# Patient Record
Sex: MALE | Race: WHITE | NOT HISPANIC OR LATINO | Employment: OTHER | URBAN - METROPOLITAN AREA
[De-identification: names, ages, dates, MRNs, and addresses within clinical notes are randomized per-mention and may not be internally consistent; named-entity substitution may affect disease eponyms.]

---

## 2017-07-28 ENCOUNTER — OFFICE VISIT (OUTPATIENT)
Dept: FAMILY MEDICINE CLINIC | Facility: CLINIC | Age: 72
End: 2017-07-28

## 2017-07-28 VITALS
TEMPERATURE: 97.8 F | HEIGHT: 70 IN | WEIGHT: 248.5 LBS | DIASTOLIC BLOOD PRESSURE: 76 MMHG | HEART RATE: 110 BPM | BODY MASS INDEX: 35.58 KG/M2 | OXYGEN SATURATION: 99 % | SYSTOLIC BLOOD PRESSURE: 120 MMHG

## 2017-07-28 DIAGNOSIS — E78.2 MIXED HYPERLIPIDEMIA: ICD-10-CM

## 2017-07-28 DIAGNOSIS — I10 ESSENTIAL HYPERTENSION: ICD-10-CM

## 2017-07-28 DIAGNOSIS — S80.12XA TRAUMATIC HEMATOMA OF LEFT LOWER LEG, INITIAL ENCOUNTER: ICD-10-CM

## 2017-07-28 DIAGNOSIS — E11.9 TYPE 2 DIABETES MELLITUS WITHOUT COMPLICATION, WITHOUT LONG-TERM CURRENT USE OF INSULIN (HCC): Primary | ICD-10-CM

## 2017-07-28 DIAGNOSIS — Z51.81 MEDICATION MONITORING ENCOUNTER: ICD-10-CM

## 2017-07-28 PROBLEM — Z00.00 ROUTINE ADULT HEALTH MAINTENANCE: Status: ACTIVE | Noted: 2017-07-28

## 2017-07-28 PROBLEM — E66.3 SEVERELY OVERWEIGHT: Status: ACTIVE | Noted: 2017-07-28

## 2017-07-28 PROBLEM — M10.062 ACUTE IDIOPATHIC GOUT OF LEFT KNEE: Status: ACTIVE | Noted: 2017-07-28

## 2017-07-28 PROCEDURE — 99204 OFFICE O/P NEW MOD 45 MIN: CPT | Performed by: FAMILY MEDICINE

## 2017-07-28 RX ORDER — PIOGLITAZONEHYDROCHLORIDE 30 MG/1
30 TABLET ORAL DAILY
Qty: 90 TABLET | Refills: 3 | Status: SHIPPED | OUTPATIENT
Start: 2017-07-28 | End: 2018-08-23 | Stop reason: SDUPTHER

## 2017-07-28 RX ORDER — PIOGLITAZONEHYDROCHLORIDE 30 MG/1
30 TABLET ORAL DAILY
COMMUNITY
End: 2017-07-28 | Stop reason: SDUPTHER

## 2017-07-28 RX ORDER — MELATONIN
1000 DAILY
COMMUNITY

## 2017-07-28 RX ORDER — LISINOPRIL 20 MG/1
20 TABLET ORAL DAILY
Qty: 90 TABLET | Refills: 3 | Status: SHIPPED | OUTPATIENT
Start: 2017-07-28 | End: 2018-08-23 | Stop reason: SDUPTHER

## 2017-07-28 RX ORDER — ALLOPURINOL 100 MG/1
100 TABLET ORAL DAILY
COMMUNITY
End: 2017-09-06 | Stop reason: SDUPTHER

## 2017-07-28 RX ORDER — ASPIRIN 81 MG/1
81 TABLET, CHEWABLE ORAL DAILY
COMMUNITY
End: 2018-09-28 | Stop reason: HOSPADM

## 2017-07-28 RX ORDER — ATORVASTATIN CALCIUM 80 MG/1
80 TABLET, FILM COATED ORAL DAILY
Qty: 90 TABLET | Refills: 3 | Status: SHIPPED | OUTPATIENT
Start: 2017-07-28 | End: 2018-08-23 | Stop reason: SDUPTHER

## 2017-07-28 RX ORDER — ATORVASTATIN CALCIUM 80 MG/1
80 TABLET, FILM COATED ORAL DAILY
COMMUNITY
End: 2017-07-28 | Stop reason: SDUPTHER

## 2017-07-28 RX ORDER — CHLORAL HYDRATE 500 MG
CAPSULE ORAL
COMMUNITY
End: 2018-08-23

## 2017-07-28 RX ORDER — LISINOPRIL 20 MG/1
20 TABLET ORAL DAILY
COMMUNITY
End: 2017-07-28 | Stop reason: SDUPTHER

## 2017-07-28 NOTE — PROGRESS NOTES
"  Chief Complaint   Patient presents with   • Hyperlipidemia     New patient, med refills    • Hypertension   • Leg Swelling     left leg       Subjective    New patient here  He and wife moved back from [place] to be near daughter and grands  Currently battling prostate cancer with radiation  Needs refills    Also tipped his lawn tractor a month ago and injured his left calf    Patient here for follow-up of elevated blood pressure.    He is not exercising and is not adherent to a low-salt diet.    Blood pressure is well controlled at home.   Cardiac symptoms: lower extremity edema.   Patient denies: chest pressure/discomfort, claudication, cough, dyspnea, exertional chest pressure/discomfort, fatigue and irregular heart beat.   Cardiovascular risk factors: advanced age (older than 55 for men, 65 for women), diabetes mellitus, dyslipidemia, family history of premature cardiovascular disease, hypertension, male gender, obesity (BMI >= 30 kg/m2) and sedentary lifestyle.   Use of agents associated with hypertension: none.   History of target organ damage: none.  Patient is taking prescribed hypertension medications as prescribed without side effects.    The following portions of the patient's history were reviewed and updated as appropriate: allergies, current medications, past family history, past medical history, past social history, past surgical history and problem list.    Review of Systems  Pertinent items are noted in HPI.    No results found for this or any previous visit.     Vitals:    07/28/17 1255   BP: 120/76   BP Location: Left arm   Patient Position: Sitting   Pulse: 110   Temp: 97.8 °F (36.6 °C)   SpO2: 99%   Weight: 248 lb 8 oz (113 kg)   Height: 69.5\" (176.5 cm)     Objective    Gen: alert, pleasant.  HEENT: PERRL, EOMI intact, lids ok, ear canals clear, TMs normal, throat clear, nostrils normal  Neck: no bruit, no enlarged thyroid  Lungs: CTA  Heart: RR no murmur  ABD: soft , + BS  Pulses: intact  Mood: " stable  Left calf: golf ball sized hematoma.  Feet : rough shape, all nails thick and yellow, hammer toes.     Assessment/Plan   Hypertension, normal blood pressure Evidence of target organ damage: none.    Bryan was seen today for hyperlipidemia, hypertension and leg swelling.    Diagnoses and all orders for this visit:    Type 2 diabetes mellitus without complication, without long-term current use of insulin  -     pioglitazone (ACTOS) 30 MG tablet; Take 1 tablet by mouth Daily. Indications: Type 2 Diabetes  -     Basic Metabolic Panel; Future  -     MicroAlbumin, Urine, Random; Future  -     Hemoglobin A1c; Future  -     Ambulatory Referral to Podiatry    Essential hypertension  -     lisinopril (PRINIVIL,ZESTRIL) 20 MG tablet; Take 1 tablet by mouth Daily. Indications: High Blood Pressure    Mixed hyperlipidemia  -     atorvastatin (LIPITOR) 80 MG tablet; Take 1 tablet by mouth Daily.  -     Lipid Panel; Future    Medication monitoring encounter  -     ALT; Future    Traumatic hematoma of left lower leg, initial encounter  -     US nonvascular extremity complete; Future      Medication: no change.  Dietary sodium restriction.  Regular aerobic exercise.  Follow up: 3 months and as needed.    There are no Patient Instructions on file for this visit.  Medications Discontinued During This Encounter   Medication Reason   • pioglitazone (ACTOS) 30 MG tablet Reorder   • lisinopril (PRINIVIL,ZESTRIL) 20 MG tablet Reorder   • atorvastatin (LIPITOR) 80 MG tablet Reorder        Return in about 3 months (around 10/28/2017) for diabetes.    Limit salt  Limit alcoholic drinks to 1 a day  Limit caffeine to 1-2 servings a day    Dr. Jose Fonseca MD  [place]  [facility].

## 2017-08-02 ENCOUNTER — RESULTS ENCOUNTER (OUTPATIENT)
Dept: FAMILY MEDICINE CLINIC | Facility: CLINIC | Age: 72
End: 2017-08-02

## 2017-08-02 DIAGNOSIS — Z51.81 MEDICATION MONITORING ENCOUNTER: ICD-10-CM

## 2017-08-02 DIAGNOSIS — E78.2 MIXED HYPERLIPIDEMIA: ICD-10-CM

## 2017-08-02 DIAGNOSIS — E11.9 TYPE 2 DIABETES MELLITUS WITHOUT COMPLICATION, WITHOUT LONG-TERM CURRENT USE OF INSULIN (HCC): ICD-10-CM

## 2017-08-04 ENCOUNTER — HOSPITAL ENCOUNTER (OUTPATIENT)
Dept: ULTRASOUND IMAGING | Facility: HOSPITAL | Age: 72
Discharge: HOME | End: 2017-08-04
Attending: FAMILY MEDICINE | Admitting: FAMILY MEDICINE

## 2017-08-04 DIAGNOSIS — S80.12XA TRAUMATIC HEMATOMA OF LEFT LOWER LEG, INITIAL ENCOUNTER: ICD-10-CM

## 2017-08-04 PROCEDURE — 76881 US COMPL JOINT R-T W/IMG: CPT

## 2017-08-07 DIAGNOSIS — S80.12XD TRAUMATIC HEMATOMA OF LEFT LOWER LEG, SUBSEQUENT ENCOUNTER: Primary | ICD-10-CM

## 2017-08-14 ENCOUNTER — OFFICE VISIT (OUTPATIENT)
Dept: SURGERY | Facility: CLINIC | Age: 72
End: 2017-08-14

## 2017-08-14 VITALS
HEIGHT: 70 IN | DIASTOLIC BLOOD PRESSURE: 78 MMHG | BODY MASS INDEX: 35.93 KG/M2 | WEIGHT: 251 LBS | SYSTOLIC BLOOD PRESSURE: 118 MMHG

## 2017-08-14 DIAGNOSIS — S80.12XA TRAUMATIC HEMATOMA OF LEFT LOWER LEG, INITIAL ENCOUNTER: Primary | ICD-10-CM

## 2017-08-14 PROCEDURE — 99202 OFFICE O/P NEW SF 15 MIN: CPT | Performed by: SURGERY

## 2017-08-14 NOTE — PROGRESS NOTES
PATIENT INFORMATION  Bryan ROSS - 1945    CHIEF COMPLAINT  Chief Complaint   Patient presents with   • Wound Check     hematoma x 3 months       HISTORY OF PRESENT ILLNESS  HPI suffered some trauma to his left leg 3 months ago.  He fell off a tractor.  He is not on any anticoagulation.  He says he has some swelling of the left leg but he denies any fevers or chills.  He denies any pain.  He denies any disability from this.  He recently had an ultrasound performed.  He does work out by lifting weights with his upper body and his lower body.        REVIEW OF SYSTEMS  Review of Systems prostate cancer being treated with radiation with fatigue      ACTIVE PROBLEMS  Patient Active Problem List    Diagnosis   • Type 2 diabetes mellitus without complication, without long-term current use of insulin [E11.9]   • Severely overweight [E66.3]   • Routine adult health maintenance [Z00.00]   • Acute idiopathic gout of left knee [M10.062]   • Medication monitoring encounter [Z51.81]   • Traumatic hematoma of left lower leg [S80.12XA]   • Essential hypertension [I10]   • Mixed hyperlipidemia [E78.2]   • Arthritis [M19.90]         PAST MEDICAL HISTORY  Past Medical History:   Diagnosis Date   • Arthritis    • Hyperlipidemia    • Hypertension          SURGICAL HISTORY  Past Surgical History:   Procedure Laterality Date   • COLONOSCOPY  2016   • HERNIA REPAIR     • PROSTATE ULTRASOUND BIOPSY     • TOTAL HIP ARTHROPLASTY Right          FAMILY HISTORY  Family History   Problem Relation Age of Onset   • Stroke Mother    • Stroke Father    • No Known Problems Brother          SOCIAL HISTORY  Social History     Occupational History   • retired [employer]       Social History Main Topics   • Smoking status: Never Smoker   • Smokeless tobacco: Never Used   • Alcohol use 8.4 oz/week     14 Glasses of wine per week   • Drug use: No   • Sexual activity: Yes     Partners: Female     Birth control/  "protection: Post-menopausal         CURRENT MEDICATIONS    Current Outpatient Prescriptions:   •  allopurinol (ZYLOPRIM) 100 MG tablet, Take 100 mg by mouth Daily., Disp: , Rfl:   •  aspirin 81 MG chewable tablet, Chew 81 mg Daily., Disp: , Rfl:   •  atorvastatin (LIPITOR) 80 MG tablet, Take 1 tablet by mouth Daily., Disp: 90 tablet, Rfl: 3  •  cholecalciferol (VITAMIN D3) 1000 UNITS tablet, Take 1,000 Units by mouth Daily., Disp: , Rfl:   •  lisinopril (PRINIVIL,ZESTRIL) 20 MG tablet, Take 1 tablet by mouth Daily. Indications: High Blood Pressure, Disp: 90 tablet, Rfl: 3  •  Multiple Vitamins-Minerals (MULTIVITAL PLATINUM PO), Take  by mouth., Disp: , Rfl:   •  Omega-3 Fatty Acids (FISH OIL) 1000 MG capsule capsule, Take  by mouth Daily With Breakfast., Disp: , Rfl:   •  pioglitazone (ACTOS) 30 MG tablet, Take 1 tablet by mouth Daily. Indications: Type 2 Diabetes, Disp: 90 tablet, Rfl: 3  •  Potassium 99 MG tablet, Take  by mouth., Disp: , Rfl:   •  Psyllium (METAMUCIL FIBER PO), Take  by mouth., Disp: , Rfl:     ALLERGIES  Review of patient's allergies indicates no known allergies.    VITALS  Vitals:    08/14/17 1300   BP: 118/78   Weight: 251 lb (114 kg)   Height: 69.5\" (176.5 cm)       LAST RESULTS   No results found for any previous visit.  Us Nonvascular Extremity Complete    Result Date: 8/4/2017  Narrative: LEFT NONVASCULAR EXTREMITY ULTRASOUND  INDICATION: Hematoma in the left leg after a fall 2 months ago. Observation for hematoma  PROCEDURE: Grayscale and Doppler imaging of the left leg in the area of patient's  COMPARISON: None  FINDINGS:  There is a complex collection in the soft tissues of the medial left calf measures 10.6 x 1.4 x 4. 8 cm.      Impression: Complex collection in the soft tissues of the medial left calf consistent with a hematoma.  This report was finalized on 8/4/2017 1:18 PM by Dr. Tito Hanson MD.        PHYSICAL EXAM  Physical Exam solar white male in no active distress.  He is " oriented ×3.  He is asymmetry of his left calf to his right calf.  His ultrasound was reviewed and discussed with the radiologist.  It is consistent with a hematoma.  There is no cellulitis.  There is no tenderness.    ASSESSMENT  Traumatic hematoma        PLAN  The risks benefits and options were discussed with the patient in detail.  I have advised him to stop his lower extremity weight work for the next 2 months.  We will observe the area if he become symptomatic we can proceed with an incision and drainage but that is not planned at this time.  I will see him back when necessary.

## 2017-08-30 LAB
ALT SERPL-CCNC: 24 U/L (ref 5–41)
BUN SERPL-MCNC: 17 MG/DL (ref 8–23)
BUN/CREAT SERPL: 13.3 (ref 7–25)
CALCIUM SERPL-MCNC: 8.8 MG/DL (ref 8.8–10.5)
CHLORIDE SERPL-SCNC: 101 MMOL/L (ref 98–107)
CHOLEST SERPL-MCNC: 185 MG/DL (ref 0–200)
CO2 SERPL-SCNC: 25.2 MMOL/L (ref 22–29)
CREAT SERPL-MCNC: 1.28 MG/DL (ref 0.76–1.27)
GLUCOSE SERPL-MCNC: 116 MG/DL (ref 65–99)
HBA1C MFR BLD: 5.7 % (ref 4.8–5.6)
HDLC SERPL-MCNC: 64 MG/DL (ref 40–60)
LDLC SERPL CALC-MCNC: 68 MG/DL (ref 0–100)
POTASSIUM SERPL-SCNC: 4.6 MMOL/L (ref 3.5–5.2)
SODIUM SERPL-SCNC: 140 MMOL/L (ref 136–145)
TRIGL SERPL-MCNC: 266 MG/DL (ref 0–150)
VLDLC SERPL CALC-MCNC: 53.2 MG/DL (ref 8–32)

## 2017-08-31 LAB — MICROALBUMIN UR-MCNC: 30.1 UG/ML

## 2017-09-06 ENCOUNTER — OFFICE VISIT (OUTPATIENT)
Dept: FAMILY MEDICINE CLINIC | Facility: CLINIC | Age: 72
End: 2017-09-06

## 2017-09-06 VITALS
BODY MASS INDEX: 35.73 KG/M2 | TEMPERATURE: 97.6 F | OXYGEN SATURATION: 93 % | SYSTOLIC BLOOD PRESSURE: 130 MMHG | DIASTOLIC BLOOD PRESSURE: 76 MMHG | HEART RATE: 106 BPM | HEIGHT: 70 IN | WEIGHT: 249.6 LBS

## 2017-09-06 DIAGNOSIS — E11.9 TYPE 2 DIABETES MELLITUS WITHOUT COMPLICATION, WITHOUT LONG-TERM CURRENT USE OF INSULIN (HCC): ICD-10-CM

## 2017-09-06 DIAGNOSIS — E11.29 MICROALBUMINURIA DUE TO TYPE 2 DIABETES MELLITUS (HCC): ICD-10-CM

## 2017-09-06 DIAGNOSIS — Z51.81 MEDICATION MONITORING ENCOUNTER: ICD-10-CM

## 2017-09-06 DIAGNOSIS — M10.062 ACUTE IDIOPATHIC GOUT OF LEFT KNEE: ICD-10-CM

## 2017-09-06 DIAGNOSIS — E78.2 MIXED HYPERLIPIDEMIA: ICD-10-CM

## 2017-09-06 DIAGNOSIS — R80.9 MICROALBUMINURIA DUE TO TYPE 2 DIABETES MELLITUS (HCC): ICD-10-CM

## 2017-09-06 DIAGNOSIS — I10 ESSENTIAL HYPERTENSION: Primary | ICD-10-CM

## 2017-09-06 PROCEDURE — 99214 OFFICE O/P EST MOD 30 MIN: CPT | Performed by: FAMILY MEDICINE

## 2017-09-06 RX ORDER — ALLOPURINOL 100 MG/1
100 TABLET ORAL DAILY
Qty: 90 TABLET | Refills: 3 | Status: SHIPPED | OUTPATIENT
Start: 2017-09-06 | End: 2018-03-26 | Stop reason: SDUPTHER

## 2017-09-06 RX ORDER — TAMSULOSIN HYDROCHLORIDE 0.4 MG/1
CAPSULE ORAL
Status: ON HOLD | COMMUNITY
Start: 2017-08-31 | End: 2019-01-04

## 2017-09-06 NOTE — PROGRESS NOTES
"  Chief Complaint   Patient presents with   • Follow-up     lab results    • Diabetes   • Hyperlipidemia   • Gout   • Lower Extremity Issue   • Hypertension       Subjective   Bryan Landers is an 72 y.o. male who presents for follow up of diabetes. Current symptoms include: none. Patient denies foot ulcerations, hyperglycemia, hypoglycemia , increased appetite, nausea, paresthesia of the feet, polydipsia, polyuria, visual disturbances, vomiting and weight loss. Evaluation to date has included: fasting blood sugar, fasting lipid panel, hemoglobin A1C and microalbuminuria. Home sugars: patient does not check sugars. Current treatments: more intensive attention to diet which has been not very effective, low cholesterol diet which has been not very effective, increased aerobic exercise which has been not very effective, Continued metformin which has been effective, Continued Actos which has been effective, Continued statin which has been effective and Continued ACE inhibitor/ARB which has been effective. Last dilated eye exam unsure.    No gout flairs.      The following portions of the patient's history were reviewed and updated as appropriate: allergies, current medications, past medical history, past social history, past surgical history and problem list.        Review of Systems  Pertinent items are noted in HPI.     Vitals:    09/06/17 0942   BP: 130/76   BP Location: Left arm   Patient Position: Sitting   Pulse: 106   Temp: 97.6 °F (36.4 °C)   SpO2: 93%   Weight: 249 lb 9.6 oz (113 kg)   Height: 69.5\" (176.5 cm)       Objective    Gen:  Alert, pleasant, heavy smell of ETOH. Eyes bloodshot  Ears: canals clear, TMs normal  Throat: clear , no thrush, teeth ok  Neck: no bruit, no LAD  Lungs: clear  Heart: RR no murmur  Feet:  No rash, no skin breakdown, sensation grossly normal.  Right calf. Less swollen , smaller but harder hematoma  If not better in a few weeks, please call Dr Jett again    Laboratory:  Results for " orders placed or performed in visit on 08/02/17   Lipid Panel   Result Value Ref Range    Total Cholesterol 185 0 - 200 mg/dL    Triglycerides 266 (H) 0 - 150 mg/dL    HDL Cholesterol 64 (H) 40 - 60 mg/dL    VLDL Cholesterol 53.2 (H) 8 - 32 mg/dL    LDL Cholesterol  68 0 - 100 mg/dL   Basic Metabolic Panel   Result Value Ref Range    Glucose 116 (H) 65 - 99 mg/dL    BUN 17 8 - 23 mg/dL    Creatinine 1.28 (H) 0.76 - 1.27 mg/dL    eGFR Non African Am 55 (L) >60 mL/min/1.73    eGFR African Am 67 >60 mL/min/1.73    BUN/Creatinine Ratio 13.3 7.0 - 25.0    Sodium 140 136 - 145 mmol/L    Potassium 4.6 3.5 - 5.2 mmol/L    Chloride 101 98 - 107 mmol/L    Total CO2 25.2 22.0 - 29.0 mmol/L    Calcium 8.8 8.8 - 10.5 mg/dL   MicroAlbumin, Urine, Random   Result Value Ref Range    Microalbumin, Urine 30.1 Not Estab. ug/mL   Hemoglobin A1c   Result Value Ref Range    Hemoglobin A1C 5.70 (H) 4.80 - 5.60 %   ALT   Result Value Ref Range    ALT (SGPT) 24 5 - 41 U/L        Assessment/Plan        Encouraged aerobic exercise.  Discussed foot care.  Reminded to get yearly retinal exam.  Continued metformin; see  medication orders.  Continued Actos; see medication orders.  Continued statin drug see medication orders.  Continued ACE inhibitor; see medication orders.  Follow up in 6 months or as needed.    Bryan was seen today for follow-up, diabetes, hyperlipidemia, gout, lower extremity issue and hypertension.    Diagnoses and all orders for this visit:    Essential hypertension    Mixed hyperlipidemia    Type 2 diabetes mellitus without complication, without long-term current use of insulin  -     Hemoglobin A1c; Future  -     Basic Metabolic Panel; Future    Acute idiopathic gout of left knee  -     allopurinol (ZYLOPRIM) 100 MG tablet; Take 1 tablet by mouth Daily. Indications: Gout secondary to Another Condition  -     Uric Acid; Future    Microalbuminuria due to type 2 diabetes mellitus  -     MicroAlbumin, Urine, Random;  Future    Medication monitoring encounter  -     ALT; Future        Return in about 6 months (around 3/6/2018) for blood pressure, Medicare Wellness visit, diabetes.  Patient Instructions   SCr went up  Protein in urine    Medications Discontinued During This Encounter   Medication Reason   • allopurinol (ZYLOPRIM) 100 MG tablet Reorder         Dr. Jose Fonseca MD  [place], [place]  [facility].

## 2017-09-22 ENCOUNTER — CLINICAL SUPPORT (OUTPATIENT)
Dept: FAMILY MEDICINE CLINIC | Facility: CLINIC | Age: 72
End: 2017-09-22

## 2017-09-22 DIAGNOSIS — Z23 IMMUNIZATION DUE: Primary | ICD-10-CM

## 2017-09-22 PROCEDURE — 90662 IIV NO PRSV INCREASED AG IM: CPT | Performed by: FAMILY MEDICINE

## 2017-09-22 PROCEDURE — 90471 IMMUNIZATION ADMIN: CPT | Performed by: FAMILY MEDICINE

## 2017-12-06 ENCOUNTER — RESULTS ENCOUNTER (OUTPATIENT)
Dept: FAMILY MEDICINE CLINIC | Facility: CLINIC | Age: 72
End: 2017-12-06

## 2017-12-06 DIAGNOSIS — Z51.81 MEDICATION MONITORING ENCOUNTER: ICD-10-CM

## 2017-12-06 DIAGNOSIS — M10.062 ACUTE IDIOPATHIC GOUT OF LEFT KNEE: ICD-10-CM

## 2017-12-06 DIAGNOSIS — E11.29 MICROALBUMINURIA DUE TO TYPE 2 DIABETES MELLITUS (HCC): ICD-10-CM

## 2017-12-06 DIAGNOSIS — R80.9 MICROALBUMINURIA DUE TO TYPE 2 DIABETES MELLITUS (HCC): ICD-10-CM

## 2017-12-06 DIAGNOSIS — E11.9 TYPE 2 DIABETES MELLITUS WITHOUT COMPLICATION, WITHOUT LONG-TERM CURRENT USE OF INSULIN (HCC): ICD-10-CM

## 2017-12-16 LAB
ALT SERPL-CCNC: 28 U/L (ref 5–41)
BUN SERPL-MCNC: 15 MG/DL (ref 8–23)
BUN/CREAT SERPL: 11.3 (ref 7–25)
CALCIUM SERPL-MCNC: 8.8 MG/DL (ref 8.8–10.5)
CHLORIDE SERPL-SCNC: 100 MMOL/L (ref 98–107)
CO2 SERPL-SCNC: 27.8 MMOL/L (ref 22–29)
CREAT SERPL-MCNC: 1.33 MG/DL (ref 0.76–1.27)
GFR SERPLBLD CREATININE-BSD FMLA CKD-EPI: 53 ML/MIN/1.73
GFR SERPLBLD CREATININE-BSD FMLA CKD-EPI: 64 ML/MIN/1.73
GLUCOSE SERPL-MCNC: 129 MG/DL (ref 65–99)
HBA1C MFR BLD: 5.8 % (ref 4.8–5.6)
MICROALBUMIN UR-MCNC: 26 UG/ML
POTASSIUM SERPL-SCNC: 4.4 MMOL/L (ref 3.5–5.2)
SODIUM SERPL-SCNC: 140 MMOL/L (ref 136–145)
URATE SERPL-MCNC: 6.6 MG/DL (ref 3.4–7)

## 2018-02-27 ENCOUNTER — OFFICE VISIT (OUTPATIENT)
Dept: FAMILY MEDICINE CLINIC | Facility: CLINIC | Age: 73
End: 2018-02-27

## 2018-02-27 VITALS
OXYGEN SATURATION: 95 % | WEIGHT: 252 LBS | DIASTOLIC BLOOD PRESSURE: 64 MMHG | HEIGHT: 70 IN | HEART RATE: 115 BPM | SYSTOLIC BLOOD PRESSURE: 118 MMHG | TEMPERATURE: 97.4 F | BODY MASS INDEX: 36.08 KG/M2

## 2018-02-27 DIAGNOSIS — Z85.46 HISTORY OF PROSTATE CANCER: Primary | ICD-10-CM

## 2018-02-27 DIAGNOSIS — I10 ESSENTIAL HYPERTENSION: ICD-10-CM

## 2018-02-27 DIAGNOSIS — E11.9 TYPE 2 DIABETES MELLITUS WITHOUT COMPLICATION, WITHOUT LONG-TERM CURRENT USE OF INSULIN (HCC): ICD-10-CM

## 2018-02-27 PROBLEM — S80.12XA TRAUMATIC HEMATOMA OF LEFT LOWER LEG: Status: RESOLVED | Noted: 2017-07-28 | Resolved: 2018-02-27

## 2018-02-27 PROCEDURE — 99213 OFFICE O/P EST LOW 20 MIN: CPT | Performed by: FAMILY MEDICINE

## 2018-02-27 NOTE — PROGRESS NOTES
Subjective   Bryan Landers is a 73 y.o. male who is here for   Chief Complaint   Patient presents with   • Follow-up   • Prostate Cancer   • Hypertension   .     History of Present Illness   Overall doing well after his 43 RXT for prostate cancer.finished up in Aug 17'  Wants to move back to [facility]  His wife was here last week , I s/w her in a room  She was distraught over his recent anger and wanted me to talk with him.  He verbally hurt his daughter and son in law and his 2 adult grandchildren's feelings  Which is unlike him.  He blames them for the issue  He seems level headed today  He acknowledges he and wife disagree about moving away  Their daughter and grands are here.  He has no friends here  Is lonely.  3-4 drinks a night. (MAYBE MORE?)  No smell of etoh today.      The following portions of the patient's history were reviewed and updated as appropriate: allergies, current medications, past family history, past medical history, past social history, past surgical history and problem list.    Review of Systems    Objective   Physical Exam   Constitutional: He appears well-developed and well-nourished.   Cardiovascular: Normal rate.    Neurological: He is alert.   Psychiatric: He has a normal mood and affect. His behavior is normal. Judgment and thought content normal.   Nursing note and vitals reviewed.      Assessment/Plan   Bryan was seen today for follow-up, prostate cancer and hypertension.    Diagnoses and all orders for this visit:    History of prostate cancer    Essential hypertension    Type 2 diabetes mellitus without complication, without long-term current use of insulin      There are no Patient Instructions on file for this visit.    There are no discontinued medications.     Return in about 6 months (around 8/27/2018) for Medicare Wellness visit.      The next day, i called his wife Chloe and gave her an update.  I did not see depression or dementia  Suggested continue to work things out  calmly at home.    Dr. Jose Fonseca  [place], [place]

## 2018-03-06 ENCOUNTER — TELEPHONE (OUTPATIENT)
Dept: FAMILY MEDICINE CLINIC | Facility: CLINIC | Age: 73
End: 2018-03-06

## 2018-03-06 NOTE — TELEPHONE ENCOUNTER
Pt would like to know if he can come in for a Hep A vaccine? He and his wife are going out of state traveling.

## 2018-03-07 ENCOUNTER — CLINICAL SUPPORT (OUTPATIENT)
Dept: FAMILY MEDICINE CLINIC | Facility: CLINIC | Age: 73
End: 2018-03-07

## 2018-03-07 DIAGNOSIS — Z23 IMMUNIZATION DUE: Primary | ICD-10-CM

## 2018-03-07 PROCEDURE — 90471 IMMUNIZATION ADMIN: CPT | Performed by: FAMILY MEDICINE

## 2018-03-07 PROCEDURE — 90632 HEPA VACCINE ADULT IM: CPT | Performed by: FAMILY MEDICINE

## 2018-03-21 ENCOUNTER — OFFICE VISIT (OUTPATIENT)
Dept: FAMILY MEDICINE CLINIC | Facility: CLINIC | Age: 73
End: 2018-03-21

## 2018-03-21 VITALS
SYSTOLIC BLOOD PRESSURE: 126 MMHG | WEIGHT: 251.9 LBS | TEMPERATURE: 97.9 F | OXYGEN SATURATION: 95 % | BODY MASS INDEX: 36.06 KG/M2 | HEART RATE: 113 BPM | DIASTOLIC BLOOD PRESSURE: 80 MMHG | HEIGHT: 70 IN

## 2018-03-21 DIAGNOSIS — J40 BRONCHITIS: Primary | ICD-10-CM

## 2018-03-21 PROCEDURE — 99213 OFFICE O/P EST LOW 20 MIN: CPT | Performed by: FAMILY MEDICINE

## 2018-03-21 RX ORDER — AMOXICILLIN 500 MG/1
500 TABLET, FILM COATED ORAL 3 TIMES DAILY
Qty: 21 TABLET | Refills: 0 | Status: SHIPPED | OUTPATIENT
Start: 2018-03-21 | End: 2018-03-28

## 2018-03-21 NOTE — PROGRESS NOTES
"  Chief Complaint   Patient presents with   • Sore Throat     x 1 day    • Nasal Congestion       Upper Respiratory Infection: Patient complains of symptoms of a URI, possible sinusitis. Symptoms include congestion, cough and sore throat. Onset of symptoms was several days ago, gradually worsening since that time. He also c/o congestion, low grade fever, nasal congestion, productive cough with  yellow colored sputum and wheezing for the past 3 days .  He is drinking moderate amounts of fluids. Evaluation to date: none. Treatment to date: none.  Ill contacts at home or school or work discussed.      Vitals:    03/21/18 1052   BP: 126/80   BP Location: Left arm   Patient Position: Sitting   Pulse: 113   Temp: 97.9 °F (36.6 °C)   SpO2: 95%   Weight: 114 kg (251 lb 14.4 oz)   Height: 176.5 cm (69.5\")     Gen: mildly ill appearing, alert, smells of ETOH  Ears: Tm's  without redness  Nose:  Congestion  Throat:  Red without exudate, some drainage, tonsils okay, teeth in bad shape , several are rotten, needs to see dentist soon.  Neck: no LAD  Lung: mild rales, good air movement, regular RR  Heart: RR without murmur  Skin: advanced sun skin damage to face, mulit large AKs      Assessment/Plan   Bryan was seen today for sore throat and nasal congestion.    Diagnoses and all orders for this visit:    Bronchitis  -     amoxicillin (AMOXIL) 500 MG tablet; Take 1 tablet by mouth 3 (Three) Times a Day for 7 days.             There are no Patient Instructions on file for this visit.    Tylenol or Advil as needed for pain, fever, muscle aches  Plenty of fluids  Hand washing discussed  Off work or school note given if needed.  Warm tea for throat.  Pros and cons of antibiotic use discussed    Dr. Jose Fonseca MD  [place]  [facility]  "

## 2018-03-26 DIAGNOSIS — M10.062 ACUTE IDIOPATHIC GOUT OF LEFT KNEE: ICD-10-CM

## 2018-03-26 RX ORDER — ALLOPURINOL 100 MG/1
200 TABLET ORAL DAILY
Qty: 180 TABLET | Refills: 1 | Status: SHIPPED | OUTPATIENT
Start: 2018-03-26 | End: 2018-08-23 | Stop reason: SDUPTHER

## 2018-04-17 ENCOUNTER — HOSPITAL ENCOUNTER (EMERGENCY)
Facility: HOSPITAL | Age: 73
Discharge: HOME | End: 2018-04-17
Attending: EMERGENCY MEDICINE | Admitting: EMERGENCY MEDICINE

## 2018-04-17 VITALS
RESPIRATION RATE: 15 BRPM | HEIGHT: 70 IN | SYSTOLIC BLOOD PRESSURE: 145 MMHG | DIASTOLIC BLOOD PRESSURE: 88 MMHG | HEART RATE: 109 BPM | TEMPERATURE: 97.5 F | BODY MASS INDEX: 35.07 KG/M2 | WEIGHT: 245 LBS | OXYGEN SATURATION: 97 %

## 2018-04-17 DIAGNOSIS — S51.811A SKIN TEAR OF FOREARM WITHOUT COMPLICATION, RIGHT, INITIAL ENCOUNTER: Primary | ICD-10-CM

## 2018-04-17 PROCEDURE — 99283 EMERGENCY DEPT VISIT LOW MDM: CPT

## 2018-04-17 PROCEDURE — 99282 EMERGENCY DEPT VISIT SF MDM: CPT | Performed by: EMERGENCY MEDICINE

## 2018-04-17 RX ORDER — CEPHALEXIN 500 MG/1
500 CAPSULE ORAL 3 TIMES DAILY
Qty: 9 CAPSULE | Refills: 0 | Status: SHIPPED | OUTPATIENT
Start: 2018-04-17 | End: 2018-04-20

## 2018-04-17 NOTE — DISCHARGE INSTRUCTIONS
Change wound dressing once a day until it is well-healed.  Please return to the ER for any worsening pain, swelling, redness, fevers, drainage or any other concerns.

## 2018-04-17 NOTE — ED PROVIDER NOTES
Subjective   History of Present Illness  History of Present Illness    Chief complaint: Fall, forearm injury    Location: Right forearm    Quality/Severity:  Mild pain and bleeding    Timing/Duration: Occurred about 3 hours ago    Modifying Factors: None    Narrative: This patient presents for evaluation of a forearm injury after he accidentally fell.  He was in the parking lot of the liquor store and he tripped over his own feet causing him to fall to ground.  When he landed his forearm struck the asphalt surface and caused a skin tear with some immediate pain and some bleeding.  He was able to move the arm in all directions without any difficulty.  He denies any head injury or loss of consciousness.  He denies any injury to his trunk.  He says his left knee is a little bit sore but he hasn't been able to bear weight on it and he does not think it is particularly injured.  He expresses concern that he just needs help with the wound care needs on his forearm.    Associated Symptoms: As above    Review of Systems   Constitutional: Negative for activity change and diaphoresis.   Respiratory: Negative for shortness of breath.    Cardiovascular: Negative for chest pain.   Gastrointestinal: Negative for abdominal pain and vomiting.   Musculoskeletal: Positive for myalgias. Negative for gait problem.   Skin: Positive for wound. Negative for color change.   Neurological: Negative for syncope and numbness.   All other systems reviewed and are negative.      Past Medical History:   Diagnosis Date   • Arthritis    • Hyperlipidemia    • Hypertension        No Known Allergies    Past Surgical History:   Procedure Laterality Date   • COLONOSCOPY  09/2016   • HERNIA REPAIR     • PROSTATE ULTRASOUND BIOPSY     • TOTAL HIP ARTHROPLASTY Right 2007       Family History   Problem Relation Age of Onset   • Stroke Mother    • Stroke Father    • No Known Problems Brother        Social History     Social History   • Marital status:       Spouse name: Chloe   • Number of children: 2     Occupational History   • retired [employer]       Social History Main Topics   • Smoking status: Never Smoker   • Smokeless tobacco: Never Used   • Alcohol use 8.4 oz/week     14 Glasses of wine per week   • Drug use: No   • Sexual activity: Yes     Partners: Female     Birth control/ protection: Post-menopausal     Other Topics Concern   • Not on file       ED Triage Vitals [04/17/18 1421]   Temp Heart Rate Resp BP SpO2   97.5 °F (36.4 °C) 109 15 145/88 97 %      Temp src Heart Rate Source Patient Position BP Location FiO2 (%)   Oral Monitor -- -- --         Objective   Physical Exam   Constitutional: He is oriented to person, place, and time. He appears well-developed and well-nourished. No distress.   HENT:   Head: Normocephalic and atraumatic.   Eyes: EOM are normal. Pupils are equal, round, and reactive to light. Right eye exhibits no discharge. Left eye exhibits no discharge.   Neck: Normal range of motion. Neck supple.   Cardiovascular: Normal rate, regular rhythm and intact distal pulses.    Pulmonary/Chest: Effort normal. No respiratory distress.   Musculoskeletal: Normal range of motion. He exhibits tenderness. He exhibits no edema or deformity.   The right dorsolateral proximal forearm shows one moderate sized skin tear abrasion injury that extends about 6 cm in diameter.  The soft tissues are tender to palpation.  There is minimal oozing of dark red blood present.  There are no foreign bodies evident within the wound.  There is no evidence of tendon, nerve or vascular injury at all.  The patient is able to flex and extend the elbow and forearm and fingers to completely normal range of motion.  Distal sensation is intact to all fingers appropriately.   Neurological: He is alert and oriented to person, place, and time. He exhibits normal muscle tone.   Skin: Skin is warm and dry. No rash noted. He is not diaphoretic. No  "erythema.   Psychiatric: He has a normal mood and affect. His behavior is normal. Judgment and thought content normal.   Nursing note and vitals reviewed.      Procedures         ED Course  ED Course   Comment By Time   Presented with a skin tear injury to the right forearm.  It was not amenable to suturing.  I personally irrigated the wound with 1000 mL of sterile saline over a sink.  He and I reapproximated the wound edges of the skin tear epithelium back over the abraded tissue to create a sterile dressing.  We applied bacitracin over entire wound after that and I applied a nonadherent gauze dressings with Curlex and then Coban and for security.  Patient tolerated wound care very well.  Discharged home with a 3 day prescription for Keflex.  Advised follow-up with primary care doctor for repeat evaluation of the wound and I reviewed with him the usual \"return to ER\" instructions for any worsening changes or concerns about wound healing. Tobin Szymanski MD 04/17 [id number]                  [facility]    Final diagnoses:   Skin tear of forearm without complication, right, initial encounter            Tobin Szymanski MD  04/17/18 0423    "

## 2018-04-23 ENCOUNTER — OFFICE VISIT (OUTPATIENT)
Dept: FAMILY MEDICINE CLINIC | Facility: CLINIC | Age: 73
End: 2018-04-23

## 2018-04-23 VITALS
WEIGHT: 252 LBS | OXYGEN SATURATION: 97 % | HEIGHT: 70 IN | SYSTOLIC BLOOD PRESSURE: 120 MMHG | BODY MASS INDEX: 36.08 KG/M2 | HEART RATE: 103 BPM | DIASTOLIC BLOOD PRESSURE: 78 MMHG

## 2018-04-23 DIAGNOSIS — S51.811D SKIN TEAR OF FOREARM WITHOUT COMPLICATION, RIGHT, SUBSEQUENT ENCOUNTER: Primary | ICD-10-CM

## 2018-04-23 PROCEDURE — 99213 OFFICE O/P EST LOW 20 MIN: CPT | Performed by: FAMILY MEDICINE

## 2018-04-23 NOTE — PROGRESS NOTES
Subjective   Bryan Landers is a 73 y.o. male who is here for   Chief Complaint   Patient presents with   • Follow-up     ER   • Fall     x 1 week ago    • Wound Check     on right arm    .     History of Present Illness   Tripped and feel last week on [place]  Large skin tear flap on right forearm  Went to ER, note reviewed. Done with Keflex  No sutures.  Healing well  No pain.  tetnus utd    The following portions of the patient's history were reviewed and updated as appropriate: allergies, current medications, past medical history, past social history and problem list.    Review of Systems    Objective   Physical Exam   Skin:        Nursing note and vitals reviewed.      Assessment/Plan   Bryan was seen today for follow-up, fall and wound check.    Diagnoses and all orders for this visit:    Skin tear of forearm without complication, right, subsequent encounter      Patient Instructions   Gently wash 2 x a day with soap and water  Neosporin  Cover with non stick and ACE wrap   May also leave open to air if at home.      There are no discontinued medications.     Return for Next scheduled follow up.    Dr. Jose Fonseca  [facility] [place], [place]

## 2018-04-23 NOTE — PATIENT INSTRUCTIONS
Gently wash 2 x a day with soap and water  Neosporin  Cover with non stick and ACE wrap   May also leave open to air if at home.

## 2018-06-21 ENCOUNTER — OFFICE VISIT (OUTPATIENT)
Dept: FAMILY MEDICINE CLINIC | Facility: CLINIC | Age: 73
End: 2018-06-21

## 2018-06-21 VITALS
HEART RATE: 111 BPM | OXYGEN SATURATION: 94 % | DIASTOLIC BLOOD PRESSURE: 76 MMHG | SYSTOLIC BLOOD PRESSURE: 114 MMHG | BODY MASS INDEX: 35.65 KG/M2 | HEIGHT: 70 IN | WEIGHT: 249 LBS

## 2018-06-21 DIAGNOSIS — S36.508A: Primary | ICD-10-CM

## 2018-06-21 PROCEDURE — 99213 OFFICE O/P EST LOW 20 MIN: CPT | Performed by: FAMILY MEDICINE

## 2018-06-21 NOTE — PROGRESS NOTES
Subjective   Bryan Landers is a 73 y.o. male who is here for   Chief Complaint   Patient presents with   • Rectal Bleeding     getting worse x 2-3 weeks    .     History of Present Illness   Blood in Stool: Patient presents for presents evaluation of blood in stool/ rectal bleeding. Patient has associated symptoms of visible blood: light. The patient denies abdominal pain and diarrhea.  The patient has a known history of: no known risk factors. The patient has had 9 or 10 episodes of rectal bleeding.  There is a history of rectal injury. Patient has not similar episodes of rectal bleeding in the past.  He battles daily conspitation, takes 6 fiber tabs a day  Went to [place] for a week, got really constipated  At home finally had a large painful BM  Since then streaking red blood with BM      The following portions of the patient's history were reviewed and updated as appropriate: allergies, current medications, past family history, past medical history, past social history, past surgical history and problem list.    Review of Systems    Objective   Physical Exam   Genitourinary: Prostate normal. Rectal exam shows fissure. Rectal exam shows no external hemorrhoid, no internal hemorrhoid, no mass, no tenderness and anal tone normal.             Assessment/Plan   Bryan was seen today for rectal bleeding.    Diagnoses and all orders for this visit:    Injury of anal canal, initial encounter      Patient Instructions   Plenty of water  Baby wipes  Vaseline after BM to cut  Hold preventive aspirin for a week or 2      There are no discontinued medications.     Return for Next scheduled follow up.    Dr. Jose Fonseca  [facility]  [place], [place]

## 2018-06-22 ENCOUNTER — TELEPHONE (OUTPATIENT)
Dept: FAMILY MEDICINE CLINIC | Facility: CLINIC | Age: 73
End: 2018-06-22

## 2018-06-22 NOTE — TELEPHONE ENCOUNTER
Pt called and said you were going to call something in for his rash on the back of his leg that you found yesterday.

## 2018-07-26 ENCOUNTER — TELEPHONE (OUTPATIENT)
Dept: FAMILY MEDICINE CLINIC | Facility: CLINIC | Age: 73
End: 2018-07-26

## 2018-07-26 DIAGNOSIS — S36.508D: Primary | ICD-10-CM

## 2018-07-31 ENCOUNTER — TELEPHONE (OUTPATIENT)
Dept: SURGERY | Facility: CLINIC | Age: 73
End: 2018-07-31

## 2018-07-31 ENCOUNTER — OFFICE VISIT (OUTPATIENT)
Dept: SURGERY | Facility: CLINIC | Age: 73
End: 2018-07-31

## 2018-07-31 VITALS
DIASTOLIC BLOOD PRESSURE: 80 MMHG | WEIGHT: 252 LBS | BODY MASS INDEX: 36.08 KG/M2 | HEIGHT: 70 IN | SYSTOLIC BLOOD PRESSURE: 132 MMHG

## 2018-07-31 DIAGNOSIS — K64.8 INTERNAL HEMORRHOIDS: Primary | ICD-10-CM

## 2018-07-31 PROCEDURE — 99212 OFFICE O/P EST SF 10 MIN: CPT | Performed by: SURGERY

## 2018-07-31 NOTE — PROGRESS NOTES
INTERMITTENT RECTAL BLEEDING X FEW MONTHS, DARK STOOL, LAST C-SCOPE ABOUT 3 YEARS AGO  He has noticed some blood when he wipes on his tissue occasionally over the last several weeks.  He had a colonoscopy 2 years ago that had 2 polyps.  He did have radiation treatment for prostate cancer.  He is due for another colonoscopy in 3 years.  He saw his primary care doctor and was diagnosed with a fissure.  He denies any painful bowel movements recently.  He's been using Metamucil and he does have some constipation.  He drinks about 32 ounces of water per day.  Alert white male in no active distress.  He has no external hemorrhoids or skin tags.  His sphincter tone was normal.  Rectal exam was normal.  It was nontender I did not feel a mass or a fissure.  Anoscopy was performed and I saw no evidence of proctitis I did see some small internal hemorrhoids and no evidence of a fissure today.  His benefits and options were discussed with the patient.  I've advised him to increase his water intake to 64 ounces per day.  He can resume his daily aspirin.  I wrote him a prescription for Anusol HC suppositories one twice a day ×14 days.  If he has recurrent bleeding at that time he should call and we will likely refer him to colorectal surgery.

## 2018-08-15 DIAGNOSIS — Z51.81 MEDICATION MONITORING ENCOUNTER: ICD-10-CM

## 2018-08-15 DIAGNOSIS — Z00.00 ROUTINE ADULT HEALTH MAINTENANCE: ICD-10-CM

## 2018-08-15 DIAGNOSIS — E11.29 MICROALBUMINURIA DUE TO TYPE 2 DIABETES MELLITUS (HCC): ICD-10-CM

## 2018-08-15 DIAGNOSIS — M10.062 ACUTE IDIOPATHIC GOUT OF LEFT KNEE: ICD-10-CM

## 2018-08-15 DIAGNOSIS — R53.83 FATIGUE, UNSPECIFIED TYPE: ICD-10-CM

## 2018-08-15 DIAGNOSIS — R80.9 MICROALBUMINURIA DUE TO TYPE 2 DIABETES MELLITUS (HCC): ICD-10-CM

## 2018-08-15 DIAGNOSIS — E78.2 MIXED HYPERLIPIDEMIA: ICD-10-CM

## 2018-08-15 DIAGNOSIS — I10 ESSENTIAL HYPERTENSION: Primary | ICD-10-CM

## 2018-08-15 DIAGNOSIS — E11.9 TYPE 2 DIABETES MELLITUS WITHOUT COMPLICATION, WITHOUT LONG-TERM CURRENT USE OF INSULIN (HCC): ICD-10-CM

## 2018-08-15 DIAGNOSIS — Z12.5 ENCOUNTER FOR SCREENING FOR MALIGNANT NEOPLASM OF PROSTATE: ICD-10-CM

## 2018-08-16 LAB
ALT SERPL-CCNC: 29 U/L (ref 5–41)
APPEARANCE UR: CLEAR
BILIRUB UR QL STRIP: NEGATIVE
BUN SERPL-MCNC: 12 MG/DL (ref 8–23)
BUN/CREAT SERPL: 8.8 (ref 7–25)
CALCIUM SERPL-MCNC: 9.6 MG/DL (ref 8.8–10.5)
CHLORIDE SERPL-SCNC: 99 MMOL/L (ref 98–107)
CHOLEST SERPL-MCNC: 200 MG/DL (ref 0–200)
CO2 SERPL-SCNC: 27.1 MMOL/L (ref 22–29)
COLOR UR: YELLOW
CREAT SERPL-MCNC: 1.37 MG/DL (ref 0.76–1.27)
ERYTHROCYTE [DISTWIDTH] IN BLOOD BY AUTOMATED COUNT: 13.3 % (ref 11.5–14.5)
GLUCOSE SERPL-MCNC: 131 MG/DL (ref 65–99)
GLUCOSE UR QL: NEGATIVE
HBA1C MFR BLD: 5.9 % (ref 4.8–5.6)
HCT VFR BLD AUTO: 41 % (ref 42–52)
HDLC SERPL-MCNC: 70 MG/DL (ref 40–60)
HGB BLD-MCNC: 13.6 G/DL (ref 14–18)
HGB UR QL STRIP: NEGATIVE
KETONES UR QL STRIP: NEGATIVE
LDLC SERPL CALC-MCNC: 74 MG/DL (ref 0–100)
LDLC/HDLC SERPL: 1.06 {RATIO}
LEUKOCYTE ESTERASE UR QL STRIP: NEGATIVE
MCH RBC QN AUTO: 36 PG (ref 27–31)
MCHC RBC AUTO-ENTMCNC: 33.2 G/DL (ref 31–37)
MCV RBC AUTO: 108.5 FL (ref 80–94)
NITRITE UR QL STRIP: NEGATIVE
PH UR STRIP: 7 [PH] (ref 4.5–8)
PLATELET # BLD AUTO: 159 10*3/MM3 (ref 140–500)
POTASSIUM SERPL-SCNC: 5 MMOL/L (ref 3.5–5.2)
PROT UR QL STRIP: NEGATIVE
PSA SERPL-MCNC: 0.62 NG/ML (ref 0–4)
RBC # BLD AUTO: 3.78 10*6/MM3 (ref 4.7–6.1)
SODIUM SERPL-SCNC: 141 MMOL/L (ref 136–145)
SP GR UR: 1.01 (ref 1–1.03)
TRIGL SERPL-MCNC: 279 MG/DL (ref 0–150)
TSH SERPL DL<=0.005 MIU/L-ACNC: 4.76 MIU/ML (ref 0.27–4.2)
URATE SERPL-MCNC: 5.3 MG/DL (ref 3.4–7)
UROBILINOGEN UR STRIP-MCNC: NORMAL MG/DL
VLDLC SERPL CALC-MCNC: 55.8 MG/DL (ref 8–32)
WBC # BLD AUTO: 4.36 10*3/MM3 (ref 4.8–10.8)

## 2018-08-23 ENCOUNTER — OFFICE VISIT (OUTPATIENT)
Dept: FAMILY MEDICINE CLINIC | Facility: CLINIC | Age: 73
End: 2018-08-23

## 2018-08-23 VITALS
BODY MASS INDEX: 36.08 KG/M2 | SYSTOLIC BLOOD PRESSURE: 110 MMHG | HEIGHT: 70 IN | HEART RATE: 100 BPM | WEIGHT: 252 LBS | OXYGEN SATURATION: 95 % | DIASTOLIC BLOOD PRESSURE: 72 MMHG

## 2018-08-23 DIAGNOSIS — Z85.46 HISTORY OF PROSTATE CANCER: ICD-10-CM

## 2018-08-23 DIAGNOSIS — Z51.81 MEDICATION MONITORING ENCOUNTER: ICD-10-CM

## 2018-08-23 DIAGNOSIS — M10.062 ACUTE IDIOPATHIC GOUT OF LEFT KNEE: Primary | ICD-10-CM

## 2018-08-23 DIAGNOSIS — E11.9 TYPE 2 DIABETES MELLITUS WITHOUT COMPLICATION, WITHOUT LONG-TERM CURRENT USE OF INSULIN (HCC): ICD-10-CM

## 2018-08-23 DIAGNOSIS — Z00.00 MEDICARE ANNUAL WELLNESS VISIT, SUBSEQUENT: ICD-10-CM

## 2018-08-23 DIAGNOSIS — N18.2 STAGE 2 CHRONIC KIDNEY DISEASE: ICD-10-CM

## 2018-08-23 DIAGNOSIS — M19.90 ARTHRITIS: ICD-10-CM

## 2018-08-23 DIAGNOSIS — I10 ESSENTIAL HYPERTENSION: ICD-10-CM

## 2018-08-23 DIAGNOSIS — E78.2 MIXED HYPERLIPIDEMIA: ICD-10-CM

## 2018-08-23 PROBLEM — S36.508A: Status: RESOLVED | Noted: 2018-06-21 | Resolved: 2018-08-23

## 2018-08-23 PROCEDURE — G0439 PPPS, SUBSEQ VISIT: HCPCS | Performed by: FAMILY MEDICINE

## 2018-08-23 RX ORDER — PIOGLITAZONEHYDROCHLORIDE 30 MG/1
30 TABLET ORAL DAILY
Qty: 90 TABLET | Refills: 3 | Status: ON HOLD | OUTPATIENT
Start: 2018-08-23 | End: 2019-01-10 | Stop reason: SDUPTHER

## 2018-08-23 RX ORDER — ATORVASTATIN CALCIUM 80 MG/1
80 TABLET, FILM COATED ORAL DAILY
Qty: 90 TABLET | Refills: 3 | Status: SHIPPED | OUTPATIENT
Start: 2018-08-23 | End: 2019-08-15 | Stop reason: SDUPTHER

## 2018-08-23 RX ORDER — ALLOPURINOL 100 MG/1
200 TABLET ORAL DAILY
Qty: 180 TABLET | Refills: 3 | Status: SHIPPED | OUTPATIENT
Start: 2018-08-23 | End: 2019-02-28

## 2018-08-23 RX ORDER — LISINOPRIL 20 MG/1
20 TABLET ORAL DAILY
Qty: 90 TABLET | Refills: 3 | Status: ON HOLD | OUTPATIENT
Start: 2018-08-23 | End: 2019-01-09 | Stop reason: SDUPTHER

## 2018-08-23 NOTE — PATIENT INSTRUCTIONS
Results for orders placed or performed in visit on 08/15/18   PSA SCREENING   Result Value Ref Range    PSA 0.617 0.000 - 4.000 ng/mL   Basic metabolic panel   Result Value Ref Range    Glucose 131 (H) 65 - 99 mg/dL    BUN 12 8 - 23 mg/dL    Creatinine 1.37 (H) 0.76 - 1.27 mg/dL    eGFR Non African Am 51 (L) >60 mL/min/1.73    eGFR African Am 62 >60 mL/min/1.73    BUN/Creatinine Ratio 8.8 7.0 - 25.0    Sodium 141 136 - 145 mmol/L    Potassium 5.0 3.5 - 5.2 mmol/L    Chloride 99 98 - 107 mmol/L    Total CO2 27.1 22.0 - 29.0 mmol/L    Calcium 9.6 8.8 - 10.5 mg/dL   ALT   Result Value Ref Range    ALT (SGPT) 29 5 - 41 U/L   CBC (No Diff)   Result Value Ref Range    WBC 4.36 (L) 4.80 - 10.80 10*3/mm3    RBC 3.78 (L) 4.70 - 6.10 10*6/mm3    Hemoglobin 13.6 (L) 14.0 - 18.0 g/dL    Hematocrit 41.0 (L) 42.0 - 52.0 %    .5 (H) 80.0 - 94.0 fL    MCH 36.0 (H) 27.0 - 31.0 pg    MCHC 33.2 31.0 - 37.0 g/dL    RDW 13.3 11.5 - 14.5 %    Platelets 159 140 - 500 10*3/mm3   TSH   Result Value Ref Range    TSH 4.760 (H) 0.270 - 4.200 mIU/mL   Urinalysis With Microscopic If Indicated (No Culture) - Urine, Clean Catch   Result Value Ref Range    Specific Gravity, UA 1.015 1.003 - 1.030    pH, UA 7.0 4.5 - 8.0    Color, UA Yellow     Appearance, UA Clear Clear    Leukocytes, UA Negative Negative    Protein Negative Negative    Glucose, UA Negative Negative    Ketones Negative     Blood, UA Negative Negative    Bilirubin, UA Negative Negative    Urobilinogen, UA Comment     Nitrite, UA Negative Negative   Lipid Panel With LDL / HDL Ratio   Result Value Ref Range    Total Cholesterol 200 0 - 200 mg/dL    Triglycerides 279 (H) 0 - 150 mg/dL    HDL Cholesterol 70 (H) 40 - 60 mg/dL    VLDL Cholesterol 55.8 (H) 8 - 32 mg/dL    LDL Cholesterol  74 0 - 100 mg/dL    LDL/HDL Ratio 1.06    Hemoglobin A1c   Result Value Ref Range    Hemoglobin A1C 5.90 (H) 4.80 - 5.60 %   Uric Acid   Result Value Ref Range    Uric Acid 5.3 3.4 - 7.0 mg/dL

## 2018-08-23 NOTE — PROGRESS NOTES
QUICK REFERENCE INFORMATION:  The ABCs of the Annual Wellness Visit    Subsequent Medicare Wellness Visit    HEALTH RISK ASSESSMENT    1945    Recent Hospitalizations:  No hospitalization(s) within the last year..        Current Medical Providers:  Patient Care Team:  Jose Fonseca MD as PCP - General (Family Medicine)  Jose Michelle MD as Consulting Physician (Radiation Oncology)  Roldan Mccarthy MD as Consulting Physician (Urology)        Smoking Status:  History   Smoking Status   • Former Smoker   • Types: Cigars   Smokeless Tobacco   • Never Used       Alcohol Consumption:  History   Alcohol Use   • 16.8 oz/week   • 28 Standard drinks or equivalent per week     Comment: 2 double vodkas a night       Depression Screen:   PHQ-2/PHQ-9 Depression Screening 8/23/2018   Little interest or pleasure in doing things 0   Feeling down, depressed, or hopeless 0   Total Score 0       Health Habits and Functional and Cognitive Screening:  Functional & Cognitive Status 8/23/2018   Do you have difficulty preparing food and eating? No   Do you have difficulty bathing yourself, getting dressed or grooming yourself? No   Do you have difficulty using the toilet? No   Do you have difficulty moving around from place to place? No   Do you have trouble with steps or getting out of a bed or a chair? No   In the past year have you fallen or experienced a near fall? No   Current Diet Well Balanced Diet   Dental Exam Up to date   Eye Exam Up to date   Exercise (times per week) 4 times per week   Current Exercise Activities Include Walking   Do you need help using the phone?  No   Are you deaf or do you have serious difficulty hearing?  Yes   Do you need help with transportation? No   Do you need help shopping? No   Do you need help preparing meals?  No   Do you need help with housework?  No   Do you need help with laundry? No   Do you need help taking your medications? No   Do you need help managing money? No   Do you  ever drive or ride in a car without wearing a seat belt? No   Have you felt unusual stress, anger or loneliness in the last month? No   Who do you live with? Spouse   If you need help, do you have trouble finding someone available to you? No   Have you been bothered in the last four weeks by sexual problems? No   Do you have difficulty concentrating, remembering or making decisions? Yes           Does the patient have evidence of cognitive impairment? Yes    Aspirin use counseling: Taking ASA appropriately as indicated      Recent Lab Results:  CMP:  Lab Results   Component Value Date     (H) 08/16/2018    BUN 12 08/16/2018    CREATININE 1.37 (H) 08/16/2018    EGFRIFNONA 51 (L) 08/16/2018    EGFRIFAFRI 62 08/16/2018    BCR 8.8 08/16/2018     08/16/2018    K 5.0 08/16/2018    CO2 27.1 08/16/2018    CALCIUM 9.6 08/16/2018    ALT 29 08/16/2018     Lipid Panel:  Lab Results   Component Value Date    TRIG 279 (H) 08/16/2018    HDL 70 (H) 08/16/2018    VLDL 55.8 (H) 08/16/2018    LDLHDL 1.06 08/16/2018     HbA1c:  Lab Results   Component Value Date    HGBA1C 5.90 (H) 08/16/2018       Visual Acuity:  No exam data present    Age-appropriate Screening Schedule:  Refer to the list below for future screening recommendations based on patient's age, sex and/or medical conditions. Orders for these recommended tests are listed in the plan section. The patient has been provided with a written plan.    Health Maintenance   Topic Date Due   • TDAP/TD VACCINES (1 - Tdap) 01/09/1963   • ZOSTER VACCINE (1 of 2) 01/09/1994   • PNEUMOCOCCAL VACCINES (65+ LOW/MEDIUM RISK) (1 of 2 - PCV13) 01/09/2009   • DIABETIC FOOT EXAM  07/28/2017   • DIABETIC EYE EXAM  07/28/2017   • INFLUENZA VACCINE  08/01/2018   • URINE MICROALBUMIN  12/15/2018   • HEMOGLOBIN A1C  02/16/2019   • LIPID PANEL  08/16/2019   • COLONOSCOPY  09/15/2026        Subjective   History of Present Illness    Bryan Landers is a 73 y.o. male who presents for an  Subsequent Wellness Visit.    The following portions of the patient's history were reviewed and updated as appropriate: allergies, current medications, past family history, past medical history, past social history, past surgical history and problem list.    Outpatient Medications Prior to Visit   Medication Sig Dispense Refill   • aspirin 81 MG chewable tablet Chew 81 mg Daily.     • cholecalciferol (VITAMIN D3) 1000 UNITS tablet Take 1,000 Units by mouth Daily.     • Multiple Vitamins-Minerals (MULTIVITAL PLATINUM PO) Take  by mouth.     • Potassium 99 MG tablet Take  by mouth.     • tamsulosin (FLOMAX) 0.4 MG capsule 24 hr capsule      • allopurinol (ZYLOPRIM) 100 MG tablet Take 2 tablets by mouth Daily. 180 tablet 1   • atorvastatin (LIPITOR) 80 MG tablet Take 1 tablet by mouth Daily. 90 tablet 3   • lisinopril (PRINIVIL,ZESTRIL) 20 MG tablet Take 1 tablet by mouth Daily. Indications: High Blood Pressure 90 tablet 3   • pioglitazone (ACTOS) 30 MG tablet Take 1 tablet by mouth Daily. Indications: Type 2 Diabetes 90 tablet 3   • Omega-3 Fatty Acids (FISH OIL) 1000 MG capsule capsule Take  by mouth Daily With Breakfast.     • Psyllium (METAMUCIL FIBER PO) Take  by mouth.       No facility-administered medications prior to visit.        Patient Active Problem List   Diagnosis   • Essential hypertension   • Mixed hyperlipidemia   • Arthritis   • Type 2 diabetes mellitus without complication, without long-term current use of insulin (CMS/[facility])   • Severely overweight   • Routine adult health maintenance   • Acute idiopathic gout of left knee   • Medication monitoring encounter   • Microalbuminuria due to type 2 diabetes mellitus (CMS/[facility])   • History of prostate cancer   • Skin tear of forearm without complication, right, subsequent encounter   • Stage 2 chronic kidney disease   • Medicare annual wellness visit, subsequent       Advance Care Planning:  has an advance directive - a copy HAS NOT been  "provided    Identification of Risk Factors:  Risk factors include: weight , unhealthy diet, cardiovascular risk, inactivity, alcohol use, increased fall risk and hearing limitations.    Review of Systems    Compared to one year ago, the patient feels his physical health is the same.  Compared to one year ago, the patient feels his mental health is the same.    Objective     Physical Exam   Constitutional: He appears well-developed and well-nourished.   Cardiovascular: Normal rate.    Pulmonary/Chest: Effort normal.   Musculoskeletal: He exhibits tenderness and deformity.   Skin:   Extensive skin sun damage on head face and arms   Nursing note and vitals reviewed.      Vitals:    08/23/18 0957   BP: 110/72   BP Location: Left arm   Patient Position: Sitting   Pulse: 100   SpO2: 95%   Weight: 114 kg (252 lb)   Height: 177.8 cm (70\")   PainSc: 0-No pain       Patient's Body mass index is 36.16 kg/m². BMI is above normal parameters. Recommendations include: exercise counseling and nutrition counseling.      Assessment/Plan   Patient Self-Management and Personalized Health Advice  The patient has been provided with information about: diet, exercise, weight management, prevention of cardiac or vascular disease, the relationship between weight and GERD and alcohol use and preventive services including:   · Diabetes screening, see lab orders.    Visit Diagnoses:    ICD-10-CM ICD-9-CM   1. Acute idiopathic gout of left knee M10.062 274.01   2. Arthritis M19.90 716.90   3. Essential hypertension I10 401.9   4. History of prostate cancer Z85.46 V10.46   5. Medication monitoring encounter Z51.81 V58.83   6. Mixed hyperlipidemia E78.2 272.2   7. Type 2 diabetes mellitus without complication, without long-term current use of insulin (CMS/[facility]) E11.9 250.00   8. Stage 2 chronic kidney disease N18.2 585.2   9. Medicare annual wellness visit, subsequent Z00.00 V70.0       Orders Placed This Encounter   Procedures   • US AAA Screen " Limited     Standing Status:   Future     Standing Expiration Date:   8/23/2019     Order Specific Question:   Is the patient a male between 65-75 years old and have smoked at least 100 cigarettes in their lifetime OR a male or female Medicare patient who has a family history of abdominal aoritc aneurysm?     Answer:   Yes     Order Specific Question:   Reason for Exam:     Answer:   screening       Outpatient Encounter Prescriptions as of 8/23/2018   Medication Sig Dispense Refill   • allopurinol (ZYLOPRIM) 100 MG tablet Take 2 tablets by mouth Daily. 180 tablet 3   • aspirin 81 MG chewable tablet Chew 81 mg Daily.     • atorvastatin (LIPITOR) 80 MG tablet Take 1 tablet by mouth Daily. 90 tablet 3   • cholecalciferol (VITAMIN D3) 1000 UNITS tablet Take 1,000 Units by mouth Daily.     • lisinopril (PRINIVIL,ZESTRIL) 20 MG tablet Take 1 tablet by mouth Daily. 90 tablet 3   • Multiple Vitamins-Minerals (MULTIVITAL PLATINUM PO) Take  by mouth.     • pioglitazone (ACTOS) 30 MG tablet Take 1 tablet by mouth Daily. 90 tablet 3   • Potassium 99 MG tablet Take  by mouth.     • tamsulosin (FLOMAX) 0.4 MG capsule 24 hr capsule      • [DISCONTINUED] allopurinol (ZYLOPRIM) 100 MG tablet Take 2 tablets by mouth Daily. 180 tablet 1   • [DISCONTINUED] atorvastatin (LIPITOR) 80 MG tablet Take 1 tablet by mouth Daily. 90 tablet 3   • [DISCONTINUED] lisinopril (PRINIVIL,ZESTRIL) 20 MG tablet Take 1 tablet by mouth Daily. Indications: High Blood Pressure 90 tablet 3   • [DISCONTINUED] pioglitazone (ACTOS) 30 MG tablet Take 1 tablet by mouth Daily. Indications: Type 2 Diabetes 90 tablet 3   • [DISCONTINUED] Omega-3 Fatty Acids (FISH OIL) 1000 MG capsule capsule Take  by mouth Daily With Breakfast.     • [DISCONTINUED] Psyllium (METAMUCIL FIBER PO) Take  by mouth.       No facility-administered encounter medications on file as of 8/23/2018.        Reviewed use of high risk medication in the elderly: yes  Reviewed for potential of  harmful drug interactions in the elderly: yes   Refilled meds  AAA screening.  Refilled meds  Reviewed labs  He is sure he is utd on vaccines, will receive hep A #2 next month.      Follow Up:  Return in about 6 months (around 2/23/2019) for blood pressure.     An After Visit Summary and PPPS with all of these plans were given to the patient.

## 2018-08-29 ENCOUNTER — TELEPHONE (OUTPATIENT)
Dept: FAMILY MEDICINE CLINIC | Facility: CLINIC | Age: 73
End: 2018-08-29

## 2018-08-29 DIAGNOSIS — Z82.49 FAMILY HISTORY OF ISCHEMIC HEART DISEASE: Primary | ICD-10-CM

## 2018-08-29 NOTE — TELEPHONE ENCOUNTER
Hernan with [employer] called and said that the [place] AAA is ordered incorrectly. He needs DX to be Z13.6 with supporting DX to be either Z87.891 or Z82.49    Ok i reordered with new code.    Jose Fonseca MD

## 2018-08-31 ENCOUNTER — TELEPHONE (OUTPATIENT)
Dept: FAMILY MEDICINE CLINIC | Facility: CLINIC | Age: 73
End: 2018-08-31

## 2018-09-04 ENCOUNTER — HOSPITAL ENCOUNTER (OUTPATIENT)
Dept: ULTRASOUND IMAGING | Facility: HOSPITAL | Age: 73
Discharge: HOME | End: 2018-09-04
Attending: FAMILY MEDICINE | Admitting: FAMILY MEDICINE

## 2018-09-04 DIAGNOSIS — Z82.49 FAMILY HISTORY OF ISCHEMIC HEART DISEASE: ICD-10-CM

## 2018-09-04 PROCEDURE — 76706 US ABDL AORTA SCREEN AAA: CPT

## 2018-09-07 ENCOUNTER — CLINICAL SUPPORT (OUTPATIENT)
Dept: FAMILY MEDICINE CLINIC | Facility: CLINIC | Age: 73
End: 2018-09-07

## 2018-09-07 PROCEDURE — 90632 HEPA VACCINE ADULT IM: CPT | Performed by: FAMILY MEDICINE

## 2018-09-07 PROCEDURE — 90471 IMMUNIZATION ADMIN: CPT | Performed by: FAMILY MEDICINE

## 2018-09-13 ENCOUNTER — OFFICE VISIT (OUTPATIENT)
Dept: GASTROENTEROLOGY | Facility: CLINIC | Age: 73
End: 2018-09-13

## 2018-09-13 VITALS — HEIGHT: 70 IN | BODY MASS INDEX: 36.54 KG/M2 | WEIGHT: 255.2 LBS

## 2018-09-13 DIAGNOSIS — K62.5 RECTAL BLEEDING: Primary | ICD-10-CM

## 2018-09-13 PROCEDURE — 99213 OFFICE O/P EST LOW 20 MIN: CPT | Performed by: INTERNAL MEDICINE

## 2018-09-13 NOTE — PROGRESS NOTES
PATIENT INFORMATION  Bryan ROSS - 1945    CHIEF COMPLAINT  Chief Complaint   Patient presents with   • Rectal Bleeding       HISTORY OF PRESENT ILLNESS  Here for bleeding with a normal bowel pattern and only when its firm the bleeding will be worse(like this AM) No known family history of polyps or CRC.   Has had BIHR and BTHA but ow no abd surgery        Rectal Bleeding             REVIEW OF SYSTEMS  Review of Systems   Gastrointestinal: Positive for anal bleeding, blood in stool and hematochezia.   All other systems reviewed and are negative.        ACTIVE PROBLEMS  Patient Active Problem List    Diagnosis   • Stage 2 chronic kidney disease [N18.2]   • Medicare annual wellness visit, subsequent [Z00.00]   • Skin tear of forearm without complication, right, subsequent encounter [S51.811D]   • History of prostate cancer [Z85.46]   • Microalbuminuria due to type 2 diabetes mellitus (CMS/[facility]) [E11.29, R80.9]   • Type 2 diabetes mellitus without complication, without long-term current use of insulin (CMS/[facility]) [E11.9]   • Severely overweight [E66.3]   • Routine adult health maintenance [Z00.00]   • Acute idiopathic gout of left knee [M10.062]   • Medication monitoring encounter [Z51.81]   • Essential hypertension [I10]   • Mixed hyperlipidemia [E78.2]   • Arthritis [M19.90]         PAST MEDICAL HISTORY  Past Medical History:   Diagnosis Date   • Arthritis    • Colon polyp    • Hyperlipidemia    • Hypertension          SURGICAL HISTORY  Past Surgical History:   Procedure Laterality Date   • COLONOSCOPY  2016   • HERNIA REPAIR     • PROSTATE ULTRASOUND BIOPSY     • TOTAL HIP ARTHROPLASTY Right          FAMILY HISTORY  Family History   Problem Relation Age of Onset   • Stroke Mother    • Stroke Father    • No Known Problems Brother    • Colon cancer Neg Hx    • Colon polyps Neg Hx          SOCIAL HISTORY  Social History     Occupational History   • retired sales managment [employer]    "    Social History Main Topics   • Smoking status: Former Smoker     Types: Cigars   • Smokeless tobacco: Never Used   • Alcohol use 16.8 oz/week     28 Standard drinks or equivalent per week      Comment: 2 double vodkas a night   • Drug use: No   • Sexual activity: Yes     Partners: Female     Birth control/ protection: Post-menopausal         CURRENT MEDICATIONS    Current Outpatient Prescriptions:   •  allopurinol (ZYLOPRIM) 100 MG tablet, Take 2 tablets by mouth Daily., Disp: 180 tablet, Rfl: 3  •  aspirin 81 MG chewable tablet, Chew 81 mg Daily., Disp: , Rfl:   •  atorvastatin (LIPITOR) 80 MG tablet, Take 1 tablet by mouth Daily., Disp: 90 tablet, Rfl: 3  •  cholecalciferol (VITAMIN D3) 1000 UNITS tablet, Take 1,000 Units by mouth Daily., Disp: , Rfl:   •  lisinopril (PRINIVIL,ZESTRIL) 20 MG tablet, Take 1 tablet by mouth Daily., Disp: 90 tablet, Rfl: 3  •  Multiple Vitamins-Minerals (MULTIVITAL PLATINUM PO), Take  by mouth., Disp: , Rfl:   •  pioglitazone (ACTOS) 30 MG tablet, Take 1 tablet by mouth Daily., Disp: 90 tablet, Rfl: 3  •  Potassium 99 MG tablet, Take  by mouth., Disp: , Rfl:   •  tamsulosin (FLOMAX) 0.4 MG capsule 24 hr capsule, , Disp: , Rfl:     ALLERGIES  Patient has no known allergies.    VITALS  Vitals:    09/13/18 1121   Weight: 116 kg (255 lb 3.2 oz)   Height: 177.8 cm (70\")       LAST RESULTS   Orders Only on 08/15/2018   Component Date Value Ref Range Status   • PSA 08/16/2018 0.617  0.000 - 4.000 ng/mL Final    Comment: The total PSA (tPSA) method performed at [facility] is a  quantitative electrochemiluminescence immunoassay 'ECLIA'     • Glucose 08/16/2018 131* 65 - 99 mg/dL Final   • BUN 08/16/2018 12  8 - 23 mg/dL Final   • Creatinine 08/16/2018 1.37* 0.76 - 1.27 mg/dL Final   • eGFR Non  Am 08/16/2018 51* >60 mL/min/1.73 Final    Comment: The MDRD GFR formula is only valid for adults with stable  renal function between ages 18 and 70.     • eGFR  Am 08/16/2018 62  >60 " mL/min/1.73 Final   • BUN/Creatinine Ratio 08/16/2018 8.8  7.0 - 25.0 Final   • Sodium 08/16/2018 141  136 - 145 mmol/L Final   • Potassium 08/16/2018 5.0  3.5 - 5.2 mmol/L Final   • Chloride 08/16/2018 99  98 - 107 mmol/L Final   • Total CO2 08/16/2018 27.1  22.0 - 29.0 mmol/L Final   • Calcium 08/16/2018 9.6  8.8 - 10.5 mg/dL Final   • ALT (SGPT) 08/16/2018 29  5 - 41 U/L Final   • WBC 08/16/2018 4.36* 4.80 - 10.80 10*3/mm3 Final   • RBC 08/16/2018 3.78* 4.70 - 6.10 10*6/mm3 Final   • Hemoglobin 08/16/2018 13.6* 14.0 - 18.0 g/dL Final   • Hematocrit 08/16/2018 41.0* 42.0 - 52.0 % Final   • MCV 08/16/2018 108.5* 80.0 - 94.0 fL Final   • MCH 08/16/2018 36.0* 27.0 - 31.0 pg Final   • MCHC 08/16/2018 33.2  31.0 - 37.0 g/dL Final   • RDW 08/16/2018 13.3  11.5 - 14.5 % Final   • Platelets 08/16/2018 159  140 - 500 10*3/mm3 Final   • TSH 08/16/2018 4.760* 0.270 - 4.200 mIU/mL Final   • Specific Gravity, UA 08/16/2018 1.015  1.003 - 1.030 Final   • pH, UA 08/16/2018 7.0  4.5 - 8.0 Final   • Color, UA 08/16/2018 Yellow   Final    Comment: Urine microscopic not indicated.  REFERENCE RANGE: Yellow, Straw     • Appearance, UA 08/16/2018 Clear  Clear Final   • Leukocytes, UA 08/16/2018 Negative  Negative Final   • Protein 08/16/2018 Negative  Negative Final   • Glucose, UA 08/16/2018 Negative  Negative Final   • Ketones 08/16/2018 Negative   Final    REFERENCE RANGE: Negative, 80 mg/dL (3+), >=160 mg/dL (4+)   • Blood, UA 08/16/2018 Negative  Negative Final   • Bilirubin, UA 08/16/2018 Negative  Negative Final   • Urobilinogen, UA 08/16/2018 Comment   Final    Comment: 0.2 E.U./dL  REFERENCE RANGE: 0.2 - 1.0 E.U./dL     • Nitrite, UA 08/16/2018 Negative  Negative Final   • Total Cholesterol 08/16/2018 200  0 - 200 mg/dL Final   • Triglycerides 08/16/2018 279* 0 - 150 mg/dL Final   • HDL Cholesterol 08/16/2018 70* 40 - 60 mg/dL Final   • VLDL Cholesterol 08/16/2018 55.8* 8 - 32 mg/dL Final   • LDL Cholesterol  08/16/2018 74  0  - 100 mg/dL Final   • LDL/HDL Ratio 08/16/2018 1.06   Final   • Hemoglobin A1C 08/16/2018 5.90* 4.80 - 5.60 % Final   • Uric Acid 08/16/2018 5.3  3.4 - 7.0 mg/dL Final     [place] Aaa Screen Limited    Result Date: 9/4/2018  Narrative: Screening aortic ultrasound  INDICATION: Former smoker. Hypertension. Family history of coronary artery disease.  COMPARISON: None.  FINDINGS: Grayscale, color flow, and spectral Doppler waveform analysis is performed of the abdominal aorta. Proximal aorta is 2.6 cm. Mid aorta is 2.4 cm. Distal aorta is 1.9 cm. No free fluid.      Impression: Negative exam. No abdominal aortic aneurysm.  This report was finalized on 9/4/2018 12:28 PM by Dr. Beck Khan MD.        PHYSICAL EXAM  Physical Exam   Constitutional: He is oriented to person, place, and time. He appears well-developed and well-nourished.   HENT:   Head: Normocephalic and atraumatic.   Eyes: Pupils are equal, round, and reactive to light. Conjunctivae and EOM are normal. No scleral icterus.   Neck: Normal range of motion. Neck supple. No thyromegaly present.   Cardiovascular: Normal rate, regular rhythm, normal heart sounds and intact distal pulses.  Exam reveals no gallop.    No murmur heard.  Pulmonary/Chest: Effort normal and breath sounds normal. He has no wheezes. He has no rales.   Abdominal: Soft. Bowel sounds are normal. He exhibits no shifting dullness, no distension, no fluid wave, no abdominal bruit, no ascites and no mass. There is no hepatosplenomegaly. There is no tenderness. There is no guarding and negative Pabon's sign. Hernia confirmed negative in the ventral area.   Musculoskeletal: Normal range of motion. He exhibits no edema.   Lymphadenopathy:     He has no cervical adenopathy.   Neurological: He is alert and oriented to person, place, and time.   Skin: Skin is warm and dry. No rash noted. He is not diaphoretic. No erythema.   Psychiatric: He has a normal mood and affect. His behavior is normal.        ASSESSMENT  Diagnoses and all orders for this visit:    Rectal bleeding  -     Case Request; Standing  -     Case Request    Other orders  -     Follow Anesthesia Guidelines / Standing Orders; Future  -     Obtain informed consent; Standing  -     Verify bowel prep was successful; Standing  -     Give tap water enema if bowel prep was insufficient; Standing          PLAN  Return if symptoms worsen or fail to improve.

## 2018-09-24 ENCOUNTER — TELEPHONE (OUTPATIENT)
Dept: GASTROENTEROLOGY | Facility: CLINIC | Age: 73
End: 2018-09-24

## 2018-09-24 NOTE — TELEPHONE ENCOUNTER
SPOKE WITH MIKAEL TOLENTINO  MOVED UP TO 10/17/2018 AT 11:45AM AT [place].  HE STATES THERE HAS A LOT BLOOD IN [facility].  ADVISE HIS IF GETS WORST MAY NEED TO GO ER

## 2018-09-24 NOTE — TELEPHONE ENCOUNTER
LEFT MESSAGE ON MY VOICE MAIL.  SCHEDULED FOR 11/02/2018 AT [place].  HE STATES HAVING SERVE RECTAL BLEEDING, ONLY COUPLE OF TIMES, WANTS TO MOVE HIS PROCEDURE UP.

## 2018-09-26 ENCOUNTER — TELEPHONE (OUTPATIENT)
Dept: GASTROENTEROLOGY | Facility: CLINIC | Age: 73
End: 2018-09-26

## 2018-09-26 NOTE — TELEPHONE ENCOUNTER
SCHEDULED FOR COLONOSCOPY 10/17/2018.  HE STATES HAVING BLEEDING TO POINT ITS COLORING THE WATER RED, ONLY HAPPENS EVERY COUPLE OF DAYS, VERY WORRIED.  CAN GET IN HIM ON THIS Friday, 09/28/2018 Juan C, TAKES ASPIRIN , BUT WILL ONLY BE HELD TWO DAY.  OR DOES HE NEED TO GO ER?  PLEASE ADVISE.

## 2018-09-27 ENCOUNTER — ANESTHESIA EVENT (OUTPATIENT)
Dept: PERIOP | Facility: HOSPITAL | Age: 73
End: 2018-09-27

## 2018-09-27 ENCOUNTER — PREP FOR SURGERY (OUTPATIENT)
Dept: OTHER | Facility: HOSPITAL | Age: 73
End: 2018-09-27

## 2018-09-27 DIAGNOSIS — K62.5 RECTAL BLEEDING: Primary | ICD-10-CM

## 2018-09-28 ENCOUNTER — HOSPITAL ENCOUNTER (OUTPATIENT)
Facility: HOSPITAL | Age: 73
Setting detail: HOSPITAL OUTPATIENT SURGERY
Discharge: HOME | End: 2018-09-28
Attending: INTERNAL MEDICINE | Admitting: INTERNAL MEDICINE

## 2018-09-28 ENCOUNTER — HOSPITAL ENCOUNTER (EMERGENCY)
Facility: HOSPITAL | Age: 73
Discharge: HOME | End: 2018-09-28
Attending: EMERGENCY MEDICINE | Admitting: EMERGENCY MEDICINE

## 2018-09-28 ENCOUNTER — PREP FOR SURGERY (OUTPATIENT)
Dept: OTHER | Facility: HOSPITAL | Age: 73
End: 2018-09-28

## 2018-09-28 ENCOUNTER — TELEPHONE (OUTPATIENT)
Dept: GASTROENTEROLOGY | Facility: CLINIC | Age: 73
End: 2018-09-28

## 2018-09-28 ENCOUNTER — ANESTHESIA (OUTPATIENT)
Dept: PERIOP | Facility: HOSPITAL | Age: 73
End: 2018-09-28

## 2018-09-28 VITALS
SYSTOLIC BLOOD PRESSURE: 145 MMHG | WEIGHT: 250 LBS | OXYGEN SATURATION: 98 % | DIASTOLIC BLOOD PRESSURE: 86 MMHG | HEART RATE: 96 BPM | RESPIRATION RATE: 18 BRPM | TEMPERATURE: 97.5 F | BODY MASS INDEX: 37.03 KG/M2 | HEIGHT: 69 IN

## 2018-09-28 VITALS
RESPIRATION RATE: 15 BRPM | TEMPERATURE: 97.8 F | DIASTOLIC BLOOD PRESSURE: 90 MMHG | WEIGHT: 248.2 LBS | OXYGEN SATURATION: 97 % | SYSTOLIC BLOOD PRESSURE: 131 MMHG | BODY MASS INDEX: 36.65 KG/M2 | HEART RATE: 84 BPM

## 2018-09-28 DIAGNOSIS — K62.7 RADIATION PROCTITIS: Primary | ICD-10-CM

## 2018-09-28 DIAGNOSIS — K92.2 CHRONIC LOWER GI BLEEDING: Primary | ICD-10-CM

## 2018-09-28 DIAGNOSIS — D64.9 ANEMIA, MILD: ICD-10-CM

## 2018-09-28 DIAGNOSIS — K62.5 RECTAL BLEEDING: ICD-10-CM

## 2018-09-28 LAB
ALBUMIN SERPL-MCNC: 3.6 G/DL (ref 3.5–5.2)
ALBUMIN/GLOB SERPL: 1.3 G/DL
ALP SERPL-CCNC: 70 U/L (ref 40–129)
ALT SERPL W P-5'-P-CCNC: 22 U/L (ref 5–41)
ANION GAP SERPL CALCULATED.3IONS-SCNC: 12.2 MMOL/L
ANISOCYTOSIS BLD QL: NORMAL
AST SERPL-CCNC: 35 U/L (ref 5–40)
BASOPHILS # BLD AUTO: 0.04 10*3/MM3 (ref 0–0.2)
BASOPHILS NFR BLD AUTO: 1 % (ref 0–2)
BILIRUB SERPL-MCNC: 0.6 MG/DL (ref 0.2–1.2)
BUN BLD-MCNC: 12 MG/DL (ref 8–23)
BUN/CREAT SERPL: 8.8 (ref 7–25)
CALCIUM SPEC-SCNC: 8.5 MG/DL (ref 8.8–10.5)
CHLORIDE SERPL-SCNC: 100 MMOL/L (ref 98–107)
CO2 SERPL-SCNC: 25.8 MMOL/L (ref 22–29)
CREAT BLD-MCNC: 1.37 MG/DL (ref 0.76–1.27)
DEPRECATED RDW RBC AUTO: 53.3 FL (ref 37–54)
EOSINOPHIL # BLD AUTO: 0.09 10*3/MM3 (ref 0.1–0.3)
EOSINOPHIL NFR BLD AUTO: 2.3 % (ref 0–4)
ERYTHROCYTE [DISTWIDTH] IN BLOOD BY AUTOMATED COUNT: 13.4 % (ref 11.5–14.5)
GFR SERPL CREATININE-BSD FRML MDRD: 51 ML/MIN/1.73
GLOBULIN UR ELPH-MCNC: 2.7 GM/DL
GLUCOSE BLD-MCNC: 122 MG/DL (ref 65–99)
GLUCOSE BLDC GLUCOMTR-MCNC: 114 MG/DL (ref 70–130)
HCT VFR BLD AUTO: 35.3 % (ref 42–52)
HGB BLD-MCNC: 11.5 G/DL (ref 14–18)
IMM GRANULOCYTES # BLD: 0.01 10*3/MM3 (ref 0–0.03)
IMM GRANULOCYTES NFR BLD: 0.3 % (ref 0–0.5)
LYMPHOCYTES # BLD AUTO: 0.8 10*3/MM3 (ref 0.6–4.8)
LYMPHOCYTES NFR BLD AUTO: 20.9 % (ref 20–45)
MACROCYTES BLD QL SMEAR: NORMAL
MCH RBC QN AUTO: 35.4 PG (ref 27–31)
MCHC RBC AUTO-ENTMCNC: 32.6 G/DL (ref 31–37)
MCV RBC AUTO: 108.6 FL (ref 80–94)
MONOCYTES # BLD AUTO: 0.61 10*3/MM3 (ref 0–1)
MONOCYTES NFR BLD AUTO: 15.9 % (ref 3–8)
NEUTROPHILS # BLD AUTO: 2.28 10*3/MM3 (ref 1.5–8.3)
NEUTROPHILS NFR BLD AUTO: 59.6 % (ref 45–70)
NRBC BLD MANUAL-RTO: 0 /100 WBC (ref 0–0)
PLAT MORPH BLD: NORMAL
PLATELET # BLD AUTO: 123 10*3/MM3 (ref 140–500)
PMV BLD AUTO: 8.8 FL (ref 7.4–10.4)
POTASSIUM BLD-SCNC: 3.8 MMOL/L (ref 3.5–5.2)
PROT SERPL-MCNC: 6.3 G/DL (ref 6–8.5)
RBC # BLD AUTO: 3.25 10*6/MM3 (ref 4.7–6.1)
SODIUM BLD-SCNC: 138 MMOL/L (ref 136–145)
WBC MORPH BLD: NORMAL
WBC NRBC COR # BLD: 3.83 10*3/MM3 (ref 4.8–10.8)

## 2018-09-28 PROCEDURE — 85007 BL SMEAR W/DIFF WBC COUNT: CPT | Performed by: EMERGENCY MEDICINE

## 2018-09-28 PROCEDURE — 88305 TISSUE EXAM BY PATHOLOGIST: CPT

## 2018-09-28 PROCEDURE — 80053 COMPREHEN METABOLIC PANEL: CPT | Performed by: EMERGENCY MEDICINE

## 2018-09-28 PROCEDURE — 85025 COMPLETE CBC W/AUTO DIFF WBC: CPT | Performed by: EMERGENCY MEDICINE

## 2018-09-28 PROCEDURE — 93010 ELECTROCARDIOGRAM REPORT: CPT | Performed by: INTERNAL MEDICINE

## 2018-09-28 PROCEDURE — 93005 ELECTROCARDIOGRAM TRACING: CPT | Performed by: NURSE ANESTHETIST, CERTIFIED REGISTERED

## 2018-09-28 PROCEDURE — 82962 GLUCOSE BLOOD TEST: CPT

## 2018-09-28 PROCEDURE — 25010000002 PROPOFOL 10 MG/ML EMULSION: Performed by: NURSE ANESTHETIST, CERTIFIED REGISTERED

## 2018-09-28 PROCEDURE — 99283 EMERGENCY DEPT VISIT LOW MDM: CPT

## 2018-09-28 PROCEDURE — 99284 EMERGENCY DEPT VISIT MOD MDM: CPT | Performed by: EMERGENCY MEDICINE

## 2018-09-28 PROCEDURE — 45380 COLONOSCOPY AND BIOPSY: CPT | Performed by: INTERNAL MEDICINE

## 2018-09-28 RX ORDER — SODIUM CHLORIDE 0.9 % (FLUSH) 0.9 %
10 SYRINGE (ML) INJECTION AS NEEDED
Status: DISCONTINUED | OUTPATIENT
Start: 2018-09-28 | End: 2018-09-28 | Stop reason: HOSPADM

## 2018-09-28 RX ORDER — LIDOCAINE HYDROCHLORIDE 10 MG/ML
0.5 INJECTION, SOLUTION EPIDURAL; INFILTRATION; INTRACAUDAL; PERINEURAL ONCE AS NEEDED
Status: DISCONTINUED | OUTPATIENT
Start: 2018-09-28 | End: 2018-09-28 | Stop reason: HOSPADM

## 2018-09-28 RX ORDER — SODIUM CHLORIDE 0.9 % (FLUSH) 0.9 %
1-10 SYRINGE (ML) INJECTION AS NEEDED
Status: DISCONTINUED | OUTPATIENT
Start: 2018-09-28 | End: 2018-09-28 | Stop reason: HOSPADM

## 2018-09-28 RX ORDER — PROPOFOL 10 MG/ML
VIAL (ML) INTRAVENOUS AS NEEDED
Status: DISCONTINUED | OUTPATIENT
Start: 2018-09-28 | End: 2018-09-28 | Stop reason: SURG

## 2018-09-28 RX ORDER — LIDOCAINE HYDROCHLORIDE 20 MG/ML
INJECTION, SOLUTION INFILTRATION; PERINEURAL AS NEEDED
Status: DISCONTINUED | OUTPATIENT
Start: 2018-09-28 | End: 2018-09-28 | Stop reason: SURG

## 2018-09-28 RX ORDER — ONDANSETRON 2 MG/ML
4 INJECTION INTRAMUSCULAR; INTRAVENOUS ONCE AS NEEDED
Status: CANCELLED | OUTPATIENT
Start: 2018-09-28

## 2018-09-28 RX ORDER — SODIUM CHLORIDE, SODIUM LACTATE, POTASSIUM CHLORIDE, CALCIUM CHLORIDE 600; 310; 30; 20 MG/100ML; MG/100ML; MG/100ML; MG/100ML
9 INJECTION, SOLUTION INTRAVENOUS CONTINUOUS
Status: DISCONTINUED | OUTPATIENT
Start: 2018-09-28 | End: 2018-09-28 | Stop reason: HOSPADM

## 2018-09-28 RX ORDER — MEPERIDINE HYDROCHLORIDE 25 MG/ML
12.5 INJECTION INTRAMUSCULAR; INTRAVENOUS; SUBCUTANEOUS
Status: CANCELLED | OUTPATIENT
Start: 2018-09-28 | End: 2018-09-29

## 2018-09-28 RX ORDER — SODIUM CHLORIDE, SODIUM LACTATE, POTASSIUM CHLORIDE, CALCIUM CHLORIDE 600; 310; 30; 20 MG/100ML; MG/100ML; MG/100ML; MG/100ML
100 INJECTION, SOLUTION INTRAVENOUS CONTINUOUS
Status: CANCELLED | OUTPATIENT
Start: 2018-09-28

## 2018-09-28 RX ORDER — SODIUM CHLORIDE 9 MG/ML
40 INJECTION, SOLUTION INTRAVENOUS AS NEEDED
Status: DISCONTINUED | OUTPATIENT
Start: 2018-09-28 | End: 2018-09-28 | Stop reason: HOSPADM

## 2018-09-28 RX ADMIN — SODIUM CHLORIDE, POTASSIUM CHLORIDE, SODIUM LACTATE AND CALCIUM CHLORIDE: 600; 310; 30; 20 INJECTION, SOLUTION INTRAVENOUS at 13:16

## 2018-09-28 RX ADMIN — PROPOFOL 100 MG: 10 INJECTION, EMULSION INTRAVENOUS at 14:02

## 2018-09-28 RX ADMIN — PROPOFOL 50 MG: 10 INJECTION, EMULSION INTRAVENOUS at 14:07

## 2018-09-28 RX ADMIN — PROPOFOL 50 MG: 10 INJECTION, EMULSION INTRAVENOUS at 14:10

## 2018-09-28 RX ADMIN — PROPOFOL 100 MG: 10 INJECTION, EMULSION INTRAVENOUS at 14:05

## 2018-09-28 RX ADMIN — LIDOCAINE HYDROCHLORIDE 100 MG: 20 INJECTION, SOLUTION INFILTRATION; PERINEURAL at 14:02

## 2018-09-28 NOTE — TELEPHONE ENCOUNTER
RECEIVED MESSAGE FROM DR. MCKEON - NEED TO SCHEDULE FLEX SIGM W/ [facility]. AT  [facility] SOON AS POSSIBLE.  CAN SCHEDULE FOR 10/02/2018 AT 2:45PM - ARRIVE 1PM.

## 2018-09-28 NOTE — ANESTHESIA PREPROCEDURE EVALUATION
Anesthesia Evaluation     Patient summary reviewed and Nursing notes reviewed   no history of anesthetic complications:  NPO Solid Status: > 8 hours  NPO Liquid Status: > 8 hours           Airway   Mallampati: II  TM distance: >3 FB  No difficulty expected  Dental          Pulmonary - negative pulmonary ROS and normal exam    breath sounds clear to auscultation  Cardiovascular - normal exam  Exercise tolerance: good (4-7 METS)    Rhythm: regular  Rate: normal    (+) hypertension well controlled, PND, hyperlipidemia,       Neuro/Psych- negative ROS  GI/Hepatic/Renal/Endo    (+) obesity,   diabetes mellitus type 2 well controlled,     Musculoskeletal     Abdominal   (+) obese,    Substance History   (+) alcohol use (2/ day),      OB/GYN          Other   (+) arthritis                     Anesthesia Plan    ASA 3     MAC     Anesthetic plan, all risks, benefits, and alternatives have been provided, discussed and informed consent has been obtained with: patient.  Use of blood products discussed with patient  Consented to blood products.

## 2018-09-28 NOTE — TELEPHONE ENCOUNTER
SPOKE WITH DAUGHTER, JOE.  EXPLAIINED SCHEDULED FOR Tuesday, 10/02/2018 AT 2:45PM - ARRIVE 1PM.  E-MAILED INSTRUCTIONS TO HER AT [email or web address] TODAY.

## 2018-09-28 NOTE — ANESTHESIA POSTPROCEDURE EVALUATION
Patient: Bryan Landers    Procedure Summary     Date:  09/28/18 Room / Location:  [facility] ENDOSCOPY 1 /  [facility]    Anesthesia Start:  1357 Anesthesia Stop:  1420    Procedure:  COLONOSCOPY (N/A ) Diagnosis:       Rectal bleeding      Radiation proctitis      Colon polyps      Diverticulosis      (Rectal bleeding [K62.5])    Surgeon:  Olaf Gomez MD Provider:  Roldan Morataya CRNA    Anesthesia Type:  MAC ASA Status:  3          Anesthesia Type: MAC  Last vitals  BP   113/79 (09/28/18 1450)   Temp   97.8 °F (36.6 °C) (09/28/18 1439)   Pulse   82 (09/28/18 1450)   Resp   15 (09/28/18 1450)     SpO2   97 % (09/28/18 1450)     Post Anesthesia Care and Evaluation    Patient location during evaluation: PHASE II  Patient participation: complete - patient participated  Level of consciousness: awake and alert  Pain score: 0  Pain management: satisfactory to patient  Airway patency: patent  Anesthetic complications: No anesthetic complications  PONV Status: none  Cardiovascular status: acceptable  Respiratory status: acceptable  Hydration status: acceptable

## 2018-10-01 PROBLEM — K62.7 RADIATION PROCTITIS: Status: ACTIVE | Noted: 2018-10-01

## 2018-10-02 ENCOUNTER — HOSPITAL ENCOUNTER (OUTPATIENT)
Facility: HOSPITAL | Age: 73
Setting detail: HOSPITAL OUTPATIENT SURGERY
Discharge: HOME | End: 2018-10-02
Attending: INTERNAL MEDICINE | Admitting: INTERNAL MEDICINE

## 2018-10-02 ENCOUNTER — ANESTHESIA EVENT (OUTPATIENT)
Dept: GASTROENTEROLOGY | Facility: HOSPITAL | Age: 73
End: 2018-10-02

## 2018-10-02 ENCOUNTER — ANESTHESIA (OUTPATIENT)
Dept: GASTROENTEROLOGY | Facility: HOSPITAL | Age: 73
End: 2018-10-02

## 2018-10-02 VITALS
DIASTOLIC BLOOD PRESSURE: 88 MMHG | HEIGHT: 69 IN | SYSTOLIC BLOOD PRESSURE: 131 MMHG | HEART RATE: 74 BPM | OXYGEN SATURATION: 97 % | RESPIRATION RATE: 20 BRPM | WEIGHT: 249.13 LBS | BODY MASS INDEX: 36.9 KG/M2 | TEMPERATURE: 97.9 F

## 2018-10-02 LAB
CYTO UR: NORMAL
LAB AP CASE REPORT: NORMAL
LAB AP CLINICAL INFORMATION: NORMAL
PATH REPORT.FINAL DX SPEC: NORMAL
PATH REPORT.GROSS SPEC: NORMAL

## 2018-10-02 PROCEDURE — 25010000002 PROPOFOL 10 MG/ML EMULSION: Performed by: ANESTHESIOLOGY

## 2018-10-02 RX ORDER — PROPOFOL 10 MG/ML
VIAL (ML) INTRAVENOUS CONTINUOUS PRN
Status: DISCONTINUED | OUTPATIENT
Start: 2018-10-02 | End: 2018-10-02 | Stop reason: SURG

## 2018-10-02 RX ORDER — SODIUM CHLORIDE, SODIUM LACTATE, POTASSIUM CHLORIDE, CALCIUM CHLORIDE 600; 310; 30; 20 MG/100ML; MG/100ML; MG/100ML; MG/100ML
30 INJECTION, SOLUTION INTRAVENOUS CONTINUOUS PRN
Status: DISCONTINUED | OUTPATIENT
Start: 2018-10-02 | End: 2018-10-02 | Stop reason: HOSPADM

## 2018-10-02 RX ORDER — LIDOCAINE HYDROCHLORIDE 20 MG/ML
INJECTION, SOLUTION INFILTRATION; PERINEURAL AS NEEDED
Status: DISCONTINUED | OUTPATIENT
Start: 2018-10-02 | End: 2018-10-02 | Stop reason: SURG

## 2018-10-02 RX ORDER — PROPOFOL 10 MG/ML
VIAL (ML) INTRAVENOUS AS NEEDED
Status: DISCONTINUED | OUTPATIENT
Start: 2018-10-02 | End: 2018-10-02 | Stop reason: SURG

## 2018-10-02 RX ADMIN — PROPOFOL 100 MCG/KG/MIN: 10 INJECTION, EMULSION INTRAVENOUS at 14:50

## 2018-10-02 RX ADMIN — LIDOCAINE HYDROCHLORIDE 60 MG: 20 INJECTION, SOLUTION INFILTRATION; PERINEURAL at 14:53

## 2018-10-02 RX ADMIN — SODIUM CHLORIDE, POTASSIUM CHLORIDE, SODIUM LACTATE AND CALCIUM CHLORIDE: 600; 310; 30; 20 INJECTION, SOLUTION INTRAVENOUS at 14:47

## 2018-10-02 RX ADMIN — PROPOFOL 100 MG: 10 INJECTION, EMULSION INTRAVENOUS at 14:50

## 2018-10-02 NOTE — ANESTHESIA PREPROCEDURE EVALUATION
Anesthesia Evaluation     Patient summary reviewed                Airway   Mallampati: III  TM distance: >3 FB  Neck ROM: full  No difficulty expected  Dental - normal exam     Pulmonary     breath sounds clear to auscultation  Cardiovascular   Exercise tolerance: good (4-7 METS)    Rhythm: regular  Rate: normal        Neuro/Psych  GI/Hepatic/Renal/Endo      Musculoskeletal     Abdominal    Substance History      OB/GYN          Other                        Anesthesia Plan    ASA 2     MAC     intravenous induction   Anesthetic plan, all risks, benefits, and alternatives have been provided, discussed and informed consent has been obtained with: patient.

## 2018-10-02 NOTE — ANESTHESIA POSTPROCEDURE EVALUATION
Patient: Bryan Landers    Procedure Summary     Date:  10/02/18 Room / Location:   [facility] /  [facility]    Anesthesia Start:  1452 Anesthesia Stop:  1543    Procedure:  FLEXIBLE SIGMOIDOSCOPY:  APC cautery bleeding using 60 joules (N/A ) Diagnosis:       Radiation proctitis      Diverticulosis      (Radiation proctitis [K62.7])    Surgeon:  Olaf Gomez MD Provider:  Mark Barnard MD    Anesthesia Type:  MAC ASA Status:  2          Anesthesia Type: MAC  Last vitals  BP   131/88 (10/02/18 1602)   Temp   36.6 °C (97.9 °F) (10/02/18 1256)   Pulse   74 (10/02/18 1602)   Resp   20 (10/02/18 1602)     SpO2   97 % (10/02/18 1602)     Post Anesthesia Care and Evaluation    Patient location during evaluation: PACU  Patient participation: complete - patient participated  Level of consciousness: awake and alert  Pain management: adequate  Airway patency: patent  Anesthetic complications: No anesthetic complications  PONV Status: none  Cardiovascular status: acceptable  Respiratory status: acceptable  Hydration status: acceptable

## 2018-10-02 NOTE — BRIEF OP NOTE
FLEXIBLE SIGMOIDOSCOPY WITH BIOPSY  Progress Note    Bryan Landers  10/2/2018    Pre-op Diagnosis:   Radiation proctitis [K62.7]       Post-Op Diagnosis Codes:     * Radiation proctitis [K62.7]     * Diverticulosis [K57.90]    Procedure/CPT® Codes:      Procedure(s):  FLEXIBLE SIGMOIDOSCOPY:  APC cautery bleeding using 60 joules    Surgeon(s):  Olaf Gomez MD    Anesthesia: Monitor Anesthesia Care    Staff:   Endo Technician: Madie Arvizu  Endo Nurse: Shanon Salomon RN    Estimated Blood Loss: none    Urine Voided: * No values recorded between 10/2/2018  2:51 PM and 10/2/2018  3:39 PM *    Specimens:                None      Drains:      Findings: Flex sig to 30 cm good Prep  Diverticulosis  Radiation Proctitis -APC 1L/60J    Complications: none      Olaf Gomez MD     Date: 10/2/2018  Time: 3:41 PM

## 2018-10-02 NOTE — NURSING NOTE
DR. MCKEON NOTIFIED OF PATIENTS 2ND FLEETS ENEMA RESULTS (YELLOWISH-BROWN) LIQUID WITH BRIGHT RED BLOOD. MD STATES HE WANTS PATIENT TO HAVE 3RD ENEMA AT THIS TIME PRIOR TO PROCEDURE. WILL CONTINUE TO MONITOR.

## 2018-10-03 ENCOUNTER — TELEPHONE (OUTPATIENT)
Dept: GASTROENTEROLOGY | Facility: CLINIC | Age: 73
End: 2018-10-03

## 2018-10-09 ENCOUNTER — TELEPHONE (OUTPATIENT)
Dept: GASTROENTEROLOGY | Facility: CLINIC | Age: 73
End: 2018-10-09

## 2018-10-11 ENCOUNTER — TELEPHONE (OUTPATIENT)
Dept: GASTROENTEROLOGY | Facility: CLINIC | Age: 73
End: 2018-10-11

## 2018-10-11 NOTE — TELEPHONE ENCOUNTER
HE STATES STILL HAVING LOOSE STOOLS, NOT DIARRHEA AND NOT COMPLETELY FORMED YET.  WHAT ELSE CAN HE DO?  MEDICATION?  PLEASE ADVISE.

## 2018-10-16 NOTE — TELEPHONE ENCOUNTER
NOW COMPLAINING OF CONSTIPATION.  HAS TAKEN DOSE OF MIRALAX LAST NIGHT AND OTHER DOSE THIS MORNING.  HE STATES FEELS LIKE SOMETHING IS STUCK.  CAN SHE GIVE HIM ENEMA?  PLEASE ADVISE.

## 2018-10-19 NOTE — TELEPHONE ENCOUNTER
SPOKE WITH WIFE - DECIDE NOT TO DO ENEMA, EMILY HAS KICKED IN.  ADVISE HER TO CALL WITH ANY OTHER ISSUES.

## 2018-10-19 NOTE — TELEPHONE ENCOUNTER
SPOKE WITH WIFE.  STILL HAVING CONSTIPATION.  WILL GIVE HIM ENEMA.  SHE WILL CALL BACK THIS AFTERNOON WITH MARIANNE.

## 2018-10-29 ENCOUNTER — TELEPHONE (OUTPATIENT)
Dept: GASTROENTEROLOGY | Facility: CLINIC | Age: 73
End: 2018-10-29

## 2018-10-29 NOTE — H&P
PATIENT INFORMATION  Bryan ROSS - 1945     CHIEF COMPLAINT      Chief Complaint   Patient presents with   • Rectal Bleeding         HISTORY OF PRESENT ILLNESS  Here for bleeding with a normal bowel pattern and only when its firm the bleeding will be worse(like this AM) No known family history of polyps or CRC.   Has had BIHR and BTHA but ow no abd surgery        Rectal Bleeding               REVIEW OF SYSTEMS  Review of Systems   Gastrointestinal: Positive for anal bleeding, blood in stool and hematochezia.   All other systems reviewed and are negative.           ACTIVE PROBLEMS      Patient Active Problem List     Diagnosis   • Stage 2 chronic kidney disease [N18.2]   • Medicare annual wellness visit, subsequent [Z00.00]   • Skin tear of forearm without complication, right, subsequent encounter [S51.811D]   • History of prostate cancer [Z85.46]   • Microalbuminuria due to type 2 diabetes mellitus (CMS/[facility]) [E11.29, R80.9]   • Type 2 diabetes mellitus without complication, without long-term current use of insulin (CMS/[facility]) [E11.9]   • Severely overweight [E66.3]   • Routine adult health maintenance [Z00.00]   • Acute idiopathic gout of left knee [M10.062]   • Medication monitoring encounter [Z51.81]   • Essential hypertension [I10]   • Mixed hyperlipidemia [E78.2]   • Arthritis [M19.90]            PAST MEDICAL HISTORY  Medical History        Past Medical History:   Diagnosis Date   • Arthritis     • Colon polyp     • Hyperlipidemia     • Hypertension                 SURGICAL HISTORY  Surgical History         Past Surgical History:   Procedure Laterality Date   • COLONOSCOPY   2016   • HERNIA REPAIR       • PROSTATE ULTRASOUND BIOPSY       • TOTAL HIP ARTHROPLASTY Right                FAMILY HISTORY        Family History   Problem Relation Age of Onset   • Stroke Mother     • Stroke Father     • No Known Problems Brother     • Colon cancer Neg Hx     • Colon polyps Neg Hx              SOCIAL  "HISTORY  Social History           Occupational History   • retired sales managment [employer]                Social History Main Topics   • Smoking status: Former Smoker       Types: Cigars   • Smokeless tobacco: Never Used   • Alcohol use 16.8 oz/week       28 Standard drinks or equivalent per week         Comment: 2 double vodkas a night   • Drug use: No   • Sexual activity: Yes       Partners: Female       Birth control/ protection: Post-menopausal            CURRENT MEDICATIONS     Current Outpatient Prescriptions:   •  allopurinol (ZYLOPRIM) 100 MG tablet, Take 2 tablets by mouth Daily., Disp: 180 tablet, Rfl: 3  •  aspirin 81 MG chewable tablet, Chew 81 mg Daily., Disp: , Rfl:   •  atorvastatin (LIPITOR) 80 MG tablet, Take 1 tablet by mouth Daily., Disp: 90 tablet, Rfl: 3  •  cholecalciferol (VITAMIN D3) 1000 UNITS tablet, Take 1,000 Units by mouth Daily., Disp: , Rfl:   •  lisinopril (PRINIVIL,ZESTRIL) 20 MG tablet, Take 1 tablet by mouth Daily., Disp: 90 tablet, Rfl: 3  •  Multiple Vitamins-Minerals (MULTIVITAL PLATINUM PO), Take  by mouth., Disp: , Rfl:   •  pioglitazone (ACTOS) 30 MG tablet, Take 1 tablet by mouth Daily., Disp: 90 tablet, Rfl: 3  •  Potassium 99 MG tablet, Take  by mouth., Disp: , Rfl:   •  tamsulosin (FLOMAX) 0.4 MG capsule 24 hr capsule, , Disp: , Rfl:      ALLERGIES  Patient has no known allergies.     VITALS  Vitals       Vitals:     09/13/18 1121   Weight: 116 kg (255 lb 3.2 oz)   Height: 177.8 cm (70\")            LAST RESULTS                   Orders Only on 08/15/2018   Component Date Value Ref Range Status   • PSA 08/16/2018 0.617  0.000 - 4.000 ng/mL Final     Comment: The total PSA (tPSA) method performed at [facility] is a  quantitative electrochemiluminescence immunoassay 'ECLIA'      • Glucose 08/16/2018 131* 65 - 99 mg/dL Final   • BUN 08/16/2018 12  8 - 23 mg/dL Final   • Creatinine 08/16/2018 1.37* 0.76 - 1.27 mg/dL Final   • eGFR Non  Am 08/16/2018 51* >60 " mL/min/1.73 Final     Comment: The MDRD GFR formula is only valid for adults with stable  renal function between ages 18 and 70.      • eGFR  Am 08/16/2018 62  >60 mL/min/1.73 Final   • BUN/Creatinine Ratio 08/16/2018 8.8  7.0 - 25.0 Final   • Sodium 08/16/2018 141  136 - 145 mmol/L Final   • Potassium 08/16/2018 5.0  3.5 - 5.2 mmol/L Final   • Chloride 08/16/2018 99  98 - 107 mmol/L Final   • Total CO2 08/16/2018 27.1  22.0 - 29.0 mmol/L Final   • Calcium 08/16/2018 9.6  8.8 - 10.5 mg/dL Final   • ALT (SGPT) 08/16/2018 29  5 - 41 U/L Final   • WBC 08/16/2018 4.36* 4.80 - 10.80 10*3/mm3 Final   • RBC 08/16/2018 3.78* 4.70 - 6.10 10*6/mm3 Final   • Hemoglobin 08/16/2018 13.6* 14.0 - 18.0 g/dL Final   • Hematocrit 08/16/2018 41.0* 42.0 - 52.0 % Final   • MCV 08/16/2018 108.5* 80.0 - 94.0 fL Final   • MCH 08/16/2018 36.0* 27.0 - 31.0 pg Final   • MCHC 08/16/2018 33.2  31.0 - 37.0 g/dL Final   • RDW 08/16/2018 13.3  11.5 - 14.5 % Final   • Platelets 08/16/2018 159  140 - 500 10*3/mm3 Final   • TSH 08/16/2018 4.760* 0.270 - 4.200 mIU/mL Final   • Specific Gravity, UA 08/16/2018 1.015  1.003 - 1.030 Final   • pH, UA 08/16/2018 7.0  4.5 - 8.0 Final   • Color, UA 08/16/2018 Yellow    Final     Comment: Urine microscopic not indicated.  REFERENCE RANGE: Yellow, Straw      • Appearance, UA 08/16/2018 Clear  Clear Final   • Leukocytes, UA 08/16/2018 Negative  Negative Final   • Protein 08/16/2018 Negative  Negative Final   • Glucose, UA 08/16/2018 Negative  Negative Final   • Ketones 08/16/2018 Negative    Final     REFERENCE RANGE: Negative, 80 mg/dL (3+), >=160 mg/dL (4+)   • Blood, UA 08/16/2018 Negative  Negative Final   • Bilirubin, UA 08/16/2018 Negative  Negative Final   • Urobilinogen, UA 08/16/2018 Comment    Final     Comment: 0.2 E.U./dL  REFERENCE RANGE: 0.2 - 1.0 E.U./dL      • Nitrite, UA 08/16/2018 Negative  Negative Final   • Total Cholesterol 08/16/2018 200  0 - 200 mg/dL Final   • Triglycerides  "08/16/2018 279* 0 - 150 mg/dL Final   • HDL Cholesterol 08/16/2018 70* 40 - 60 mg/dL Final   • VLDL Cholesterol 08/16/2018 55.8* 8 - 32 mg/dL Final   • LDL Cholesterol  08/16/2018 74  0 - 100 mg/dL Final   • LDL/HDL Ratio 08/16/2018 1.06    Final   • Hemoglobin A1C 08/16/2018 5.90* 4.80 - 5.60 % Final   • Uric Acid 08/16/2018 5.3  3.4 - 7.0 mg/dL Final      Us Aaa Screen Limited     Result Date: 9/4/2018  Narrative: Screening aortic ultrasound  INDICATION: Former smoker. Hypertension. Family history of coronary artery disease.  COMPARISON: None.  FINDINGS: Grayscale, color flow, and spectral Doppler waveform analysis is performed of the abdominal aorta. Proximal aorta is 2.6 cm. Mid aorta is 2.4 cm. Distal aorta is 1.9 cm. No free fluid.       Impression: Negative exam. No abdominal aortic aneurysm.  This report was finalized on 9/4/2018 12:28 PM by Dr. Beck Khan MD.          PHYSICAL EXAM  /88 (BP Location: Right arm, Patient Position: Lying)   Pulse 74   Temp 97.9 °F (36.6 °C) (Oral)   Resp 20   Ht 175.3 cm (69\")   Wt 113 kg (249 lb 2 oz)   SpO2 97%   BMI 36.79 kg/m²     Physical Exam   Constitutional: He is oriented to person, place, and time. He appears well-developed and well-nourished.   HENT:   Head: Normocephalic and atraumatic.   Eyes: Pupils are equal, round, and reactive to light. Conjunctivae and EOM are normal. No scleral icterus.   Neck: Normal range of motion. Neck supple. No thyromegaly present.   Cardiovascular: Normal rate, regular rhythm, normal heart sounds and intact distal pulses.  Exam reveals no gallop.    No murmur heard.  Pulmonary/Chest: Effort normal and breath sounds normal. He has no wheezes. He has no rales.   Abdominal: Soft. Bowel sounds are normal. He exhibits no shifting dullness, no distension, no fluid wave, no abdominal bruit, no ascites and no mass. There is no hepatosplenomegaly. There is no tenderness. There is no guarding and negative Pabon's sign. Hernia " confirmed negative in the ventral area.   Musculoskeletal: Normal range of motion. He exhibits no edema.   Lymphadenopathy:     He has no cervical adenopathy.   Neurological: He is alert and oriented to person, place, and time.   Skin: Skin is warm and dry. No rash noted. He is not diaphoretic. No erythema.   Psychiatric: He has a normal mood and affect. His behavior is normal.         ASSESSMENT  Diagnoses and all orders for this visit:     Rectal bleeding  -     Case Request; Standing  -     Case Request     Other orders  -     Follow Anesthesia Guidelines / Standing Orders; Future  -     Obtain informed consent; Standing  -     Verify bowel prep was successful; Standing  -     Give tap water enema if bowel prep was insufficient; Standing              PLAN  Return if symptoms worsen or fail to improve.    Recent Colon showed Radiation Proctitis so will repeat Flex Sig w APC for Treatment

## 2018-11-05 ENCOUNTER — TELEPHONE (OUTPATIENT)
Dept: GASTROENTEROLOGY | Facility: CLINIC | Age: 73
End: 2018-11-05

## 2018-11-05 NOTE — TELEPHONE ENCOUNTER
SPOKE WITH PATIENT - NO BLEEDING FOR 1 WEEK PRIOR TO Friday, 11/02/2018.  HE STATES NO BLEEDING OVER THE WEEKEND.  DOES HAVE BOWEL MOVEMENT EVERYDAY, PENCIL THIN, ORANGE & PALE IN COLOR.  HAS OFFICE APPOINTMENT ON 11/26/2018.

## 2018-11-26 ENCOUNTER — OFFICE VISIT (OUTPATIENT)
Dept: GASTROENTEROLOGY | Facility: CLINIC | Age: 73
End: 2018-11-26

## 2018-11-26 VITALS — HEIGHT: 69 IN | BODY MASS INDEX: 37.47 KG/M2 | WEIGHT: 253 LBS

## 2018-11-26 DIAGNOSIS — K62.7 RADIATION PROCTITIS: Primary | ICD-10-CM

## 2018-11-26 DIAGNOSIS — Z85.46 HISTORY OF PROSTATE CANCER: ICD-10-CM

## 2018-11-26 DIAGNOSIS — K62.5 RECTAL BLEEDING: ICD-10-CM

## 2018-11-26 PROCEDURE — 99213 OFFICE O/P EST LOW 20 MIN: CPT | Performed by: INTERNAL MEDICINE

## 2018-11-26 NOTE — PROGRESS NOTES
PATIENT INFORMATION  Bryan Landers       - 1945    CHIEF COMPLAINT  Chief Complaint   Patient presents with   • Follow-up     follow up on flex sig       HISTORY OF PRESENT ILLNESS  Follow up from [facility] treatment of his XRT treatment fro his prostate and has gone from Daily bleeding to once a week, hasn't stopped all together and is on miralax with soft regular stools.    Has been holding his Baby asa and tried it once with some bleeding so he stopped again            REVIEW OF SYSTEMS  Review of Systems   All other systems reviewed and are negative.        ACTIVE PROBLEMS  Patient Active Problem List    Diagnosis   • Radiation proctitis [K62.7]   • Rectal bleeding [K62.5]   • Stage 2 chronic kidney disease [N18.2]   • Medicare annual wellness visit, subsequent [Z00.00]   • Skin tear of forearm without complication, right, subsequent encounter [S51.811D]   • History of prostate cancer [Z85.46]   • Microalbuminuria due to type 2 diabetes mellitus (CMS/[facility]) [E11.29, R80.9]   • Type 2 diabetes mellitus without complication, without long-term current use of insulin (CMS/[facility]) [E11.9]   • Severely overweight [E66.3]   • Routine adult health maintenance [Z00.00]   • Acute idiopathic gout of left knee [M10.062]   • Medication monitoring encounter [Z51.81]   • Essential hypertension [I10]   • Mixed hyperlipidemia [E78.2]   • Arthritis [M19.90]         PAST MEDICAL HISTORY  Past Medical History:   Diagnosis Date   • Arthritis    • Colon polyp    • Diabetes mellitus (CMS/HCC)    • GI bleed    • Hyperlipidemia    • Hypertension          SURGICAL HISTORY  Past Surgical History:   Procedure Laterality Date   • COLONOSCOPY  2016   • HERNIA REPAIR     • PROSTATE ULTRASOUND BIOPSY     • TOTAL HIP ARTHROPLASTY Right          FAMILY HISTORY  Family History   Problem Relation Age of Onset   • Stroke Mother    • Stroke Father    • No Known Problems Brother    • Colon cancer Neg Hx    • Colon polyps Neg Hx          SOCIAL  "HISTORY  Social History     Occupational History   • Occupation: retired [employer]    Tobacco Use   • Smoking status: Former Smoker     Types: Cigars   • Smokeless tobacco: Never Used   Substance and Sexual Activity   • Alcohol use: Yes     Alcohol/week: 16.8 oz     Types: 28 Standard drinks or equivalent per week     Comment: 2 double vodkas a night   • Drug use: No   • Sexual activity: Yes     Partners: Female     Birth control/protection: Post-menopausal       Debilities/Disabilities Identified: None    Emotional Behavior: Appropriate    CURRENT MEDICATIONS    Current Outpatient Medications:   •  allopurinol (ZYLOPRIM) 100 MG tablet, Take 2 tablets by mouth Daily., Disp: 180 tablet, Rfl: 3  •  atorvastatin (LIPITOR) 80 MG tablet, Take 1 tablet by mouth Daily., Disp: 90 tablet, Rfl: 3  •  cholecalciferol (VITAMIN D3) 1000 UNITS tablet, Take 1,000 Units by mouth Daily., Disp: , Rfl:   •  lisinopril (PRINIVIL,ZESTRIL) 20 MG tablet, Take 1 tablet by mouth Daily., Disp: 90 tablet, Rfl: 3  •  Multiple Vitamins-Minerals (MULTIVITAL PLATINUM PO), Take  by mouth., Disp: , Rfl:   •  pioglitazone (ACTOS) 30 MG tablet, Take 1 tablet by mouth Daily., Disp: 90 tablet, Rfl: 3  •  Potassium 99 MG tablet, Take  by mouth., Disp: , Rfl:   •  tamsulosin (FLOMAX) 0.4 MG capsule 24 hr capsule, , Disp: , Rfl:     ALLERGIES  Patient has no known allergies.    VITALS  Vitals:    11/26/18 1521   Weight: 115 kg (253 lb)   Height: 175.3 cm (69.02\")       LAST RESULTS   Admission on 09/28/2018, Discharged on 09/28/2018   Component Date Value Ref Range Status   • Glucose 09/28/2018 114  70 - 130 mg/dL Final   • Case Report 09/28/2018    Final                    Value:Surgical Pathology Report                         Case: [id number]                                  Authorizing Provider:  Olaf Gomez        Collected:           09/28/2018 02:11 PM                                 MD Tevin                       "                                             Ordering Location:     [facility]   Received:            09/28/2018 02:34 PM                                 OR                                                                           Pathologist:           Juan Ayala MD                                                     Specimens:   1) - Large Intestine, Right / Ascending Colon, ASCENDING COLON POLYP                                2) - Large Intestine, Transverse Colon, TRANSVERSE COLON POLYP                            • Clinical Information 09/28/2018    Final                    Value:This result contains rich text formatting which cannot be displayed here.   • Final Diagnosis 09/28/2018    Final                    Value:This result contains rich text formatting which cannot be displayed here.   • Gross Description 09/28/2018    Final                    Value:This result contains rich text formatting which cannot be displayed here.   • Microscopic Description 09/28/2018    Final                    Value:This result contains rich text formatting which cannot be displayed here.     No results found.    PHYSICAL EXAM  Physical Exam   Constitutional: He is oriented to person, place, and time. He appears well-developed and well-nourished.   HENT:   Head: Normocephalic and atraumatic.   Eyes: Conjunctivae and EOM are normal. Pupils are equal, round, and reactive to light. No scleral icterus.   Neck: Normal range of motion. Neck supple. No thyromegaly present.   Cardiovascular: Normal rate, regular rhythm, normal heart sounds and intact distal pulses. Exam reveals no gallop.   No murmur heard.  Pulmonary/Chest: Effort normal and breath sounds normal. He has no wheezes. He has no rales.   Abdominal: Soft. Bowel sounds are normal. He exhibits no shifting dullness, no distension, no fluid wave, no abdominal bruit, no ascites and no mass. There is no hepatosplenomegaly. There is no tenderness. There is  no guarding and negative Pabon's sign. Hernia confirmed negative in the ventral area.   Musculoskeletal: Normal range of motion. He exhibits no edema.   Lymphadenopathy:     He has no cervical adenopathy.   Neurological: He is alert and oriented to person, place, and time.   Skin: Skin is warm and dry. No rash noted. He is not diaphoretic. No erythema.   Psychiatric: He has a normal mood and affect. His behavior is normal.       ASSESSMENT  Diagnoses and all orders for this visit:    Radiation proctitis    Rectal bleeding    History of prostate cancer          PLAN  Return in about 4 months (around 3/26/2019).

## 2018-12-13 ENCOUNTER — HOSPITAL ENCOUNTER (OUTPATIENT)
Dept: GENERAL RADIOLOGY | Facility: HOSPITAL | Age: 73
Discharge: HOME | End: 2018-12-13
Attending: FAMILY MEDICINE

## 2018-12-13 ENCOUNTER — HOSPITAL ENCOUNTER (OUTPATIENT)
Dept: GENERAL RADIOLOGY | Facility: HOSPITAL | Age: 73
Discharge: HOME | End: 2018-12-13
Attending: FAMILY MEDICINE | Admitting: FAMILY MEDICINE

## 2018-12-13 ENCOUNTER — OFFICE VISIT (OUTPATIENT)
Dept: FAMILY MEDICINE CLINIC | Facility: CLINIC | Age: 73
End: 2018-12-13

## 2018-12-13 VITALS
WEIGHT: 248 LBS | SYSTOLIC BLOOD PRESSURE: 122 MMHG | DIASTOLIC BLOOD PRESSURE: 78 MMHG | BODY MASS INDEX: 36.73 KG/M2 | HEART RATE: 108 BPM | HEIGHT: 69 IN | OXYGEN SATURATION: 95 %

## 2018-12-13 DIAGNOSIS — Z96.643 HISTORY OF HIP REPLACEMENT, TOTAL, BILATERAL: ICD-10-CM

## 2018-12-13 DIAGNOSIS — M25.551 RIGHT HIP PAIN: ICD-10-CM

## 2018-12-13 DIAGNOSIS — Z96.643 HISTORY OF HIP REPLACEMENT, TOTAL, BILATERAL: Primary | ICD-10-CM

## 2018-12-13 PROCEDURE — 99213 OFFICE O/P EST LOW 20 MIN: CPT | Performed by: FAMILY MEDICINE

## 2018-12-13 PROCEDURE — 72110 X-RAY EXAM L-2 SPINE 4/>VWS: CPT

## 2018-12-13 PROCEDURE — 73502 X-RAY EXAM HIP UNI 2-3 VIEWS: CPT

## 2018-12-13 NOTE — PROGRESS NOTES
Subjective   Bryan Landers is a 73 y.o. male who is here for   Chief Complaint   Patient presents with   • Hip Pain     Right    .     History of Present Illness   Hip Pain: Patient complains of right hip pain. Onset of the symptoms was several weeks ago. Inciting event: none. Current symptoms include is worse with weight bearing and radiates to right thigh. Associated symptoms: none. Aggravating symptoms: going up and down stairs. Patient's overall course: gradually worsening. Patient has had prior hip problems. Previous visits for this problem: none. Evaluation to date: none.  Treatment to date: none.  S/p bilateral hip replacements 15 yr ago in [facility].  Still going to the  for exercise 3 x a week    The following portions of the patient's history were reviewed and updated as appropriate: allergies, current medications, past medical history, past social history, past surgical history and problem list.    Review of Systems    Objective   Physical Exam   Musculoskeletal:        Right hip: He exhibits decreased range of motion and decreased strength. He exhibits no tenderness, no bony tenderness, no swelling and no crepitus.        Left hip: He exhibits decreased range of motion. He exhibits no tenderness.        Right knee: Normal.        Lumbar back: He exhibits decreased range of motion and tenderness. He exhibits no pain.        Back:        Assessment/Plan   Bryan was seen today for hip pain.    Diagnoses and all orders for this visit:    History of hip replacement, total, bilateral  -     XR Spine Lumbar 4+ View; Future  -     XR Hip With or Without Pelvis 2 - 3 View Right; Future  -     Ambulatory Referral to Orthopedic Surgery    Right hip pain  -     XR Spine Lumbar 4+ View; Future  -     XR Hip With or Without Pelvis 2 - 3 View Right; Future  -     Ambulatory Referral to Orthopedic Surgery      There are no Patient Instructions on file for this visit.    There are no discontinued medications.      Return for Next scheduled follow up.    Dr. Jose Fonseca  [facility] [place], [place].

## 2018-12-26 ENCOUNTER — OFFICE VISIT (OUTPATIENT)
Dept: ORTHOPEDIC SURGERY | Facility: CLINIC | Age: 73
End: 2018-12-26

## 2018-12-26 VITALS
BODY MASS INDEX: 37.03 KG/M2 | DIASTOLIC BLOOD PRESSURE: 80 MMHG | HEIGHT: 69 IN | HEART RATE: 116 BPM | SYSTOLIC BLOOD PRESSURE: 125 MMHG | WEIGHT: 250 LBS

## 2018-12-26 DIAGNOSIS — M47.26 OSTEOARTHRITIS OF SPINE WITH RADICULOPATHY, LUMBAR REGION: Primary | ICD-10-CM

## 2018-12-26 DIAGNOSIS — Z96.643 STATUS POST TOTAL REPLACEMENT OF BOTH HIPS: ICD-10-CM

## 2018-12-26 PROCEDURE — 99203 OFFICE O/P NEW LOW 30 MIN: CPT | Performed by: ORTHOPAEDIC SURGERY

## 2018-12-26 NOTE — PROGRESS NOTES
Subjective:Low back and right hip and leg pain.          Patient ID: Bryan Landers is a 73 y.o. male.    Chief Complaint:    History of Present Illness A 73-year-old male seen by me for the first time today regarding his lower back and primarily his right hip for pain that has developed in the past 3 weeks. Past medical history is significant that he had bilateral total hip replacement over 15 years ago when living in [place], [place], and had no complaints or problems until this most recent onset of pain. There is no history of trauma although he states he did play football when he was younger and he is now having some lower back and right hip pain. X-rays were done at the hospital of his hips which show perfect position of both implants, the right hip is cemented and the left hip is press-fit but the position appears well with no evidence of loosening or malpositioning. X-rays of his back are significant and show significant lumbar arthritic changes and disk disease with anterior and posterior spurring and spondylolisthesis of L3 on L4. Grade 1 spondylolisthesis. He has not tried any antiinflammatories. States that the pain is not at rest but with activity and the longer he walks the worse the pain gets.            Social History     Occupational History   • 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    Tobacco Use   • Smoking status: Former Smoker     Types: Cigars   • Smokeless tobacco: Never Used   Substance and Sexual Activity   • Alcohol use: Yes     Alcohol/week: 16.8 oz     Types: 28 Standard drinks or equivalent per week     Comment: 2 double vodkas a night   • Drug use: No   • Sexual activity: Yes     Partners: Female     Birth control/protection: Post-menopausal      Review of Systems   Constitutional: Negative for chills, diaphoresis, fever and unexpected weight change.   HENT: Positive for hearing loss. Negative for nosebleeds, sore throat and tinnitus.    Eyes: Negative for pain and visual  disturbance.   Respiratory: Negative for cough, shortness of breath and wheezing.    Cardiovascular: Negative for chest pain and palpitations.   Gastrointestinal: Negative for abdominal pain, diarrhea, nausea and vomiting.   Endocrine: Negative for cold intolerance, heat intolerance and polydipsia.   Genitourinary: Negative for difficulty urinating, dysuria and hematuria.   Musculoskeletal: Positive for arthralgias and myalgias. Negative for joint swelling.   Skin: Negative for rash and wound.   Allergic/Immunologic: Negative for environmental allergies.   Neurological: Negative for dizziness, syncope and numbness.   Hematological: Does not bruise/bleed easily.   Psychiatric/Behavioral: Negative for dysphoric mood and sleep disturbance. The patient is not nervous/anxious.          Past Medical History:   Diagnosis Date   • Arthritis    • Colon polyp    • Diabetes mellitus (CMS/HCC)    • GI bleed    • Hyperlipidemia    • Hypertension      Past Surgical History:   Procedure Laterality Date   • COLONOSCOPY  09/2016   • COLONOSCOPY N/A 9/28/2018    Procedure: COLONOSCOPY;  Surgeon: Olaf Gomez MD;  Location: [facility] OR;  Service: Gastroenterology   • HERNIA REPAIR     • PROSTATE ULTRASOUND BIOPSY     • SIGMOIDOSCOPY N/A 10/2/2018    Procedure: FLEXIBLE SIGMOIDOSCOPY:  APC cautery bleeding using 60 joules;  Surgeon: Olaf Gomez MD;  Location: [facility] ENDOSCOPY;  Service: Gastroenterology   • TOTAL HIP ARTHROPLASTY Right 2007     Family History   Problem Relation Age of Onset   • Stroke Mother    • Stroke Father    • No Known Problems Brother    • Colon cancer Neg Hx    • Colon polyps Neg Hx          Objective:  Vitals:    12/26/18 1353   BP: 125/80   Pulse: 116         12/26/18  1353   Weight: 113 kg (250 lb)     Body mass index is 36.92 kg/m².        Ortho Exam   Again reviewed the x-rays done at the hospital and the tip shows perfect position of both implants with no evidence of  malrotation alignment or loosening. X-ray with AP and lateral of his lumbar spine showed mild scoliotic changes with significant arthritic changes throughout the lumbar spine with spondylolisthesis of L3 on L4, grade 1 spondylolisthesis. He is alert and oriented x 3. He has no pain with passive internal or external rotation of either hip and negative Stinchfield test. Negative straight leg raise on both sides with negative figure-4. Quad function 5/5. He has no tenderness to palpation in the lower back or over the right SI joint. His calf is nontender with good distal pulses. There is no reflux deficit. Good capillary refill.          Assessment:       No diagnosis found.       Plan:Over 20 minutes were spent with the patient and his wife face to face reviewing his x-rays of his back and his hip, pointing out the arthritis in his lumbar spine and reviewing his physical exam. I believe the pain is coming from his back in the arthritic changes noted and not his hips. He has not tried any antiinflammatory so my recommendation is to try 2 Aleve twice a day to see what relief we can get. He is to return in a month. If still symptomatic will proceed with an MRI of his back. Patient was in agreement with that recommendation and plan.                 Work Status:    BRONWYN query complete.    Orders:  No orders of the defined types were placed in this encounter.      Medications:  No orders of the defined types were placed in this encounter.      Followup:  No Follow-up on file.          Dictated utilizing Dragon dictation

## 2019-01-04 ENCOUNTER — HOSPITAL ENCOUNTER (INPATIENT)
Facility: HOSPITAL | Age: 74
LOS: 6 days | Discharge: HOME | End: 2019-01-10
Attending: EMERGENCY MEDICINE | Admitting: INTERNAL MEDICINE

## 2019-01-04 ENCOUNTER — TELEPHONE (OUTPATIENT)
Dept: ORTHOPEDIC SURGERY | Facility: CLINIC | Age: 74
End: 2019-01-04

## 2019-01-04 ENCOUNTER — TELEPHONE (OUTPATIENT)
Dept: GASTROENTEROLOGY | Facility: CLINIC | Age: 74
End: 2019-01-04

## 2019-01-04 DIAGNOSIS — I10 ESSENTIAL HYPERTENSION: ICD-10-CM

## 2019-01-04 DIAGNOSIS — K92.2 GASTROINTESTINAL HEMORRHAGE, UNSPECIFIED GASTROINTESTINAL HEMORRHAGE TYPE: Primary | ICD-10-CM

## 2019-01-04 DIAGNOSIS — E11.9 TYPE 2 DIABETES MELLITUS WITHOUT COMPLICATION, WITHOUT LONG-TERM CURRENT USE OF INSULIN (HCC): ICD-10-CM

## 2019-01-04 LAB
ABO GROUP BLD: NORMAL
ABO GROUP BLD: NORMAL
ALBUMIN SERPL-MCNC: 3.7 G/DL (ref 3.5–5.2)
ALBUMIN/GLOB SERPL: 1.2 G/DL
ALP SERPL-CCNC: 63 U/L (ref 40–129)
ALT SERPL W P-5'-P-CCNC: 20 U/L (ref 5–41)
ANION GAP SERPL CALCULATED.3IONS-SCNC: 11.5 MMOL/L
APTT PPP: 25.1 SECONDS (ref 24.3–38.1)
AST SERPL-CCNC: 35 U/L (ref 5–40)
BASOPHILS # BLD AUTO: 0.04 10*3/MM3 (ref 0–0.2)
BASOPHILS NFR BLD AUTO: 0.8 % (ref 0–2)
BILIRUB SERPL-MCNC: 0.5 MG/DL (ref 0.2–1.2)
BLD GP AB SCN SERPL QL: NEGATIVE
BUN BLD-MCNC: 16 MG/DL (ref 8–23)
BUN/CREAT SERPL: 9.9 (ref 7–25)
CALCIUM SPEC-SCNC: 8.8 MG/DL (ref 8.8–10.5)
CHLORIDE SERPL-SCNC: 103 MMOL/L (ref 98–107)
CO2 SERPL-SCNC: 24.5 MMOL/L (ref 22–29)
CREAT BLD-MCNC: 1.61 MG/DL (ref 0.76–1.27)
DEPRECATED RDW RBC AUTO: 53.1 FL (ref 37–54)
EOSINOPHIL # BLD AUTO: 0.12 10*3/MM3 (ref 0.1–0.3)
EOSINOPHIL NFR BLD AUTO: 2.5 % (ref 0–4)
ERYTHROCYTE [DISTWIDTH] IN BLOOD BY AUTOMATED COUNT: 13.7 % (ref 11.5–14.5)
GFR SERPL CREATININE-BSD FRML MDRD: 42 ML/MIN/1.73
GLOBULIN UR ELPH-MCNC: 3 GM/DL
GLUCOSE BLD-MCNC: 135 MG/DL (ref 65–99)
GLUCOSE BLDC GLUCOMTR-MCNC: 97 MG/DL (ref 70–130)
HCT VFR BLD AUTO: 32.3 % (ref 42–52)
HCT VFR BLD AUTO: 33.7 % (ref 42–52)
HCT VFR BLD AUTO: 36.6 % (ref 42–52)
HGB BLD-MCNC: 10.6 G/DL (ref 14–18)
HGB BLD-MCNC: 10.8 G/DL (ref 14–18)
HGB BLD-MCNC: 12.3 G/DL (ref 14–18)
IMM GRANULOCYTES # BLD AUTO: 0.01 10*3/MM3 (ref 0–0.03)
IMM GRANULOCYTES NFR BLD AUTO: 0.2 % (ref 0–0.5)
INR PPP: 1.06 (ref 0.9–1.1)
LYMPHOCYTES # BLD AUTO: 1.18 10*3/MM3 (ref 0.6–4.8)
LYMPHOCYTES NFR BLD AUTO: 24.4 % (ref 20–45)
MCH RBC QN AUTO: 35.4 PG (ref 27–31)
MCHC RBC AUTO-ENTMCNC: 33.6 G/DL (ref 31–37)
MCV RBC AUTO: 105.5 FL (ref 80–94)
MONOCYTES # BLD AUTO: 0.59 10*3/MM3 (ref 0–1)
MONOCYTES NFR BLD AUTO: 12.2 % (ref 3–8)
NEUTROPHILS # BLD AUTO: 2.9 10*3/MM3 (ref 1.5–8.3)
NEUTROPHILS NFR BLD AUTO: 59.9 % (ref 45–70)
NRBC BLD AUTO-RTO: 0 /100 WBC (ref 0–0)
PLATELET # BLD AUTO: 147 10*3/MM3 (ref 140–500)
PMV BLD AUTO: 9.4 FL (ref 7.4–10.4)
POTASSIUM BLD-SCNC: 4.4 MMOL/L (ref 3.5–5.2)
PROT SERPL-MCNC: 6.7 G/DL (ref 6–8.5)
PROTHROMBIN TIME: 13.8 SECONDS (ref 12.1–15)
RBC # BLD AUTO: 3.47 10*6/MM3 (ref 4.7–6.1)
RH BLD: POSITIVE
RH BLD: POSITIVE
SODIUM BLD-SCNC: 139 MMOL/L (ref 136–145)
T&S EXPIRATION DATE: NORMAL
WBC NRBC COR # BLD: 4.84 10*3/MM3 (ref 4.8–10.8)

## 2019-01-04 PROCEDURE — 80053 COMPREHEN METABOLIC PANEL: CPT | Performed by: EMERGENCY MEDICINE

## 2019-01-04 PROCEDURE — G0378 HOSPITAL OBSERVATION PER HR: HCPCS

## 2019-01-04 PROCEDURE — 86901 BLOOD TYPING SEROLOGIC RH(D): CPT | Performed by: EMERGENCY MEDICINE

## 2019-01-04 PROCEDURE — 86850 RBC ANTIBODY SCREEN: CPT | Performed by: EMERGENCY MEDICINE

## 2019-01-04 PROCEDURE — 99219 PR INITIAL OBSERVATION CARE/DAY 50 MINUTES: CPT | Performed by: HOSPITALIST

## 2019-01-04 PROCEDURE — 82962 GLUCOSE BLOOD TEST: CPT

## 2019-01-04 PROCEDURE — 99284 EMERGENCY DEPT VISIT MOD MDM: CPT

## 2019-01-04 PROCEDURE — 85610 PROTHROMBIN TIME: CPT | Performed by: EMERGENCY MEDICINE

## 2019-01-04 PROCEDURE — 86901 BLOOD TYPING SEROLOGIC RH(D): CPT

## 2019-01-04 PROCEDURE — 99284 EMERGENCY DEPT VISIT MOD MDM: CPT | Performed by: EMERGENCY MEDICINE

## 2019-01-04 PROCEDURE — 85007 BL SMEAR W/DIFF WBC COUNT: CPT | Performed by: EMERGENCY MEDICINE

## 2019-01-04 PROCEDURE — 36415 COLL VENOUS BLD VENIPUNCTURE: CPT

## 2019-01-04 PROCEDURE — 85730 THROMBOPLASTIN TIME PARTIAL: CPT | Performed by: EMERGENCY MEDICINE

## 2019-01-04 PROCEDURE — 86900 BLOOD TYPING SEROLOGIC ABO: CPT

## 2019-01-04 PROCEDURE — 85018 HEMOGLOBIN: CPT | Performed by: HOSPITALIST

## 2019-01-04 PROCEDURE — 85014 HEMATOCRIT: CPT | Performed by: HOSPITALIST

## 2019-01-04 PROCEDURE — 85014 HEMATOCRIT: CPT | Performed by: FAMILY MEDICINE

## 2019-01-04 PROCEDURE — 85018 HEMOGLOBIN: CPT | Performed by: FAMILY MEDICINE

## 2019-01-04 PROCEDURE — 85025 COMPLETE CBC W/AUTO DIFF WBC: CPT | Performed by: EMERGENCY MEDICINE

## 2019-01-04 PROCEDURE — 86900 BLOOD TYPING SEROLOGIC ABO: CPT | Performed by: EMERGENCY MEDICINE

## 2019-01-04 RX ORDER — CHLORDIAZEPOXIDE HYDROCHLORIDE 25 MG/1
25 CAPSULE, GELATIN COATED ORAL 2 TIMES DAILY
Status: DISCONTINUED | OUTPATIENT
Start: 2019-01-04 | End: 2019-01-05

## 2019-01-04 RX ORDER — NICOTINE POLACRILEX 4 MG
15 LOZENGE BUCCAL
Status: DISCONTINUED | OUTPATIENT
Start: 2019-01-04 | End: 2019-01-10 | Stop reason: HOSPADM

## 2019-01-04 RX ORDER — MELATONIN
1000 DAILY
Status: DISCONTINUED | OUTPATIENT
Start: 2019-01-05 | End: 2019-01-10 | Stop reason: HOSPADM

## 2019-01-04 RX ORDER — SODIUM CHLORIDE 0.9 % (FLUSH) 0.9 %
10 SYRINGE (ML) INJECTION AS NEEDED
Status: DISCONTINUED | OUTPATIENT
Start: 2019-01-04 | End: 2019-01-10 | Stop reason: HOSPADM

## 2019-01-04 RX ORDER — DEXTROSE MONOHYDRATE 25 G/50ML
25 INJECTION, SOLUTION INTRAVENOUS
Status: DISCONTINUED | OUTPATIENT
Start: 2019-01-04 | End: 2019-01-10 | Stop reason: HOSPADM

## 2019-01-04 RX ORDER — LISINOPRIL 20 MG/1
20 TABLET ORAL DAILY
Status: DISCONTINUED | OUTPATIENT
Start: 2019-01-05 | End: 2019-01-04

## 2019-01-04 RX ORDER — ALLOPURINOL 100 MG/1
200 TABLET ORAL DAILY
Status: DISCONTINUED | OUTPATIENT
Start: 2019-01-05 | End: 2019-01-10 | Stop reason: HOSPADM

## 2019-01-04 RX ORDER — ATORVASTATIN CALCIUM 40 MG/1
80 TABLET, FILM COATED ORAL DAILY
Status: DISCONTINUED | OUTPATIENT
Start: 2019-01-05 | End: 2019-01-10 | Stop reason: HOSPADM

## 2019-01-04 RX ORDER — PANTOPRAZOLE SODIUM 40 MG/10ML
40 INJECTION, POWDER, LYOPHILIZED, FOR SOLUTION INTRAVENOUS ONCE
Status: COMPLETED | OUTPATIENT
Start: 2019-01-04 | End: 2019-01-04

## 2019-01-04 RX ORDER — SODIUM CHLORIDE, SODIUM LACTATE, POTASSIUM CHLORIDE, CALCIUM CHLORIDE 600; 310; 30; 20 MG/100ML; MG/100ML; MG/100ML; MG/100ML
100 INJECTION, SOLUTION INTRAVENOUS CONTINUOUS
Status: DISCONTINUED | OUTPATIENT
Start: 2019-01-05 | End: 2019-01-05

## 2019-01-04 RX ORDER — PANTOPRAZOLE SODIUM 40 MG/10ML
40 INJECTION, POWDER, LYOPHILIZED, FOR SOLUTION INTRAVENOUS
Status: DISCONTINUED | OUTPATIENT
Start: 2019-01-04 | End: 2019-01-08

## 2019-01-04 RX ADMIN — CHLORDIAZEPOXIDE HYDROCHLORIDE 25 MG: 25 CAPSULE ORAL at 22:55

## 2019-01-04 RX ADMIN — SODIUM CHLORIDE 1000 ML: 9 INJECTION, SOLUTION INTRAVENOUS at 12:10

## 2019-01-04 RX ADMIN — PANTOPRAZOLE SODIUM 40 MG: 40 INJECTION, POWDER, FOR SOLUTION INTRAVENOUS at 18:20

## 2019-01-04 RX ADMIN — SODIUM CHLORIDE, POTASSIUM CHLORIDE, SODIUM LACTATE AND CALCIUM CHLORIDE 100 ML/HR: 600; 310; 30; 20 INJECTION, SOLUTION INTRAVENOUS at 23:27

## 2019-01-04 RX ADMIN — PANTOPRAZOLE SODIUM 40 MG: 40 INJECTION, POWDER, FOR SOLUTION INTRAVENOUS at 13:39

## 2019-01-04 NOTE — PLAN OF CARE
Problem: Patient Care Overview  Goal: Plan of Care Review  Outcome: Ongoing (interventions implemented as appropriate)   01/04/19 2192   Coping/Psychosocial   Plan of Care Reviewed With patient;spouse   Plan of Care Review   Progress no change   OTHER   Outcome Summary 73 yr. old admitted from ER with bright red stools x 2 days( had been on Aleve for back pain,arthritis, seen by DR. Jack,NPOx ice chips,, V/S stable,Labs due @5pm.     Goal: Individualization and Mutuality  Outcome: Ongoing (interventions implemented as appropriate)    Goal: Discharge Needs Assessment  Outcome: Ongoing (interventions implemented as appropriate)      Problem: Gastrointestinal Bleeding (Adult)  Goal: Signs and Symptoms of Listed Potential Problems Will be Absent, Minimized or Managed (Gastrointestinal Bleeding)  Outcome: Ongoing (interventions implemented as appropriate)      Problem: Alcohol Withdrawal Acute, Risk/Actual (Adult)  Goal: Signs and Symptoms of Listed Potential Problems Will be Absent, Minimized or Managed (Alcohol Withdrawal Acute, Risk/Actual)  Outcome: Ongoing (interventions implemented as appropriate)

## 2019-01-04 NOTE — ED NOTES
Called hospitalists, Bernice Cummins, per  Dr. Rivera's request.      Argelia Logan  01/04/19 8289

## 2019-01-04 NOTE — H&P
[facility] HOSPITALIST     Jose Fonseca MD    CHIEF COMPLAINT:  Blood in stool    HISTORY OF PRESENT ILLNESS:    -Mr. Landers is a 72 y/o  male who presents after noticing a significant amount of blood with a bowel movement last evening.  He denies rectal pain as well as abdominal cramping.  He denies nausea.  He notes no unusual increase in stool frequency outside of what normally happens when he takes Miralax.  He denies dizziness and chest pain.  No reported exertional dyspnea.  The bleeding continued through the night so he was brought in to the ER.  He also notes recent NSAID use for some shoulder pain prescribed by Dr. Albarran.  He has known diverticular disease from colonoscopy by Dr. Gomez as well as AVM's treated by [facility].  No other issues until last night.  He does not routinely check his blood glucose.      Past Medical History:   Diagnosis Date   • Arthritis    • Colon polyp    • Diabetes mellitus (CMS/HCC)    • GI bleed    • Hyperlipidemia    • Hypertension      Past Surgical History:   Procedure Laterality Date   • COLONOSCOPY  09/2016   • COLONOSCOPY N/A 9/28/2018    Procedure: COLONOSCOPY;  Surgeon: Olaf Gomez MD;  Location: [facility] OR;  Service: Gastroenterology   • HERNIA REPAIR     • PROSTATE ULTRASOUND BIOPSY     • SIGMOIDOSCOPY N/A 10/2/2018    Procedure: FLEXIBLE SIGMOIDOSCOPY:  APC cautery bleeding using 60 joules;  Surgeon: Olaf Gomez MD;  Location: [facility] ENDOSCOPY;  Service: Gastroenterology   • TOTAL HIP ARTHROPLASTY Right 2007     Family History   Problem Relation Age of Onset   • Stroke Mother    • Stroke Father    • No Known Problems Brother    • Colon cancer Neg Hx    • Colon polyps Neg Hx      Social History     Tobacco Use   • Smoking status: Former Smoker     Types: Cigars   • Smokeless tobacco: Never Used   Substance Use Topics   • Alcohol use: Yes     Alcohol/week: 19.2 - 20.4 oz     Types: 28 Standard drinks or  "equivalent, 4 - 6 Shots of liquor per week     Comment: 2-3 double vodkas a night   • Drug use: No     Medications Prior to Admission   Medication Sig Dispense Refill Last Dose   • allopurinol (ZYLOPRIM) 100 MG tablet Take 2 tablets by mouth Daily. (Patient taking differently: Take 300 mg by mouth Daily.) 180 tablet 3 1/4/2019 at Unknown time   • atorvastatin (LIPITOR) 80 MG tablet Take 1 tablet by mouth Daily. 90 tablet 3 1/4/2019 at Unknown time   • cholecalciferol (VITAMIN D3) 1000 UNITS tablet Take 1,000 Units by mouth Daily.   1/4/2019 at Unknown time   • lisinopril (PRINIVIL,ZESTRIL) 20 MG tablet Take 1 tablet by mouth Daily. 90 tablet 3 1/4/2019 at Unknown time   • Multiple Vitamins-Minerals (MULTIVITAL PLATINUM PO) Take 1 tablet by mouth Daily.   1/4/2019 at Unknown time   • pioglitazone (ACTOS) 30 MG tablet Take 1 tablet by mouth Daily. 90 tablet 3 1/4/2019 at Unknown time   • POTASSIUM PO Take  by mouth. Dose unknown   1/4/2019 at Unknown time     Allergies:  Nsaids    REVIEW OF SYSTEMS:  Please see the above history of present illness for pertinent positives and negatives.  The remainder of the patient's systems have been reviewed and are negative.    Vital Signs  Temp:  [97.6 °F (36.4 °C)-98.2 °F (36.8 °C)] 97.6 °F (36.4 °C)  Heart Rate:  [] 110  Resp:  [16] 16  BP: (124-136)/(70-80) 136/76  Oxygen Therapy  SpO2: 98 %  Pulse Oximetry Type: Intermittent  Device (Oxygen Therapy): room air}  Body mass index is 37.42 kg/m².  Flowsheet Rows      First Filed Value   Admission Height  175 cm (68.9\") Documented at 01/04/2019 1125   Admission Weight  119 kg (262 lb) Documented at 01/04/2019 1125             Physical Exam:  Physical Exam   Constitutional: Patient appears well-developed and well-nourished and in no acute distress   HEENT:   Head: Normocephalic and atraumatic.   Eyes:  Pupils are equal, round, and reactive to light. EOM are intact. Sclerae are anicteric and noninjected.  Mouth and Throat: " Patient has moist mucous membranes. Oropharynx is clear of any erythema or exudate.     Neck: Neck supple. No JVD present. No thyromegaly present. No lymphadenopathy present.  Cardiovascular: Regular rate, regular rhythm, S1 normal and S2 normal.  Exam reveals no gallop and no friction rub.  No murmur heard.  Pulmonary/Chest: Lungs are clear to auscultation bilaterally. No respiratory distress. No wheezes. No rhonchi. No rales.   Abdominal: Soft. Bowel sounds are normal. No distension and no mass. There is no hepatosplenomegaly. There is no tenderness.   Musculoskeletal: Normal muscle tone  Extremities: No edema. Radial pulses are palpable and equal.  Neurological: Patient is alert and oriented to person, place, and time. Cranial nerves II-XII are grossly intact with no focal deficits.       Results Review:    I reviewed the patient's new clinical results.  Lab Results (most recent)     Procedure Component Value Units Date/Time    Hemoglobin & Hematocrit, Blood [[id number]]  (Abnormal) Collected:  01/04/19 1602    Specimen:  Blood Updated:  01/04/19 1607     Hemoglobin 10.8 g/dL      Hematocrit 33.7 %     Comprehensive Metabolic Panel [[id number]]  (Abnormal) Collected:  01/04/19 1209    Specimen:  Blood Updated:  01/04/19 1250     Glucose 135 mg/dL      BUN 16 mg/dL      Creatinine 1.61 mg/dL      Sodium 139 mmol/L      Potassium 4.4 mmol/L      Chloride 103 mmol/L      CO2 24.5 mmol/L      Calcium 8.8 mg/dL      Total Protein 6.7 g/dL      Albumin 3.70 g/dL      ALT (SGPT) 20 U/L      AST (SGOT) 35 U/L      Alkaline Phosphatase 63 U/L      Total Bilirubin 0.5 mg/dL      eGFR Non African Amer 42 mL/min/1.73      Globulin 3.0 gm/dL      A/G Ratio 1.2 g/dL      BUN/Creatinine Ratio 9.9     Anion Gap 11.5 mmol/L     Narrative:       The MDRD GFR formula is only valid for adults with stable renal function between ages 18 and 70.    Protime-INR [[id number]]  (Normal) Collected:  01/04/19 1209    Specimen:  Blood Updated:   01/04/19 1249     Protime 13.8 Seconds      INR 1.06    Narrative:       Therapeutic Ranges for INR: 2.0-3.0 (PT 20-30)                              2.5-3.5 (PT 25-34)    aPTT [[id number]]  (Normal) Collected:  01/04/19 1209    Specimen:  Blood Updated:  01/04/19 1249     PTT 25.1 seconds     Narrative:       PTT = The equivalent PTT values for the therapeutic range of heparin levels at 0.1 to 0.7 U/ml are 53 to 110 seconds.    CBC & Differential [[id number]] Collected:  01/04/19 1209    Specimen:  Blood Updated:  01/04/19 1235    Narrative:       The following orders were created for panel order CBC & Differential.  Procedure                               Abnormality         Status                     ---------                               -----------         ------                     Scan Slide[[id number]]                                       Final result               CBC Auto Differential[[id number]]        Abnormal            Final result                 Please view results for these tests on the individual orders.    Scan Slide [[id number]] Collected:  01/04/19 1209    Specimen:  Blood Updated:  01/04/19 1235    CBC Auto Differential [[id number]]  (Abnormal) Collected:  01/04/19 1209    Specimen:  Blood Updated:  01/04/19 1234     WBC 4.84 10*3/mm3      RBC 3.47 10*6/mm3      Hemoglobin 12.3 g/dL      Hematocrit 36.6 %      .5 fL      MCH 35.4 pg      MCHC 33.6 g/dL      RDW 13.7 %      RDW-SD 53.1 fl      MPV 9.4 fL      Platelets 147 10*3/mm3      Neutrophil % 59.9 %      Lymphocyte % 24.4 %      Monocyte % 12.2 %      Eosinophil % 2.5 %      Basophil % 0.8 %      Immature Grans % 0.2 %      Neutrophils, Absolute 2.90 10*3/mm3      Lymphocytes, Absolute 1.18 10*3/mm3      Monocytes, Absolute 0.59 10*3/mm3      Eosinophils, Absolute 0.12 10*3/mm3      Basophils, Absolute 0.04 10*3/mm3      Immature Grans, Absolute 0.01 10*3/mm3      nRBC 0.0 /100 WBC           Imaging Results (most recent)     None           ECG/EMG Results (most recent)     None          Assessment/Plan     1.  GI bleed: Discussed case with Dr. Loredo.  Likely a diverticular bleed.  Will monitor overnight and repeat H&H if further bleeding occurs.  Otherwise, check CBC in AM.    2.  DM, Type II: Holding Actos.  Cover with accuchecks and SSI while NPO.    3.  EtOH Overuse: Will cover with Librium.    4.  CRI: Creatinine a little above baseline.  Will repeat in the AM.  Baseline around 1.3.     I discussed the patients findings and my recommendations with patient.     Hever Evans MD  01/04/19  4:45 PM

## 2019-01-04 NOTE — ED PROVIDER NOTES
Subjective   History of Present Illness  History of Present Illness    Chief complaint: bleeding    Location: Rectum    Quality/Severity:  Moderate    Timing/Onset/Duration: Noted today    Modifying Factors: Nothing seems to make it better or worse    Associated Symptoms: No fever or chills.  No abdominal pain.  No chest pain or shortness of breath.  No nausea or vomiting.    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  The patient has been on Aleve for a week for arthritis.    PCP: Raymundo      Review of Systems   Constitutional: Negative for chills and fever.   Respiratory: Negative for shortness of breath.    Cardiovascular: Negative for chest pain.   Gastrointestinal: Negative for abdominal pain, diarrhea and vomiting.   Genitourinary: Negative for difficulty urinating.   Skin: Negative for pallor.        Medication List      ASK your doctor about these medications    allopurinol 100 MG tablet  Commonly known as:  ZYLOPRIM  Take 2 tablets by mouth Daily.     atorvastatin 80 MG tablet  Commonly known as:  LIPITOR  Take 1 tablet by mouth Daily.     cholecalciferol 1000 units tablet  Commonly known as:  VITAMIN D3     lisinopril 20 MG tablet  Commonly known as:  PRINIVIL,ZESTRIL  Take 1 tablet by mouth Daily.     MULTIVITAL PLATINUM PO     pioglitazone 30 MG tablet  Commonly known as:  ACTOS  Take 1 tablet by mouth Daily.     POTASSIUM PO     tamsulosin 0.4 MG capsule 24 hr capsule  Commonly known as:  FLOMAX          Past Medical History:   Diagnosis Date   • Arthritis    • Colon polyp    • Diabetes mellitus (CMS/HCC)    • GI bleed    • Hyperlipidemia    • Hypertension        Allergies   Allergen Reactions   • Nsaids GI Bleeding     BLEEDING       Past Surgical History:   Procedure Laterality Date   • COLONOSCOPY  09/2016   • COLONOSCOPY N/A 9/28/2018    Procedure: COLONOSCOPY;  Surgeon: Olaf oGmez  MD Tevin;  Location:  [facility];  Service: Gastroenterology   • HERNIA REPAIR     • PROSTATE ULTRASOUND BIOPSY     • SIGMOIDOSCOPY N/A 10/2/2018    Procedure: FLEXIBLE SIGMOIDOSCOPY:  APC cautery bleeding using 60 joules;  Surgeon: Olaf Gomez MD;  Location:  [facility];  Service: Gastroenterology   • TOTAL HIP ARTHROPLASTY Right 2007       Family History   Problem Relation Age of Onset   • Stroke Mother    • Stroke Father    • No Known Problems Brother    • Colon cancer Neg Hx    • Colon polyps Neg Hx        Social History     Socioeconomic History   • Marital status:      Spouse name: Chloe   • Number of children: 2   • Years of education: Not on file   • Highest education level: Not on file   Occupational History   • 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    Tobacco Use   • Smoking status: Former Smoker     Types: Cigars   • Smokeless tobacco: Never Used   Substance and Sexual Activity   • Alcohol use: Yes     Alcohol/week: 16.8 oz     Types: 28 Standard drinks or equivalent per week     Comment: 2 double vodkas a night   • Drug use: No   • Sexual activity: Yes     Partners: Female     Birth control/protection: Post-menopausal           Objective   Physical Exam   Constitutional: He is oriented to person, place, and time. He appears well-developed and well-nourished. No distress.   ED Triage Vitals  Temp: 98.2 °F (36.8 °C) (01/04/19 1135)  Heart Rate: 117 (01/04/19 1125)  Resp: 16 (01/04/19 1125)  BP: 128/70 (01/04/19 1125)  SpO2: 95 % (01/04/19 1125)  Temp src: Oral (01/04/19 1135)  Heart Rate Source: Monitor (01/04/19 1125)  Patient Position: Lying (01/04/19 1125)  BP Location: Left arm (01/04/19 1125)  FiO2 (%): n/a    The patient's vitals were reviewed by me.  Unless otherwise noted they are within normal limits.     Cardiovascular: Normal rate, regular rhythm, normal heart sounds and intact distal pulses. Exam reveals no gallop and no friction rub.   No murmur  heard.  Pulmonary/Chest: Effort normal and breath sounds normal. No stridor. No respiratory distress. He has no wheezes. He has no rales. He exhibits no tenderness.   Abdominal: Soft. Bowel sounds are normal. He exhibits no distension and no mass. There is no tenderness. There is no rebound and no guarding. No hernia.   Genitourinary:   Genitourinary Comments: Rectal exam: Burgundy colored stools, normal tone, no masses.  Nontender.  No hemorrhoids.   Musculoskeletal: Normal range of motion. He exhibits no edema or deformity.   Neurological: He is alert and oriented to person, place, and time.   Skin: Skin is warm and dry. No rash noted. He is not diaphoretic. No erythema. No pallor.   Psychiatric: His behavior is normal.   Nursing note and vitals reviewed.      Procedures           ED Course  ED Course as of Jan 04 1333   Fri Jan 04, 2019   1237 On September 28, 2018, the hemoglobin was 11.5.  [RC]   1331 The lab for values were reviewed by me.  The hemoglobin is 12.3.  The hematocrit is 36.  The serum glucose is 135.  The creatinine is 1.61.  The GFR is 42.  The laboratory values are otherwise unremarkable.  [RC]      ED Course User Index  [RC] Toby Rivera MD      12:37 PM, 01/04/19:  The orthostatics are negative.    1:33 PM, 01/04/19:  Patient was reassessed.  He has no new complaints.  His vital signs were reviewed and are improved.  He is less tachycardic with heart rate of 104.  Abdominal exam: Soft nontender no masses positive bowel sounds.    1:33 PM, 01/04/19:  Patient's diagnosis of GI bleed, likely related to Aleve, was discussed with him.  The patient will be admitted to the hospitalist for monitoring his hemoglobin further treatment workup for his GI bleed.  The plan will be to stop his NSAIDs.    1:35 PM, 01/04/19:  Spoke with Neeru, the nurse practitioner for the hospitalist, she will accept the patient to the hospital service.        [facility]    No orders to display     Labs Reviewed - No data to  display  Xr Spine Lumbar 4+ View    Result Date: 12/13/2018  Narrative: INDICATION: Back and right hip pain for 2 weeks..  COMPARISON: None..  FINDINGS: 5 views of the lumbar spine. There is a moderate degenerative dextroscoliosis. There is grade 1-2 anterolisthesis at L5-S1 and mild retrolisthesis at L2-3.  There are discogenic degenerative changes, but there is no fracture or bone erosion or destruction at any level. 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      Impression: Degenerative changes without acute abnormality.  This report was finalized on 12/13/2018 10:19 AM by Dr. Caden Ballard MD.      Xr Hip With Or Without Pelvis 2 - 3 View Right    Result Date: 12/13/2018  Narrative: INDICATION: Hip pain for 2 weeks. No trauma.  COMPARISON: None.  FINDINGS: AP pelvis and 2 views of the right hip. No fracture or dislocation. No bone erosion or destruction. Bilateral hip arthroplasties appear normally aligned. Soft tissue calcifications around the right hip and proximal femur are presumably related to prior surgery and/or trauma. Large bone spur extends from the superior aspect of the right acetabulum. No evidence of hardware loosening.      Impression: Chronic changes as above. No acute findings.  This report was finalized on 12/13/2018 11:11 AM by Dr. Beck Khan MD.        Final diagnoses:   Gastrointestinal hemorrhage, unspecified gastrointestinal hemorrhage type         ED Medications:  Medications - No data to display    New Medications:     Medication List      ASK your doctor about these medications    allopurinol 100 MG tablet  Commonly known as:  ZYLOPRIM  Take 2 tablets by mouth Daily.     atorvastatin 80 MG tablet  Commonly known as:  LIPITOR  Take 1 tablet by mouth Daily.     cholecalciferol 1000 units tablet  Commonly known as:  VITAMIN D3     lisinopril 20 MG tablet  Commonly known as:  PRINIVIL,ZESTRIL  Take 1  tablet by mouth Daily.     MULTIVITAL PLATINUM PO     pioglitazone 30 MG tablet  Commonly known as:  ACTOS  Take 1 tablet by mouth Daily.     POTASSIUM PO     tamsulosin 0.4 MG capsule 24 hr capsule  Commonly known as:  FLOMAX          Stopped Medications:     Medication List      ASK your doctor about these medications    allopurinol 100 MG tablet  Commonly known as:  ZYLOPRIM  Take 2 tablets by mouth Daily.     atorvastatin 80 MG tablet  Commonly known as:  LIPITOR  Take 1 tablet by mouth Daily.     cholecalciferol 1000 units tablet  Commonly known as:  VITAMIN D3     lisinopril 20 MG tablet  Commonly known as:  PRINIVIL,ZESTRIL  Take 1 tablet by mouth Daily.     MULTIVITAL PLATINUM PO     pioglitazone 30 MG tablet  Commonly known as:  ACTOS  Take 1 tablet by mouth Daily.     POTASSIUM PO     tamsulosin 0.4 MG capsule 24 hr capsule  Commonly known as:  FLOMAX            Final diagnoses:   Gastrointestinal hemorrhage, unspecified gastrointestinal hemorrhage type            Toby Rivera MD  01/04/19 6260

## 2019-01-04 NOTE — TELEPHONE ENCOUNTER
Patient advised and agreeable. I did add NSAIDS to allergy per Dr. Albarran so it would be flagged for the future.    Thanks.

## 2019-01-04 NOTE — TELEPHONE ENCOUNTER
"Patient called stating that he was taking 2 Aleve twice daily as prescribed, and he is now having \"a massive of blood coming from his rectum including clots yesterday, only a dabble today\" he states that this has happened in the past and he has seen Dr. Gomez for it and he did contact there office. I instructed him to IMMEDIATELY stop the Aleve and follow Dr. Gomez's recommendation that if this happens again he needs to go to the Emergency department immediately. I also let him know I was sending a message to Dr. Albarran but he was in the OR today and that I would call the patient back as soon as I heard back from Dr. Albarran.    Thanks.  "

## 2019-01-04 NOTE — CONSULTS
[facility]              Initial Inpatient Consult Note  CC:bright red blood from rectum  Referring Provider:Dr. Evans  Reason for Consultation: GI bleed  Subjective     History of present illness:    72 yo presents with passing  bright red blood and blood clots last night. He had about 2-3 episodes last night and small amount in the morning. He has history for radiation proctitis and diverticulosis. He had cls in September . He was treated with APC in October. He had some ongoing intermittent issues with rectal bleeding but minimal. None in the past one month prior to yesterday. He was having hip pain and was given Aleve for this about 1 week ago. He denies any abdominal pain, nausea, vomiting. No melena. No dizziness, chest pain or lightheadedness.  Gastroenterology consult requested for further evaluation.  Past Medical History:  Past Medical History:   Diagnosis Date   • Arthritis    • Colon polyp    • Diabetes mellitus (CMS/HCC)    • GI bleed    • Hyperlipidemia    • Hypertension        Past Surgical History:  Past Surgical History:   Procedure Laterality Date   • COLONOSCOPY  09/2016   • COLONOSCOPY N/A 9/28/2018    Procedure: COLONOSCOPY;  Surgeon: Olaf Gomez MD;  [facility] OR;  Service: Gastroenterology   • HERNIA REPAIR     • PROSTATE ULTRASOUND BIOPSY     • SIGMOIDOSCOPY N/A 10/2/2018    Procedure: FLEXIBLE SIGMOIDOSCOPY:  APC cautery bleeding using 60 joules;  Surgeon: Olaf Gomez MD;  Location: [facility];  Service: Gastroenterology   • TOTAL HIP ARTHROPLASTY Right 2007        Social History:   Social History     Tobacco Use   • Smoking status: Former Smoker     Types: Cigars   • Smokeless tobacco: Never Used   Substance Use Topics   • Alcohol use: Yes     Alcohol/week: 19.2 - 20.4 oz     Types: 28 Standard drinks or equivalent, 4 - 6 Shots of liquor per week     Comment: 2-3 double vodkas a night        Family History:  Family  History   Problem Relation Age of Onset   • Stroke Mother    • Stroke Father    • No Known Problems Brother    • Colon cancer Neg Hx    • Colon polyps Neg Hx        Home Meds:  Medications Prior to Admission   Medication Sig Dispense Refill Last Dose   • allopurinol (ZYLOPRIM) 100 MG tablet Take 2 tablets by mouth Daily. (Patient taking differently: Take 300 mg by mouth Daily.) 180 tablet 3 1/4/2019 at Unknown time   • atorvastatin (LIPITOR) 80 MG tablet Take 1 tablet by mouth Daily. 90 tablet 3 1/4/2019 at Unknown time   • cholecalciferol (VITAMIN D3) 1000 UNITS tablet Take 1,000 Units by mouth Daily.   1/4/2019 at Unknown time   • lisinopril (PRINIVIL,ZESTRIL) 20 MG tablet Take 1 tablet by mouth Daily. 90 tablet 3 1/4/2019 at Unknown time   • Multiple Vitamins-Minerals (MULTIVITAL PLATINUM PO) Take 1 tablet by mouth Daily.   1/4/2019 at Unknown time   • pioglitazone (ACTOS) 30 MG tablet Take 1 tablet by mouth Daily. 90 tablet 3 1/4/2019 at Unknown time   • POTASSIUM PO Take  by mouth. Dose unknown   1/4/2019 at Unknown time       Current Meds:     allopurinol 200 mg Oral Daily   atorvastatin 80 mg Oral Daily   cholecalciferol 1,000 Units Oral Daily   insulin aspart 0-7 Units Subcutaneous 4x Daily AC & at Bedtime   lisinopril 20 mg Oral Daily       Allergies:  Allergies   Allergen Reactions   • Nsaids GI Bleeding     BLEEDING       Review of Systems  12 point ros done, pertinent positive including bright red blood from rectum, hip pain.    Objective     Vital Signs  Temp:  [97.6 °F (36.4 °C)-98.2 °F (36.8 °C)] 97.6 °F (36.4 °C)  Heart Rate:  [] 110  Resp:  [16] 16  BP: (124-136)/(70-80) 136/76    Physical Exam:  General Appearance:    Alert, cooperative, in no acute distress   Head:    Normocephalic, without obvious abnormality, atraumatic   Eyes:            Lids and lashes normal, conjunctivae and sclerae normal, no   icterus   Throat:   No oral lesions, no thrush, oral mucosa moist   Neck:   No adenopathy,  supple, trachea midline, no thyromegaly, no   carotid bruit, no JVD   Lungs:     Clear to auscultation,respirations regular, even and                   unlabored    Heart:    Regular rhythm and normal rate, normal S1 and S2, no            murmur, no gallop, no rub, no click   Chest Wall:    No abnormalities observed   Abdomen:     Normal bowel sounds, no masses, no organomegaly, soft        non-tender, non-distended, no guarding, no rebound                 tenderness   Rectal:     Deferred   Extremities:   no edema, no cyanosis, no redness   Skin:   No bleeding, bruising or rash   Lymph nodes:   No palpable adenopathy   Psychiatric:  Judgement and insight: normal   Orientation to person place and time: normal   Mood and affect: normal     Results Review:   I reviewed the patient's new clinical results.    WBC No results found for: WBCS   HGB Hemoglobin   Date Value Ref Range Status   01/04/2019 12.3 (L) 14.0 - 18.0 g/dL Final      HCT Hematocrit   Date Value Ref Range Status   01/04/2019 36.6 (L) 42.0 - 52.0 % Final      Platlets No results found for: LABPLAT   MCV MCV   Date Value Ref Range Status   01/04/2019 105.5 (H) 80.0 - 94.0 fL Final          Sodium Sodium   Date Value Ref Range Status   01/04/2019 139 136 - 145 mmol/L Final      Potassium Potassium   Date Value Ref Range Status   01/04/2019 4.4 3.5 - 5.2 mmol/L Final      Chloride Chloride   Date Value Ref Range Status   01/04/2019 103 98 - 107 mmol/L Final      CO2 CO2   Date Value Ref Range Status   01/04/2019 24.5 22.0 - 29.0 mmol/L Final      BUN BUN   Date Value Ref Range Status   01/04/2019 16 8 - 23 mg/dL Final      Creatinine Creatinine   Date Value Ref Range Status   01/04/2019 1.61 (H) 0.76 - 1.27 mg/dL Final      Calcium Calcium   Date Value Ref Range Status   01/04/2019 8.8 8.8 - 10.5 mg/dL Final      Albumin Albumin   Date Value Ref Range Status   01/04/2019 3.70 3.50 - 5.20 g/dL Final      AST  ALT  PT/INR:   AST (SGOT)   Date Value Ref Range  Status   01/04/2019 35 5 - 40 U/L Final     ALT (SGPT)   Date Value Ref Range Status   01/04/2019 20 5 - 41 U/L Final     Protime   Date Value Ref Range Status   01/04/2019 13.8 12.1 - 15.0 Seconds Final   /  INR   Date Value Ref Range Status   01/04/2019 1.06 0.90 - 1.10 Final            Imaging Results (last 72 hours)     ** No results found for the last 72 hours. **          Assessment/Plan       Gastrointestinal hemorrhage    Lower GI bleed-hemodynamically stable. DDX diverticular vs. Radiation proctitis. Hemoglobin higher than before. Will monitor.  Keep npo except ice chips  If has ongoing bleeding, may need to rescope.       I discussed the patients findings and my recommendations with patient, primary care team and consulting provider    Rosa Jack MD  [facility]  01/04/19  3:07 PM      Time:

## 2019-01-05 LAB
ALBUMIN SERPL-MCNC: 2.8 G/DL (ref 3.5–5.2)
ALBUMIN/GLOB SERPL: 1.2 G/DL
ALP SERPL-CCNC: 48 U/L (ref 40–129)
ALT SERPL W P-5'-P-CCNC: 15 U/L (ref 5–41)
ANION GAP SERPL CALCULATED.3IONS-SCNC: 7.9 MMOL/L
AST SERPL-CCNC: 26 U/L (ref 5–40)
BASOPHILS # BLD AUTO: 0.02 10*3/MM3 (ref 0–0.2)
BASOPHILS NFR BLD AUTO: 0.6 % (ref 0–2)
BILIRUB SERPL-MCNC: 0.4 MG/DL (ref 0.2–1.2)
BUN BLD-MCNC: 18 MG/DL (ref 8–23)
BUN/CREAT SERPL: 12.8 (ref 7–25)
CALCIUM SPEC-SCNC: 7.8 MG/DL (ref 8.8–10.5)
CHLORIDE SERPL-SCNC: 109 MMOL/L (ref 98–107)
CO2 SERPL-SCNC: 25.1 MMOL/L (ref 22–29)
CREAT BLD-MCNC: 1.41 MG/DL (ref 0.76–1.27)
DEPRECATED RDW RBC AUTO: 55.2 FL (ref 37–54)
EOSINOPHIL # BLD AUTO: 0.13 10*3/MM3 (ref 0.1–0.3)
EOSINOPHIL NFR BLD AUTO: 3.7 % (ref 0–4)
ERYTHROCYTE [DISTWIDTH] IN BLOOD BY AUTOMATED COUNT: 14 % (ref 11.5–14.5)
GFR SERPL CREATININE-BSD FRML MDRD: 49 ML/MIN/1.73
GLOBULIN UR ELPH-MCNC: 2.4 GM/DL
GLUCOSE BLD-MCNC: 101 MG/DL (ref 65–99)
GLUCOSE BLDC GLUCOMTR-MCNC: 102 MG/DL (ref 70–130)
GLUCOSE BLDC GLUCOMTR-MCNC: 110 MG/DL (ref 70–130)
GLUCOSE BLDC GLUCOMTR-MCNC: 112 MG/DL (ref 70–130)
HCT VFR BLD AUTO: 28.7 % (ref 42–52)
HCT VFR BLD AUTO: 30.5 % (ref 42–52)
HGB BLD-MCNC: 9.4 G/DL (ref 14–18)
HGB BLD-MCNC: 9.8 G/DL (ref 14–18)
IMM GRANULOCYTES # BLD AUTO: 0.01 10*3/MM3 (ref 0–0.03)
IMM GRANULOCYTES NFR BLD AUTO: 0.3 % (ref 0–0.5)
LYMPHOCYTES # BLD AUTO: 1.05 10*3/MM3 (ref 0.6–4.8)
LYMPHOCYTES NFR BLD AUTO: 29.7 % (ref 20–45)
MCH RBC QN AUTO: 35.2 PG (ref 27–31)
MCHC RBC AUTO-ENTMCNC: 32.8 G/DL (ref 31–37)
MCV RBC AUTO: 107.5 FL (ref 80–94)
MONOCYTES # BLD AUTO: 0.49 10*3/MM3 (ref 0–1)
MONOCYTES NFR BLD AUTO: 13.9 % (ref 3–8)
NEUTROPHILS # BLD AUTO: 1.83 10*3/MM3 (ref 1.5–8.3)
NEUTROPHILS NFR BLD AUTO: 51.8 % (ref 45–70)
NRBC BLD AUTO-RTO: 0 /100 WBC (ref 0–0)
PLATELET # BLD AUTO: 108 10*3/MM3 (ref 140–500)
PMV BLD AUTO: 9.1 FL (ref 7.4–10.4)
POTASSIUM BLD-SCNC: 4.2 MMOL/L (ref 3.5–5.2)
PROT SERPL-MCNC: 5.2 G/DL (ref 6–8.5)
RBC # BLD AUTO: 2.67 10*6/MM3 (ref 4.7–6.1)
SODIUM BLD-SCNC: 142 MMOL/L (ref 136–145)
WBC NRBC COR # BLD: 3.53 10*3/MM3 (ref 4.8–10.8)

## 2019-01-05 PROCEDURE — 85025 COMPLETE CBC W/AUTO DIFF WBC: CPT | Performed by: HOSPITALIST

## 2019-01-05 PROCEDURE — 85014 HEMATOCRIT: CPT | Performed by: FAMILY MEDICINE

## 2019-01-05 PROCEDURE — 99225 PR SBSQ OBSERVATION CARE/DAY 25 MINUTES: CPT | Performed by: HOSPITALIST

## 2019-01-05 PROCEDURE — 99214 OFFICE O/P EST MOD 30 MIN: CPT | Performed by: INTERNAL MEDICINE

## 2019-01-05 PROCEDURE — 80053 COMPREHEN METABOLIC PANEL: CPT | Performed by: HOSPITALIST

## 2019-01-05 PROCEDURE — G0378 HOSPITAL OBSERVATION PER HR: HCPCS

## 2019-01-05 PROCEDURE — 82962 GLUCOSE BLOOD TEST: CPT

## 2019-01-05 PROCEDURE — 85018 HEMOGLOBIN: CPT | Performed by: FAMILY MEDICINE

## 2019-01-05 RX ORDER — CHLORDIAZEPOXIDE HYDROCHLORIDE 25 MG/1
50 CAPSULE, GELATIN COATED ORAL 2 TIMES DAILY
Status: DISCONTINUED | OUTPATIENT
Start: 2019-01-05 | End: 2019-01-08

## 2019-01-05 RX ADMIN — VITAMIN D, TAB 1000IU (100/BT) 1000 UNITS: 25 TAB at 08:38

## 2019-01-05 RX ADMIN — ALLOPURINOL 200 MG: 100 TABLET ORAL at 08:38

## 2019-01-05 RX ADMIN — ATORVASTATIN CALCIUM 80 MG: 40 TABLET, FILM COATED ORAL at 08:38

## 2019-01-05 RX ADMIN — PANTOPRAZOLE SODIUM 40 MG: 40 INJECTION, POWDER, FOR SOLUTION INTRAVENOUS at 08:38

## 2019-01-05 RX ADMIN — CHLORDIAZEPOXIDE HYDROCHLORIDE 25 MG: 25 CAPSULE ORAL at 08:43

## 2019-01-05 RX ADMIN — PANTOPRAZOLE SODIUM 40 MG: 40 INJECTION, POWDER, FOR SOLUTION INTRAVENOUS at 18:14

## 2019-01-05 RX ADMIN — CHLORDIAZEPOXIDE HYDROCHLORIDE 50 MG: 25 CAPSULE ORAL at 20:13

## 2019-01-05 NOTE — PROGRESS NOTES
BGA/GI Progress Note   Chief Complaint: gi bleed    Subjective     Interval History:     No complaints. bm small amount this am, less blood, solid    Review of Systems:    All systems were reviewed and negative except for:  Gastrointestinal: positive for  bright red blood per rectum    Objective     Vital Signs  Temp:  [97.6 °F (36.4 °C)-98.2 °F (36.8 °C)] 97.9 °F (36.6 °C)  Heart Rate:  [] 91  Resp:  [16] 16  BP: (124-136)/(70-81) 134/81  Body mass index is 37.42 kg/m².    Intake/Output Summary (Last 24 hours) at 1/5/2019 1119  Last data filed at 1/5/2019 1006  Gross per 24 hour   Intake 1625 ml   Output --   Net 1625 ml     I/O this shift:  In: 625 [I.V.:625]  Out: -        Physical Exam:   General: patient awake, alert and cooperative   Eyes: Normal lids and lashes, no scleral icterus   Neck: supple, normal ROM   Skin: warm and dry, not jaundiced   Cardiovascular: regular rhythm and rate, no murmurs auscultated   Pulm: clear to auscultation bilaterally, regular and unlabored   Abdomen: soft, nontender, nondistended; normal bowel sounds   Rectal: deferred   Extremities: no rash or edema   Neurologic: Normal mood and behavior     Results Review:     I reviewed the patient's new clinical results.      WBC No results found for: WBCS   HGB Hemoglobin   Date Value Ref Range Status   01/05/2019 9.4 (L) 14.0 - 18.0 g/dL Final   01/04/2019 10.6 (L) 14.0 - 18.0 g/dL Final   01/04/2019 10.8 (L) 14.0 - 18.0 g/dL Final   01/04/2019 12.3 (L) 14.0 - 18.0 g/dL Final      HCT Hematocrit   Date Value Ref Range Status   01/05/2019 28.7 (L) 42.0 - 52.0 % Final   01/04/2019 32.3 (L) 42.0 - 52.0 % Final   01/04/2019 33.7 (L) 42.0 - 52.0 % Final   01/04/2019 36.6 (L) 42.0 - 52.0 % Final      Platlets No results found for: LABPLAT     PT/INR:    Protime   Date Value Ref Range Status   01/04/2019 13.8 12.1 - 15.0 Seconds Final   /  INR   Date Value Ref Range Status   01/04/2019 1.06 0.90 - 1.10 Final       Sodium Sodium    Date Value Ref Range Status   01/05/2019 142 136 - 145 mmol/L Final   01/04/2019 139 136 - 145 mmol/L Final      Potassium Potassium   Date Value Ref Range Status   01/05/2019 4.2 3.5 - 5.2 mmol/L Final   01/04/2019 4.4 3.5 - 5.2 mmol/L Final      Chloride Chloride   Date Value Ref Range Status   01/05/2019 109 (H) 98 - 107 mmol/L Final   01/04/2019 103 98 - 107 mmol/L Final      Bicarbonate No results found for: PLASMABICARB   BUN BUN   Date Value Ref Range Status   01/05/2019 18 8 - 23 mg/dL Final   01/04/2019 16 8 - 23 mg/dL Final      Creatinine Creatinine   Date Value Ref Range Status   01/05/2019 1.41 (H) 0.76 - 1.27 mg/dL Final   01/04/2019 1.61 (H) 0.76 - 1.27 mg/dL Final      Calcium Calcium   Date Value Ref Range Status   01/05/2019 7.8 (L) 8.8 - 10.5 mg/dL Final   01/04/2019 8.8 8.8 - 10.5 mg/dL Final      Magnesium  AST  ALT  Bilirubin, Total  AlkPhos  Albumin    Amylase  Lipase    Radiology: No results found for: MG  No components found for: AST.*  No components found for: ALT.*  No components found for: BILIRUBIN, TOTAL.*    No components found for: ALKPHOS.*  No components found for: ALBUMIN.*      No components found for: AMYLASE.*  No components found for: LIPASE.*    Imaging Results (most recent)     None                Assessment/Plan     Patient Active Problem List   Diagnosis Code   • Essential hypertension I10   • Mixed hyperlipidemia E78.2   • Arthritis M19.90   • Type 2 diabetes mellitus without complication, without long-term current use of insulin (CMS/[facility]) E11.9   • Severely overweight E66.3   • Routine adult health maintenance Z00.00   • Acute idiopathic gout of left knee M10.062   • Medication monitoring encounter Z51.81   • Microalbuminuria due to type 2 diabetes mellitus (CMS/[facility]) E11.29, R80.9   • History of prostate cancer Z85.46   • Skin tear of forearm without complication, right, subsequent encounter S51.811D   • Stage 2 chronic kidney disease N18.2   • Medicare annual wellness  visit, subsequent Z00.00   • Rectal bleeding K62.5   • Radiation proctitis K62.7   • History of hip replacement, total, bilateral Z96.643   • Right hip pain M25.551   • Gastrointestinal hemorrhage K92.2       Lower gi bleed-likely diverticular vs radiation proctitis  Hemoglobin noted at 9.6  Bleeding has stopped  Advance diet will follow        Rosa Jack MD  01/05/19  11:19 AM

## 2019-01-05 NOTE — PLAN OF CARE
Problem: Patient Care Overview  Goal: Discharge Needs Assessment  Outcome: Ongoing (interventions implemented as appropriate)   01/05/19 1109   Discharge Needs Assessment   Readmission Within the Last 30 Days no previous admission in last 30 days   Concerns to be Addressed no discharge needs identified;denies needs/concerns at this time   Patient/Family Anticipates Transition to home with family   Patient/Family Anticipated Services at Transition none   Transportation Concerns car, none   Transportation Anticipated car, drives self;family or friend will provide   Anticipated Changes Related to Illness none   Equipment Needed After Discharge none   Offered/Gave Vendor List no   Disability   Equipment Currently Used at Home none

## 2019-01-05 NOTE — PROGRESS NOTES
"Hospitalist Team      Patient Care Team:  Jose Fonseca MD as PCP - General (Family Medicine)  Jose Michelle MD as Consulting Physician (Radiation Oncology)  Roldan Mccarthy MD as Consulting Physician (Urology)        Chief Complaint: Follow-up GI bleed    Subjective    -Events noted overnight.  Less bright red blood this morning, but still present.  Denies chest pain and dyspnea.  Tolerating PO.    Objective    Vital Signs  Temp:  [96.5 °F (35.8 °C)-97.9 °F (36.6 °C)] 96.5 °F (35.8 °C)  Heart Rate:  [] 97  Resp:  [16] 16  BP: (112-134)/(60-81) 112/60  Oxygen Therapy  SpO2: 98 %  Pulse Oximetry Type: Intermittent  Device (Oxygen Therapy): room air}  Flowsheet Rows      First Filed Value   Admission Height  175 cm (68.9\") Documented at 01/04/2019 1125   Admission Weight  119 kg (262 lb) Documented at 01/04/2019 1125          Physical Exam:    General Appearance:    Alert, cooperative, in no acute distress   Lungs:     Clear to auscultation,respirations regular, even and                  unlabored    Heart:    Regular rhythm and normal rate, normal S1 and S2, no            murmur, no gallop, no rub, no click   Abdomen:     Soft and non-tender w/ active bowel sounds   Extremities:   Moves all extremities well, no edema, no cyanosis, no             redness   Pulses:   Pulses palpable and equal bilaterally   Neurologic:   Cranial nerves 2 - 12 grossly intact       Results Review:     I reviewed the patient's new clinical results.    Lab Results (last 24 hours)     Procedure Component Value Units Date/Time    POC Glucose Once [[id number]]  (Normal) Collected:  01/05/19 1646    Specimen:  Blood Updated:  01/05/19 1652     Glucose 102 mg/dL     POC Glucose Once [[id number]]  (Normal) Collected:  01/05/19 1134    Specimen:  Blood Updated:  01/05/19 1140     Glucose 112 mg/dL     POC Glucose Once [[id number]]  (Normal) Collected:  01/05/19 0615    Specimen:  Blood Updated:  01/05/19 0621     Glucose 112 " mg/dL     Comprehensive Metabolic Panel [[id number]]  (Abnormal) Collected:  01/05/19 0446    Specimen:  Blood Updated:  01/05/19 0530     Glucose 101 mg/dL      BUN 18 mg/dL      Creatinine 1.41 mg/dL      Sodium 142 mmol/L      Potassium 4.2 mmol/L      Chloride 109 mmol/L      CO2 25.1 mmol/L      Calcium 7.8 mg/dL      Total Protein 5.2 g/dL      Albumin 2.80 g/dL      ALT (SGPT) 15 U/L      AST (SGOT) 26 U/L      Alkaline Phosphatase 48 U/L      Total Bilirubin 0.4 mg/dL      eGFR Non African Amer 49 mL/min/1.73      Globulin 2.4 gm/dL      A/G Ratio 1.2 g/dL      BUN/Creatinine Ratio 12.8     Anion Gap 7.9 mmol/L     Narrative:       The MDRD GFR formula is only valid for adults with stable renal function between ages 18 and 70.    CBC & Differential [[id number]] Collected:  01/05/19 0446    Specimen:  Blood Updated:  01/05/19 0517    Narrative:       The following orders were created for panel order CBC & Differential.  Procedure                               Abnormality         Status                     ---------                               -----------         ------                     Scan Slide[[id number]]                                                                  CBC Auto Differential[[id number]]        Abnormal            Final result                 Please view results for these tests on the individual orders.    CBC Auto Differential [[id number]]  (Abnormal) Collected:  01/05/19 0446    Specimen:  Blood Updated:  01/05/19 0517     WBC 3.53 10*3/mm3      RBC 2.67 10*6/mm3      Hemoglobin 9.4 g/dL      Hematocrit 28.7 %      .5 fL      MCH 35.2 pg      MCHC 32.8 g/dL      RDW 14.0 %      RDW-SD 55.2 fl      MPV 9.1 fL      Platelets 108 10*3/mm3      Neutrophil % 51.8 %      Lymphocyte % 29.7 %      Monocyte % 13.9 %      Eosinophil % 3.7 %      Basophil % 0.6 %      Immature Grans % 0.3 %      Neutrophils, Absolute 1.83 10*3/mm3      Lymphocytes, Absolute 1.05 10*3/mm3      Monocytes,  Absolute 0.49 10*3/mm3      Eosinophils, Absolute 0.13 10*3/mm3      Basophils, Absolute 0.02 10*3/mm3      Immature Grans, Absolute 0.01 10*3/mm3      nRBC 0.0 /100 WBC     POC Glucose Once [[id number]]  (Normal) Collected:  01/05/19 0034    Specimen:  Blood Updated:  01/05/19 0040     Glucose 110 mg/dL     Hemoglobin & Hematocrit, Blood [[id number]]  (Abnormal) Collected:  01/04/19 2304    Specimen:  Blood Updated:  01/04/19 2306     Hemoglobin 10.6 g/dL      Hematocrit 32.3 %     POC Glucose Once [[id number]]  (Normal) Collected:  01/04/19 1851    Specimen:  Blood Updated:  01/04/19 1857     Glucose 97 mg/dL           Imaging Results (last 24 hours)     ** No results found for the last 24 hours. **            ECG/EMG Results (most recent)     None          Medication Review:   I have reviewed the patient's current medication list    Current Facility-Administered Medications:   •  allopurinol (ZYLOPRIM) tablet 200 mg, 200 mg, Oral, Daily, Hever Evans MD, 200 mg at 01/05/19 0838  •  atorvastatin (LIPITOR) tablet 80 mg, 80 mg, Oral, Daily, Hever Evans MD, 80 mg at 01/05/19 0838  •  chlordiazePOXIDE (LIBRIUM) capsule 50 mg, 50 mg, Oral, BID, Hever Evans MD  •  cholecalciferol (VITAMIN D3) tablet 1,000 Units, 1,000 Units, Oral, Daily, Hever Evans MD, 1,000 Units at 01/05/19 0838  •  dextrose (D50W) 25 g/ 50mL Intravenous Solution 25 g, 25 g, Intravenous, Q15 Min PRN, Hever Evans MD  •  dextrose (GLUTOSE) oral gel 15 g, 15 g, Oral, Q15 Min PRN, Hever Evans MD  •  glucagon (GLUCAGEN) injection 1 mg, 1 mg, Subcutaneous, PRN, Hever Evans MD  •  insulin aspart (novoLOG) injection 0-7 Units, 0-7 Units, Subcutaneous, TID With Meals, Hever Evans MD  •  pantoprazole (PROTONIX) injection 40 mg, 40 mg, Intravenous, BID AMY, Rosa Jack MD, 40 mg at 01/05/19 0829  •  [COMPLETED] Insert peripheral IV, , , Once **AND** sodium chloride 0.9 % flush 10 mL, 10 mL, Intravenous, PRN, [place], Toby L,  MD      Assessment/Plan     1.  GI bleed: Still suspect a diverticular bleed.  Hgb relatively stable w/o any large dips.  Will repeat H&H in the morning.     2.  DM, Type II: Bedsides and AM at goal.  Holding Actos.  Continue SSI.     3.  EtOH Overuse: Increased Librium to 50mg BID.     4.  CRI: Creatinine improved.  Repeat renal panel in AM.      Plan for disposition: Home soon    Hever Evans MD  01/05/19  5:34 PM

## 2019-01-05 NOTE — NURSING NOTE
Discharge Planning Assessment   Helen Yancey     Patient Name: Bryan Landers  MRN: [id number]  Today's Date: 1/5/2019    Admit Date: 1/4/2019    Discharge Needs Assessment     Row Name 01/05/19 1109       Living Environment    Lives With  spouse    Current Living Arrangements  home/apartment/condo    Primary Care Provided by  self    Provides Primary Care For  no one    Family Caregiver if Needed  spouse    Quality of Family Relationships  helpful;involved;supportive    Able to Return to Prior Arrangements  yes       Resource/Environmental Concerns    Resource/Environmental Concerns  none    Transportation Concerns  car, none       Transition Planning    Patient/Family Anticipates Transition to  home with family    Patient/Family Anticipated Services at Transition  none    Transportation Anticipated  car, drives self;family or friend will provide       Discharge Needs Assessment    Readmission Within the Last 30 Days  no previous admission in last 30 days    Concerns to be Addressed  no discharge needs identified;denies needs/concerns at this time    Equipment Currently Used at Home  none    Anticipated Changes Related to Illness  none    Equipment Needed After Discharge  none    Offered/Gave Vendor List  no        Discharge Plan     Row Name 01/05/19 1110       Plan    Plan  home    Patient/Family in Agreement with Plan  yes    Plan Comments  spoke with patient at bedside. agreeable to speak to . he refused to sign DAVIS. he lives with wife in a multi level home. no trouble navigating the stairs. no HH or home O2/neb. he states he has a bp cuff. independent with ADL's including shopping and driving. wife provides the meal prep and driving as well. he is very active: goes to the [facility] 3x week. he uses [facility] [place] and denies having trouble paying for medications. he has prescription coverage. does not have a living will and is not interested at this time. face sheet verified. plan is home when medically  stable. will continue to follow.         Destination      No service coordination in this encounter.      Durable Medical Equipment      No service coordination in this encounter.      Dialysis/Infusion      No service coordination in this encounter.      Home Medical Care      No service coordination in this encounter.      Community Resources      No service coordination in this encounter.          Demographic Summary     Row Name 01/05/19 1109       General Information    Admission Type  observation    Arrived From  home    Required Notices Provided  Observation Status Notice    Referral Source  admission list    Reason for Consult  discharge planning    Preferred Language  English     Used During This Interaction  no       Contact Information    Permission Granted to Share Info With          Functional Status    No documentation.       Psychosocial    No documentation.       Abuse/Neglect    No documentation.       Legal    No documentation.       Substance Abuse    No documentation.       Patient Forms    No documentation.           Deisy Bryant RN

## 2019-01-05 NOTE — PLAN OF CARE
Problem: Patient Care Overview  Goal: Plan of Care Review  Outcome: Ongoing (interventions implemented as appropriate)   01/05/19 0548   Coping/Psychosocial   Plan of Care Reviewed With patient   Plan of Care Review   Progress no change   OTHER   Outcome Summary Pt VSS; NPO with ice chips; 2 bright red stools this shift; LR @ 100 ml/hr; O2 sats maintained on room air; will continue to monitor.        Problem: Gastrointestinal Bleeding (Adult)  Goal: Signs and Symptoms of Listed Potential Problems Will be Absent, Minimized or Managed (Gastrointestinal Bleeding)  Outcome: Ongoing (interventions implemented as appropriate)   01/05/19 0548   Goal/Outcome Evaluation   Problems Assessed (GI Bleeding) all       Problem: Alcohol Withdrawal Acute, Risk/Actual (Adult)  Goal: Signs and Symptoms of Listed Potential Problems Will be Absent, Minimized or Managed (Alcohol Withdrawal Acute, Risk/Actual)  Outcome: Ongoing (interventions implemented as appropriate)   01/05/19 0548   Goal/Outcome Evaluation   Problems Assessed (Alcohol Withdrawal Syndrome) all   Problems Present (Alcohol W/D Syndrome) none

## 2019-01-06 LAB
GLUCOSE BLDC GLUCOMTR-MCNC: 111 MG/DL (ref 70–130)
GLUCOSE BLDC GLUCOMTR-MCNC: 111 MG/DL (ref 70–130)
GLUCOSE BLDC GLUCOMTR-MCNC: 114 MG/DL (ref 70–130)
GLUCOSE BLDC GLUCOMTR-MCNC: 115 MG/DL (ref 70–130)
HCT VFR BLD AUTO: 26.5 % (ref 42–52)
HCT VFR BLD AUTO: 30.3 % (ref 42–52)
HGB BLD-MCNC: 8.7 G/DL (ref 14–18)
HGB BLD-MCNC: 9.8 G/DL (ref 14–18)

## 2019-01-06 PROCEDURE — 85014 HEMATOCRIT: CPT | Performed by: INTERNAL MEDICINE

## 2019-01-06 PROCEDURE — 99225 PR SBSQ OBSERVATION CARE/DAY 25 MINUTES: CPT | Performed by: HOSPITALIST

## 2019-01-06 PROCEDURE — 85018 HEMOGLOBIN: CPT | Performed by: HOSPITALIST

## 2019-01-06 PROCEDURE — 85018 HEMOGLOBIN: CPT | Performed by: INTERNAL MEDICINE

## 2019-01-06 PROCEDURE — G0378 HOSPITAL OBSERVATION PER HR: HCPCS

## 2019-01-06 PROCEDURE — 82962 GLUCOSE BLOOD TEST: CPT

## 2019-01-06 PROCEDURE — 85014 HEMATOCRIT: CPT | Performed by: HOSPITALIST

## 2019-01-06 PROCEDURE — 99214 OFFICE O/P EST MOD 30 MIN: CPT | Performed by: INTERNAL MEDICINE

## 2019-01-06 RX ADMIN — CHLORDIAZEPOXIDE HYDROCHLORIDE 50 MG: 25 CAPSULE ORAL at 23:59

## 2019-01-06 RX ADMIN — ALLOPURINOL 200 MG: 100 TABLET ORAL at 08:34

## 2019-01-06 RX ADMIN — VITAMIN D, TAB 1000IU (100/BT) 1000 UNITS: 25 TAB at 08:34

## 2019-01-06 RX ADMIN — PANTOPRAZOLE SODIUM 40 MG: 40 INJECTION, POWDER, FOR SOLUTION INTRAVENOUS at 08:34

## 2019-01-06 RX ADMIN — POLYETHYLENE GLYCOL 3350, SODIUM SULFATE ANHYDROUS, SODIUM BICARBONATE, SODIUM CHLORIDE, POTASSIUM CHLORIDE 4000 ML: 236; 22.74; 6.74; 5.86; 2.97 POWDER, FOR SOLUTION ORAL at 11:38

## 2019-01-06 RX ADMIN — ATORVASTATIN CALCIUM 80 MG: 40 TABLET, FILM COATED ORAL at 08:34

## 2019-01-06 RX ADMIN — PANTOPRAZOLE SODIUM 40 MG: 40 INJECTION, POWDER, FOR SOLUTION INTRAVENOUS at 17:04

## 2019-01-06 RX ADMIN — CHLORDIAZEPOXIDE HYDROCHLORIDE 50 MG: 25 CAPSULE ORAL at 08:39

## 2019-01-06 NOTE — SIGNIFICANT NOTE
01/05/19 2240   Provider Notification   Reason for Communication Other (Comment)  (Stat H &H)   Provider Name Dr. Murdock   Notification Route In person, on unit   Response No new orders  (lab has improved)

## 2019-01-06 NOTE — PROGRESS NOTES
BGA/GI Progress Note   Chief Complaint: Rectal bleeding  Subjective     Interval History:     He had more bleeding last night. He said more bright red, at times just dripping out of him. Hemoglobin down another 1 gram. None since    Review of Systems:    All systems were reviewed and negative except for:  Gastrointestinal: positive for  bright red blood per rectum    Objective     Vital Signs  Temp:  [96.5 °F (35.8 °C)-98.1 °F (36.7 °C)] 96.7 °F (35.9 °C)  Heart Rate:  [] 100  Resp:  [16-20] 20  BP: (100-136)/(60-74) 136/68  Body mass index is 37.42 kg/m².    Intake/Output Summary (Last 24 hours) at 1/6/2019 1011  Last data filed at 1/5/2019 1314  Gross per 24 hour   Intake 360 ml   Output --   Net 360 ml     No intake/output data recorded.       Physical Exam:   General: patient awake, alert and cooperative   Eyes: Normal lids and lashes, no scleral icterus   Neck: supple, normal ROM   Skin: warm and dry, not jaundiced   Cardiovascular: regular rhythm and rate, no murmurs auscultated   Pulm: clear to auscultation bilaterally, regular and unlabored   Abdomen: soft, nontender, nondistended; normal bowel sounds   Rectal: deferred   Extremities: no rash or edema   Neurologic: Normal mood and behavior     Results Review:     I reviewed the patient's new clinical results.      WBC No results found for: WBCS   HGB Hemoglobin   Date Value Ref Range Status   01/06/2019 8.7 (L) 14.0 - 18.0 g/dL Final   01/05/2019 9.8 (L) 14.0 - 18.0 g/dL Final   01/05/2019 9.4 (L) 14.0 - 18.0 g/dL Final   01/04/2019 10.6 (L) 14.0 - 18.0 g/dL Final   01/04/2019 10.8 (L) 14.0 - 18.0 g/dL Final   01/04/2019 12.3 (L) 14.0 - 18.0 g/dL Final      HCT Hematocrit   Date Value Ref Range Status   01/06/2019 26.5 (L) 42.0 - 52.0 % Final   01/05/2019 30.5 (L) 42.0 - 52.0 % Final   01/05/2019 28.7 (L) 42.0 - 52.0 % Final   01/04/2019 32.3 (L) 42.0 - 52.0 % Final   01/04/2019 33.7 (L) 42.0 - 52.0 % Final   01/04/2019 36.6 (L) 42.0 - 52.0 %  Final      Platlets No results found for: LABPLAT     PT/INR:    Protime   Date Value Ref Range Status   01/04/2019 13.8 12.1 - 15.0 Seconds Final   /  INR   Date Value Ref Range Status   01/04/2019 1.06 0.90 - 1.10 Final       Sodium Sodium   Date Value Ref Range Status   01/05/2019 142 136 - 145 mmol/L Final   01/04/2019 139 136 - 145 mmol/L Final      Potassium Potassium   Date Value Ref Range Status   01/05/2019 4.2 3.5 - 5.2 mmol/L Final   01/04/2019 4.4 3.5 - 5.2 mmol/L Final      Chloride Chloride   Date Value Ref Range Status   01/05/2019 109 (H) 98 - 107 mmol/L Final   01/04/2019 103 98 - 107 mmol/L Final      Bicarbonate No results found for: PLASMABICARB   BUN BUN   Date Value Ref Range Status   01/05/2019 18 8 - 23 mg/dL Final   01/04/2019 16 8 - 23 mg/dL Final      Creatinine Creatinine   Date Value Ref Range Status   01/05/2019 1.41 (H) 0.76 - 1.27 mg/dL Final   01/04/2019 1.61 (H) 0.76 - 1.27 mg/dL Final      Calcium Calcium   Date Value Ref Range Status   01/05/2019 7.8 (L) 8.8 - 10.5 mg/dL Final   01/04/2019 8.8 8.8 - 10.5 mg/dL Final      Magnesium  AST  ALT  Bilirubin, Total  AlkPhos  Albumin    Amylase  Lipase    Radiology: No results found for: MG  No components found for: AST.*  No components found for: ALT.*  No components found for: BILIRUBIN, TOTAL.*    No components found for: ALKPHOS.*  No components found for: ALBUMIN.*      No components found for: AMYLASE.*  No components found for: LIPASE.*    Imaging Results (most recent)     None                Assessment/Plan     Patient Active Problem List   Diagnosis Code   • Essential hypertension I10   • Mixed hyperlipidemia E78.2   • Arthritis M19.90   • Type 2 diabetes mellitus without complication, without long-term current use of insulin (CMS/[facility]) E11.9   • Severely overweight E66.3   • Routine adult health maintenance Z00.00   • Acute idiopathic gout of left knee M10.062   • Medication monitoring encounter Z51.81   • Microalbuminuria due to  type 2 diabetes mellitus (CMS/HCC) E11.29, R80.9   • History of prostate cancer Z85.46   • Skin tear of forearm without complication, right, subsequent encounter S51.811D   • Stage 2 chronic kidney disease N18.2   • Medicare annual wellness visit, subsequent Z00.00   • Rectal bleeding K62.5   • Radiation proctitis K62.7   • History of hip replacement, total, bilateral Z96.643   • Right hip pain M25.551   • Gastrointestinal hemorrhage K92.2       GI bleed-diverticular vs. Radiation proctitis. Plan colonoscopy tomorrow  RBA discussed  Continue on ivette Jack MD  01/06/19  10:11 AM

## 2019-01-06 NOTE — PROGRESS NOTES
"Hospitalist Team      Patient Care Team:  Jose Fonseca MD as PCP - General (Family Medicine)  Jose Michelle MD as Consulting Physician (Radiation Oncology)  Roldan Mccarthy MD as Consulting Physician (Urology)        Chief Complaint: Follow-up GI bleed    Subjective    Still having bleeding episodes and now just had another large \"blow-out\" described by the wife.  He denies chest pain and dizziness.  He is tolerating PO.  No abdominal pain.    Objective    Vital Signs  Temp:  [96.7 °F (35.9 °C)-98.1 °F (36.7 °C)] 98.1 °F (36.7 °C)  Heart Rate:  [] 83  Resp:  [16-20] 18  BP: (100-136)/(60-71) 111/71  Oxygen Therapy  SpO2: 99 %  Pulse Oximetry Type: Intermittent  Device (Oxygen Therapy): room air}    Flowsheet Rows      First Filed Value   Admission Height  175 cm (68.9\") Documented at 01/04/2019 1125   Admission Weight  119 kg (262 lb) Documented at 01/04/2019 1125          Physical Exam:    General Appearance:    Alert, cooperative, in no acute distress   Lungs:     Clear to auscultation,respirations regular, even and                   unlabored    Heart:    Regular rhythm and normal rate, normal S1 and S2, no            murmur, no gallop, no rub, no click   Abdomen:     Soft and non-tender w/ active bowel sounds   Extremities:   Moves all extremities well, no edema, no cyanosis, no             redness   Pulses:   Pulses palpable and equal bilaterally   Neurologic:   Cranial nerves 2 - 12 grossly intact       Results Review:     I reviewed the patient's new clinical results.    Lab Results (last 24 hours)     Procedure Component Value Units Date/Time    POC Glucose Once [[id number]]  (Normal) Collected:  01/06/19 1139    Specimen:  Blood Updated:  01/06/19 1147     Glucose 114 mg/dL     POC Glucose Once [[id number]]  (Normal) Collected:  01/06/19 0711    Specimen:  Blood Updated:  01/06/19 0721     Glucose 111 mg/dL     Hemoglobin & Hematocrit, Blood [[id number]]  (Abnormal) Collected:  01/06/19 " 0431    Specimen:  Blood Updated:  01/06/19 0504     Hemoglobin 8.7 g/dL      Hematocrit 26.5 %     Hemoglobin & Hematocrit, Blood [[id number]]  (Abnormal) Collected:  01/05/19 2224    Specimen:  Blood Updated:  01/05/19 2228     Hemoglobin 9.8 g/dL      Hematocrit 30.5 %     POC Glucose Once [[id number]]  (Normal) Collected:  01/05/19 2125    Specimen:  Blood Updated:  01/05/19 2131     Glucose 112 mg/dL     POC Glucose Once [[id number]]  (Normal) Collected:  01/05/19 1646    Specimen:  Blood Updated:  01/05/19 1652     Glucose 102 mg/dL           Imaging Results (last 24 hours)     ** No results found for the last 24 hours. **              Medication Review:   I have reviewed the patient's current medication list    Current Facility-Administered Medications:   •  allopurinol (ZYLOPRIM) tablet 200 mg, 200 mg, Oral, Daily, Hever Evans MD, 200 mg at 01/06/19 0834  •  atorvastatin (LIPITOR) tablet 80 mg, 80 mg, Oral, Daily, Hever Evans MD, 80 mg at 01/06/19 0834  •  chlordiazePOXIDE (LIBRIUM) capsule 50 mg, 50 mg, Oral, BID, Hever Evans MD, 50 mg at 01/06/19 0839  •  cholecalciferol (VITAMIN D3) tablet 1,000 Units, 1,000 Units, Oral, Daily, Hever Evans MD, 1,000 Units at 01/06/19 0834  •  dextrose (D50W) 25 g/ 50mL Intravenous Solution 25 g, 25 g, Intravenous, Q15 Min PRN, Hever Evans MD  •  dextrose (GLUTOSE) oral gel 15 g, 15 g, Oral, Q15 Min PRN, Hever Evans MD  •  glucagon (GLUCAGEN) injection 1 mg, 1 mg, Subcutaneous, PRN, Hever Evans MD  •  insulin aspart (novoLOG) injection 0-7 Units, 0-7 Units, Subcutaneous, TID With Meals, Hever Evans MD  •  pantoprazole (PROTONIX) injection 40 mg, 40 mg, Intravenous, BID AC, Rosa Jack MD, 40 mg at 01/06/19 0834  •  [COMPLETED] Insert peripheral IV, , , Once **AND** sodium chloride 0.9 % flush 10 mL, 10 mL, Intravenous, PRN, Toby Rivera MD      Assessment/Plan     1.  GI Bleed: Hgb trickling down, but he remains HD stable.  C-scope  tomorrow.  Given this episode, will check an H&H.    2.  DM, Type II: Bedsides and AM at goal.  Continue current regimen.  Actos on hold.    3.  EtOH Overuse: Nothing acute.  Continue Librium.    4.  HTN: Blood pressure at goal.  Holding ACEI.    5.  CRI: Check renal panel in AM.    Plan for disposition: Home vs. Transfer when appropriate.    Hever Evans MD  01/06/19  12:29 PM

## 2019-01-06 NOTE — PLAN OF CARE
Problem: Patient Care Overview  Goal: Plan of Care Review  Outcome: Ongoing (interventions implemented as appropriate)   01/06/19 0504   Coping/Psychosocial   Plan of Care Reviewed With patient   Plan of Care Review   Progress no change   OTHER   Outcome Summary Pt does not complain of pain; CIWA zero; up independently to bathroom; bloody stools increased, placed bedside commode; clear liquids; will continue to monitor.        Problem: Gastrointestinal Bleeding (Adult)  Goal: Signs and Symptoms of Listed Potential Problems Will be Absent, Minimized or Managed (Gastrointestinal Bleeding)  Outcome: Ongoing (interventions implemented as appropriate)      Problem: Alcohol Withdrawal Acute, Risk/Actual (Adult)  Goal: Signs and Symptoms of Listed Potential Problems Will be Absent, Minimized or Managed (Alcohol Withdrawal Acute, Risk/Actual)  Outcome: Ongoing (interventions implemented as appropriate)   01/06/19 0504   Goal/Outcome Evaluation   Problems Assessed (Alcohol Withdrawal Syndrome) all   Problems Present (Alcohol W/D Syndrome) none

## 2019-01-06 NOTE — PLAN OF CARE
Problem: Patient Care Overview  Goal: Plan of Care Review  Outcome: Ongoing (interventions implemented as appropriate)   01/06/19 1355   Coping/Psychosocial   Plan of Care Reviewed With patient   Plan of Care Review   Progress no change   OTHER   Outcome Summary No c/o pain or discomfort. CIWA score of 1 d/t anxiety. Up ad emil. BSC for frequency d/t bowel prep for colonscopy 1/7. Bright red bloody stools continue. Clear liquid diet; NPO at midnight. Hgb is 9 this afternoon.      Goal: Individualization and Mutuality  Outcome: Ongoing (interventions implemented as appropriate)   01/06/19 1355   Individualization   Patient Specific Goals (Include Timeframe) encourage to use BSC and not walk to bathroom while doing bowel prep    Patient Specific Interventions BSC at bedside       Problem: Gastrointestinal Bleeding (Adult)  Goal: Signs and Symptoms of Listed Potential Problems Will be Absent, Minimized or Managed (Gastrointestinal Bleeding)  Outcome: Ongoing (interventions implemented as appropriate)   01/06/19 1355   Goal/Outcome Evaluation   Problems Assessed (GI Bleeding) all   Problems Present (GI Bleeding) situational response  (bright red bloody BMs, dizziness)       Problem: Alcohol Withdrawal Acute, Risk/Actual (Adult)  Goal: Signs and Symptoms of Listed Potential Problems Will be Absent, Minimized or Managed (Alcohol Withdrawal Acute, Risk/Actual)  Outcome: Ongoing (interventions implemented as appropriate)   01/06/19 1355   Goal/Outcome Evaluation   Problems Assessed (Alcohol Withdrawal Syndrome) all   Problems Present (Alcohol W/D Syndrome) none

## 2019-01-07 ENCOUNTER — ANESTHESIA EVENT (OUTPATIENT)
Dept: PERIOP | Facility: HOSPITAL | Age: 74
End: 2019-01-07

## 2019-01-07 ENCOUNTER — APPOINTMENT (OUTPATIENT)
Dept: NUCLEAR MEDICINE | Facility: HOSPITAL | Age: 74
End: 2019-01-07

## 2019-01-07 ENCOUNTER — ANESTHESIA (OUTPATIENT)
Dept: PERIOP | Facility: HOSPITAL | Age: 74
End: 2019-01-07

## 2019-01-07 LAB
ALBUMIN SERPL-MCNC: 3 G/DL (ref 3.5–5.2)
ANION GAP SERPL CALCULATED.3IONS-SCNC: 10.4 MMOL/L
BASOPHILS # BLD AUTO: 0.02 10*3/MM3 (ref 0–0.2)
BASOPHILS NFR BLD AUTO: 0.6 % (ref 0–2)
BUN BLD-MCNC: 18 MG/DL (ref 8–23)
BUN/CREAT SERPL: 13.8 (ref 7–25)
CALCIUM SPEC-SCNC: 7.9 MG/DL (ref 8.8–10.5)
CHLORIDE SERPL-SCNC: 107 MMOL/L (ref 98–107)
CO2 SERPL-SCNC: 25.6 MMOL/L (ref 22–29)
CREAT BLD-MCNC: 1.3 MG/DL (ref 0.76–1.27)
DEPRECATED RDW RBC AUTO: 54.5 FL (ref 37–54)
EOSINOPHIL # BLD AUTO: 0.1 10*3/MM3 (ref 0.1–0.3)
EOSINOPHIL NFR BLD AUTO: 2.8 % (ref 0–4)
ERYTHROCYTE [DISTWIDTH] IN BLOOD BY AUTOMATED COUNT: 14 % (ref 11.5–14.5)
GFR SERPL CREATININE-BSD FRML MDRD: 54 ML/MIN/1.73
GLUCOSE BLD-MCNC: 96 MG/DL (ref 65–99)
GLUCOSE BLDC GLUCOMTR-MCNC: 100 MG/DL (ref 70–130)
GLUCOSE BLDC GLUCOMTR-MCNC: 102 MG/DL (ref 70–130)
GLUCOSE BLDC GLUCOMTR-MCNC: 120 MG/DL (ref 70–130)
GLUCOSE BLDC GLUCOMTR-MCNC: 155 MG/DL (ref 70–130)
HCT VFR BLD AUTO: 24.2 % (ref 42–52)
HCT VFR BLD AUTO: 24.6 % (ref 42–52)
HGB BLD-MCNC: 8 G/DL (ref 14–18)
HGB BLD-MCNC: 8.2 G/DL (ref 14–18)
IMM GRANULOCYTES # BLD AUTO: 0.01 10*3/MM3 (ref 0–0.03)
IMM GRANULOCYTES NFR BLD AUTO: 0.3 % (ref 0–0.5)
LYMPHOCYTES # BLD AUTO: 0.79 10*3/MM3 (ref 0.6–4.8)
LYMPHOCYTES NFR BLD AUTO: 22.2 % (ref 20–45)
MCH RBC QN AUTO: 35.2 PG (ref 27–31)
MCHC RBC AUTO-ENTMCNC: 33.1 G/DL (ref 31–37)
MCV RBC AUTO: 106.6 FL (ref 80–94)
MONOCYTES # BLD AUTO: 0.55 10*3/MM3 (ref 0–1)
MONOCYTES NFR BLD AUTO: 15.4 % (ref 3–8)
NEUTROPHILS # BLD AUTO: 2.09 10*3/MM3 (ref 1.5–8.3)
NEUTROPHILS NFR BLD AUTO: 58.7 % (ref 45–70)
NRBC BLD AUTO-RTO: 0 /100 WBC (ref 0–0)
PHOSPHATE SERPL-MCNC: 3.9 MG/DL (ref 2.7–4.5)
PLATELET # BLD AUTO: 96 10*3/MM3 (ref 140–500)
PMV BLD AUTO: 9 FL (ref 7.4–10.4)
POTASSIUM BLD-SCNC: 3.7 MMOL/L (ref 3.5–5.2)
RBC # BLD AUTO: 2.27 10*6/MM3 (ref 4.7–6.1)
SODIUM BLD-SCNC: 143 MMOL/L (ref 136–145)
WBC NRBC COR # BLD: 3.56 10*3/MM3 (ref 4.8–10.8)

## 2019-01-07 PROCEDURE — 99232 SBSQ HOSP IP/OBS MODERATE 35: CPT | Performed by: HOSPITALIST

## 2019-01-07 PROCEDURE — 85014 HEMATOCRIT: CPT | Performed by: HOSPITALIST

## 2019-01-07 PROCEDURE — 85018 HEMOGLOBIN: CPT | Performed by: HOSPITALIST

## 2019-01-07 PROCEDURE — 0W3P8ZZ CONTROL BLEEDING IN GASTROINTESTINAL TRACT, VIA NATURAL OR ARTIFICIAL OPENING ENDOSCOPIC: ICD-10-PCS | Performed by: INTERNAL MEDICINE

## 2019-01-07 PROCEDURE — 85025 COMPLETE CBC W/AUTO DIFF WBC: CPT | Performed by: INTERNAL MEDICINE

## 2019-01-07 PROCEDURE — A9560 TC99M LABELED RBC: HCPCS | Performed by: HOSPITALIST

## 2019-01-07 PROCEDURE — 25010000002 PHENYLEPHRINE PER 1 ML: Performed by: NURSE ANESTHETIST, CERTIFIED REGISTERED

## 2019-01-07 PROCEDURE — 78278 ACUTE GI BLOOD LOSS IMAGING: CPT

## 2019-01-07 PROCEDURE — 45378 DIAGNOSTIC COLONOSCOPY: CPT | Performed by: INTERNAL MEDICINE

## 2019-01-07 PROCEDURE — 82962 GLUCOSE BLOOD TEST: CPT

## 2019-01-07 PROCEDURE — 80069 RENAL FUNCTION PANEL: CPT | Performed by: HOSPITALIST

## 2019-01-07 PROCEDURE — 25010000002 PROPOFOL 10 MG/ML EMULSION: Performed by: NURSE ANESTHETIST, CERTIFIED REGISTERED

## 2019-01-07 PROCEDURE — 0 TECHNETIUM LABELED RED BLOOD CELLS: Performed by: HOSPITALIST

## 2019-01-07 RX ORDER — ONDANSETRON 2 MG/ML
4 INJECTION INTRAMUSCULAR; INTRAVENOUS ONCE AS NEEDED
Status: DISCONTINUED | OUTPATIENT
Start: 2019-01-07 | End: 2019-01-07 | Stop reason: HOSPADM

## 2019-01-07 RX ORDER — PROPOFOL 10 MG/ML
VIAL (ML) INTRAVENOUS AS NEEDED
Status: DISCONTINUED | OUTPATIENT
Start: 2019-01-07 | End: 2019-01-07 | Stop reason: SURG

## 2019-01-07 RX ORDER — SODIUM CHLORIDE, SODIUM LACTATE, POTASSIUM CHLORIDE, CALCIUM CHLORIDE 600; 310; 30; 20 MG/100ML; MG/100ML; MG/100ML; MG/100ML
INJECTION, SOLUTION INTRAVENOUS CONTINUOUS PRN
Status: DISCONTINUED | OUTPATIENT
Start: 2019-01-07 | End: 2019-01-07 | Stop reason: SURG

## 2019-01-07 RX ORDER — MEPERIDINE HYDROCHLORIDE 25 MG/ML
12.5 INJECTION INTRAMUSCULAR; INTRAVENOUS; SUBCUTANEOUS
Status: DISCONTINUED | OUTPATIENT
Start: 2019-01-07 | End: 2019-01-07 | Stop reason: HOSPADM

## 2019-01-07 RX ORDER — LIDOCAINE HYDROCHLORIDE 20 MG/ML
INJECTION, SOLUTION INFILTRATION; PERINEURAL AS NEEDED
Status: DISCONTINUED | OUTPATIENT
Start: 2019-01-07 | End: 2019-01-07 | Stop reason: SURG

## 2019-01-07 RX ORDER — GLYCOPYRROLATE 0.2 MG/ML
INJECTION INTRAMUSCULAR; INTRAVENOUS AS NEEDED
Status: DISCONTINUED | OUTPATIENT
Start: 2019-01-07 | End: 2019-01-07 | Stop reason: SURG

## 2019-01-07 RX ORDER — SODIUM CHLORIDE, SODIUM LACTATE, POTASSIUM CHLORIDE, CALCIUM CHLORIDE 600; 310; 30; 20 MG/100ML; MG/100ML; MG/100ML; MG/100ML
100 INJECTION, SOLUTION INTRAVENOUS CONTINUOUS
Status: DISCONTINUED | OUTPATIENT
Start: 2019-01-07 | End: 2019-01-09

## 2019-01-07 RX ADMIN — SODIUM CHLORIDE, POTASSIUM CHLORIDE, SODIUM LACTATE AND CALCIUM CHLORIDE: 600; 310; 30; 20 INJECTION, SOLUTION INTRAVENOUS at 13:27

## 2019-01-07 RX ADMIN — LIDOCAINE HYDROCHLORIDE 100 MG: 20 INJECTION, SOLUTION INFILTRATION; PERINEURAL at 14:13

## 2019-01-07 RX ADMIN — PHENYLEPHRINE HYDROCHLORIDE 100 MCG: 10 INJECTION INTRAVENOUS at 14:24

## 2019-01-07 RX ADMIN — PROPOFOL 20 MG: 10 INJECTION, EMULSION INTRAVENOUS at 14:32

## 2019-01-07 RX ADMIN — SODIUM CHLORIDE, POTASSIUM CHLORIDE, SODIUM LACTATE AND CALCIUM CHLORIDE 100 ML/HR: 600; 310; 30; 20 INJECTION, SOLUTION INTRAVENOUS at 16:20

## 2019-01-07 RX ADMIN — TECHNETIUM TC 99M-LABELED RED BLOOD CELLS 1 DOSE: KIT at 22:30

## 2019-01-07 RX ADMIN — VITAMIN D, TAB 1000IU (100/BT) 1000 UNITS: 25 TAB at 09:59

## 2019-01-07 RX ADMIN — PROPOFOL 20 MG: 10 INJECTION, EMULSION INTRAVENOUS at 14:17

## 2019-01-07 RX ADMIN — PROPOFOL 20 MG: 10 INJECTION, EMULSION INTRAVENOUS at 14:19

## 2019-01-07 RX ADMIN — GLYCOPYRROLATE 0.1 MG: 0.2 INJECTION INTRAMUSCULAR; INTRAVENOUS at 14:11

## 2019-01-07 RX ADMIN — PANTOPRAZOLE SODIUM 40 MG: 40 INJECTION, POWDER, FOR SOLUTION INTRAVENOUS at 17:16

## 2019-01-07 RX ADMIN — PROPOFOL 20 MG: 10 INJECTION, EMULSION INTRAVENOUS at 14:16

## 2019-01-07 RX ADMIN — ALLOPURINOL 200 MG: 100 TABLET ORAL at 09:59

## 2019-01-07 RX ADMIN — PHENYLEPHRINE HYDROCHLORIDE 100 MCG: 10 INJECTION INTRAVENOUS at 14:37

## 2019-01-07 RX ADMIN — PROPOFOL 20 MG: 10 INJECTION, EMULSION INTRAVENOUS at 14:30

## 2019-01-07 RX ADMIN — PROPOFOL 20 MG: 10 INJECTION, EMULSION INTRAVENOUS at 14:21

## 2019-01-07 RX ADMIN — PANTOPRAZOLE SODIUM 40 MG: 40 INJECTION, POWDER, FOR SOLUTION INTRAVENOUS at 08:50

## 2019-01-07 RX ADMIN — PROPOFOL 20 MG: 10 INJECTION, EMULSION INTRAVENOUS at 14:36

## 2019-01-07 RX ADMIN — PROPOFOL 80 MG: 10 INJECTION, EMULSION INTRAVENOUS at 14:14

## 2019-01-07 RX ADMIN — PROPOFOL 20 MG: 10 INJECTION, EMULSION INTRAVENOUS at 14:18

## 2019-01-07 RX ADMIN — PROPOFOL 20 MG: 10 INJECTION, EMULSION INTRAVENOUS at 14:25

## 2019-01-07 RX ADMIN — PROPOFOL 20 MG: 10 INJECTION, EMULSION INTRAVENOUS at 14:28

## 2019-01-07 RX ADMIN — PHENYLEPHRINE HYDROCHLORIDE 100 MCG: 10 INJECTION INTRAVENOUS at 14:31

## 2019-01-07 RX ADMIN — ATORVASTATIN CALCIUM 80 MG: 40 TABLET, FILM COATED ORAL at 09:59

## 2019-01-07 NOTE — NURSING NOTE
Gabriela from Radiology called, said she spoke with the Nuclear tech who said the order is [facility] GI Blood Loss. She clarified with the tech that the scan is for rectal bleeding and was told this is the right order. Meanwhile, Dr. Jack called back and said she couldn't find the order in Epic, but was told by this RN that Radiology found what to enter.     Marah Trimble, RN 1/7/2019 .    Called Dr. Jack to enter the order for the tagged RBC scan. Nursing and Radiology could not find the order in [facility]. Dr. Jack said she would get on [employer] from home and try to find the order to enter it.     Marah Trimble, RN 1/7/2019

## 2019-01-07 NOTE — OP NOTE
Patient Name:  Bryan Landers  YOB: 1945    Date of Procedure:  1/4/2019 - 1/7/2019    Procedure Performed: Colonoscopy    Indications:  GI bleed    Pre-op Diagnosis:   Gastrointestinal hemorrhage, unspecified gastrointestinal hemorrhage type [K92.2]    Post-Op Diagnosis Codes:     * Gastrointestinal hemorrhage, unspecified gastrointestinal hemorrhage type [K92.2]         Staff:  Surgeon(s):  Rosa Jack MD         Consent: Procedure Colonoscopy Indications, risks and procedure were discussed with the patient, including but not limited to, bleeding, infection, possibility of perforation and possible polypectomy. All of the patient's questions were answered, and signed informed consent was obtained and placed on the chart.      Sedation: Sedation was provided by anesthesia.      Estimated Blood Loss: <500ml    Specimens: None      Description of Procedure:   After excellent sedation a digital rectal examination was performed and a flexible colonoscope was inserted into the rectum.  Blood blood clots is noted in the rectum.  The scope was carefully traversed all the way into the cecal bowl.  There is blood clots noted all the way up to the cecal bowl.  He's had evidence of pan diverticulosis noted.  The terminal ileum was entered and no blood is noted in the terminal ileum.  The scope was then brought back into the sixth cecal bowl and upon withdrawal the scope careful examination mucosa was made with careful lavaging throughout.  There are diverticular disease noted throughout the colon and blood clots are noted throughout the colon.  No tic 11 that was actively bleeding.  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  He has an ulcerated area there in the rectum with friable edges with some oozing noted here.      Findings:  If he bleeds again, will do tagged RBC scan as there was blood noted all the way into the  right colon.  Rectal ulcer with friable mucosa.  Will review findings with Dr. Gomez to see if APC treatment is needed to these areas.  Will transfuse 2 units of PRBC  Complications:None      Rosa Jcak MD     Date: 1/7/2019  Time: 2:47 PM

## 2019-01-07 NOTE — PLAN OF CARE
Problem: Patient Care Overview  Goal: Plan of Care Review  Outcome: Ongoing (interventions implemented as appropriate)   01/07/19 0611   Coping/Psychosocial   Plan of Care Reviewed With patient   Plan of Care Review   Progress no change   OTHER   Outcome Summary No c/o pain or discomfort through out the night CIWA score 0. Up ad emil. BSC; NPO since midnight; Hgb is 8 at this time.     Goal: Individualization and Mutuality  Outcome: Ongoing (interventions implemented as appropriate)    Goal: Discharge Needs Assessment  Outcome: Ongoing (interventions implemented as appropriate)      Problem: Gastrointestinal Bleeding (Adult)  Goal: Signs and Symptoms of Listed Potential Problems Will be Absent, Minimized or Managed (Gastrointestinal Bleeding)  Outcome: Ongoing (interventions implemented as appropriate)      Problem: Alcohol Withdrawal Acute, Risk/Actual (Adult)  Goal: Signs and Symptoms of Listed Potential Problems Will be Absent, Minimized or Managed (Alcohol Withdrawal Acute, Risk/Actual)  Outcome: Ongoing (interventions implemented as appropriate)

## 2019-01-07 NOTE — ANESTHESIA PREPROCEDURE EVALUATION
Anesthesia Evaluation     Patient summary reviewed and Nursing notes reviewed   no history of anesthetic complications:  NPO Solid Status: > 8 hours  NPO Liquid Status: < 2 hours           Airway   Mallampati: II  TM distance: >3 FB  Neck ROM: full  No difficulty expected  Dental      Comment: Some missing    Pulmonary - normal exam    breath sounds clear to auscultation  (+) a smoker (cigars 25 years ago) Former,   Sleep apnea: snores.  Cardiovascular - normal exam  Exercise tolerance: good (4-7 METS)    Rhythm: regular  Rate: normal    (+) hypertension well controlled less than 2 medications, hyperlipidemia,       Neuro/Psych  GI/Hepatic/Renal/Endo    (+) obesity,  GI bleeding (lower GI bleed), diabetes mellitus type 2 well controlled,     Musculoskeletal     (+) back pain (occ),   Abdominal   (+) obese,    Substance History   (+) alcohol use (3 drinks daily),      OB/GYN negative ob/gyn ROS         Other   (+) arthritis (bacl)   history of cancer (history of prostate cancer s/p radiation therapy, colitis 9/2018) remission    ROS/Med Hx Other: meds with sip H2O 0730 and 1-2 oz water one hour ago                Anesthesia Plan    ASA 3     MAC     intravenous induction   Anesthetic plan, all risks, benefits, and alternatives have been provided, discussed and informed consent has been obtained with: patient and spouse/significant other.

## 2019-01-07 NOTE — PROGRESS NOTES
"Hospitalist Team      Patient Care Team:  Jose Fonseca MD as PCP - General (Family Medicine)  Jose Michelle MD as Consulting Physician (Radiation Oncology)  Roldan Mccarthy MD as Consulting Physician (Urology)        Chief Complaint:  GI bleed    Subjective    Interval History and ROS:     Patient States I am bleeding and it has not slowed down  Patient Complaints: GI bleeding/ Patient has rectal bleeding.  When I entered room he had blood covering the floor from the bed into the bathroom and all over the commode.   He is sitting up on a bedside commode and is alert and oriented and in no physical distress  Patient Denies:  Nausea and vomiting or pain  History taken from: patient chart RN      Objective    Vital Signs  Temp:  [96.9 °F (36.1 °C)-98.1 °F (36.7 °C)] 97.7 °F (36.5 °C)  Heart Rate:  [] 97  Resp:  [18] 18  BP: (111-127)/(68-76) 127/76  Oxygen Therapy  SpO2: 97 %  Pulse Oximetry Type: Intermittent  Device (Oxygen Therapy): room air}  Flowsheet Rows      First Filed Value   Admission Height  175 cm (68.9\") Documented at 01/04/2019 1125   Admission Weight  119 kg (262 lb) Documented at 01/04/2019 1125            Physical Exam:    Physical Exam   Constitutional: Patient appears in no distress but bleeding from rectum with blood on floor and on commode in room.  I reported it immediately to nursing staff.  HEENT:   Head: Normocephalic and atraumatic.   Eyes:  Pupils are equal, round, and reactive to light. EOM are intact. Sclerae are anicteric and non-injected.  Mouth and Throat: Patient has moist mucous membranes. Oropharynx is clear of any erythema or exudate.     Neck: Neck supple. No JVD present. No thyromegaly present. No lymphadenopathy present.  Cardiovascular: Regular rate, regular rhythm, S1 normal and S2 normal.  Exam reveals no gallop and no friction rub.  No murmur heard.  Pulmonary/Chest: Lungs are clear to auscultation bilaterally. No respiratory distress. No wheezes. No " rhonchi. No rales.   Abdominal: Soft. Bowel sounds are normal. No distension and no mass. There is no hepatosplenomegaly. There is no tenderness.     Dr. Jack is consulted and she is taking patient to surgery today for colonoscopy.        Results Review:     I reviewed the patient's new clinical results.    Lab Results (last 24 hours)     Procedure Component Value Units Date/Time    POC Glucose Once [[id number]]  (Normal) Collected:  01/07/19 0718    Specimen:  Blood Updated:  01/07/19 0724     Glucose 100 mg/dL     Renal Function Panel [[id number]]  (Abnormal) Collected:  01/07/19 0421    Specimen:  Blood Updated:  01/07/19 0528     Glucose 96 mg/dL      BUN 18 mg/dL      Creatinine 1.30 mg/dL      Sodium 143 mmol/L      Potassium 3.7 mmol/L      Chloride 107 mmol/L      CO2 25.6 mmol/L      Calcium 7.9 mg/dL      Albumin 3.00 g/dL      Phosphorus 3.9 mg/dL      Anion Gap 10.4 mmol/L      BUN/Creatinine Ratio 13.8     eGFR Non African Amer 54 mL/min/1.73     Narrative:       The MDRD GFR formula is only valid for adults with stable renal function between ages 18 and 70.    CBC & Differential [[id number]] Collected:  01/07/19 0421    Specimen:  Blood Updated:  01/07/19 0505    Narrative:       The following orders were created for panel order CBC & Differential.  Procedure                               Abnormality         Status                     ---------                               -----------         ------                     Scan Slide[[id number]]                                                                  CBC Auto Differential[[id number]]        Abnormal            Final result                 Please view results for these tests on the individual orders.    CBC Auto Differential [[id number]]  (Abnormal) Collected:  01/07/19 0421    Specimen:  Blood Updated:  01/07/19 0505     WBC 3.56 10*3/mm3      RBC 2.27 10*6/mm3      Hemoglobin 8.0 g/dL      Hematocrit 24.2 %      .6 fL      MCH 35.2 pg       MCHC 33.1 g/dL      RDW 14.0 %      RDW-SD 54.5 fl      MPV 9.0 fL      Platelets 96 10*3/mm3      Neutrophil % 58.7 %      Lymphocyte % 22.2 %      Monocyte % 15.4 %      Eosinophil % 2.8 %      Basophil % 0.6 %      Immature Grans % 0.3 %      Neutrophils, Absolute 2.09 10*3/mm3      Lymphocytes, Absolute 0.79 10*3/mm3      Monocytes, Absolute 0.55 10*3/mm3      Eosinophils, Absolute 0.10 10*3/mm3      Basophils, Absolute 0.02 10*3/mm3      Immature Grans, Absolute 0.01 10*3/mm3      nRBC 0.0 /100 WBC     POC Glucose Once [[id number]]  (Normal) Collected:  01/06/19 2018    Specimen:  Blood Updated:  01/06/19 2024     Glucose 115 mg/dL     POC Glucose Once [[id number]]  (Normal) Collected:  01/06/19 1633    Specimen:  Blood Updated:  01/06/19 1646     Glucose 111 mg/dL     Hemoglobin & Hematocrit, Blood [[id number]]  (Abnormal) Collected:  01/06/19 1317    Specimen:  Blood Updated:  01/06/19 1322     Hemoglobin 9.8 g/dL      Hematocrit 30.3 %     POC Glucose Once [[id number]]  (Normal) Collected:  01/06/19 1139    Specimen:  Blood Updated:  01/06/19 1147     Glucose 114 mg/dL           Imaging Results (last 24 hours)     ** No results found for the last 24 hours. **          Xray not reviewed personally by physician.      ECG not reviewed personally by physician  ECG/EMG Results (most recent)     None          Medication Review:   I have reviewed the patient's current medication list    Current Facility-Administered Medications:   •  allopurinol (ZYLOPRIM) tablet 200 mg, 200 mg, Oral, Daily, Hever Evans MD, 200 mg at 01/06/19 0834  •  atorvastatin (LIPITOR) tablet 80 mg, 80 mg, Oral, Daily, Hever Evans MD, 80 mg at 01/06/19 0834  •  chlordiazePOXIDE (LIBRIUM) capsule 50 mg, 50 mg, Oral, BID, Hever Evans MD, 50 mg at 01/06/19 5784  •  cholecalciferol (VITAMIN D3) tablet 1,000 Units, 1,000 Units, Oral, Daily, Hever Evans MD, 1,000 Units at 01/06/19 0834  •  dextrose (D50W) 25 g/ 50mL Intravenous Solution  25 g, 25 g, Intravenous, Q15 Min PRN, Hever Evans MD  •  dextrose (GLUTOSE) oral gel 15 g, 15 g, Oral, Q15 Min PRN, Hever Evans MD  •  glucagon (GLUCAGEN) injection 1 mg, 1 mg, Subcutaneous, PRN, Hever Evans MD  •  insulin aspart (novoLOG) injection 0-7 Units, 0-7 Units, Subcutaneous, TID With Meals, Hever Evans MD  •  pantoprazole (PROTONIX) injection 40 mg, 40 mg, Intravenous, BID AC, Rosa Jack MD, 40 mg at 01/06/19 7964  •  [COMPLETED] Insert peripheral IV, , , Once **AND** sodium chloride 0.9 % flush 10 mL, 10 mL, Intravenous, PRN, Toby Rivera MD      Assessment/Plan     GI Bleed/  Colonoscopy planned for today Dr. Jack/ on clears    DM 2    ETOH use    HTN    CKD 2    OA    HLD    Obese:  Body mass index is 37.42 kg/m².     Radiation proctitis/ H/O prostate cancer    H/O hip replacement    Gout              Plan for disposition:  Home when well.        Hailey Benson DO  01/07/19  7:30 AM      Time: 30 min

## 2019-01-07 NOTE — ANESTHESIA POSTPROCEDURE EVALUATION
Patient: Bryan Landers    Procedure Summary     Date:  01/07/19 Room / Location:  [facility] ENDOSCOPY 2 /  [facility]    Anesthesia Start:  1409 Anesthesia Stop:  1447    Procedure:  COLONOSCOPY (N/A ) Diagnosis:       Gastrointestinal hemorrhage, unspecified gastrointestinal hemorrhage type      (Gastrointestinal hemorrhage, unspecified gastrointestinal hemorrhage type [K92.2])    Surgeon:  Rosa Jack MD Provider:  Roldan Morataya CRNA    Anesthesia Type:  MAC ASA Status:  3          Anesthesia Type: MAC  Last vitals  BP   (!) 83/53 (01/07/19 1448)   Temp   98 °F (36.7 °C) (01/07/19 1448)   Pulse   93 (01/07/19 1448)   Resp   16 (01/07/19 1448)     SpO2   96 % (01/07/19 1448)     Post Anesthesia Care and Evaluation    Patient location during evaluation: PHASE II  Patient participation: complete - patient participated  Level of consciousness: awake and alert  Pain score: 0  Pain management: inadequate  Airway patency: patent  Anesthetic complications: No anesthetic complications  PONV Status: none  Cardiovascular status: acceptable  Respiratory status: acceptable  Hydration status: acceptable

## 2019-01-08 LAB
ABO GROUP BLD: NORMAL
ANION GAP SERPL CALCULATED.3IONS-SCNC: 8.3 MMOL/L
BASOPHILS # BLD AUTO: 0.01 10*3/MM3 (ref 0–0.2)
BASOPHILS NFR BLD AUTO: 0.3 % (ref 0–2)
BLD GP AB SCN SERPL QL: NEGATIVE
BUN BLD-MCNC: 15 MG/DL (ref 8–23)
BUN/CREAT SERPL: 12.5 (ref 7–25)
CALCIUM SPEC-SCNC: 8.2 MG/DL (ref 8.8–10.5)
CHLORIDE SERPL-SCNC: 109 MMOL/L (ref 98–107)
CO2 SERPL-SCNC: 25.7 MMOL/L (ref 22–29)
CREAT BLD-MCNC: 1.2 MG/DL (ref 0.76–1.27)
DEPRECATED RDW RBC AUTO: 54.6 FL (ref 37–54)
EOSINOPHIL # BLD AUTO: 0.1 10*3/MM3 (ref 0.1–0.3)
EOSINOPHIL NFR BLD AUTO: 2.7 % (ref 0–4)
ERYTHROCYTE [DISTWIDTH] IN BLOOD BY AUTOMATED COUNT: 13.8 % (ref 11.5–14.5)
GFR SERPL CREATININE-BSD FRML MDRD: 59 ML/MIN/1.73
GLUCOSE BLD-MCNC: 104 MG/DL (ref 65–99)
GLUCOSE BLDC GLUCOMTR-MCNC: 120 MG/DL (ref 70–130)
GLUCOSE BLDC GLUCOMTR-MCNC: 136 MG/DL (ref 70–130)
GLUCOSE BLDC GLUCOMTR-MCNC: 144 MG/DL (ref 70–130)
GLUCOSE BLDC GLUCOMTR-MCNC: 96 MG/DL (ref 70–130)
HCT VFR BLD AUTO: 21.6 % (ref 42–52)
HGB BLD-MCNC: 7 G/DL (ref 14–18)
IMM GRANULOCYTES # BLD AUTO: 0.02 10*3/MM3 (ref 0–0.03)
IMM GRANULOCYTES NFR BLD AUTO: 0.5 % (ref 0–0.5)
LYMPHOCYTES # BLD AUTO: 0.68 10*3/MM3 (ref 0.6–4.8)
LYMPHOCYTES NFR BLD AUTO: 18.6 % (ref 20–45)
MACROCYTES BLD QL SMEAR: NORMAL
MCH RBC QN AUTO: 35 PG (ref 27–31)
MCHC RBC AUTO-ENTMCNC: 32.4 G/DL (ref 31–37)
MCV RBC AUTO: 108 FL (ref 80–94)
MONOCYTES # BLD AUTO: 0.48 10*3/MM3 (ref 0–1)
MONOCYTES NFR BLD AUTO: 13.1 % (ref 3–8)
NEUTROPHILS # BLD AUTO: 2.37 10*3/MM3 (ref 1.5–8.3)
NEUTROPHILS NFR BLD AUTO: 64.8 % (ref 45–70)
NRBC BLD AUTO-RTO: 0 /100 WBC (ref 0–0)
PLAT MORPH BLD: NORMAL
PLATELET # BLD AUTO: 95 10*3/MM3 (ref 140–500)
PMV BLD AUTO: 9.2 FL (ref 7.4–10.4)
POTASSIUM BLD-SCNC: 3.9 MMOL/L (ref 3.5–5.2)
RBC # BLD AUTO: 2 10*6/MM3 (ref 4.7–6.1)
RH BLD: POSITIVE
SODIUM BLD-SCNC: 143 MMOL/L (ref 136–145)
T&S EXPIRATION DATE: NORMAL
WBC MORPH BLD: NORMAL
WBC NRBC COR # BLD: 3.66 10*3/MM3 (ref 4.8–10.8)

## 2019-01-08 PROCEDURE — 86900 BLOOD TYPING SEROLOGIC ABO: CPT | Performed by: INTERNAL MEDICINE

## 2019-01-08 PROCEDURE — 82962 GLUCOSE BLOOD TEST: CPT

## 2019-01-08 PROCEDURE — 86850 RBC ANTIBODY SCREEN: CPT | Performed by: INTERNAL MEDICINE

## 2019-01-08 PROCEDURE — 86923 COMPATIBILITY TEST ELECTRIC: CPT

## 2019-01-08 PROCEDURE — 85007 BL SMEAR W/DIFF WBC COUNT: CPT | Performed by: INTERNAL MEDICINE

## 2019-01-08 PROCEDURE — 36430 TRANSFUSION BLD/BLD COMPNT: CPT

## 2019-01-08 PROCEDURE — 86900 BLOOD TYPING SEROLOGIC ABO: CPT

## 2019-01-08 PROCEDURE — 80048 BASIC METABOLIC PNL TOTAL CA: CPT | Performed by: INTERNAL MEDICINE

## 2019-01-08 PROCEDURE — 99233 SBSQ HOSP IP/OBS HIGH 50: CPT | Performed by: INTERNAL MEDICINE

## 2019-01-08 PROCEDURE — 86901 BLOOD TYPING SEROLOGIC RH(D): CPT | Performed by: INTERNAL MEDICINE

## 2019-01-08 PROCEDURE — 99232 SBSQ HOSP IP/OBS MODERATE 35: CPT | Performed by: INTERNAL MEDICINE

## 2019-01-08 PROCEDURE — 85025 COMPLETE CBC W/AUTO DIFF WBC: CPT | Performed by: INTERNAL MEDICINE

## 2019-01-08 PROCEDURE — P9016 RBC LEUKOCYTES REDUCED: HCPCS

## 2019-01-08 RX ORDER — CHLORDIAZEPOXIDE HYDROCHLORIDE 25 MG/1
25 CAPSULE, GELATIN COATED ORAL 2 TIMES DAILY
Status: DISCONTINUED | OUTPATIENT
Start: 2019-01-08 | End: 2019-01-09

## 2019-01-08 RX ORDER — SODIUM CHLORIDE 9 MG/ML
INJECTION, SOLUTION INTRAVENOUS
Status: COMPLETED
Start: 2019-01-08 | End: 2019-01-08

## 2019-01-08 RX ADMIN — ALLOPURINOL 200 MG: 100 TABLET ORAL at 09:04

## 2019-01-08 RX ADMIN — CHLORDIAZEPOXIDE HYDROCHLORIDE 25 MG: 25 CAPSULE ORAL at 22:11

## 2019-01-08 RX ADMIN — VITAMIN D, TAB 1000IU (100/BT) 1000 UNITS: 25 TAB at 09:04

## 2019-01-08 RX ADMIN — SODIUM CHLORIDE: 900 INJECTION, SOLUTION INTRAVENOUS at 11:48

## 2019-01-08 RX ADMIN — SODIUM CHLORIDE, POTASSIUM CHLORIDE, SODIUM LACTATE AND CALCIUM CHLORIDE 100 ML/HR: 600; 310; 30; 20 INJECTION, SOLUTION INTRAVENOUS at 00:36

## 2019-01-08 RX ADMIN — ATORVASTATIN CALCIUM 80 MG: 40 TABLET, FILM COATED ORAL at 09:04

## 2019-01-08 RX ADMIN — CHLORDIAZEPOXIDE HYDROCHLORIDE 50 MG: 25 CAPSULE ORAL at 09:07

## 2019-01-08 NOTE — PROGRESS NOTES
GI Daily Progress Note    Assessment/Plan:      Gastrointestinal hemorrhage       LOS: 2 days     Bryan Landers is a 73 y.o. male who was admitted with Rectal Bleeding. He reports the symptoms are improving with treatment. TaggedRBC scan showed Prox colon source. With blood thru out  Colon would consider this a Diverticular bleed and no BM overnight.   No other complaints and his HGB is 8.2. Also his indicies suggest no BEE so this is ABLA and I=his non healing rectal ulcer can be addressed later.     Subjective:    Patient expresses No GI compalints  Patient denies abdominal pain, bloody stools and loss of appetite    Objective:    Vital signs in last 24 hours:  Temp:  [97 °F (36.1 °C)-98.3 °F (36.8 °C)] 97.9 °F (36.6 °C)  Heart Rate:  [82-97] 82  Resp:  [16-18] 18  BP: ()/(53-70) 97/55    Intake/Output last 3 shifts:  I/O last 3 completed shifts:  In: 1460 [P.O.:480; I.V.:980]  Out: -   Intake/Output this shift:  No intake/output data recorded.    Physical Exam:Abdomen  Sounds Normal Active Bowel Sounds   Distension Soft   Tenderness Nontender     Imaging Results (last 72 hours)     Procedure Component Value Units Date/Time    NM GI Blood Loss [[id number]] Collected:  01/08/19 0655     Updated:  01/08/19 0704    Narrative:       RADIONUCLIDE TAGGED RBC GI BLEEDING STUDY, 1/8/2019     HISTORY:   73-year-old male admitted to the hospital on 1/4/2019 with active lower  GI bleed. Colonoscopy yesterday showed extensive diverticulosis  throughout the colon with clotted blood from the cecum to the rectum,  but no blood in the terminal ileum. 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     DOSE:   26.8 mCi pertechnetate labeled autologous red blood cells.     COMPARISON:   No prior abdominal imaging here.     FINDINGS:   Early short interval imaging over the 1st hour with delayed images at 2  hours and 8 hours.     On the 8 hours image, abnormal GI tract activity is seen  throughout the  colon from the cecum to the rectosigmoid. The findings imply a proximal  colonic source of active GI bleeding.       Impression:       Abnormal examination showing evidence of active proximal colonic GI  tract bleeding.     This report was finalized on 1/8/2019 7:02 AM by Dr. George Kevin MD.             WBC   Date Value Ref Range Status   01/07/2019 3.56 (L) 4.80 - 10.80 10*3/mm3 Final     RBC   Date Value Ref Range Status   01/07/2019 2.27 (L) 4.70 - 6.10 10*6/mm3 Final     Hemoglobin   Date Value Ref Range Status   01/07/2019 8.2 (L) 14.0 - 18.0 g/dL Final     Hematocrit   Date Value Ref Range Status   01/07/2019 24.6 (L) 42.0 - 52.0 % Final     MCV   Date Value Ref Range Status   01/07/2019 106.6 (H) 80.0 - 94.0 fL Final     MCH   Date Value Ref Range Status   01/07/2019 35.2 (H) 27.0 - 31.0 pg Final     MCHC   Date Value Ref Range Status   01/07/2019 33.1 31.0 - 37.0 g/dL Final     RDW   Date Value Ref Range Status   01/07/2019 14.0 11.5 - 14.5 % Final     RDW-SD   Date Value Ref Range Status   01/07/2019 54.5 (H) 37.0 - 54.0 fl Final     MPV   Date Value Ref Range Status   01/07/2019 9.0 7.4 - 10.4 fL Final     Platelets   Date Value Ref Range Status   01/07/2019 96 (L) 140 - 500 10*3/mm3 Final     Neutrophil %   Date Value Ref Range Status   01/07/2019 58.7 45.0 - 70.0 % Final     Lymphocyte %   Date Value Ref Range Status   01/07/2019 22.2 20.0 - 45.0 % Final     Monocyte %   Date Value Ref Range Status   01/07/2019 15.4 (H) 3.0 - 8.0 % Final     Eosinophil %   Date Value Ref Range Status   01/07/2019 2.8 0.0 - 4.0 % Final     Basophil %   Date Value Ref Range Status   01/07/2019 0.6 0.0 - 2.0 % Final     Immature Grans %   Date Value Ref Range Status   01/07/2019 0.3 0.0 - 0.5 % Final     Neutrophils, Absolute   Date Value Ref Range Status   01/07/2019 2.09 1.50 - 8.30 10*3/mm3 Final     Lymphocytes, Absolute   Date Value Ref Range Status   01/07/2019 0.79 0.60 - 4.80 10*3/mm3 Final      Monocytes, Absolute   Date Value Ref Range Status   01/07/2019 0.55 0.00 - 1.00 10*3/mm3 Final     Eosinophils, Absolute   Date Value Ref Range Status   01/07/2019 0.10 0.10 - 0.30 10*3/mm3 Final     Basophils, Absolute   Date Value Ref Range Status   01/07/2019 0.02 0.00 - 0.20 10*3/mm3 Final     Immature Grans, Absolute   Date Value Ref Range Status   01/07/2019 0.01 0.00 - 0.03 10*3/mm3 Final     nRBC   Date Value Ref Range Status   01/07/2019 0.0 0.0 - 0.0 /100 WBC Final       Glucose   Date Value Ref Range Status   01/07/2019 96 65 - 99 mg/dL Final     Sodium   Date Value Ref Range Status   01/07/2019 143 136 - 145 mmol/L Final     Potassium   Date Value Ref Range Status   01/07/2019 3.7 3.5 - 5.2 mmol/L Final     CO2   Date Value Ref Range Status   01/07/2019 25.6 22.0 - 29.0 mmol/L Final     Chloride   Date Value Ref Range Status   01/07/2019 107 98 - 107 mmol/L Final     Anion Gap   Date Value Ref Range Status   01/07/2019 10.4 mmol/L Final     Creatinine   Date Value Ref Range Status   01/07/2019 1.30 (H) 0.76 - 1.27 mg/dL Final     BUN   Date Value Ref Range Status   01/07/2019 18 8 - 23 mg/dL Final     BUN/Creatinine Ratio   Date Value Ref Range Status   01/07/2019 13.8 7.0 - 25.0 Final     Calcium   Date Value Ref Range Status   01/07/2019 7.9 (L) 8.8 - 10.5 mg/dL Final     eGFR Non  Amer   Date Value Ref Range Status   01/07/2019 54 (L) >60 mL/min/1.73 Final     Albumin   Date Value Ref Range Status   01/07/2019 3.00 (L) 3.50 - 5.20 g/dL Final     Diverticular Bleed  ABLA  DM  ETOH abuse  CKD- II    Appears to have stopped bleeding  over night despite the positive NM scan. WIll observe another day on clears and follow clinically but feel this is consistent with a Diverticular Bleed.

## 2019-01-08 NOTE — NURSING NOTE
Continued Stay Note  ROBERTO Pelletier     Patient Name: Bryan Landers  MRN: [id number]  Today's Date: 1/8/2019    Admit Date: 1/4/2019    Discharge Plan     Row Name 01/08/19 1320       Plan    Plan  plan home    Patient/Family in Agreement with Plan  yes    Plan Comments  Spoke with patient and wife at bedside. IMM explained, signed and copy provided. Patient hopes to return home soon, denies needs at this time. Will continue to follow.        Discharge Codes    No documentation.             Nelson Carson RN

## 2019-01-08 NOTE — PROGRESS NOTES
"SERVICE: [facility] HOSPITALIST    CONSULTANTS:  GI    CHIEF COMPLAINT: GI Bleed    SUBJECTIVE:  No further blood bm's overnight. Has seen some melena. Continues to deny pain. Is having dizziness and subjective weakness on standing and ambulation.     No objections to blood administration. Discussed risks/benefits. Pt agreeable to transfusion.    ROS: no n/v      OBJECTIVE:    BP 97/55 (BP Location: Right arm, Patient Position: Lying)   Pulse 82   Temp 97.9 °F (36.6 °C) (Oral)   Resp 18   Ht 175.3 cm (69.02\")   Wt 115 kg (253 lb 6.4 oz)   SpO2 100%   BMI 37.40 kg/m²     MEDS/LABS REVIEWED AND ORDERED      allopurinol 200 mg Oral Daily   atorvastatin 80 mg Oral Daily   chlordiazePOXIDE 50 mg Oral BID   cholecalciferol 1,000 Units Oral Daily   insulin aspart 0-7 Units Subcutaneous TID With Meals       Physical Exam   Constitutional: He appears well-developed and well-nourished. No distress.   Eyes: Pupils are equal, round, and reactive to light.   Cardiovascular: Normal rate, regular rhythm and normal heart sounds.   No murmur heard.  Pulmonary/Chest: Effort normal and breath sounds normal. No stridor. No respiratory distress. He has no wheezes.   Abdominal: Soft. He exhibits no distension. There is no tenderness. There is no guarding.   Diminished in all quadrants   Musculoskeletal: He exhibits no edema.   Neurological: He is alert. He exhibits normal muscle tone.   Skin: Skin is warm and dry. He is not diaphoretic. No erythema.   Psychiatric: He has a normal mood and affect. His behavior is normal.   Nursing note and vitals reviewed.      LAB/DIAGNOSTICS:    Lab Results (last 24 hours)     Procedure Component Value Units Date/Time    CBC & Differential [[id number]] Collected:  01/08/19 0831    Specimen:  Blood Updated:  01/08/19 0954    Narrative:       The following orders were created for panel order CBC & Differential.  Procedure                               Abnormality         Status     "                 ---------                               -----------         ------                     Scan Slide[[id number]]                                       Final result               CBC Auto Differential[[id number]]        Abnormal            Final result                 Please view results for these tests on the individual orders.    Scan Slide [[id number]] Collected:  01/08/19 0831    Specimen:  Blood Updated:  01/08/19 0954     Macrocytes Slight/1+     WBC Morphology Normal     Platelet Morphology Normal    Basic Metabolic Panel [[id number]]  (Abnormal) Collected:  01/08/19 0831    Specimen:  Blood Updated:  01/08/19 0856     Glucose 104 mg/dL      BUN 15 mg/dL      Creatinine 1.20 mg/dL      Sodium 143 mmol/L      Potassium 3.9 mmol/L      Chloride 109 mmol/L      CO2 25.7 mmol/L      Calcium 8.2 mg/dL      eGFR Non African Amer 59 mL/min/1.73      BUN/Creatinine Ratio 12.5     Anion Gap 8.3 mmol/L     Narrative:       The MDRD GFR formula is only valid for adults with stable renal function between ages 18 and 70.    CBC Auto Differential [[id number]]  (Abnormal) Collected:  01/08/19 0831    Specimen:  Blood Updated:  01/08/19 0844     WBC 3.66 10*3/mm3      RBC 2.00 10*6/mm3      Hemoglobin 7.0 g/dL      Hematocrit 21.6 %      .0 fL      MCH 35.0 pg      MCHC 32.4 g/dL      RDW 13.8 %      RDW-SD 54.6 fl      MPV 9.2 fL      Platelets 95 10*3/mm3      Neutrophil % 64.8 %      Lymphocyte % 18.6 %      Monocyte % 13.1 %      Eosinophil % 2.7 %      Basophil % 0.3 %      Immature Grans % 0.5 %      Neutrophils, Absolute 2.37 10*3/mm3      Lymphocytes, Absolute 0.68 10*3/mm3      Monocytes, Absolute 0.48 10*3/mm3      Eosinophils, Absolute 0.10 10*3/mm3      Basophils, Absolute 0.01 10*3/mm3      Immature Grans, Absolute 0.02 10*3/mm3      nRBC 0.0 /100 WBC     POC Glucose Once [[id number]]  (Normal) Collected:  01/08/19 0712    Specimen:  Blood Updated:  01/08/19 0718     Glucose 96 mg/dL     POC Glucose  Once [[id number]]  (Abnormal) Collected:  01/07/19 2029    Specimen:  Blood Updated:  01/07/19 2036     Glucose 155 mg/dL     POC Glucose Once [[id number]]  (Normal) Collected:  01/07/19 1642    Specimen:  Blood Updated:  01/07/19 1647     Glucose 102 mg/dL     Hemoglobin & Hematocrit, Blood [[id number]]  (Abnormal) Collected:  01/07/19 1554    Specimen:  Blood Updated:  01/07/19 1601     Hemoglobin 8.2 g/dL      Hematocrit 24.6 %     POC Glucose Once [[id number]]  (Normal) Collected:  01/07/19 1116    Specimen:  Blood Updated:  01/07/19 1123     Glucose 120 mg/dL            Nm Gi Blood Loss    Result Date: 1/8/2019  Abnormal examination showing evidence of active proximal colonic GI tract bleeding.  This report was finalized on 1/8/2019 7:02 AM by Dr. George Kevin MD.          ASSESSMENT/PLAN:    Diverticular Bleed w/ acute blood loss anemia  - Admission hgb 12.3 -> 7.0 today, has not gotten any transfusions this admission  - GI Following  - Tagged RBC scan positive for active proximal colonic bleed  - No BM's overnight  - Give one unit, check H&H after unit, recheck in AM, unless pt has another bloody BM after H&H drawn.  - On Clears  - Eval for advancement of diet tomorrow, possible DC tomorrow if continues to be stable    DM2  - Holding actos  - On Sliding scale  - Fasting was 96 this AM  - A1c was 5.6 back in August, won't check here, not pt is s/p transfusion    HTN  - Borderline low, likely related to acute blood loss  - Holding home lisinopril    Etoh Overuse  - Has been on Librium, CIWA's negative  - Will cut down librium today    JEFFREY on Stage 3 CKD  - Baseline eGFR 40's-50's, baseline Cr 1.3  - Admit CR 1.6 -> 1.2 today

## 2019-01-08 NOTE — PLAN OF CARE
Problem: Patient Care Overview  Goal: Plan of Care Review  Outcome: Ongoing (interventions implemented as appropriate)   01/08/19 0431   Coping/Psychosocial   Plan of Care Reviewed With patient   Plan of Care Review   Progress no change   OTHER   Outcome Summary Active bleed slowed, VSS, clear liquid diet, IVF's, Tag redblood cell test last night, bedtime       Goal: Individualization and Mutuality  Outcome: Ongoing (interventions implemented as appropriate)      Problem: Gastrointestinal Bleeding (Adult)  Goal: Signs and Symptoms of Listed Potential Problems Will be Absent, Minimized or Managed (Gastrointestinal Bleeding)  Outcome: Ongoing (interventions implemented as appropriate)

## 2019-01-08 NOTE — PLAN OF CARE
Problem: Patient Care Overview  Goal: Plan of Care Review  Outcome: Ongoing (interventions implemented as appropriate)   01/07/19 1942   Coping/Psychosocial   Plan of Care Reviewed With patient   Plan of Care Review   Progress no change   OTHER   Outcome Summary Still actively bleeding. Dr. Jack aware. Tagged RBC Scan ordered. Colonscopy today, showed rectal bleeding. Clear liquid diet.        Problem: Gastrointestinal Bleeding (Adult)  Goal: Signs and Symptoms of Listed Potential Problems Will be Absent, Minimized or Managed (Gastrointestinal Bleeding)  Outcome: Ongoing (interventions implemented as appropriate)   01/07/19 1942   Goal/Outcome Evaluation   Problems Assessed (GI Bleeding) all   Problems Present (GI Bleeding) situational response       Problem: Alcohol Withdrawal Acute, Risk/Actual (Adult)  Goal: Signs and Symptoms of Listed Potential Problems Will be Absent, Minimized or Managed (Alcohol Withdrawal Acute, Risk/Actual)  Outcome: Outcome(s) achieved Date Met: 01/07/19 01/07/19 1942   Goal/Outcome Evaluation   Problems Assessed (Alcohol Withdrawal Syndrome) all   Problems Present (Alcohol W/D Syndrome) none

## 2019-01-08 NOTE — PROGRESS NOTES
Patient: Bryan Landers  Procedure(s) with comments:  COLONOSCOPY - Diverticulosis  Radiation Proctitis  Rectal Ulcerations  Anesthesia type: [unfilled]    Patient location: Med Surgical Floor  Vitals:    01/07/19 1538 01/07/19 1915 01/08/19 0003 01/08/19 1132   BP: 113/70 124/69 97/55 102/68   BP Location: Right arm Right arm Right arm Right arm   Patient Position: Lying Lying Lying Lying   Pulse: 90 97 82 87   Resp: 18 18 18 18   Temp: 97 °F (36.1 °C) 97.7 °F (36.5 °C) 97.9 °F (36.6 °C) 97 °F (36.1 °C)   TempSrc: Oral Oral Oral Oral   SpO2: 98% 97% 100% 98%   Weight:       Height:         Level of consciousness: awake, alert and oriented    Post-anesthesia pain: adequate analgesia  Airway patency: patent  Respiratory: unassisted  Cardiovascular: stable and blood pressure at baseline  Hydration: euvolemic    Anesthetic complications: no

## 2019-01-09 ENCOUNTER — APPOINTMENT (OUTPATIENT)
Dept: GENERAL RADIOLOGY | Facility: HOSPITAL | Age: 74
End: 2019-01-09

## 2019-01-09 LAB
ABO + RH BLD: NORMAL
ANION GAP SERPL CALCULATED.3IONS-SCNC: 8.8 MMOL/L
BASOPHILS # BLD AUTO: 0.01 10*3/MM3 (ref 0–0.2)
BASOPHILS NFR BLD AUTO: 0.3 % (ref 0–2)
BH BB BLOOD EXPIRATION DATE: NORMAL
BH BB BLOOD TYPE BARCODE: 5100
BH BB DISPENSE STATUS: NORMAL
BH BB PRODUCT CODE: NORMAL
BH BB UNIT NUMBER: NORMAL
BUN BLD-MCNC: 11 MG/DL (ref 8–23)
BUN/CREAT SERPL: 10.8 (ref 7–25)
CALCIUM SPEC-SCNC: 8 MG/DL (ref 8.8–10.5)
CHLORIDE SERPL-SCNC: 106 MMOL/L (ref 98–107)
CO2 SERPL-SCNC: 26.2 MMOL/L (ref 22–29)
CREAT BLD-MCNC: 1.02 MG/DL (ref 0.76–1.27)
DEPRECATED RDW RBC AUTO: 57 FL (ref 37–54)
EOSINOPHIL # BLD AUTO: 0.14 10*3/MM3 (ref 0.1–0.3)
EOSINOPHIL NFR BLD AUTO: 4.2 % (ref 0–4)
ERYTHROCYTE [DISTWIDTH] IN BLOOD BY AUTOMATED COUNT: 14.9 % (ref 11.5–14.5)
GFR SERPL CREATININE-BSD FRML MDRD: 71 ML/MIN/1.73
GLUCOSE BLD-MCNC: 112 MG/DL (ref 65–99)
GLUCOSE BLDC GLUCOMTR-MCNC: 118 MG/DL (ref 70–130)
GLUCOSE BLDC GLUCOMTR-MCNC: 145 MG/DL (ref 70–130)
GLUCOSE BLDC GLUCOMTR-MCNC: 146 MG/DL (ref 70–130)
GLUCOSE BLDC GLUCOMTR-MCNC: 172 MG/DL (ref 70–130)
HCT VFR BLD AUTO: 21.6 % (ref 42–52)
HCT VFR BLD AUTO: 26.6 % (ref 42–52)
HGB BLD-MCNC: 7.1 G/DL (ref 14–18)
HGB BLD-MCNC: 8.9 G/DL (ref 14–18)
IMM GRANULOCYTES # BLD AUTO: 0.02 10*3/MM3 (ref 0–0.03)
IMM GRANULOCYTES NFR BLD AUTO: 0.6 % (ref 0–0.5)
LYMPHOCYTES # BLD AUTO: 0.69 10*3/MM3 (ref 0.6–4.8)
LYMPHOCYTES NFR BLD AUTO: 20.7 % (ref 20–45)
MCH RBC QN AUTO: 34.6 PG (ref 27–31)
MCHC RBC AUTO-ENTMCNC: 32.9 G/DL (ref 31–37)
MCV RBC AUTO: 105.4 FL (ref 80–94)
MONOCYTES # BLD AUTO: 0.39 10*3/MM3 (ref 0–1)
MONOCYTES NFR BLD AUTO: 11.7 % (ref 3–8)
NEUTROPHILS # BLD AUTO: 2.08 10*3/MM3 (ref 1.5–8.3)
NEUTROPHILS NFR BLD AUTO: 62.5 % (ref 45–70)
NRBC BLD AUTO-RTO: 0 /100 WBC (ref 0–0)
PLATELET # BLD AUTO: 114 10*3/MM3 (ref 140–500)
PMV BLD AUTO: 9.3 FL (ref 7.4–10.4)
POTASSIUM BLD-SCNC: 3.6 MMOL/L (ref 3.5–5.2)
RBC # BLD AUTO: 2.05 10*6/MM3 (ref 4.7–6.1)
SODIUM BLD-SCNC: 141 MMOL/L (ref 136–145)
UNIT  ABO: NORMAL
UNIT  RH: NORMAL
WBC NRBC COR # BLD: 3.33 10*3/MM3 (ref 4.8–10.8)

## 2019-01-09 PROCEDURE — 82962 GLUCOSE BLOOD TEST: CPT

## 2019-01-09 PROCEDURE — 71045 X-RAY EXAM CHEST 1 VIEW: CPT

## 2019-01-09 PROCEDURE — 85018 HEMOGLOBIN: CPT | Performed by: INTERNAL MEDICINE

## 2019-01-09 PROCEDURE — 80048 BASIC METABOLIC PNL TOTAL CA: CPT | Performed by: INTERNAL MEDICINE

## 2019-01-09 PROCEDURE — 99233 SBSQ HOSP IP/OBS HIGH 50: CPT | Performed by: INTERNAL MEDICINE

## 2019-01-09 PROCEDURE — 97161 PT EVAL LOW COMPLEX 20 MIN: CPT

## 2019-01-09 PROCEDURE — 36430 TRANSFUSION BLD/BLD COMPNT: CPT

## 2019-01-09 PROCEDURE — 85014 HEMATOCRIT: CPT | Performed by: INTERNAL MEDICINE

## 2019-01-09 PROCEDURE — 85025 COMPLETE CBC W/AUTO DIFF WBC: CPT | Performed by: INTERNAL MEDICINE

## 2019-01-09 PROCEDURE — 99232 SBSQ HOSP IP/OBS MODERATE 35: CPT | Performed by: INTERNAL MEDICINE

## 2019-01-09 PROCEDURE — 71101 X-RAY EXAM UNILAT RIBS/CHEST: CPT

## 2019-01-09 PROCEDURE — P9016 RBC LEUKOCYTES REDUCED: HCPCS

## 2019-01-09 PROCEDURE — 94799 UNLISTED PULMONARY SVC/PX: CPT

## 2019-01-09 PROCEDURE — 86900 BLOOD TYPING SEROLOGIC ABO: CPT

## 2019-01-09 PROCEDURE — 36415 COLL VENOUS BLD VENIPUNCTURE: CPT | Performed by: INTERNAL MEDICINE

## 2019-01-09 RX ORDER — LISINOPRIL 20 MG/1
20 TABLET ORAL DAILY
Qty: 90 TABLET | Refills: 3
Start: 2019-01-09 | End: 2019-01-16 | Stop reason: SINTOL

## 2019-01-09 RX ORDER — CHLORDIAZEPOXIDE HYDROCHLORIDE 5 MG/1
10 CAPSULE, GELATIN COATED ORAL 2 TIMES DAILY
Status: DISCONTINUED | OUTPATIENT
Start: 2019-01-09 | End: 2019-01-10 | Stop reason: HOSPADM

## 2019-01-09 RX ORDER — SENNOSIDES 8.6 MG
CAPSULE ORAL
Status: COMPLETED
Start: 2019-01-09 | End: 2019-01-09

## 2019-01-09 RX ORDER — SENNOSIDES 8.6 MG
1300 CAPSULE ORAL EVERY 12 HOURS SCHEDULED
Status: DISCONTINUED | OUTPATIENT
Start: 2019-01-09 | End: 2019-01-10 | Stop reason: HOSPADM

## 2019-01-09 RX ORDER — ACETAMINOPHEN 325 MG/1
1300 TABLET ORAL 2 TIMES DAILY
Status: DISCONTINUED | OUTPATIENT
Start: 2019-01-09 | End: 2019-01-09 | Stop reason: CLARIF

## 2019-01-09 RX ADMIN — CHLORDIAZEPOXIDE HYDROCHLORIDE 10 MG: 5 CAPSULE ORAL at 21:32

## 2019-01-09 RX ADMIN — ALLOPURINOL 200 MG: 100 TABLET ORAL at 09:36

## 2019-01-09 RX ADMIN — ACETAMINOPHEN 1300 MG: 650 TABLET, FILM COATED, EXTENDED RELEASE ORAL at 21:45

## 2019-01-09 RX ADMIN — CHLORDIAZEPOXIDE HYDROCHLORIDE 25 MG: 25 CAPSULE ORAL at 09:41

## 2019-01-09 RX ADMIN — ATORVASTATIN CALCIUM 80 MG: 40 TABLET, FILM COATED ORAL at 09:36

## 2019-01-09 RX ADMIN — VITAMIN D, TAB 1000IU (100/BT) 1000 UNITS: 25 TAB at 09:37

## 2019-01-09 RX ADMIN — SODIUM CHLORIDE, POTASSIUM CHLORIDE, SODIUM LACTATE AND CALCIUM CHLORIDE 100 ML/HR: 600; 310; 30; 20 INJECTION, SOLUTION INTRAVENOUS at 06:14

## 2019-01-09 NOTE — PROGRESS NOTES
"SERVICE: [facility] HOSPITALIST    CONSULTANTS:  GI    CHIEF COMPLAINT: Orthostatic Syncope    SUBJECTIVE:    Patient has had 2 falls in the last 24 hours associated with vertigo on standing.  The first fall was overnight, and patient on exam this morning stated that he his symptoms were improving and that he understood he needed to wait a second prior to standing.  However this afternoon he stood and apparently forgot his safety precautions and fell again.  With this afternoon's fall he has had some rib pain that is new.  She had not had started his unit of blood prior to this fall.  No further bloody bowel movements.    Denies chest pain, review of systems is positive for some right shoulder pain that is chronic.    OBJECTIVE:    /70 (BP Location: Left arm, Patient Position: Sitting)   Pulse 92   Temp 97 °F (36.1 °C) (Oral)   Resp 18   Ht 175.3 cm (69.02\")   Wt 115 kg (253 lb 6.4 oz)   SpO2 99%   BMI 37.40 kg/m²     MEDS/LABS REVIEWED AND ORDERED      acetaminophen 1,300 mg Oral Q12H   allopurinol 200 mg Oral Daily   atorvastatin 80 mg Oral Daily   chlordiazePOXIDE 25 mg Oral BID   cholecalciferol 1,000 Units Oral Daily   insulin aspart 0-7 Units Subcutaneous TID With Meals       Physical Exam   Constitutional: No distress.   HENT:   Head: Normocephalic and atraumatic.   Eyes: Pupils are equal, round, and reactive to light.   Cardiovascular: Normal rate, regular rhythm and normal heart sounds.   No murmur heard.  Pulmonary/Chest: Effort normal and breath sounds normal. No stridor. No respiratory distress. He has no wheezes. He has no rales.   Abdominal: Soft. Bowel sounds are normal. He exhibits no distension. There is no tenderness. There is no guarding.   Musculoskeletal: Normal range of motion. He exhibits no edema or tenderness.   Neurological: He is alert. He exhibits normal muscle tone.   Skin: Skin is warm and dry. He is not diaphoretic. No erythema.   Psychiatric: He has a " normal mood and affect. His behavior is normal.   Nursing note and vitals reviewed.      LAB/DIAGNOSTICS:    Lab Results (last 24 hours)     Procedure Component Value Units Date/Time    POC Glucose Once [[id number]]  (Abnormal) Collected:  01/09/19 1113    Specimen:  Blood Updated:  01/09/19 1127     Glucose 145 mg/dL     POC Glucose Once [[id number]]  (Normal) Collected:  01/09/19 0733    Specimen:  Blood Updated:  01/09/19 0740     Glucose 118 mg/dL     Basic Metabolic Panel [[id number]]  (Abnormal) Collected:  01/09/19 0426    Specimen:  Blood Updated:  01/09/19 0507     Glucose 112 mg/dL      BUN 11 mg/dL      Creatinine 1.02 mg/dL      Sodium 141 mmol/L      Potassium 3.6 mmol/L      Chloride 106 mmol/L      CO2 26.2 mmol/L      Calcium 8.0 mg/dL      eGFR Non African Amer 71 mL/min/1.73      BUN/Creatinine Ratio 10.8     Anion Gap 8.8 mmol/L     Narrative:       The MDRD GFR formula is only valid for adults with stable renal function between ages 18 and 70.    CBC & Differential [[id number]] Collected:  01/09/19 0426    Specimen:  Blood Updated:  01/09/19 0453    Narrative:       The following orders were created for panel order CBC & Differential.  Procedure                               Abnormality         Status                     ---------                               -----------         ------                     Scan Slide[[id number]]                                                                  CBC Auto Differential[[id number]]        Abnormal            Final result                 Please view results for these tests on the individual orders.    CBC Auto Differential [[id number]]  (Abnormal) Collected:  01/09/19 0426    Specimen:  Blood Updated:  01/09/19 0453     WBC 3.33 10*3/mm3      RBC 2.05 10*6/mm3      Hemoglobin 7.1 g/dL      Hematocrit 21.6 %      .4 fL      MCH 34.6 pg      MCHC 32.9 g/dL      RDW 14.9 %      RDW-SD 57.0 fl      MPV 9.3 fL      Platelets 114 10*3/mm3      Neutrophil %  62.5 %      Lymphocyte % 20.7 %      Monocyte % 11.7 %      Eosinophil % 4.2 %      Basophil % 0.3 %      Immature Grans % 0.6 %      Neutrophils, Absolute 2.08 10*3/mm3      Lymphocytes, Absolute 0.69 10*3/mm3      Monocytes, Absolute 0.39 10*3/mm3      Eosinophils, Absolute 0.14 10*3/mm3      Basophils, Absolute 0.01 10*3/mm3      Immature Grans, Absolute 0.02 10*3/mm3      nRBC 0.0 /100 WBC     POC Glucose Once [[id number]]  (Abnormal) Collected:  01/08/19 1953    Specimen:  Blood Updated:  01/08/19 2003     Glucose 136 mg/dL            Nm Gi Blood Loss    Result Date: 1/8/2019  Abnormal examination showing evidence of active proximal colonic GI tract bleeding.  This report was finalized on 1/8/2019 7:02 AM by Dr. George Kevin MD.      Xr Chest 1 View    Result Date: 1/9/2019  No active disease.  This report was finalized on 1/9/2019 2:41 PM by Dr. George Kevin MD.          ASSESSMENT/PLAN:    Diverticular Bleed w/ acute blood loss anemia  - Admission hgb 12.3 -> 7.0 yesterday, Got 1 unit PRBC, 7.1 this AM  - GI Following  - Tagged RBC scan positive for active proximal colonic bleed  - No BM's overnight  - GIve an additional unit, check CBC in AM, with orthostatics  - GI has advanced diet and stopped nsaids     DM2  - Holding actos  - On Sliding scale  - Fasting was 96 this AM  - A1c was 5.6 back in August, won't check here, not pt is s/p transfusion     HTN  - Borderline low, likely related to acute blood loss  - Holding home lisinopril     Etoh Overuse  - Has been on Librium, Continuing to score negative on CIWA  - Will cut down librium again today     JEFFREY on Stage 3 CKD  - Baseline eGFR 40's-50's, baseline Cr 1.3  - Admit CR 1.6 -> 1.2 -> 1.02

## 2019-01-09 NOTE — THERAPY DISCHARGE NOTE
Acute Care - Physical Therapy Initial Eval/Discharge   Helen Yancey     Patient Name: Bryan Landers  : 1945  MRN: [id number]  Today's Date: 2019   Onset of Illness/Injury or Date of Surgery: 19  Date of Referral to PT: 19  Referring Physician: Dr. Olson       Admit Date: 2019    Visit Dx:    ICD-10-CM ICD-9-CM   1. Gastrointestinal hemorrhage, unspecified gastrointestinal hemorrhage type K92.2 578.9     Patient Active Problem List   Diagnosis   • Essential hypertension   • Mixed hyperlipidemia   • Arthritis   • Type 2 diabetes mellitus without complication, without long-term current use of insulin (CMS/HCC)   • Severely overweight   • Routine adult health maintenance   • Acute idiopathic gout of left knee   • Medication monitoring encounter   • Microalbuminuria due to type 2 diabetes mellitus (CMS/HCC)   • History of prostate cancer   • Skin tear of forearm without complication, right, subsequent encounter   • Stage 2 chronic kidney disease   • Medicare annual wellness visit, subsequent   • Rectal bleeding   • Radiation proctitis   • History of hip replacement, total, bilateral   • Right hip pain   • Gastrointestinal hemorrhage     Past Medical History:   Diagnosis Date   • Arthritis    • Colon polyp    • Diabetes mellitus (CMS/HCC)    • GI bleed    • Hyperlipidemia    • Hypertension      Past Surgical History:   Procedure Laterality Date   • COLONOSCOPY  2016   • COLONOSCOPY N/A 2018    Procedure: COLONOSCOPY;  Surgeon: Olaf Gomez MD;  Location: [facility] OR;  Service: Gastroenterology   • COLONOSCOPY N/A 2019    Procedure: COLONOSCOPY;  Surgeon: Rosa Jack MD;  Location: [facility] OR;  Service: Gastroenterology   • HERNIA REPAIR     • PROSTATE ULTRASOUND BIOPSY     • SIGMOIDOSCOPY N/A 10/2/2018    Procedure: FLEXIBLE SIGMOIDOSCOPY:  APC cautery bleeding using 60 joules;  Surgeon: Olaf Gomez MD;  Location: [facility] ENDOSCOPY;  Service:  "Gastroenterology   • TOTAL HIP ARTHROPLASTY Right 2007          PT ASSESSMENT (last 12 hours)      Physical Therapy Evaluation     Row Name 01/09/19 0900          PT Evaluation Time/Intention    Subjective Information  no complaints  -BP     Document Type  discharge evaluation/summary  -BP     Mode of Treatment  physical therapy  -BP     Patient Effort  good  -BP     Symptoms Noted During/After Treatment  dizziness  -BP     Comment  Patient with complaints of dizziness during initial 10 feet of gait. With rest, dizziness subsides. Notified RN  -BP     Row Name 01/09/19 0900          General Information    Patient Profile Reviewed?  yes  -BP     Onset of Illness/Injury or Date of Surgery  01/04/19  -BP     Referring Physician  Dr. Olson   -BP     Patient Observations  alert;cooperative;agree to therapy  -BP     Patient/Family Observations  Patient sitting upright in bedside chair. Daughter present. Patient provides permission to speak in front of daughter. IV line in use. Agreeable to PT evaluation   -BP     Prior Level of Function  independent:;gait;transfer;all household mobility;community mobility;ADL's;bed mobility;home management;driving  -BP     Equipment Currently Used at Home  none owns FWW and BSC   -BP     Pertinent History of Current Functional Problem  Patient admitted to the hospital with diagnosis of GI bleed. Patient independent with all mobility at baseline however states he feels a little \"weaker\" due to medical diagnosis/status. Patient has used a walker in the past following hip replacements. Per patient and nursing staff, patient fell last evening while going to the bathroom. He reports he tripped over his IV line.   -BP     Existing Precautions/Restrictions  fall  -BP     Risks Reviewed  patient and family:;LOB;increased discomfort  -BP     Benefits Reviewed  patient and family:;improve function;increase independence;increase strength  -BP     Barriers to Rehab  none identified  -BP     Row Name " 01/09/19 0900          Relationship/Environment    Lives With  spouse  -BP     Row Name 01/09/19 0900          Resource/Environmental Concerns    Current Living Arrangements  home/apartment/condo  -BP     Row Name 01/09/19 0900          Home Main Entrance    Number of Stairs, Main Entrance  two  -BP     Stair Railings, Main Entrance  -- one handrail, does not specify which side   -BP     Row Name 01/09/19 0900          Cognitive Assessment/Interventions    Additional Documentation  Cognitive Assessment/Intervention (Group)  -BP     Row Name 01/09/19 0900          Cognitive Assessment/Intervention- PT/OT    Orientation Status (Cognition)  oriented x 4  -BP     Follows Commands (Cognition)  WNL  -BP     Personal Safety Interventions  gait belt;nonskid shoes/slippers when out of bed  -BP     Row Name 01/09/19 0900          Safety Issues, Functional Mobility    Safety Issues Affecting Function (Mobility)  positioning of assistive device  -     Comment, Safety Issues/Impairments (Mobility)  Patient requires verbal cues for improved position of AD, patient able to correct and maintain  -BP     Row Name 01/09/19 0900          Bed Mobility Assessment/Treatment    Comment (Bed Mobility)  deferred, patient up in chair.   -BP     Row Name 01/09/19 0900          Transfer Assessment/Treatment    Transfer Assessment/Treatment  sit-stand transfer;stand-sit transfer  -     Comment (Transfers)  Patient able to demonstrate proper hand placement during transfers   -BP     Sit-Stand [place] (Transfers)  supervision  -     Stand-Sit [place] (Transfers)  supervision  -     Row Name 01/09/19 0900          Sit-Stand Transfer    Assistive Device (Sit-Stand Transfers)  walker, front-wheeled  -BP     Row Name 01/09/19 0900          Stand-Sit Transfer    Assistive Device (Stand-Sit Transfers)  walker, front-wheeled  -BP     Row Name 01/09/19 0900          Gait/Stairs Assessment/Training    [place] Level (Gait)  stand by  assist  -BP     Assistive Device (Gait)  walker, front-wheeled  -BP     Distance in Feet (Gait)  176  -BP     Pattern (Gait)  swing-through  -BP     Deviations/Abnormal Patterns (Gait)  base of support, wide;gait speed decreased;stride length decreased  -BP     Bilateral Gait Deviations  forward flexed posture  -BP     Comment (Gait/Stairs)  Attemtped gait without use of device. Patient demonstrates slow gait speed and decreased arm swing. With use of device, patient increases gait speed and reports feeling more secure. No LOB noted. Navigates all directional changes and external obstacles without need for cues. Assist for IV pole only.   -BP     Row Name 01/09/19 0900          General ROM    GENERAL ROM COMMENTS  B UE AROM WFL. B LE AROM WFL.   -[facility] [place] Name 01/09/19 0900          MMT (Manual Muscle Testing)    General MMT Comments  R UE gross MMT 5/5. L shoulder flexion MMT 4-/5. B LE gross MMT 4+/5   -[facility] [place] Name 01/09/19 0900          Sensory Assessment/Intervention    Sensory General Assessment  -- Patient denies numbness and tingling B LE's.   -     Row Name 01/09/19 0900          Pain Assessment    Additional Documentation  Pain Scale: Numbers Pre/Post-Treatment (Group)  -[facility] [place] Name 01/09/19 0900          Pain Scale: Numbers Pre/Post-Treatment    Pain Scale: Numbers, Pretreatment  0/10 - no pain  -     Pain Scale: Numbers, Post-Treatment  0/10 - no pain  -[facility] [place] Name 01/09/19 0900          Plan of Care Review    Plan of Care Reviewed With  patient  -[facility] [place] Name 01/09/19 0900          Physical Therapy Clinical Impression    Date of Referral to PT  01/09/19  -     Criteria for Skilled Interventions Met (PT Clinical Impression)  no problems identified which require skilled intervention  -     Care Plan Review (PT)  evaluation/treatment results reviewed;care plan/treatment goals reviewed;risks/benefits reviewed  -     Care Plan Review, Other Participant (PT Clinical Impression)   daughter  -BP     Row Name 01/09/19 0900          Physical Therapy Goals    Bed Mobility Goal Selection (PT)  --  -BP     Row Name 01/09/19 0900          Positioning and Restraints    Pre-Treatment Position  sitting in chair/recliner  -BP     Post Treatment Position  chair  -BP     In Chair  sitting;notified nsg;call light within reach;encouraged to call for assist;with family/caregiver  -BP     Row Name 01/09/19 0900          Living Environment    Home Accessibility  stairs to enter home  -BP       User Key  (r) = Recorded By, (t) = Taken By, (c) = Cosigned By    Initials Name Provider Type    BP Quincy Bruno, PT Physical Therapist          Physical Therapy Education     Title: PT OT SLP Therapies (Done)     Topic: Physical Therapy (Done)     Point: Mobility training (Done)     Learning Progress Summary           Patient Acceptance, E,TB, VU by  at 1/9/2019 11:43 AM   Family Acceptance, E,TB, VU by  at 1/9/2019 11:43 AM                               User Key     Initials Effective Dates Name Provider Type Discipline     04/03/18 -  Quincy Bruno, PT Physical Therapist PT                PT Recommendation and Plan  Anticipated Discharge Disposition (PT): home  Therapy Frequency (PT Clinical Impression): evaluation only  Outcome Summary/Treatment Plan (PT)  Anticipated Equipment Needs at Discharge (PT): (Pt may need FWW if unable to obtain from prior use )  Anticipated Discharge Disposition (PT): home  Plan of Care Reviewed With: patient, daughter  Outcome Summary: PT Evaluation Complete: Patient peforms sit to/from stand transfers with supervision and gait x 176 feet with SBA with FWW. Patient with improved stability and gait mechanics with use of FWW. Patient agreeable to use of walker for mobility. Recommend continued mobility wit nursing staff. No further skilled PT needs at this time.      Outcome Measures     Row Name 01/09/19 0900             How much help from another person do you currently  need...    Turning from your back to your side while in flat bed without using bedrails?  3  -BP      Moving from lying on back to sitting on the side of a flat bed without bedrails?  3  -BP      Moving to and from a bed to a chair (including a wheelchair)?  3  -BP      Standing up from a chair using your arms (e.g., wheelchair, bedside chair)?  3  -BP      Climbing 3-5 steps with a railing?  3  -BP      To walk in hospital room?  3  -BP      AM-PAC 6 Clicks Score  18  -BP         Functional Assessment    Outcome Measure Options  AM-PAC 6 Clicks Basic Mobility (PT)  -BP        User Key  (r) = Recorded By, (t) = Taken By, (c) = Cosigned By    Initials Name Provider Type    Quincy Guerrero, PT Physical Therapist           Time Calculation:   PT Charges     Row Name 01/09/19 1148             Time Calculation    Start Time  0900  -BP      PT Received On  01/09/19  -BP        User Key  (r) = Recorded By, (t) = Taken By, (c) = Cosigned By    Initials Name Provider Type    Quincy Guerrero, PT Physical Therapist        Therapy Suggested Charges     Code   Minutes Charges    None           Therapy Charges for Today     Code Description Service Date Service Provider Modifiers Qty    52739118707 HC PT EVAL LOW COMPLEXITY 2 1/9/2019 Quincy Bruno, PT GP 1          PT G-Codes  Outcome Measure Options: AM-PAC 6 Clicks Basic Mobility (PT)  AM-PAC 6 Clicks Score: 18    PT Discharge Summary  Anticipated Discharge Disposition (PT): home  Reason for Discharge: At baseline function    Quincy Bruno, PT  1/9/2019

## 2019-01-09 NOTE — PLAN OF CARE
Problem: Patient Care Overview  Goal: Plan of Care Review  Outcome: Ongoing (interventions implemented as appropriate)   01/09/19 0547   Coping/Psychosocial   Plan of Care Reviewed With patient;family   OTHER   Outcome Summary Received one unit prbc, hgb 7.1, ivf dc'd, unwitnessed fall this shift, alarms placed, and n.o for orthostatic b/p in am. C/o left rib pain s/p fall, left rib series pending. Wife at bedside, reinforced need to call for asist with transfers and ambulation, pt verbalizes understanding.     Goal: Individualization and Mutuality  Outcome: Ongoing (interventions implemented as appropriate)    Goal: Discharge Needs Assessment  Outcome: Ongoing (interventions implemented as appropriate)    Goal: Interprofessional Rounds/Family Conf  Outcome: Ongoing (interventions implemented as appropriate)      Problem: Gastrointestinal Bleeding (Adult)  Goal: Signs and Symptoms of Listed Potential Problems Will be Absent, Minimized or Managed (Gastrointestinal Bleeding)  Outcome: Ongoing (interventions implemented as appropriate)      Problem: Fall Risk (Adult)  Goal: Identify Related Risk Factors and Signs and Symptoms  Outcome: Outcome(s) achieved Date Met: 01/09/19    Goal: Absence of Fall  Outcome: Unable to achieve outcome(s) by discharge Date Met: 01/09/19

## 2019-01-09 NOTE — SIGNIFICANT NOTE
Patient reported that he tripped and fell on his way back from the bathroom. This was unwitnessed. No injuries or changes noted.

## 2019-01-09 NOTE — NURSING NOTE
Continued Stay Note  ROBERTO Pelletier     Patient Name: Bryan Landers  MRN: [id number]  Today's Date: 1/9/2019    Admit Date: 1/4/2019    Discharge Plan     Row Name 01/09/19 1512       Plan    Plan Comments   Per physical therapist recommendation patient would benefit from having a rolling walker for home use.  Patient in agreement with speaking to  with wife at bedside.  He says he has a rolling walker at home to use and does not need one ordered.   will continue to follow.         Discharge Codes    No documentation.             Miracle Stuart RN

## 2019-01-09 NOTE — PROGRESS NOTES
GI Daily Progress Note    Assessment/Plan:      Gastrointestinal hemorrhage       LOS: 3 days     Bryan Landers is a 74 y.o. male who was admitted with Rectal Bleeding. He reports the symptoms are improving with treatment. No further bleeding and his HGB though still low is stable and is still on IVFs so his counts may be s/w diluted. Wife Present and reviewed his endo findings and labs. Will recheck after another unit of PRBCs today in the AM prior to considering D/C    Subjective:    Patient expresses No GI complaints  Patient denies abdominal pain and bloody stools    Objective:    Vital signs in last 24 hours:  Temp:  [97 °F (36.1 °C)-97.6 °F (36.4 °C)] 97 °F (36.1 °C)  Heart Rate:  [76-93] 92  Resp:  [16] 16  BP: (108-120)/(68-77) 118/74    Intake/Output last 3 shifts:  I/O last 3 completed shifts:  In: 4070 [P.O.:1440; I.V.:1995; Blood:635]  Out: -   Intake/Output this shift:  I/O this shift:  In: 960 [P.O.:960]  Out: -     Physical Exam:Abdomen  Sounds Normal Active Bowel Sounds   Distension Soft   Tenderness Nontender     Imaging Results (last 72 hours)     Procedure Component Value Units Date/Time    NM GI Blood Loss [[id number]] Collected:  01/08/19 0655     Updated:  01/08/19 0704    Narrative:       RADIONUCLIDE TAGGED RBC GI BLEEDING STUDY, 1/8/2019     HISTORY:   73-year-old male admitted to the hospital on 1/4/2019 with active lower  GI bleed. Colonoscopy yesterday showed extensive diverticulosis  throughout the colon with clotted blood from the cecum to the rectum,  but no blood in the terminal ileum. 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     DOSE:   26.8 mCi pertechnetate labeled autologous red blood cells.     COMPARISON:   No prior abdominal imaging here.     FINDINGS:   Early short interval imaging over the 1st hour with delayed images at 2  hours and 8 hours.     On the 8 hours image, abnormal GI tract activity is seen throughout the  colon from  the cecum to the rectosigmoid. The findings imply a proximal  colonic source of active GI bleeding.       Impression:       Abnormal examination showing evidence of active proximal colonic GI  tract bleeding.     This report was finalized on 1/8/2019 7:02 AM by Dr. George Kevin MD.             WBC   Date Value Ref Range Status   01/09/2019 3.33 (L) 4.80 - 10.80 10*3/mm3 Final     RBC   Date Value Ref Range Status   01/09/2019 2.05 (L) 4.70 - 6.10 10*6/mm3 Final     Hemoglobin   Date Value Ref Range Status   01/09/2019 7.1 (C) 14.0 - 18.0 g/dL Final     Hematocrit   Date Value Ref Range Status   01/09/2019 21.6 (C) 42.0 - 52.0 % Final     MCV   Date Value Ref Range Status   01/09/2019 105.4 (H) 80.0 - 94.0 fL Final     MCH   Date Value Ref Range Status   01/09/2019 34.6 (H) 27.0 - 31.0 pg Final     MCHC   Date Value Ref Range Status   01/09/2019 32.9 31.0 - 37.0 g/dL Final     RDW   Date Value Ref Range Status   01/09/2019 14.9 (H) 11.5 - 14.5 % Final     RDW-SD   Date Value Ref Range Status   01/09/2019 57.0 (H) 37.0 - 54.0 fl Final     MPV   Date Value Ref Range Status   01/09/2019 9.3 7.4 - 10.4 fL Final     Platelets   Date Value Ref Range Status   01/09/2019 114 (L) 140 - 500 10*3/mm3 Final     Neutrophil %   Date Value Ref Range Status   01/09/2019 62.5 45.0 - 70.0 % Final     Lymphocyte %   Date Value Ref Range Status   01/09/2019 20.7 20.0 - 45.0 % Final     Monocyte %   Date Value Ref Range Status   01/09/2019 11.7 (H) 3.0 - 8.0 % Final     Eosinophil %   Date Value Ref Range Status   01/09/2019 4.2 (H) 0.0 - 4.0 % Final     Basophil %   Date Value Ref Range Status   01/09/2019 0.3 0.0 - 2.0 % Final     Immature Grans %   Date Value Ref Range Status   01/09/2019 0.6 (H) 0.0 - 0.5 % Final     Neutrophils, Absolute   Date Value Ref Range Status   01/09/2019 2.08 1.50 - 8.30 10*3/mm3 Final     Lymphocytes, Absolute   Date Value Ref Range Status   01/09/2019 0.69 0.60 - 4.80 10*3/mm3 Final     Monocytes,  Absolute   Date Value Ref Range Status   01/09/2019 0.39 0.00 - 1.00 10*3/mm3 Final     Eosinophils, Absolute   Date Value Ref Range Status   01/09/2019 0.14 0.10 - 0.30 10*3/mm3 Final     Basophils, Absolute   Date Value Ref Range Status   01/09/2019 0.01 0.00 - 0.20 10*3/mm3 Final     Immature Grans, Absolute   Date Value Ref Range Status   01/09/2019 0.02 0.00 - 0.03 10*3/mm3 Final     nRBC   Date Value Ref Range Status   01/09/2019 0.0 0.0 - 0.0 /100 WBC Final       Glucose   Date Value Ref Range Status   01/09/2019 112 (H) 65 - 99 mg/dL Final     Sodium   Date Value Ref Range Status   01/09/2019 141 136 - 145 mmol/L Final     Potassium   Date Value Ref Range Status   01/09/2019 3.6 3.5 - 5.2 mmol/L Final     CO2   Date Value Ref Range Status   01/09/2019 26.2 22.0 - 29.0 mmol/L Final     Chloride   Date Value Ref Range Status   01/09/2019 106 98 - 107 mmol/L Final     Anion Gap   Date Value Ref Range Status   01/09/2019 8.8 mmol/L Final     Creatinine   Date Value Ref Range Status   01/09/2019 1.02 0.76 - 1.27 mg/dL Final     BUN   Date Value Ref Range Status   01/09/2019 11 8 - 23 mg/dL Final     BUN/Creatinine Ratio   Date Value Ref Range Status   01/09/2019 10.8 7.0 - 25.0 Final     Calcium   Date Value Ref Range Status   01/09/2019 8.0 (L) 8.8 - 10.5 mg/dL Final     eGFR Non  Amer   Date Value Ref Range Status   01/09/2019 71 >60 mL/min/1.73 Final     Albumin   Date Value Ref Range Status   01/07/2019 3.00 (L) 3.50 - 5.20 g/dL Final     Diverticular Bleed  ABLA  DM  ETOH abuse  CKD- II  Radiation Proctitis-S/P APC treatment    Will advance Diet and D/C his IVFs also agree with transfusion and will recheck in the AM. Also will encourage Arthritis strength Tylenol to replace his recently started Aleve despite there being no probable link between NSAIDS and Diverticular bleeding.

## 2019-01-09 NOTE — PLAN OF CARE
Problem: Patient Care Overview  Goal: Plan of Care Review  Outcome: Ongoing (interventions implemented as appropriate)   01/09/19 0526   Coping/Psychosocial   Plan of Care Reviewed With patient   Plan of Care Review   Progress improving   OTHER   Outcome Summary Pt. had no BM this shift; Hgb 7.1 and Hct 21.6 this AM - MD aware; LR infusing; per pt. had a fall tonight, was unwitnessed - fall precautions now in place; sleeping at this time       Problem: Gastrointestinal Bleeding (Adult)  Goal: Signs and Symptoms of Listed Potential Problems Will be Absent, Minimized or Managed (Gastrointestinal Bleeding)  Outcome: Ongoing (interventions implemented as appropriate)   01/09/19 0526   Goal/Outcome Evaluation   Problems Assessed (GI Bleeding) all   Problems Present (GI Bleeding) situational response       Problem: Fall Risk (Adult)  Goal: Identify Related Risk Factors and Signs and Symptoms  Outcome: Ongoing (interventions implemented as appropriate)   01/09/19 0526   Fall Risk (Adult)   Related Risk Factors (Fall Risk) other (see comments)  (IV tubing)   Signs and Symptoms (Fall Risk) presence of risk factors     Goal: Absence of Fall  Outcome: Ongoing (interventions implemented as appropriate)   01/09/19 0526   Fall Risk (Adult)   Absence of Fall unable to achieve outcome

## 2019-01-09 NOTE — SIGNIFICANT NOTE
Critical value result: Hgb 7.1   Reported to: Dr. Evans  Repeated and verified     No new orders at this time

## 2019-01-09 NOTE — PAYOR COMM NOTE
"Bryan Patel (74 y.o. Male)     ATTN: RACHEL  AUTH#[id number]  FAXING UPDATED CLINICALS FOR APPROVAL OF ADDITIONAL DAYS. PLEASE REPLY TO LUIS HERNANDEZ AT FAX#[phone] OR PHONE#[phone]. THANK YOU.       Date of Birth Social Security Number Address Home Phone MRN    1945  [address] [place] [id number] [phone] [id number]    [employer] Marital Status          None        Admission Date Admission Type Admitting Provider Attending Provider Department, Room/Bed    1/4/19 Emergency Hever Evans MD Ellis, Rusty T, MD [facility] MED SURG, 1407/1    Discharge Date Discharge Disposition Discharge Destination                       Attending Provider:  Hever Evans MD    Allergies:  Nsaids    Isolation:  None   Infection:  None   Code Status:  CPR    Ht:  175.3 cm (69.02\")   Wt:  115 kg (253 lb 6.4 oz)    Admission Cmt:  None   Principal Problem:  None                Active Insurance as of 1/4/2019     Primary Coverage     Payor Plan Insurance Group Employer/Plan Group    [employer]NA MEDICARE REPLACEMENT AETNA [id number]     Payor Plan Address Payor Plan Phone Number Payor Plan Fax Number Effective Dates    [address] [phone]  1/1/2018 - None Entered    [facility] [id number]       Subscriber Name Subscriber Birth Date Member ID       BRYAN PATEL 1945 [id number]                 Emergency Contacts      (Rel.) Home Phone Work Phone Mobile Phone    Chloe Patel (Spouse) -- -- [phone]    MichaelIsi rod (Daughter) -- -- [phone]            ICU Vital Signs     Row Name 01/09/19 1518 01/09/19 1353 01/09/19 1330 01/09/19 1325 01/09/19 0844       Vitals    Temp  97 °F (36.1 °C)  97 °F (36.1 °C)  --  97 °F (36.1 °C)  --    Temp src  Oral  Oral  --  Oral  --    Pulse  92  100  --  103  --    Heart Rate Source  Monitor  Monitor  --  Monitor  --    Resp  18  16  --  16  --    Resp Rate Source  Visual  Visual  --  Visual  --    BP  120/70  114/65  --  " 94/54  --    BP Location  Left arm  Left arm  --  Left arm  --    BP Method  Automatic  Automatic  --  Automatic  --    Patient Position  Sitting  Sitting  --  Sitting  --       Oxygen Therapy    SpO2  99 %  97 %  97 %  93 %  --    Pulse Oximetry Type  --  Intermittent  --  Intermittent  --    Device (Oxygen Therapy)  room air  room air  room air  room air  room air    Row Name 01/09/19 0642 01/09/19 0300 01/08/19 2352 01/08/19 2300 01/08/19 2203       Vitals    Temp  97 °F (36.1 °C)  97.6 °F (36.4 °C)  --  97 °F (36.1 °C)  --    Temp src  Oral  Oral  --  Oral  --    Pulse  92  93  --  92  --    Heart Rate Source  Monitor  Monitor  --  Monitor  --    Resp  16  16  --  16  --    Resp Rate Source  Visual  Visual  --  Visual  --    BP  118/74  108/68  --  120/77  --    BP Location  Right arm  Right arm  --  Right arm  --    BP Method  Automatic  Automatic  --  Automatic  --    Patient Position  Lying  Lying  --  Lying  --       Oxygen Therapy    SpO2  98 %  99 %  --  97 %  --    Device (Oxygen Therapy)  room air  room air  room air  room air  room air    Row Name 01/08/19 1900                   Vitals    Temp  97.6 °F (36.4 °C)        Temp src  Oral        Pulse  76        Heart Rate Source  Monitor        Resp  16        Resp Rate Source  Visual        BP  112/75        BP Location  Right arm        BP Method  Automatic        Patient Position  Lying           Oxygen Therapy    SpO2  100 %        Device (Oxygen Therapy)  room air            Lines, Drains & Airways    Active LDAs     Name:   Placement date:   Placement time:   Site:   Days:    Peripheral IV 01/09/19 0005 Right Hand   01/09/19    0005    Hand   less than 1                Hospital Medications (active)       Dose Frequency Start End    acetaminophen (TYLENOL) 8 hr tablet 1,300 mg 1,300 mg Every 12 Hours Scheduled 1/9/2019     Sig - Route: Take 2 tablets by mouth Every 12 (Twelve) Hours. - Oral    allopurinol (ZYLOPRIM) tablet 200 mg 200 mg Daily 1/5/2019   "   Sig - Route: Take 2 tablets by mouth Daily. - Oral    atorvastatin (LIPITOR) tablet 80 mg 80 mg Daily 1/5/2019     Sig - Route: Take 2 tablets by mouth Daily. - Oral    chlordiazePOXIDE (LIBRIUM) capsule 25 mg 25 mg 2 Times Daily 1/8/2019 1/14/2019    Sig - Route: Take 1 capsule by mouth 2 (Two) Times a Day. - Oral    cholecalciferol (VITAMIN D3) tablet 1,000 Units 1,000 Units Daily 1/5/2019     Sig - Route: Take 1 tablet by mouth Daily. - Oral    dextrose (D50W) 25 g/ 50mL Intravenous Solution 25 g 25 g Every 15 Minutes PRN 1/4/2019     Sig - Route: Infuse 50 mL into a venous catheter Every 15 (Fifteen) Minutes As Needed for Low Blood Sugar (Blood Sugar Less Than 70). - Intravenous    dextrose (GLUTOSE) oral gel 15 g 15 g Every 15 Minutes PRN 1/4/2019     Sig - Route: Take 15 g by mouth Every 15 (Fifteen) Minutes As Needed for Low Blood Sugar (Blood sugar less than 70). - Oral    glucagon (GLUCAGEN) injection 1 mg 1 mg As Needed 1/4/2019     Sig - Route: Inject 1 mg under the skin into the appropriate area as directed As Needed (Blood Glucose Less Than 70). - Subcutaneous    insulin aspart (novoLOG) injection 0-7 Units 0-7 Units 3 Times Daily With Meals 1/5/2019     Sig - Route: Inject 0-7 Units under the skin into the appropriate area as directed 3 (Three) Times a Day With Meals. - Subcutaneous    sodium chloride 0.9 % flush 10 mL 10 mL As Needed 1/4/2019     Sig - Route: Infuse 10 mL into a venous catheter As Needed for Line Care. - Intravenous    Linked Group 1:  \"And\" Linked Group Details        acetaminophen (TYLENOL) tablet 1,300 mg (Discontinued) 1,300 mg 2 Times Daily 1/9/2019 1/9/2019    Sig - Route: Take 4 tablets by mouth 2 (Two) Times a Day. - Oral    Reason for Discontinue: Formulary change    lactated ringers infusion (Discontinued) 100 mL/hr Continuous 1/7/2019 1/9/2019    Sig - Route: Infuse 100 mL/hr into a venous catheter Continuous. - Intravenous          Blood Administration Record (From " admission, onward)    Transfusions already released     Ordered     Start    01/09/19 1137  Transfuse RBC, 1 Units Infuse Each Unit Over: 3.5H  Transfusion     Released Time Blood Unit Number Status   01/09/19 1329   18  [id number]  [id number] Transfusing       01/09/19 1136          Completed transfusions     Ordered     Start    01/08/19 0900  Transfuse RBC, 1 Units Infuse Each Unit Over: 3.5H  Transfusion     Released Time Blood Unit Number Status   01/08/19 1140   18  [id number]  [id number] Completed 01/08/19 1418       01/08/19 0859                  Lab Results (last 24 hours)     Procedure Component Value Units Date/Time    POC Glucose Once [[id number]]  (Abnormal) Collected:  01/09/19 1113    Specimen:  Blood Updated:  01/09/19 1127     Glucose 145 mg/dL     POC Glucose Once [[id number]]  (Normal) Collected:  01/09/19 0733    Specimen:  Blood Updated:  01/09/19 0740     Glucose 118 mg/dL     Basic Metabolic Panel [[id number]]  (Abnormal) Collected:  01/09/19 0426    Specimen:  Blood Updated:  01/09/19 0507     Glucose 112 mg/dL      BUN 11 mg/dL      Creatinine 1.02 mg/dL      Sodium 141 mmol/L      Potassium 3.6 mmol/L      Chloride 106 mmol/L      CO2 26.2 mmol/L      Calcium 8.0 mg/dL      eGFR Non African Amer 71 mL/min/1.73      BUN/Creatinine Ratio 10.8     Anion Gap 8.8 mmol/L     Narrative:       The MDRD GFR formula is only valid for adults with stable renal function between ages 18 and 70.    CBC & Differential [[id number]] Collected:  01/09/19 0426    Specimen:  Blood Updated:  01/09/19 0453    Narrative:       The following orders were created for panel order CBC & Differential.  Procedure                               Abnormality         Status                     ---------                               -----------         ------                     Scan Slide[[id number]]                                                                  CBC Auto Differential[[id number]]        Abnormal             Final result                 Please view results for these tests on the individual orders.    CBC Auto Differential [[id number]]  (Abnormal) Collected:  01/09/19 0426    Specimen:  Blood Updated:  01/09/19 0453     WBC 3.33 10*3/mm3      RBC 2.05 10*6/mm3      Hemoglobin 7.1 g/dL      Hematocrit 21.6 %      .4 fL      MCH 34.6 pg      MCHC 32.9 g/dL      RDW 14.9 %      RDW-SD 57.0 fl      MPV 9.3 fL      Platelets 114 10*3/mm3      Neutrophil % 62.5 %      Lymphocyte % 20.7 %      Monocyte % 11.7 %      Eosinophil % 4.2 %      Basophil % 0.3 %      Immature Grans % 0.6 %      Neutrophils, Absolute 2.08 10*3/mm3      Lymphocytes, Absolute 0.69 10*3/mm3      Monocytes, Absolute 0.39 10*3/mm3      Eosinophils, Absolute 0.14 10*3/mm3      Basophils, Absolute 0.01 10*3/mm3      Immature Grans, Absolute 0.02 10*3/mm3      nRBC 0.0 /100 WBC     POC Glucose Once [[id number]]  (Abnormal) Collected:  01/08/19 1953    Specimen:  Blood Updated:  01/08/19 2003     Glucose 136 mg/dL     POC Glucose Once [[id number]]  (Normal) Collected:  01/08/19 1617    Specimen:  Blood Updated:  01/08/19 1623     Glucose 120 mg/dL         Imaging Results (last 24 hours)     Procedure Component Value Units Date/Time    XR Ribs Left With PA Chest [[id number]] Updated:  01/09/19 1515    XR Chest 1 View [[id number]] Collected:  01/09/19 1440     Updated:  01/09/19 1443    Narrative:       CHEST X-RAY, 1/9/2019         HISTORY:  74-year-old male with left side chest pain after a fall today.     TECHNIQUE:  Single view chest x-ray limited by patient body habitus and AP portable  radiographic technique.     FINDINGS:  The lungs are symmetrically expanded and clear. No visible pneumothorax,  pulmonary contusion or pleural effusion. Mild cardiomegaly.       Impression:       No active disease.     This report was finalized on 1/9/2019 2:41 PM by Dr. George Kevin MD.              Physician Progress Notes (last 24 hours) (Notes from 1/8/2019   3:21 PM through 1/9/2019  3:21 PM)      Olaf Gomez MD at 1/9/2019  1:06 PM          GI Daily Progress Note    Assessment/Plan:      Gastrointestinal hemorrhage       LOS: 3 days     Bryan Landers is a 74 y.o. male who was admitted with Rectal Bleeding. He reports the symptoms are improving with treatment. No further bleeding and his HGB though still low is stable and is still on IVFs so his counts may be s/w diluted. Wife Present and reviewed his endo findings and labs. Will recheck after another unit of PRBCs today in the AM prior to considering D/C    Subjective:    Patient expresses No GI complaints  Patient denies abdominal pain and bloody stools    Objective:    Vital signs in last 24 hours:  Temp:  [97 °F (36.1 °C)-97.6 °F (36.4 °C)] 97 °F (36.1 °C)  Heart Rate:  [76-93] 92  Resp:  [16] 16  BP: (108-120)/(68-77) 118/74    Intake/Output last 3 shifts:  I/O last 3 completed shifts:  In: 4070 [P.O.:1440; I.V.:1995; Blood:635]  Out: -   Intake/Output this shift:  I/O this shift:  In: 960 [P.O.:960]  Out: -     Physical Exam:Abdomen  Sounds Normal Active Bowel Sounds   Distension Soft   Tenderness Nontender     Imaging Results (last 72 hours)     Procedure Component Value Units Date/Time    NM GI Blood Loss [[id number]] Collected:  01/08/19 0655     Updated:  01/08/19 0704    Narrative:       RADIONUCLIDE TAGGED RBC GI BLEEDING STUDY, 1/8/2019     HISTORY:   73-year-old male admitted to the hospital on 1/4/2019 with active lower  GI bleed. Colonoscopy yesterday showed extensive diverticulosis  throughout the colon with clotted blood from the cecum to the rectum,  but no blood in the terminal ileum. 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     DOSE:   26.8 mCi pertechnetate labeled autologous red blood cells.     COMPARISON:   No prior abdominal imaging here.     FINDINGS:   Early short interval imaging over the 1st hour with delayed images at 2  hours  and 8 hours.     On the 8 hours image, abnormal GI tract activity is seen throughout the  colon from the cecum to the rectosigmoid. The findings imply a proximal  colonic source of active GI bleeding.       Impression:       Abnormal examination showing evidence of active proximal colonic GI  tract bleeding.     This report was finalized on 1/8/2019 7:02 AM by Dr. George Kevin MD.             WBC   Date Value Ref Range Status   01/09/2019 3.33 (L) 4.80 - 10.80 10*3/mm3 Final     RBC   Date Value Ref Range Status   01/09/2019 2.05 (L) 4.70 - 6.10 10*6/mm3 Final     Hemoglobin   Date Value Ref Range Status   01/09/2019 7.1 (C) 14.0 - 18.0 g/dL Final     Hematocrit   Date Value Ref Range Status   01/09/2019 21.6 (C) 42.0 - 52.0 % Final     MCV   Date Value Ref Range Status   01/09/2019 105.4 (H) 80.0 - 94.0 fL Final     MCH   Date Value Ref Range Status   01/09/2019 34.6 (H) 27.0 - 31.0 pg Final     MCHC   Date Value Ref Range Status   01/09/2019 32.9 31.0 - 37.0 g/dL Final     RDW   Date Value Ref Range Status   01/09/2019 14.9 (H) 11.5 - 14.5 % Final     RDW-SD   Date Value Ref Range Status   01/09/2019 57.0 (H) 37.0 - 54.0 fl Final     MPV   Date Value Ref Range Status   01/09/2019 9.3 7.4 - 10.4 fL Final     Platelets   Date Value Ref Range Status   01/09/2019 114 (L) 140 - 500 10*3/mm3 Final     Neutrophil %   Date Value Ref Range Status   01/09/2019 62.5 45.0 - 70.0 % Final     Lymphocyte %   Date Value Ref Range Status   01/09/2019 20.7 20.0 - 45.0 % Final     Monocyte %   Date Value Ref Range Status   01/09/2019 11.7 (H) 3.0 - 8.0 % Final     Eosinophil %   Date Value Ref Range Status   01/09/2019 4.2 (H) 0.0 - 4.0 % Final     Basophil %   Date Value Ref Range Status   01/09/2019 0.3 0.0 - 2.0 % Final     Immature Grans %   Date Value Ref Range Status   01/09/2019 0.6 (H) 0.0 - 0.5 % Final     Neutrophils, Absolute   Date Value Ref Range Status   01/09/2019 2.08 1.50 - 8.30 10*3/mm3 Final     Lymphocytes,  Absolute   Date Value Ref Range Status   01/09/2019 0.69 0.60 - 4.80 10*3/mm3 Final     Monocytes, Absolute   Date Value Ref Range Status   01/09/2019 0.39 0.00 - 1.00 10*3/mm3 Final     Eosinophils, Absolute   Date Value Ref Range Status   01/09/2019 0.14 0.10 - 0.30 10*3/mm3 Final     Basophils, Absolute   Date Value Ref Range Status   01/09/2019 0.01 0.00 - 0.20 10*3/mm3 Final     Immature Grans, Absolute   Date Value Ref Range Status   01/09/2019 0.02 0.00 - 0.03 10*3/mm3 Final     nRBC   Date Value Ref Range Status   01/09/2019 0.0 0.0 - 0.0 /100 WBC Final       Glucose   Date Value Ref Range Status   01/09/2019 112 (H) 65 - 99 mg/dL Final     Sodium   Date Value Ref Range Status   01/09/2019 141 136 - 145 mmol/L Final     Potassium   Date Value Ref Range Status   01/09/2019 3.6 3.5 - 5.2 mmol/L Final     CO2   Date Value Ref Range Status   01/09/2019 26.2 22.0 - 29.0 mmol/L Final     Chloride   Date Value Ref Range Status   01/09/2019 106 98 - 107 mmol/L Final     Anion Gap   Date Value Ref Range Status   01/09/2019 8.8 mmol/L Final     Creatinine   Date Value Ref Range Status   01/09/2019 1.02 0.76 - 1.27 mg/dL Final     BUN   Date Value Ref Range Status   01/09/2019 11 8 - 23 mg/dL Final     BUN/Creatinine Ratio   Date Value Ref Range Status   01/09/2019 10.8 7.0 - 25.0 Final     Calcium   Date Value Ref Range Status   01/09/2019 8.0 (L) 8.8 - 10.5 mg/dL Final     eGFR Non  Amer   Date Value Ref Range Status   01/09/2019 71 >60 mL/min/1.73 Final     Albumin   Date Value Ref Range Status   01/07/2019 3.00 (L) 3.50 - 5.20 g/dL Final     Diverticular Bleed  ABLA  DM  ETOH abuse  CKD- II  Radiation Proctitis-S/P APC treatment    Will advance Diet and D/C his IVFs also agree with transfusion and will recheck in the AM. Also will encourage Arthritis strength Tylenol to replace his recently started Aleve despite there being no probable link between NSAIDS and Diverticular bleeding.    Electronically signed  by Patricia, Olaf Abarca MD at 1/9/2019  1:11 PM

## 2019-01-09 NOTE — PLAN OF CARE
Problem: Patient Care Overview  Goal: Plan of Care Review   01/09/19 1144   Coping/Psychosocial   Plan of Care Reviewed With patient;daughter   OTHER   Outcome Summary PT Evaluation Complete: Patient peforms sit to/from stand transfers with supervision and gait x 176 feet with SBA with FWW. Patient with improved stability and gait mechanics with use of FWW. Patient agreeable to use of walker for mobility. Recommend continued mobility wit nursing staff. No further skilled PT needs at this time.

## 2019-01-10 VITALS
BODY MASS INDEX: 37.49 KG/M2 | SYSTOLIC BLOOD PRESSURE: 105 MMHG | TEMPERATURE: 97.3 F | HEART RATE: 112 BPM | DIASTOLIC BLOOD PRESSURE: 70 MMHG | OXYGEN SATURATION: 97 % | WEIGHT: 253.1 LBS | RESPIRATION RATE: 18 BRPM | HEIGHT: 69 IN

## 2019-01-10 PROBLEM — K92.2 GASTROINTESTINAL HEMORRHAGE: Status: RESOLVED | Noted: 2019-01-04 | Resolved: 2019-01-10

## 2019-01-10 LAB
ABO + RH BLD: NORMAL
ANION GAP SERPL CALCULATED.3IONS-SCNC: 10.2 MMOL/L
BASOPHILS # BLD AUTO: 0.01 10*3/MM3 (ref 0–0.2)
BASOPHILS # BLD AUTO: 0.02 10*3/MM3 (ref 0–0.2)
BASOPHILS NFR BLD AUTO: 0.3 % (ref 0–2)
BASOPHILS NFR BLD AUTO: 0.6 % (ref 0–2)
BH BB BLOOD EXPIRATION DATE: NORMAL
BH BB BLOOD TYPE BARCODE: 5100
BH BB DISPENSE STATUS: NORMAL
BH BB PRODUCT CODE: NORMAL
BH BB UNIT NUMBER: NORMAL
BUN BLD-MCNC: 12 MG/DL (ref 8–23)
BUN/CREAT SERPL: 9.9 (ref 7–25)
CALCIUM SPEC-SCNC: 8.2 MG/DL (ref 8.8–10.5)
CHLORIDE SERPL-SCNC: 108 MMOL/L (ref 98–107)
CO2 SERPL-SCNC: 25.8 MMOL/L (ref 22–29)
CREAT BLD-MCNC: 1.21 MG/DL (ref 0.76–1.27)
DEPRECATED RDW RBC AUTO: 62.3 FL (ref 37–54)
DEPRECATED RDW RBC AUTO: 66.9 FL (ref 37–54)
EOSINOPHIL # BLD AUTO: 0.12 10*3/MM3 (ref 0.1–0.3)
EOSINOPHIL # BLD AUTO: 0.14 10*3/MM3 (ref 0.1–0.3)
EOSINOPHIL NFR BLD AUTO: 3.8 % (ref 0–4)
EOSINOPHIL NFR BLD AUTO: 3.9 % (ref 0–4)
ERYTHROCYTE [DISTWIDTH] IN BLOOD BY AUTOMATED COUNT: 16.8 % (ref 11.5–14.5)
ERYTHROCYTE [DISTWIDTH] IN BLOOD BY AUTOMATED COUNT: 17.2 % (ref 11.5–14.5)
GFR SERPL CREATININE-BSD FRML MDRD: 58 ML/MIN/1.73
GLUCOSE BLD-MCNC: 128 MG/DL (ref 65–99)
GLUCOSE BLDC GLUCOMTR-MCNC: 113 MG/DL (ref 70–130)
GLUCOSE BLDC GLUCOMTR-MCNC: 174 MG/DL (ref 70–130)
HCT VFR BLD AUTO: 22.9 % (ref 42–52)
HCT VFR BLD AUTO: 28.3 % (ref 42–52)
HGB BLD-MCNC: 7.4 G/DL (ref 14–18)
HGB BLD-MCNC: 8.9 G/DL (ref 14–18)
IMM GRANULOCYTES # BLD AUTO: 0.02 10*3/MM3 (ref 0–0.03)
IMM GRANULOCYTES # BLD AUTO: 0.04 10*3/MM3 (ref 0–0.03)
IMM GRANULOCYTES NFR BLD AUTO: 0.6 % (ref 0–0.5)
IMM GRANULOCYTES NFR BLD AUTO: 1.1 % (ref 0–0.5)
LYMPHOCYTES # BLD AUTO: 0.67 10*3/MM3 (ref 0.6–4.8)
LYMPHOCYTES # BLD AUTO: 0.73 10*3/MM3 (ref 0.6–4.8)
LYMPHOCYTES NFR BLD AUTO: 18.6 % (ref 20–45)
LYMPHOCYTES NFR BLD AUTO: 22.9 % (ref 20–45)
MCH RBC QN AUTO: 33.2 PG (ref 27–31)
MCH RBC QN AUTO: 33.6 PG (ref 27–31)
MCHC RBC AUTO-ENTMCNC: 31.4 G/DL (ref 31–37)
MCHC RBC AUTO-ENTMCNC: 32.3 G/DL (ref 31–37)
MCV RBC AUTO: 102.7 FL (ref 80–94)
MCV RBC AUTO: 106.8 FL (ref 80–94)
MONOCYTES # BLD AUTO: 0.46 10*3/MM3 (ref 0–1)
MONOCYTES # BLD AUTO: 0.49 10*3/MM3 (ref 0–1)
MONOCYTES NFR BLD AUTO: 13.6 % (ref 3–8)
MONOCYTES NFR BLD AUTO: 14.4 % (ref 3–8)
NEUTROPHILS # BLD AUTO: 1.85 10*3/MM3 (ref 1.5–8.3)
NEUTROPHILS # BLD AUTO: 2.24 10*3/MM3 (ref 1.5–8.3)
NEUTROPHILS NFR BLD AUTO: 58 % (ref 45–70)
NEUTROPHILS NFR BLD AUTO: 62.2 % (ref 45–70)
NRBC BLD AUTO-RTO: 0 /100 WBC (ref 0–0)
NRBC BLD AUTO-RTO: 0.8 /100 WBC (ref 0–0)
PLATELET # BLD AUTO: 114 10*3/MM3 (ref 140–500)
PLATELET # BLD AUTO: 138 10*3/MM3 (ref 140–500)
PMV BLD AUTO: 9.3 FL (ref 7.4–10.4)
PMV BLD AUTO: 9.7 FL (ref 7.4–10.4)
POTASSIUM BLD-SCNC: 3.8 MMOL/L (ref 3.5–5.2)
RBC # BLD AUTO: 2.23 10*6/MM3 (ref 4.7–6.1)
RBC # BLD AUTO: 2.65 10*6/MM3 (ref 4.7–6.1)
SODIUM BLD-SCNC: 144 MMOL/L (ref 136–145)
UNIT  ABO: NORMAL
UNIT  RH: NORMAL
WBC NRBC COR # BLD: 3.19 10*3/MM3 (ref 4.8–10.8)
WBC NRBC COR # BLD: 3.6 10*3/MM3 (ref 4.8–10.8)

## 2019-01-10 PROCEDURE — 85025 COMPLETE CBC W/AUTO DIFF WBC: CPT | Performed by: INTERNAL MEDICINE

## 2019-01-10 PROCEDURE — 94799 UNLISTED PULMONARY SVC/PX: CPT

## 2019-01-10 PROCEDURE — 80048 BASIC METABOLIC PNL TOTAL CA: CPT | Performed by: INTERNAL MEDICINE

## 2019-01-10 PROCEDURE — 63710000001 INSULIN ASPART PER 5 UNITS: Performed by: HOSPITALIST

## 2019-01-10 PROCEDURE — 82962 GLUCOSE BLOOD TEST: CPT

## 2019-01-10 PROCEDURE — 99239 HOSP IP/OBS DSCHRG MGMT >30: CPT | Performed by: INTERNAL MEDICINE

## 2019-01-10 RX ORDER — PIOGLITAZONEHYDROCHLORIDE 30 MG/1
30 TABLET ORAL DAILY
Qty: 90 TABLET | Refills: 3
Start: 2019-01-10 | End: 2019-08-15 | Stop reason: SDUPTHER

## 2019-01-10 RX ADMIN — VITAMIN D, TAB 1000IU (100/BT) 1000 UNITS: 25 TAB at 08:56

## 2019-01-10 RX ADMIN — ACETAMINOPHEN 1300 MG: 650 TABLET, FILM COATED, EXTENDED RELEASE ORAL at 09:44

## 2019-01-10 RX ADMIN — INSULIN ASPART 2 UNITS: 100 INJECTION, SOLUTION INTRAVENOUS; SUBCUTANEOUS at 11:53

## 2019-01-10 RX ADMIN — ATORVASTATIN CALCIUM 80 MG: 40 TABLET, FILM COATED ORAL at 08:56

## 2019-01-10 RX ADMIN — ALLOPURINOL 200 MG: 100 TABLET ORAL at 08:56

## 2019-01-10 RX ADMIN — CHLORDIAZEPOXIDE HYDROCHLORIDE 10 MG: 5 CAPSULE ORAL at 09:43

## 2019-01-10 NOTE — NURSING NOTE
Received call back from kyra Lopez to d/c home per Dr Patricia pt to follow up in office in four weeks.

## 2019-01-10 NOTE — PLAN OF CARE
Problem: Patient Care Overview  Goal: Plan of Care Review  Outcome: Ongoing (interventions implemented as appropriate)   01/10/19 0751   Coping/Psychosocial   Plan of Care Reviewed With patient   Plan of Care Review   Progress no change   OTHER   Outcome Summary No falls this shift; hgb 7.4, hct 22.9 this AM - Hospitalist aware; IV is saline locked       Problem: Gastrointestinal Bleeding (Adult)  Goal: Signs and Symptoms of Listed Potential Problems Will be Absent, Minimized or Managed (Gastrointestinal Bleeding)  Outcome: Ongoing (interventions implemented as appropriate)   01/10/19 0751   Goal/Outcome Evaluation   Problems Assessed (GI Bleeding) all   Problems Present (GI Bleeding) situational response

## 2019-01-10 NOTE — NURSING NOTE
Case Management Discharge Note    Final Note: patient discharged home to self care.    Destination      No service has been selected for the patient.      Durable Medical Equipment      No service has been selected for the patient.      Dialysis/Infusion      No service has been selected for the patient.      Home Medical Care      No service has been selected for the patient.      Community Resources      No service has been selected for the patient.             Final Discharge Disposition Code: 01 - home or self-care

## 2019-01-10 NOTE — DISCHARGE SUMMARY
"  Bryan Landers  1945  [id number]        Hospitalists Discharge Summary    Date of Admission: 1/4/2019  Date of Discharge:  1/10/2019    Primary Discharge Diagnoses: Acute blood loss anemia 2/2 Diverticular Bleed POA    Secondary Discharge Diagnoses:   Orthostatic Syncope w/ falls due to Primary ddx POA  DM2 not on chronic Insulin POA  Esstential HTN POA  Etoh Abuse POA  JEFFREY on CKD stage III POA  Radiation Proctitis s/p APC treatment      PCP  Patient Care Team:  Jose Fonseca MD as PCP - General (Family Medicine)  Jose Michelle MD as Consulting Physician (Radiation Oncology)  Roldan Mccarthy MD as Consulting Physician (Urology)    Consults:   Consults     Date and Time Order Name Status Description    1/4/2019 1456 Inpatient Gastroenterology Consult Completed     1/4/2019 1336 Inpatient Gastroenterology Consult Completed           CC:  BRBPR    History of Present Illness:  As per H&P: \"Mr. Landers is a 72 y/o  male who presents after noticing a significant amount of blood with a bowel movement last evening.  He denies rectal pain as well as abdominal cramping.  He denies nausea.  He notes no unusual increase in stool frequency outside of what normally happens when he takes Miralax.  He denies dizziness and chest pain.  No reported exertional dyspnea.  The bleeding continued through the night so he was brought in to the ER.  He also notes recent NSAID use for some shoulder pain prescribed by Dr. Albarran.  He has known diverticular disease from colonoscopy by Dr. Gomez as well as AVM's treated by APC.  No other issues until last night.  He does not routinely check his blood glucose.\"    Hospital Course    Pt's Hgb on Admission was 12.3 on 1/4, dropping to 10 later in the afternoon. He initially stopped bleeding on 1/5, but started rebleeding on 1/6, so pt underwent colonoscopy on 1/7 by Dr. Jack, with blood clots noted through out the bowel. No actively bleeding tic was found during " colonoscopy, but a ulcerated area was found int he rectum. When the pt rebleed he underwent a tagged RBC scan that showed active bleeding from proximal colonic source.     Overnight from 1/7 till 1/8 pt's bleeding had abruptly stopped again, but his hgb was 7.0. He was transfused one unit, and HGB on recheck was 7.1. Given he stopped having bright red bowel movements, it was felt this was due to Equilibiration and dilution, and transfused another unit. The Hgb on AM of Discharge showed 7.4, but on repeat check was 8.9.    Pt had 3 falls while he was admitted, due to being orthostatic. Pt given instructions on safe transfers. Xrays after each fall were negative. Though pt did have increase in his chronic rt shoulder pain, and new pain in his ribs.     Holding actos and lisinopril, blood pressures still borderline low without medication. And mildly orthostatic. Encouraged fluid intake, and care on transfers to reduce risk of falls.     Etoh daily use - was given librium and monitored on CIWA no signs of acute wd.    Physical Exam at Discharge  Vital Signs  Temp:  [97 °F (36.1 °C)-97.5 °F (36.4 °C)] 97.3 °F (36.3 °C)  Heart Rate:  [] 110  Resp:  [16-19] 18  BP: ()/(54-80) 118/72    Physical Exam:  Physical Exam   Constitutional: He appears well-developed and well-nourished. No distress.   HENT:   Head: Normocephalic and atraumatic.   Eyes: Conjunctivae are normal.   Neck: No JVD present.   Cardiovascular: Normal rate, regular rhythm and normal heart sounds.   Pulmonary/Chest: Effort normal and breath sounds normal. No stridor. No respiratory distress. He has no wheezes.   Abdominal: Soft. Bowel sounds are normal. He exhibits no distension. There is no tenderness. There is no guarding.   Musculoskeletal: Normal range of motion. He exhibits no edema, tenderness or deformity.   Skin: Skin is warm and dry. Capillary refill takes less than 2 seconds. He is not diaphoretic. No erythema. No pallor.   Psychiatric:  He has a normal mood and affect. His behavior is normal.   Nursing note and vitals reviewed.      Operations and Procedures Performed:  Procedure(s):  COLONOSCOPY      Allergies:  is allergic to nsaids.    Renaldo  not reviewed    Discharge Medications:     Discharge Medications      Changes to Medications      Instructions Start Date   allopurinol 100 MG tablet  Commonly known as:  ZYLOPRIM  What changed:  how much to take   200 mg, Oral, Daily      lisinopril 20 MG tablet  Commonly known as:  PRINIVIL,ZESTRIL  What changed:  additional instructions   20 mg, Oral, Daily, Hold until follow up with PCP      pioglitazone 30 MG tablet  Commonly known as:  ACTOS  What changed:  additional instructions   30 mg, Oral, Daily, Hold until follow up with Dr. Fonseca         Continue These Medications      Instructions Start Date   atorvastatin 80 MG tablet  Commonly known as:  LIPITOR   80 mg, Oral, Daily      cholecalciferol 1000 units tablet  Commonly known as:  VITAMIN D3   1,000 Units, Oral, Daily      MULTIVITAL PLATINUM PO   1 tablet, Oral, Daily      POTASSIUM PO   Oral, Dose unknown             Last Lab Results:   Lab Results (last 72 hours)     Procedure Component Value Units Date/Time    CBC & Differential [[id number]] Collected:  01/10/19 1001    Specimen:  Blood Updated:  01/10/19 1007    Narrative:       The following orders were created for panel order CBC & Differential.  Procedure                               Abnormality         Status                     ---------                               -----------         ------                     Scan Slide[[id number]]                                                                  CBC Auto Differential[[id number]]        Abnormal            Final result                 Please view results for these tests on the individual orders.    CBC Auto Differential [[id number]]  (Abnormal) Collected:  01/10/19 1001    Specimen:  Blood Updated:  01/10/19 1007     WBC 3.60  10*3/mm3      RBC 2.65 10*6/mm3      Hemoglobin 8.9 g/dL      Hematocrit 28.3 %      .8 fL      MCH 33.6 pg      MCHC 31.4 g/dL      RDW 17.2 %      RDW-SD 66.9 fl      MPV 9.3 fL      Platelets 138 10*3/mm3      Neutrophil % 62.2 %      Lymphocyte % 18.6 %      Monocyte % 13.6 %      Eosinophil % 3.9 %      Basophil % 0.6 %      Immature Grans % 1.1 %      Neutrophils, Absolute 2.24 10*3/mm3      Lymphocytes, Absolute 0.67 10*3/mm3      Monocytes, Absolute 0.49 10*3/mm3      Eosinophils, Absolute 0.14 10*3/mm3      Basophils, Absolute 0.02 10*3/mm3      Immature Grans, Absolute 0.04 10*3/mm3      nRBC 0.8 /100 WBC     POC Glucose Once [[id number]]  (Normal) Collected:  01/10/19 0715    Specimen:  Blood Updated:  01/10/19 0724     Glucose 113 mg/dL     Basic Metabolic Panel [[id number]]  (Abnormal) Collected:  01/10/19 0344    Specimen:  Blood Updated:  01/10/19 0505     Glucose 128 mg/dL      BUN 12 mg/dL      Creatinine 1.21 mg/dL      Sodium 144 mmol/L      Potassium 3.8 mmol/L      Chloride 108 mmol/L      CO2 25.8 mmol/L      Calcium 8.2 mg/dL      eGFR Non African Amer 58 mL/min/1.73      BUN/Creatinine Ratio 9.9     Anion Gap 10.2 mmol/L     Narrative:       The MDRD GFR formula is only valid for adults with stable renal function between ages 18 and 70.    CBC & Differential [[id number]] Collected:  01/10/19 0344    Specimen:  Blood Updated:  01/10/19 0442    Narrative:       The following orders were created for panel order CBC & Differential.  Procedure                               Abnormality         Status                     ---------                               -----------         ------                     CBC Auto Differential[[id number]]        Abnormal            Final result                 Please view results for these tests on the individual orders.    CBC Auto Differential [[id number]]  (Abnormal) Collected:  01/10/19 0344    Specimen:  Blood Updated:  01/10/19 0442     WBC 3.19 10*3/mm3       RBC 2.23 10*6/mm3      Hemoglobin 7.4 g/dL      Hematocrit 22.9 %      .7 fL      MCH 33.2 pg      MCHC 32.3 g/dL      RDW 16.8 %      RDW-SD 62.3 fl      MPV 9.7 fL      Platelets 114 10*3/mm3      Neutrophil % 58.0 %      Lymphocyte % 22.9 %      Monocyte % 14.4 %      Eosinophil % 3.8 %      Basophil % 0.3 %      Immature Grans % 0.6 %      Neutrophils, Absolute 1.85 10*3/mm3      Lymphocytes, Absolute 0.73 10*3/mm3      Monocytes, Absolute 0.46 10*3/mm3      Eosinophils, Absolute 0.12 10*3/mm3      Basophils, Absolute 0.01 10*3/mm3      Immature Grans, Absolute 0.02 10*3/mm3      nRBC 0.0 /100 WBC     Hemoglobin & Hematocrit, Blood [[id number]]  (Abnormal) Collected:  01/09/19 2039    Specimen:  Blood Updated:  01/09/19 2043     Hemoglobin 8.9 g/dL      Hematocrit 26.6 %     POC Glucose Once [[id number]]  (Abnormal) Collected:  01/09/19 2018    Specimen:  Blood Updated:  01/09/19 2025     Glucose 172 mg/dL     POC Glucose Once [[id number]]  (Abnormal) Collected:  01/09/19 1615    Specimen:  Blood Updated:  01/09/19 1630     Glucose 146 mg/dL     POC Glucose Once [[id number]]  (Abnormal) Collected:  01/09/19 1113    Specimen:  Blood Updated:  01/09/19 1127     Glucose 145 mg/dL     POC Glucose Once [[id number]]  (Normal) Collected:  01/09/19 0733    Specimen:  Blood Updated:  01/09/19 0740     Glucose 118 mg/dL     Basic Metabolic Panel [[id number]]  (Abnormal) Collected:  01/09/19 0426    Specimen:  Blood Updated:  01/09/19 0507     Glucose 112 mg/dL      BUN 11 mg/dL      Creatinine 1.02 mg/dL      Sodium 141 mmol/L      Potassium 3.6 mmol/L      Chloride 106 mmol/L      CO2 26.2 mmol/L      Calcium 8.0 mg/dL      eGFR Non African Amer 71 mL/min/1.73      BUN/Creatinine Ratio 10.8     Anion Gap 8.8 mmol/L     Narrative:       The MDRD GFR formula is only valid for adults with stable renal function between ages 18 and 70.    CBC & Differential [[id number]] Collected:  01/09/19 0426    Specimen:  Blood  Updated:  01/09/19 0453    Narrative:       The following orders were created for panel order CBC & Differential.  Procedure                               Abnormality         Status                     ---------                               -----------         ------                     Scan Slide[[id number]]                                                                  CBC Auto Differential[[id number]]        Abnormal            Final result                 Please view results for these tests on the individual orders.    CBC Auto Differential [[id number]]  (Abnormal) Collected:  01/09/19 0426    Specimen:  Blood Updated:  01/09/19 0453     WBC 3.33 10*3/mm3      RBC 2.05 10*6/mm3      Hemoglobin 7.1 g/dL      Hematocrit 21.6 %      .4 fL      MCH 34.6 pg      MCHC 32.9 g/dL      RDW 14.9 %      RDW-SD 57.0 fl      MPV 9.3 fL      Platelets 114 10*3/mm3      Neutrophil % 62.5 %      Lymphocyte % 20.7 %      Monocyte % 11.7 %      Eosinophil % 4.2 %      Basophil % 0.3 %      Immature Grans % 0.6 %      Neutrophils, Absolute 2.08 10*3/mm3      Lymphocytes, Absolute 0.69 10*3/mm3      Monocytes, Absolute 0.39 10*3/mm3      Eosinophils, Absolute 0.14 10*3/mm3      Basophils, Absolute 0.01 10*3/mm3      Immature Grans, Absolute 0.02 10*3/mm3      nRBC 0.0 /100 WBC     POC Glucose Once [[id number]]  (Abnormal) Collected:  01/08/19 1953    Specimen:  Blood Updated:  01/08/19 2003     Glucose 136 mg/dL     POC Glucose Once [[id number]]  (Normal) Collected:  01/08/19 1617    Specimen:  Blood Updated:  01/08/19 1623     Glucose 120 mg/dL     POC Glucose Once [[id number]]  (Abnormal) Collected:  01/08/19 1121    Specimen:  Blood Updated:  01/08/19 1148     Glucose 144 mg/dL     CBC & Differential [[id number]] Collected:  01/08/19 0831    Specimen:  Blood Updated:  01/08/19 0954    Narrative:       The following orders were created for panel order CBC & Differential.  Procedure                               Abnormality          Status                     ---------                               -----------         ------                     Scan Slide[[id number]]                                       Final result               CBC Auto Differential[[id number]]        Abnormal            Final result                 Please view results for these tests on the individual orders.    Scan Slide [[id number]] Collected:  01/08/19 0831    Specimen:  Blood Updated:  01/08/19 0954     Macrocytes Slight/1+     WBC Morphology Normal     Platelet Morphology Normal    Basic Metabolic Panel [[id number]]  (Abnormal) Collected:  01/08/19 0831    Specimen:  Blood Updated:  01/08/19 0856     Glucose 104 mg/dL      BUN 15 mg/dL      Creatinine 1.20 mg/dL      Sodium 143 mmol/L      Potassium 3.9 mmol/L      Chloride 109 mmol/L      CO2 25.7 mmol/L      Calcium 8.2 mg/dL      eGFR Non African Amer 59 mL/min/1.73      BUN/Creatinine Ratio 12.5     Anion Gap 8.3 mmol/L     Narrative:       The MDRD GFR formula is only valid for adults with stable renal function between ages 18 and 70.    CBC Auto Differential [[id number]]  (Abnormal) Collected:  01/08/19 0831    Specimen:  Blood Updated:  01/08/19 0844     WBC 3.66 10*3/mm3      RBC 2.00 10*6/mm3      Hemoglobin 7.0 g/dL      Hematocrit 21.6 %      .0 fL      MCH 35.0 pg      MCHC 32.4 g/dL      RDW 13.8 %      RDW-SD 54.6 fl      MPV 9.2 fL      Platelets 95 10*3/mm3      Neutrophil % 64.8 %      Lymphocyte % 18.6 %      Monocyte % 13.1 %      Eosinophil % 2.7 %      Basophil % 0.3 %      Immature Grans % 0.5 %      Neutrophils, Absolute 2.37 10*3/mm3      Lymphocytes, Absolute 0.68 10*3/mm3      Monocytes, Absolute 0.48 10*3/mm3      Eosinophils, Absolute 0.10 10*3/mm3      Basophils, Absolute 0.01 10*3/mm3      Immature Grans, Absolute 0.02 10*3/mm3      nRBC 0.0 /100 WBC     POC Glucose Once [[id number]]  (Normal) Collected:  01/08/19 0712    Specimen:  Blood Updated:  01/08/19 0718     Glucose 96 mg/dL      POC Glucose Once [[id number]]  (Abnormal) Collected:  01/07/19 2029    Specimen:  Blood Updated:  01/07/19 2036     Glucose 155 mg/dL     POC Glucose Once [[id number]]  (Normal) Collected:  01/07/19 1642    Specimen:  Blood Updated:  01/07/19 1647     Glucose 102 mg/dL     Hemoglobin & Hematocrit, Blood [[id number]]  (Abnormal) Collected:  01/07/19 1554    Specimen:  Blood Updated:  01/07/19 1601     Hemoglobin 8.2 g/dL      Hematocrit 24.6 %         Xr Ribs Left With Pa Chest    Result Date: 1/10/2019  Narrative: CHEST AND LEFT RIB SERIES, 1/9/2019     HISTORY: 74-year-old male hospital inpatient with left anterior rib pain after a fall earlier today.  TECHNIQUE: Upright PA chest x-ray with 4 view left rib series.  FINDINGS: No acute left rib fracture is identified. There are old healed fractures of the left 5th and 6th ribs.  The lungs are expanded and clear. No visible pneumothorax, pulmonary contusion or pleural effusion. Mild cardiomegaly is stable.      Impression: 1. Negative for acute left rib fracture. 2. Old healed left 5th and 6th rib fractures.  This report was finalized on 1/10/2019 6:45 AM by Dr. George Kevin MD.      Nm Gi Blood Loss    Result Date: 1/8/2019  Narrative: RADIONUCLIDE TAGGED RBC GI BLEEDING STUDY, 1/8/2019  HISTORY: 73-year-old male admitted to the hospital on 1/4/2019 with active lower GI bleed. Colonoscopy yesterday showed extensive diverticulosis throughout the colon with clotted blood from the cecum to the rectum, but no blood in the terminal ileum. He has a history of radiation proctitis related to prior therapy for prostate cancer.  DOSE: 26.8 mCi pertechnetate labeled autologous red blood cells.  COMPARISON: No prior abdominal imaging here.  FINDINGS: Early short interval imaging over the 1st hour with delayed images at 2 hours and 8 hours.  On the 8 hours image, abnormal GI tract activity is seen throughout the colon from the cecum to the rectosigmoid. The findings imply a  proximal colonic source of active GI bleeding.      Impression: Abnormal examination showing evidence of active proximal colonic GI tract bleeding.  This report was finalized on 1/8/2019 7:02 AM by Dr. George Kevin MD.      Xr Chest 1 View    Result Date: 1/9/2019  Narrative: CHEST X-RAY, 1/9/2019     HISTORY: 74-year-old male with left side chest pain after a fall today.  TECHNIQUE: Single view chest x-ray limited by patient body habitus and AP portable radiographic technique.  FINDINGS: The lungs are symmetrically expanded and clear. No visible pneumothorax, pulmonary contusion or pleural effusion. Mild cardiomegaly.      Impression: No active disease.  This report was finalized on 1/9/2019 2:41 PM by Dr. George Kevin MD.      Xr Spine Lumbar 4+ View    Result Date: 12/13/2018  Narrative: INDICATION: Back and right hip pain for 2 weeks..  COMPARISON: None..  FINDINGS: 5 views of the lumbar spine. There is a moderate degenerative dextroscoliosis. There is grade 1-2 anterolisthesis at L5-S1 and mild retrolisthesis at L2-3.  There are discogenic degenerative changes, but there is no fracture or bone erosion or destruction at any level. 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      Impression: Degenerative changes without acute abnormality.  This report was finalized on 12/13/2018 10:19 AM by Dr. Caden Ballard MD.      Xr Hip With Or Without Pelvis 2 - 3 View Right    Result Date: 12/13/2018  Narrative: INDICATION: Hip pain for 2 weeks. No trauma.  COMPARISON: None.  FINDINGS: AP pelvis and 2 views of the right hip. No fracture or dislocation. No bone erosion or destruction. Bilateral hip arthroplasties appear normally aligned. Soft tissue calcifications around the right hip and proximal femur are presumably related to prior surgery and/or trauma. Large bone spur extends from the superior aspect of the right acetabulum.  No evidence of hardware loosening.      Impression: Chronic changes as above. No acute findings.  This report was finalized on 12/13/2018 11:11 AM by Dr. Beck Khan MD.        Condition on Discharge:  Fair    Discharge Disposition  To Home    Visiting Nurse:    No     Home PT/OT:  No     Home Safety Evaluation:  No     DME  None, has walkers at home    Discharge Diet:      Dietary Orders (From admission, onward)    Start     Ordered    01/09/19 1141  Diet Regular; Consistent Carbohydrate  Diet Effective Now     Question Answer Comment   Diet Texture / Consistency Regular    Common Modifiers Consistent Carbohydrate        01/09/19 1140          Activity at Discharge:  Care on transfers until blood pressure improves      Pre-discharge education  Transfer safety    Follow-up Appointments  Future Appointments   Date Time Provider Department Center   1/23/2019  1:30 PM Jacobo Albarran MD [facility] None   2/21/2019 10:00 AM [facility] None   2/28/2019 11:00 AM Jose Fonseca MD [facility] None   3/25/2019  2:00 PM Olaf Gomez MD [facility] None     Additional Instructions for the Follow-ups that You Need to Schedule     Discharge Follow-up with PCP   As directed       Currently Documented PCP:    Jose Fonseca MD    PCP Phone Number:    [phone]     Follow Up Details:  in 1 week for blood loss anemia from Diverticular bleed, holding lisinopril and actos until follow up         Discharge Follow-up with Specialty: Dr Gomez GI; 6 Weeks   As directed      Specialty:  Dr Gomez GI    Follow Up:  6 Weeks               Test Results Pending at Discharge       Cari Olson MD  01/10/19  11:35 AM    Time: Discharge > 30 min (if over 30 minutes give explanation as to why it took greater than 30 minutes) Coordination with GI, discussion of results with family, education on transfers and orthostatic symptoms

## 2019-01-10 NOTE — NURSING NOTE
Continued Stay Note  ROBERTO Pelletier     Patient Name: Bryan Landers  MRN: [id number]  Today's Date: 1/10/2019    Admit Date: 1/4/2019    Discharge Plan     Row Name 01/10/19 1136       Plan    Plan Comments  LACE patient; referral made to Mandie in pharmacy.         Discharge Codes    No documentation.       Expected Discharge Date and Time     Expected Discharge Date Expected Discharge Time    Maykel 10, 2019             Miracle Stuart RN

## 2019-01-11 NOTE — PAYOR COMM NOTE
"Bryan Patel (74 y.o. Male)     ATTN: NURSE REVIEWER  AUTH#[id number]  FAXING DISCHARGE ORDER. AWAITING APPROVAL OF FINAL NIGHT OF SERVICE (1/9). PLEASE REPLY TO LUIS HERNANDEZ AT FAX#[phone] OR PHONE#[phone]. THANK YOU.       Date of Birth Social Security Number Address Home Phone MRN    1945  [address] [facility] [phone] [id number]    Denominational Marital Status          None        Admission Date Admission Type Admitting Provider Attending Provider Department, Room/Bed    1/4/19 Emergency Hever Evans MD  [facility] MED SURG, 1407/1    Discharge Date Discharge Disposition Discharge Destination        1/10/2019               Attending Provider:  (none)   Allergies:  Nsaids    Isolation:  None   Infection:  None   Code Status:  Prior    Ht:  175.3 cm (69.02\")   Wt:  115 kg (253 lb 1.6 oz)    Admission Cmt:  None   Principal Problem:  Gastrointestinal hemorrhage [K92.2]                 Active Insurance as of 1/4/2019     Primary Coverage     Payor Plan Insurance Group Employer/Plan Group    [employer] MEDICARE [place]PLAC[place] [id number]     Payor Plan Address Payor Plan Phone Number Payor Plan Fax Number Effective Dates    [address] [phone]  1/1/2018 - None Entered    [facility] [id number]       Subscriber Name Subscriber Birth Date Member ID       BRYAN PATEL 1945 [id number]                 Emergency Contacts      (Rel.) Home Phone Work Phone Mobile Phone    Chloe Patel (Spouse) -- -- [phone]    TerraIsi (Daughter) -- -- [phone]                 Discharge Order (From admission, onward)    Start     Ordered    01/10/19 1131  Discharge patient  Once     Expected Discharge Date:  01/10/19    Expected Discharge Time:  Midday    Discharge Disposition:  Home or Self Care    Physician of Record for Attribution - Please select from Treatment Team:  SURINDER GUTIERREZ [[id number]]    Review needed by CMO to determine " Physician of Record:  No    Please choose which facility the patient is currently admitted if they are being discharged to another facility or unit.:   [facility]    Mode:  Family       Question Answer Comment   Physician of Record for Attribution - Please select from Treatment Team SURINDER GUTIERREZ    Review needed by CMO to determine Physician of Record No    Please choose which facility the patient is currently admitted if they are being discharged to another facility or unit.  [facility]    Mode: Family        01/10/19 1134

## 2019-01-12 ENCOUNTER — READMISSION MANAGEMENT (OUTPATIENT)
Dept: CALL CENTER | Facility: HOSPITAL | Age: 74
End: 2019-01-12

## 2019-01-12 NOTE — OUTREACH NOTE
Prep Survey      Responses   Facility patient discharged from?  [facility]   Is LACE score < 7 ?  No   Is patient eligible?  Yes   Discharge diagnosis  Gastrointestinal hemorrhage   Does the patient have one of the following disease processes/diagnoses(primary or secondary)?  Other   Does the patient have Home health ordered?  No   Is there a DME ordered?  No   Prep survey completed?  Yes          Kati Roberson RN

## 2019-01-14 ENCOUNTER — READMISSION MANAGEMENT (OUTPATIENT)
Dept: CALL CENTER | Facility: HOSPITAL | Age: 74
End: 2019-01-14

## 2019-01-14 NOTE — OUTREACH NOTE
"Medical Week 1 Survey      Responses   Facility patient discharged from?  [facility]   Does the patient have one of the following disease processes/diagnoses(primary or secondary)?  Other   Is there a successful TCM telephone encounter documented?  No   Week 1 attempt successful?  Yes   Call start time  0927   Call end time  0944   Discharge diagnosis  Gastrointestinal hemorrhage   Is patient permission given to speak with other caregiver?  Yes   List who call center can speak with  wife   Person spoke with today (if not patient) and relationship  wife   Meds reviewed with patient/caregiver?  Yes   Is the patient taking all medications as directed (includes completed medication regime)?  N/A   Does the patient have a primary care provider?   Yes   Does the patient have an appointment with their PCP within 7 days of discharge?  No   What is preventing the patient from scheduling follow up appointments within 7 days of discharge?  Haven't had time   Nursing Interventions  Educated patient on importance of making appointment, Advised patient to make appointment   Has home health visited the patient within 72 hours of discharge?  N/A   Psychosocial issues?  No   Did the patient receive a copy of their discharge instructions?  Yes   Nursing interventions  Reviewed instructions with patient   What is the patient's perception of their health status since discharge?  Improving   Is the patient/caregiver able to teach back signs and symptoms related to disease process for when to call PCP?  Yes   Is the patient/caregiver able to teach back signs and symptoms related to disease process for when to call 911?  Yes   Is the patient/caregiver able to teach back the hierarchy of who to call/visit for symptoms/problems? PCP, Specialist, Home health nurse, Urgent Care, ED, 911  Yes   Additional teach back comments  Pt says he is \"just fine\" Says he is having \"not very much bleeding\". WIfe says pt is \"sleeping a lot\". SHe will make " appt to see PCP today.    Week 1 call completed?  Yes          Justine Lamb RN

## 2019-01-16 ENCOUNTER — OFFICE VISIT (OUTPATIENT)
Dept: FAMILY MEDICINE CLINIC | Facility: CLINIC | Age: 74
End: 2019-01-16

## 2019-01-16 VITALS
OXYGEN SATURATION: 98 % | DIASTOLIC BLOOD PRESSURE: 78 MMHG | BODY MASS INDEX: 37.92 KG/M2 | HEART RATE: 104 BPM | WEIGHT: 256 LBS | SYSTOLIC BLOOD PRESSURE: 118 MMHG | HEIGHT: 69 IN

## 2019-01-16 DIAGNOSIS — I95.89 HYPOTENSION DUE TO HYPOVOLEMIA: ICD-10-CM

## 2019-01-16 DIAGNOSIS — D62 ACUTE BLOOD LOSS ANEMIA: ICD-10-CM

## 2019-01-16 DIAGNOSIS — K62.7 RADIATION PROCTITIS: Primary | ICD-10-CM

## 2019-01-16 DIAGNOSIS — E86.1 HYPOTENSION DUE TO HYPOVOLEMIA: ICD-10-CM

## 2019-01-16 DIAGNOSIS — K62.5 RECTAL BLEEDING: ICD-10-CM

## 2019-01-16 LAB
BUN SERPL-MCNC: 10 MG/DL (ref 8–23)
BUN/CREAT SERPL: 7.1 (ref 7–25)
CALCIUM SERPL-MCNC: 8.6 MG/DL (ref 8.8–10.5)
CHLORIDE SERPL-SCNC: 105 MMOL/L (ref 98–107)
CO2 SERPL-SCNC: 29.7 MMOL/L (ref 22–29)
CREAT SERPL-MCNC: 1.41 MG/DL (ref 0.76–1.27)
GLUCOSE SERPL-MCNC: 126 MG/DL (ref 65–99)
HCT VFR BLD AUTO: 28.7 % (ref 42–52)
HGB BLD-MCNC: 9 G/DL (ref 14–18)
POTASSIUM SERPL-SCNC: 4.9 MMOL/L (ref 3.5–5.2)
SODIUM SERPL-SCNC: 144 MMOL/L (ref 136–145)

## 2019-01-16 PROCEDURE — 99214 OFFICE O/P EST MOD 30 MIN: CPT | Performed by: FAMILY MEDICINE

## 2019-01-16 NOTE — PROGRESS NOTES
Subjective   Bryan Landers is a 74 y.o. male who is here for   Chief Complaint   Patient presents with   • Follow-up     Hospital    • Diverticulitis   • Rectal Bleeding   .     History of Present Illness     GI Bleeding: Patient complains of bright red blood per rectum and melena. Symptoms have been present for approximately a few weeks. The symptoms are completely resolved. This has been associated with abdominal bloating.  He denies belching and eructation and heartburn.  He has used nonsteroidal anti-inflammatory drugs on a regular bases; he is not anticoagulated.  The patient currently denies significant abdominal pain or discomfort.  There is a past history of gastrointestinal bleeding.   Admitted for a week , with severe lower GI bleed  Had a scope and PRBC Xfusion  We reviewed hospital records  Eating ok now  Still weak and CERVANTES  No rectal bleeding since he left the hospital      The following portions of the patient's history were reviewed and updated as appropriate: allergies, current medications, past medical history, past social history, past surgical history and problem list.    Review of Systems    Objective   Physical Exam   Constitutional: He appears well-developed and well-nourished.   HENT:   Mouth/Throat: Oropharynx is clear and moist.   Eyes: No scleral icterus.   Cardiovascular: Normal rate.   Pulmonary/Chest: Effort normal.   Abdominal: Soft.   Neurological: He is alert.   Skin: There is pallor.   Nursing note and vitals reviewed.      Assessment/Plan   Bryan was seen today for follow-up, diverticulitis and rectal bleeding.    Diagnoses and all orders for this visit:    Radiation proctitis    Rectal bleeding    Acute blood loss anemia  -     Hemoglobin & Hematocrit, Blood    Hypotension due to hypovolemia  -     Basic Metabolic Panel      Patient Instructions   Stay of lisinopril for now, bp is still on the low side.  Stay off asprin for now  Restart Actos  Take 2 OTC iron pills a day.  Look up  iron rich diet        Medications Discontinued During This Encounter   Medication Reason   • lisinopril (PRINIVIL,ZESTRIL) 20 MG tablet Side effects        Return in about 1 month (around 2/16/2019) for blood pressure, new medication follow up.    Dr. Jose Fonseca  [place], [place]

## 2019-01-16 NOTE — PATIENT INSTRUCTIONS
Stay of lisinopril for now, bp is still on the low side.  Stay off asprin for now  Restart Actos  Take 2 OTC iron pills a day.  Look up iron rich diet

## 2019-01-21 ENCOUNTER — READMISSION MANAGEMENT (OUTPATIENT)
Dept: CALL CENTER | Facility: HOSPITAL | Age: 74
End: 2019-01-21

## 2019-01-21 NOTE — OUTREACH NOTE
Medical Week 2 Survey      Responses   Facility patient discharged from?  [facility]   Does the patient have one of the following disease processes/diagnoses(primary or secondary)?  Other   Week 2 attempt successful?  Yes   Call start time  1705   Meds reviewed with patient/caregiver?  Yes   Is the patient having any side effects they believe may be caused by any medication additions or changes?  No   Does the patient have all medications ordered at discharge?  Yes   Is the patient taking all medications as directed (includes completed medication regime)?  Yes   Does the patient have a primary care provider?   Yes   Does the patient have an appointment with their PCP within 7 days of discharge?  Yes   Has the patient kept scheduled appointments due by today?  Yes   Did the patient receive a copy of their discharge instructions?  Yes   Nursing interventions  Reviewed instructions with patient   What is the patient's perception of their health status since discharge?  Improving   Is the patient/caregiver able to teach back signs and symptoms related to disease process for when to call PCP?  Yes   Is the patient/caregiver able to teach back signs and symptoms related to disease process for when to call 911?  Yes   Is the patient/caregiver able to teach back the hierarchy of who to call/visit for symptoms/problems? PCP, Specialist, Home health nurse, Urgent Care, ED, 911  Yes   Week 2 Call Completed?  Yes          Shelbi Camargo RN

## 2019-01-23 ENCOUNTER — OFFICE VISIT (OUTPATIENT)
Dept: ORTHOPEDIC SURGERY | Facility: CLINIC | Age: 74
End: 2019-01-23

## 2019-01-23 VITALS — HEIGHT: 69 IN | WEIGHT: 250 LBS | BODY MASS INDEX: 37.03 KG/M2

## 2019-01-23 DIAGNOSIS — M47.26 OSTEOARTHRITIS OF SPINE WITH RADICULOPATHY, LUMBAR REGION: Primary | ICD-10-CM

## 2019-01-23 DIAGNOSIS — Z96.643 STATUS POST TOTAL REPLACEMENT OF BOTH HIPS: ICD-10-CM

## 2019-01-23 DIAGNOSIS — K57.92 DIVERTICULITIS: ICD-10-CM

## 2019-01-23 PROCEDURE — 99212 OFFICE O/P EST SF 10 MIN: CPT | Performed by: ORTHOPAEDIC SURGERY

## 2019-01-23 NOTE — PROGRESS NOTES
Subjective: Low back pain     Patient ID: Bryan Landers is a 74 y.o. male.    Chief Complaint:    History of Present Illness patient returns since last seen was placed in an anti-inflammatory relieved his back pain but it caused rectal bleeding from a previously undiagnosed diverticulitis.  States he was hospitalized the need for blood transfusion is currently asymptomatic as far as his diverticulitis is concerned but also his back at this time is asymptomatic.  He was told that he can take Tylenol future pain.       Social History     Occupational History   • Occupation: retired [employer]    Tobacco Use   • Smoking status: Former Smoker     Types: Cigars     Last attempt to quit: 1986     Years since quittin.0   • Smokeless tobacco: Never Used   Substance and Sexual Activity   • Alcohol use: Yes     Alcohol/week: 19.2 - 20.4 oz     Types: 4 - 6 Shots of liquor, 28 Standard drinks or equivalent per week     Comment: 2-3 double vodkas a night   • Drug use: No   • Sexual activity: Yes     Partners: Female      Review of Systems   Constitutional: Negative for chills, diaphoresis, fever and unexpected weight change.   HENT: Negative for hearing loss, nosebleeds, sore throat and tinnitus.    Eyes: Negative for pain and visual disturbance.   Respiratory: Negative for cough, shortness of breath and wheezing.    Cardiovascular: Negative for chest pain and palpitations.   Gastrointestinal: Negative for abdominal pain, diarrhea, nausea and vomiting.   Endocrine: Negative for cold intolerance, heat intolerance and polydipsia.   Genitourinary: Negative for difficulty urinating, dysuria and hematuria.   Musculoskeletal: Positive for arthralgias, back pain and myalgias. Negative for joint swelling.   Skin: Negative for rash and wound.   Allergic/Immunologic: Negative for environmental allergies.   Neurological: Negative for dizziness, syncope and numbness.   Hematological: Does not bruise/bleed  easily.   Psychiatric/Behavioral: Negative for dysphoric mood and sleep disturbance. The patient is not nervous/anxious.          Past Medical History:   Diagnosis Date   • Arthritis    • Colon polyp    • Diabetes mellitus (CMS/HCC)    • GI bleed    • Hyperlipidemia    • Hypertension      Past Surgical History:   Procedure Laterality Date   • COLONOSCOPY  09/2016   • COLONOSCOPY N/A 9/28/2018    Procedure: COLONOSCOPY;  Surgeon: Olaf Gomez MD;  Location: [facility];  Service: Gastroenterology   • COLONOSCOPY N/A 1/7/2019    Procedure: COLONOSCOPY;  Surgeon: Rosa Jack MD;  Location: [facility] OR;  Service: Gastroenterology   • HERNIA REPAIR     • PROSTATE ULTRASOUND BIOPSY     • SIGMOIDOSCOPY N/A 10/2/2018    Procedure: FLEXIBLE SIGMOIDOSCOPY:  APC cautery bleeding using 60 joules;  Surgeon: Olaf Gomez MD;  Location: [facility] ENDOSCOPY;  Service: Gastroenterology   • TOTAL HIP ARTHROPLASTY Right 2007     Family History   Problem Relation Age of Onset   • Stroke Mother    • Stroke Father    • No Known Problems Brother    • Colon cancer Neg Hx    • Colon polyps Neg Hx          Objective:  There were no vitals filed for this visit.      01/23/19  1325   Weight: 113 kg (250 lb)     Body mass index is 36.92 kg/m².        Ortho Exam   is alert and oriented ×3.  He has no long track signs minimal pain as far back as he is concerned.  Good distal pulses no sensory loss no long track signs.  Is taking Tylenol for any residual pain.  Negative Stinchfield test his calf is nontender negative Homans.    Assessment:        1. Osteoarthritis of spine with radiculopathy, lumbar region    2. Status post total replacement of both hips    3. Diverticulitis           Plan: Patient take Tylenol as needed for back pain but is told that the pain should return exam or radiculopathy he should call and I'll schedule first an outpatient MRI that or tigre moncada's he is claustrophobic and i'll see him after  the mris been completed otherwise return when necessary            Work Status:    BRONWYN query complete.    Orders:  No orders of the defined types were placed in this encounter.      Medications:  No orders of the defined types were placed in this encounter.      Followup:  Return if symptoms worsen or fail to improve.          Dictated utilizing Dragon dictation

## 2019-01-28 ENCOUNTER — READMISSION MANAGEMENT (OUTPATIENT)
Dept: CALL CENTER | Facility: HOSPITAL | Age: 74
End: 2019-01-28

## 2019-01-28 NOTE — OUTREACH NOTE
Medical Week 3 Survey      Responses   Facility patient discharged from?  [facility]   Does the patient have one of the following disease processes/diagnoses(primary or secondary)?  Other   Week 3 attempt successful?  No   Unsuccessful attempts  Attempt 1          Laly Liu RN

## 2019-01-29 ENCOUNTER — READMISSION MANAGEMENT (OUTPATIENT)
Dept: CALL CENTER | Facility: HOSPITAL | Age: 74
End: 2019-01-29

## 2019-01-29 NOTE — OUTREACH NOTE
Medical Week 3 Survey      Responses   Facility patient discharged from?  [facility]   Does the patient have one of the following disease processes/diagnoses(primary or secondary)?  Other   Week 3 attempt successful?  Yes   Call start time  1010   Call end time  1013   Discharge diagnosis  Gastrointestinal hemorrhage   Meds reviewed with patient/caregiver?  Yes   Is the patient having any side effects they believe may be caused by any medication additions or changes?  No   Does the patient have all medications ordered at discharge?  Yes   Is the patient taking all medications as directed (includes completed medication regime)?  Yes   Does the patient have a primary care provider?   Yes   Does the patient have an appointment with their PCP within 7 days of discharge?  Yes   Has the patient kept scheduled appointments due by today?  Yes   Psychosocial issues?  No   Did the patient receive a copy of their discharge instructions?  Yes   Nursing interventions  Reviewed instructions with patient   What is the patient's perception of their health status since discharge?  Improving   Is the patient/caregiver able to teach back signs and symptoms related to disease process for when to call PCP?  Yes   Is the patient/caregiver able to teach back signs and symptoms related to disease process for when to call 911?  Yes   Is the patient/caregiver able to teach back the hierarchy of who to call/visit for symptoms/problems? PCP, Specialist, Home health nurse, Urgent Care, ED, 911  Yes   Additional teach back comments  Pt doing well, stated he has not bled.   Week 3 Call Completed?  Yes          Sheron Galo RN

## 2019-02-05 ENCOUNTER — READMISSION MANAGEMENT (OUTPATIENT)
Dept: CALL CENTER | Facility: HOSPITAL | Age: 74
End: 2019-02-05

## 2019-02-05 NOTE — OUTREACH NOTE
Medical Week 4 Survey      Responses   Facility patient discharged from?  [facility]   Does the patient have one of the following disease processes/diagnoses(primary or secondary)?  Other   Week 4 attempt successful?  Yes   Call start time  1657   Call end time  1658   Discharge diagnosis  Gastrointestinal hemorrhage   Meds reviewed with patient/caregiver?  Yes   Is the patient having any side effects they believe may be caused by any medication additions or changes?  No   Is the patient taking all medications as directed (includes completed medication regime)?  Yes   Has the patient kept scheduled appointments due by today?  Yes   Is the patient still receiving Home Health Services?  Yes   Psychosocial issues?  No   What is the patient's perception of their health status since discharge?  Improving   Is the patient/caregiver able to teach back signs and symptoms related to disease process for when to call PCP?  Yes   Is the patient/caregiver able to teach back signs and symptoms related to disease process for when to call 911?  Yes   Is the patient/caregiver able to teach back the hierarchy of who to call/visit for symptoms/problems? PCP, Specialist, Home health nurse, Urgent Care, ED, 911  Yes   Additional teach back comments  Pt doing well, stated he has not bled.   Week 4 Call Completed?  Yes   Would the patient like one additional call?  No   Graduated  Yes   Did the patient feel the follow up calls were helpful during their recovery period?  Yes   Was the number of calls appropriate?  Yes          Gopi Mobley RN

## 2019-02-20 DIAGNOSIS — R80.9 MICROALBUMINURIA DUE TO TYPE 2 DIABETES MELLITUS (HCC): ICD-10-CM

## 2019-02-20 DIAGNOSIS — E78.2 MIXED HYPERLIPIDEMIA: ICD-10-CM

## 2019-02-20 DIAGNOSIS — E11.9 TYPE 2 DIABETES MELLITUS WITHOUT COMPLICATION, WITHOUT LONG-TERM CURRENT USE OF INSULIN (HCC): ICD-10-CM

## 2019-02-20 DIAGNOSIS — I10 ESSENTIAL HYPERTENSION: ICD-10-CM

## 2019-02-20 DIAGNOSIS — N18.2 STAGE 2 CHRONIC KIDNEY DISEASE: ICD-10-CM

## 2019-02-20 DIAGNOSIS — I10 ESSENTIAL HYPERTENSION: Primary | ICD-10-CM

## 2019-02-20 DIAGNOSIS — Z85.46 HISTORY OF PROSTATE CANCER: ICD-10-CM

## 2019-02-20 DIAGNOSIS — Z12.5 ENCOUNTER FOR SCREENING FOR MALIGNANT NEOPLASM OF PROSTATE: ICD-10-CM

## 2019-02-20 DIAGNOSIS — D62 ACUTE BLOOD LOSS ANEMIA: ICD-10-CM

## 2019-02-20 DIAGNOSIS — Z00.00 ROUTINE ADULT HEALTH MAINTENANCE: ICD-10-CM

## 2019-02-20 DIAGNOSIS — E11.29 MICROALBUMINURIA DUE TO TYPE 2 DIABETES MELLITUS (HCC): ICD-10-CM

## 2019-02-20 DIAGNOSIS — Z51.81 MEDICATION MONITORING ENCOUNTER: ICD-10-CM

## 2019-02-20 DIAGNOSIS — M10.062 ACUTE IDIOPATHIC GOUT OF LEFT KNEE: ICD-10-CM

## 2019-02-22 LAB
ALBUMIN SERPL-MCNC: 3.9 G/DL (ref 3.5–5.2)
ALBUMIN/GLOB SERPL: 1.6 G/DL
ALP SERPL-CCNC: 69 U/L (ref 40–129)
ALT SERPL-CCNC: 20 U/L (ref 5–41)
APPEARANCE UR: CLEAR
AST SERPL-CCNC: 33 U/L (ref 5–40)
BILIRUB SERPL-MCNC: 0.7 MG/DL (ref 0.2–1.2)
BILIRUB UR QL STRIP: NEGATIVE
BUN SERPL-MCNC: 11 MG/DL (ref 8–23)
BUN/CREAT SERPL: 8 (ref 7–25)
CALCIUM SERPL-MCNC: 8.9 MG/DL (ref 8.8–10.5)
CHLORIDE SERPL-SCNC: 102 MMOL/L (ref 98–107)
CHOLEST SERPL-MCNC: 165 MG/DL (ref 0–200)
CO2 SERPL-SCNC: 28.9 MMOL/L (ref 22–29)
COLOR UR: YELLOW
CREAT SERPL-MCNC: 1.38 MG/DL (ref 0.76–1.27)
ERYTHROCYTE [DISTWIDTH] IN BLOOD BY AUTOMATED COUNT: 14.6 % (ref 12.3–15.4)
GLOBULIN SER CALC-MCNC: 2.5 GM/DL
GLUCOSE SERPL-MCNC: 112 MG/DL (ref 65–99)
GLUCOSE UR QL: NEGATIVE
HBA1C MFR BLD: 4.9 % (ref 4.8–5.6)
HCT VFR BLD AUTO: 37.8 % (ref 37.5–51)
HDLC SERPL-MCNC: 63 MG/DL (ref 40–60)
HGB BLD-MCNC: 12.1 G/DL (ref 13–17.7)
HGB UR QL STRIP: NEGATIVE
KETONES UR QL STRIP: NEGATIVE
LDLC SERPL CALC-MCNC: 66 MG/DL (ref 0–100)
LEUKOCYTE ESTERASE UR QL STRIP: NEGATIVE
MCH RBC QN AUTO: 35.2 PG (ref 26.6–33)
MCHC RBC AUTO-ENTMCNC: 32 G/DL (ref 31.5–35.7)
MCV RBC AUTO: 109.9 FL (ref 79–97)
MICROALBUMIN UR-MCNC: 30.3 UG/ML
NITRITE UR QL STRIP: NEGATIVE
PH UR STRIP: 7 [PH] (ref 4.5–8)
PLATELET # BLD AUTO: 154 10*3/MM3 (ref 140–450)
POTASSIUM SERPL-SCNC: 4.3 MMOL/L (ref 3.5–5.2)
PROT SERPL-MCNC: 6.4 G/DL (ref 6–8.5)
PROT UR QL STRIP: NEGATIVE
PSA SERPL-MCNC: 0.32 NG/ML (ref 0–4)
RBC # BLD AUTO: 3.44 10*6/MM3 (ref 4.14–5.8)
SODIUM SERPL-SCNC: 142 MMOL/L (ref 136–145)
SP GR UR: 1.01 (ref 1–1.03)
TRIGL SERPL-MCNC: 180 MG/DL (ref 0–150)
TSH SERPL DL<=0.005 MIU/L-ACNC: 4.79 MIU/ML (ref 0.27–4.2)
URATE SERPL-MCNC: 5.9 MG/DL (ref 3.4–7)
UROBILINOGEN UR STRIP-MCNC: NORMAL MG/DL
VLDLC SERPL CALC-MCNC: 36 MG/DL (ref 8–32)
WBC # BLD AUTO: 3.04 10*3/MM3 (ref 3.4–10.8)

## 2019-02-28 ENCOUNTER — OFFICE VISIT (OUTPATIENT)
Dept: FAMILY MEDICINE CLINIC | Facility: CLINIC | Age: 74
End: 2019-02-28

## 2019-02-28 VITALS
BODY MASS INDEX: 37.47 KG/M2 | HEIGHT: 69 IN | OXYGEN SATURATION: 97 % | WEIGHT: 253 LBS | DIASTOLIC BLOOD PRESSURE: 82 MMHG | SYSTOLIC BLOOD PRESSURE: 120 MMHG | HEART RATE: 99 BPM

## 2019-02-28 DIAGNOSIS — E78.2 MIXED HYPERLIPIDEMIA: ICD-10-CM

## 2019-02-28 DIAGNOSIS — M10.062 ACUTE IDIOPATHIC GOUT OF LEFT KNEE: ICD-10-CM

## 2019-02-28 DIAGNOSIS — E11.9 TYPE 2 DIABETES MELLITUS WITHOUT COMPLICATION, WITHOUT LONG-TERM CURRENT USE OF INSULIN (HCC): ICD-10-CM

## 2019-02-28 DIAGNOSIS — I10 ESSENTIAL HYPERTENSION: Primary | ICD-10-CM

## 2019-02-28 DIAGNOSIS — Z51.81 MEDICATION MONITORING ENCOUNTER: ICD-10-CM

## 2019-02-28 DIAGNOSIS — Z00.00 ROUTINE ADULT HEALTH MAINTENANCE: ICD-10-CM

## 2019-02-28 DIAGNOSIS — N18.2 STAGE 2 CHRONIC KIDNEY DISEASE: ICD-10-CM

## 2019-02-28 PROBLEM — I95.89 HYPOTENSION DUE TO HYPOVOLEMIA: Status: RESOLVED | Noted: 2019-01-16 | Resolved: 2019-02-28

## 2019-02-28 PROBLEM — D62 ACUTE BLOOD LOSS ANEMIA: Status: RESOLVED | Noted: 2019-01-16 | Resolved: 2019-02-28

## 2019-02-28 PROBLEM — M25.551 RIGHT HIP PAIN: Status: RESOLVED | Noted: 2018-12-13 | Resolved: 2019-02-28

## 2019-02-28 PROBLEM — E86.1 HYPOTENSION DUE TO HYPOVOLEMIA: Status: RESOLVED | Noted: 2019-01-16 | Resolved: 2019-02-28

## 2019-02-28 PROBLEM — K62.5 RECTAL BLEEDING: Status: RESOLVED | Noted: 2018-09-13 | Resolved: 2019-02-28

## 2019-02-28 PROCEDURE — 99214 OFFICE O/P EST MOD 30 MIN: CPT | Performed by: FAMILY MEDICINE

## 2019-02-28 RX ORDER — ALLOPURINOL 100 MG/1
100 TABLET ORAL DAILY
Qty: 180 TABLET | Refills: 3
Start: 2019-02-28 | End: 2020-02-14 | Stop reason: SDUPTHER

## 2019-02-28 RX ORDER — LISINOPRIL 5 MG/1
5 TABLET ORAL DAILY
Qty: 90 TABLET | Refills: 3 | Status: SHIPPED | OUTPATIENT
Start: 2019-02-28 | End: 2020-02-14 | Stop reason: DRUGHIGH

## 2019-02-28 RX ORDER — FERROUS SULFATE 325(65) MG
325 TABLET ORAL
COMMUNITY
End: 2019-04-26

## 2019-03-12 ENCOUNTER — TELEPHONE (OUTPATIENT)
Dept: FAMILY MEDICINE CLINIC | Facility: CLINIC | Age: 74
End: 2019-03-12

## 2019-03-12 DIAGNOSIS — R41.3 MEMORY LOSS: Primary | ICD-10-CM

## 2019-03-12 NOTE — ADDENDUM NOTE
Addended by: HEAD, LINDA HENRY on: 3/12/2019 02:35 PM     Modules accepted: Level of Service, SmartSet

## 2019-03-12 NOTE — TELEPHONE ENCOUNTER
Saw Bryan Landers's wife in the office yesterday for complaints of anxiety due to her concerns about .  She is wanting him tested for demenetia or Alzheimer's due to worsening confusion and aggressive behavior. She states he failed at-home testing for cognitive screening which was completed per home health nurse.    Lamar WATERS Head, APRN

## 2019-03-12 NOTE — TELEPHONE ENCOUNTER
Saw Bryan Landers's wife in the office yesterday for complaints of anxiety due to her concerns about .  She is wanting him tested for demenetia or Alzheimer's due to worsening confusion and aggressive behavior. She states he failed at-home testing for cognitive screening which was completed per home health nurse.    Lamar Miner, APRN    Call patient  Need to preform follow up labs and CT of brain for memory and mood issues  Due to failed depressioin screening by [employer] nurse.    Come in for nonfasting labs  And i will order head CT  We can so both on same day here on site.    Jose Fonseca MD

## 2019-03-13 ENCOUNTER — RESULTS ENCOUNTER (OUTPATIENT)
Dept: FAMILY MEDICINE CLINIC | Facility: CLINIC | Age: 74
End: 2019-03-13

## 2019-03-13 DIAGNOSIS — R41.3 MEMORY LOSS: ICD-10-CM

## 2019-03-17 ENCOUNTER — RESULTS ENCOUNTER (OUTPATIENT)
Dept: FAMILY MEDICINE CLINIC | Facility: CLINIC | Age: 74
End: 2019-03-17

## 2019-03-17 DIAGNOSIS — R41.3 MEMORY LOSS: ICD-10-CM

## 2019-03-21 ENCOUNTER — HOSPITAL ENCOUNTER (OUTPATIENT)
Dept: CT IMAGING | Facility: HOSPITAL | Age: 74
Discharge: HOME | End: 2019-03-21
Admitting: FAMILY MEDICINE

## 2019-03-21 DIAGNOSIS — R41.3 MEMORY LOSS: ICD-10-CM

## 2019-03-21 PROCEDURE — 70450 CT HEAD/BRAIN W/O DYE: CPT

## 2019-03-22 LAB
ANA SER QL: NEGATIVE
FOLATE SERPL-MCNC: >20 NG/ML
HCV AB S/CO SERPL IA: 0.2 S/CO RATIO (ref 0–0.9)
RPR SER QL: NON REACTIVE
VIT B12 SERPL-MCNC: 326 PG/ML (ref 232–1245)

## 2019-03-25 ENCOUNTER — OFFICE VISIT (OUTPATIENT)
Dept: GASTROENTEROLOGY | Facility: CLINIC | Age: 74
End: 2019-03-25

## 2019-03-25 ENCOUNTER — APPOINTMENT (OUTPATIENT)
Dept: LAB | Facility: HOSPITAL | Age: 74
End: 2019-03-25

## 2019-03-25 VITALS
SYSTOLIC BLOOD PRESSURE: 122 MMHG | WEIGHT: 254.2 LBS | HEIGHT: 69 IN | DIASTOLIC BLOOD PRESSURE: 80 MMHG | BODY MASS INDEX: 37.65 KG/M2

## 2019-03-25 DIAGNOSIS — D62 ANEMIA DUE TO ACUTE BLOOD LOSS: Primary | ICD-10-CM

## 2019-03-25 DIAGNOSIS — K62.7 RADIATION PROCTITIS: ICD-10-CM

## 2019-03-25 DIAGNOSIS — N18.2 STAGE 2 CHRONIC KIDNEY DISEASE: ICD-10-CM

## 2019-03-25 DIAGNOSIS — E11.9 TYPE 2 DIABETES MELLITUS WITHOUT COMPLICATION, WITHOUT LONG-TERM CURRENT USE OF INSULIN (HCC): ICD-10-CM

## 2019-03-25 DIAGNOSIS — E53.8 VITAMIN B12 DEFICIENCY: ICD-10-CM

## 2019-03-25 LAB
BASOPHILS # BLD AUTO: 0.03 10*3/MM3 (ref 0–0.2)
BASOPHILS NFR BLD AUTO: 0.7 % (ref 0–1.5)
DEPRECATED RDW RBC AUTO: 53.9 FL (ref 37–54)
EOSINOPHIL # BLD AUTO: 0.04 10*3/MM3 (ref 0–0.4)
EOSINOPHIL NFR BLD AUTO: 1 % (ref 0.3–6.2)
ERYTHROCYTE [DISTWIDTH] IN BLOOD BY AUTOMATED COUNT: 13.6 % (ref 12.3–15.4)
HCT VFR BLD AUTO: 42 % (ref 37.5–51)
HGB BLD-MCNC: 13.5 G/DL (ref 13–17.7)
IMM GRANULOCYTES # BLD AUTO: 0.01 10*3/MM3 (ref 0–0.05)
IMM GRANULOCYTES NFR BLD AUTO: 0.2 % (ref 0–0.5)
LYMPHOCYTES # BLD AUTO: 0.85 10*3/MM3 (ref 0.7–3.1)
LYMPHOCYTES NFR BLD AUTO: 21 % (ref 19.6–45.3)
MCH RBC QN AUTO: 34.5 PG (ref 26.6–33)
MCHC RBC AUTO-ENTMCNC: 32.1 G/DL (ref 31.5–35.7)
MCV RBC AUTO: 107.4 FL (ref 79–97)
MONOCYTES # BLD AUTO: 0.56 10*3/MM3 (ref 0.1–0.9)
MONOCYTES NFR BLD AUTO: 13.8 % (ref 5–12)
NEUTROPHILS # BLD AUTO: 2.56 10*3/MM3 (ref 1.4–7)
NEUTROPHILS NFR BLD AUTO: 63.3 % (ref 42.7–76)
NRBC BLD AUTO-RTO: 0 /100 WBC (ref 0–0)
PLATELET # BLD AUTO: 159 10*3/MM3 (ref 140–450)
PMV BLD AUTO: 9.7 FL (ref 6–12)
RBC # BLD AUTO: 3.91 10*6/MM3 (ref 4.14–5.8)
WBC NRBC COR # BLD: 4.05 10*3/MM3 (ref 3.4–10.8)

## 2019-03-25 PROCEDURE — 36415 COLL VENOUS BLD VENIPUNCTURE: CPT | Performed by: INTERNAL MEDICINE

## 2019-03-25 PROCEDURE — 99213 OFFICE O/P EST LOW 20 MIN: CPT | Performed by: INTERNAL MEDICINE

## 2019-03-25 PROCEDURE — 83921 ORGANIC ACID SINGLE QUANT: CPT | Performed by: INTERNAL MEDICINE

## 2019-03-25 PROCEDURE — 85025 COMPLETE CBC W/AUTO DIFF WBC: CPT | Performed by: INTERNAL MEDICINE

## 2019-03-25 NOTE — PROGRESS NOTES
PATIENT INFORMATION  Bryan Landers       - 1945    CHIEF COMPLAINT  Chief Complaint   Patient presents with   • Follow-up     4 mo follow up on radiation proctitis       HISTORY OF PRESENT ILLNESS  Here for F/u from radiation proctitis and no recurrent bleeding    Overall doing well and due to borderline B12 level and elevated MCV will check a MMA and repeat his CBC    Is moving his halima better on Miralax            REVIEW OF SYSTEMS  Review of Systems   All other systems reviewed and are negative.        ACTIVE PROBLEMS  Patient Active Problem List    Diagnosis   • History of hip replacement, total, bilateral [Z96.643]   • Radiation proctitis [K62.7]   • Stage 2 chronic kidney disease [N18.2]   • Medicare annual wellness visit, subsequent [Z00.00]   • Skin tear of forearm without complication, right, subsequent encounter [S51.811D]   • History of prostate cancer [Z85.46]   • Microalbuminuria due to type 2 diabetes mellitus (CMS/HCC) [E11.29, R80.9]   • Type 2 diabetes mellitus without complication, without long-term current use of insulin (CMS/[facility]) [E11.9]   • Severely overweight [E66.3]   • Routine adult health maintenance [Z00.00]   • Acute idiopathic gout of left knee [M10.062]   • Medication monitoring encounter [Z51.81]   • Essential hypertension [I10]   • Mixed hyperlipidemia [E78.2]   • Arthritis [M19.90]         PAST MEDICAL HISTORY  Past Medical History:   Diagnosis Date   • Arthritis    • Colon polyp    • Diabetes mellitus (CMS/HCC)    • GI bleed    • Hyperlipidemia    • Hypertension          SURGICAL HISTORY  Past Surgical History:   Procedure Laterality Date   • COLONOSCOPY  2016   • COLONOSCOPY N/A 2018    Procedure: COLONOSCOPY;  Surgeon: Olaf Gomez MD;  Location: [facility] OR;  Service: Gastroenterology   • COLONOSCOPY N/A 2019    Procedure: COLONOSCOPY;  Surgeon: Rosa Jack MD;  Location: [facility] OR;  Service: Gastroenterology   • HERNIA REPAIR     •  PROSTATE ULTRASOUND BIOPSY     • SIGMOIDOSCOPY N/A 10/2/2018    Procedure: FLEXIBLE SIGMOIDOSCOPY:  APC cautery bleeding using 60 joules;  Surgeon: Olaf Gomez MD;  Location: [facility] ENDOSCOPY;  Service: Gastroenterology   • TOTAL HIP ARTHROPLASTY Right          FAMILY HISTORY  Family History   Problem Relation Age of Onset   • Stroke Mother    • Stroke Father    • No Known Problems Brother    • Colon cancer Neg Hx    • Colon polyps Neg Hx          SOCIAL HISTORY  Social History     Occupational History   • 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    Tobacco Use   • Smoking status: Current Some Day Smoker     Types: Cigars     Last attempt to quit: 1986     Years since quittin.2   • Smokeless tobacco: Never Used   Substance and Sexual Activity   • Alcohol use: Yes     Alcohol/week: 19.2 - 20.4 oz     Types: 4 - 6 Shots of liquor, 28 Standard drinks or equivalent per week     Comment: 2-3 double vodkas a night   • Drug use: No   • Sexual activity: Yes     Partners: Female     Birth control/protection: Post-menopausal       Debilities/Disabilities Identified: None    Emotional Behavior: Appropriate    CURRENT MEDICATIONS    Current Outpatient Medications:   •  allopurinol (ZYLOPRIM) 100 MG tablet, Take 1 tablet by mouth Daily., Disp: 180 tablet, Rfl: 3  •  atorvastatin (LIPITOR) 80 MG tablet, Take 1 tablet by mouth Daily., Disp: 90 tablet, Rfl: 3  •  cholecalciferol (VITAMIN D3) 1000 UNITS tablet, Take 1,000 Units by mouth Daily., Disp: , Rfl:   •  ferrous sulfate 325 (65 FE) MG tablet, Take 325 mg by mouth Daily With Breakfast., Disp: , Rfl:   •  lisinopril (PRINIVIL,ZESTRIL) 5 MG tablet, Take 1 tablet by mouth Daily., Disp: 90 tablet, Rfl: 3  •  Multiple Vitamins-Minerals (MULTIVITAL PLATINUM PO), Take 1 tablet by mouth Daily., Disp: , Rfl:   •  pioglitazone (ACTOS) 30 MG tablet, Take 1 tablet by mouth Daily. Hold until follow up with Dr. Fonseca, Disp: 90 tablet, Rfl: 3  •   "POTASSIUM PO, Take  by mouth. Dose unknown, Disp: , Rfl:     ALLERGIES  Nsaids    VITALS  Vitals:    03/25/19 1436   BP: 122/80   Weight: 115 kg (254 lb 3.2 oz)   Height: 175.3 cm (69.02\")       LAST RESULTS   Results Encounter on 03/17/2019   Component Date Value Ref Range Status   • TOÑO Direct 03/21/2019 Negative  Negative Final     Ct Head Without Contrast    Result Date: 3/21/2019  Narrative: HEAD CT WITHOUT CONTRAST 03/21/2019  HISTORY: Multiple episodes of memory lapses over the past year. No known trauma.  TECHNIQUE: Multiple axial images were obtained from the skull base to vertex without intravenous contrast administration. Radiation dose reduction techniques included automated exposure control or exposure modulation based on body size. Radiation audit for CT and nuclear cardiology exams in the last 12 months: 0.  FINDINGS: There is generalized enlargement of the ventricles and sulci characteristic of atrophy and there is periventricular microvascular white matter ischemic change. There is no midline shift. There is no mass or mass effect, hemorrhage or acute infarct. The visualized paranasal sinuses are clear.      Impression: Generalized atrophy and chronic microvascular white matter ischemic change. No acute intracranial abnormality.  This report was finalized on 3/21/2019 9:13 AM by Dr. Thiago Negrete MD.        PHYSICAL EXAM  Physical Exam   Constitutional: He is oriented to person, place, and time. He appears well-developed and well-nourished.   HENT:   Head: Normocephalic and atraumatic.   Eyes: Conjunctivae and EOM are normal. Pupils are equal, round, and reactive to light. No scleral icterus.   Neck: Normal range of motion. Neck supple. No thyromegaly present.   Cardiovascular: Normal rate, regular rhythm, normal heart sounds and intact distal pulses. Exam reveals no gallop.   No murmur heard.  Pulmonary/Chest: Effort normal and breath sounds normal. He has no wheezes. He has no rales.   Abdominal: " Soft. Bowel sounds are normal. He exhibits no shifting dullness, no distension, no fluid wave, no abdominal bruit, no ascites and no mass. There is no hepatosplenomegaly. There is no tenderness. There is no guarding and negative Pabon's sign. Hernia confirmed negative in the ventral area.   Musculoskeletal: Normal range of motion. He exhibits no edema.   Lymphadenopathy:     He has no cervical adenopathy.   Neurological: He is alert and oriented to person, place, and time.   Skin: Skin is warm and dry. No rash noted. He is not diaphoretic. No erythema.   Psychiatric: He has a normal mood and affect. His behavior is normal.       ASSESSMENT  Diagnoses and all orders for this visit:    Anemia due to acute blood loss  -     Methylmalonic Acid, Serum  -     CBC & Differential    Radiation proctitis    Type 2 diabetes mellitus without complication, without long-term current use of insulin (CMS/[facility])    Stage 2 chronic kidney disease    Vitamin B12 deficiency          PLAN  Return in about 4 months (around 7/25/2019).

## 2019-03-26 ENCOUNTER — OFFICE VISIT (OUTPATIENT)
Dept: FAMILY MEDICINE CLINIC | Facility: CLINIC | Age: 74
End: 2019-03-26

## 2019-03-26 VITALS
BODY MASS INDEX: 37.62 KG/M2 | WEIGHT: 254 LBS | SYSTOLIC BLOOD PRESSURE: 122 MMHG | HEART RATE: 103 BPM | OXYGEN SATURATION: 96 % | HEIGHT: 69 IN | DIASTOLIC BLOOD PRESSURE: 76 MMHG

## 2019-03-26 DIAGNOSIS — R41.3 MEMORY PROBLEM: Primary | ICD-10-CM

## 2019-03-26 PROCEDURE — 99214 OFFICE O/P EST MOD 30 MIN: CPT | Performed by: FAMILY MEDICINE

## 2019-03-26 RX ORDER — DONEPEZIL HYDROCHLORIDE 10 MG/1
10 TABLET, FILM COATED ORAL
Qty: 30 TABLET | Refills: 5 | Status: SHIPPED | OUTPATIENT
Start: 2019-03-26 | End: 2019-08-15 | Stop reason: SDUPTHER

## 2019-03-26 NOTE — PATIENT INSTRUCTIONS
HEAD CT WITHOUT CONTRAST 03/21/2019     HISTORY:  Multiple episodes of memory lapses over the past year. No known trauma.     TECHNIQUE:  Multiple axial images were obtained from the skull base to vertex  without intravenous contrast administration. Radiation dose reduction  techniques included automated exposure control or exposure modulation  based on body size. Radiation audit for CT and nuclear cardiology exams  in the last 12 months: 0.     FINDINGS:  There is generalized enlargement of the ventricles and sulci  characteristic of atrophy and there is periventricular microvascular  white matter ischemic change. There is no midline shift. There is no  mass or mass effect, hemorrhage or acute infarct. The visualized  paranasal sinuses are clear.     IMPRESSION:  Generalized atrophy and chronic microvascular white matter  ischemic change. No acute intracranial abnormality.     This report was finalized on 3/21/2019 9:13 AM by Dr. Thiago Negrete MD.

## 2019-03-26 NOTE — PROGRESS NOTES
Subjective   Bryan Landers is a 74 y.o. male who is here for   Chief Complaint   Patient presents with   • Follow-up     CT SCAN    • Memory Loss   .   Saw Bryan Landers's wife in the office yesterday for complaints of anxiety due to her concerns about .  She is wanting him tested for dementia or Alzheimer's due to worsening confusion and aggressive behavior. She states he failed at-home testing for cognitive screening which was completed per home health nurse.    Lamar Miner, NIRAJ     Call patient  Need to preform follow up labs and CT of brain for memory and mood issues  Due to failed depression screening by [employer] nurse.     Come in for non fasting labs  And i will order head CT  We can so both on same day here on site.     Jose Fonseca MD    History of Present Illness   Dementia: He is here for evaluation and treatment of cognitive problems. He is accompanied by daughter and wife. Primary caregiver is patient. The family and the patient identify problems with changes in short term memory, getting disoriented outside of familiar environment and recalling words. Family and patient report problems with forgetting things. Family and patient are concerned about  irritability, however, they are not concerned about medication errors, driving and inability to maintain adequate nutrition. Medication administration: patient self medicates    Functional Assessment:   Activities of Daily Living (ADLs):    He is independent in the following: ambulation, bathing and hygiene, feeding, continence, grooming, toile ting and dressing  Requires assistance with the following: finances  Instrumental Activities of Daily Living (IADLs):    He is independent in the following: ,  Requires assistance with the following: finances          The following portions of the patient's history were reviewed and updated as appropriate: allergies, current medications, past family history, past medical history, past social history,  past surgical history and problem list.    Review of Systems    Objective   Physical Exam   Constitutional: He appears well-developed and well-nourished.   Cardiovascular: Normal rate.   Pulmonary/Chest: Effort normal.   Neurological: He is alert. No cranial nerve deficit.   Psychiatric: He has a normal mood and affect. His speech is normal and behavior is normal. Judgment and thought content normal. He exhibits abnormal recent memory.   Nursing note and vitals reviewed.       Results for orders placed or performed in visit on 03/25/19   CBC Auto Differential   Result Value Ref Range    WBC 4.05 3.40 - 10.80 10*3/mm3    RBC 3.91 (L) 4.14 - 5.80 10*6/mm3    Hemoglobin 13.5 13.0 - 17.7 g/dL    Hematocrit 42.0 37.5 - 51.0 %    .4 (H) 79.0 - 97.0 fL    MCH 34.5 (H) 26.6 - 33.0 pg    MCHC 32.1 31.5 - 35.7 g/dL    RDW 13.6 12.3 - 15.4 %    RDW-SD 53.9 37.0 - 54.0 fl    MPV 9.7 6.0 - 12.0 fL    Platelets 159 140 - 450 10*3/mm3    Neutrophil % 63.3 42.7 - 76.0 %    Lymphocyte % 21.0 19.6 - 45.3 %    Monocyte % 13.8 (H) 5.0 - 12.0 %    Eosinophil % 1.0 0.3 - 6.2 %    Basophil % 0.7 0.0 - 1.5 %    Immature Grans % 0.2 0.0 - 0.5 %    Neutrophils, Absolute 2.56 1.40 - 7.00 10*3/mm3    Lymphocytes, Absolute 0.85 0.70 - 3.10 10*3/mm3    Monocytes, Absolute 0.56 0.10 - 0.90 10*3/mm3    Eosinophils, Absolute 0.04 0.00 - 0.40 10*3/mm3    Basophils, Absolute 0.03 0.00 - 0.20 10*3/mm3    Immature Grans, Absolute 0.01 0.00 - 0.05 10*3/mm3    nRBC 0.0 0.0 - 0.0 /100 WBC     Lab w/u for dementia all ok      Assessment/Plan   Bryan was seen today for follow-up and memory loss.    Diagnoses and all orders for this visit:    Memory problem  -     donepezil (ARICEPT) 10 MG tablet; Take 1 tablet by mouth Daily With Breakfast.    also asked him to reduce his 3-4 double vodkas a night.    Patient Instructions   HEAD CT WITHOUT CONTRAST 03/21/2019     HISTORY:  Multiple episodes of memory lapses over the past year. No known trauma.      TECHNIQUE:  Multiple axial images were obtained from the skull base to vertex  without intravenous contrast administration. Radiation dose reduction  techniques included automated exposure control or exposure modulation  based on body size. Radiation audit for CT and nuclear cardiology exams  in the last 12 months: 0.     FINDINGS:  There is generalized enlargement of the ventricles and sulci  characteristic of atrophy and there is periventricular microvascular  white matter ischemic change. There is no midline shift. There is no  mass or mass effect, hemorrhage or acute infarct. The visualized  paranasal sinuses are clear.     IMPRESSION:  Generalized atrophy and chronic microvascular white matter  ischemic change. No acute intracranial abnormality.     This report was finalized on 3/21/2019 9:13 AM by Dr. Thiago Negrete MD.           There are no discontinued medications.     Return in about 1 month (around 4/26/2019) for new medication follow up.    Dr. Jose Fonseca  [place], [place]

## 2019-03-27 LAB
Lab: ABNORMAL
METHYLMALONATE SERPL-SCNC: 652 NMOL/L (ref 0–378)

## 2019-04-26 ENCOUNTER — OFFICE VISIT (OUTPATIENT)
Dept: FAMILY MEDICINE CLINIC | Facility: CLINIC | Age: 74
End: 2019-04-26

## 2019-04-26 VITALS
WEIGHT: 250 LBS | HEART RATE: 110 BPM | DIASTOLIC BLOOD PRESSURE: 62 MMHG | OXYGEN SATURATION: 95 % | BODY MASS INDEX: 36.92 KG/M2 | SYSTOLIC BLOOD PRESSURE: 104 MMHG

## 2019-04-26 DIAGNOSIS — E11.9 TYPE 2 DIABETES MELLITUS WITHOUT COMPLICATION, WITHOUT LONG-TERM CURRENT USE OF INSULIN (HCC): ICD-10-CM

## 2019-04-26 DIAGNOSIS — R41.3 MEMORY PROBLEM: ICD-10-CM

## 2019-04-26 DIAGNOSIS — M10.062 ACUTE IDIOPATHIC GOUT OF LEFT KNEE: ICD-10-CM

## 2019-04-26 DIAGNOSIS — Z00.00 ROUTINE ADULT HEALTH MAINTENANCE: ICD-10-CM

## 2019-04-26 DIAGNOSIS — Z85.46 HISTORY OF PROSTATE CANCER: ICD-10-CM

## 2019-04-26 DIAGNOSIS — Z51.81 MEDICATION MONITORING ENCOUNTER: ICD-10-CM

## 2019-04-26 DIAGNOSIS — E78.2 MIXED HYPERLIPIDEMIA: ICD-10-CM

## 2019-04-26 DIAGNOSIS — D64.9 LOW HEMOGLOBIN: ICD-10-CM

## 2019-04-26 DIAGNOSIS — I10 ESSENTIAL HYPERTENSION: Primary | ICD-10-CM

## 2019-04-26 DIAGNOSIS — N18.2 STAGE 2 CHRONIC KIDNEY DISEASE: ICD-10-CM

## 2019-04-26 DIAGNOSIS — Z12.5 ENCOUNTER FOR SCREENING FOR MALIGNANT NEOPLASM OF PROSTATE: ICD-10-CM

## 2019-04-26 PROCEDURE — 99213 OFFICE O/P EST LOW 20 MIN: CPT | Performed by: FAMILY MEDICINE

## 2019-04-26 NOTE — PROGRESS NOTES
Subjective   Bryan Landers is a 74 y.o. male who is here for   Chief Complaint   Patient presents with   • Memory Loss     Follow Up medication   .     History of Present Illness   We started Aricept last month.  No SE  He nor wife have not noticed any improvement  Which is typical , takes 2-6 months for effect    Stopped the B complex due to loose stools, ok by me  Still on a MVI  Take iron for 1 more week then stop.    We again talked about his ETOH at night, reports a slight reduction  I asked him to keeping working on less and less every night.      The following portions of the patient's history were reviewed and updated as appropriate: allergies, current medications, past family history, past medical history, past social history, past surgical history and problem list.    Review of Systems    Objective   Physical Exam   Constitutional: He appears well-developed and well-nourished.   Cardiovascular: Normal rate.   Pulmonary/Chest: Effort normal.   Abdominal: Soft.   Neurological: He is alert.   Nursing note and vitals reviewed.      Assessment/Plan   Bryan was seen today for memory loss.    Diagnoses and all orders for this visit:    Memory problem      There are no Patient Instructions on file for this visit.    Medications Discontinued During This Encounter   Medication Reason   • ferrous sulfate 325 (65 FE) MG tablet *Therapy completed        Return in about 3 months (around 7/26/2019) for Next scheduled follow up, blood pressure.    Dr. Jose Fonseca  [facility] [place], [place]

## 2019-05-26 ENCOUNTER — HOSPITAL ENCOUNTER (EMERGENCY)
Facility: HOSPITAL | Age: 74
Discharge: HOME | End: 2019-05-26
Attending: EMERGENCY MEDICINE | Admitting: EMERGENCY MEDICINE

## 2019-05-26 VITALS
HEART RATE: 103 BPM | HEIGHT: 69 IN | BODY MASS INDEX: 36.73 KG/M2 | OXYGEN SATURATION: 96 % | SYSTOLIC BLOOD PRESSURE: 153 MMHG | WEIGHT: 248 LBS | TEMPERATURE: 97.5 F | RESPIRATION RATE: 14 BRPM | DIASTOLIC BLOOD PRESSURE: 83 MMHG

## 2019-05-26 DIAGNOSIS — T16.1XXA FOREIGN BODY OF RIGHT EAR, INITIAL ENCOUNTER: Primary | ICD-10-CM

## 2019-05-26 PROCEDURE — 69200 CLEAR OUTER EAR CANAL: CPT | Performed by: EMERGENCY MEDICINE

## 2019-05-26 PROCEDURE — 99282 EMERGENCY DEPT VISIT SF MDM: CPT

## 2019-05-26 RX ORDER — ASPIRIN 81 MG/1
81 TABLET, CHEWABLE ORAL DAILY
COMMUNITY
End: 2021-09-20

## 2019-06-06 NOTE — PATIENT INSTRUCTIONS
Patient called in regards to receiving notification of an appointment on 6/17 with Dr. Phelps. The patient is confused about this appointment. She stated that she was instructed to follow up in 6 months for the next office visit. She was also told to record BP for two weeks and send results. She believes that this appointment may have been scheduled in error since she was never told to come into the office in two weeks. Patient needs to verify if this appointment is necessary   Plenty of water  Baby wipes  Vaseline after BM to cut  Hold preventive aspirin for a week or 2

## 2019-07-26 ENCOUNTER — RESULTS ENCOUNTER (OUTPATIENT)
Dept: FAMILY MEDICINE CLINIC | Facility: CLINIC | Age: 74
End: 2019-07-26

## 2019-07-26 DIAGNOSIS — E78.2 MIXED HYPERLIPIDEMIA: ICD-10-CM

## 2019-07-26 DIAGNOSIS — Z85.46 HISTORY OF PROSTATE CANCER: ICD-10-CM

## 2019-07-26 DIAGNOSIS — E11.9 TYPE 2 DIABETES MELLITUS WITHOUT COMPLICATION, WITHOUT LONG-TERM CURRENT USE OF INSULIN (HCC): ICD-10-CM

## 2019-07-26 DIAGNOSIS — I10 ESSENTIAL HYPERTENSION: ICD-10-CM

## 2019-07-26 DIAGNOSIS — R41.3 MEMORY PROBLEM: ICD-10-CM

## 2019-07-26 DIAGNOSIS — Z12.5 ENCOUNTER FOR SCREENING FOR MALIGNANT NEOPLASM OF PROSTATE: ICD-10-CM

## 2019-07-26 DIAGNOSIS — D64.9 LOW HEMOGLOBIN: ICD-10-CM

## 2019-07-29 ENCOUNTER — OFFICE VISIT (OUTPATIENT)
Dept: GASTROENTEROLOGY | Facility: CLINIC | Age: 74
End: 2019-07-29

## 2019-07-29 VITALS
HEIGHT: 69 IN | WEIGHT: 248 LBS | SYSTOLIC BLOOD PRESSURE: 156 MMHG | BODY MASS INDEX: 36.73 KG/M2 | DIASTOLIC BLOOD PRESSURE: 84 MMHG

## 2019-07-29 DIAGNOSIS — N18.2 STAGE 2 CHRONIC KIDNEY DISEASE: ICD-10-CM

## 2019-07-29 DIAGNOSIS — E11.9 TYPE 2 DIABETES MELLITUS WITHOUT COMPLICATION, WITHOUT LONG-TERM CURRENT USE OF INSULIN (HCC): ICD-10-CM

## 2019-07-29 DIAGNOSIS — K62.7 RADIATION PROCTITIS: Primary | ICD-10-CM

## 2019-07-29 PROCEDURE — 99213 OFFICE O/P EST LOW 20 MIN: CPT | Performed by: INTERNAL MEDICINE

## 2019-07-29 NOTE — PROGRESS NOTES
PATIENT INFORMATION  Bryan Landers       - 1945    CHIEF COMPLAINT  Chief Complaint   Patient presents with   • Follow-up     4 mo follow up on anemia       HISTORY OF PRESENT ILLNESS  Here for radiation proctitis and is doing well and has started taking namenda and VIagra but is being treted for PCE(NFL Disease)    Reviewed his constipation and anemia but is over responding to Miralax and we discussed how to cut mback            REVIEW OF SYSTEMS  Review of Systems   HENT: Positive for rhinorrhea.    Gastrointestinal: Positive for diarrhea.   Genitourinary: Positive for frequency.   All other systems reviewed and are negative.        ACTIVE PROBLEMS  Patient Active Problem List    Diagnosis   • Memory problem [R41.3]   • History of hip replacement, total, bilateral [Z96.643]   • Radiation proctitis [K62.7]   • Stage 2 chronic kidney disease [N18.2]   • Medicare annual wellness visit, subsequent [Z00.00]   • Skin tear of forearm without complication, right, subsequent encounter [S51.811D]   • History of prostate cancer [Z85.46]   • Microalbuminuria due to type 2 diabetes mellitus (CMS/HCC) [E11.29, R80.9]   • Type 2 diabetes mellitus without complication, without long-term current use of insulin (CMS/HCC) [E11.9]   • Severely overweight [E66.3]   • Routine adult health maintenance [Z00.00]   • Acute idiopathic gout of left knee [M10.062]   • Medication monitoring encounter [Z51.81]   • Essential hypertension [I10]   • Mixed hyperlipidemia [E78.2]   • Arthritis [M19.90]         PAST MEDICAL HISTORY  Past Medical History:   Diagnosis Date   • Arthritis    • Colon polyp    • Diabetes mellitus (CMS/HCC)    • GI bleed    • Hyperlipidemia    • Hypertension          SURGICAL HISTORY  Past Surgical History:   Procedure Laterality Date   • COLONOSCOPY  2016   • COLONOSCOPY N/A 2018    Procedure: COLONOSCOPY;  Surgeon: Olaf Gomez MD;  Location: [facility];  Service: Gastroenterology   •  COLONOSCOPY N/A 2019    Procedure: COLONOSCOPY;  Surgeon: Rosa Jack MD;  Location:  [facility];  Service: Gastroenterology   • HERNIA REPAIR     • PROSTATE ULTRASOUND BIOPSY     • SIGMOIDOSCOPY N/A 10/2/2018    Procedure: FLEXIBLE SIGMOIDOSCOPY:  APC cautery bleeding using 60 joules;  Surgeon: Olaf Gomez MD;  Location:  [facility];  Service: Gastroenterology   • TOTAL HIP ARTHROPLASTY Right          FAMILY HISTORY  Family History   Problem Relation Age of Onset   • Stroke Mother    • Stroke Father    • No Known Problems Brother    • Colon cancer Neg Hx    • Colon polyps Neg Hx          SOCIAL HISTORY  Social History     Occupational History   • Occupation: retired [employer]    Tobacco Use   • Smoking status: Former Smoker     Types: Cigars     Last attempt to quit: 1986     Years since quittin.5   • Smokeless tobacco: Never Used   Substance and Sexual Activity   • Alcohol use: Yes     Alcohol/week: 19.2 - 20.4 oz     Types: 4 - 6 Shots of liquor, 28 Standard drinks or equivalent per week     Frequency: 4 or more times a week     Drinks per session: 5 or 6     Comment: 2-3 double vodkas a night   • Drug use: No   • Sexual activity: Yes     Partners: Female     Birth control/protection: Post-menopausal       Debilities/Disabilities Identified: None    Emotional Behavior: Appropriate    CURRENT MEDICATIONS    Current Outpatient Medications:   •  allopurinol (ZYLOPRIM) 100 MG tablet, Take 1 tablet by mouth Daily., Disp: 180 tablet, Rfl: 3  •  aspirin 81 MG chewable tablet, Chew 81 mg Daily., Disp: , Rfl:   •  atorvastatin (LIPITOR) 80 MG tablet, Take 1 tablet by mouth Daily., Disp: 90 tablet, Rfl: 3  •  cholecalciferol (VITAMIN D3) 1000 UNITS tablet, Take 1,000 Units by mouth Daily., Disp: , Rfl:   •  donepezil (ARICEPT) 10 MG tablet, Take 1 tablet by mouth Daily With Breakfast., Disp: 30 tablet, Rfl: 5  •  lisinopril (PRINIVIL,ZESTRIL) 5 MG tablet, Take  "1 tablet by mouth Daily., Disp: 90 tablet, Rfl: 3  •  Multiple Vitamins-Minerals (MULTIVITAL PLATINUM PO), Take 1 tablet by mouth Daily., Disp: , Rfl:   •  pioglitazone (ACTOS) 30 MG tablet, Take 1 tablet by mouth Daily. Hold until follow up with Dr. Fonseca, Disp: 90 tablet, Rfl: 3  •  POTASSIUM PO, Take  by mouth. Dose unknown, Disp: , Rfl:     ALLERGIES  Nsaids    VITALS  Vitals:    07/29/19 1504   BP: 156/84   Weight: 112 kg (248 lb)   Height: 175.3 cm (69.02\")       LAST RESULTS   Office Visit on 03/25/2019   Component Date Value Ref Range Status   • Methylmalonic Acid 03/25/2019 652* 0 - 378 nmol/L Final   • Disclaimer: 03/25/2019 Comment   Final    This test was developed and its performance characteristics  determined by [facility]. It has not been cleared or approved  by the Food and Drug Administration.   • WBC 03/25/2019 4.05  3.40 - 10.80 10*3/mm3 Final   • RBC 03/25/2019 3.91* 4.14 - 5.80 10*6/mm3 Final   • Hemoglobin 03/25/2019 13.5  13.0 - 17.7 g/dL Final   • Hematocrit 03/25/2019 42.0  37.5 - 51.0 % Final   • MCV 03/25/2019 107.4* 79.0 - 97.0 fL Final   • MCH 03/25/2019 34.5* 26.6 - 33.0 pg Final   • MCHC 03/25/2019 32.1  31.5 - 35.7 g/dL Final   • RDW 03/25/2019 13.6  12.3 - 15.4 % Final   • RDW-SD 03/25/2019 53.9  37.0 - 54.0 fl Final   • MPV 03/25/2019 9.7  6.0 - 12.0 fL Final   • Platelets 03/25/2019 159  140 - 450 10*3/mm3 Final   • Neutrophil % 03/25/2019 63.3  42.7 - 76.0 % Final   • Lymphocyte % 03/25/2019 21.0  19.6 - 45.3 % Final   • Monocyte % 03/25/2019 13.8* 5.0 - 12.0 % Final   • Eosinophil % 03/25/2019 1.0  0.3 - 6.2 % Final   • Basophil % 03/25/2019 0.7  0.0 - 1.5 % Final   • Immature Grans % 03/25/2019 0.2  0.0 - 0.5 % Final   • Neutrophils, Absolute 03/25/2019 2.56  1.40 - 7.00 10*3/mm3 Final   • Lymphocytes, Absolute 03/25/2019 0.85  0.70 - 3.10 10*3/mm3 Final   • Monocytes, Absolute 03/25/2019 0.56  0.10 - 0.90 10*3/mm3 Final   • Eosinophils, Absolute 03/25/2019 0.04  0.00 - 0.40 " 10*3/mm3 Final   • Basophils, Absolute 03/25/2019 0.03  0.00 - 0.20 10*3/mm3 Final   • Immature Grans, Absolute 03/25/2019 0.01  0.00 - 0.05 10*3/mm3 Final   • nRBC 03/25/2019 0.0  0.0 - 0.0 /100 WBC Final     No results found.    PHYSICAL EXAM  Physical Exam   Constitutional: He is oriented to person, place, and time. He appears well-developed and well-nourished.   HENT:   Head: Normocephalic and atraumatic.   Eyes: Conjunctivae and EOM are normal. Pupils are equal, round, and reactive to light. No scleral icterus.   Neck: Normal range of motion. Neck supple. No thyromegaly present.   Cardiovascular: Normal rate, regular rhythm, normal heart sounds and intact distal pulses. Exam reveals no gallop.   No murmur heard.  Pulmonary/Chest: Effort normal and breath sounds normal. He has no wheezes. He has no rales.   Abdominal: Soft. Bowel sounds are normal. He exhibits no shifting dullness, no distension, no fluid wave, no abdominal bruit, no ascites and no mass. There is no hepatosplenomegaly. There is no guarding and negative Pabon's sign. Hernia confirmed negative in the ventral area.   Musculoskeletal: Normal range of motion. He exhibits no edema.   Lymphadenopathy:     He has no cervical adenopathy.   Neurological: He is alert and oriented to person, place, and time.   Skin: Skin is warm and dry. No rash noted. He is not diaphoretic. No erythema.       ASSESSMENT  Diagnoses and all orders for this visit:    Radiation proctitis    Type 2 diabetes mellitus without complication, without long-term current use of insulin (CMS/[facility])    Stage 2 chronic kidney disease          PLAN  Return in about 6 months (around 1/29/2020).

## 2019-08-10 LAB
ALBUMIN SERPL-MCNC: 4 G/DL (ref 3.5–5.2)
ALBUMIN/GLOB SERPL: 1.3 G/DL
ALP SERPL-CCNC: 72 U/L (ref 39–117)
ALT SERPL-CCNC: 33 U/L (ref 1–41)
APPEARANCE UR: CLEAR
AST SERPL-CCNC: 48 U/L (ref 1–40)
BILIRUB SERPL-MCNC: 0.7 MG/DL (ref 0.2–1.2)
BILIRUB UR QL STRIP: NEGATIVE
BUN SERPL-MCNC: 15 MG/DL (ref 8–23)
BUN/CREAT SERPL: 10.6 (ref 7–25)
CALCIUM SERPL-MCNC: 8.9 MG/DL (ref 8.6–10.5)
CHLORIDE SERPL-SCNC: 103 MMOL/L (ref 98–107)
CHOLEST SERPL-MCNC: 170 MG/DL (ref 0–200)
CO2 SERPL-SCNC: 26 MMOL/L (ref 22–29)
COLOR UR: YELLOW
CREAT SERPL-MCNC: 1.42 MG/DL (ref 0.76–1.27)
ERYTHROCYTE [DISTWIDTH] IN BLOOD BY AUTOMATED COUNT: 14.3 % (ref 12.3–15.4)
FERRITIN SERPL-MCNC: 382 NG/ML (ref 30–400)
GLOBULIN SER CALC-MCNC: 3 GM/DL
GLUCOSE SERPL-MCNC: 105 MG/DL (ref 65–99)
GLUCOSE UR QL: NEGATIVE
HBA1C MFR BLD: 5.6 % (ref 4.8–5.6)
HCT VFR BLD AUTO: 42.3 % (ref 37.5–51)
HDLC SERPL-MCNC: 66 MG/DL (ref 40–60)
HGB BLD-MCNC: 13.6 G/DL (ref 13–17.7)
HGB UR QL STRIP: NEGATIVE
IRON SATN MFR SERPL: 25 % (ref 20–50)
IRON SERPL-MCNC: 101 MCG/DL (ref 59–158)
KETONES UR QL STRIP: NEGATIVE
LDLC SERPL CALC-MCNC: 61 MG/DL (ref 0–100)
LEUKOCYTE ESTERASE UR QL STRIP: NEGATIVE
MCH RBC QN AUTO: 36 PG (ref 26.6–33)
MCHC RBC AUTO-ENTMCNC: 32.2 G/DL (ref 31.5–35.7)
MCV RBC AUTO: 111.9 FL (ref 79–97)
MICROALBUMIN UR-MCNC: 45.1 UG/ML
NITRITE UR QL STRIP: NEGATIVE
PH UR STRIP: 6.5 [PH] (ref 5–8)
PLATELET # BLD AUTO: 154 10*3/MM3 (ref 140–450)
POTASSIUM SERPL-SCNC: 4.4 MMOL/L (ref 3.5–5.2)
PROT SERPL-MCNC: 7 G/DL (ref 6–8.5)
PROT UR QL STRIP: NEGATIVE
PSA SERPL-MCNC: 0.4 NG/ML (ref 0–4)
RBC # BLD AUTO: 3.78 10*6/MM3 (ref 4.14–5.8)
SODIUM SERPL-SCNC: 143 MMOL/L (ref 136–145)
SP GR UR: 1.02 (ref 1–1.03)
TIBC SERPL-MCNC: 400 MCG/DL
TRIGL SERPL-MCNC: 215 MG/DL (ref 0–150)
TSH SERPL DL<=0.005 MIU/L-ACNC: 5.02 MIU/ML (ref 0.27–4.2)
UIBC SERPL-MCNC: 299 MCG/DL (ref 112–346)
UROBILINOGEN UR STRIP-MCNC: NORMAL MG/DL
VLDLC SERPL CALC-MCNC: 43 MG/DL
WBC # BLD AUTO: 3.74 10*3/MM3 (ref 3.4–10.8)

## 2019-08-15 ENCOUNTER — OFFICE VISIT (OUTPATIENT)
Dept: FAMILY MEDICINE CLINIC | Facility: CLINIC | Age: 74
End: 2019-08-15

## 2019-08-15 VITALS
BODY MASS INDEX: 36.72 KG/M2 | HEIGHT: 69 IN | SYSTOLIC BLOOD PRESSURE: 128 MMHG | RESPIRATION RATE: 16 BRPM | DIASTOLIC BLOOD PRESSURE: 68 MMHG | WEIGHT: 247.9 LBS | HEART RATE: 104 BPM | OXYGEN SATURATION: 97 %

## 2019-08-15 DIAGNOSIS — E11.9 TYPE 2 DIABETES MELLITUS WITHOUT COMPLICATION, WITHOUT LONG-TERM CURRENT USE OF INSULIN (HCC): ICD-10-CM

## 2019-08-15 DIAGNOSIS — E78.2 MIXED HYPERLIPIDEMIA: ICD-10-CM

## 2019-08-15 DIAGNOSIS — M1A.0620 IDIOPATHIC CHRONIC GOUT OF LEFT KNEE WITHOUT TOPHUS: ICD-10-CM

## 2019-08-15 DIAGNOSIS — R41.3 MEMORY PROBLEM: ICD-10-CM

## 2019-08-15 DIAGNOSIS — E03.8 SUBCLINICAL HYPOTHYROIDISM: ICD-10-CM

## 2019-08-15 DIAGNOSIS — I10 ESSENTIAL HYPERTENSION: Primary | ICD-10-CM

## 2019-08-15 DIAGNOSIS — N18.2 STAGE 2 CHRONIC KIDNEY DISEASE: ICD-10-CM

## 2019-08-15 DIAGNOSIS — Z51.81 MEDICATION MONITORING ENCOUNTER: ICD-10-CM

## 2019-08-15 DIAGNOSIS — E55.9 VITAMIN D DEFICIENCY: ICD-10-CM

## 2019-08-15 PROCEDURE — 99214 OFFICE O/P EST MOD 30 MIN: CPT | Performed by: FAMILY MEDICINE

## 2019-08-15 RX ORDER — DONEPEZIL HYDROCHLORIDE 10 MG/1
10 TABLET, FILM COATED ORAL
Qty: 90 TABLET | Refills: 3 | Status: SHIPPED | OUTPATIENT
Start: 2019-08-15 | End: 2020-07-23

## 2019-08-15 RX ORDER — LEVOTHYROXINE SODIUM 0.03 MG/1
25 TABLET ORAL DAILY
Qty: 90 TABLET | Refills: 1 | Status: SHIPPED | OUTPATIENT
Start: 2019-08-15 | End: 2020-02-14 | Stop reason: SDUPTHER

## 2019-08-15 RX ORDER — ATORVASTATIN CALCIUM 80 MG/1
80 TABLET, FILM COATED ORAL DAILY
Qty: 90 TABLET | Refills: 3 | Status: SHIPPED | OUTPATIENT
Start: 2019-08-15 | End: 2020-08-12 | Stop reason: SDUPTHER

## 2019-08-15 RX ORDER — PIOGLITAZONEHYDROCHLORIDE 30 MG/1
30 TABLET ORAL DAILY
Qty: 90 TABLET | Refills: 3 | Status: SHIPPED | OUTPATIENT
Start: 2019-08-15 | End: 2020-07-23

## 2019-08-15 NOTE — PROGRESS NOTES
"  Chief Complaint   Patient presents with   • Hyperlipidemia   • Hypertension   • Diabetes       Subjective   Bryan Landers is an 74 y.o. male who presents for follow up of diabetes. Current symptoms include: paresthesia of the feet. Patient denies foot ulcerations and visual disturbances. Evaluation to date has included: fasting blood sugar, fasting lipid panel, hemoglobin A1C and microalbuminuria. Home sugars: patient does not check sugars. Current treatments: more intensive attention to diet which has been somewhat effective, increased aerobic exercise which has been somewhat effective, Continued Actos which has been effective and Continued ACE inhibitor/ARB which has been effective. Discussed importance of yearly eye exams and checking feet for skin integrity.    No gout flairs    Drinking less    Memory and mood is better,    New onset hypothyroidism.    Going to the  2-3 x a week    Wife reports his mood is better, drinking less, and he is watching his diet more closely          The following portions of the patient's history were reviewed and updated as appropriate: allergies, current medications, past medical history, past social history, past surgical history and problem list.        Review of Systems  Pertinent items are noted in HPI.     Vitals:    08/15/19 1023   BP: 128/68   BP Location: Left arm   Patient Position: Sitting   Cuff Size: Large Adult   Pulse: 104   Resp: 16   SpO2: 97%   Weight: 112 kg (247 lb 14.4 oz)   Height: 175.3 cm (69.02\")       Objective    Gen:  Alert, pleasant  Ears: canals clear, TMs normal  Throat: clear , no thrush, teeth ok  Neck: no bruit, no LAD  Lungs: clear  Heart: RR no murmur  Feet:  No rash, no skin breakdown, sensation grossly normal.    Laboratory:  Results for orders placed or performed in visit on 07/26/19   MicroAlbumin, Urine, Random - Urine, Clean Catch   Result Value Ref Range    Microalbumin, Urine 45.1 Not Estab. ug/mL   CBC (No Diff)   Result Value Ref " Range    WBC 3.74 3.40 - 10.80 10*3/mm3    RBC 3.78 (L) 4.14 - 5.80 10*6/mm3    Hemoglobin 13.6 13.0 - 17.7 g/dL    Hematocrit 42.3 37.5 - 51.0 %    .9 (H) 79.0 - 97.0 fL    MCH 36.0 (H) 26.6 - 33.0 pg    MCHC 32.2 31.5 - 35.7 g/dL    RDW 14.3 12.3 - 15.4 %    Platelets 154 140 - 450 10*3/mm3   Comprehensive Metabolic Panel   Result Value Ref Range    Glucose 105 (H) 65 - 99 mg/dL    BUN 15 8 - 23 mg/dL    Creatinine 1.42 (H) 0.76 - 1.27 mg/dL    eGFR Non African Am 49 (L) >60 mL/min/1.73    eGFR African Am 59 (L) >60 mL/min/1.73    BUN/Creatinine Ratio 10.6 7.0 - 25.0    Sodium 143 136 - 145 mmol/L    Potassium 4.4 3.5 - 5.2 mmol/L    Chloride 103 98 - 107 mmol/L    Total CO2 26.0 22.0 - 29.0 mmol/L    Calcium 8.9 8.6 - 10.5 mg/dL    Total Protein 7.0 6.0 - 8.5 g/dL    Albumin 4.00 3.50 - 5.20 g/dL    Globulin 3.0 gm/dL    A/G Ratio 1.3 g/dL    Total Bilirubin 0.7 0.2 - 1.2 mg/dL    Alkaline Phosphatase 72 39 - 117 U/L    AST (SGOT) 48 (H) 1 - 40 U/L    ALT (SGPT) 33 1 - 41 U/L   Hemoglobin A1c   Result Value Ref Range    Hemoglobin A1C 5.60 4.80 - 5.60 %   Iron and TIBC   Result Value Ref Range    TIBC 400 mcg/dL    UIBC 299 112 - 346 mcg/dL    Iron 101 59 - 158 mcg/dL    Iron Saturation 25 20 - 50 %   Ferritin   Result Value Ref Range    Ferritin 382.00 30.00 - 400.00 ng/mL   Lipid Panel   Result Value Ref Range    Total Cholesterol 170 0 - 200 mg/dL    Triglycerides 215 (H) 0 - 150 mg/dL    HDL Cholesterol 66 (H) 40 - 60 mg/dL    VLDL Cholesterol 43 mg/dL    LDL Cholesterol  61 0 - 100 mg/dL   PSA Screen   Result Value Ref Range    PSA 0.398 0.000 - 4.000 ng/mL   TSH   Result Value Ref Range    TSH 5.020 (H) 0.270 - 4.200 mIU/mL   Urinalysis With Microscopic If Indicated (No Culture) - Urine, Clean Catch   Result Value Ref Range    Specific Gravity, UA 1.017 1.005 - 1.030    pH, UA 6.5 5.0 - 8.0    Color, UA Yellow     Appearance, UA Clear Clear    Leukocytes, UA Negative Negative    Protein Negative  Negative    Glucose, UA Negative Negative    Ketones Negative Negative    Blood, UA Negative Negative    Bilirubin, UA Negative Negative    Urobilinogen, UA Comment     Nitrite, UA Negative Negative        Assessment/Plan        Discussed general issues about diabetes pathophysiology and management.  Addressed ADA diet.  Encouraged aerobic exercise.  Continued Actos; see medication orders.  Continued statin drug see medication orders.  Continued ACE inhibitor; see medication orders.  Follow up in 6 months or as needed.    Bryan was seen today for hyperlipidemia, hypertension and diabetes.    Diagnoses and all orders for this visit:    Essential hypertension  -     CBC (No Diff); Future    Idiopathic chronic gout of left knee without tophus  -     Uric Acid; Future    Mixed hyperlipidemia  -     atorvastatin (LIPITOR) 80 MG tablet; Take 1 tablet by mouth Daily.  -     Lipid Panel; Future    Type 2 diabetes mellitus without complication, without long-term current use of insulin (CMS/[facility])  -     pioglitazone (ACTOS) 30 MG tablet; Take 1 tablet by mouth Daily. Hold until follow up with Dr. Fonseca  -     MicroAlbumin, Urine, Random - Urine, Clean Catch; Future  -     Hemoglobin A1c; Future  -     Urinalysis With Microscopic If Indicated (No Culture) - Urine, Clean Catch; Future    Stage 2 chronic kidney disease  -     Renal Function Panel; Future    Subclinical hypothyroidism  -     levothyroxine (SYNTHROID, LEVOTHROID) 25 MCG tablet; Take 1 tablet by mouth Daily.  -     TSH; Future  -     T4, Free; Future    Memory problem  -     donepezil (ARICEPT) 10 MG tablet; Take 1 tablet by mouth Daily With Breakfast.    Vitamin D deficiency  -     Vitamin D 25 Hydroxy; Future    Medication monitoring encounter  -     ALT; Future        Discussed healthy diabetic eating plan.  May refer to ADA web site, diabetes.org    Return in about 6 months (around 2/15/2020).  There are no Patient Instructions on file for this  visit.  Medications Discontinued During This Encounter   Medication Reason   • atorvastatin (LIPITOR) 80 MG tablet Reorder   • donepezil (ARICEPT) 10 MG tablet Reorder   • pioglitazone (ACTOS) 30 MG tablet Reorder         Dr. Jose Fonseca MD  [place], [place]  [facility].

## 2019-09-12 DIAGNOSIS — I10 ESSENTIAL HYPERTENSION: ICD-10-CM

## 2019-09-13 RX ORDER — LISINOPRIL 20 MG/1
TABLET ORAL
Qty: 90 TABLET | Refills: 1 | Status: SHIPPED | OUTPATIENT
Start: 2019-09-13 | End: 2020-02-14 | Stop reason: SDUPTHER

## 2019-10-04 DIAGNOSIS — M10.062 ACUTE IDIOPATHIC GOUT OF LEFT KNEE: ICD-10-CM

## 2019-10-04 RX ORDER — ALLOPURINOL 100 MG/1
TABLET ORAL
Qty: 180 TABLET | Refills: 0 | Status: SHIPPED | OUTPATIENT
Start: 2019-10-04 | End: 2020-02-14 | Stop reason: SDUPTHER

## 2019-10-29 ENCOUNTER — OFFICE VISIT (OUTPATIENT)
Dept: FAMILY MEDICINE CLINIC | Facility: CLINIC | Age: 74
End: 2019-10-29

## 2019-10-29 VITALS
OXYGEN SATURATION: 97 % | DIASTOLIC BLOOD PRESSURE: 78 MMHG | SYSTOLIC BLOOD PRESSURE: 126 MMHG | BODY MASS INDEX: 37.33 KG/M2 | RESPIRATION RATE: 16 BRPM | HEART RATE: 102 BPM | TEMPERATURE: 98.3 F | WEIGHT: 252 LBS | HEIGHT: 69 IN

## 2019-10-29 DIAGNOSIS — H61.23 BILATERAL IMPACTED CERUMEN: Primary | ICD-10-CM

## 2019-10-29 PROCEDURE — 69209 REMOVE IMPACTED EAR WAX UNI: CPT | Performed by: NURSE PRACTITIONER

## 2019-10-29 PROCEDURE — 99213 OFFICE O/P EST LOW 20 MIN: CPT | Performed by: NURSE PRACTITIONER

## 2019-10-29 NOTE — PROGRESS NOTES
Patient ID: Bryan Landers is a 74 y.o. male     Subjective     Chief Complaint   Patient presents with   • Cerumen Impaction     bilateral       History of Present Illness    Bryan Landers presents to the office today for ear irrigation.  He has had problems with ear fullness for the past couple of weeks.  He states he has had problems with increased earwax in the past however has been approximately 10 years ago which required ear irrigation.  He saw an ad on TV over the weekend which showed an ear irrigation system to buy over-the-counter.  He had this ordered from his pharmacy which also contained earwax drops in the kit.  He applied 1 drop to each ear on Sunday and per the instructions, instructed to have ears flushed 2 days later.  Presented to office today with kit for ear irrigation.      He denies any complaints of fever, chills, cough, chest pain, shortness of air, abdominal pain, nausea, or any other concerns.     The following portions of the patient's history were reviewed and updated as appropriate: allergies, current medications, past family history, past medical history, past social history, past surgical history and problem list.       Review of Systems   HENT:        Ear fullness - possible ear wax impaction.     All other systems reviewed and are negative.      Vitals:    10/29/19 0956   BP: 126/78   Pulse: 102   Resp: 16   Temp: 98.3 °F (36.8 °C)   SpO2: 97%       Documented weights    10/29/19 0956   Weight: 114 kg (252 lb)     Body mass index is 37.21 kg/m².    Results for orders placed or performed in visit on 07/26/19   MicroAlbumin, Urine, Random - Urine, Clean Catch   Result Value Ref Range    Microalbumin, Urine 45.1 Not Estab. ug/mL   CBC (No Diff)   Result Value Ref Range    WBC 3.74 3.40 - 10.80 10*3/mm3    RBC 3.78 (L) 4.14 - 5.80 10*6/mm3    Hemoglobin 13.6 13.0 - 17.7 g/dL    Hematocrit 42.3 37.5 - 51.0 %    .9 (H) 79.0 - 97.0 fL    MCH 36.0 (H) 26.6 - 33.0 pg    MCHC 32.2 31.5  - 35.7 g/dL    RDW 14.3 12.3 - 15.4 %    Platelets 154 140 - 450 10*3/mm3   Comprehensive Metabolic Panel   Result Value Ref Range    Glucose 105 (H) 65 - 99 mg/dL    BUN 15 8 - 23 mg/dL    Creatinine 1.42 (H) 0.76 - 1.27 mg/dL    eGFR Non African Am 49 (L) >60 mL/min/1.73    eGFR African Am 59 (L) >60 mL/min/1.73    BUN/Creatinine Ratio 10.6 7.0 - 25.0    Sodium 143 136 - 145 mmol/L    Potassium 4.4 3.5 - 5.2 mmol/L    Chloride 103 98 - 107 mmol/L    Total CO2 26.0 22.0 - 29.0 mmol/L    Calcium 8.9 8.6 - 10.5 mg/dL    Total Protein 7.0 6.0 - 8.5 g/dL    Albumin 4.00 3.50 - 5.20 g/dL    Globulin 3.0 gm/dL    A/G Ratio 1.3 g/dL    Total Bilirubin 0.7 0.2 - 1.2 mg/dL    Alkaline Phosphatase 72 39 - 117 U/L    AST (SGOT) 48 (H) 1 - 40 U/L    ALT (SGPT) 33 1 - 41 U/L   Hemoglobin A1c   Result Value Ref Range    Hemoglobin A1C 5.60 4.80 - 5.60 %   Iron and TIBC   Result Value Ref Range    TIBC 400 mcg/dL    UIBC 299 112 - 346 mcg/dL    Iron 101 59 - 158 mcg/dL    Iron Saturation 25 20 - 50 %   Ferritin   Result Value Ref Range    Ferritin 382.00 30.00 - 400.00 ng/mL   Lipid Panel   Result Value Ref Range    Total Cholesterol 170 0 - 200 mg/dL    Triglycerides 215 (H) 0 - 150 mg/dL    HDL Cholesterol 66 (H) 40 - 60 mg/dL    VLDL Cholesterol 43 mg/dL    LDL Cholesterol  61 0 - 100 mg/dL   PSA Screen   Result Value Ref Range    PSA 0.398 0.000 - 4.000 ng/mL   TSH   Result Value Ref Range    TSH 5.020 (H) 0.270 - 4.200 mIU/mL   Urinalysis With Microscopic If Indicated (No Culture) - Urine, Clean Catch   Result Value Ref Range    Specific Gravity, UA 1.017 1.005 - 1.030    pH, UA 6.5 5.0 - 8.0    Color, UA Yellow     Appearance, UA Clear Clear    Leukocytes, UA Negative Negative    Protein Negative Negative    Glucose, UA Negative Negative    Ketones Negative Negative    Blood, UA Negative Negative    Bilirubin, UA Negative Negative    Urobilinogen, UA Comment     Nitrite, UA Negative Negative       Objective     Physical  Exam   Constitutional: He appears well-developed. No distress.   HENT:   Bilateral cerumen impaction.  Right greater than left.     Neck: Normal range of motion.   Cardiovascular: Normal rate and regular rhythm.   No murmur heard.  Pulmonary/Chest: Effort normal and breath sounds normal. He has no wheezes.   Lymphadenopathy:     He has no cervical adenopathy.   Skin: Skin is warm and dry.            Assessment/Plan     Assessment/Plan     Bryan was seen today for cerumen impaction.    Diagnoses and all orders for this visit:    Bilateral impacted cerumen  -     Ear Cerumen Removal    Ear Cerumen Removal  Date/Time: 10/29/2019 10:44 AM  Performed by: Lamar Miner APRN  Authorized by: Lamar Miner APRN   Consent: Verbal consent obtained.  Risks and benefits: risks, benefits and alternatives were discussed  Consent given by: patient  Patient understanding: patient states understanding of the procedure being performed  Patient consent: the patient's understanding of the procedure matches consent given  Patient identity confirmed: verbally with patient    Anesthesia:  Local Anesthetic: none  Ceruminolytics applied: Ceruminolytics applied prior to the procedure. (Patient applied drops to ears on Sunday)  Location details: left ear and right ear  Patient tolerance: Patient tolerated the procedure well with no immediate complications  Comments: Used our own supplies.  Instructed the kit he bought is to be used at home as needed.  Moderate amount of cerumen irrigated from right ear and small amount from left ear.  Immediate improvement of symptoms with decreased ear fullness.    Procedure type: irrigation   Sedation:  Patient sedated: no        F/U as needed for any problems or concerns.      NIRAJ Barba  [facility]  [facility]  10/29/19  10:27 AM

## 2020-01-30 ENCOUNTER — OFFICE VISIT (OUTPATIENT)
Dept: GASTROENTEROLOGY | Facility: CLINIC | Age: 75
End: 2020-01-30

## 2020-01-30 VITALS
HEIGHT: 69 IN | SYSTOLIC BLOOD PRESSURE: 128 MMHG | DIASTOLIC BLOOD PRESSURE: 70 MMHG | BODY MASS INDEX: 37.47 KG/M2 | WEIGHT: 253 LBS

## 2020-01-30 DIAGNOSIS — N18.2 STAGE 2 CHRONIC KIDNEY DISEASE: ICD-10-CM

## 2020-01-30 DIAGNOSIS — K62.7 RADIATION PROCTITIS: Primary | ICD-10-CM

## 2020-01-30 DIAGNOSIS — E53.8 VITAMIN B12 DEFICIENCY: ICD-10-CM

## 2020-01-30 DIAGNOSIS — K57.30 DIVERTICULOSIS LARGE INTESTINE W/O PERFORATION OR ABSCESS W/O BLEEDING: ICD-10-CM

## 2020-01-30 PROCEDURE — 99213 OFFICE O/P EST LOW 20 MIN: CPT | Performed by: INTERNAL MEDICINE

## 2020-01-30 NOTE — PROGRESS NOTES
PATIENT INFORMATION  Bryan Landers       - 1945    CHIEF COMPLAINT  Chief Complaint   Patient presents with   • Follow-up     6 mo follow up on proctitis       HISTORY OF PRESENT ILLNESS  Followed for Diverticular bleed and XRT proctitis and is doing well but was shown to be B12 deficient and has not gotten replaced so will discuss with Dr Fonseca in 2 weeks              REVIEW OF SYSTEMS  Review of Systems   All other systems reviewed and are negative.        ACTIVE PROBLEMS  Patient Active Problem List    Diagnosis   • Subclinical hypothyroidism [E03.9]   • Memory problem [R41.3]   • History of hip replacement, total, bilateral [Z96.643]   • Radiation proctitis [K62.7]   • Stage 2 chronic kidney disease [N18.2]   • Medicare annual wellness visit, subsequent [Z00.00]   • Skin tear of forearm without complication, right, subsequent encounter [S51.811D]   • History of prostate cancer [Z85.46]   • Microalbuminuria due to type 2 diabetes mellitus (CMS/[facility]) [E11.29, R80.9]   • Type 2 diabetes mellitus without complication, without long-term current use of insulin (CMS/[facility]) [E11.9]   • Severely overweight [E66.3]   • Routine adult health maintenance [Z00.00]   • Idiopathic chronic gout of left knee [M1A.0620]   • Medication monitoring encounter [Z51.81]   • Essential hypertension [I10]   • Mixed hyperlipidemia [E78.2]   • Arthritis [M19.90]         PAST MEDICAL HISTORY  Past Medical History:   Diagnosis Date   • Arthritis    • Colon polyp    • Diabetes mellitus (CMS/HCC)    • GI bleed    • Hyperlipidemia    • Hypertension          SURGICAL HISTORY  Past Surgical History:   Procedure Laterality Date   • COLONOSCOPY  2016   • COLONOSCOPY N/A 2018    Procedure: COLONOSCOPY;  Surgeon: Olaf Gomez MD;  Location: [facility] OR;  Service: Gastroenterology   • COLONOSCOPY N/A 2019    Procedure: COLONOSCOPY;  Surgeon: Rosa Jack MD;  Location: [facility] OR;  Service: Gastroenterology   •  HERNIA REPAIR     • PROSTATE ULTRASOUND BIOPSY     • SIGMOIDOSCOPY N/A 10/2/2018    Procedure: FLEXIBLE SIGMOIDOSCOPY:  APC cautery bleeding using 60 joules;  Surgeon: Olaf Gomez MD;  Location: [facility] ENDOSCOPY;  Service: Gastroenterology   • TOTAL HIP ARTHROPLASTY Right          FAMILY HISTORY  Family History   Problem Relation Age of Onset   • Stroke Mother    • Stroke Father    • No Known Problems Brother    • Colon cancer Neg Hx    • Colon polyps Neg Hx          SOCIAL HISTORY  Social History     Occupational History   • 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    Tobacco Use   • Smoking status: Former Smoker     Types: Cigars     Last attempt to quit: 1986     Years since quittin.1   • Smokeless tobacco: Never Used   Substance and Sexual Activity   • Alcohol use: Yes     Alcohol/week: 32.0 - 34.0 standard drinks     Types: 4 - 6 Shots of liquor, 28 Standard drinks or equivalent per week     Frequency: 4 or more times a week     Drinks per session: 5 or 6     Comment: 2-3 double vodkas a night   • Drug use: No   • Sexual activity: Yes     Partners: Female     Birth control/protection: Post-menopausal       Debilities/Disabilities Identified: None    Emotional Behavior: Appropriate    CURRENT MEDICATIONS    Current Outpatient Medications:   •  allopurinol (ZYLOPRIM) 100 MG tablet, Take 1 tablet by mouth Daily., Disp: 180 tablet, Rfl: 3  •  allopurinol (ZYLOPRIM) 100 MG tablet, TAKE TWO TABLETS BY MOUTH DAILY, Disp: 180 tablet, Rfl: 0  •  aspirin 81 MG chewable tablet, Chew 81 mg Daily., Disp: , Rfl:   •  atorvastatin (LIPITOR) 80 MG tablet, Take 1 tablet by mouth Daily., Disp: 90 tablet, Rfl: 3  •  cholecalciferol (VITAMIN D3) 1000 UNITS tablet, Take 1,000 Units by mouth Daily., Disp: , Rfl:   •  donepezil (ARICEPT) 10 MG tablet, Take 1 tablet by mouth Daily With Breakfast., Disp: 90 tablet, Rfl: 3  •  levothyroxine (SYNTHROID, LEVOTHROID) 25 MCG tablet, Take 1 tablet by  "mouth Daily., Disp: 90 tablet, Rfl: 1  •  lisinopril (PRINIVIL,ZESTRIL) 20 MG tablet, TAKE ONE TABLET BY MOUTH DAILY, Disp: 90 tablet, Rfl: 1  •  lisinopril (PRINIVIL,ZESTRIL) 5 MG tablet, Take 1 tablet by mouth Daily., Disp: 90 tablet, Rfl: 3  •  Multiple Vitamins-Minerals (MULTIVITAL PLATINUM PO), Take 1 tablet by mouth Daily., Disp: , Rfl:   •  pioglitazone (ACTOS) 30 MG tablet, Take 1 tablet by mouth Daily. Hold until follow up with Dr. Fonseca, Disp: 90 tablet, Rfl: 3  •  POTASSIUM PO, Take  by mouth. Dose unknown, Disp: , Rfl:     ALLERGIES  Nsaids    VITALS  Vitals:    01/30/20 1026   BP: 128/70   Weight: 115 kg (253 lb)   Height: 175.3 cm (69.02\")       LAST RESULTS   Results Encounter on 07/26/2019   Component Date Value Ref Range Status   • Microalbumin, Urine 08/09/2019 45.1  Not Estab. ug/mL Final   • WBC 08/09/2019 3.74  3.40 - 10.80 10*3/mm3 Final   • RBC 08/09/2019 3.78* 4.14 - 5.80 10*6/mm3 Final   • Hemoglobin 08/09/2019 13.6  13.0 - 17.7 g/dL Final   • Hematocrit 08/09/2019 42.3  37.5 - 51.0 % Final   • MCV 08/09/2019 111.9* 79.0 - 97.0 fL Final   • MCH 08/09/2019 36.0* 26.6 - 33.0 pg Final   • MCHC 08/09/2019 32.2  31.5 - 35.7 g/dL Final   • RDW 08/09/2019 14.3  12.3 - 15.4 % Final   • Platelets 08/09/2019 154  140 - 450 10*3/mm3 Final   • Glucose 08/09/2019 105* 65 - 99 mg/dL Final   • BUN 08/09/2019 15  8 - 23 mg/dL Final   • Creatinine 08/09/2019 1.42* 0.76 - 1.27 mg/dL Final   • eGFR Non African Am 08/09/2019 49* >60 mL/min/1.73 Final    Comment: The MDRD GFR formula is only valid for adults with stable  renal function between ages 18 and 70.     • eGFR  Am 08/09/2019 59* >60 mL/min/1.73 Final   • BUN/Creatinine Ratio 08/09/2019 10.6  7.0 - 25.0 Final   • Sodium 08/09/2019 143  136 - 145 mmol/L Final   • Potassium 08/09/2019 4.4  3.5 - 5.2 mmol/L Final   • Chloride 08/09/2019 103  98 - 107 mmol/L Final   • Total CO2 08/09/2019 26.0  22.0 - 29.0 mmol/L Final   • Calcium 08/09/2019 8.9  " 8.6 - 10.5 mg/dL Final   • Total Protein 08/09/2019 7.0  6.0 - 8.5 g/dL Final   • Albumin 08/09/2019 4.00  3.50 - 5.20 g/dL Final   • Globulin 08/09/2019 3.0  gm/dL Final   • A/G Ratio 08/09/2019 1.3  g/dL Final   • Total Bilirubin 08/09/2019 0.7  0.2 - 1.2 mg/dL Final   • Alkaline Phosphatase 08/09/2019 72  39 - 117 U/L Final   • AST (SGOT) 08/09/2019 48* 1 - 40 U/L Final   • ALT (SGPT) 08/09/2019 33  1 - 41 U/L Final   • Hemoglobin A1C 08/09/2019 5.60  4.80 - 5.60 % Final    Comment: Hemoglobin A1C Ranges:  Increased Risk for Diabetes  5.7% to 6.4%  Diabetes                     >= 6.5%  Diabetic Goal                < 7.0%     • TIBC 08/09/2019 400  mcg/dL Final   • UIBC 08/09/2019 299  112 - 346 mcg/dL Final   • Iron 08/09/2019 101  59 - 158 mcg/dL Final   • Iron Saturation 08/09/2019 25  20 - 50 % Final   • Ferritin 08/09/2019 382.00  30.00 - 400.00 ng/mL Final   • Total Cholesterol 08/09/2019 170  0 - 200 mg/dL Final   • Triglycerides 08/09/2019 215* 0 - 150 mg/dL Final   • HDL Cholesterol 08/09/2019 66* 40 - 60 mg/dL Final   • VLDL Cholesterol 08/09/2019 43  mg/dL Final   • LDL Cholesterol  08/09/2019 61  0 - 100 mg/dL Final   • PSA 08/09/2019 0.398  0.000 - 4.000 ng/mL Final   • TSH 08/09/2019 5.020* 0.270 - 4.200 mIU/mL Final   • Specific Gravity, UA 08/09/2019 1.017  1.005 - 1.030 Final   • pH, UA 08/09/2019 6.5  5.0 - 8.0 Final   • Color, UA 08/09/2019 Yellow   Final    Comment: Urine microscopic not indicated.  REFERENCE RANGE: Yellow, Straw     • Appearance, UA 08/09/2019 Clear  Clear Final   • Leukocytes, UA 08/09/2019 Negative  Negative Final   • Protein 08/09/2019 Negative  Negative Final   • Glucose, UA 08/09/2019 Negative  Negative Final   • Ketones 08/09/2019 Negative  Negative Final   • Blood, UA 08/09/2019 Negative  Negative Final   • Bilirubin, UA 08/09/2019 Negative  Negative Final   • Urobilinogen, UA 08/09/2019 Comment   Final    Comment: 0.2 E.U./dL  REFERENCE RANGE: 0.2 - 1.0 E.U./dL     •  Nitrite, UA 08/09/2019 Negative  Negative Final     No results found.    PHYSICAL EXAM  Physical Exam   Constitutional: He is oriented to person, place, and time. He appears well-developed and well-nourished.   Eyes: Pupils are equal, round, and reactive to light. Conjunctivae and EOM are normal. No scleral icterus.   Neck: Normal range of motion. Neck supple. No thyromegaly present.   Cardiovascular: Normal rate, regular rhythm, normal heart sounds and intact distal pulses. Exam reveals no gallop.   No murmur heard.  Pulmonary/Chest: Effort normal and breath sounds normal. He has no wheezes. He has no rales.   Abdominal: Soft. Bowel sounds are normal. He exhibits no shifting dullness, no distension, no fluid wave, no abdominal bruit, no ascites and no mass. There is no hepatosplenomegaly. There is no tenderness. There is no guarding and negative Pabon's sign. Hernia confirmed negative in the ventral area.   Musculoskeletal: Normal range of motion. He exhibits no edema.   Lymphadenopathy:     He has no cervical adenopathy.   Neurological: He is alert and oriented to person, place, and time.   Skin: Skin is warm and dry. No rash noted. He is not diaphoretic. No erythema.       ASSESSMENT  Diagnoses and all orders for this visit:    Radiation proctitis    Diverticulosis large intestine w/o perforation or abscess w/o bleeding    Stage 2 chronic kidney disease    Vitamin B12 deficiency          PLAN  Return in about 6 months (around 7/30/2020).

## 2020-02-06 DIAGNOSIS — E11.9 TYPE 2 DIABETES MELLITUS WITHOUT COMPLICATION, WITHOUT LONG-TERM CURRENT USE OF INSULIN (HCC): ICD-10-CM

## 2020-02-06 DIAGNOSIS — Z51.81 MEDICATION MONITORING ENCOUNTER: ICD-10-CM

## 2020-02-06 DIAGNOSIS — E78.2 MIXED HYPERLIPIDEMIA: ICD-10-CM

## 2020-02-06 DIAGNOSIS — N18.2 STAGE 2 CHRONIC KIDNEY DISEASE: ICD-10-CM

## 2020-02-06 DIAGNOSIS — I10 ESSENTIAL HYPERTENSION: ICD-10-CM

## 2020-02-06 DIAGNOSIS — E03.8 SUBCLINICAL HYPOTHYROIDISM: ICD-10-CM

## 2020-02-06 DIAGNOSIS — E55.9 VITAMIN D DEFICIENCY: ICD-10-CM

## 2020-02-06 DIAGNOSIS — M1A.0620 IDIOPATHIC CHRONIC GOUT OF LEFT KNEE WITHOUT TOPHUS: ICD-10-CM

## 2020-02-08 LAB
25(OH)D3+25(OH)D2 SERPL-MCNC: 49.7 NG/ML (ref 30–100)
ALBUMIN SERPL-MCNC: 3.8 G/DL (ref 3.5–5.2)
ALT SERPL-CCNC: 28 U/L (ref 1–41)
APPEARANCE UR: CLEAR
BILIRUB UR QL STRIP: NEGATIVE
BUN SERPL-MCNC: 17 MG/DL (ref 8–23)
BUN/CREAT SERPL: 11.6 (ref 7–25)
CALCIUM SERPL-MCNC: 8.6 MG/DL (ref 8.6–10.5)
CHLORIDE SERPL-SCNC: 102 MMOL/L (ref 98–107)
CHOLEST SERPL-MCNC: 170 MG/DL (ref 0–200)
CO2 SERPL-SCNC: 24.9 MMOL/L (ref 22–29)
COLOR UR: YELLOW
CREAT SERPL-MCNC: 1.47 MG/DL (ref 0.76–1.27)
ERYTHROCYTE [DISTWIDTH] IN BLOOD BY AUTOMATED COUNT: 12.6 % (ref 12.3–15.4)
GLUCOSE SERPL-MCNC: 128 MG/DL (ref 65–99)
GLUCOSE UR QL: NEGATIVE
HBA1C MFR BLD: 6.3 % (ref 4.8–5.6)
HCT VFR BLD AUTO: 38.7 % (ref 37.5–51)
HDLC SERPL-MCNC: 65 MG/DL (ref 40–60)
HGB BLD-MCNC: 13.2 G/DL (ref 13–17.7)
HGB UR QL STRIP: NEGATIVE
KETONES UR QL STRIP: NEGATIVE
LDLC SERPL CALC-MCNC: 65 MG/DL (ref 0–100)
LEUKOCYTE ESTERASE UR QL STRIP: NEGATIVE
MCH RBC QN AUTO: 35.3 PG (ref 26.6–33)
MCHC RBC AUTO-ENTMCNC: 34.1 G/DL (ref 31.5–35.7)
MCV RBC AUTO: 103.5 FL (ref 79–97)
MICROALBUMIN UR-MCNC: 17.3 UG/ML
NITRITE UR QL STRIP: NEGATIVE
PH UR STRIP: 6 [PH] (ref 5–8)
PHOSPHATE SERPL-MCNC: 2.8 MG/DL (ref 2.5–4.5)
PLATELET # BLD AUTO: 137 10*3/MM3 (ref 140–450)
POTASSIUM SERPL-SCNC: 4.4 MMOL/L (ref 3.5–5.2)
PROT UR QL STRIP: NEGATIVE
RBC # BLD AUTO: 3.74 10*6/MM3 (ref 4.14–5.8)
SODIUM SERPL-SCNC: 141 MMOL/L (ref 136–145)
SP GR UR: 1.01 (ref 1–1.03)
T4 FREE SERPL-MCNC: 1.26 NG/DL (ref 0.93–1.7)
TRIGL SERPL-MCNC: 201 MG/DL (ref 0–150)
TSH SERPL DL<=0.005 MIU/L-ACNC: 4.25 UIU/ML (ref 0.27–4.2)
URATE SERPL-MCNC: 5.2 MG/DL (ref 3.4–7)
UROBILINOGEN UR STRIP-MCNC: NORMAL MG/DL
VLDLC SERPL CALC-MCNC: 40.2 MG/DL (ref 5–40)
WBC # BLD AUTO: 4.51 10*3/MM3 (ref 3.4–10.8)

## 2020-02-14 ENCOUNTER — OFFICE VISIT (OUTPATIENT)
Dept: FAMILY MEDICINE CLINIC | Facility: CLINIC | Age: 75
End: 2020-02-14

## 2020-02-14 VITALS
HEART RATE: 109 BPM | HEIGHT: 69 IN | SYSTOLIC BLOOD PRESSURE: 120 MMHG | BODY MASS INDEX: 37.18 KG/M2 | OXYGEN SATURATION: 97 % | WEIGHT: 251 LBS | DIASTOLIC BLOOD PRESSURE: 74 MMHG

## 2020-02-14 DIAGNOSIS — Z51.81 MEDICATION MONITORING ENCOUNTER: ICD-10-CM

## 2020-02-14 DIAGNOSIS — E53.8 B12 DEFICIENCY: ICD-10-CM

## 2020-02-14 DIAGNOSIS — I10 ESSENTIAL HYPERTENSION: ICD-10-CM

## 2020-02-14 DIAGNOSIS — M10.062 ACUTE IDIOPATHIC GOUT OF LEFT KNEE: ICD-10-CM

## 2020-02-14 DIAGNOSIS — E78.2 MIXED HYPERLIPIDEMIA: ICD-10-CM

## 2020-02-14 DIAGNOSIS — E03.8 SUBCLINICAL HYPOTHYROIDISM: ICD-10-CM

## 2020-02-14 DIAGNOSIS — Z00.00 ROUTINE ADULT HEALTH MAINTENANCE: ICD-10-CM

## 2020-02-14 DIAGNOSIS — M1A.0620 IDIOPATHIC CHRONIC GOUT OF LEFT KNEE WITHOUT TOPHUS: ICD-10-CM

## 2020-02-14 DIAGNOSIS — Z00.00 MEDICARE ANNUAL WELLNESS VISIT, SUBSEQUENT: Primary | ICD-10-CM

## 2020-02-14 DIAGNOSIS — E11.9 TYPE 2 DIABETES MELLITUS WITHOUT COMPLICATION, WITHOUT LONG-TERM CURRENT USE OF INSULIN (HCC): ICD-10-CM

## 2020-02-14 DIAGNOSIS — N18.2 STAGE 2 CHRONIC KIDNEY DISEASE: ICD-10-CM

## 2020-02-14 PROBLEM — S51.811D SKIN TEAR OF FOREARM WITHOUT COMPLICATION, RIGHT, SUBSEQUENT ENCOUNTER: Status: RESOLVED | Noted: 2018-04-23 | Resolved: 2020-02-14

## 2020-02-14 PROCEDURE — G0439 PPPS, SUBSEQ VISIT: HCPCS | Performed by: FAMILY MEDICINE

## 2020-02-14 RX ORDER — ALLOPURINOL 100 MG/1
100 TABLET ORAL DAILY
Qty: 90 TABLET | Refills: 3 | Status: SHIPPED | OUTPATIENT
Start: 2020-02-14 | End: 2020-04-14

## 2020-02-14 RX ORDER — LEVOTHYROXINE SODIUM 0.03 MG/1
25 TABLET ORAL DAILY
Qty: 90 TABLET | Refills: 1 | Status: SHIPPED | OUTPATIENT
Start: 2020-02-14 | End: 2020-06-03

## 2020-02-14 RX ORDER — FLUOROURACIL 50 MG/G
CREAM TOPICAL 2 TIMES DAILY
COMMUNITY

## 2020-02-14 RX ORDER — LISINOPRIL 20 MG/1
20 TABLET ORAL DAILY
Qty: 90 TABLET | Refills: 3 | Status: SHIPPED | OUTPATIENT
Start: 2020-02-14 | End: 2021-03-30

## 2020-02-14 NOTE — PROGRESS NOTES
The ABCs of the Annual Wellness Visit  Subsequent Medicare Wellness Visit    Chief Complaint   Patient presents with   • Medicare Wellness-subsequent       Subjective   History of Present Illness:  Bryan Landers is a 75 y.o. male who presents for a Subsequent Medicare Wellness Visit.    HEALTH RISK ASSESSMENT    Recent Hospitalizations:  No hospitalization(s) within the last year.    Current Medical Providers:  Patient Care Team:  Jose Fonseca MD as PCP - General (Family Medicine)  Jose Michelle MD as Consulting Physician (Radiation Oncology)  Roldan Mccarthy MD as Consulting Physician (Urology)    Smoking Status:  Social History     Tobacco Use   Smoking Status Former Smoker   • Types: Cigars   • Last attempt to quit: 1986   • Years since quittin.1   Smokeless Tobacco Never Used       Alcohol Consumption:  Social History     Substance and Sexual Activity   Alcohol Use Yes   • Alcohol/week: 32.0 - 34.0 standard drinks   • Types: 4 - 6 Shots of liquor, 28 Standard drinks or equivalent per week   • Frequency: 4 or more times a week   • Drinks per session: 5 or 6    Comment: 2-3 double vodkas a night       Depression Screen:   PHQ-2/PHQ-9 Depression Screening 2020   Little interest or pleasure in doing things 0   Feeling down, depressed, or hopeless 0   Trouble falling or staying asleep, or sleeping too much 0   Feeling tired or having little energy 0   Poor appetite or overeating 0   Feeling bad about yourself - or that you are a failure or have let yourself or your family down 0   Trouble concentrating on things, such as reading the newspaper or watching television 0   Moving or speaking so slowly that other people could have noticed. Or the opposite - being so fidgety or restless that you have been moving around a lot more than usual 0   Thoughts that you would be better off dead, or of hurting yourself in some way 0   Total Score 0       Fall Risk Screen:  STEADI Fall Risk Assessment  has not been completed.    Health Habits and Functional and Cognitive Screening:  Functional & Cognitive Status 2/14/2020   Do you have difficulty preparing food and eating? No   Do you have difficulty bathing yourself, getting dressed or grooming yourself? No   Do you have difficulty using the toilet? No   Do you have difficulty moving around from place to place? No   Do you have trouble with steps or getting out of a bed or a chair? No   Current Diet Well Balanced Diet   Dental Exam Up to date   Eye Exam Up to date   Exercise (times per week) 3 times per week   Current Exercise Activities Include Light Weight/Kettebells   Do you need help using the phone?  No   Are you deaf or do you have serious difficulty hearing?  Yes   Do you need help with transportation? No   Do you need help shopping? No   Do you need help preparing meals?  No   Do you need help with housework?  No   Do you need help with laundry? No   Do you need help taking your medications? No   Do you need help managing money? No   Do you ever drive or ride in a car without wearing a seat belt? No   Have you felt unusual stress, anger or loneliness in the last month? No   Who do you live with? Spouse   If you need help, do you have trouble finding someone available to you? Yes   Have you been bothered in the last four weeks by sexual problems? No   Do you have difficulty concentrating, remembering or making decisions? Yes         Does the patient have evidence of cognitive impairment? No    Asprin use counseling:Taking ASA appropriately as indicated    Age-appropriate Screening Schedule:  Refer to the list below for future screening recommendations based on patient's age, sex and/or medical conditions. Orders for these recommended tests are listed in the plan section. The patient has been provided with a written plan.    Health Maintenance   Topic Date Due   • TDAP/TD VACCINES (1 - Tdap) 01/09/1956   • ZOSTER VACCINE (1 of 2) 04/11/2005   • PNEUMOCOCCAL  VACCINE (65+ HIGH RISK) (1 of 2 - PCV13) 01/09/2009   • DIABETIC EYE EXAM  08/01/2020   • HEMOGLOBIN A1C  08/07/2020   • LIPID PANEL  02/07/2021   • URINE MICROALBUMIN  02/07/2021   • COLONOSCOPY  09/15/2021   • INFLUENZA VACCINE  Completed          The following portions of the patient's history were reviewed and updated as appropriate: allergies, current medications, past family history, past medical history, past social history, past surgical history and problem list.    Outpatient Medications Prior to Visit   Medication Sig Dispense Refill   • aspirin 81 MG chewable tablet Chew 81 mg Daily.     • atorvastatin (LIPITOR) 80 MG tablet Take 1 tablet by mouth Daily. 90 tablet 3   • cholecalciferol (VITAMIN D3) 1000 UNITS tablet Take 1,000 Units by mouth Daily.     • Cyanocobalamin (VITAMIN B 12 PO) Take 1,000 mg by mouth Every Morning.     • donepezil (ARICEPT) 10 MG tablet Take 1 tablet by mouth Daily With Breakfast. 90 tablet 3   • fluorouracil (EFUDEX) 5 % cream Apply  topically to the appropriate area as directed 2 (Two) Times a Day.     • Multiple Vitamins-Minerals (MULTIVITAL PLATINUM PO) Take 1 tablet by mouth Daily.     • pioglitazone (ACTOS) 30 MG tablet Take 1 tablet by mouth Daily. Hold until follow up with Dr. Fonseca 90 tablet 3   • POTASSIUM PO Take  by mouth. Dose unknown     • psyllium (METAMUCIL) 58.6 % powder Take  by mouth 3 (Three) Times a Day.     • allopurinol (ZYLOPRIM) 100 MG tablet Take 1 tablet by mouth Daily. 180 tablet 3   • levothyroxine (SYNTHROID, LEVOTHROID) 25 MCG tablet Take 1 tablet by mouth Daily. 90 tablet 1   • lisinopril (PRINIVIL,ZESTRIL) 20 MG tablet TAKE ONE TABLET BY MOUTH DAILY 90 tablet 1   • lisinopril (PRINIVIL,ZESTRIL) 5 MG tablet Take 1 tablet by mouth Daily. 90 tablet 3   • allopurinol (ZYLOPRIM) 100 MG tablet TAKE TWO TABLETS BY MOUTH DAILY 180 tablet 0     No facility-administered medications prior to visit.        Patient Active Problem List   Diagnosis   •  "Essential hypertension   • Mixed hyperlipidemia   • Arthritis   • Type 2 diabetes mellitus without complication, without long-term current use of insulin (CMS/[facility])   • Severely overweight   • Routine adult health maintenance   • Idiopathic chronic gout of left knee   • Medication monitoring encounter   • Microalbuminuria due to type 2 diabetes mellitus (CMS/[facility])   • History of prostate cancer   • Stage 2 chronic kidney disease   • Medicare annual wellness visit, subsequent   • Radiation proctitis   • History of hip replacement, total, bilateral   • Memory problem   • Subclinical hypothyroidism   • GI bleed   • B12 deficiency       Advanced Care Planning:  ACP discussion was held with the patient during this visit. Patient has an advance directive, copy requested.    Review of Systems    Compared to one year ago, the patient feels his physical health is better.  Compared to one year ago, the patient feels his mental health is better.    Reviewed chart for potential of high risk medication in the elderly: yes  Reviewed chart for potential of harmful drug interactions in the elderly:yes    Objective         Vitals:    02/14/20 1116   BP: 120/74   BP Location: Left arm   Patient Position: Sitting   Cuff Size: Adult   Pulse: 109   SpO2: 97%   Weight: 114 kg (251 lb)   Height: 175.3 cm (69.02\")       Body mass index is 37.04 kg/m².  Discussed the patient's BMI with him. The BMI is above average; BMI management plan is completed.    Physical Exam    Lab Results   Component Value Date     (H) 02/07/2020    CHLPL 170 02/07/2020    TRIG 201 (H) 02/07/2020    HDL 65 (H) 02/07/2020    LDL 65 02/07/2020    VLDL 40.2 (H) 02/07/2020    HGBA1C 6.30 (H) 02/07/2020        Assessment/Plan   Medicare Risks and Personalized Health Plan  CMS Preventative Services Quick Reference  Alcohol Misuse  Cardiovascular risk  Colon Cancer Screening  Dementia/Memory   Depression/Dysphoria  Diabetic Lab Screening   Obesity/Overweight "   Prostate Cancer Screening     The above risks/problems have been discussed with the patient.  Pertinent information has been shared with the patient in the After Visit Summary.  Follow up plans and orders are seen below in the Assessment/Plan Section.    Diagnoses and all orders for this visit:    1. Medicare annual wellness visit, subsequent (Primary)    2. Essential hypertension  -     lisinopril (PRINIVIL,ZESTRIL) 20 MG tablet; Take 1 tablet by mouth Daily. Indications: High Blood Pressure Disorder  Dispense: 90 tablet; Refill: 3    3. Idiopathic chronic gout of left knee without tophus    4. Mixed hyperlipidemia    5. Routine adult health maintenance    6. Stage 2 chronic kidney disease  -     Basic Metabolic Panel; Future    7. Subclinical hypothyroidism  -     levothyroxine (SYNTHROID, LEVOTHROID) 25 MCG tablet; Take 1 tablet by mouth Daily. Indications: Underactive Thyroid Gland  Dispense: 90 tablet; Refill: 1  -     TSH; Future  -     T4, Free; Future    8. Type 2 diabetes mellitus without complication, without long-term current use of insulin (CMS/[facility])  -     Hemoglobin A1c; Future    9. B12 deficiency  -     Vitamin B12; Future  -     Folate; Future    10. Acute idiopathic gout of left knee  -     allopurinol (ZYLOPRIM) 100 MG tablet; Take 1 tablet by mouth Daily. Indications: Gout secondary to Another Condition  Dispense: 90 tablet; Refill: 3  -     Uric Acid; Future    11. Medication monitoring encounter  -     Hepatic Function Panel; Future    no gout flairs  [facility]  Still drinking heavy every night.  Using 5FU on face for extensive AKs.  Start OTC vit b12 1000 mcg a day.    He stopped taking synthroid for about a month, so will leave the dose the same    Follow Up:  Return in about 6 months (around 8/14/2020) for diabetes.     An After Visit Summary and PPPS were given to the patient.

## 2020-02-14 NOTE — PATIENT INSTRUCTIONS
Results for orders placed or performed in visit on 02/06/20   Vitamin D 25 Hydroxy   Result Value Ref Range    25 Hydroxy, Vitamin D 49.7 30.0 - 100.0 ng/ml   T4, Free   Result Value Ref Range    Free T4 1.26 0.93 - 1.70 ng/dL   TSH   Result Value Ref Range    TSH 4.250 (H) 0.270 - 4.200 uIU/mL   Uric Acid   Result Value Ref Range    Uric Acid 5.2 3.4 - 7.0 mg/dL   Urinalysis With Microscopic If Indicated (No Culture) - Urine, Clean Catch   Result Value Ref Range    Specific Gravity, UA 1.015 1.005 - 1.030    pH, UA 6.0 5.0 - 8.0    Color, UA Yellow     Appearance, UA Clear Clear    Leukocytes, UA Negative Negative    Protein Negative Negative    Glucose, UA Negative Negative    Ketones Negative Negative    Blood, UA Negative Negative    Bilirubin, UA Negative Negative    Urobilinogen, UA Comment     Nitrite, UA Negative Negative   CBC (No Diff)   Result Value Ref Range    WBC 4.51 3.40 - 10.80 10*3/mm3    RBC 3.74 (L) 4.14 - 5.80 10*6/mm3    Hemoglobin 13.2 13.0 - 17.7 g/dL    Hematocrit 38.7 37.5 - 51.0 %    .5 (H) 79.0 - 97.0 fL    MCH 35.3 (H) 26.6 - 33.0 pg    MCHC 34.1 31.5 - 35.7 g/dL    RDW 12.6 12.3 - 15.4 %    Platelets 137 (L) 140 - 450 10*3/mm3   Hemoglobin A1c   Result Value Ref Range    Hemoglobin A1C 6.30 (H) 4.80 - 5.60 %   Lipid Panel   Result Value Ref Range    Total Cholesterol 170 0 - 200 mg/dL    Triglycerides 201 (H) 0 - 150 mg/dL    HDL Cholesterol 65 (H) 40 - 60 mg/dL    VLDL Cholesterol 40.2 (H) 5 - 40 mg/dL    LDL Cholesterol  65 0 - 100 mg/dL   Renal Function Panel   Result Value Ref Range    Glucose 128 (H) 65 - 99 mg/dL    BUN 17 8 - 23 mg/dL    Creatinine 1.47 (H) 0.76 - 1.27 mg/dL    eGFR Non African Am 47 (L) >60 mL/min/1.73    eGFR African Am 56 (L) >60 mL/min/1.73    BUN/Creatinine Ratio 11.6 7.0 - 25.0    Sodium 141 136 - 145 mmol/L    Potassium 4.4 3.5 - 5.2 mmol/L    Chloride 102 98 - 107 mmol/L    Total CO2 24.9 22.0 - 29.0 mmol/L    Calcium 8.6 8.6 - 10.5 mg/dL     Phosphorus 2.8 2.5 - 4.5 mg/dL    Albumin 3.80 3.50 - 5.20 g/dL   MicroAlbumin, Urine, Random - Urine, Clean Catch   Result Value Ref Range    Microalbumin, Urine 17.3 Not Estab. ug/mL   ALT   Result Value Ref Range    ALT (SGPT) 28 1 - 41 U/L

## 2020-04-14 DIAGNOSIS — M10.062 ACUTE IDIOPATHIC GOUT OF LEFT KNEE: ICD-10-CM

## 2020-04-14 RX ORDER — ALLOPURINOL 100 MG/1
TABLET ORAL
Qty: 180 TABLET | Refills: 0 | Status: SHIPPED | OUTPATIENT
Start: 2020-04-14 | End: 2020-08-12 | Stop reason: SDUPTHER

## 2020-06-03 DIAGNOSIS — E03.8 SUBCLINICAL HYPOTHYROIDISM: ICD-10-CM

## 2020-06-03 RX ORDER — LEVOTHYROXINE SODIUM 0.03 MG/1
TABLET ORAL
Qty: 90 TABLET | Refills: 0 | Status: SHIPPED | OUTPATIENT
Start: 2020-06-03 | End: 2020-08-12 | Stop reason: SDUPTHER

## 2020-07-20 ENCOUNTER — TELEPHONE (OUTPATIENT)
Dept: FAMILY MEDICINE CLINIC | Facility: CLINIC | Age: 75
End: 2020-07-20

## 2020-07-23 DIAGNOSIS — R41.3 MEMORY PROBLEM: ICD-10-CM

## 2020-07-23 DIAGNOSIS — E11.9 TYPE 2 DIABETES MELLITUS WITHOUT COMPLICATION, WITHOUT LONG-TERM CURRENT USE OF INSULIN (HCC): ICD-10-CM

## 2020-07-23 RX ORDER — PIOGLITAZONEHYDROCHLORIDE 30 MG/1
TABLET ORAL
Qty: 90 TABLET | Refills: 0 | Status: SHIPPED | OUTPATIENT
Start: 2020-07-23 | End: 2021-02-22

## 2020-07-23 RX ORDER — DONEPEZIL HYDROCHLORIDE 10 MG/1
TABLET, FILM COATED ORAL
Qty: 90 TABLET | Refills: 0 | Status: SHIPPED | OUTPATIENT
Start: 2020-07-23 | End: 2021-10-13 | Stop reason: SINTOL

## 2020-07-30 ENCOUNTER — TELEMEDICINE (OUTPATIENT)
Dept: GASTROENTEROLOGY | Facility: CLINIC | Age: 75
End: 2020-07-30

## 2020-07-30 ENCOUNTER — RESULTS ENCOUNTER (OUTPATIENT)
Dept: FAMILY MEDICINE CLINIC | Facility: CLINIC | Age: 75
End: 2020-07-30

## 2020-07-30 DIAGNOSIS — E03.8 SUBCLINICAL HYPOTHYROIDISM: ICD-10-CM

## 2020-07-30 DIAGNOSIS — E53.8 B12 DEFICIENCY: ICD-10-CM

## 2020-07-30 DIAGNOSIS — K62.7 RADIATION PROCTITIS: ICD-10-CM

## 2020-07-30 DIAGNOSIS — Z51.81 MEDICATION MONITORING ENCOUNTER: ICD-10-CM

## 2020-07-30 DIAGNOSIS — K57.90 DIVERTICULOSIS: Primary | ICD-10-CM

## 2020-07-30 DIAGNOSIS — M10.062 ACUTE IDIOPATHIC GOUT OF LEFT KNEE: ICD-10-CM

## 2020-07-30 DIAGNOSIS — E53.8 VITAMIN B12 DEFICIENCY: ICD-10-CM

## 2020-07-30 DIAGNOSIS — N18.2 STAGE 2 CHRONIC KIDNEY DISEASE: ICD-10-CM

## 2020-07-30 DIAGNOSIS — M1A.9XX0 CHRONIC GOUT WITHOUT TOPHUS, UNSPECIFIED CAUSE, UNSPECIFIED SITE: ICD-10-CM

## 2020-07-30 DIAGNOSIS — E11.21 TYPE 2 DIABETES MELLITUS WITH DIABETIC NEPHROPATHY, WITHOUT LONG-TERM CURRENT USE OF INSULIN (HCC): ICD-10-CM

## 2020-07-30 DIAGNOSIS — E11.9 TYPE 2 DIABETES MELLITUS WITHOUT COMPLICATION, WITHOUT LONG-TERM CURRENT USE OF INSULIN (HCC): ICD-10-CM

## 2020-07-30 PROCEDURE — 99212 OFFICE O/P EST SF 10 MIN: CPT | Performed by: INTERNAL MEDICINE

## 2020-07-30 NOTE — PROGRESS NOTES
Tele visit:  Unable to complete visit using a video connection to the patient. A phone visit was used to complete this visits. You have chosen to receive care through a telephone visit. Do you consent to use a telephone visit for your medical care today? Yes    PATIENT INFORMATION  Bryan ROSS - 1945    CHIEF COMPLAINT  Chief Complaint   Patient presents with   • Follow-up     6 MO FOLLOW UP ON RADIATION PROCTITIS       HISTORY OF PRESENT ILLNESS  Was tested  For COVID last week due to GK exposure. Is a poor historian and doesn't recall last visit with PCP nor does he recall getttin B12 shots.     Is exercising and his treadmill checks his pulse and does have a cuff for his BP too.    No rectal bleeding and no dyspepsia so was strongly encouraged to get back into his PCP to have his Renal function HGB A1C and B12 checked again              REVIEW OF SYSTEMS  Review of Systems   All other systems reviewed and are negative.        ACTIVE PROBLEMS  Patient Active Problem List    Diagnosis   • B12 deficiency [E53.8]   • GI bleed [K92.2]   • Subclinical hypothyroidism [E03.9]   • Memory problem [R41.3]   • History of hip replacement, total, bilateral [Z96.643]   • Radiation proctitis [K62.7]   • Stage 2 chronic kidney disease [N18.2]   • Medicare annual wellness visit, subsequent [Z00.00]   • History of prostate cancer [Z85.46]   • Microalbuminuria due to type 2 diabetes mellitus (CMS/HCC) [E11.29, R80.9]   • Type 2 diabetes mellitus without complication, without long-term current use of insulin (CMS/[facility]) [E11.9]   • Severely overweight [E66.3]   • Routine adult health maintenance [Z00.00]   • Idiopathic chronic gout of left knee [M1A.0620]   • Medication monitoring encounter [Z51.81]   • Essential hypertension [I10]   • Mixed hyperlipidemia [E78.2]   • Arthritis [M19.90]         PAST MEDICAL HISTORY  Past Medical History:   Diagnosis Date   • Disease of thyroid gland    • GI bleed    • Gout    •  Hyperlipidemia    • Hypertension    • Vitamin D deficiency          SURGICAL HISTORY  Past Surgical History:   Procedure Laterality Date   • COLONOSCOPY  2016   • COLONOSCOPY N/A 2018    Procedure: COLONOSCOPY;  Surgeon: Olaf Gomez MD;  Location: [facility] OR;  Service: Gastroenterology   • COLONOSCOPY N/A 2019    Procedure: COLONOSCOPY;  Surgeon: Rosa Jack MD;  Location: [facility] OR;  Service: Gastroenterology   • HERNIA REPAIR     • PROSTATE ULTRASOUND BIOPSY     • SIGMOIDOSCOPY N/A 10/2/2018    Procedure: FLEXIBLE SIGMOIDOSCOPY:  APC cautery bleeding using 60 joules;  Surgeon: Olaf Gomez MD;  Location: [facility] ENDOSCOPY;  Service: Gastroenterology   • TOTAL HIP ARTHROPLASTY Bilateral          FAMILY HISTORY  Family History   Problem Relation Age of Onset   • Stroke Mother    • Stroke Father    • No Known Problems Brother    • Colon cancer Neg Hx    • Colon polyps Neg Hx          SOCIAL HISTORY  Social History     Occupational History   • 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    Tobacco Use   • Smoking status: Former Smoker     Types: Cigars     Last attempt to quit: 1986     Years since quittin.6   • Smokeless tobacco: Never Used   Substance and Sexual Activity   • Alcohol use: Yes     Alcohol/week: 32.0 - 34.0 standard drinks     Types: 4 - 6 Shots of liquor, 28 Standard drinks or equivalent per week     Frequency: 4 or more times a week     Drinks per session: 5 or 6     Comment: 2-3 double vodkas a night   • Drug use: No   • Sexual activity: Yes     Partners: Female     Birth control/protection: Post-menopausal         CURRENT MEDICATIONS    Current Outpatient Medications:   •  allopurinol (ZYLOPRIM) 100 MG tablet, TAKE TWO TABLETS BY MOUTH DAILY, Disp: 180 tablet, Rfl: 0  •  aspirin 81 MG chewable tablet, Chew 81 mg Daily., Disp: , Rfl:   •  atorvastatin (LIPITOR) 80 MG tablet, Take 1 tablet by mouth Daily., Disp: 90 tablet, Rfl: 3  •   cholecalciferol (VITAMIN D3) 1000 UNITS tablet, Take 1,000 Units by mouth Daily., Disp: , Rfl:   •  Cyanocobalamin (VITAMIN B 12 PO), Take 1,000 mg by mouth Every Morning., Disp: , Rfl:   •  donepezil (ARICEPT) 10 MG tablet, TAKE ONE TABLET BY MOUTH DAILY WITH BREAKFAST, Disp: 90 tablet, Rfl: 0  •  fluorouracil (EFUDEX) 5 % cream, Apply  topically to the appropriate area as directed 2 (Two) Times a Day., Disp: , Rfl:   •  levothyroxine (SYNTHROID, LEVOTHROID) 25 MCG tablet, TAKE ONE TABLET BY MOUTH DAILY, Disp: 90 tablet, Rfl: 0  •  lisinopril (PRINIVIL,ZESTRIL) 20 MG tablet, Take 1 tablet by mouth Daily. Indications: High Blood Pressure Disorder, Disp: 90 tablet, Rfl: 3  •  Multiple Vitamins-Minerals (MULTIVITAL PLATINUM PO), Take 1 tablet by mouth Daily., Disp: , Rfl:   •  pioglitazone (ACTOS) 30 MG tablet, TAKE ONE TABLET BY MOUTH DAILY, Disp: 90 tablet, Rfl: 0  •  POTASSIUM PO, Take  by mouth. Dose unknown, Disp: , Rfl:   •  psyllium (METAMUCIL) 58.6 % powder, Take  by mouth 3 (Three) Times a Day., Disp: , Rfl:     ALLERGIES  Nsaids    VITALS  There were no vitals filed for this visit.    LAST RESULTS   Admission on 07/20/2020, Discharged on 07/20/2020   Component Date Value Ref Range Status   • SARS-CoV-2, SANDRA 07/20/2020 Not Detected  Not Detected Final    This test was developed and its performance characteristics determined  by [facility]. This test has not been FDA cleared or  approved. This test has been authorized by FDA under an Emergency Use  Authorization (EUA). This test is only authorized for the duration of  time the declaration that circumstances exist justifying the  authorization of the emergency use of in vitro diagnostic tests for  detection of SARS-CoV-2 virus and/or diagnosis of COVID-19 infection  under section 564(b)(1) of the Act, 21 U.S.C. 360bbb-3(b)(1), unless  the authorization is terminated or revoked sooner.  When diagnostic testing is negative, the possibility of a  "false  negative result should be considered in the context of a patient's  recent exposures and the presence of clinical signs and symptoms  consistent with COVID-19. An individual without symptoms of COVID-19  and who is not shedding SARS-CoV-2 virus would expect to have a  negative (not detected) result in this assay.   • COVID [facility] PRIORITY 07/20/2020 Comment   Final    Received     No results found.    PHYSICAL EXAM  Debilities/Disabilities Identified: None  Emotional Behavior: Appropriate  Wt Readings from Last 3 Encounters:   07/20/20 111 kg (245 lb)   02/14/20 114 kg (251 lb)   01/30/20 115 kg (253 lb)     Ht Readings from Last 1 Encounters:   07/20/20 175.3 cm (69\")     There is no height or weight on file to calculate BMI.  Physical Exam    CLINICAL DATA REVIEWED   reviewed previous lab results and integrated with today's visit, reviewed notes from other physicians and/or last GI encounter, reviewed previous endoscopy results and available photos    ASSESSMENT  Diagnoses and all orders for this visit:    Diverticulosis    Radiation proctitis    Chronic gout without tophus, unspecified cause, unspecified site    Type 2 diabetes mellitus with diabetic nephropathy, without long-term current use of insulin (CMS/[facility])    Stage 2 chronic kidney disease    Vitamin B12 deficiency          PLAN  Needs Appt with Dr Fonseca    Return in about 1 year (around 7/30/2021).    I have discussed the above plan with the patient.  They verbalize understanding and are in agreement with the plan.  They have been advised to contact the office for any questions, concerns, or changes related to their health.                      "

## 2020-08-05 LAB
ALBUMIN SERPL-MCNC: 3.8 G/DL (ref 3.5–5.2)
ALP SERPL-CCNC: 83 U/L (ref 39–117)
ALT SERPL-CCNC: 37 U/L (ref 1–41)
AST SERPL-CCNC: 64 U/L (ref 1–40)
BILIRUB DIRECT SERPL-MCNC: 0.2 MG/DL (ref 0–0.3)
BILIRUB SERPL-MCNC: 0.6 MG/DL (ref 0–1.2)
BUN SERPL-MCNC: 12 MG/DL (ref 8–23)
BUN/CREAT SERPL: 8.5 (ref 7–25)
CALCIUM SERPL-MCNC: 8.8 MG/DL (ref 8.6–10.5)
CHLORIDE SERPL-SCNC: 104 MMOL/L (ref 98–107)
CO2 SERPL-SCNC: 28 MMOL/L (ref 22–29)
CREAT SERPL-MCNC: 1.41 MG/DL (ref 0.76–1.27)
FOLATE SERPL-MCNC: 5.54 NG/ML (ref 4.78–24.2)
GLUCOSE SERPL-MCNC: 100 MG/DL (ref 65–99)
HBA1C MFR BLD: 6 % (ref 4.8–5.6)
POTASSIUM SERPL-SCNC: 4.3 MMOL/L (ref 3.5–5.2)
PROT SERPL-MCNC: 6.1 G/DL (ref 6–8.5)
SODIUM SERPL-SCNC: 141 MMOL/L (ref 136–145)
T4 FREE SERPL-MCNC: 1.18 NG/DL (ref 0.93–1.7)
TSH SERPL DL<=0.005 MIU/L-ACNC: 3.08 UIU/ML (ref 0.27–4.2)
URATE SERPL-MCNC: 5.5 MG/DL (ref 3.4–7)
VIT B12 SERPL-MCNC: 757 PG/ML (ref 211–946)

## 2020-08-12 ENCOUNTER — OFFICE VISIT (OUTPATIENT)
Dept: FAMILY MEDICINE CLINIC | Facility: CLINIC | Age: 75
End: 2020-08-12

## 2020-08-12 VITALS
WEIGHT: 246.6 LBS | BODY MASS INDEX: 36.53 KG/M2 | OXYGEN SATURATION: 99 % | SYSTOLIC BLOOD PRESSURE: 120 MMHG | DIASTOLIC BLOOD PRESSURE: 76 MMHG | HEART RATE: 107 BPM | HEIGHT: 69 IN

## 2020-08-12 DIAGNOSIS — E03.8 SUBCLINICAL HYPOTHYROIDISM: ICD-10-CM

## 2020-08-12 DIAGNOSIS — E11.9 TYPE 2 DIABETES MELLITUS WITHOUT COMPLICATION, WITHOUT LONG-TERM CURRENT USE OF INSULIN (HCC): Primary | ICD-10-CM

## 2020-08-12 DIAGNOSIS — E78.2 MIXED HYPERLIPIDEMIA: ICD-10-CM

## 2020-08-12 DIAGNOSIS — M10.062 ACUTE IDIOPATHIC GOUT OF LEFT KNEE: ICD-10-CM

## 2020-08-12 PROCEDURE — 99213 OFFICE O/P EST LOW 20 MIN: CPT | Performed by: FAMILY MEDICINE

## 2020-08-12 RX ORDER — LEVOTHYROXINE SODIUM 0.03 MG/1
25 TABLET ORAL DAILY
Qty: 90 TABLET | Refills: 3 | Status: SHIPPED | OUTPATIENT
Start: 2020-08-12 | End: 2021-08-27 | Stop reason: SDUPTHER

## 2020-08-12 RX ORDER — ALLOPURINOL 100 MG/1
200 TABLET ORAL DAILY
Qty: 180 TABLET | Refills: 3 | Status: SHIPPED | OUTPATIENT
Start: 2020-08-12 | End: 2021-08-27 | Stop reason: SDUPTHER

## 2020-08-12 RX ORDER — ATORVASTATIN CALCIUM 80 MG/1
80 TABLET, FILM COATED ORAL DAILY
Qty: 90 TABLET | Refills: 3 | Status: SHIPPED | OUTPATIENT
Start: 2020-08-12 | End: 2021-08-27 | Stop reason: SDUPTHER

## 2020-08-12 NOTE — PATIENT INSTRUCTIONS
Results for orders placed or performed in visit on 07/30/20   Vitamin B12   Result Value Ref Range    Vitamin B-12 757 211 - 946 pg/mL   Folate   Result Value Ref Range    Folate 5.54 4.78 - 24.20 ng/mL   Basic Metabolic Panel   Result Value Ref Range    Glucose 100 (H) 65 - 99 mg/dL    BUN 12 8 - 23 mg/dL    Creatinine 1.41 (H) 0.76 - 1.27 mg/dL    eGFR Non African Am 49 (L) >60 mL/min/1.73    eGFR African Am 59 (L) >60 mL/min/1.73    BUN/Creatinine Ratio 8.5 7.0 - 25.0    Sodium 141 136 - 145 mmol/L    Potassium 4.3 3.5 - 5.2 mmol/L    Chloride 104 98 - 107 mmol/L    Total CO2 28.0 22.0 - 29.0 mmol/L    Calcium 8.8 8.6 - 10.5 mg/dL   TSH   Result Value Ref Range    TSH 3.080 0.270 - 4.200 uIU/mL   T4, Free   Result Value Ref Range    Free T4 1.18 0.93 - 1.70 ng/dL   Hemoglobin A1c   Result Value Ref Range    Hemoglobin A1C 6.00 (H) 4.80 - 5.60 %   Hepatic Function Panel   Result Value Ref Range    Total Protein 6.1 6.0 - 8.5 g/dL    Albumin 3.80 3.50 - 5.20 g/dL    Total Bilirubin 0.6 0.0 - 1.2 mg/dL    Bilirubin, Direct 0.2 0.0 - 0.3 mg/dL    Alkaline Phosphatase 83 39 - 117 U/L    AST (SGOT) 64 (H) 1 - 40 U/L    ALT (SGPT) 37 1 - 41 U/L   Uric Acid   Result Value Ref Range    Uric Acid 5.5 3.4 - 7.0 mg/dL

## 2020-08-12 NOTE — PROGRESS NOTES
"  Chief Complaint   Patient presents with   • Diabetes   • Chronic Kidney Disease   • Hypothyroidism   • Hyperlipidemia       Subjective   Bryan Landers is an 75 y.o. male who presents for follow up of diabetes. Current symptoms include: hyperglycemia. Patient denies foot ulcerations. Evaluation to date has included: fasting blood sugar, fasting lipid panel, hemoglobin A1C and microalbuminuria. Home sugars: patient does not check sugars. Current treatments: more intensive attention to diet which has been somewhat effective, low cholesterol diet which has been not very effective, increased aerobic exercise which has been somewhat effective, Continued Actos which has been somewhat effective, Continued statin which has been effective and Continued ACE inhibitor/ARB which has been effective. Discussed importance of yearly eye exams and checking feet for skin integrity.      The following portions of the patient's history were reviewed and updated as appropriate: allergies, current medications, past family history, past medical history, past social history, past surgical history and problem list.        Review of Systems  Pertinent items are noted in HPI.     Vitals:    08/12/20 1458   BP: 120/76   BP Location: Left arm   Patient Position: Sitting   Cuff Size: Adult   Pulse: 107   SpO2: 99%   Weight: 112 kg (246 lb 9.6 oz)   Height: 175.3 cm (69\")       Objective    Gen:  Alert, pleasant  Ears: canals clear, TMs normal  Throat: clear , no thrush, teeth ok  Neck: no bruit, no LAD  Lungs: clear  Heart: RR no murmur  Feet:  No rash, no skin breakdown, sensation grossly normal.    Laboratory:       Assessment/Plan        Discussed general issues about diabetes pathophysiology and management.  Continued Actos; see medication orders.  Continued statin drug see medication orders.  Continued ACE inhibitor; see medication orders.  Follow up in 6 months or as needed.    Bryan was seen today for diabetes, chronic kidney disease, " hypothyroidism and hyperlipidemia.    Diagnoses and all orders for this visit:    Type 2 diabetes mellitus without complication, without long-term current use of insulin (CMS/[facility])    Acute idiopathic gout of left knee  -     allopurinol (ZYLOPRIM) 100 MG tablet; Take 2 tablets by mouth Daily.    Mixed hyperlipidemia  -     atorvastatin (LIPITOR) 80 MG tablet; Take 1 tablet by mouth Daily.    Subclinical hypothyroidism  -     levothyroxine (SYNTHROID, LEVOTHROID) 25 MCG tablet; Take 1 tablet by mouth Daily.      We reviewed his labs  A1c looks pretty good today at 6.0.  He does exercise most days on his treadmill.  Still drinking 3 double vodkas a night.  And really no plans on changing    No recent gout flares will discontinue on the allopurinol  Discussed healthy diabetic eating plan.  May refer to ADA web site, diabetes.org    Return in about 6 months (around 2/12/2021) for blood pressure, Medicare Wellness visit, diabetes.  Patient Instructions     Results for orders placed or performed in visit on 07/30/20   Vitamin B12   Result Value Ref Range    Vitamin B-12 757 211 - 946 pg/mL   Folate   Result Value Ref Range    Folate 5.54 4.78 - 24.20 ng/mL   Basic Metabolic Panel   Result Value Ref Range    Glucose 100 (H) 65 - 99 mg/dL    BUN 12 8 - 23 mg/dL    Creatinine 1.41 (H) 0.76 - 1.27 mg/dL    eGFR Non African Am 49 (L) >60 mL/min/1.73    eGFR African Am 59 (L) >60 mL/min/1.73    BUN/Creatinine Ratio 8.5 7.0 - 25.0    Sodium 141 136 - 145 mmol/L    Potassium 4.3 3.5 - 5.2 mmol/L    Chloride 104 98 - 107 mmol/L    Total CO2 28.0 22.0 - 29.0 mmol/L    Calcium 8.8 8.6 - 10.5 mg/dL   TSH   Result Value Ref Range    TSH 3.080 0.270 - 4.200 uIU/mL   T4, Free   Result Value Ref Range    Free T4 1.18 0.93 - 1.70 ng/dL   Hemoglobin A1c   Result Value Ref Range    Hemoglobin A1C 6.00 (H) 4.80 - 5.60 %   Hepatic Function Panel   Result Value Ref Range    Total Protein 6.1 6.0 - 8.5 g/dL    Albumin 3.80 3.50 - 5.20 g/dL     Total Bilirubin 0.6 0.0 - 1.2 mg/dL    Bilirubin, Direct 0.2 0.0 - 0.3 mg/dL    Alkaline Phosphatase 83 39 - 117 U/L    AST (SGOT) 64 (H) 1 - 40 U/L    ALT (SGPT) 37 1 - 41 U/L   Uric Acid   Result Value Ref Range    Uric Acid 5.5 3.4 - 7.0 mg/dL         Medications Discontinued During This Encounter   Medication Reason   • allopurinol (ZYLOPRIM) 100 MG tablet Reorder   • atorvastatin (LIPITOR) 80 MG tablet Reorder   • levothyroxine (SYNTHROID, LEVOTHROID) 25 MCG tablet Reorder         Dr. Jose Fonseca MD  [place], [place]  [facility].

## 2020-12-08 ENCOUNTER — OFFICE VISIT (OUTPATIENT)
Dept: FAMILY MEDICINE CLINIC | Facility: CLINIC | Age: 75
End: 2020-12-08

## 2020-12-08 VITALS
HEIGHT: 69 IN | SYSTOLIC BLOOD PRESSURE: 128 MMHG | DIASTOLIC BLOOD PRESSURE: 70 MMHG | WEIGHT: 242 LBS | OXYGEN SATURATION: 98 % | BODY MASS INDEX: 35.84 KG/M2 | HEART RATE: 116 BPM

## 2020-12-08 DIAGNOSIS — R06.09 DYSPNEA ON EXERTION: Primary | ICD-10-CM

## 2020-12-08 PROCEDURE — 99214 OFFICE O/P EST MOD 30 MIN: CPT | Performed by: FAMILY MEDICINE

## 2020-12-08 PROCEDURE — 93000 ELECTROCARDIOGRAM COMPLETE: CPT | Performed by: FAMILY MEDICINE

## 2020-12-08 NOTE — PROGRESS NOTES
"  Subjective   Bryan Landers is a 75 y.o. male who is here for   Chief Complaint   Patient presents with   • Shortness of Breath     since saturday has been more soa    .     History of Present Illness   Dyspnea: Patient complains of shortness of breath with one block walking, with more than one block walking.  Symptoms include difficulty breathing, dyspnea on exertion, dyspnea when laying down and no cough. Symptoms began 1 week ago, unchanged since that time.  Patient denies chest pain, located left chest and constant cough. Associated symptoms include wheezing. Patient has not had recent travel.  Weight has been stable.  Appetite has been unchanged. Symptoms are exacerbated by moderate activity and walking. Symptoms are alleviated by rest.   Usually walks on treadmill most days.  No true chest pain.  No fever      The following portions of the patient's history were reviewed and updated as appropriate: allergies, current medications, past family history, past medical history, past social history, past surgical history and problem list.    Review of Systems   Constitutional: Negative for fever.   HENT: Negative.    Respiratory: Positive for chest tightness, shortness of breath and wheezing.    Cardiovascular: Negative for chest pain, palpitations and leg swelling.   Neurological: Negative for dizziness.       Vitals:    12/08/20 1318   BP: 128/70   BP Location: Left arm   Patient Position: Sitting   Cuff Size: Adult   Pulse: 116   SpO2: 98%   Weight: 110 kg (242 lb)   Height: 175.3 cm (69\")     Objective   Physical Exam  Vitals signs reviewed.   Cardiovascular:      Rate and Rhythm: Tachycardia present.      Heart sounds: No murmur.   Pulmonary:      Effort: Pulmonary effort is normal. No respiratory distress.      Breath sounds: Wheezing present.   Neurological:      General: No focal deficit present.      Mental Status: He is alert.   Psychiatric:         Mood and Affect: Mood normal.       ECG 12 " Lead    Date/Time: 12/8/2020 1:43 PM  Performed by: Jose Fonseca MD  Authorized by: Jose Fonseca MD   Comparison: compared with previous ECG from 9/28/2018  Rhythm: sinus tachycardia  Rate: tachycardic  Conduction: right bundle branch block  QRS axis: normal    Clinical impression: abnormal EKG            Assessment/Plan   Diagnoses and all orders for this visit:    1. Dyspnea on exertion (Primary)  -     ECG 12 Lead  -     BNP  -     Basic Metabolic Panel  -     Hemoglobin & Hematocrit, Blood  -     TSH  -     Treadmill Stress Test; Future  -     XR Chest 2 View; Future        There are no Patient Instructions on file for this visit.    There are no discontinued medications.     Return in about 2 weeks (around 12/22/2020).    Dr. Jose Fonseca  [place], [place]

## 2020-12-09 LAB
BUN SERPL-MCNC: 14 MG/DL (ref 8–27)
BUN/CREAT SERPL: 10 (ref 10–24)
CALCIUM SERPL-MCNC: 9.4 MG/DL (ref 8.6–10.2)
CHLORIDE SERPL-SCNC: 102 MMOL/L (ref 96–106)
CO2 SERPL-SCNC: 25 MMOL/L (ref 20–29)
CREAT SERPL-MCNC: 1.43 MG/DL (ref 0.76–1.27)
GLUCOSE SERPL-MCNC: 121 MG/DL (ref 65–99)
HCT VFR BLD AUTO: 37.4 % (ref 37.5–51)
HGB BLD-MCNC: 13.3 G/DL (ref 13–17.7)
POTASSIUM SERPL-SCNC: 4.7 MMOL/L (ref 3.5–5.2)
SODIUM SERPL-SCNC: 140 MMOL/L (ref 134–144)
TSH SERPL DL<=0.005 MIU/L-ACNC: 3.34 UIU/ML (ref 0.45–4.5)

## 2020-12-18 ENCOUNTER — HOSPITAL ENCOUNTER (OUTPATIENT)
Dept: CARDIOLOGY | Facility: HOSPITAL | Age: 75
Discharge: HOME | End: 2020-12-18
Admitting: FAMILY MEDICINE

## 2020-12-18 DIAGNOSIS — R06.09 DYSPNEA ON EXERTION: ICD-10-CM

## 2020-12-18 PROCEDURE — 93016 CV STRESS TEST SUPVJ ONLY: CPT | Performed by: INTERNAL MEDICINE

## 2020-12-18 PROCEDURE — 93017 CV STRESS TEST TRACING ONLY: CPT

## 2020-12-18 PROCEDURE — 93018 CV STRESS TEST I&R ONLY: CPT | Performed by: INTERNAL MEDICINE

## 2020-12-21 DIAGNOSIS — R06.09 DOE (DYSPNEA ON EXERTION): Primary | ICD-10-CM

## 2020-12-21 LAB
BH CV STRESS BP STAGE 1: NORMAL
BH CV STRESS DURATION MIN STAGE 1: 2
BH CV STRESS DURATION SEC STAGE 1: 40
BH CV STRESS GRADE STAGE 1: 10
BH CV STRESS HR STAGE 1: 145
BH CV STRESS METS STAGE 1: 5
BH CV STRESS PROTOCOL 1: NORMAL
BH CV STRESS RECOVERY BP: NORMAL MMHG
BH CV STRESS RECOVERY HR: 124 BPM
BH CV STRESS SPEED STAGE 1: 1.7
BH CV STRESS STAGE 1: 1
MAXIMAL PREDICTED HEART RATE: 144 BPM
PERCENT MAX PREDICTED HR: 101.39 %
STRESS BASELINE BP: NORMAL MMHG
STRESS BASELINE HR: 124 BPM
STRESS O2 SAT REST: 96 %
STRESS PERCENT HR: 119 %
STRESS POST ESTIMATED WORKLOAD: 4.6 METS
STRESS POST EXERCISE DUR MIN: 2 MIN
STRESS POST EXERCISE DUR SEC: 40 SEC
STRESS POST O2 SAT PEAK: 96 %
STRESS POST PEAK BP: NORMAL MMHG
STRESS POST PEAK HR: 146 BPM
STRESS TARGET HR: 122 BPM

## 2020-12-22 ENCOUNTER — HOSPITAL ENCOUNTER (OUTPATIENT)
Dept: GENERAL RADIOLOGY | Facility: HOSPITAL | Age: 75
Discharge: HOME | End: 2020-12-22
Admitting: FAMILY MEDICINE

## 2020-12-22 ENCOUNTER — OFFICE VISIT (OUTPATIENT)
Dept: FAMILY MEDICINE CLINIC | Facility: CLINIC | Age: 75
End: 2020-12-22

## 2020-12-22 VITALS
BODY MASS INDEX: 34.07 KG/M2 | DIASTOLIC BLOOD PRESSURE: 76 MMHG | OXYGEN SATURATION: 96 % | HEIGHT: 69 IN | HEART RATE: 125 BPM | WEIGHT: 230 LBS | SYSTOLIC BLOOD PRESSURE: 126 MMHG

## 2020-12-22 DIAGNOSIS — R06.09 DOE (DYSPNEA ON EXERTION): ICD-10-CM

## 2020-12-22 DIAGNOSIS — R94.39 ABNORMAL STRESS ECG WITH TREADMILL: Primary | ICD-10-CM

## 2020-12-22 DIAGNOSIS — R00.0 SINUS TACHYCARDIA BY ELECTROCARDIOGRAM: ICD-10-CM

## 2020-12-22 DIAGNOSIS — R06.09 DYSPNEA ON EXERTION: ICD-10-CM

## 2020-12-22 PROCEDURE — 71046 X-RAY EXAM CHEST 2 VIEWS: CPT

## 2020-12-22 PROCEDURE — 99214 OFFICE O/P EST MOD 30 MIN: CPT | Performed by: FAMILY MEDICINE

## 2020-12-22 NOTE — PROGRESS NOTES
Subjective   Bryan Landers is a 75 y.o. male who is here for   Chief Complaint   Patient presents with   • Follow-up     stress test    .     History of Present Illness   Bryan is here in follow-up for his treadmill EKG.  There was some troubles he had some baseline tachycardia.  Had dyspnea as he proceed on a treadmill.  He has a right bundle branch block.  No obvious changes of clear ischemia.  But nonetheless with the abnormal EKG and his persistent dyspnea on exertion we will set him up for a nuclear scan hopefully not next week sometime.    He will go up today and get his chest x-ray which she was unable to get during the time of the treadmill.    In the meantime; I asked him to take it easy.  No more exercise or treadmill.  No lifting heavier than a gallon of milk.  No intercourse.  And to stay on his 81 mg aspirin.    The following portions of the patient's history were reviewed and updated as appropriate: allergies, current medications, past family history, past medical history, past social history, past surgical history and problem list.    Review of Systems    Objective   Physical Exam  Cardiovascular:      Rate and Rhythm: Tachycardia present.   Pulmonary:      Effort: Pulmonary effort is normal.   Neurological:      Mental Status: He is alert.   Psychiatric:         Mood and Affect: Mood normal.         Treadmill Stress Test (12/18/2020 10:26)    Assessment/Plan   Diagnoses and all orders for this visit:    1. Abnormal stress ECG with treadmill (Primary)    2. CERVANTES (dyspnea on exertion)    3. Sinus tachycardia by electrocardiogram      There are no Patient Instructions on file for this visit.    There are no discontinued medications.     Return if symptoms worsen or fail to improve.    Dr. Jose Fonseca  [facility] [place], [place]

## 2021-01-20 ENCOUNTER — TELEPHONE (OUTPATIENT)
Dept: FAMILY MEDICINE CLINIC | Facility: CLINIC | Age: 76
End: 2021-01-20

## 2021-01-20 NOTE — TELEPHONE ENCOUNTER
Caller: Bryan Landers    Relationship to patient: Self    Best call back number: [phone]    New or established patient?  [] New  [x] Established    Date of discharge: 12/29/2021    Facility discharged from: [facility]    Diagnosis/Symptoms: PULMONARY EMBOLISM, BLOOD CLOTS IN LUNGS AND LEGS. PATIENT IS CURRENTLY ON BLOOD THINNERS AND WANTED TO SPEAK WITH HIS PROVIDER TO SEE IF HE NEEDED TO SCHEDULE A FOLLOW-UP APPOINTMENT. PATIENT REQUESTS A CALL BACK.    Length of stay (If applicable): ONE NIGHT    Specialty Only: Did you see a [facility] health provider?    [] Yes  [x] No

## 2021-01-21 ENCOUNTER — OFFICE VISIT (OUTPATIENT)
Dept: FAMILY MEDICINE CLINIC | Facility: CLINIC | Age: 76
End: 2021-01-21

## 2021-01-21 VITALS
SYSTOLIC BLOOD PRESSURE: 120 MMHG | BODY MASS INDEX: 35.46 KG/M2 | WEIGHT: 239.4 LBS | HEIGHT: 69 IN | OXYGEN SATURATION: 98 % | HEART RATE: 114 BPM | DIASTOLIC BLOOD PRESSURE: 74 MMHG

## 2021-01-21 DIAGNOSIS — I82.433 ACUTE DEEP VEIN THROMBOSIS (DVT) OF POPLITEAL VEIN OF BOTH LOWER EXTREMITIES (HCC): ICD-10-CM

## 2021-01-21 DIAGNOSIS — I26.90 ACUTE SEPTIC PULMONARY EMBOLISM WITHOUT ACUTE COR PULMONALE (HCC): Primary | ICD-10-CM

## 2021-01-21 DIAGNOSIS — Z12.5 SCREENING FOR PROSTATE CANCER: ICD-10-CM

## 2021-01-21 DIAGNOSIS — Z86.711 PERSONAL HISTORY OF PULMONARY EMBOLISM: ICD-10-CM

## 2021-01-21 PROBLEM — I82.409 DVT (DEEP VENOUS THROMBOSIS): Status: ACTIVE | Noted: 2021-01-07

## 2021-01-21 PROBLEM — R06.09 DOE (DYSPNEA ON EXERTION): Status: RESOLVED | Noted: 2020-12-22 | Resolved: 2021-01-21

## 2021-01-21 PROBLEM — I26.99 ACUTE PULMONARY EMBOLISM (HCC): Status: ACTIVE | Noted: 2020-12-28

## 2021-01-21 PROCEDURE — 99214 OFFICE O/P EST MOD 30 MIN: CPT | Performed by: FAMILY MEDICINE

## 2021-01-21 NOTE — PROGRESS NOTES
Subjective   Bryan Landers is a 76 y.o. male who is here for   Chief Complaint   Patient presents with   • Hospital Follow Up Visit     dvt lung and leg    • Deep Vein Thrombosis   .     History of Present Illness   Hospital follow-up with Mr. Bryan Landers.  Bryan passed out at home a few weeks ago.  Was taken to [facility].  Where he was diagnosed with bilateral leg DVTs and bilateral pulmonary embolism.  He was placed on a heparin drip.  Echocardiogram was done did not reveal intracardiac thrombus.  CTA of chest was done.  And sent home on Eliquis.  Did not need oxygen.  Today he is not having chest pain.  Still has swelling in both of his legs.  Although this edema is  routine for him.  He still having some dyspnea on exertion.  He is able to walk on a treadmill for a few minutes at a slow pace.  Again we had seen him twice in the office for this dyspnea on exertion and we had begun the work-up and had not found a cause.  He was scheduled for a nuclear stress test on his heart.  But that was canceled as he was in the hospital the same time for his PE.  And then there is no need for at this point, as we have the cause of his dyspnea on exertion.    He has never had blood clots before.  He is not a smoker.  I did not discuss it with him today, but I am quite concerned about an occult malignancy.  He report he has lost weight;but may be  due to his increased exercise as of late and healthier eating.    He will stay on Eliquis. For at least 6 months.    We will need to order a CT abdomen pelvis with p.o. and IV contrast to look for occult malignancy.  We will draw a venous thrombus blood panel today.  Including factor V.  We will draw a SPEP and a PSA again looking for occult malignancies.    Current outpatient and discharge medications have been reconciled for the patient.  Reviewed by: Jose Fonseca MD      The following portions of the patient's history were reviewed and updated as appropriate:  allergies, current medications, past family history, past medical history, past social history, past surgical history and problem list.    Review of Systems    Objective   Physical Exam  Vitals signs reviewed.   Cardiovascular:      Rate and Rhythm: Normal rate.   Pulmonary:      Effort: No respiratory distress.   Musculoskeletal:      Right lower leg: Edema present.      Left lower leg: Edema present.   Neurological:      Mental Status: He is alert.         Assessment/Plan   Diagnoses and all orders for this visit:    1. Acute septic pulmonary embolism without acute cor pulmonale (CMS/HCC) (Primary)  -     Venous Thrombosis Profile  -     Factor 5 Leiden  -     CT Abdomen Pelvis With Contrast; Future  -     Protein Elec + Interp, Serum  -     PSA Screen    2. Acute deep vein thrombosis (DVT) of popliteal vein of both lower extremities (CMS/HCC)  -     Venous Thrombosis Profile  -     Factor 5 Leiden  -     CT Abdomen Pelvis With Contrast; Future  -     Protein Elec + Interp, Serum  -     PSA Screen    3. Screening for prostate cancer  -     PSA Screen    4. Personal history of pulmonary embolism   -     Venous Thrombosis Profile    There are no Patient Instructions on file for this visit.    We will have him come back in 2 weeks we will go over all the results.    Then did discuss we will need repeat CTA of chest and Doppler of legs in 3 months to assess the status of his thrombi    There are no discontinued medications.     No follow-ups on file.    Dr. Jose Fonseca  [place], [place]

## 2021-01-22 ENCOUNTER — HOSPITAL ENCOUNTER (OUTPATIENT)
Dept: CT IMAGING | Facility: HOSPITAL | Age: 76
Discharge: HOME | End: 2021-01-22
Admitting: FAMILY MEDICINE

## 2021-01-22 DIAGNOSIS — I26.90 ACUTE SEPTIC PULMONARY EMBOLISM WITHOUT ACUTE COR PULMONALE (HCC): ICD-10-CM

## 2021-01-22 DIAGNOSIS — I82.433 ACUTE DEEP VEIN THROMBOSIS (DVT) OF POPLITEAL VEIN OF BOTH LOWER EXTREMITIES (HCC): ICD-10-CM

## 2021-01-22 LAB — CREAT BLDA-MCNC: 1.5 MG/DL (ref 0.6–1.3)

## 2021-01-22 PROCEDURE — 82565 ASSAY OF CREATININE: CPT

## 2021-01-22 PROCEDURE — 0 IOPAMIDOL PER 1 ML: Performed by: FAMILY MEDICINE

## 2021-01-22 PROCEDURE — 0 DIATRIZOATE MEGLUMINE & SODIUM PER 1 ML: Performed by: FAMILY MEDICINE

## 2021-01-22 PROCEDURE — 74177 CT ABD & PELVIS W/CONTRAST: CPT

## 2021-01-22 RX ADMIN — IOPAMIDOL 100 ML: 755 INJECTION, SOLUTION INTRAVENOUS at 12:19

## 2021-01-22 RX ADMIN — DIATRIZOATE MEGLUMINE AND DIATRIZOATE SODIUM 30 ML: 600; 100 SOLUTION ORAL; RECTAL at 12:19

## 2021-01-26 LAB
ALBUMIN SERPL ELPH-MCNC: 3.2 G/DL (ref 2.9–4.4)
ALBUMIN/GLOB SERPL: 1 {RATIO} (ref 0.7–1.7)
ALPHA1 GLOB SERPL ELPH-MCNC: 0.3 G/DL (ref 0–0.4)
ALPHA2 GLOB SERPL ELPH-MCNC: 0.7 G/DL (ref 0.4–1)
APCR PPP: 2 RATIO
APTT HEX PL PPP: 2 SEC
APTT IMM NP PPP: ABNORMAL SEC
APTT PPP 1:1 SALINE: ABNORMAL SEC
APTT PPP: 27.4 SEC
AT III ACT/NOR PPP CHRO: 106 %
B-GLOBULIN SERPL ELPH-MCNC: 0.9 G/DL (ref 0.7–1.3)
B2 GLYCOPROT1 IGA SER-ACNC: <10 SAU
B2 GLYCOPROT1 IGG SER-ACNC: <10 SGU
B2 GLYCOPROT1 IGM SER-ACNC: <10 SMU
CARDIOLIPIN IGG SER IA-ACNC: <10 GPL
CARDIOLIPIN IGM SER IA-ACNC: <10 MPL
CONFIRM DRVVT: 66.1 SEC
DRVVT SCREEN TO CONFIRM RATIO: 1 RATIO
F2 GENE MUT ANL BLD/T: ABNORMAL
F5 GENE MUT ANL BLD/T: ABNORMAL
F5 GENE MUT ANL BLD/T: NORMAL
FACT VIII ACT/NOR PPP: 263 %
GAMMA GLOB SERPL ELPH-MCNC: 1.4 G/DL (ref 0.4–1.8)
GENE DIS ANL INTERP-IMP: ABNORMAL
GENE DIS ANL INTERP-IMP: NORMAL
GLOBULIN SER CALC-MCNC: 3.3 G/DL (ref 2.2–3.9)
HCYS SERPL-SCNC: 29.3 UMOL/L
LABORATORY COMMENT REPORT: ABNORMAL
LABORATORY COMMENT REPORT: ABNORMAL
LABORATORY COMMENT REPORT: NORMAL
LABORATORY COMMENT REPORT: NORMAL
M PROTEIN SERPL ELPH-MCNC: NORMAL G/DL
PROT C ACT/NOR PPP CHRO: 99 %
PROT PATTERN SERPL ELPH-IMP: NORMAL
PROT S FREE AG ACT/NOR PPP IA: 213 %
PROT SERPL-MCNC: 6.5 G/DL (ref 6–8.5)
PSA SERPL-MCNC: 0.6 NG/ML (ref 0–4)
REF LAB TEST METHOD: ABNORMAL
REF LAB TEST METHOD: NORMAL
SCREEN DRVVT: 70.6 SEC

## 2021-01-27 ENCOUNTER — OFFICE VISIT (OUTPATIENT)
Dept: FAMILY MEDICINE CLINIC | Facility: CLINIC | Age: 76
End: 2021-01-27

## 2021-01-27 DIAGNOSIS — I82.433 ACUTE DEEP VEIN THROMBOSIS (DVT) OF POPLITEAL VEIN OF BOTH LOWER EXTREMITIES (HCC): ICD-10-CM

## 2021-01-27 DIAGNOSIS — I26.09 OTHER ACUTE PULMONARY EMBOLISM WITH ACUTE COR PULMONALE (HCC): Primary | ICD-10-CM

## 2021-01-27 DIAGNOSIS — R93.5 ABNORMAL FINDINGS ON DIAGNOSTIC IMAGING OF OTHER ABDOMINAL REGIONS, INCLUDING RETROPERITONEUM: ICD-10-CM

## 2021-01-27 DIAGNOSIS — R59.0 LYMPHADENOPATHY, ABDOMINAL: ICD-10-CM

## 2021-01-27 DIAGNOSIS — D68.51 FACTOR V LEIDEN CARRIER (HCC): ICD-10-CM

## 2021-01-27 PROCEDURE — 99442 PR PHYS/QHP TELEPHONE EVALUATION 11-20 MIN: CPT | Performed by: FAMILY MEDICINE

## 2021-01-27 NOTE — PROGRESS NOTES
Subjective   Bryan Landers is a 76 y.o. male who is here for No chief complaint on file.  .     History of Present Illness   Today is telephone medical visit between Dr Jose Fonseca and current patient.  Consent is given by patient for this encounter.  This is occurring during the current COVID-19 pandemic of 2020-1; with social isolation mandates per federal and state guidelines. Patient's chart has been reviewed.  Medical history from chart is reviewed. Patient location is per their choice. Provider location is in my routine medical office in [facility].  Regarding EM coding: history will not be as robust and exam will not be possible. Most EM coding decisions will be based on MDM and time. Time included pre and post charting and time spent speaking with the patient. Time spent for encounter is 15 minutes.    Telephone follow-up with Mr. Bryan Landers today.  Received the majority of his venous thrombosis lab panel back.  He is heterozygous for factor V.  There are some other abnormalities on his panel.  Given his diffuse venous thrombosis problems now including bilateral leg DVT and bilateral PEs.  We will continue him on his Eliquis and refer him to Dr. Marquez for a heme-onc evaluation.    Also reviewed his CT scan abdomen pelvis which reveals abnormal lymph nodes in the liver area.  Worrisome for an occult malignancy.  I will try to order a PET scan for further evaluation.      The following portions of the patient's history were reviewed and updated as appropriate: allergies, current medications, past family history, past medical history, past social history, past surgical history and problem list.    Review of Systems    Objective   Physical Exam    Assessment/Plan   Diagnoses and all orders for this visit:    1. Other acute pulmonary embolism with acute cor pulmonale (CMS/HCC) (Primary)  -     Ambulatory Referral to Hematology / Oncology  -     NM PET Skull Base To Mid Thigh; Future    2. Acute deep  vein thrombosis (DVT) of popliteal vein of both lower extremities (CMS/[facility])  -     Ambulatory Referral to Hematology / Oncology  -     NM PET Skull Base To Mid Thigh; Future    3. 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 Lymphadenopathy, abdominal  -     Ambulatory Referral to Hematology / Oncology  -     NM PET Skull Base To Mid Thigh; Future    5. Abnormal findings on diagnostic imaging of other abdominal regions, including retroperitoneum   -     NM PET Skull Base To Mid Thigh; Future      There are no Patient Instructions on file for this visit.    There are no discontinued medications.     Return if symptoms worsen or fail to improve.    Dr. Jose Fonseca  [place], [place]

## 2021-02-04 ENCOUNTER — OFFICE VISIT (OUTPATIENT)
Dept: FAMILY MEDICINE CLINIC | Facility: CLINIC | Age: 76
End: 2021-02-04

## 2021-02-04 VITALS
DIASTOLIC BLOOD PRESSURE: 82 MMHG | WEIGHT: 244.1 LBS | HEIGHT: 69 IN | OXYGEN SATURATION: 98 % | HEART RATE: 105 BPM | SYSTOLIC BLOOD PRESSURE: 120 MMHG | BODY MASS INDEX: 36.15 KG/M2

## 2021-02-04 DIAGNOSIS — R59.0 LYMPHADENOPATHY, ABDOMINAL: ICD-10-CM

## 2021-02-04 DIAGNOSIS — I26.99 ACUTE PULMONARY EMBOLISM WITHOUT ACUTE COR PULMONALE, UNSPECIFIED PULMONARY EMBOLISM TYPE (HCC): ICD-10-CM

## 2021-02-04 DIAGNOSIS — I82.433 ACUTE DEEP VEIN THROMBOSIS (DVT) OF POPLITEAL VEIN OF BOTH LOWER EXTREMITIES (HCC): Primary | ICD-10-CM

## 2021-02-04 DIAGNOSIS — D68.51 FACTOR V LEIDEN CARRIER (HCC): ICD-10-CM

## 2021-02-04 PROCEDURE — 99214 OFFICE O/P EST MOD 30 MIN: CPT | Performed by: FAMILY MEDICINE

## 2021-02-04 NOTE — PROGRESS NOTES
Subjective   Bryan Landers is a 76 y.o. male who is here for   Chief Complaint   Patient presents with   • Deep Vein Thrombosis     f/u    .     History of Present Illness     Follow-up of DVT and PE.  Neck is doing better.  No shortness of breath.  Only mild dyspnea on exertion.  Doing well on his Eliquis.  No bleeding problems.  We discussed the DVT in both his legs.  He already had chronic edema in both legs, the edema seems to be worse.  We discussed leg care, skin care of the leg.  He is encouraged to wear compression stockings.  Elevate the leg and cut out salt.    Lastly spoke with rebound referred him to Dr. Marquez for heme-onc eval.  But good news he is already enrolled at [facility]'s heme-onc program and is already seeing a specialist there.    He has an appointment with them next week.  Also has his PET scan to be done next week to further eval the lymphadenopathy which we saw around his liver on the CT scan.  Again we will try to rule out an occult malignancy.    His thromboses work-up revealed a few abnormalities as well as carrier for factor V.  He will further discuss these results with his hematology specialist about the significance of these.  And long-term management with anticoagulation medication.    Looks like he will have follow-up Doppler of his legs on July 7.  He will probably have a CTA of the chest at the same time.            The following portions of the patient's history were reviewed and updated as appropriate: allergies, current medications, past family history, past medical history, past social history, past surgical history and problem list.    Review of Systems    Objective   Physical Exam  Vitals signs reviewed.   Cardiovascular:      Rate and Rhythm: Normal rate.   Pulmonary:      Effort: Pulmonary effort is normal.   Musculoskeletal:      Right lower leg: Edema present.      Left lower leg: Edema present.   Neurological:      Mental Status: He is alert.       Factor V Leiden  (01/21/2021 09:31)  Protein Elec + Interp, Serum (01/21/2021 09:31)    Venous Thrombosis Profile (01/21/2021 09:31)    CT Abdomen Pelvis With Contrast (01/22/2021 14:09)        Assessment/Plan   Diagnoses and all orders for this visit:    1. Acute deep vein thrombosis (DVT) of popliteal vein of both lower extremities (CMS/HCC) (Primary)    2. Acute pulmonary embolism without acute cor pulmonale, unspecified pulmonary embolism type (CMS/HCC)    3. 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 Lymphadenopathy, abdominal      There are no Patient Instructions on file for this visit.    There are no discontinued medications.     No follow-ups on file.    Dr. Jose Fonseca  [facility]  [place], [place]

## 2021-02-08 ENCOUNTER — TELEPHONE (OUTPATIENT)
Dept: FAMILY MEDICINE CLINIC | Facility: CLINIC | Age: 76
End: 2021-02-08

## 2021-02-08 NOTE — TELEPHONE ENCOUNTER
PATIENTS WIFE CALLED STATING, PATIENT WAS TOLD TO WEAR SUPPORT STOCKINGS DAY AND NIGHT, PT IS NEEDING SOMETHING TO MOISTURIZE SKIN PHARMACY TOLD THEM OVER THE COUNTER CREAM WOULD MESS UP THE STOCKINGS AND TOLD THEM TO REQUEST SOMETHING FROM THERE DOCTOR, IS THERE ANYTHING DR MOREJON CAN PRESCRIBE FOR THIS?PLEASE ADVISE          CALL BACK:[phone]

## 2021-02-11 ENCOUNTER — APPOINTMENT (OUTPATIENT)
Dept: PET IMAGING | Facility: HOSPITAL | Age: 76
End: 2021-02-11

## 2021-02-22 DIAGNOSIS — E11.9 TYPE 2 DIABETES MELLITUS WITHOUT COMPLICATION, WITHOUT LONG-TERM CURRENT USE OF INSULIN (HCC): ICD-10-CM

## 2021-02-22 RX ORDER — PIOGLITAZONEHYDROCHLORIDE 30 MG/1
TABLET ORAL
Qty: 90 TABLET | Refills: 1 | Status: SHIPPED | OUTPATIENT
Start: 2021-02-22 | End: 2021-08-27 | Stop reason: SDUPTHER

## 2021-02-23 ENCOUNTER — HOSPITAL ENCOUNTER (OUTPATIENT)
Dept: PET IMAGING | Facility: HOSPITAL | Age: 76
Discharge: HOME | End: 2021-02-23

## 2021-02-23 DIAGNOSIS — R59.0 LYMPHADENOPATHY, ABDOMINAL: ICD-10-CM

## 2021-02-23 DIAGNOSIS — R93.5 ABNORMAL FINDINGS ON DIAGNOSTIC IMAGING OF OTHER ABDOMINAL REGIONS, INCLUDING RETROPERITONEUM: ICD-10-CM

## 2021-02-23 DIAGNOSIS — D68.51 FACTOR V LEIDEN CARRIER (HCC): ICD-10-CM

## 2021-02-23 DIAGNOSIS — I26.09 OTHER ACUTE PULMONARY EMBOLISM WITH ACUTE COR PULMONALE (HCC): ICD-10-CM

## 2021-02-23 DIAGNOSIS — I82.433 ACUTE DEEP VEIN THROMBOSIS (DVT) OF POPLITEAL VEIN OF BOTH LOWER EXTREMITIES (HCC): ICD-10-CM

## 2021-02-23 LAB — GLUCOSE BLDC GLUCOMTR-MCNC: 111 MG/DL (ref 70–130)

## 2021-02-23 PROCEDURE — A9552 F18 FDG: HCPCS | Performed by: FAMILY MEDICINE

## 2021-02-23 PROCEDURE — 82962 GLUCOSE BLOOD TEST: CPT

## 2021-02-23 PROCEDURE — 78815 PET IMAGE W/CT SKULL-THIGH: CPT

## 2021-02-23 PROCEDURE — 0 FLUDEOXYGLUCOSE F18 SOLUTION: Performed by: FAMILY MEDICINE

## 2021-02-23 RX ADMIN — FLUDEOXYGLUCOSE F18 1 DOSE: 300 INJECTION INTRAVENOUS at 12:03

## 2021-03-01 ENCOUNTER — TELEPHONE (OUTPATIENT)
Dept: FAMILY MEDICINE CLINIC | Facility: CLINIC | Age: 76
End: 2021-03-01

## 2021-03-01 NOTE — TELEPHONE ENCOUNTER
Pt daughter notified she needs to have patient and wife put her name on the verbal release form next time they come in, and any other offices as well.

## 2021-03-01 NOTE — TELEPHONE ENCOUNTER
Caller: Isi Ellison    Relationship: Emergency Contact    Best call back number: [phone]    Caller requesting test results: PET SCAN    What test was performed: PET SCAN    When was the test performed: 2/23      Additional notes: PATIENT'S DAUGHTER CALLED TO REQUEST A CALL BACK WITH HER FATHER'S PET SCAN.    DAUGHTER REQUESTS TO BE ON THE  VERBAL.

## 2021-03-17 ENCOUNTER — IMMUNIZATION (OUTPATIENT)
Dept: VACCINE CLINIC | Facility: HOSPITAL | Age: 76
End: 2021-03-17

## 2021-03-17 PROCEDURE — 91300 HC SARSCOV02 VAC 30MCG/0.3ML IM: CPT | Performed by: OBSTETRICS & GYNECOLOGY

## 2021-03-17 PROCEDURE — 0001A: CPT | Performed by: OBSTETRICS & GYNECOLOGY

## 2021-03-30 DIAGNOSIS — I10 ESSENTIAL HYPERTENSION: ICD-10-CM

## 2021-03-30 RX ORDER — LISINOPRIL 20 MG/1
TABLET ORAL
Qty: 90 TABLET | Refills: 1 | Status: SHIPPED | OUTPATIENT
Start: 2021-03-30 | End: 2021-08-27 | Stop reason: SDUPTHER

## 2021-04-07 ENCOUNTER — IMMUNIZATION (OUTPATIENT)
Dept: VACCINE CLINIC | Facility: HOSPITAL | Age: 76
End: 2021-04-07

## 2021-04-07 PROCEDURE — 0002A: CPT | Performed by: OBSTETRICS & GYNECOLOGY

## 2021-04-07 PROCEDURE — 91300 HC SARSCOV02 VAC 30MCG/0.3ML IM: CPT | Performed by: OBSTETRICS & GYNECOLOGY

## 2021-07-09 ENCOUNTER — OFFICE VISIT (OUTPATIENT)
Dept: FAMILY MEDICINE CLINIC | Facility: CLINIC | Age: 76
End: 2021-07-09

## 2021-07-09 VITALS
TEMPERATURE: 97.2 F | RESPIRATION RATE: 16 BRPM | HEIGHT: 69 IN | SYSTOLIC BLOOD PRESSURE: 110 MMHG | OXYGEN SATURATION: 97 % | BODY MASS INDEX: 36.58 KG/M2 | DIASTOLIC BLOOD PRESSURE: 74 MMHG | HEART RATE: 102 BPM | WEIGHT: 247 LBS

## 2021-07-09 DIAGNOSIS — Z85.46 HISTORY OF PROSTATE CANCER: ICD-10-CM

## 2021-07-09 DIAGNOSIS — R31.9 PAINLESS HEMATURIA: ICD-10-CM

## 2021-07-09 DIAGNOSIS — R31.9 HEMATURIA, UNSPECIFIED TYPE: Primary | ICD-10-CM

## 2021-07-09 LAB
BILIRUB BLD-MCNC: NEGATIVE MG/DL
CLARITY, POC: ABNORMAL
COLOR UR: ABNORMAL
GLUCOSE UR STRIP-MCNC: NEGATIVE MG/DL
KETONES UR QL: NEGATIVE
LEUKOCYTE EST, POC: NEGATIVE
NITRITE UR-MCNC: NEGATIVE MG/ML
PH UR: 6.5 [PH] (ref 5–8)
PROT UR STRIP-MCNC: ABNORMAL MG/DL
RBC # UR STRIP: ABNORMAL /UL
SP GR UR: 1.01 (ref 1–1.03)
UROBILINOGEN UR QL: NORMAL

## 2021-07-09 PROCEDURE — 81002 URINALYSIS NONAUTO W/O SCOPE: CPT | Performed by: NURSE PRACTITIONER

## 2021-07-09 PROCEDURE — 99213 OFFICE O/P EST LOW 20 MIN: CPT | Performed by: NURSE PRACTITIONER

## 2021-07-09 NOTE — PROGRESS NOTES
"Subjective      Chief Complaint   Patient presents with   • Blood in Urine       Bryan Landers is a 76 y.o. male here along with wife for evaluation of gross hematuria. Patient has had a few episodes of hematuria. Onset of hematuria was 1 day ago.There is not a history of nephrolithiasis. Associated symptoms include: none. Denies any dysuria or increased urinary frequency. Patient admits to history of prostate cancer s/p radiation therapy.. Patient denies history of previous infection.    He was taking Eliquis due to PE.  Repeat CT angio of chest completed 2 days ago showed resolution of PE.  Stopped taking his Eliquis yesterday at the direction of hematology.  He has been taking aspirin 81 mg daily.      He brought urine sample from home.  Was not in sterile container.      The following portions of the patient's history were reviewed and updated as appropriate: allergies, current medications, past family history, past medical history, past social history, past surgical history and problem list.    Objective    /74 (BP Location: Left arm, Patient Position: Sitting, Cuff Size: Adult)   Pulse 102   Temp 97.2 °F (36.2 °C)   Resp 16   Ht 175.3 cm (69\")   Wt 112 kg (247 lb)   SpO2 97%   BMI 36.48 kg/m²   General appearance: alert, appears stated age and cooperative  Lungs: clear to auscultation bilaterally  Heart: regular rate and rhythm, S1, S2 normal, no murmur, click, rub or gallop  Abdomen: soft, non-tender; bowel sounds normal; no masses,  no organomegaly      Lab Review  UA has been reviewed. UA results: hematuria to a large degree and protein   Urine culture not sent due to use of non-sterile container from home for urine collection.    Assessment/Plan   Gross hematuria of uncertain cause.   Hold aspirin 81 mg daily for now due to hematuria.    Discussed the evaluation and differential diagnosis of hematuria.  Urology referral.      Patient was wearing face mask when I entered the room and throughout " our encounter. Protective equipment was worn throughout this patient encounter including a face mask, eye protection, and gloves. Hand hygiene was performed before donning protective equipment and after removal when leaving the room.     Lamar Miner, APRN

## 2021-07-29 ENCOUNTER — TRANSCRIBE ORDERS (OUTPATIENT)
Dept: ADMINISTRATIVE | Facility: HOSPITAL | Age: 76
End: 2021-07-29

## 2021-07-29 DIAGNOSIS — N20.0 RENAL CALCULUS: Primary | ICD-10-CM

## 2021-08-18 DIAGNOSIS — E78.2 MIXED HYPERLIPIDEMIA: ICD-10-CM

## 2021-08-18 DIAGNOSIS — M10.062 ACUTE IDIOPATHIC GOUT OF LEFT KNEE: ICD-10-CM

## 2021-08-18 DIAGNOSIS — I10 ESSENTIAL HYPERTENSION: ICD-10-CM

## 2021-08-18 DIAGNOSIS — E11.9 TYPE 2 DIABETES MELLITUS WITHOUT COMPLICATION, WITHOUT LONG-TERM CURRENT USE OF INSULIN (HCC): ICD-10-CM

## 2021-08-18 DIAGNOSIS — N18.2 STAGE 2 CHRONIC KIDNEY DISEASE: ICD-10-CM

## 2021-08-18 DIAGNOSIS — Z85.46 HISTORY OF PROSTATE CANCER: ICD-10-CM

## 2021-08-18 DIAGNOSIS — Z12.5 ENCOUNTER FOR SCREENING FOR MALIGNANT NEOPLASM OF PROSTATE: ICD-10-CM

## 2021-08-18 DIAGNOSIS — Z85.46 HISTORY OF PROSTATE CANCER: Primary | ICD-10-CM

## 2021-08-21 LAB
ALT SERPL-CCNC: 20 U/L (ref 1–41)
APPEARANCE UR: CLEAR
BILIRUB UR QL STRIP: NEGATIVE
BUN SERPL-MCNC: 16 MG/DL (ref 8–23)
BUN/CREAT SERPL: 12 (ref 7–25)
CALCIUM SERPL-MCNC: 8.8 MG/DL (ref 8.6–10.5)
CHLORIDE SERPL-SCNC: 104 MMOL/L (ref 98–107)
CHOLEST SERPL-MCNC: 181 MG/DL (ref 0–200)
CO2 SERPL-SCNC: 27.4 MMOL/L (ref 22–29)
COLOR UR: YELLOW
CREAT SERPL-MCNC: 1.33 MG/DL (ref 0.76–1.27)
ERYTHROCYTE [DISTWIDTH] IN BLOOD BY AUTOMATED COUNT: 13.9 % (ref 12.3–15.4)
GLUCOSE SERPL-MCNC: 115 MG/DL (ref 65–99)
GLUCOSE UR QL: NEGATIVE
HBA1C MFR BLD: 5.5 % (ref 4.8–5.6)
HCT VFR BLD AUTO: 37.1 % (ref 37.5–51)
HDLC SERPL-MCNC: 50 MG/DL (ref 40–60)
HGB BLD-MCNC: 12.6 G/DL (ref 13–17.7)
HGB UR QL STRIP: NEGATIVE
KETONES UR QL STRIP: NEGATIVE
LDLC SERPL CALC-MCNC: 97 MG/DL (ref 0–100)
LEUKOCYTE ESTERASE UR QL STRIP: NEGATIVE
MCH RBC QN AUTO: 36.1 PG (ref 26.6–33)
MCHC RBC AUTO-ENTMCNC: 34 G/DL (ref 31.5–35.7)
MCV RBC AUTO: 106.3 FL (ref 79–97)
NITRITE UR QL STRIP: NEGATIVE
PH UR STRIP: 7 [PH] (ref 5–8)
PLATELET # BLD AUTO: 120 10*3/MM3 (ref 140–450)
POTASSIUM SERPL-SCNC: 4.4 MMOL/L (ref 3.5–5.2)
PROT UR QL STRIP: NEGATIVE
PSA SERPL-MCNC: 0.79 NG/ML (ref 0–4)
RBC # BLD AUTO: 3.49 10*6/MM3 (ref 4.14–5.8)
SODIUM SERPL-SCNC: 138 MMOL/L (ref 136–145)
SP GR UR: 1.02 (ref 1–1.03)
TRIGL SERPL-MCNC: 196 MG/DL (ref 0–150)
TSH SERPL DL<=0.005 MIU/L-ACNC: 43.7 UIU/ML (ref 0.27–4.2)
URATE SERPL-MCNC: 4 MG/DL (ref 3.4–7)
UROBILINOGEN UR STRIP-MCNC: NORMAL MG/DL
VLDLC SERPL CALC-MCNC: 34 MG/DL (ref 5–40)
WBC # BLD AUTO: 3.5 10*3/MM3 (ref 3.4–10.8)

## 2021-08-27 ENCOUNTER — OFFICE VISIT (OUTPATIENT)
Dept: FAMILY MEDICINE CLINIC | Facility: CLINIC | Age: 76
End: 2021-08-27

## 2021-08-27 ENCOUNTER — HOSPITAL ENCOUNTER (OUTPATIENT)
Dept: CT IMAGING | Facility: HOSPITAL | Age: 76
Discharge: HOME | End: 2021-08-27
Admitting: UROLOGY

## 2021-08-27 VITALS
SYSTOLIC BLOOD PRESSURE: 130 MMHG | OXYGEN SATURATION: 96 % | HEART RATE: 113 BPM | WEIGHT: 249.8 LBS | BODY MASS INDEX: 37 KG/M2 | HEIGHT: 69 IN | DIASTOLIC BLOOD PRESSURE: 80 MMHG

## 2021-08-27 DIAGNOSIS — I10 ESSENTIAL HYPERTENSION: ICD-10-CM

## 2021-08-27 DIAGNOSIS — E03.8 SUBCLINICAL HYPOTHYROIDISM: ICD-10-CM

## 2021-08-27 DIAGNOSIS — N20.0 RENAL CALCULUS: ICD-10-CM

## 2021-08-27 DIAGNOSIS — E78.2 MIXED HYPERLIPIDEMIA: ICD-10-CM

## 2021-08-27 DIAGNOSIS — M10.062 ACUTE IDIOPATHIC GOUT OF LEFT KNEE: ICD-10-CM

## 2021-08-27 DIAGNOSIS — Z00.00 MEDICARE ANNUAL WELLNESS VISIT, SUBSEQUENT: Primary | ICD-10-CM

## 2021-08-27 DIAGNOSIS — E11.9 TYPE 2 DIABETES MELLITUS WITHOUT COMPLICATION, WITHOUT LONG-TERM CURRENT USE OF INSULIN (HCC): ICD-10-CM

## 2021-08-27 PROCEDURE — 99213 OFFICE O/P EST LOW 20 MIN: CPT | Performed by: FAMILY MEDICINE

## 2021-08-27 PROCEDURE — 74176 CT ABD & PELVIS W/O CONTRAST: CPT

## 2021-08-27 PROCEDURE — G0439 PPPS, SUBSEQ VISIT: HCPCS | Performed by: FAMILY MEDICINE

## 2021-08-27 RX ORDER — ATORVASTATIN CALCIUM 80 MG/1
80 TABLET, FILM COATED ORAL DAILY
Qty: 90 TABLET | Refills: 3 | Status: SHIPPED | OUTPATIENT
Start: 2021-08-27 | End: 2023-02-27 | Stop reason: SDUPTHER

## 2021-08-27 RX ORDER — ALLOPURINOL 100 MG/1
200 TABLET ORAL DAILY
Qty: 180 TABLET | Refills: 3 | Status: SHIPPED | OUTPATIENT
Start: 2021-08-27 | End: 2023-02-27 | Stop reason: SDUPTHER

## 2021-08-27 RX ORDER — PIOGLITAZONEHYDROCHLORIDE 30 MG/1
30 TABLET ORAL DAILY
Qty: 90 TABLET | Refills: 3 | Status: SHIPPED | OUTPATIENT
Start: 2021-08-27 | End: 2022-08-22

## 2021-08-27 RX ORDER — LISINOPRIL 20 MG/1
20 TABLET ORAL DAILY
Qty: 90 TABLET | Refills: 3 | Status: SHIPPED | OUTPATIENT
Start: 2021-08-27 | End: 2023-02-27 | Stop reason: SDUPTHER

## 2021-08-27 RX ORDER — LEVOTHYROXINE SODIUM 0.03 MG/1
25 TABLET ORAL DAILY
Qty: 90 TABLET | Refills: 3 | Status: SHIPPED | OUTPATIENT
Start: 2021-08-27 | End: 2021-09-03

## 2021-08-27 RX ORDER — LEVOTHYROXINE SODIUM 0.03 MG/1
TABLET ORAL
Qty: 90 TABLET | Refills: 3 | OUTPATIENT
Start: 2021-08-27

## 2021-08-27 NOTE — PROGRESS NOTES
The ABCs of the Annual Wellness Visit  Subsequent Medicare Wellness Visit    Chief Complaint   Patient presents with   • Medicare Wellness-subsequent       Subjective   History of Present Illness:  Bryan Landers is a 76 y.o. male who presents for a Subsequent Medicare Wellness Visit.    HEALTH RISK ASSESSMENT    Recent Hospitalizations:  Recently treated at the following:  Other: At [facility].  Diagnosed with bilateral DVT in legs and bilateral pulmonary embolism.  He has had multiple follow-ups.  Last CTA of chest reveals resolution of PE and resolution of right heart strain.  During his work-up for the hypercoagulable state.  Had a CT abdomen of pelvis and abdomen.  Which revealed diffuse lymphadenopathy.  Which was suspicious for cancer.  He had a retroperitoneal lymph node biopsy which revealed nonmalignant lymph node.  He is currently now off his Eliquis.    Current Medical Providers:  Patient Care Team:  Jose Fonseca MD as PCP - General (Family Medicine)  Jose Michelle MD as Consulting Physician (Radiation Oncology)  Roldan Mccarthy MD as Consulting Physician (Urology)    Smoking Status:  Social History     Tobacco Use   Smoking Status Former Smoker   • Types: Cigars   • Quit date: 1986   • Years since quittin.6   Smokeless Tobacco Never Used       Alcohol Consumption:  Social History     Substance and Sexual Activity   Alcohol Use Yes   • Alcohol/week: 32.0 - 34.0 standard drinks   • Types: 4 - 6 Shots of liquor, 28 Standard drinks or equivalent per week    Comment: 2-3 double vodkas a night       Depression Screen:   PHQ-2/PHQ-9 Depression Screening 2021   Little interest or pleasure in doing things 0   Feeling down, depressed, or hopeless 0   Trouble falling or staying asleep, or sleeping too much -   Feeling tired or having little energy -   Poor appetite or overeating -   Feeling bad about yourself - or that you are a failure or have let yourself or your  family down -   Trouble concentrating on things, such as reading the newspaper or watching television -   Moving or speaking so slowly that other people could have noticed. Or the opposite - being so fidgety or restless that you have been moving around a lot more than usual -   Thoughts that you would be better off dead, or of hurting yourself in some way -   Total Score 0       Fall Risk Screen:  DEBORA Fall Risk Assessment was completed, and patient is at LOW risk for falls.Assessment completed on:8/27/2021    Health Habits and Functional and Cognitive Screening:  Functional & Cognitive Status 8/27/2021   Do you have difficulty preparing food and eating? No   Do you have difficulty bathing yourself, getting dressed or grooming yourself? No   Do you have difficulty using the toilet? No   Do you have difficulty moving around from place to place? No   Do you have trouble with steps or getting out of a bed or a chair? No   Current Diet Low Fat Diet   Dental Exam Not up to date   Eye Exam Up to date   Exercise (times per week) 7 times per week   Current Exercises Include Treadmill   Current Exercise Activities Include -   Do you need help using the phone?  No   Are you deaf or do you have serious difficulty hearing?  Yes   Do you need help with transportation? No   Do you need help shopping? No   Do you need help preparing meals?  No   Do you need help with housework?  No   Do you need help with laundry? No   Do you need help taking your medications? No   Do you need help managing money? No   Do you ever drive or ride in a car without wearing a seat belt? No   Have you felt unusual stress, anger or loneliness in the last month? No   Who do you live with? Spouse   If you need help, do you have trouble finding someone available to you? No   Have you been bothered in the last four weeks by sexual problems? No   Do you have difficulty concentrating, remembering or making decisions? Yes         Does the patient have  evidence of cognitive impairment? Yes    Asprin use counseling:Taking ASA appropriately as indicated    Age-appropriate Screening Schedule:  Refer to the list below for future screening recommendations based on patient's age, sex and/or medical conditions. Orders for these recommended tests are listed in the plan section. The patient has been provided with a written plan.    Health Maintenance   Topic Date Due   • URINE MICROALBUMIN  02/07/2021   • INFLUENZA VACCINE  10/01/2021   • HEMOGLOBIN A1C  02/20/2022   • DIABETIC EYE EXAM  05/21/2022   • LIPID PANEL  08/20/2022   • TDAP/TD VACCINES (2 - Td or Tdap) 08/16/2031   • ZOSTER VACCINE  Completed          The following portions of the patient's history were reviewed and updated as appropriate: allergies, current medications, past family history, past medical history, past social history, past surgical history and problem list.    Outpatient Medications Prior to Visit   Medication Sig Dispense Refill   • aspirin 81 MG chewable tablet Chew 81 mg Daily.     • cholecalciferol (VITAMIN D3) 1000 UNITS tablet Take 1,000 Units by mouth Daily.     • Cyanocobalamin (VITAMIN B 12 PO) Take 1,000 mg by mouth Every Morning.     • donepezil (ARICEPT) 10 MG tablet TAKE ONE TABLET BY MOUTH DAILY WITH BREAKFAST 90 tablet 0   • fluorouracil (EFUDEX) 5 % cream Apply  topically to the appropriate area as directed 2 (Two) Times a Day.     • Multiple Vitamins-Minerals (MULTIVITAL PLATINUM PO) Take 1 tablet by mouth Daily.     • POTASSIUM PO Take  by mouth. Dose unknown     • psyllium (METAMUCIL) 58.6 % powder Take  by mouth 3 (Three) Times a Day.     • allopurinol (ZYLOPRIM) 100 MG tablet Take 2 tablets by mouth Daily. 180 tablet 3   • atorvastatin (LIPITOR) 80 MG tablet Take 1 tablet by mouth Daily. 90 tablet 3   • levothyroxine (SYNTHROID, LEVOTHROID) 25 MCG tablet Take 1 tablet by mouth Daily. 90 tablet 3   • lisinopril (PRINIVIL,ZESTRIL) 20 MG tablet TAKE ONE TABLET BY MOUTH DAILY 90  "tablet 1   • pioglitazone (ACTOS) 30 MG tablet TAKE ONE TABLET BY MOUTH DAILY 90 tablet 1     No facility-administered medications prior to visit.       Patient Active Problem List   Diagnosis   • Essential hypertension   • Mixed hyperlipidemia   • Arthritis   • Type 2 diabetes mellitus without complication, without long-term current use of insulin (CMS/[facility])   • Severely overweight   • Routine adult health maintenance   • Idiopathic chronic gout of left knee   • Medication monitoring encounter   • Microalbuminuria due to type 2 diabetes mellitus (CMS/[facility])   • History of prostate cancer   • Stage 2 chronic kidney disease   • Medicare annual wellness visit, subsequent   • Radiation proctitis   • History of hip replacement, total, bilateral   • Memory problem   • Subclinical hypothyroidism   • B12 deficiency   • Sinus tachycardia by electrocardiogram   • Acute pulmonary embolism without acute cor pulmonale (CMS/[facility])   • Acute deep vein thrombosis (DVT) of popliteal vein of both lower extremities (CMS/[facility])   • Screening for prostate cancer   • 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 (CMS/[facility])       Advanced Care Planning:  ACP discussion was held with the patient during this visit. Patient has an advance directive (not in EMR), copy requested.    Review of Systems    Compared to one year ago, the patient feels his physical health is worse.  Compared to one year ago, the patient feels his mental health is worse.    Reviewed chart for potential of high risk medication in the elderly: yes  Reviewed chart for potential of harmful drug interactions in the elderly:yes    Objective         Vitals:    08/27/21 1022   BP: 130/80   BP Location: Left arm   Patient Position: Sitting   Cuff Size: Adult   Pulse: 113   SpO2: 96%   Weight: 113 kg (249 lb 12.8 oz)   Height: 175.3 cm (69\")       Body mass index is 36.89 kg/m².  Discussed the patient's BMI with him. The BMI is above average; BMI management plan is completed.    Physical " Exam  Cardiovascular:      Rate and Rhythm: Normal rate.   Pulmonary:      Effort: Pulmonary effort is normal.   Neurological:      Mental Status: He is alert.   Psychiatric:         Mood and Affect: Mood normal.         Lab Results   Component Value Date     (H) 08/20/2021    CHLPL 181 08/20/2021    TRIG 196 (H) 08/20/2021    HDL 50 08/20/2021    LDL 97 08/20/2021    VLDL 34 08/20/2021    HGBA1C 5.50 08/20/2021        Assessment/Plan   Medicare Risks and Personalized Health Plan  CMS Preventative Services Quick Reference  Cardiovascular risk  Chronic Pain   Colon Cancer Screening  Dementia/Memory   Depression/Dysphoria  Diabetic Lab Screening   Fall Risk  Hearing Problem  Inactivity/Sedentary  Obesity/Overweight   Polypharmacy  Prostate Cancer Screening     The above risks/problems have been discussed with the patient.  Pertinent information has been shared with the patient in the After Visit Summary.  Follow up plans and orders are seen below in the Assessment/Plan Section.    Diagnoses and all orders for this visit:    1. Medicare annual wellness visit, subsequent (Primary)    2. Subclinical hypothyroidism  -     levothyroxine (SYNTHROID, LEVOTHROID) 25 MCG tablet; Take 1 tablet by mouth Daily.  Dispense: 90 tablet; Refill: 3  -     TSH; Future  -     T4, Free; Future  -     T3; Future    3. Acute idiopathic gout of left knee  -     allopurinol (ZYLOPRIM) 100 MG tablet; Take 2 tablets by mouth Daily. Indications: Gout  Dispense: 180 tablet; Refill: 3    4. Mixed hyperlipidemia  -     atorvastatin (LIPITOR) 80 MG tablet; Take 1 tablet by mouth Daily.  Dispense: 90 tablet; Refill: 3    5. Essential hypertension  -     lisinopril (PRINIVIL,ZESTRIL) 20 MG tablet; Take 1 tablet by mouth Daily. Indications: High Blood Pressure Disorder  Dispense: 90 tablet; Refill: 3    6. Type 2 diabetes mellitus without complication, without long-term current use of insulin (CMS/[facility])  -     pioglitazone (ACTOS) 30 MG tablet;  Take 1 tablet by mouth Daily. Indications: Type 2 Diabetes  Dispense: 90 tablet; Refill: 3    Reviewed his lab work.  Kidney function has improved.  His TSH is elevated.  Wife reports he stopped taking levothyroxine 1 years ago.  Also stopped taking the Aricept 1 years ago.  He stopped them both, as he was taking them at the same time, and one the medications was causing stomachache and diarrhea.  My suspicion it was the Aricept and not levothyroxine  His TSH is markedly elevated consistent with full-blown hypothyroidism.  We will restart his home levothyroxine today.  We will recheck TSH and thyroid labs in 6 weeks.  See me in 7 weeks.  We will adjust thyroid medicine as needed.  We will probably start Namenda at that next visit.    Is had no recent gout attacks.    He is also seeing urology for hematuria.  Was told he had kidney stones.  perhaps that was the cause of his hematuria.  Note; he was on Eliquis during that time.  He is no longer on Eliquis.    Having bilateral heel pain.  Been going on off and on for years.  Clinically sounds like plantar fasciitis  Follow Up:  Return in about 7 weeks (around 10/15/2021) for new medication follow up.     An After Visit Summary and PPPS were given to the patient.

## 2021-09-02 DIAGNOSIS — E03.8 SUBCLINICAL HYPOTHYROIDISM: ICD-10-CM

## 2021-09-03 RX ORDER — LEVOTHYROXINE SODIUM 0.03 MG/1
TABLET ORAL
Qty: 90 TABLET | Refills: 3 | Status: SHIPPED | OUTPATIENT
Start: 2021-09-03 | End: 2021-10-13 | Stop reason: SDUPTHER

## 2021-09-20 ENCOUNTER — PRE-ADMISSION TESTING (OUTPATIENT)
Dept: PREADMISSION TESTING | Facility: HOSPITAL | Age: 76
End: 2021-09-20

## 2021-09-20 VITALS
HEIGHT: 70 IN | DIASTOLIC BLOOD PRESSURE: 81 MMHG | SYSTOLIC BLOOD PRESSURE: 118 MMHG | WEIGHT: 249.2 LBS | TEMPERATURE: 98.1 F | HEART RATE: 104 BPM | RESPIRATION RATE: 24 BRPM | OXYGEN SATURATION: 98 % | BODY MASS INDEX: 35.68 KG/M2

## 2021-09-20 LAB
ANION GAP SERPL CALCULATED.3IONS-SCNC: 12.7 MMOL/L (ref 5–15)
BUN SERPL-MCNC: 12 MG/DL (ref 8–23)
BUN/CREAT SERPL: 9.2 (ref 7–25)
CALCIUM SPEC-SCNC: 8.8 MG/DL (ref 8.6–10.5)
CHLORIDE SERPL-SCNC: 104 MMOL/L (ref 98–107)
CO2 SERPL-SCNC: 24.3 MMOL/L (ref 22–29)
CREAT SERPL-MCNC: 1.3 MG/DL (ref 0.76–1.27)
DEPRECATED RDW RBC AUTO: 51.4 FL (ref 37–54)
ERYTHROCYTE [DISTWIDTH] IN BLOOD BY AUTOMATED COUNT: 13.4 % (ref 12.3–15.4)
GFR SERPL CREATININE-BSD FRML MDRD: 54 ML/MIN/1.73
GLUCOSE SERPL-MCNC: 97 MG/DL (ref 65–99)
HCT VFR BLD AUTO: 38 % (ref 37.5–51)
HGB BLD-MCNC: 13.2 G/DL (ref 13–17.7)
MCH RBC QN AUTO: 36.7 PG (ref 26.6–33)
MCHC RBC AUTO-ENTMCNC: 34.7 G/DL (ref 31.5–35.7)
MCV RBC AUTO: 105.6 FL (ref 79–97)
PLATELET # BLD AUTO: 114 10*3/MM3 (ref 140–450)
PMV BLD AUTO: 9.6 FL (ref 6–12)
POTASSIUM SERPL-SCNC: 4.3 MMOL/L (ref 3.5–5.2)
RBC # BLD AUTO: 3.6 10*6/MM3 (ref 4.14–5.8)
SARS-COV-2 ORF1AB RESP QL NAA+PROBE: NOT DETECTED
SODIUM SERPL-SCNC: 141 MMOL/L (ref 136–145)
WBC # BLD AUTO: 4.05 10*3/MM3 (ref 3.4–10.8)

## 2021-09-20 PROCEDURE — 36415 COLL VENOUS BLD VENIPUNCTURE: CPT

## 2021-09-20 PROCEDURE — 80048 BASIC METABOLIC PNL TOTAL CA: CPT

## 2021-09-20 PROCEDURE — 93010 ELECTROCARDIOGRAM REPORT: CPT | Performed by: INTERNAL MEDICINE

## 2021-09-20 PROCEDURE — 85027 COMPLETE CBC AUTOMATED: CPT

## 2021-09-20 PROCEDURE — C9803 HOPD COVID-19 SPEC COLLECT: HCPCS | Performed by: NURSE PRACTITIONER

## 2021-09-20 PROCEDURE — U0004 COV-19 TEST NON-CDC HGH THRU: HCPCS | Performed by: NURSE PRACTITIONER

## 2021-09-20 PROCEDURE — 93005 ELECTROCARDIOGRAM TRACING: CPT

## 2021-09-20 RX ORDER — ASPIRIN 81 MG/1
81 TABLET ORAL DAILY
COMMUNITY

## 2021-09-20 NOTE — DISCHARGE INSTRUCTIONS
Take the following medications the morning of surgery with a small sip of water:  NONE-IF TAKING LEVOTHYROXINE PLEASE TAKE THE MORNING OF SURGERY    BRING A LIST OF DAILY MEDS THE MORNING OF SURGERY    [facility] 1:30PM      If you are on prescription narcotic pain medication to control your pain you may also take that medication the morning of surgery.    General Instructions:  • Do not eat or drink anything after midnight the night before surgery.  • Infants may have breast milk up to four hours before surgery.  • Infants drinking formula may drink formula up to six hours before surgery.   • Patients who avoid smoking, chewing tobacco and alcohol for 4 weeks prior to surgery have a reduced risk of post-operative complications.  Quit smoking as many days before surgery as you can.  • Do not smoke, use chewing tobacco or drink alcohol the day of surgery.   • If applicable bring your C-PAP/ BI-PAP machine.  • Bring any papers given to you in the doctor’s office.  • Wear clean comfortable clothes.  • Do not wear contact lenses, false eyelashes or make-up.  Bring a case for your glasses.   • Bring crutches or walker if applicable.  • Remove all piercings.  Leave jewelry and any other valuables at home.  • Hair extensions with metal clips must be removed prior to surgery.  • The Pre-Admission Testing nurse will instruct you to bring medications if unable to obtain an accurate list in Pre-Admission Testing.        Preventing a Surgical Site Infection:  • For 2 to 3 days before surgery, avoid shaving with a razor because the razor can irritate skin and make it easier to develop an infection.    • Any areas of open skin can increase the risk of a post-operative wound infection by allowing bacteria to enter and travel throughout the body.  Notify your surgeon if you have any skin wounds / rashes even if it is not near the expected surgical site.  The area will need assessed to determine if surgery  should be delayed until it is healed.  • The night prior to surgery shower using a fresh bar of anti-bacterial soap (such as Dial) and clean washcloth.  Sleep in a clean bed with clean clothing.  Do not allow pets to sleep with you.  • Shower on the morning of surgery using a fresh bar of anti-bacterial soap (such as Dial) and clean washcloth.  Dry with a clean towel and dress in clean clothing.  • Ask your surgeon if you will be receiving antibiotics prior to surgery.  • Make sure you, your family, and all healthcare providers clean their hands with soap and water or an alcohol based hand  before caring for you or your wound.    Day of surgery:  Your arrival time is approximately two hours before your scheduled surgery time.  Upon arrival, a Pre-op nurse and Anesthesiologist will review your health history, obtain vital signs, and answer questions you may have.  The only belongings needed at this time will be your home medications and if applicable your C-PAP/BI-PAP machine.  A Pre-op nurse will start an IV and you may receive medication in preparation for surgery, including something to help you relax.      Please be aware that surgery does come with discomfort.  We want to make every effort to control your discomfort so please discuss any uncontrolled symptoms with your nurse.   Your doctor will most likely have prescribed pain medications.      If you are going home after surgery you will receive individualized written care instructions before being discharged.  A responsible adult must drive you to and from the hospital on the day of your surgery and stay with you for 24 hours.  Discharge prescriptions can be filled by the hospital pharmacy during regular pharmacy hours.  If you are having surgery late in the day/evening your prescription may be e-prescribed to your pharmacy.  Please verify your pharmacy hours or chose a 24 hour pharmacy to avoid not having access to your prescription because your  pharmacy has closed for the day.    If you are staying overnight following surgery, you will be transported to your hospital room following the recovery period.  [facility] has all private rooms.    If you have any questions please call Pre-Admission Testing at [phone].  Deductibles and co-payments are collected on the day of service. Please be prepared to pay the required co-pay, deductible or deposit on the day of service as defined by your plan.    Patient Education for Self-Quarantine Process    • Following your COVID testing, we strongly recommend that you wear a mask when you are with other people and practice social distancing.   • Limit your activities to only required outings.  • Wash your hands with soap and water frequently for at least 20 seconds.   • Avoid touching your eyes, nose and mouth with unwashed hands.  • Do not share anything - utensils, drinking glasses, food from the same bowl.   • Sanitize household surfaces daily. Include all high touch areas (door handles, light switches, phones, countertops, etc.)    Call your surgeon immediately if you experience any of the following symptoms:  • Sore Throat  • Shortness of Breath or difficulty breathing  • Cough  • Chills  • Body soreness or muscle pain  • Headache  • Fever  • New loss of taste or smell  • Do not arrive for your surgery ill.  Your procedure will need to be rescheduled to another time.  You will need to call your physician before the day of surgery to avoid any unnecessary exposure to hospital staff as well as other patients.

## 2021-09-21 ENCOUNTER — TELEPHONE (OUTPATIENT)
Dept: FAMILY MEDICINE CLINIC | Facility: CLINIC | Age: 76
End: 2021-09-21

## 2021-09-21 LAB — QT INTERVAL: 423 MS

## 2021-09-21 NOTE — TELEPHONE ENCOUNTER
Patient is having a bladder biopsy done tomorrow at [facility]. [facility] called saying that patient had an abnormal EKG. They are asking you to review that EKG, and put a note in his chart stating whether or not he is approved for the biopsy tomorrow. The EKG is in Epic.     EKG has been reviewed.  Compared to several EKGs in our system as well as [facility].  His right bundle branch block is old and chronic.  His PVCs have developed over the past few years.  These have accelerated after his  Admission to [facility] last December with bilateral leg DVTs and bilateral pulmonary emboli.  Echo at that time revealed ejection fraction 48%.  There is no aortic stenosis.  Does not appear that the heme-onc folks at [facility] have kept him on his anticoagulation.  Is all clear from that front.  So although his EKG is abnormal with PVCs and right bundle branch block.  He should generally be safe for his cystoscopy tomorrow with Dr. Brian Mccarthy.  But he does carry a mild to moderate risk of increased arrhythmias during surgery.  So we will ask the anesthesiology staff to keep a close eye on his telemetry.        Jose Fonseca MD

## 2021-09-22 ENCOUNTER — ANESTHESIA (OUTPATIENT)
Dept: PERIOP | Facility: HOSPITAL | Age: 76
End: 2021-09-22

## 2021-09-22 ENCOUNTER — HOSPITAL ENCOUNTER (OUTPATIENT)
Facility: HOSPITAL | Age: 76
Setting detail: HOSPITAL OUTPATIENT SURGERY
Discharge: HOME | End: 2021-09-22
Attending: UROLOGY | Admitting: UROLOGY

## 2021-09-22 ENCOUNTER — ANESTHESIA EVENT (OUTPATIENT)
Dept: PERIOP | Facility: HOSPITAL | Age: 76
End: 2021-09-22

## 2021-09-22 VITALS
TEMPERATURE: 98 F | WEIGHT: 249 LBS | DIASTOLIC BLOOD PRESSURE: 95 MMHG | BODY MASS INDEX: 36.88 KG/M2 | SYSTOLIC BLOOD PRESSURE: 130 MMHG | OXYGEN SATURATION: 96 % | HEIGHT: 69 IN | HEART RATE: 83 BPM | RESPIRATION RATE: 16 BRPM

## 2021-09-22 DIAGNOSIS — R31.0 GROSS HEMATURIA: ICD-10-CM

## 2021-09-22 DIAGNOSIS — N32.89 BLADDER MASS: Primary | ICD-10-CM

## 2021-09-22 LAB
GLUCOSE BLDC GLUCOMTR-MCNC: 123 MG/DL (ref 70–130)
GLUCOSE BLDC GLUCOMTR-MCNC: 128 MG/DL (ref 70–130)

## 2021-09-22 PROCEDURE — 25010000002 FENTANYL CITRATE (PF) 50 MCG/ML SOLUTION: Performed by: ANESTHESIOLOGY

## 2021-09-22 PROCEDURE — 88313 SPECIAL STAINS GROUP 2: CPT | Performed by: UROLOGY

## 2021-09-22 PROCEDURE — 25010000002 ONDANSETRON PER 1 MG: Performed by: ANESTHESIOLOGY

## 2021-09-22 PROCEDURE — 82962 GLUCOSE BLOOD TEST: CPT

## 2021-09-22 PROCEDURE — 88305 TISSUE EXAM BY PATHOLOGIST: CPT | Performed by: UROLOGY

## 2021-09-22 PROCEDURE — 25010000002 PROPOFOL 10 MG/ML EMULSION: Performed by: ANESTHESIOLOGY

## 2021-09-22 RX ORDER — GLYCOPYRROLATE 0.2 MG/ML
INJECTION INTRAMUSCULAR; INTRAVENOUS AS NEEDED
Status: DISCONTINUED | OUTPATIENT
Start: 2021-09-22 | End: 2021-09-22 | Stop reason: SURG

## 2021-09-22 RX ORDER — MIDAZOLAM HYDROCHLORIDE 1 MG/ML
0.5 INJECTION INTRAMUSCULAR; INTRAVENOUS
Status: DISCONTINUED | OUTPATIENT
Start: 2021-09-22 | End: 2021-09-22 | Stop reason: HOSPADM

## 2021-09-22 RX ORDER — FAMOTIDINE 10 MG/ML
20 INJECTION, SOLUTION INTRAVENOUS ONCE
Status: COMPLETED | OUTPATIENT
Start: 2021-09-22 | End: 2021-09-22

## 2021-09-22 RX ORDER — ENALAPRILAT 2.5 MG/2ML
1.25 INJECTION INTRAVENOUS ONCE AS NEEDED
Status: DISCONTINUED | OUTPATIENT
Start: 2021-09-22 | End: 2021-09-22 | Stop reason: HOSPADM

## 2021-09-22 RX ORDER — FENTANYL CITRATE 50 UG/ML
50 INJECTION, SOLUTION INTRAMUSCULAR; INTRAVENOUS
Status: DISCONTINUED | OUTPATIENT
Start: 2021-09-22 | End: 2021-09-22 | Stop reason: HOSPADM

## 2021-09-22 RX ORDER — HYDROMORPHONE HYDROCHLORIDE 1 MG/ML
0.25 INJECTION, SOLUTION INTRAMUSCULAR; INTRAVENOUS; SUBCUTANEOUS
Status: DISCONTINUED | OUTPATIENT
Start: 2021-09-22 | End: 2021-09-22 | Stop reason: HOSPADM

## 2021-09-22 RX ORDER — LIDOCAINE HYDROCHLORIDE 10 MG/ML
0.5 INJECTION, SOLUTION EPIDURAL; INFILTRATION; INTRACAUDAL; PERINEURAL ONCE AS NEEDED
Status: DISCONTINUED | OUTPATIENT
Start: 2021-09-22 | End: 2021-09-22 | Stop reason: HOSPADM

## 2021-09-22 RX ORDER — FENTANYL CITRATE 50 UG/ML
INJECTION, SOLUTION INTRAMUSCULAR; INTRAVENOUS AS NEEDED
Status: DISCONTINUED | OUTPATIENT
Start: 2021-09-22 | End: 2021-09-22 | Stop reason: SURG

## 2021-09-22 RX ORDER — OXYCODONE HYDROCHLORIDE AND ACETAMINOPHEN 5; 325 MG/1; MG/1
1 TABLET ORAL EVERY 4 HOURS PRN
Qty: 25 TABLET | Refills: 0 | Status: SHIPPED | OUTPATIENT
Start: 2021-09-22 | End: 2022-01-13 | Stop reason: ALTCHOICE

## 2021-09-22 RX ORDER — DROPERIDOL 2.5 MG/ML
0.62 INJECTION, SOLUTION INTRAMUSCULAR; INTRAVENOUS ONCE AS NEEDED
Status: DISCONTINUED | OUTPATIENT
Start: 2021-09-22 | End: 2021-09-22 | Stop reason: HOSPADM

## 2021-09-22 RX ORDER — OXYCODONE AND ACETAMINOPHEN 7.5; 325 MG/1; MG/1
1 TABLET ORAL ONCE AS NEEDED
Status: DISCONTINUED | OUTPATIENT
Start: 2021-09-22 | End: 2021-09-22 | Stop reason: HOSPADM

## 2021-09-22 RX ORDER — OXYCODONE HYDROCHLORIDE AND ACETAMINOPHEN 5; 325 MG/1; MG/1
1 TABLET ORAL ONCE AS NEEDED
Status: DISCONTINUED | OUTPATIENT
Start: 2021-09-22 | End: 2021-09-22 | Stop reason: HOSPADM

## 2021-09-22 RX ORDER — CEFAZOLIN SODIUM 2 G/100ML
2 INJECTION, SOLUTION INTRAVENOUS ONCE
Status: DISCONTINUED | OUTPATIENT
Start: 2021-09-22 | End: 2021-09-22 | Stop reason: HOSPADM

## 2021-09-22 RX ORDER — DIPHENHYDRAMINE HYDROCHLORIDE 50 MG/ML
12.5 INJECTION INTRAMUSCULAR; INTRAVENOUS
Status: DISCONTINUED | OUTPATIENT
Start: 2021-09-22 | End: 2021-09-22 | Stop reason: HOSPADM

## 2021-09-22 RX ORDER — ONDANSETRON 2 MG/ML
4 INJECTION INTRAMUSCULAR; INTRAVENOUS ONCE AS NEEDED
Status: DISCONTINUED | OUTPATIENT
Start: 2021-09-22 | End: 2021-09-22 | Stop reason: HOSPADM

## 2021-09-22 RX ORDER — MAGNESIUM HYDROXIDE 1200 MG/15ML
LIQUID ORAL AS NEEDED
Status: DISCONTINUED | OUTPATIENT
Start: 2021-09-22 | End: 2021-09-22 | Stop reason: HOSPADM

## 2021-09-22 RX ORDER — SODIUM CHLORIDE 0.9 % (FLUSH) 0.9 %
3-10 SYRINGE (ML) INJECTION AS NEEDED
Status: DISCONTINUED | OUTPATIENT
Start: 2021-09-22 | End: 2021-09-22 | Stop reason: HOSPADM

## 2021-09-22 RX ORDER — HYDROCODONE BITARTRATE AND ACETAMINOPHEN 5; 325 MG/1; MG/1
1 TABLET ORAL ONCE AS NEEDED
Status: DISCONTINUED | OUTPATIENT
Start: 2021-09-22 | End: 2021-09-22 | Stop reason: HOSPADM

## 2021-09-22 RX ORDER — PROPOFOL 10 MG/ML
VIAL (ML) INTRAVENOUS AS NEEDED
Status: DISCONTINUED | OUTPATIENT
Start: 2021-09-22 | End: 2021-09-22 | Stop reason: SURG

## 2021-09-22 RX ORDER — SODIUM CHLORIDE, SODIUM LACTATE, POTASSIUM CHLORIDE, CALCIUM CHLORIDE 600; 310; 30; 20 MG/100ML; MG/100ML; MG/100ML; MG/100ML
9 INJECTION, SOLUTION INTRAVENOUS CONTINUOUS
Status: DISCONTINUED | OUTPATIENT
Start: 2021-09-22 | End: 2021-09-22 | Stop reason: HOSPADM

## 2021-09-22 RX ORDER — ONDANSETRON 2 MG/ML
INJECTION INTRAMUSCULAR; INTRAVENOUS AS NEEDED
Status: DISCONTINUED | OUTPATIENT
Start: 2021-09-22 | End: 2021-09-22 | Stop reason: SURG

## 2021-09-22 RX ORDER — CEPHALEXIN 500 MG/1
500 CAPSULE ORAL 3 TIMES DAILY
Qty: 21 CAPSULE | Refills: 0 | Status: SHIPPED | OUTPATIENT
Start: 2021-09-22 | End: 2021-09-29

## 2021-09-22 RX ORDER — SODIUM CHLORIDE 0.9 % (FLUSH) 0.9 %
3 SYRINGE (ML) INJECTION EVERY 12 HOURS SCHEDULED
Status: DISCONTINUED | OUTPATIENT
Start: 2021-09-22 | End: 2021-09-22 | Stop reason: HOSPADM

## 2021-09-22 RX ORDER — OXYCODONE HYDROCHLORIDE AND ACETAMINOPHEN 5; 325 MG/1; MG/1
1 TABLET ORAL EVERY 4 HOURS PRN
Qty: 20 TABLET | Refills: 0 | Status: SHIPPED | OUTPATIENT
Start: 2021-09-22 | End: 2021-09-22 | Stop reason: SDUPTHER

## 2021-09-22 RX ORDER — LIDOCAINE HYDROCHLORIDE 20 MG/ML
INJECTION, SOLUTION INFILTRATION; PERINEURAL AS NEEDED
Status: DISCONTINUED | OUTPATIENT
Start: 2021-09-22 | End: 2021-09-22 | Stop reason: SURG

## 2021-09-22 RX ORDER — NALOXONE HCL 0.4 MG/ML
0.4 VIAL (ML) INJECTION AS NEEDED
Status: DISCONTINUED | OUTPATIENT
Start: 2021-09-22 | End: 2021-09-22 | Stop reason: HOSPADM

## 2021-09-22 RX ADMIN — FENTANYL CITRATE 25 MCG: 50 INJECTION INTRAMUSCULAR; INTRAVENOUS at 15:58

## 2021-09-22 RX ADMIN — ONDANSETRON 4 MG: 2 INJECTION INTRAMUSCULAR; INTRAVENOUS at 16:15

## 2021-09-22 RX ADMIN — SODIUM CHLORIDE, POTASSIUM CHLORIDE, SODIUM LACTATE AND CALCIUM CHLORIDE 9 ML/HR: 600; 310; 30; 20 INJECTION, SOLUTION INTRAVENOUS at 15:32

## 2021-09-22 RX ADMIN — PROPOFOL 250 MG: 10 INJECTION, EMULSION INTRAVENOUS at 15:58

## 2021-09-22 RX ADMIN — GLYCOPYRROLATE 0.1 MG: 0.2 INJECTION INTRAMUSCULAR; INTRAVENOUS at 15:55

## 2021-09-22 RX ADMIN — LIDOCAINE HYDROCHLORIDE 80 MG: 20 INJECTION, SOLUTION INFILTRATION; PERINEURAL at 15:58

## 2021-09-22 RX ADMIN — FAMOTIDINE 20 MG: 10 INJECTION INTRAVENOUS at 15:32

## 2021-09-22 NOTE — PERIOPERATIVE NURSING NOTE
[facility] Yandy, who confirmed that they are open until 8pm tonight.  She confirmed they have a Keflex ready for pt, but they have not received an e-script for a Percocet.  I asked her to watch for that to come through, as the doctor said he would be resending it. Pt & pt's daughter updated.

## 2021-09-22 NOTE — DISCHARGE INSTRUCTIONS
***You can take a pain pill at any time at home.  You did not receive a dose here at the hospital.

## 2021-09-22 NOTE — OP NOTE
CYSTOSCOPY BLADDER BIOPSY  Procedure Note    Bryan Landers  9/22/2021    Pre-op Diagnosis:   Bladder Mass    Post-op Diagnosis:     Post-Op Diagnosis Codes:     * Bladder mass [N32.89]    Procedure(s):  CYSTOSCOPY BLADDER BIOPSY    Surgeon(s):  Roldan Mccarthy MD    Anesthesia: General    Staff:   Circulator: Brooklyn Willams RN; Jess Cui RN  Scrub Person: Diann Hennessy; Sheree Leroy    Estimated Blood Loss: minimal    Specimens:                Order Name Source Comment Collection Info Order Time   TISSUE PATHOLOGY EXAM Urinary Bladder  Collected By: Roldan Mccarthy MD 9/22/2021  4:15 PM     Release to patient   Immediate              Drains: * No LDAs found *    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  Cystoscopy shows a mass in the posterior bladder wall.    Procedure: Patient was taken to the operative suite given general anesthesia.  Placed in lithotomy.  Prepped and draped in sterile fashion.  Surgical timeout was performed.  Urethra is normal.  The prostatic urethra showed moderate obstructing lateral lobes.  The bladder showed mild to moderate trabeculation.  No urothelial mass was seen within the lumen the bladder but at the dome of the bladder he had a very sizable bladder diverticulum measure about 5 to 6 cm.  At the back wall of this diverticulum there is an erythematous mass which I used cold cup biopsies to take multiple specimens.  I then cauterized this with the Bugbee.  Bleeding was minimal.  He was awoken taken recovery stable condition after instillation of lidocaine in his urethra and bladder.      Roldan Mccarthy MD     Date: 9/22/2021  Time: 16:52 EDT

## 2021-09-22 NOTE — PERIOPERATIVE NURSING NOTE
I called Dr Mccarthy to notify that pt's Percocet failed to transmit to the pharmacy on the e-scribe he attempted at 1728.  Dr Mccarthy confirms that he will resend it.

## 2021-09-22 NOTE — ANESTHESIA PREPROCEDURE EVALUATION
Anesthesia Evaluation     Patient summary reviewed and Nursing notes reviewed   NPO Solid Status: > 8 hours  NPO Liquid Status: > 2 hours           Airway   Mallampati: II  TM distance: >3 FB  Neck ROM: full  Dental - normal exam   (+) poor dentition        Pulmonary - normal exam   (+) pulmonary embolism,   Cardiovascular     ECG reviewed  Rhythm: irregular  Rate: normal    (+) hypertension, dysrhythmias PVC, DVT, hyperlipidemia,       Neuro/Psych- negative ROS  GI/Hepatic/Renal/Endo    (+) morbid obesity, GI bleeding resolved, renal disease CRI, diabetes mellitus type 2,     Musculoskeletal     Abdominal    Substance History - negative use     OB/GYN negative ob/gyn ROS         Other   arthritis,                    Anesthesia Plan    ASA 3     general   (I have reviewed the patient's history with the patient and the chart, including all pertinent laboratory results and imaging. I have explained the risks of anesthesia including but not limited to dental damage, corneal abrasion, nerve injury, MI, stroke, and death. Questions asked and answered. Anesthetic plan discussed with patient and team as indicated. Patient expressed understanding of the above.    GA with LMA as able  )  intravenous induction     Anesthetic plan, all risks, benefits, and alternatives have been provided, discussed and informed consent has been obtained with: patient.

## 2021-09-22 NOTE — ANESTHESIA PROCEDURE NOTES
Airway  Urgency: elective    Date/Time: 9/22/2021 4:00 PM  Airway not difficult    General Information and Staff    Patient location during procedure: OR  Anesthesiologist: Tejinder Vizcaino MD    Indications and Patient Condition  Indications for airway management: airway protection    Preoxygenated: yes      Final Airway Details  Final airway type: supraglottic airway      Successful airway: classic  Size 5    Number of attempts at approach: 1    Additional Comments  Smooth IV/mask induction and placement of LMA. Atraumatic, lips/teeth/mouth intact, as preop. +ETCO2, bilateral breath sounds and equal.

## 2021-09-22 NOTE — H&P
[facility] History and Physical    Patient Care Team:  Jose Fonseca MD as PCP - General (Family Medicine)  Jose Michelle MD as Consulting Physician (Radiation Oncology)  Roldan Mccarthy MD as Consulting Physician (Urology)    Chief complaint bladder mass    Subjective     Patient is a 76 y.o. male presents with gross hematuria and bladder mass on cystoscopy. Here for biopsy.     Review of Systems   Pertinent items are noted in HPI    Past Medical History:   Diagnosis Date   • At risk for sleep apnea    • Bladder mass    • Bruises easily    • Diabetes mellitus (CMS/HCC)    • Disease of thyroid gland    • Elevated cholesterol    • GI bleed    • History of transfusion    • Poor historian    • Short-term memory loss    • Vitamin D deficiency      Past Surgical History:   Procedure Laterality Date   • COLONOSCOPY  2016   • COLONOSCOPY N/A 2018    Procedure: COLONOSCOPY;  Surgeon: Olaf Gomez MD;  Location: [facility] OR;  Service: Gastroenterology   • COLONOSCOPY N/A 2019    Procedure: COLONOSCOPY;  Surgeon: Rosa Jack MD;  Location: [facility] OR;  Service: Gastroenterology   • HERNIA REPAIR Bilateral    • PROSTATE ULTRASOUND BIOPSY     • SIGMOIDOSCOPY N/A 10/2/2018    Procedure: FLEXIBLE SIGMOIDOSCOPY:  APC cautery bleeding using 60 joules;  Surgeon: Olaf Gomez MD;  Location: [facility] ENDOSCOPY;  Service: Gastroenterology   • TONSILLECTOMY     • TOTAL HIP ARTHROPLASTY Bilateral      Family History   Problem Relation Age of Onset   • Stroke Mother    • Stroke Father    • No Known Problems Brother    • Colon cancer Neg Hx    • Colon polyps Neg Hx    • Malig Hyperthermia Neg Hx      Social History     Tobacco Use   • Smoking status: Former Smoker     Types: Cigars     Quit date: 1986     Years since quittin.7   • Smokeless tobacco: Never Used   Vaping Use   • Vaping Use: Never used   Substance Use Topics   • Alcohol use: Yes      Alcohol/week: 32.0 - 34.0 standard drinks     Types: 4 - 6 Shots of liquor, 28 Standard drinks or equivalent per week     Comment: 2-3 double vodkas a night   • Drug use: No       Meds:  Medications Prior to Admission   Medication Sig Dispense Refill Last Dose   • allopurinol (ZYLOPRIM) 100 MG tablet Take 2 tablets by mouth Daily. Indications: Gout (Patient taking differently: Take 100 mg by mouth 2 (Two) Times a Day. Indications: Gout) 180 tablet 3 Past Week at Unknown time   • aspirin 81 MG EC tablet Take 81 mg by mouth Daily. HOLDING FOR SURGERY   9/14/2021 at Unknown time   • atorvastatin (LIPITOR) 80 MG tablet Take 1 tablet by mouth Daily. 90 tablet 3 9/19/2021 at 0800   • cholecalciferol (VITAMIN D3) 1000 UNITS tablet Take 1,000 Units by mouth Daily.   9/14/2021   • Cyanocobalamin (VITAMIN B 12 PO) Take 1,000 mg by mouth Every Morning.   9/14/2021   • donepezil (ARICEPT) 10 MG tablet TAKE ONE TABLET BY MOUTH DAILY WITH BREAKFAST (Patient taking differently: Take 10 mg by mouth Daily.) 90 tablet 0 9/19/2021   • fluorouracil (EFUDEX) 5 % cream Apply  topically to the appropriate area as directed 2 (Two) Times a Day.      • levothyroxine (SYNTHROID, LEVOTHROID) 25 MCG tablet TAKE ONE TABLET BY MOUTH DAILY 90 tablet 3 9/19/2021   • lisinopril (PRINIVIL,ZESTRIL) 20 MG tablet Take 1 tablet by mouth Daily. Indications: High Blood Pressure Disorder 90 tablet 3 9/19/2021   • Multiple Vitamins-Minerals (MULTIVITAL PLATINUM PO) Take 1 tablet by mouth Daily. HOLD FOR SURGERY   9/19/2021   • pioglitazone (ACTOS) 30 MG tablet Take 1 tablet by mouth Daily. Indications: Type 2 Diabetes 90 tablet 3 9/20/2021   • POTASSIUM PO Take  by mouth. Dose unknown   9/20/2021       Allergies:  Nsaids    Debilities:  none    Objective     Vital Signs  Temp:  [98 °F (36.7 °C)] 98 °F (36.7 °C)  Heart Rate:  [101] 101  Resp:  [16] 16  BP: (123)/(76) 123/76  No intake or output data in the 24 hours ending 09/22/21 1540  Flowsheet Rows       "First Filed Value   Admission Height  175.3 cm (69\") Documented at 09/22/2021 1415   Admission Weight  113 kg (249 lb) Documented at 09/22/2021 1415           Physical Exam:     General Appearance:    Alert, cooperative, NAD   HEENT:    No trauma, pupils reactive, hearing intact   Back:     No CVA tenderness   Lungs:     Respirations unlabored, no wheezing    Heart:    RRR, intact peripheral pulses   Abdomen:     Soft, NDNT, no masses, no guarding   :    Phallus nl   Extremities:   No edema, no deformity   Lymphatic:   No neck or groin LAD   Skin:   No bleeding, bruising or rashes   Neuro/Psych:   Orientation intact, mood/affect pleasant, no focal findings     Results Review:    I reviewed the patient's new clinical results.  Results for orders placed or performed during the hospital encounter of 09/22/21   POC Glucose Once    Specimen: Blood   Result Value Ref Range    Glucose 123 70 - 130 mg/dL        Assessment:  Bladder Mass    Plan:    Cystoscopy bladder biopsy    I discussed the patient's findings and my recommendations with patient.   Risks, complications, outcomes and alternatives discussed with the patient at the bedside and office.    Roldan Mccarthy MD  09/22/21  15:40 EDT          "

## 2021-09-22 NOTE — ANESTHESIA POSTPROCEDURE EVALUATION
"Patient: Bryan Landers    Procedure Summary     Date: 09/22/21 Room / Location: [facility] OR 01 / [facility] MAIN OR    Anesthesia Start: 1546 Anesthesia Stop: 1633    Procedure: CYSTOSCOPY BLADDER BIOPSY (N/A ) Diagnosis:       Bladder mass      (Bladder Mass)    Surgeons: Roldan Mccarthy MD Provider: Tejinder Vizcaino MD    Anesthesia Type: general ASA Status: 3          Anesthesia Type: general    Vitals  Vitals Value Taken Time   /83 09/22/21 1716   Temp 36.7 °C (98 °F) 09/22/21 1630   Pulse 78 09/22/21 1721   Resp 16 09/22/21 1715   SpO2 96 % 09/22/21 1721   Vitals shown include unvalidated device data.        Post Anesthesia Care and Evaluation    Patient location during evaluation: bedside  Patient participation: complete - patient participated  Level of consciousness: sleepy but conscious  Pain score: 0  Pain management: adequate  Airway patency: patent  Anesthetic complications: No anesthetic complications    Cardiovascular status: acceptable  Respiratory status: acceptable  Hydration status: acceptable    Comments: /86   Pulse 88   Temp 36.7 °C (98 °F) (Oral)   Resp 16   Ht 175.3 cm (69\")   Wt 113 kg (249 lb)   SpO2 95%   BMI 36.77 kg/m²         "

## 2021-09-27 LAB
LAB AP CASE REPORT: NORMAL
LAB AP SPECIAL STAINS: NORMAL
PATH REPORT.FINAL DX SPEC: NORMAL
PATH REPORT.GROSS SPEC: NORMAL

## 2021-10-04 DIAGNOSIS — E78.2 MIXED HYPERLIPIDEMIA: ICD-10-CM

## 2021-10-04 RX ORDER — ATORVASTATIN CALCIUM 80 MG/1
TABLET, FILM COATED ORAL
Qty: 90 TABLET | Refills: 3 | OUTPATIENT
Start: 2021-10-04

## 2021-10-13 ENCOUNTER — OFFICE VISIT (OUTPATIENT)
Dept: FAMILY MEDICINE CLINIC | Facility: CLINIC | Age: 76
End: 2021-10-13

## 2021-10-13 VITALS
OXYGEN SATURATION: 97 % | HEIGHT: 69 IN | WEIGHT: 245 LBS | DIASTOLIC BLOOD PRESSURE: 74 MMHG | SYSTOLIC BLOOD PRESSURE: 128 MMHG | TEMPERATURE: 97.5 F | BODY MASS INDEX: 36.29 KG/M2 | HEART RATE: 104 BPM

## 2021-10-13 DIAGNOSIS — E11.9 TYPE 2 DIABETES MELLITUS WITHOUT COMPLICATION, WITHOUT LONG-TERM CURRENT USE OF INSULIN (HCC): ICD-10-CM

## 2021-10-13 DIAGNOSIS — E03.9 ACQUIRED HYPOTHYROIDISM: ICD-10-CM

## 2021-10-13 DIAGNOSIS — R41.89 COGNITIVE DECLINE: Primary | ICD-10-CM

## 2021-10-13 PROBLEM — R31.0 GROSS HEMATURIA: Status: RESOLVED | Noted: 2021-09-22 | Resolved: 2021-10-13

## 2021-10-13 PROCEDURE — 99214 OFFICE O/P EST MOD 30 MIN: CPT | Performed by: FAMILY MEDICINE

## 2021-10-13 RX ORDER — MEMANTINE HYDROCHLORIDE 10 MG/1
10 TABLET ORAL DAILY
Qty: 90 TABLET | Refills: 1 | Status: SHIPPED | OUTPATIENT
Start: 2021-10-13 | End: 2022-01-13 | Stop reason: SDUPTHER

## 2021-10-13 RX ORDER — LEVOTHYROXINE SODIUM 0.05 MG/1
50 TABLET ORAL
Qty: 90 TABLET | Refills: 1 | Status: SHIPPED | OUTPATIENT
Start: 2021-10-13 | End: 2022-08-29 | Stop reason: SDUPTHER

## 2021-10-13 NOTE — PROGRESS NOTES
"Chief Complaint   Patient presents with   • Hypothyroidism       Hypothyroidism: Patient presents for evaluation of thyroid function. Symptoms consist of fatigue. Symptoms have present for a few years. The symptoms are moderate.  The problem has been gradually improving.  Previous thyroid studies include TSH. The hypothyroidism is due to hypothyroidism.      Vitals:    10/13/21 0934   BP: 128/74   BP Location: Left arm   Patient Position: Sitting   Cuff Size: Adult   Pulse: 104   Temp: 97.5 °F (36.4 °C)   TempSrc: Temporal   SpO2: 97%   Weight: 111 kg (245 lb)   Height: 175.3 cm (69\")     Gen: well appearing, alert  Eyes: EOMI, no eye bulge  Neck: no LAD. Thyroid normal size, no nodules, no tenderness  Lung: good air movement, regular RR  Heart: RR without murmur  Skin: no rash.    Lab Results   Component Value Date    TSH 5.130 (H) 10/06/2021           Assessment/Plan   Diagnoses and all orders for this visit:    1. Cognitive decline (Primary)  -     memantine (Namenda) 10 MG tablet; Take 1 tablet by mouth Daily. Indications: Cognitive Dysfunction  Dispense: 90 tablet; Refill: 1    2. Acquired hypothyroidism  -     levothyroxine (SYNTHROID, LEVOTHROID) 50 MCG tablet; Take 1 tablet by mouth Every Morning. Indications: Underactive Thyroid  Dispense: 90 tablet; Refill: 1  -     TSH; Future  -     T4, Free; Future  -     T3; Future    3. Type 2 diabetes mellitus without complication, without long-term current use of insulin (HCC)  -     Hemoglobin A1c; Future  -     Basic Metabolic Panel; Future    had his bladder surgery for hematuria and bladder mass  Mass is benign    TSH is much better, will increase to 50 mcg today    Off Aricept due to loose stools  Memory is still an issue  Will start namenda today.           There are no Patient Instructions on file for this visit.    Continue to take thyroid medication on a regular basis, same time of day, on an empty stomach. Repeat thyroid labs in six months.    Dr. Garcia" Raymundo BERNARD  Family [place], [place]  [facility]

## 2022-01-13 ENCOUNTER — OFFICE VISIT (OUTPATIENT)
Dept: FAMILY MEDICINE CLINIC | Facility: CLINIC | Age: 77
End: 2022-01-13

## 2022-01-13 VITALS
BODY MASS INDEX: 36.58 KG/M2 | HEART RATE: 116 BPM | DIASTOLIC BLOOD PRESSURE: 74 MMHG | SYSTOLIC BLOOD PRESSURE: 124 MMHG | WEIGHT: 247 LBS | HEIGHT: 69 IN | OXYGEN SATURATION: 98 % | TEMPERATURE: 97.1 F

## 2022-01-13 DIAGNOSIS — N18.31 STAGE 3A CHRONIC KIDNEY DISEASE: ICD-10-CM

## 2022-01-13 DIAGNOSIS — R41.89 COGNITIVE DECLINE: ICD-10-CM

## 2022-01-13 DIAGNOSIS — E03.9 ACQUIRED HYPOTHYROIDISM: Primary | ICD-10-CM

## 2022-01-13 PROBLEM — Z00.00 ROUTINE ADULT HEALTH MAINTENANCE: Status: RESOLVED | Noted: 2017-07-28 | Resolved: 2022-01-13

## 2022-01-13 PROBLEM — Z12.5 SCREENING FOR PROSTATE CANCER: Status: RESOLVED | Noted: 2021-01-21 | Resolved: 2022-01-13

## 2022-01-13 PROCEDURE — 99214 OFFICE O/P EST MOD 30 MIN: CPT | Performed by: FAMILY MEDICINE

## 2022-01-13 RX ORDER — MEMANTINE HYDROCHLORIDE 10 MG/1
20 TABLET ORAL
Qty: 180 TABLET | Refills: 1 | Status: SHIPPED | OUTPATIENT
Start: 2022-01-13 | End: 2022-07-19

## 2022-01-13 NOTE — PROGRESS NOTES
"Chief Complaint   Patient presents with   • Diabetes   • Hypothyroidism   • Hyperlipidemia   • Chronic Kidney Disease       Hypothyroidism: Patient presents for evaluation of thyroid function. Symptoms consist of denies fatigue, weight changes, heat/cold intolerance, bowel/skin changes or CVS symptoms. Symptoms have present for a few months. The symptoms are mild.  The problem has been completely resolved.  Previous thyroid studies include TSH. The hypothyroidism is due to hypothyroidism.      Vitals:    01/13/22 1019   BP: 124/74   BP Location: Left arm   Patient Position: Sitting   Cuff Size: Large Adult   Pulse: 116   Temp: 97.1 °F (36.2 °C)   TempSrc: Temporal   SpO2: 98%   Weight: 112 kg (247 lb)   Height: 175.3 cm (69\")     Gen: well appearing, alert  Eyes: EOMI, no eye bulge  Neck: no LAD. Thyroid normal size, no nodules, no tenderness  Lung: good air movement, regular RR  Heart: RR without murmur  Skin: no rash.    Lab Results   Component Value Date    TSH 6.180 (H) 01/06/2022     Results for orders placed or performed in visit on 01/03/22   Hemoglobin A1c    Specimen: Blood   Result Value Ref Range    Hemoglobin A1C 5.8 (H) 4.8 - 5.6 %   TSH    Specimen: Blood   Result Value Ref Range    TSH 6.180 (H) 0.450 - 4.500 uIU/mL   T4, Free    Specimen: Blood   Result Value Ref Range    Free T4 1.37 0.82 - 1.77 ng/dL   T3    Specimen: Blood   Result Value Ref Range    T3, Total 130 71 - 180 ng/dL   Basic Metabolic Panel    Specimen: Blood   Result Value Ref Range    Glucose 106 (H) 65 - 99 mg/dL    BUN 18 8 - 27 mg/dL    Creatinine 1.50 (H) 0.76 - 1.27 mg/dL    eGFR Non African Am 44 (L) >59 mL/min/1.73    eGFR  Am 51 (L) >59 mL/min/1.73    BUN/Creatinine Ratio 12 10 - 24    Sodium 144 134 - 144 mmol/L    Potassium 4.1 3.5 - 5.2 mmol/L    Chloride 107 (H) 96 - 106 mmol/L    Total CO2 21 20 - 29 mmol/L    Calcium 9.1 8.6 - 10.2 mg/dL             Assessment/Plan   Diagnoses and all orders for this visit:    1. " Acquired hypothyroidism (Primary)    2. Cognitive decline  -     memantine (Namenda) 10 MG tablet; Take 2 tablets by mouth Every Morning. Indications: Cognitive Dysfunction  Dispense: 180 tablet; Refill: 1    3. Stage 3a chronic kidney disease (HCC)    Neck is brought in by his wife.  Again he was not able to tolerate the Aricept.  So he started Namenda last visit.  Tolerating the 10 mg dose once daily.  Will increase in dose of 20 mg a day    His TSH is slightly elevated.  But his wife reports that he is missing 1 or 2 levothyroxine pills per week.  We will leave the dose the same discussed strategies to improve taking medication compliance.    No recent gout flares.    Appetite is okay    No recent falls    He has all 3 COVID-19 vaccines.  Had his yearly flu shot             There are no Patient Instructions on file for this visit.    Continue to take thyroid medication on a regular basis, same time of day, on an empty stomach. Repeat thyroid labs in six months.    Dr. Jose Fonseca MD  [place], [place]  [facility]

## 2022-02-09 ENCOUNTER — OFFICE VISIT (OUTPATIENT)
Dept: FAMILY MEDICINE CLINIC | Facility: CLINIC | Age: 77
End: 2022-02-09

## 2022-02-09 VITALS
HEIGHT: 69 IN | WEIGHT: 251 LBS | DIASTOLIC BLOOD PRESSURE: 76 MMHG | BODY MASS INDEX: 37.18 KG/M2 | SYSTOLIC BLOOD PRESSURE: 120 MMHG | HEART RATE: 119 BPM | OXYGEN SATURATION: 97 % | TEMPERATURE: 96.9 F

## 2022-02-09 DIAGNOSIS — R31.9 HEMATURIA, UNSPECIFIED TYPE: Primary | ICD-10-CM

## 2022-02-09 DIAGNOSIS — R31.0 GROSS HEMATURIA: ICD-10-CM

## 2022-02-09 DIAGNOSIS — N32.89 BLADDER MASS: ICD-10-CM

## 2022-02-09 LAB
BILIRUB BLD-MCNC: NEGATIVE MG/DL
CLARITY, POC: ABNORMAL
COLOR UR: ABNORMAL
GLUCOSE UR STRIP-MCNC: NEGATIVE MG/DL
KETONES UR QL: NEGATIVE
LEUKOCYTE EST, POC: NEGATIVE
NITRITE UR-MCNC: NEGATIVE MG/ML
PH UR: 5 [PH] (ref 5–8)
PROT UR STRIP-MCNC: ABNORMAL MG/DL
RBC # UR STRIP: ABNORMAL /UL
SP GR UR: 1 (ref 1–1.03)
UROBILINOGEN UR QL: NORMAL

## 2022-02-09 PROCEDURE — 81002 URINALYSIS NONAUTO W/O SCOPE: CPT | Performed by: FAMILY MEDICINE

## 2022-02-09 PROCEDURE — 99213 OFFICE O/P EST LOW 20 MIN: CPT | Performed by: FAMILY MEDICINE

## 2022-02-09 RX ORDER — AMMONIUM LACTATE 12 G/100G
CREAM TOPICAL
COMMUNITY
Start: 2022-02-01

## 2022-02-09 NOTE — PROGRESS NOTES
"  Subjective   Bryan Landers is a 77 y.o. male who is here for   Chief Complaint   Patient presents with   • Blood in Urine     less than a week, no pain    .     History of Present Illness   Hematuria: Patient complains of gross hematuria. Onset of hematuria was 1 day ago and was sudden in onset. There is not a history of nephrolithiasis. There is not a history of urologic trauma. Other urologic symptoms include none. Patient admits to history of previous bladder scope for same, all clear. Patient denies history of  cancer. Prior workup has been Cystoscopy.        The following portions of the patient's history were reviewed and updated as appropriate: allergies, current medications, past family history, past medical history, past social history, past surgical history and problem list.    Review of Systems   Constitutional: Negative for fever.   Gastrointestinal: Negative for abdominal distention, anal bleeding and nausea.   Endocrine: Negative for polydipsia, polyphagia and polyuria.   Genitourinary: Positive for hematuria. Negative for decreased urine volume, difficulty urinating, dysuria, enuresis, flank pain, frequency, genital sores, penile discharge, penile pain, penile swelling, scrotal swelling, testicular pain and urgency.       Objective   Vitals:    02/09/22 1344   BP: 120/76   BP Location: Left arm   Patient Position: Sitting   Cuff Size: Adult   Pulse: 119   Temp: 96.9 °F (36.1 °C)   TempSrc: Temporal   SpO2: 97%   Weight: 114 kg (251 lb)   Height: 175.3 cm (69\")      Physical Exam  Neurological:      Mental Status: He is alert.       Results for orders placed or performed in visit on 02/09/22   POCT urinalysis dipstick, manual    Specimen: Urine   Result Value Ref Range    Color Red (A) Yellow, Straw, Dark Yellow, Susan    Clarity, UA Slightly Cloudy (A) Clear    Glucose, UA Negative Negative, 1000 mg/dL (3+) mg/dL    Bilirubin Negative Negative    Ketones, UA Negative Negative    Specific Gravity  " 1.005 (A) 1.005 - 1.030    Blood, UA Large (A) Negative    pH, Urine 5.0 5.0 - 8.0    Protein, POC Trace (A) Negative mg/dL    Urobilinogen, UA Normal Normal    Leukocytes Negative Negative    Nitrite, UA Negative Negative       Assessment/Plan   Diagnoses and all orders for this visit:    1. Hematuria, unspecified type (Primary)  -     POCT urinalysis dipstick, manual    2. Bladder mass    3. Gross hematuria  -     Urine Culture - Urine, Urine, Clean Catch  -     US Renal Bilateral; Future    stop aspirin  2 extra bottles of water a day, until clear.    There are no Patient Instructions on file for this visit.    There are no discontinued medications.     No follow-ups on file.    Dr. Jose Fonseca  [facility] [place], [place]

## 2022-02-11 LAB
BACTERIA UR CULT: NO GROWTH
BACTERIA UR CULT: NORMAL

## 2022-02-17 ENCOUNTER — HOSPITAL ENCOUNTER (OUTPATIENT)
Dept: ULTRASOUND IMAGING | Facility: HOSPITAL | Age: 77
Discharge: HOME | End: 2022-02-17
Admitting: FAMILY MEDICINE

## 2022-02-17 DIAGNOSIS — R31.0 GROSS HEMATURIA: ICD-10-CM

## 2022-02-17 PROCEDURE — 76775 US EXAM ABDO BACK WALL LIM: CPT

## 2022-06-08 ENCOUNTER — OFFICE VISIT (OUTPATIENT)
Dept: FAMILY MEDICINE CLINIC | Facility: CLINIC | Age: 77
End: 2022-06-08

## 2022-06-08 VITALS
SYSTOLIC BLOOD PRESSURE: 120 MMHG | BODY MASS INDEX: 38.95 KG/M2 | TEMPERATURE: 97.3 F | WEIGHT: 263 LBS | HEIGHT: 69 IN | OXYGEN SATURATION: 97 % | HEART RATE: 93 BPM | DIASTOLIC BLOOD PRESSURE: 76 MMHG

## 2022-06-08 DIAGNOSIS — R60.9 DEPENDENT EDEMA: Primary | ICD-10-CM

## 2022-06-08 PROCEDURE — 99213 OFFICE O/P EST LOW 20 MIN: CPT | Performed by: FAMILY MEDICINE

## 2022-06-08 RX ORDER — FUROSEMIDE 20 MG/1
20 TABLET ORAL DAILY PRN
Qty: 90 TABLET | Refills: 0 | Status: SHIPPED | OUTPATIENT
Start: 2022-06-08 | End: 2022-07-21 | Stop reason: SDUPTHER

## 2022-06-08 NOTE — PROGRESS NOTES
"  Subjective   Bryan Landers is a 77 y.o. male who is here for   Chief Complaint   Patient presents with   • Edema     Legs/ feet (bilateral) not painful to walk    .     History of Present Illness     Alla comes in today with increasing swelling in both of his feet.  Has been occurring off and on for many years.  He wears zip up compression stockings on both sides.  He may have been on a water medication many years ago.  Nothing recently.  Reports he does not consume excessive amounts of salt.  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  And that may be a contributing factor to his vein incompetency in his legs.    The following portions of the patient's history were reviewed and updated as appropriate: allergies, current medications, past medical history, past social history, past surgical history and problem list.    Review of Systems    Objective   Vitals:    06/08/22 0914   BP: 120/76   BP Location: Left arm   Patient Position: Sitting   Cuff Size: Large Adult   Pulse: 93   Temp: 97.3 °F (36.3 °C)   SpO2: 97%   Weight: 119 kg (263 lb)   Height: 175.3 cm (69\")      Physical Exam  Musculoskeletal:      Right lower leg: Edema present.      Left lower leg: Edema present.         Assessment & Plan   Diagnoses and all orders for this visit:    1. Dependent edema (Primary)  -     furosemide (Lasix) 20 MG tablet; Take 1 tablet by mouth Daily As Needed (feet swelling).  Dispense: 90 tablet; Refill: 0    Continue to wear his zip up compression stockings.  Start low-dose Lasix daily as needed.  Continue to stay active and walking.  Reports he is on the treadmill couple times a week which is a good idea.  He also reports when he sits he uses a recliner and his feet are elevated.  Continue that    His wife also mentioned would be safe for Bryan to climb up a ladder to clean out the gutters at his home.  I told that would not be a wise choice      There are no Patient Instructions on file for this visit.    There are " no discontinued medications.     Return if symptoms worsen or fail to improve.    Dr. Jose Fonseca  [place], [place]

## 2022-07-13 DIAGNOSIS — E78.2 MIXED HYPERLIPIDEMIA: ICD-10-CM

## 2022-07-13 DIAGNOSIS — E11.9 TYPE 2 DIABETES MELLITUS WITHOUT COMPLICATION, WITHOUT LONG-TERM CURRENT USE OF INSULIN: ICD-10-CM

## 2022-07-13 DIAGNOSIS — Z51.81 MEDICATION MONITORING ENCOUNTER: ICD-10-CM

## 2022-07-13 DIAGNOSIS — Z85.46 HISTORY OF PROSTATE CANCER: ICD-10-CM

## 2022-07-13 DIAGNOSIS — M10.062 ACUTE IDIOPATHIC GOUT OF LEFT KNEE: ICD-10-CM

## 2022-07-13 DIAGNOSIS — I10 ESSENTIAL HYPERTENSION: ICD-10-CM

## 2022-07-13 DIAGNOSIS — E03.9 ACQUIRED HYPOTHYROIDISM: Primary | ICD-10-CM

## 2022-07-13 DIAGNOSIS — E53.8 B12 DEFICIENCY: ICD-10-CM

## 2022-07-13 DIAGNOSIS — E03.9 ACQUIRED HYPOTHYROIDISM: ICD-10-CM

## 2022-07-15 LAB
ALT SERPL-CCNC: 26 IU/L (ref 0–44)
BUN SERPL-MCNC: 19 MG/DL (ref 8–27)
BUN/CREAT SERPL: 11 (ref 10–24)
CALCIUM SERPL-MCNC: 8.6 MG/DL (ref 8.6–10.2)
CHLORIDE SERPL-SCNC: 104 MMOL/L (ref 96–106)
CHOLEST SERPL-MCNC: 179 MG/DL (ref 100–199)
CO2 SERPL-SCNC: 26 MMOL/L (ref 20–29)
CREAT SERPL-MCNC: 1.66 MG/DL (ref 0.76–1.27)
EGFRCR SERPLBLD CKD-EPI 2021: 42 ML/MIN/1.73
ERYTHROCYTE [DISTWIDTH] IN BLOOD BY AUTOMATED COUNT: 13.4 % (ref 11.6–15.4)
GLUCOSE SERPL-MCNC: 95 MG/DL (ref 65–99)
HBA1C MFR BLD: 5.8 % (ref 4.8–5.6)
HCT VFR BLD AUTO: 37.7 % (ref 37.5–51)
HDLC SERPL-MCNC: 51 MG/DL
HGB BLD-MCNC: 13.2 G/DL (ref 13–17.7)
LDLC SERPL CALC-MCNC: 89 MG/DL (ref 0–99)
MCH RBC QN AUTO: 39.3 PG (ref 26.6–33)
MCHC RBC AUTO-ENTMCNC: 35 G/DL (ref 31.5–35.7)
MCV RBC AUTO: 112 FL (ref 79–97)
MICROALBUMIN UR-MCNC: 12.3 UG/ML
PLATELET # BLD AUTO: 126 X10E3/UL (ref 150–450)
POTASSIUM SERPL-SCNC: 4.1 MMOL/L (ref 3.5–5.2)
PSA SERPL-MCNC: 1.2 NG/ML (ref 0–4)
RBC # BLD AUTO: 3.36 X10E6/UL (ref 4.14–5.8)
SODIUM SERPL-SCNC: 143 MMOL/L (ref 134–144)
TRIGL SERPL-MCNC: 233 MG/DL (ref 0–149)
TSH SERPL DL<=0.005 MIU/L-ACNC: 6.01 UIU/ML (ref 0.45–4.5)
URATE SERPL-MCNC: 5.6 MG/DL (ref 3.8–8.4)
VIT B12 SERPL-MCNC: 246 PG/ML (ref 232–1245)
VLDLC SERPL CALC-MCNC: 39 MG/DL (ref 5–40)
WBC # BLD AUTO: 3.3 X10E3/UL (ref 3.4–10.8)

## 2022-07-18 DIAGNOSIS — R41.89 COGNITIVE DECLINE: ICD-10-CM

## 2022-07-19 RX ORDER — MEMANTINE HYDROCHLORIDE 10 MG/1
TABLET ORAL
Qty: 180 TABLET | Refills: 3 | Status: SHIPPED | OUTPATIENT
Start: 2022-07-19

## 2022-07-21 ENCOUNTER — OFFICE VISIT (OUTPATIENT)
Dept: FAMILY MEDICINE CLINIC | Facility: CLINIC | Age: 77
End: 2022-07-21

## 2022-07-21 VITALS
OXYGEN SATURATION: 97 % | HEART RATE: 105 BPM | WEIGHT: 252 LBS | SYSTOLIC BLOOD PRESSURE: 120 MMHG | BODY MASS INDEX: 37.33 KG/M2 | HEIGHT: 69 IN | DIASTOLIC BLOOD PRESSURE: 74 MMHG | TEMPERATURE: 97.3 F

## 2022-07-21 DIAGNOSIS — R60.9 DEPENDENT EDEMA: Primary | ICD-10-CM

## 2022-07-21 PROCEDURE — 99213 OFFICE O/P EST LOW 20 MIN: CPT | Performed by: FAMILY MEDICINE

## 2022-07-21 RX ORDER — FUROSEMIDE 20 MG/1
20 TABLET ORAL DAILY
Qty: 90 TABLET | Refills: 1 | Status: SHIPPED | OUTPATIENT
Start: 2022-07-21 | End: 2023-02-27 | Stop reason: SDUPTHER

## 2022-07-21 NOTE — PROGRESS NOTES
"  Subjective   Bryan Landers is a 77 y.o. male who is here for   Chief Complaint   Patient presents with   • Leg Swelling     Bilateral, lasix seems to not be helping , no pain    .     History of Present Illness   Mr. Landers comes in again with his wife.  To follow-up on the swelling in his feet.  Lasix is helping.  He has lost 11 pounds in water weight since last time.  Edema has improved in his legs.  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  Explained his veins will never be the same.  And he will have chronic edema in his legs for life.      The following portions of the patient's history were reviewed and updated as appropriate: allergies, current medications, past medical history, past social history, past surgical history and problem list.    Review of Systems    Objective   Vitals:    07/21/22 1028   BP: 120/74   BP Location: Left arm   Patient Position: Sitting   Cuff Size: Large Adult   Pulse: 105   Temp: 97.3 °F (36.3 °C)   SpO2: 97%   Weight: 114 kg (252 lb)   Height: 175.3 cm (69\")      Physical Exam   Both lower legs with lots of edema.  Skin is in bad shape.  Explained healthy skin hygiene.  Washing the legs with washcloth in warm soapy water every day.  Pat dry apply Vaseline.  This will prevent infection.  Again he has flatfeet and bunions.  All his toenails are thick and yellow.      Assessment & Plan   Diagnoses and all orders for this visit:    1. Dependent edema (Primary)  -     furosemide (Lasix) 20 MG tablet; Take 1 tablet by mouth Daily. Indications: Edema  Dispense: 90 tablet; Refill: 1  -     Ambulatory Referral to Wound Clinic    Will stay on the furosemide for now  Enroll him in the wound clinic at [facility] for improved foot and leg care    There are no Patient Instructions on file for this visit.    Medications Discontinued During This Encounter   Medication Reason   • furosemide (Lasix) 20 MG tablet Reorder        No follow-ups on file.    Dr. Jose Fonseca  Family " Practice  [place], [place]     Rhombic Flap Text: The defect edges were debeveled with a #15 scalpel blade.  Given the location of the defect and the proximity to free margins a rhombic flap was deemed most appropriate.  Using a sterile surgical marker, an appropriate rhombic flap was drawn incorporating the defect.    The area thus outlined was incised deep to adipose tissue with a #15 scalpel blade.  The skin margins were undermined to an appropriate distance in all directions utilizing iris scissors.

## 2022-07-25 ENCOUNTER — APPOINTMENT (OUTPATIENT)
Dept: WOUND CARE | Facility: HOSPITAL | Age: 77
End: 2022-07-25

## 2022-07-25 PROCEDURE — G0463 HOSPITAL OUTPT CLINIC VISIT: HCPCS

## 2022-08-16 NOTE — TELEPHONE ENCOUNTER
LEFT MESSAGE ON MY VOICE MAIL:  STARTED WITH BLEEDING TODAY, NOT ANY BETTER, PLEASE CALL.   Excision Depth: adipose tissue

## 2022-08-21 DIAGNOSIS — E11.9 TYPE 2 DIABETES MELLITUS WITHOUT COMPLICATION, WITHOUT LONG-TERM CURRENT USE OF INSULIN: ICD-10-CM

## 2022-08-21 DIAGNOSIS — E03.9 ACQUIRED HYPOTHYROIDISM: ICD-10-CM

## 2022-08-23 DIAGNOSIS — E03.9 ACQUIRED HYPOTHYROIDISM: ICD-10-CM

## 2022-08-23 RX ORDER — PIOGLITAZONEHYDROCHLORIDE 30 MG/1
TABLET ORAL
Qty: 90 TABLET | Refills: 3 | Status: SHIPPED | OUTPATIENT
Start: 2022-08-23

## 2022-08-23 RX ORDER — LEVOTHYROXINE SODIUM 0.05 MG/1
50 TABLET ORAL
Qty: 90 TABLET | Refills: 1 | OUTPATIENT
Start: 2022-08-23

## 2022-08-23 RX ORDER — LEVOTHYROXINE SODIUM 0.05 MG/1
TABLET ORAL
Qty: 90 TABLET | Refills: 1 | OUTPATIENT
Start: 2022-08-23

## 2022-08-23 NOTE — TELEPHONE ENCOUNTER
Caller: Chloe Landers    Relationship: Emergency Contact    Best call back number: [phone]    Requested Prescriptions:   Requested Prescriptions     Pending Prescriptions Disp Refills   • levothyroxine (SYNTHROID, LEVOTHROID) 50 MCG tablet 90 tablet 1     Sig: Take 1 tablet by mouth Every Morning. Indications: Underactive Thyroid        [facility] [place] [place] [facility] [address] - [phone]  - [phone] FX     Additional details provided by patient: 3 DAY SUPPLY    Does the patient have less than a 3 day supply:  [x] Yes  [] No    Francisco Javier PAGAN Rep   08/23/22 11:48 EDT

## 2022-08-29 ENCOUNTER — OFFICE VISIT (OUTPATIENT)
Dept: FAMILY MEDICINE CLINIC | Facility: CLINIC | Age: 77
End: 2022-08-29

## 2022-08-29 VITALS
HEIGHT: 69 IN | HEART RATE: 104 BPM | TEMPERATURE: 97.3 F | DIASTOLIC BLOOD PRESSURE: 60 MMHG | BODY MASS INDEX: 37.03 KG/M2 | SYSTOLIC BLOOD PRESSURE: 120 MMHG | WEIGHT: 250 LBS | OXYGEN SATURATION: 97 %

## 2022-08-29 DIAGNOSIS — I10 ESSENTIAL HYPERTENSION: ICD-10-CM

## 2022-08-29 DIAGNOSIS — N18.32 STAGE 3B CHRONIC KIDNEY DISEASE: ICD-10-CM

## 2022-08-29 DIAGNOSIS — N18.30 TYPE 2 DIABETES MELLITUS WITH STAGE 3 CHRONIC KIDNEY DISEASE, WITHOUT LONG-TERM CURRENT USE OF INSULIN, UNSPECIFIED WHETHER STAGE 3A OR 3B CKD: ICD-10-CM

## 2022-08-29 DIAGNOSIS — E11.22 TYPE 2 DIABETES MELLITUS WITH STAGE 3 CHRONIC KIDNEY DISEASE, WITHOUT LONG-TERM CURRENT USE OF INSULIN, UNSPECIFIED WHETHER STAGE 3A OR 3B CKD: ICD-10-CM

## 2022-08-29 DIAGNOSIS — D69.6 THROMBOCYTOPENIA: ICD-10-CM

## 2022-08-29 DIAGNOSIS — Z00.00 MEDICARE ANNUAL WELLNESS VISIT, SUBSEQUENT: Primary | ICD-10-CM

## 2022-08-29 DIAGNOSIS — E03.9 ACQUIRED HYPOTHYROIDISM: ICD-10-CM

## 2022-08-29 DIAGNOSIS — M1A.0620 IDIOPATHIC CHRONIC GOUT OF LEFT KNEE WITHOUT TOPHUS: ICD-10-CM

## 2022-08-29 PROBLEM — R31.0 GROSS HEMATURIA: Status: RESOLVED | Noted: 2021-09-22 | Resolved: 2022-08-29

## 2022-08-29 PROCEDURE — G0439 PPPS, SUBSEQ VISIT: HCPCS | Performed by: FAMILY MEDICINE

## 2022-08-29 PROCEDURE — 1170F FXNL STATUS ASSESSED: CPT | Performed by: FAMILY MEDICINE

## 2022-08-29 PROCEDURE — 1160F RVW MEDS BY RX/DR IN RCRD: CPT | Performed by: FAMILY MEDICINE

## 2022-08-29 RX ORDER — LEVOTHYROXINE SODIUM 0.07 MG/1
75 TABLET ORAL
Qty: 90 TABLET | Refills: 1 | Status: SHIPPED | OUTPATIENT
Start: 2022-08-29 | End: 2023-02-27 | Stop reason: SDUPTHER

## 2022-08-29 NOTE — PROGRESS NOTES
The ABCs of the Annual Wellness Visit  Subsequent Medicare Wellness Visit    Chief Complaint   Patient presents with   • Medicare Wellness-subsequent      Subjective    History of Present Illness:  Bryan Landers is a 77 y.o. male who presents for a Subsequent Medicare Wellness Visit.    The following portions of the patient's history were reviewed and   updated as appropriate: allergies, current medications, past family history, past medical history, past social history, past surgical history and problem list.    Compared to one year ago, the patient feels his physical   health is the same.    Compared to one year ago, the patient feels his mental   health is the same.    Recent Hospitalizations:  He was not admitted to the hospital during the last year.       Current Medical Providers:  Patient Care Team:  Jose Fonseca MD as PCP - General (Family Medicine)  Jose Michelle MD as Consulting Physician (Radiation Oncology)  Roldan Mccarthy MD as Consulting Physician (Urology)  Clarence Nieves MD (Hematology)  Grover Harris DPM as Consulting Physician (Podiatry)    Outpatient Medications Prior to Visit   Medication Sig Dispense Refill   • allopurinol (ZYLOPRIM) 100 MG tablet Take 2 tablets by mouth Daily. Indications: Gout (Patient taking differently: Take 100 mg by mouth 2 (Two) Times a Day. Indications: Gout) 180 tablet 3   • ammonium lactate (AMLACTIN) 12 % cream      • aspirin 81 MG EC tablet Take 81 mg by mouth Daily. HOLDING FOR SURGERY     • atorvastatin (LIPITOR) 80 MG tablet Take 1 tablet by mouth Daily. 90 tablet 3   • cholecalciferol (VITAMIN D3) 1000 UNITS tablet Take 1,000 Units by mouth Daily.     • Cyanocobalamin (VITAMIN B 12 PO) Take 1,000 mg by mouth Every Morning.     • fluorouracil (EFUDEX) 5 % cream Apply  topically to the appropriate area as directed 2 (Two) Times a Day.     • furosemide (Lasix) 20 MG tablet Take 1 tablet by mouth Daily. Indications: Edema 90 tablet 1   •  lisinopril (PRINIVIL,ZESTRIL) 20 MG tablet Take 1 tablet by mouth Daily. Indications: High Blood Pressure Disorder 90 tablet 3   • memantine (NAMENDA) 10 MG tablet TAKE TWO TABLETS BY MOUTH EVERY MORNING 180 tablet 3   • Multiple Vitamins-Minerals (MULTIVITAL PLATINUM PO) Take 1 tablet by mouth Daily. HOLD FOR SURGERY     • pioglitazone (ACTOS) 30 MG tablet TAKE ONE TABLET BY MOUTH DAILY 90 tablet 3   • POTASSIUM PO Take  by mouth. Dose unknown     • levothyroxine (SYNTHROID, LEVOTHROID) 50 MCG tablet Take 1 tablet by mouth Every Morning. Indications: Underactive Thyroid 90 tablet 1     No facility-administered medications prior to visit.       No opioid medication identified on active medication list. I have reviewed chart for other potential  high risk medication/s and harmful drug interactions in the elderly.          Aspirin is on active medication list. Aspirin use is indicated based on review of current medical condition/s. Pros and cons of this therapy have been discussed today. Benefits of this medication outweigh potential harm.  Patient has been encouraged to continue taking this medication.  .      Patient Active Problem List   Diagnosis   • Essential hypertension   • Mixed hyperlipidemia   • Arthritis   • Type 2 diabetes mellitus with chronic kidney disease, without long-term current use of insulin (HCC)   • Severely overweight   • Idiopathic chronic gout of left knee   • Medication monitoring encounter   • Microalbuminuria due to type 2 diabetes mellitus (HCC)   • History of prostate cancer   • Stage 3b chronic kidney disease (HCC)   • Medicare annual wellness visit, subsequent   • Radiation proctitis   • History of hip replacement, total, bilateral   • Cognitive decline   • Acquired hypothyroidism   • B12 deficiency   • Sinus tachycardia by electrocardiogram   • Acute pulmonary embolism without acute cor pulmonale (HCC)   • Acute deep vein thrombosis (DVT) of popliteal vein of both lower extremities  "(HCC)   • 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  ACP discussion was held with the patient during this visit. Patient has an advance directive (not in EMR), copy requested.          Objective    Vitals:    22 1111   BP: 120/60   BP Location: Left arm   Patient Position: Sitting   Cuff Size: Large Adult   Pulse: 104   Temp: 97.3 °F (36.3 °C)   SpO2: 97%   Weight: 113 kg (250 lb)   Height: 175.3 cm (69\")     Estimated body mass index is 36.92 kg/m² as calculated from the following:    Height as of this encounter: 175.3 cm (69\").    Weight as of this encounter: 113 kg (250 lb).    Class 2 Severe Obesity (BMI >=35 and <=39.9). Obesity-related health conditions include the following: hypertension, diabetes mellitus, dyslipidemias, peripheral vascular disease, osteoarthritis and lower extremity venous stasis disease. Obesity is unchanged. BMI is is above average; BMI management plan is completed. We discussed portion control and increasing exercise.      Does the patient have evidence of cognitive impairment? No    Physical Exam  Vitals reviewed.   Cardiovascular:      Rate and Rhythm: Normal rate.   Musculoskeletal:      Right lower leg: Edema present.      Left lower leg: Edema present.   Neurological:      Mental Status: He is alert.       Lab Results   Component Value Date    CHLPL 179 2022    TRIG 233 (H) 2022    HDL 51 2022    LDL 89 2022    VLDL 39 2022    HGBA1C 5.8 (H) 2022            HEALTH RISK ASSESSMENT    Smoking Status:  Social History     Tobacco Use   Smoking Status Former Smoker   • Packs/day: 0.25   • Years: 5.00   • Pack years: 1.25   • Types: Cigars   • Quit date: 1986   • Years since quittin.6   Smokeless Tobacco Never Used     Alcohol Consumption:  Social History     Substance and Sexual Activity   Alcohol Use Yes   • Alcohol/week: 32.0 " - 34.0 standard drinks   • Types: 4 - 6 Shots of liquor, 28 Standard drinks or equivalent per week    Comment: 2-3 double vodkas a night     Fall Risk Screen:    DEBORA Fall Risk Assessment was completed, and patient is at LOW risk for falls.Assessment completed on:8/29/2022    Depression Screening:  PHQ-2/PHQ-9 Depression Screening 8/29/2022   Retired PHQ-9 Total Score -   Retired Total Score -   Little Interest or Pleasure in Doing Things 0-->not at all   Feeling Down, Depressed or Hopeless 0-->not at all   PHQ-9: Brief Depression Severity Measure Score 0       Health Habits and Functional and Cognitive Screening:  Functional & Cognitive Status 8/29/2022   Do you have difficulty preparing food and eating? No   Do you have difficulty bathing yourself, getting dressed or grooming yourself? No   Do you have difficulty using the toilet? No   Do you have difficulty moving around from place to place? No   Do you have trouble with steps or getting out of a bed or a chair? No   Current Diet Well Balanced Diet   Dental Exam Not up to date   Eye Exam Not up to date   Exercise (times per week) 7 times per week   Current Exercises Include Light Weights;Treadmill   Current Exercise Activities Include -   Do you need help using the phone?  No   Are you deaf or do you have serious difficulty hearing?  Yes   Do you need help with transportation? No   Do you need help shopping? No   Do you need help preparing meals?  No   Do you need help with housework?  No   Do you need help with laundry? No   Do you need help taking your medications? No   Do you need help managing money? No   Do you ever drive or ride in a car without wearing a seat belt? No   Have you felt unusual stress, anger or loneliness in the last month? No   Who do you live with? Spouse   If you need help, do you have trouble finding someone available to you? No   Have you been bothered in the last four weeks by sexual problems? No   Do you have difficulty  concentrating, remembering or making decisions? Yes       Age-appropriate Screening Schedule:  Refer to the list below for future screening recommendations based on patient's age, sex and/or medical conditions. Orders for these recommended tests are listed in the plan section. The patient has been provided with a written plan.    Health Maintenance   Topic Date Due   • INFLUENZA VACCINE  10/01/2022   • DIABETIC EYE EXAM  10/18/2022   • HEMOGLOBIN A1C  01/14/2023   • LIPID PANEL  07/14/2023   • URINE MICROALBUMIN  07/14/2023   • TDAP/TD VACCINES (2 - Td or Tdap) 08/16/2031   • ZOSTER VACCINE  Completed              Assessment & Plan   CMS Preventative Services Quick Reference  Risk Factors Identified During Encounter  Cardiovascular Disease  Chronic Pain   Obesity/Overweight   The above risks/problems have been discussed with the patient.  Follow up actions/plans if indicated are seen below in the Assessment/Plan Section.  Pertinent information has been shared with the patient in the After Visit Summary.    Diagnoses and all orders for this visit:    1. Medicare annual wellness visit, subsequent (Primary)    2. Acquired hypothyroidism  -     levothyroxine (SYNTHROID, LEVOTHROID) 75 MCG tablet; Take 1 tablet by mouth Every Morning. Indications: Underactive Thyroid  Dispense: 90 tablet; Refill: 1  -     TSH; Future  -     T4, Free; Future  -     T3; Future    3. Essential hypertension    4. Stage 3b chronic kidney disease (HCC)  -     Renal Function Panel; Future  -     Urinalysis With Microscopic If Indicated (No Culture) - Urine, Clean Catch; Future    5. Type 2 diabetes mellitus with stage 3 chronic kidney disease, without long-term current use of insulin, unspecified whether stage 3a or 3b CKD (HCC)  -     Lipid Panel; Future    6. Idiopathic chronic gout of left knee without tophus  -     Uric Acid; Future    7. Thrombocytopenia (HCC)  -     CBC (No Diff); Future    Was able to see the wound care clinic at  [facility].  Was discharged from the wound care clinic and now is seeing Dr. Harris podiatry in his office for follow-up with diabetic care.  Reviewed his labs.  His serum creatinine is bumped up to 1.6.  We discussed chronic kidney disease.  His A1c is well controlled  We will  adjust his thyroid medicine up    Follow Up:   Return in about 6 months (around 2/28/2023) for diabetes.     An After Visit Summary and PPPS were made available to the patient.

## 2022-09-01 ENCOUNTER — TRANSCRIBE ORDERS (OUTPATIENT)
Dept: ADMINISTRATIVE | Facility: HOSPITAL | Age: 77
End: 2022-09-01

## 2022-09-01 DIAGNOSIS — C61 PROSTATE CANCER: Primary | ICD-10-CM

## 2022-09-14 ENCOUNTER — APPOINTMENT (OUTPATIENT)
Dept: CT IMAGING | Facility: HOSPITAL | Age: 77
End: 2022-09-14

## 2022-09-14 ENCOUNTER — APPOINTMENT (OUTPATIENT)
Dept: GENERAL RADIOLOGY | Facility: HOSPITAL | Age: 77
End: 2022-09-14

## 2022-09-14 ENCOUNTER — APPOINTMENT (OUTPATIENT)
Dept: CARDIOLOGY | Facility: HOSPITAL | Age: 77
End: 2022-09-14

## 2022-09-14 ENCOUNTER — HOSPITAL ENCOUNTER (EMERGENCY)
Facility: HOSPITAL | Age: 77
Discharge: HOME | End: 2022-09-14
Attending: EMERGENCY MEDICINE | Admitting: EMERGENCY MEDICINE

## 2022-09-14 VITALS
SYSTOLIC BLOOD PRESSURE: 144 MMHG | WEIGHT: 250 LBS | HEART RATE: 79 BPM | OXYGEN SATURATION: 97 % | DIASTOLIC BLOOD PRESSURE: 79 MMHG | RESPIRATION RATE: 18 BRPM | BODY MASS INDEX: 36.92 KG/M2 | TEMPERATURE: 97.5 F

## 2022-09-14 DIAGNOSIS — N18.9 CHRONIC RENAL FAILURE, UNSPECIFIED CKD STAGE: ICD-10-CM

## 2022-09-14 DIAGNOSIS — D68.51 FACTOR V LEIDEN CARRIER: ICD-10-CM

## 2022-09-14 DIAGNOSIS — I26.99 ACUTE PULMONARY EMBOLISM WITHOUT ACUTE COR PULMONALE, UNSPECIFIED PULMONARY EMBOLISM TYPE: Primary | ICD-10-CM

## 2022-09-14 LAB
ALBUMIN SERPL-MCNC: 3.6 G/DL (ref 3.5–5.2)
ALBUMIN/GLOB SERPL: 1.2 G/DL
ALP SERPL-CCNC: 88 U/L (ref 39–117)
ALT SERPL W P-5'-P-CCNC: 22 U/L (ref 1–41)
ANION GAP SERPL CALCULATED.3IONS-SCNC: 10.6 MMOL/L (ref 5–15)
AST SERPL-CCNC: 39 U/L (ref 1–40)
BASOPHILS # BLD AUTO: 0.04 10*3/MM3 (ref 0–0.2)
BASOPHILS NFR BLD AUTO: 0.5 % (ref 0–1.5)
BH CV LOWER VASCULAR LEFT COMMON FEMORAL AUGMENT: NORMAL
BH CV LOWER VASCULAR LEFT COMMON FEMORAL COMPETENT: NORMAL
BH CV LOWER VASCULAR LEFT COMMON FEMORAL COMPRESS: NORMAL
BH CV LOWER VASCULAR LEFT COMMON FEMORAL PHASIC: NORMAL
BH CV LOWER VASCULAR LEFT COMMON FEMORAL SPONT: NORMAL
BH CV LOWER VASCULAR LEFT DISTAL FEMORAL COMPRESS: NORMAL
BH CV LOWER VASCULAR LEFT GASTRONEMIUS COMPRESS: NORMAL
BH CV LOWER VASCULAR LEFT GREATER SAPH AK COMPRESS: NORMAL
BH CV LOWER VASCULAR LEFT GREATER SAPH BK COMPRESS: NORMAL
BH CV LOWER VASCULAR LEFT LESSER SAPH COMPRESS: NORMAL
BH CV LOWER VASCULAR LEFT MID FEMORAL AUGMENT: NORMAL
BH CV LOWER VASCULAR LEFT MID FEMORAL COMPETENT: NORMAL
BH CV LOWER VASCULAR LEFT MID FEMORAL COMPRESS: NORMAL
BH CV LOWER VASCULAR LEFT MID FEMORAL PHASIC: NORMAL
BH CV LOWER VASCULAR LEFT MID FEMORAL SPONT: NORMAL
BH CV LOWER VASCULAR LEFT PERONEAL COMPRESS: NORMAL
BH CV LOWER VASCULAR LEFT POPLITEAL AUGMENT: NORMAL
BH CV LOWER VASCULAR LEFT POPLITEAL COMPETENT: NORMAL
BH CV LOWER VASCULAR LEFT POPLITEAL COMPRESS: NORMAL
BH CV LOWER VASCULAR LEFT POPLITEAL PHASIC: NORMAL
BH CV LOWER VASCULAR LEFT POPLITEAL SPONT: NORMAL
BH CV LOWER VASCULAR LEFT POSTERIOR TIBIAL COMPRESS: NORMAL
BH CV LOWER VASCULAR LEFT PROFUNDA FEMORAL COMPRESS: NORMAL
BH CV LOWER VASCULAR LEFT PROXIMAL FEMORAL COMPRESS: NORMAL
BH CV LOWER VASCULAR LEFT SAPHENOFEMORAL JUNCTION COMPRESS: NORMAL
BH CV LOWER VASCULAR RIGHT COMMON FEMORAL AUGMENT: NORMAL
BH CV LOWER VASCULAR RIGHT COMMON FEMORAL COMPETENT: NORMAL
BH CV LOWER VASCULAR RIGHT COMMON FEMORAL COMPRESS: NORMAL
BH CV LOWER VASCULAR RIGHT COMMON FEMORAL PHASIC: NORMAL
BH CV LOWER VASCULAR RIGHT COMMON FEMORAL SPONT: NORMAL
BH CV LOWER VASCULAR RIGHT DISTAL FEMORAL COMPRESS: NORMAL
BH CV LOWER VASCULAR RIGHT GASTRONEMIUS COMPRESS: NORMAL
BH CV LOWER VASCULAR RIGHT GREATER SAPH AK COMPRESS: NORMAL
BH CV LOWER VASCULAR RIGHT GREATER SAPH BK COMPRESS: NORMAL
BH CV LOWER VASCULAR RIGHT LESSER SAPH COMPRESS: NORMAL
BH CV LOWER VASCULAR RIGHT MID FEMORAL AUGMENT: NORMAL
BH CV LOWER VASCULAR RIGHT MID FEMORAL COMPETENT: NORMAL
BH CV LOWER VASCULAR RIGHT MID FEMORAL COMPRESS: NORMAL
BH CV LOWER VASCULAR RIGHT MID FEMORAL PHASIC: NORMAL
BH CV LOWER VASCULAR RIGHT MID FEMORAL SPONT: NORMAL
BH CV LOWER VASCULAR RIGHT PERONEAL COMPRESS: NORMAL
BH CV LOWER VASCULAR RIGHT POPLITEAL AUGMENT: NORMAL
BH CV LOWER VASCULAR RIGHT POPLITEAL COMPETENT: NORMAL
BH CV LOWER VASCULAR RIGHT POPLITEAL COMPRESS: NORMAL
BH CV LOWER VASCULAR RIGHT POPLITEAL PHASIC: NORMAL
BH CV LOWER VASCULAR RIGHT POPLITEAL SPONT: NORMAL
BH CV LOWER VASCULAR RIGHT POSTERIOR TIBIAL COMPRESS: NORMAL
BH CV LOWER VASCULAR RIGHT PROFUNDA FEMORAL COMPRESS: NORMAL
BH CV LOWER VASCULAR RIGHT PROXIMAL FEMORAL COMPRESS: NORMAL
BH CV LOWER VASCULAR RIGHT SAPHENOFEMORAL JUNCTION COMPRESS: NORMAL
BILIRUB SERPL-MCNC: 0.4 MG/DL (ref 0–1.2)
BUN SERPL-MCNC: 20 MG/DL (ref 8–23)
BUN/CREAT SERPL: 10.8 (ref 7–25)
CALCIUM SPEC-SCNC: 9 MG/DL (ref 8.6–10.5)
CHLORIDE SERPL-SCNC: 104 MMOL/L (ref 98–107)
CO2 SERPL-SCNC: 26.4 MMOL/L (ref 22–29)
CREAT SERPL-MCNC: 1.85 MG/DL (ref 0.76–1.27)
D DIMER PPP FEU-MCNC: 2.6 MCGFEU/ML (ref 0–0.49)
DEPRECATED RDW RBC AUTO: 56.8 FL (ref 37–54)
EGFRCR SERPLBLD CKD-EPI 2021: 36.8 ML/MIN/1.73
EOSINOPHIL # BLD AUTO: 0.03 10*3/MM3 (ref 0–0.4)
EOSINOPHIL NFR BLD AUTO: 0.4 % (ref 0.3–6.2)
ERYTHROCYTE [DISTWIDTH] IN BLOOD BY AUTOMATED COUNT: 13.3 % (ref 12.3–15.4)
GLOBULIN UR ELPH-MCNC: 2.9 GM/DL
GLUCOSE SERPL-MCNC: 134 MG/DL (ref 65–99)
HCT VFR BLD AUTO: 38.8 % (ref 37.5–51)
HGB BLD-MCNC: 12.8 G/DL (ref 13–17.7)
HOLD SPECIMEN: NORMAL
LYMPHOCYTES # BLD AUTO: 0.98 10*3/MM3 (ref 0.7–3.1)
LYMPHOCYTES NFR BLD AUTO: 11.7 % (ref 19.6–45.3)
MACROCYTES BLD QL SMEAR: NORMAL
MAXIMAL PREDICTED HEART RATE: 142 BPM
MCH RBC QN AUTO: 38.1 PG (ref 26.6–33)
MCHC RBC AUTO-ENTMCNC: 33 G/DL (ref 31.5–35.7)
MCV RBC AUTO: 115.5 FL (ref 79–97)
MONOCYTES # BLD AUTO: 0.85 10*3/MM3 (ref 0.1–0.9)
MONOCYTES NFR BLD AUTO: 10.1 % (ref 5–12)
NEUTROPHILS NFR BLD AUTO: 6.46 10*3/MM3 (ref 1.7–7)
NEUTROPHILS NFR BLD AUTO: 76.9 % (ref 42.7–76)
PLAT MORPH BLD: NORMAL
PLATELET # BLD AUTO: 121 10*3/MM3 (ref 140–450)
PMV BLD AUTO: 9.1 FL (ref 6–12)
POTASSIUM SERPL-SCNC: 4.8 MMOL/L (ref 3.5–5.2)
PROT SERPL-MCNC: 6.5 G/DL (ref 6–8.5)
QT INTERVAL: 418 MS
RBC # BLD AUTO: 3.36 10*6/MM3 (ref 4.14–5.8)
SODIUM SERPL-SCNC: 141 MMOL/L (ref 136–145)
STRESS TARGET HR: 121 BPM
TROPONIN T SERPL-MCNC: <0.01 NG/ML (ref 0–0.03)
TROPONIN T SERPL-MCNC: <0.01 NG/ML (ref 0–0.03)
WBC MORPH BLD: NORMAL
WBC NRBC COR # BLD: 8.39 10*3/MM3 (ref 3.4–10.8)
WHOLE BLOOD HOLD COAG: NORMAL
WHOLE BLOOD HOLD SPECIMEN: NORMAL

## 2022-09-14 PROCEDURE — 99284 EMERGENCY DEPT VISIT MOD MDM: CPT

## 2022-09-14 PROCEDURE — 85379 FIBRIN DEGRADATION QUANT: CPT | Performed by: EMERGENCY MEDICINE

## 2022-09-14 PROCEDURE — 93005 ELECTROCARDIOGRAM TRACING: CPT | Performed by: EMERGENCY MEDICINE

## 2022-09-14 PROCEDURE — 93005 ELECTROCARDIOGRAM TRACING: CPT

## 2022-09-14 PROCEDURE — 84484 ASSAY OF TROPONIN QUANT: CPT | Performed by: EMERGENCY MEDICINE

## 2022-09-14 PROCEDURE — 85007 BL SMEAR W/DIFF WBC COUNT: CPT | Performed by: EMERGENCY MEDICINE

## 2022-09-14 PROCEDURE — 93010 ELECTROCARDIOGRAM REPORT: CPT | Performed by: INTERNAL MEDICINE

## 2022-09-14 PROCEDURE — 36415 COLL VENOUS BLD VENIPUNCTURE: CPT

## 2022-09-14 PROCEDURE — 85025 COMPLETE CBC W/AUTO DIFF WBC: CPT

## 2022-09-14 PROCEDURE — 80053 COMPREHEN METABOLIC PANEL: CPT | Performed by: EMERGENCY MEDICINE

## 2022-09-14 PROCEDURE — 71046 X-RAY EXAM CHEST 2 VIEWS: CPT

## 2022-09-14 PROCEDURE — 71275 CT ANGIOGRAPHY CHEST: CPT

## 2022-09-14 PROCEDURE — 93970 EXTREMITY STUDY: CPT

## 2022-09-14 PROCEDURE — 0 IOPAMIDOL PER 1 ML: Performed by: EMERGENCY MEDICINE

## 2022-09-14 RX ORDER — ASPIRIN 325 MG
325 TABLET ORAL ONCE
Status: DISCONTINUED | OUTPATIENT
Start: 2022-09-14 | End: 2022-09-14 | Stop reason: HOSPADM

## 2022-09-14 RX ORDER — SODIUM CHLORIDE 0.9 % (FLUSH) 0.9 %
10 SYRINGE (ML) INJECTION AS NEEDED
Status: DISCONTINUED | OUTPATIENT
Start: 2022-09-14 | End: 2022-09-14 | Stop reason: HOSPADM

## 2022-09-14 RX ADMIN — SODIUM CHLORIDE 1000 ML: 9 INJECTION, SOLUTION INTRAVENOUS at 14:22

## 2022-09-14 RX ADMIN — APIXABAN 10 MG: 5 TABLET, FILM COATED ORAL at 15:08

## 2022-09-14 RX ADMIN — IOPAMIDOL 95 ML: 755 INJECTION, SOLUTION INTRAVENOUS at 13:37

## 2022-09-14 NOTE — PROGRESS NOTES
Today's bilateral lower venous Doppler preliminary report is negative for deep vein thrombosis. Results given to Dr. Patterson.

## 2022-09-14 NOTE — ED TRIAGE NOTES
Patient to ER via ems from home for back pain and pain in between the shoulder blades that woke him up at 5:30 am  Patient given [address] Asprin  Patient pain free since EMS arrival    Patient states no heart hx    Patient wearing mask this RN in PPE

## 2022-09-14 NOTE — ED PROVIDER NOTES
EMERGENCY DEPARTMENT ENCOUNTER    Room Number:  14/14  Date of encounter:  9/14/2022  PCP: Jose Fonseca MD  Historian: Patient, wife and daughter at bedside    I used full protective equipment while examining this patient.  This includes face mask, gloves and protective eyewear.  I washed my hands before entering the room and immediately upon leaving the room      HPI:  Chief Complaint: Pain between the shoulder blades  A complete HPI/ROS/PMH/PSH/SH/FH are unobtainable due to: None    Context: Bryan Landers is a 77 y.o. male who presents to the ED c/o upper back pain.  Patient states he awoke for the middle the night with pain between his shoulder blades.  Pain was sharp and possibly worsened with deep breath.  Pain is currently gone.  Pain was moderate at its worst.  Patient denies any pain to the anterior chest.  He has no history of coronary artery disease but does have a history of factor V Leiden and pulmonary embolism.  Patient was taken off Eliquis over a year ago.      MEDICAL RECORD REVIEW  I reviewed prior medical records note the patient has a history of diabetes, hypertension and hyperlipidemia.  He also has a history of factor V Leiden and pulmonary embolism.  Patient is not on strong anticoagulation.    PAST MEDICAL HISTORY  Active Ambulatory Problems     Diagnosis Date Noted   • Essential hypertension    • Mixed hyperlipidemia    • Arthritis    • Type 2 diabetes mellitus with chronic kidney disease, without long-term current use of insulin ([facility]) 07/28/2017   • Severely overweight 07/28/2017   • Idiopathic chronic gout of left knee 07/28/2017   • Medication monitoring encounter 07/28/2017   • Microalbuminuria due to type 2 diabetes mellitus (HCC) 09/06/2017   • History of prostate cancer 02/27/2018   • Stage 3b chronic kidney disease (HCC) 08/23/2018   • Medicare annual wellness visit, subsequent 08/23/2018   • Radiation proctitis 10/01/2018   • History of hip replacement, total, bilateral  12/13/2018   • Cognitive decline 03/26/2019   • Acquired hypothyroidism 08/15/2019   • B12 deficiency 02/14/2020   • Sinus tachycardia by electrocardiogram 12/22/2020   • Acute pulmonary embolism without acute cor pulmonale ([facility]) 12/28/2020   • Acute deep vein thrombosis (DVT) of popliteal vein of both lower extremities ([facility]) 01/07/2021   • 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 ([facility]) 02/04/2021   • Bladder mass 09/22/2021   • Dependent edema 06/08/2022   • Thrombocytopenia ([facility]) 08/29/2022     Resolved Ambulatory Problems     Diagnosis Date Noted   • Routine adult health maintenance 07/28/2017   • Traumatic hematoma of left lower leg 07/28/2017   • Skin tear of forearm without complication, right, subsequent encounter 04/23/2018   • Injury of anal canal 06/21/2018   • Rectal bleeding 09/13/2018   • Right hip pain 12/13/2018   • Gastrointestinal hemorrhage 01/04/2019   • Acute blood loss anemia 01/16/2019   • Hypotension due to hypovolemia 01/16/2019   • GI bleed    • CERVANTES (dyspnea on exertion) 12/22/2020   • Screening for prostate cancer 01/21/2021   • Gross hematuria 09/22/2021     Past Medical History:   Diagnosis Date   • At risk for sleep apnea    • Bruises easily    • Diabetes mellitus ([facility])    • Disease of thyroid gland    • Elevated cholesterol    • History of transfusion    • Poor historian    • Short-term memory loss    • Vitamin D deficiency          PAST SURGICAL HISTORY  Past Surgical History:   Procedure Laterality Date   • COLONOSCOPY  09/2016   • COLONOSCOPY N/A 9/28/2018    Procedure: COLONOSCOPY;  Surgeon: Olaf Gomez MD;  Location: [facility] OR;  Service: Gastroenterology   • COLONOSCOPY N/A 1/7/2019    Procedure: COLONOSCOPY;  Surgeon: Rosa Jack MD;  Location: [facility] OR;  Service: Gastroenterology   • CYSTOSCOPY BLADDER BIOPSY N/A 9/22/2021    Procedure: CYSTOSCOPY BLADDER BIOPSY;  Surgeon: Roldan Mccarthy MD;  Location[facility] OR;  Service: Urology;  Laterality: N/A;   • HERNIA  REPAIR Bilateral    • PROSTATE ULTRASOUND BIOPSY     • SIGMOIDOSCOPY N/A 10/2/2018    Procedure: FLEXIBLE SIGMOIDOSCOPY:  APC cautery bleeding using 60 joules;  Surgeon: Olaf Gomez MD;  Location: [facility] ENDOSCOPY;  Service: Gastroenterology   • TONSILLECTOMY     • TOTAL HIP ARTHROPLASTY Bilateral          FAMILY HISTORY  Family History   Problem Relation Age of Onset   • Stroke Mother    • Stroke Father    • No Known Problems Brother    • Colon cancer Neg Hx    • Colon polyps Neg Hx    • Malig Hyperthermia Neg Hx          SOCIAL HISTORY  Social History     Socioeconomic History   • Marital status:      Spouse name: Chloe   • Number of children: 2   Tobacco Use   • Smoking status: Former Smoker     Packs/day: 0.25     Years: 5.00     Pack years: 1.25     Types: Cigars     Quit date: 1986     Years since quittin.7   • Smokeless tobacco: Never Used   Vaping Use   • Vaping Use: Never used   Substance and Sexual Activity   • Alcohol use: Yes     Alcohol/week: 32.0 - 34.0 standard drinks     Types: 4 - 6 Shots of liquor, 28 Standard drinks or equivalent per week     Comment: 2-3 double vodkas a night   • Drug use: No   • Sexual activity: Yes     Partners: Female     Birth control/protection: Post-menopausal         ALLERGIES  Nsaids and Aricept [donepezil]       REVIEW OF SYSTEMS  Review of Systems   Constitutional: Negative.  Negative for fever.   HENT: Negative.  Negative for sore throat.    Eyes: Negative.    Respiratory: Negative.  Negative for cough.    Cardiovascular: Negative.  Negative for chest pain.   Gastrointestinal: Negative.    Genitourinary: Negative.  Negative for dysuria.   Musculoskeletal: Positive for back pain (Upper back pain as per HPI).   Skin: Negative.  Negative for rash.   Neurological: Negative.  Negative for headaches.   All other systems reviewed and are negative.          PHYSICAL EXAM    I have reviewed the triage vital signs and nursing notes.    ED Triage  Vitals [09/14/22 0731]   Temp Heart Rate Resp BP SpO2   97.5 °F (36.4 °C) 93 16 140/62 98 %      Temp src Heart Rate Source Patient Position BP Location FiO2 (%)   -- -- -- -- --       Physical Exam  GENERAL: Alert and pleasant male in no obvious distress.  Triage vitals reviewed and are fairly unremarkable  HENT: nares patent  EYES: no scleral icterus  CV: regular rhythm, regular rate-no murmur  RESPIRATORY: normal effort, clear to auscultation bilaterally-O2 sats upper 90s on room air  ABDOMEN: soft, obese/nontender  MUSCULOSKELETAL: no deformity-no significant tenderness to palpation about the spine.  Upper extremities-unremarkable  Lower extremities-chronic lower extremity edema without significant tenderness to palpation.  Patient does have compression stockings in place.  NEURO: Strength sensation and coordination are grossly intact.  Speech and mentation are unremarkable  SKIN: warm, dry      LAB RESULTS  Recent Results (from the past 24 hour(s))   ECG 12 Lead    Collection Time: 09/14/22  7:39 AM   Result Value Ref Range    QT Interval 418 ms   Comprehensive Metabolic Panel    Collection Time: 09/14/22  8:46 AM    Specimen: Blood   Result Value Ref Range    Glucose 134 (H) 65 - 99 mg/dL    BUN 20 8 - 23 mg/dL    Creatinine 1.85 (H) 0.76 - 1.27 mg/dL    Sodium 141 136 - 145 mmol/L    Potassium 4.8 3.5 - 5.2 mmol/L    Chloride 104 98 - 107 mmol/L    CO2 26.4 22.0 - 29.0 mmol/L    Calcium 9.0 8.6 - 10.5 mg/dL    Total Protein 6.5 6.0 - 8.5 g/dL    Albumin 3.60 3.50 - 5.20 g/dL    ALT (SGPT) 22 1 - 41 U/L    AST (SGOT) 39 1 - 40 U/L    Alkaline Phosphatase 88 39 - 117 U/L    Total Bilirubin 0.4 0.0 - 1.2 mg/dL    Globulin 2.9 gm/dL    A/G Ratio 1.2 g/dL    BUN/Creatinine Ratio 10.8 7.0 - 25.0    Anion Gap 10.6 5.0 - 15.0 mmol/L    eGFR 36.8 (L) >60.0 mL/min/1.73   Troponin    Collection Time: 09/14/22  8:46 AM    Specimen: Blood   Result Value Ref Range    Troponin T <0.010 0.000 - 0.030 ng/mL   Green Top (Gel)     Collection Time: 09/14/22  8:46 AM   Result Value Ref Range    Extra Tube Hold for add-ons.    Lavender Top    Collection Time: 09/14/22  8:46 AM   Result Value Ref Range    Extra Tube hold for add-on    Light Blue Top    Collection Time: 09/14/22  8:46 AM   Result Value Ref Range    Extra Tube Hold for add-ons.    CBC Auto Differential    Collection Time: 09/14/22  8:46 AM    Specimen: Blood   Result Value Ref Range    WBC 8.39 3.40 - 10.80 10*3/mm3    RBC 3.36 (L) 4.14 - 5.80 10*6/mm3    Hemoglobin 12.8 (L) 13.0 - 17.7 g/dL    Hematocrit 38.8 37.5 - 51.0 %    .5 (H) 79.0 - 97.0 fL    MCH 38.1 (H) 26.6 - 33.0 pg    MCHC 33.0 31.5 - 35.7 g/dL    RDW 13.3 12.3 - 15.4 %    RDW-SD 56.8 (H) 37.0 - 54.0 fl    MPV 9.1 6.0 - 12.0 fL    Platelets 121 (L) 140 - 450 10*3/mm3    Neutrophil % 76.9 (H) 42.7 - 76.0 %    Lymphocyte % 11.7 (L) 19.6 - 45.3 %    Monocyte % 10.1 5.0 - 12.0 %    Eosinophil % 0.4 0.3 - 6.2 %    Basophil % 0.5 0.0 - 1.5 %    Neutrophils, Absolute 6.46 1.70 - 7.00 10*3/mm3    Lymphocytes, Absolute 0.98 0.70 - 3.10 10*3/mm3    Monocytes, Absolute 0.85 0.10 - 0.90 10*3/mm3    Eosinophils, Absolute 0.03 0.00 - 0.40 10*3/mm3    Basophils, Absolute 0.04 0.00 - 0.20 10*3/mm3   Scan Slide    Collection Time: 09/14/22  8:46 AM    Specimen: Blood   Result Value Ref Range    Macrocytes Mod/2+ None Seen    WBC Morphology Normal Normal    Platelet Morphology Normal Normal   D-dimer, Quantitative    Collection Time: 09/14/22  8:46 AM    Specimen: Blood   Result Value Ref Range    D-Dimer, Quantitative 2.60 (H) 0.00 - 0.49 MCGFEU/mL   Troponin    Collection Time: 09/14/22  9:44 AM    Specimen: Blood   Result Value Ref Range    Troponin T <0.010 0.000 - 0.030 ng/mL   Duplex Venous Lower Extremity - Bilateral    Collection Time: 09/14/22 12:50 PM   Result Value Ref Range    Target HR (85%) 121 bpm    Max. Pred. HR (100%) 142 bpm    Right Common Femoral Spont Y     Right Common Femoral Phasic Y     Right Common  Femoral Augment Y     Right Common Femoral Competent Y     Right Common Femoral Compress C     Right Saphenofemoral Junction Compress C     Right Profunda Femoral Compress C     Right Proximal Femoral Compress C     Right Mid Femoral Spont Y     Right Mid Femoral Phasic Y     Right Mid Femoral Augment Y     Right Mid Femoral Competent Y     Right Mid Femoral Compress C     Right Distal Femoral Compress C     Right Popliteal Spont Y     Right Popliteal Phasic Y     Right Popliteal Augment Y     Right Popliteal Competent Y     Right Popliteal Compress C     Right Posterior Tibial Compress C     Right Peroneal Compress C     Right Gastronemius Compress C     Right Greater Saph AK Compress C     Right Greater Saph BK Compress C     Right Lesser Saph Compress C     Left Common Femoral Spont Y     Left Common Femoral Phasic Y     Left Common Femoral Augment Y     Left Common Femoral Competent Y     Left Common Femoral Compress C     Left Saphenofemoral Junction Compress C     Left Profunda Femoral Compress C     Left Proximal Femoral Compress C     Left Mid Femoral Spont Y     Left Mid Femoral Phasic Y     Left Mid Femoral Augment Y     Left Mid Femoral Competent Y     Left Mid Femoral Compress C     Left Distal Femoral Compress C     Left Popliteal Spont Y     Left Popliteal Phasic Y     Left Popliteal Augment Y     Left Popliteal Competent Y     Left Popliteal Compress C     Left Posterior Tibial Compress C     Left Peroneal Compress C     Left Gastronemius Compress C     Left Greater Saph AK Compress C     Left Greater Saph BK Compress C     Left Lesser Saph Compress C        Ordered the above labs and independently reviewed the results.      RADIOLOGY  XR Chest 2 View    Result Date: 9/14/2022  XR CHEST 2 VW-clinical: Chest pain protocol  COMPARISON: PET/CT 2/23/2021  FINDINGS: Cardiac size within normal limits, no mediastinal or hilar abnormality. Lungs are clear.  CONCLUSION: No active disease of the chest  This  report was finalized on 9/14/2022 8:10 AM by Dr. Matheus Segura M.D.      Duplex Venous Lower Extremity - Bilateral    Result Date: 9/14/2022  · Normal bilateral lower extremity venous duplex scan.      CT Angiogram Chest    Result Date: 9/14/2022  CT ANGIOGRAM OF THE CHEST. MULTIPLE CORONAL, SAGITTAL, AND 3-D RECONSTRUCTIONS.  HISTORY: 77-year-old male with mid scapular pain. Pulmonary thromboemboli in the past.  TECHNIQUE: Radiation dose reduction techniques were utilized, including automated exposure control and exposure modulation based on body size. CT angiogram of the chest was performed following the administration of IV contrast. Multiple coronal, sagittal, and 3-D reconstruction images were obtained. Compared with PET/CT 02/23/2021.  FINDINGS: There are multiple small pulmonary thromboemboli within the segmental and subsegmental pulmonary artery branches at the right middle and lower lobes. There is heterogeneous admixture of contrast on the left with no definite pulmonary thromboemboli. The RV to LV ratio is 1.0. There are no pulmonary airspace consolidations and there are no pleural or pericardial effusions. There is minimal atelectatic change at the right lung base. Shotty mediastinal nodes are stable.      There are multiple small right pulmonary thromboemboli and segmental and subsegmental pulmonary artery branches at the right middle and lower lobes. RV to LV ratio is 1.0.  Discussed with Dr. Patterson.        I ordered the above noted radiological studies. Reviewed by me and discussed with radiologist.  See dictation for official radiology interpretation.      PROCEDURES  Procedures      MEDICATIONS GIVEN IN ER    Medications   sodium chloride 0.9 % flush 10 mL (has no administration in time range)   aspirin tablet 325 mg (0 mg Oral Hold 9/14/22 0957)   sodium chloride 0.9 % bolus 1,000 mL (1,000 mL Intravenous New Bag 9/14/22 1422)   apixaban (ELIQUIS) tablet 10 mg (has no administration in time range)    iopamidol (ISOVUE-370) 76 % injection 100 mL (95 mL Intravenous Given 9/14/22 1337)         PROGRESS, DATA ANALYSIS, CONSULTS, AND MEDICAL DECISION MAKING    All labs have been independently reviewed by me.  All radiology studies have been reviewed by me and discussed with radiologist dictating the report.   EKG's independently viewed and interpreted by me.  Discussion below represents my analysis of pertinent findings related to patient's condition, differential diagnosis, treatment plan and final disposition.      ED Course as of 09/14/22 1459   Wed Sep 14, 2022   0933 [id number]-[id number]-old male with history of diabetes, hypertension and hyperlipidemia presents with pain in the mid upper back which awoke him from sleeping.  Pain is currently gone.    Physical exam is relatively unremarkable.    Differential diagnosis-most likely would be worried about possibility of pulmonary embolism in a saulo with factor V Leiden who is not on strong anticoagulation.  We will get CT scan of the chest with IV dye to help rule out pulmonary embolism or aortic dissection.    Less likely causes would include acute coronary syndrome, GI or musculoskeletal pain. [DB]   0940 EKG          EKG time: 0739  Rhythm/Rate: Sinus 95 with frequent PVCs   P waves and WY: normal P waves and WY intervals  QRS, axis: Normal axis, right bundle branch block  ST and T waves: Nonspecific ST and T wave changes    Interpreted Contemporaneously by me, independently viewed  Not significantly changed compared to prior 9/2021   [DB]   0943 Chest x-ray discussed with Dr. Segura shows no acute disease in the chest. [DB]   1243 D-dimer is elevated at 2.6 which is concerning for possible thrombosis or embolism.  Patient is getting Doppler of his legs to look for DVT.  If there is a DVT then will obviously need anticoagulation.  If the Doppler is negative, may need to go ahead and get a CTA chest to help rule out pulmonary embolism. [DB]   1244 I discussed CTA of the  chest with Dr. Olga Graff.  There are 2 small right-sided pulmonary emboli.  RV to LV ratio is 1. [DB]   1421 Results of CTA discussed with patient and family at bedside.  I did offer admission for anticoagulation and work-up of pulmonary embolism.  Patient has been anticoagulated in the past and still has Eliquis at home. [DB]   1433 I spoke with our pharmacist, Kae Paige about Eliquis dosing.  Although patient has renal failure doses are not changed for pulmonary embolism.  We will treat with 10 mg twice daily for 1 week and then 5 mg twice daily moving forward.  Family already has a prescription for Eliquis will use their doses from home. [DB]      ED Course User Index  [DB] Mark Patterson MD       AS OF 14:59 EDT VITALS:    BP - 140/62  HR - 93  TEMP - 97.5 °F (36.4 °C)  O2 SATS - 98%      DIAGNOSIS  Final diagnoses:   Acute pulmonary embolism without acute cor pulmonale, unspecified pulmonary embolism type (HCC)   Chronic renal failure, unspecified CKD stage   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 (HCC)         DISPOSITION  DISCHARGE    Patient discharged in stable condition.    Reviewed implications of results, diagnosis, meds, responsibility to follow up, warning signs and symptoms of possible worsening, potential complications and reasons to return to ER, including increased chest pain, shortness of breath or as needed.    Patient/Family voiced understanding of above instructions.    Discussed plan for discharge, as there is no emergent indication for admission. Patient referred to primary care provider for BP management due to today's BP. Pt/family is agreeable and understands need for follow up and repeat testing.  Pt is aware that discharge does not mean that nothing is wrong but it indicates no emergency is present that requires admission and they must continue care with follow-up as given below or physician of their choice.     FOLLOW-UP  Jose Fonseca MD  [address] [facility]  [id number]  [phone]    In 1 week  Call for Appointment         Medication List      Changed    allopurinol 100 MG tablet  Commonly known as: ZYLOPRIM  Take 2 tablets by mouth Daily. Indications: Gout  What changed:   · how much to take  · when to take this                   Mark Patterson MD  09/14/22 2220

## 2022-09-14 NOTE — DISCHARGE INSTRUCTIONS
Eliquis dosing:  First week take 10 mg (2 pills) twice daily  After the first week take 5 mg (1 pill) twice daily    Please follow-up with your hematologist at [facility].

## 2022-09-22 ENCOUNTER — HOSPITAL ENCOUNTER (OUTPATIENT)
Dept: PET IMAGING | Facility: HOSPITAL | Age: 77
Discharge: HOME | End: 2022-09-22

## 2022-09-22 DIAGNOSIS — C61 PROSTATE CANCER: ICD-10-CM

## 2022-09-22 PROCEDURE — A9595 PIFLUFOLASTAT F 18 9 MCI SOLUTION PREFILLED SYRINGE: HCPCS | Performed by: UROLOGY

## 2022-09-22 PROCEDURE — 0 PIFLUFOLASTAT F 18 9 MCI SOLUTION PREFILLED SYRINGE: Performed by: UROLOGY

## 2022-09-22 PROCEDURE — 78815 PET IMAGE W/CT SKULL-THIGH: CPT

## 2022-09-22 RX ADMIN — PIFLUFOLASTAT F-18 1 DOSE: 80 INJECTION INTRAVENOUS at 12:09

## 2022-09-26 ENCOUNTER — OFFICE VISIT (OUTPATIENT)
Dept: FAMILY MEDICINE CLINIC | Facility: CLINIC | Age: 77
End: 2022-09-26

## 2022-09-26 VITALS
BODY MASS INDEX: 37.03 KG/M2 | HEART RATE: 110 BPM | SYSTOLIC BLOOD PRESSURE: 110 MMHG | RESPIRATION RATE: 16 BRPM | DIASTOLIC BLOOD PRESSURE: 70 MMHG | WEIGHT: 250 LBS | TEMPERATURE: 97.5 F | OXYGEN SATURATION: 99 % | HEIGHT: 69 IN

## 2022-09-26 DIAGNOSIS — I26.99 ACUTE PULMONARY EMBOLISM WITHOUT ACUTE COR PULMONALE, UNSPECIFIED PULMONARY EMBOLISM TYPE: Primary | ICD-10-CM

## 2022-09-26 DIAGNOSIS — D68.51 FACTOR V LEIDEN CARRIER: ICD-10-CM

## 2022-09-26 PROCEDURE — 99213 OFFICE O/P EST LOW 20 MIN: CPT | Performed by: NURSE PRACTITIONER

## 2022-09-26 NOTE — PROGRESS NOTES
Patient ID: Bryan Landers is a 77 y.o. male     Patient Care Team:  Jose Fonseca MD as PCP - General (Family Medicine)  Jose Michelle MD as Consulting Physician (Radiation Oncology)  Roldan Mccarthy MD as Consulting Physician (Urology)  Clarence Nieves MD (Hematology)  Grover Harris DPM as Consulting Physician (Podiatry)    Subjective     Chief Complaint   Patient presents with   • Pulmonary Embolism       History of Present Illness    Bryan Landers presents to [facility] today along with wife.  He presented to the emergency room on September 14, 2022 with complaints of upper back pain that woke him up in the middle of the night.  Pain was sharp and worsened with deep breath.  Chest x-ray was completed which was stable.  D-dimer was elevated at 2.6.  Venous Doppler of legs was negative for DVT.  CTA of chest found 2 small right-sided pulmonary embolism.  He was started on Eliquis 10 mg twice a day for 1 week and then switch to 5 mg twice a day thereafter.  He has had previous history of DVT with pulmonary embolism and took Eliquis previously.  Has been off Eliquis for approximately 1 year.  Was followed by hematologist at [facility].  Currently symptoms are well controlled.  Denies any chest pain, shortness of breath, or any other concerns.  Has a history of factor V Leiden carrier.  He is also requesting a handicap tag due to difficulty walking long distances without worsening shortness of breath.    He denies any complaints of fever, chills, cough, chest pain, shortness of air, abdominal pain, nausea, or any other concerns.     The following portions of the patient's history were reviewed and updated as appropriate: allergies, current medications, past family history, past medical history, past social history, past surgical history and problem list.       ROS    Vitals:    09/26/22 1358   BP: 110/70   Pulse: 110   Resp: 16   Temp: 97.5 °F (36.4 °C)   SpO2:  99%       Documented weights    09/26/22 1358   Weight: 113 kg (250 lb)     Body mass index is 36.92 kg/m².    Data reviewed: Radiologic studies CT and Recent hospitalization notes ED visit  CT Angiogram Chest (09/14/2022 13:31)   ED Provider Notes by Mark Patterson MD (09/14/2022 09:21)     Objective     Physical Exam  Vitals reviewed.   Constitutional:       General: He is not in acute distress.  Cardiovascular:      Rate and Rhythm: Normal rate and regular rhythm.      Heart sounds: No murmur heard.  Pulmonary:      Effort: Pulmonary effort is normal. No respiratory distress.      Breath sounds: Normal breath sounds. No wheezing.   Musculoskeletal:      Right lower leg: No edema.      Left lower leg: No edema.   Skin:     Comments: Bruising to bilateral arms.   Neurological:      Mental Status: He is alert and oriented to person, place, and time.            Assessment & Plan     Assessment/Plan     Diagnoses and all orders for this visit:    1. Acute pulmonary embolism without acute cor pulmonale, unspecified pulmonary embolism type (HCC) (Primary)   Continue Elquis 5 mg twice a day.   Plans to schedule follow-up with hematologist, Dr. Nieves.      2. 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 (HCC)    Provided form for handicap tag    Follow-up with Dr. Fonseca as scheduled.      In the meantime, instructed to contact us sooner for any problems or concerns.    Follow Up:  Return if symptoms worsen or fail to improve.    Patient was given instructions and counseling regarding condition or for health maintenance advice.  Please see specific information pulled into the AVS if appropriate.      Patient was wearing facemask when I entered the room and throughout our encounter. Protective equipment was worn throughout this patient encounter including a face mask.  Hand hygiene was performed before donning protective equipment and after removal when leaving the room.     Lamar Miner, APRN  Family Medicine  [facility]  Janet  09/26/22  14:47 EDT

## 2022-12-28 ENCOUNTER — TELEPHONE (OUTPATIENT)
Dept: FAMILY MEDICINE CLINIC | Facility: CLINIC | Age: 77
End: 2022-12-28

## 2022-12-28 DIAGNOSIS — I26.99 ACUTE PULMONARY EMBOLISM WITHOUT ACUTE COR PULMONALE, UNSPECIFIED PULMONARY EMBOLISM TYPE: Primary | ICD-10-CM

## 2022-12-28 NOTE — TELEPHONE ENCOUNTER
I spoke with Chloe, Bryan's wife, on Wednesday, December 28, 2022 at 9:30 AM.  Needs a refill on the Eliquis medication.  Currently taking 5 mg twice daily.  Has not scheduled a follow-up appointment with hematologist.  I have instructed them to do so.  She voiced understanding.  Have sent a refill of the Eliquis to pharmacy.

## 2022-12-28 NOTE — TELEPHONE ENCOUNTER
Caller: Chloe Landers    Relationship: Emergency Contact    Best call back number: [phone]    Requested Prescriptions:   Requested Prescriptions     Pending Prescriptions Disp Refills   • apixaban (ELIQUIS) 5 MG tablet tablet 60 tablet      Sig: Take 1 tablet by mouth 2 (Two) Times a Day.        Pharmacy where request should be sent: [facility] [id number] [address] [facility] [address] - [phone]  - [phone] FX     Additional details provided by patient: PATIENT IS OUT    Does the patient have less than a 3 day supply:  [x] Yes  [] No    Would you like a call back once the refill request has been completed: [x] Yes [] No    If the office needs to give you a call back, can they leave a voicemail: [x] Yes [] No    Francisco Javier Minor Rep   12/28/22 09:15 EST

## 2023-02-13 DIAGNOSIS — I26.99 ACUTE PULMONARY EMBOLISM WITHOUT ACUTE COR PULMONALE, UNSPECIFIED PULMONARY EMBOLISM TYPE: ICD-10-CM

## 2023-02-13 RX ORDER — APIXABAN 5 MG/1
TABLET, FILM COATED ORAL
Qty: 60 TABLET | Refills: 0 | Status: SHIPPED | OUTPATIENT
Start: 2023-02-13 | End: 2023-02-27 | Stop reason: SDUPTHER

## 2023-02-14 ENCOUNTER — OFFICE VISIT (OUTPATIENT)
Dept: FAMILY MEDICINE CLINIC | Facility: CLINIC | Age: 78
End: 2023-02-14
Payer: MEDICARE

## 2023-02-14 VITALS
TEMPERATURE: 97.6 F | HEIGHT: 69 IN | WEIGHT: 241 LBS | OXYGEN SATURATION: 100 % | DIASTOLIC BLOOD PRESSURE: 60 MMHG | SYSTOLIC BLOOD PRESSURE: 114 MMHG | BODY MASS INDEX: 35.7 KG/M2 | HEART RATE: 95 BPM

## 2023-02-14 DIAGNOSIS — S51.012A SKIN TEAR OF LEFT ELBOW WITHOUT COMPLICATION, INITIAL ENCOUNTER: Primary | ICD-10-CM

## 2023-02-14 PROCEDURE — 99213 OFFICE O/P EST LOW 20 MIN: CPT | Performed by: FAMILY MEDICINE

## 2023-02-14 NOTE — PROGRESS NOTES
"  Subjective   Bryan Landers is a 78 y.o. male who is here for   Chief Complaint   Patient presents with   • Wound Check     Left forearm   .     History of Present Illness   Mr. Landers brought in by his wife today.  He has a large skin tear on his left forearm up near the elbow.  It happened several nights ago.  Patient woke up in the morning with lots of blood on his arm and in the bed.  Not exactly sure how the wound occurred.  Wife placed some jumbo Band-Aids over the area.  He is on lifelong anticoagulation.        The following portions of the patient's history were reviewed and updated as appropriate: allergies, current medications, past family history, past medical history, past social history, past surgical history and problem list.    Review of Systems    Objective   Vitals:    02/14/23 1509   BP: 114/60   Pulse: 95   Temp: 97.6 °F (36.4 °C)   SpO2: 100%   Weight: 109 kg (241 lb)   Height: 175.3 cm (69\")      Physical Exam   Left forearm near the elbow.  There is a 8 x 6 cm large skin tear and the skin flap is gone.  Wife reports she found the crumbled up section of superficial skin in the bed later that morning.  No signs of infection.  Subcutaneous tissue is visible granulation tissues already forming.  Good blood supply to all of the areas.  The 2 jumbo Band-Aids were removed.  Active bleeding oozing.  2 nonstick pads were coated with Bactroban placed over the skin tear  4 x 4's placed on top of this  Wrapped with Kerlix gauze and tape and Coban applied over this to keep in place      Assessment & Plan   Diagnoses and all orders for this visit:    1. Skin tear of left elbow without complication, initial encounter (Primary)    Will see the patient in 2 days to redress the wound  There are no Patient Instructions on file for this visit.    There are no discontinued medications.     Return in about 2 days (around 2/16/2023) for wound/suture check.    Dr. Jose Fonseca  [facility]  [place], [place]

## 2023-02-16 ENCOUNTER — OFFICE VISIT (OUTPATIENT)
Dept: FAMILY MEDICINE CLINIC | Facility: CLINIC | Age: 78
End: 2023-02-16
Payer: MEDICARE

## 2023-02-16 VITALS
TEMPERATURE: 96.9 F | WEIGHT: 241 LBS | SYSTOLIC BLOOD PRESSURE: 100 MMHG | HEART RATE: 108 BPM | BODY MASS INDEX: 35.7 KG/M2 | OXYGEN SATURATION: 98 % | HEIGHT: 69 IN | DIASTOLIC BLOOD PRESSURE: 60 MMHG

## 2023-02-16 DIAGNOSIS — S51.012D SKIN TEAR OF LEFT ELBOW WITHOUT COMPLICATION, SUBSEQUENT ENCOUNTER: Primary | ICD-10-CM

## 2023-02-16 PROBLEM — S51.012A SKIN TEAR OF LEFT ELBOW WITHOUT COMPLICATION: Status: ACTIVE | Noted: 2018-04-23

## 2023-02-16 PROCEDURE — 99213 OFFICE O/P EST LOW 20 MIN: CPT | Performed by: FAMILY MEDICINE

## 2023-02-16 NOTE — PROGRESS NOTES
"  Subjective   Bryan Landers is a 78 y.o. male who is here for   Chief Complaint   Patient presents with   • Wound Check     Skin tear f/u   .     History of Present Illness     Bryan comes back in with his wife for wound management of his large left forearm skin tear.  When he pulled his shirt off last time he shifted the bandage.  And started bleeding.  Wife is he was able to re wrap the area    The following portions of the patient's history were reviewed and updated as appropriate: allergies, current medications, past medical history, past social history, past surgical history and problem list.    Review of Systems    Objective   Vitals:    02/16/23 0940   BP: 100/60   Pulse: 108   Temp: 96.9 °F (36.1 °C)   SpO2: 98%   Weight: 109 kg (241 lb)   Height: 175.3 cm (69\")      Physical Exam  Remove all the dressing the applied last visit including the x-ray that his wife had to apply.  The upper half of the wound has a nice healing area is no longer oozing blood  But the bottom half which is 4 x 4 cm where the skin is removed is still actively bleeding.  Placed gauze over the area and held pressure for a minute or 2 and the oozing slowed  The entire area was covered with nonstick pads x2 with Bactroban.  Several 4 x 4 pads placed upon this  And arm wrapped with Kerlix gauze  Then held in place with Coban  We will see him back on Monday  No signs of infection.      Assessment & Plan   Diagnoses and all orders for this visit:    1. Skin tear of left elbow without complication, subsequent encounter (Primary)      There are no Patient Instructions on file for this visit.    There are no discontinued medications.     Return in about 4 days (around 2/20/2023) for wound/suture check.    Dr. Jose Fonseca  [facility]  [place], [place]    "

## 2023-02-17 DIAGNOSIS — N18.32 STAGE 3B CHRONIC KIDNEY DISEASE: ICD-10-CM

## 2023-02-17 DIAGNOSIS — D69.6 THROMBOCYTOPENIA: ICD-10-CM

## 2023-02-17 DIAGNOSIS — N18.30 TYPE 2 DIABETES MELLITUS WITH STAGE 3 CHRONIC KIDNEY DISEASE, WITHOUT LONG-TERM CURRENT USE OF INSULIN, UNSPECIFIED WHETHER STAGE 3A OR 3B CKD: ICD-10-CM

## 2023-02-17 DIAGNOSIS — E11.22 TYPE 2 DIABETES MELLITUS WITH STAGE 3 CHRONIC KIDNEY DISEASE, WITHOUT LONG-TERM CURRENT USE OF INSULIN, UNSPECIFIED WHETHER STAGE 3A OR 3B CKD: ICD-10-CM

## 2023-02-17 DIAGNOSIS — M1A.0620 IDIOPATHIC CHRONIC GOUT OF LEFT KNEE WITHOUT TOPHUS: ICD-10-CM

## 2023-02-17 DIAGNOSIS — E03.9 ACQUIRED HYPOTHYROIDISM: ICD-10-CM

## 2023-02-20 ENCOUNTER — OFFICE VISIT (OUTPATIENT)
Dept: FAMILY MEDICINE CLINIC | Facility: CLINIC | Age: 78
End: 2023-02-20
Payer: MEDICARE

## 2023-02-20 VITALS
HEART RATE: 100 BPM | DIASTOLIC BLOOD PRESSURE: 60 MMHG | SYSTOLIC BLOOD PRESSURE: 108 MMHG | TEMPERATURE: 97.5 F | HEIGHT: 69 IN | OXYGEN SATURATION: 96 % | BODY MASS INDEX: 35.99 KG/M2 | WEIGHT: 243 LBS

## 2023-02-20 DIAGNOSIS — S51.012S SKIN TEAR OF LEFT ELBOW WITHOUT COMPLICATION, SEQUELA: Primary | ICD-10-CM

## 2023-02-20 PROCEDURE — 99212 OFFICE O/P EST SF 10 MIN: CPT | Performed by: FAMILY MEDICINE

## 2023-02-20 NOTE — PROGRESS NOTES
"  Subjective   Bryan Landers is a 78 y.o. male who is here for   Chief Complaint   Patient presents with   • skin tear     Lt arm   .     History of Present Illness     Bryan is brought in by his son today.  To follow-up the large skin tear to his left forearm elbow area    The following portions of the patient's history were reviewed and updated as appropriate: allergies, current medications, past medical history, past social history, past surgical history and problem list.    Review of Systems    Objective   Vitals:    02/20/23 1333   BP: 108/60   Pulse: 100   Temp: 97.5 °F (36.4 °C)   TempSrc: Temporal   SpO2: 96%   Weight: 110 kg (243 lb)   Height: 175.3 cm (69\")      Physical Exam   Overall size is smaller.  There is an 8 x 8 cm  large skin tear left forearm near the elbow  No signs of infection  No active bleeding  There is healing granulation tissue present    The area was covered with nonstick pad with Bactroban  4 x 4 on top of this  Then wrapped gauze  Then an Ace wrap to hold it in place    We will see him on Wednesday    Assessment & Plan   Diagnoses and all orders for this visit:    1. Skin tear of left elbow without complication, sequela (Primary)      There are no Patient Instructions on file for this visit.    There are no discontinued medications.     Return in about 2 days (around 2/22/2023) for wound/suture check.    Dr. Jose Fonseca  [place], [place]    "

## 2023-02-21 LAB
ALBUMIN SERPL-MCNC: 3.6 G/DL (ref 3.5–5.2)
APPEARANCE UR: CLEAR
BILIRUB UR QL STRIP: NEGATIVE
BUN SERPL-MCNC: 21 MG/DL (ref 8–23)
BUN/CREAT SERPL: 10.7 (ref 7–25)
CALCIUM SERPL-MCNC: 9.2 MG/DL (ref 8.6–10.5)
CHLORIDE SERPL-SCNC: 106 MMOL/L (ref 98–107)
CHOLEST SERPL-MCNC: 160 MG/DL (ref 0–200)
CO2 SERPL-SCNC: 29.6 MMOL/L (ref 22–29)
COLOR UR: YELLOW
CREAT SERPL-MCNC: 1.96 MG/DL (ref 0.76–1.27)
EGFRCR SERPLBLD CKD-EPI 2021: 34.1 ML/MIN/1.73
ERYTHROCYTE [DISTWIDTH] IN BLOOD BY AUTOMATED COUNT: 13 % (ref 12.3–15.4)
GLUCOSE SERPL-MCNC: 101 MG/DL (ref 65–99)
GLUCOSE UR QL STRIP: NEGATIVE
HCT VFR BLD AUTO: 36.3 % (ref 37.5–51)
HDLC SERPL-MCNC: 60 MG/DL (ref 40–60)
HGB BLD-MCNC: 12.5 G/DL (ref 13–17.7)
HGB UR QL STRIP: NEGATIVE
KETONES UR QL STRIP: NEGATIVE
LDLC SERPL CALC-MCNC: 72 MG/DL (ref 0–100)
LEUKOCYTE ESTERASE UR QL STRIP: NEGATIVE
MCH RBC QN AUTO: 37.8 PG (ref 26.6–33)
MCHC RBC AUTO-ENTMCNC: 34.4 G/DL (ref 31.5–35.7)
MCV RBC AUTO: 109.7 FL (ref 79–97)
NITRITE UR QL STRIP: NEGATIVE
PH UR STRIP: 6.5 [PH] (ref 5–8)
PHOSPHATE SERPL-MCNC: 3.5 MG/DL (ref 2.5–4.5)
PLATELET # BLD AUTO: 121 10*3/MM3 (ref 140–450)
POTASSIUM SERPL-SCNC: 4.5 MMOL/L (ref 3.5–5.2)
PROT UR QL STRIP: NEGATIVE
RBC # BLD AUTO: 3.31 10*6/MM3 (ref 4.14–5.8)
SODIUM SERPL-SCNC: 143 MMOL/L (ref 136–145)
SP GR UR STRIP: 1.02 (ref 1–1.03)
T3 SERPL-MCNC: 106 NG/DL (ref 80–200)
T4 FREE SERPL-MCNC: 1.49 NG/DL (ref 0.93–1.7)
TRIGL SERPL-MCNC: 164 MG/DL (ref 0–150)
TSH SERPL DL<=0.005 MIU/L-ACNC: 2.43 UIU/ML (ref 0.27–4.2)
URATE SERPL-MCNC: 6.2 MG/DL (ref 3.4–7)
UROBILINOGEN UR STRIP-MCNC: NORMAL MG/DL
VLDLC SERPL CALC-MCNC: 28 MG/DL (ref 5–40)
WBC # BLD AUTO: 2.78 10*3/MM3 (ref 3.4–10.8)

## 2023-02-27 ENCOUNTER — OFFICE VISIT (OUTPATIENT)
Dept: FAMILY MEDICINE CLINIC | Facility: CLINIC | Age: 78
End: 2023-02-27
Payer: MEDICARE

## 2023-02-27 VITALS
HEART RATE: 94 BPM | WEIGHT: 246 LBS | DIASTOLIC BLOOD PRESSURE: 76 MMHG | HEIGHT: 69 IN | BODY MASS INDEX: 36.43 KG/M2 | OXYGEN SATURATION: 98 % | SYSTOLIC BLOOD PRESSURE: 110 MMHG | TEMPERATURE: 97.8 F

## 2023-02-27 DIAGNOSIS — E78.2 MIXED HYPERLIPIDEMIA: ICD-10-CM

## 2023-02-27 DIAGNOSIS — N18.32 STAGE 3B CHRONIC KIDNEY DISEASE: ICD-10-CM

## 2023-02-27 DIAGNOSIS — I27.82 OTHER CHRONIC PULMONARY EMBOLISM WITHOUT ACUTE COR PULMONALE: ICD-10-CM

## 2023-02-27 DIAGNOSIS — I10 ESSENTIAL HYPERTENSION: ICD-10-CM

## 2023-02-27 DIAGNOSIS — E03.9 ACQUIRED HYPOTHYROIDISM: ICD-10-CM

## 2023-02-27 DIAGNOSIS — N18.32 TYPE 2 DIABETES MELLITUS WITH STAGE 3B CHRONIC KIDNEY DISEASE, WITHOUT LONG-TERM CURRENT USE OF INSULIN: Primary | ICD-10-CM

## 2023-02-27 DIAGNOSIS — M1A.0620 IDIOPATHIC CHRONIC GOUT OF LEFT KNEE WITHOUT TOPHUS: ICD-10-CM

## 2023-02-27 DIAGNOSIS — R60.9 DEPENDENT EDEMA: ICD-10-CM

## 2023-02-27 DIAGNOSIS — E11.22 TYPE 2 DIABETES MELLITUS WITH STAGE 3B CHRONIC KIDNEY DISEASE, WITHOUT LONG-TERM CURRENT USE OF INSULIN: Primary | ICD-10-CM

## 2023-02-27 PROBLEM — I82.533 CHRONIC DEEP VEIN THROMBOSIS (DVT) OF POPLITEAL VEIN OF BOTH LOWER EXTREMITIES: Status: ACTIVE | Noted: 2021-01-07

## 2023-02-27 PROCEDURE — 99214 OFFICE O/P EST MOD 30 MIN: CPT | Performed by: FAMILY MEDICINE

## 2023-02-27 RX ORDER — FUROSEMIDE 20 MG/1
20 TABLET ORAL DAILY
Qty: 90 TABLET | Refills: 1 | Status: SHIPPED | OUTPATIENT
Start: 2023-02-27

## 2023-02-27 RX ORDER — ATORVASTATIN CALCIUM 80 MG/1
80 TABLET, FILM COATED ORAL DAILY
Qty: 90 TABLET | Refills: 3 | Status: SHIPPED | OUTPATIENT
Start: 2023-02-27

## 2023-02-27 RX ORDER — LISINOPRIL 20 MG/1
20 TABLET ORAL DAILY
Qty: 90 TABLET | Refills: 3 | Status: SHIPPED | OUTPATIENT
Start: 2023-02-27

## 2023-02-27 RX ORDER — LEVOTHYROXINE SODIUM 0.07 MG/1
75 TABLET ORAL
Qty: 90 TABLET | Refills: 1 | Status: SHIPPED | OUTPATIENT
Start: 2023-02-27

## 2023-02-27 RX ORDER — ALLOPURINOL 100 MG/1
100 TABLET ORAL DAILY
Qty: 90 TABLET | Refills: 3 | Status: SHIPPED | OUTPATIENT
Start: 2023-02-27

## 2023-02-27 NOTE — PROGRESS NOTES
"  Chief Complaint   Patient presents with   • Diabetes     F/u on labs    • skin tear     Lt arm       Subjective   Bryan Landers is an 78 y.o. male who presents for follow up of diabetes. Current symptoms include: hyperglycemia and paresthesia of the feet. Patient denies foot ulcerations. Evaluation to date has included: fasting blood sugar, fasting lipid panel, hemoglobin A1C and microalbuminuria. Home sugars: patient does not check sugars. Current treatments: no recent interventions. Discussed importance of yearly eye exams and checking feet for skin integrity.      The following portions of the patient's history were reviewed and updated as appropriate: allergies, current medications, past medical history, past social history, past surgical history and problem list.        Review of Systems  Pertinent items are noted in HPI.     Vitals:    02/27/23 0937   BP: 110/76   Pulse: 94   Temp: 97.8 °F (36.6 °C)   TempSrc: Temporal   SpO2: 98%   Weight: 112 kg (246 lb)   Height: 175.3 cm (69\")       Objective    Gen:  Alert, pleasant  Ears: canals clear, TMs normal  Throat: clear , no thrush, teeth ok  Neck: no bruit, no LAD  Lungs: clear  Heart: RR no murmur  Feet:  No rash, no skin breakdown, sensation grossly normal.  Left forearm.  Slow to heal large skin tear.  Wound reassessed  Wrapped up by nursing staff      Laboratory:  Results for orders placed or performed in visit on 02/17/23   Urinalysis With Microscopic If Indicated (No Culture) - Urine, Clean Catch    Specimen: Urine, Clean Catch   Result Value Ref Range    Specific Gravity, UA 1.018 1.005 - 1.030    pH, UA 6.5 5.0 - 8.0    Color, UA Yellow     Appearance, UA Clear Clear    Leukocytes, UA Negative Negative    Protein Negative Negative    Glucose, UA Negative Negative    Ketones Negative Negative    Blood, UA Negative Negative    Bilirubin, UA Negative Negative    Urobilinogen, UA Comment     Nitrite, UA Negative Negative   Uric Acid    Specimen: Blood "   Result Value Ref Range    Uric Acid 6.2 3.4 - 7.0 mg/dL   T3    Specimen: Blood   Result Value Ref Range    T3, Total 106.0 80.0 - 200.0 ng/dl   T4, Free    Specimen: Blood   Result Value Ref Range    Free T4 1.49 0.93 - 1.70 ng/dL   TSH    Specimen: Blood   Result Value Ref Range    TSH 2.430 0.270 - 4.200 uIU/mL   CBC (No Diff)    Specimen: Blood   Result Value Ref Range    WBC 2.78 (L) 3.40 - 10.80 10*3/mm3    RBC 3.31 (L) 4.14 - 5.80 10*6/mm3    Hemoglobin 12.5 (L) 13.0 - 17.7 g/dL    Hematocrit 36.3 (L) 37.5 - 51.0 %    .7 (H) 79.0 - 97.0 fL    MCH 37.8 (H) 26.6 - 33.0 pg    MCHC 34.4 31.5 - 35.7 g/dL    RDW 13.0 12.3 - 15.4 %    Platelets 121 (L) 140 - 450 10*3/mm3   Renal Function Panel    Specimen: Blood   Result Value Ref Range    Glucose 101 (H) 65 - 99 mg/dL    BUN 21 8 - 23 mg/dL    Creatinine 1.96 (H) 0.76 - 1.27 mg/dL    EGFR Result 34.1 (L) >60.0 mL/min/1.73    BUN/Creatinine Ratio 10.7 7.0 - 25.0    Sodium 143 136 - 145 mmol/L    Potassium 4.5 3.5 - 5.2 mmol/L    Chloride 106 98 - 107 mmol/L    Total CO2 29.6 (H) 22.0 - 29.0 mmol/L    Calcium 9.2 8.6 - 10.5 mg/dL    Phosphorus 3.5 2.5 - 4.5 mg/dL    Albumin 3.6 3.5 - 5.2 g/dL   Lipid Panel    Specimen: Blood   Result Value Ref Range    Total Cholesterol 160 0 - 200 mg/dL    Triglycerides 164 (H) 0 - 150 mg/dL    HDL Cholesterol 60 40 - 60 mg/dL    VLDL Cholesterol Vic 28 5 - 40 mg/dL    LDL Chol Calc (NIH) 72 0 - 100 mg/dL        Assessment & Plan        Discussed general issues about diabetes pathophysiology and management.  Addressed ADA diet.  Tapered off of metformin; see  medication orders.  Continued Actos; see medication orders.  Continued statin drug see medication orders.  Continued ACE inhibitor; see medication orders.    Diagnoses and all orders for this visit:    1. Type 2 diabetes mellitus with stage 3b chronic kidney disease, without long-term current use of insulin (HCC) (Primary)    2. Mixed hyperlipidemia  -     atorvastatin  (LIPITOR) 80 MG tablet; Take 1 tablet by mouth Daily. Indications: High Amount of Fats in the Blood  Dispense: 90 tablet; Refill: 3    3. Dependent edema  -     furosemide (Lasix) 20 MG tablet; Take 1 tablet by mouth Daily. Indications: Edema  Dispense: 90 tablet; Refill: 1    4. Acquired hypothyroidism  -     levothyroxine (SYNTHROID, LEVOTHROID) 75 MCG tablet; Take 1 tablet by mouth Every Morning. Indications: Underactive Thyroid  Dispense: 90 tablet; Refill: 1    5. Essential hypertension  -     lisinopril (PRINIVIL,ZESTRIL) 20 MG tablet; Take 1 tablet by mouth Daily. Indications: High Blood Pressure Disorder  Dispense: 90 tablet; Refill: 3    6. Idiopathic chronic gout of left knee without tophus  -     allopurinol (ZYLOPRIM) 100 MG tablet; Take 1 tablet by mouth Daily. Indications: Gout  Dispense: 90 tablet; Refill: 3    7. 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 Indications: history of DVT/PE  Dispense: 60 tablet; Refill: 11    8. Stage 3b chronic kidney disease (HCC)  -     Ambulatory Referral to Nephrology    Reviewed his blood work with he and wife  Unfortunately his renal function continues to decline  It is time for a referral to our nephrology colleagues    We will reduce the dose due to renal dysfunction of both his allopurinol and Eliquis    Refill meds as above    Labs reviewed    Wife reports he is not able to get into their shower at home.  They are 1 showers very small and is stand only  The other is a bathtub shower, and he feels very unsteady climbing into the shower  They will reach out to family and friends to find a bathroom remodel remodel contractor  To install a handicap shower        Discussed healthy diabetic eating plan.  May refer to ADA web site, diabetes.org    Return in about 3 months (around 5/27/2023) for cancel 03/01 appointment.  There are no Patient  Instructions on file for this visit.  Medications Discontinued During This Encounter   Medication Reason   • allopurinol (ZYLOPRIM) 100 MG tablet Reorder   • atorvastatin (LIPITOR) 80 MG tablet Reorder   • lisinopril (PRINIVIL,ZESTRIL) 20 MG tablet Reorder   • furosemide (Lasix) 20 MG tablet Reorder   • levothyroxine (SYNTHROID, LEVOTHROID) 75 MCG tablet Reorder   • Eliquis 5 MG tablet tablet Reorder         Dr. Jose Fonseca MD  [place], [place]  [facility].

## 2023-05-24 ENCOUNTER — OFFICE VISIT (OUTPATIENT)
Dept: FAMILY MEDICINE CLINIC | Facility: CLINIC | Age: 78
End: 2023-05-24
Payer: MEDICARE

## 2023-05-24 VITALS
HEART RATE: 105 BPM | BODY MASS INDEX: 36.58 KG/M2 | TEMPERATURE: 97.3 F | SYSTOLIC BLOOD PRESSURE: 116 MMHG | OXYGEN SATURATION: 98 % | HEIGHT: 69 IN | WEIGHT: 247 LBS | DIASTOLIC BLOOD PRESSURE: 66 MMHG

## 2023-05-24 DIAGNOSIS — Z85.46 HISTORY OF PROSTATE CANCER: ICD-10-CM

## 2023-05-24 DIAGNOSIS — E78.2 MIXED HYPERLIPIDEMIA: ICD-10-CM

## 2023-05-24 DIAGNOSIS — I10 ESSENTIAL HYPERTENSION: Primary | ICD-10-CM

## 2023-05-24 DIAGNOSIS — E11.22 TYPE 2 DIABETES MELLITUS WITH STAGE 3B CHRONIC KIDNEY DISEASE, WITHOUT LONG-TERM CURRENT USE OF INSULIN: ICD-10-CM

## 2023-05-24 DIAGNOSIS — N18.32 TYPE 2 DIABETES MELLITUS WITH STAGE 3B CHRONIC KIDNEY DISEASE, WITHOUT LONG-TERM CURRENT USE OF INSULIN: ICD-10-CM

## 2023-05-24 NOTE — PROGRESS NOTES
"  Chief Complaint   Patient presents with   • Diabetes       Subjective   Bryan Landers is an 78 y.o. male who presents for follow up of diabetes. Current symptoms include: paresthesia of the feet. Patient denies foot ulcerations. Evaluation to date has included: fasting blood sugar, fasting lipid panel, hemoglobin A1C and microalbuminuria. Home sugars: patient does not check sugars. Current treatments: no recent interventions. Discussed importance of yearly eye exams and checking feet for skin integrity.      The following portions of the patient's history were reviewed and updated as appropriate: allergies, current medications, past medical history, past social history, past surgical history and problem list.        Review of Systems  Pertinent items are noted in HPI.     Vitals:    05/24/23 0944   BP: 116/66   Pulse: 105   Temp: 97.3 °F (36.3 °C)   SpO2: 98%   Weight: 112 kg (247 lb)   Height: 175.3 cm (69.02\")       Objective    Gen:  Alert, pleasant  Ears: canals clear, TMs normal  Throat: clear , no thrush, teeth ok  Neck: no bruit, no LAD  Lungs: clear  Heart: RR no murmur  Feet:  No rash, no skin breakdown, sensation grossly normal.    Laboratory:  Results for orders placed or performed in visit on 02/17/23   Urinalysis With Microscopic If Indicated (No Culture) - Urine, Clean Catch    Specimen: Urine, Clean Catch   Result Value Ref Range    Specific Gravity, UA 1.018 1.005 - 1.030    pH, UA 6.5 5.0 - 8.0    Color, UA Yellow     Appearance, UA Clear Clear    Leukocytes, UA Negative Negative    Protein Negative Negative    Glucose, UA Negative Negative    Ketones Negative Negative    Blood, UA Negative Negative    Bilirubin, UA Negative Negative    Urobilinogen, UA Comment     Nitrite, UA Negative Negative   Uric Acid    Specimen: Blood   Result Value Ref Range    Uric Acid 6.2 3.4 - 7.0 mg/dL   T3    Specimen: Blood   Result Value Ref Range    T3, Total 106.0 80.0 - 200.0 ng/dl   T4, Free    Specimen: Blood "   Result Value Ref Range    Free T4 1.49 0.93 - 1.70 ng/dL   TSH    Specimen: Blood   Result Value Ref Range    TSH 2.430 0.270 - 4.200 uIU/mL   CBC (No Diff)    Specimen: Blood   Result Value Ref Range    WBC 2.78 (L) 3.40 - 10.80 10*3/mm3    RBC 3.31 (L) 4.14 - 5.80 10*6/mm3    Hemoglobin 12.5 (L) 13.0 - 17.7 g/dL    Hematocrit 36.3 (L) 37.5 - 51.0 %    .7 (H) 79.0 - 97.0 fL    MCH 37.8 (H) 26.6 - 33.0 pg    MCHC 34.4 31.5 - 35.7 g/dL    RDW 13.0 12.3 - 15.4 %    Platelets 121 (L) 140 - 450 10*3/mm3   Renal Function Panel    Specimen: Blood   Result Value Ref Range    Glucose 101 (H) 65 - 99 mg/dL    BUN 21 8 - 23 mg/dL    Creatinine 1.96 (H) 0.76 - 1.27 mg/dL    EGFR Result 34.1 (L) >60.0 mL/min/1.73    BUN/Creatinine Ratio 10.7 7.0 - 25.0    Sodium 143 136 - 145 mmol/L    Potassium 4.5 3.5 - 5.2 mmol/L    Chloride 106 98 - 107 mmol/L    Total CO2 29.6 (H) 22.0 - 29.0 mmol/L    Calcium 9.2 8.6 - 10.5 mg/dL    Phosphorus 3.5 2.5 - 4.5 mg/dL    Albumin 3.6 3.5 - 5.2 g/dL   Lipid Panel    Specimen: Blood   Result Value Ref Range    Total Cholesterol 160 0 - 200 mg/dL    Triglycerides 164 (H) 0 - 150 mg/dL    HDL Cholesterol 60 40 - 60 mg/dL    VLDL Cholesterol Vic 28 5 - 40 mg/dL    LDL Chol Calc (NIH) 72 0 - 100 mg/dL        Assessment & Plan        Discussed general issues about diabetes pathophysiology and management.  Continued Actos; see medication orders.  Continued statin drug see medication orders.  Follow up in 6 months or as needed.    Diagnoses and all orders for this visit:    1. Essential hypertension (Primary)  -     MicroAlbumin, Urine, Random - Urine, Clean Catch  -     Renal Function Panel  -     Hemoglobin A1c  -     TSH Rfx On Abnormal To Free T4  -     Urinalysis With Microscopic If Indicated (No Culture) - Urine, Clean Catch  -     PSA DIAGNOSTIC    2. Mixed hyperlipidemia  -     Lipid Panel    3. History of prostate cancer  -     PSA DIAGNOSTIC    4. Type 2 diabetes mellitus with stage  3b chronic kidney disease, without long-term current use of insulin  -     MicroAlbumin, Urine, Random - Urine, Clean Catch  -     Renal Function Panel  -     Hemoglobin A1c  -     Urinalysis With Microscopic If Indicated (No Culture) - Urine, Clean Catch    The skin tear on left forearm has finally healed up    He will be seeing Dr. Gaffney, nephrology in 2 weeks    He will be seeing Brian Mccarthy for his prostate cancer follow-up this summer    We will draw labs today    Discussed the home bathroom remodel.  They do not have a safe shower to use.  As he has an unsteady gait.  His legs get weak and he has near falls almost every day        Discussed healthy diabetic eating plan.  May refer to ADA web site, diabetes.org    Return in about 6 months (around 11/24/2023) for Medicare Wellness visit, and labs now.  There are no Patient Instructions on file for this visit.  There are no discontinued medications.      Dr. Jose Fonseca MD  [place], [place]  [facility].

## 2023-05-25 LAB
ALBUMIN SERPL-MCNC: 3.5 G/DL (ref 3.7–4.7)
APPEARANCE UR: CLEAR
BACTERIA #/AREA URNS HPF: NORMAL /[HPF]
BILIRUB UR QL STRIP: NEGATIVE
BUN SERPL-MCNC: 20 MG/DL (ref 8–27)
BUN/CREAT SERPL: 12 (ref 10–24)
CALCIUM SERPL-MCNC: 9 MG/DL (ref 8.6–10.2)
CASTS URNS QL MICRO: NORMAL /LPF
CHLORIDE SERPL-SCNC: 102 MMOL/L (ref 96–106)
CHOLEST SERPL-MCNC: 176 MG/DL (ref 100–199)
CO2 SERPL-SCNC: 25 MMOL/L (ref 20–29)
COLOR UR: YELLOW
CREAT SERPL-MCNC: 1.71 MG/DL (ref 0.76–1.27)
EGFRCR SERPLBLD CKD-EPI 2021: 40 ML/MIN/1.73
EPI CELLS #/AREA URNS HPF: NORMAL /HPF (ref 0–10)
GLUCOSE SERPL-MCNC: 115 MG/DL (ref 70–99)
GLUCOSE UR QL STRIP: NEGATIVE
HBA1C MFR BLD: 5.7 % (ref 4.8–5.6)
HDLC SERPL-MCNC: 47 MG/DL
HGB UR QL STRIP: NEGATIVE
KETONES UR QL STRIP: NEGATIVE
LDLC SERPL CALC-MCNC: 94 MG/DL (ref 0–99)
LEUKOCYTE ESTERASE UR QL STRIP: ABNORMAL
MICRO URNS: ABNORMAL
MICROALBUMIN UR-MCNC: 18.4 UG/ML
NITRITE UR QL STRIP: NEGATIVE
PH UR STRIP: 5.5 [PH] (ref 5–7.5)
PHOSPHATE SERPL-MCNC: 2.9 MG/DL (ref 2.8–4.1)
POTASSIUM SERPL-SCNC: 4.6 MMOL/L (ref 3.5–5.2)
PROT UR QL STRIP: NEGATIVE
PSA SERPL-MCNC: 2.4 NG/ML (ref 0–4)
RBC #/AREA URNS HPF: NORMAL /HPF (ref 0–2)
SODIUM SERPL-SCNC: 138 MMOL/L (ref 134–144)
SP GR UR STRIP: 1.01 (ref 1–1.03)
TRIGL SERPL-MCNC: 206 MG/DL (ref 0–149)
TSH SERPL DL<=0.005 MIU/L-ACNC: 3.02 UIU/ML (ref 0.45–4.5)
UROBILINOGEN UR STRIP-MCNC: 0.2 MG/DL (ref 0.2–1)
VLDLC SERPL CALC-MCNC: 35 MG/DL (ref 5–40)
WBC #/AREA URNS HPF: NORMAL /HPF (ref 0–5)

## 2023-06-06 ENCOUNTER — TRANSCRIBE ORDERS (OUTPATIENT)
Dept: ADMINISTRATIVE | Facility: HOSPITAL | Age: 78
End: 2023-06-06
Payer: MEDICARE

## 2023-06-06 ENCOUNTER — LAB (OUTPATIENT)
Dept: LAB | Facility: HOSPITAL | Age: 78
End: 2023-06-06
Payer: MEDICARE

## 2023-06-06 DIAGNOSIS — R97.21 INCREASING PROSTATE SPECIFIC ANTIGEN (PSA) LEVEL AFTER TREATMENT FOR MALIGNANT NEOPLASM OF PROSTATE: ICD-10-CM

## 2023-06-06 DIAGNOSIS — C61 MALIGNANT NEOPLASM OF PROSTATE: ICD-10-CM

## 2023-06-06 DIAGNOSIS — C61 MALIGNANT NEOPLASM OF PROSTATE: Primary | ICD-10-CM

## 2023-06-06 PROCEDURE — 84153 ASSAY OF PSA TOTAL: CPT

## 2023-06-06 PROCEDURE — 84154 ASSAY OF PSA FREE: CPT

## 2023-06-06 PROCEDURE — 36415 COLL VENOUS BLD VENIPUNCTURE: CPT

## 2023-06-07 LAB
PSA FREE MFR SERPL: 34.8 %
PSA FREE SERPL-MCNC: 0.8 NG/ML
PSA SERPL-MCNC: 2.3 NG/ML (ref 0–4)

## 2023-07-26 DIAGNOSIS — R41.89 COGNITIVE DECLINE: ICD-10-CM

## 2023-07-26 RX ORDER — MEMANTINE HYDROCHLORIDE 10 MG/1
TABLET ORAL
Qty: 180 TABLET | Refills: 3 | Status: SHIPPED | OUTPATIENT
Start: 2023-07-26

## 2023-08-23 DIAGNOSIS — R60.9 DEPENDENT EDEMA: ICD-10-CM

## 2023-08-23 RX ORDER — FUROSEMIDE 20 MG/1
TABLET ORAL
Qty: 90 TABLET | Refills: 1 | Status: SHIPPED | OUTPATIENT
Start: 2023-08-23

## 2023-09-11 DIAGNOSIS — E11.9 TYPE 2 DIABETES MELLITUS WITHOUT COMPLICATION, WITHOUT LONG-TERM CURRENT USE OF INSULIN: ICD-10-CM

## 2023-09-11 RX ORDER — PIOGLITAZONEHYDROCHLORIDE 30 MG/1
TABLET ORAL
Qty: 90 TABLET | Refills: 0 | Status: SHIPPED | OUTPATIENT
Start: 2023-09-11

## 2023-09-30 DIAGNOSIS — E03.9 ACQUIRED HYPOTHYROIDISM: ICD-10-CM

## 2023-10-01 RX ORDER — LEVOTHYROXINE SODIUM 0.07 MG/1
TABLET ORAL
Qty: 90 TABLET | Refills: 0 | Status: SHIPPED | OUTPATIENT
Start: 2023-10-01

## 2023-10-23 ENCOUNTER — HOSPITAL ENCOUNTER (OUTPATIENT)
Dept: ULTRASOUND IMAGING | Facility: HOSPITAL | Age: 78
Discharge: HOME | End: 2023-10-23
Admitting: INTERNAL MEDICINE
Payer: MEDICARE

## 2023-10-23 DIAGNOSIS — N18.31 STAGE 3A CHRONIC KIDNEY DISEASE: ICD-10-CM

## 2023-10-23 PROCEDURE — 76775 US EXAM ABDO BACK WALL LIM: CPT

## 2023-10-24 ENCOUNTER — LAB (OUTPATIENT)
Dept: LAB | Facility: HOSPITAL | Age: 78
End: 2023-10-24
Payer: MEDICARE

## 2023-10-24 ENCOUNTER — TRANSCRIBE ORDERS (OUTPATIENT)
Dept: ADMINISTRATIVE | Facility: HOSPITAL | Age: 78
End: 2023-10-24
Payer: MEDICARE

## 2023-10-24 DIAGNOSIS — I12.9 HYPERTENSIVE NEPHROPATHY: ICD-10-CM

## 2023-10-24 DIAGNOSIS — N18.9 CHRONIC KIDNEY DISEASE, UNSPECIFIED CKD STAGE: ICD-10-CM

## 2023-10-24 DIAGNOSIS — E55.9 AVITAMINOSIS D: ICD-10-CM

## 2023-10-24 DIAGNOSIS — N18.31 CHRONIC KIDNEY DISEASE (CKD) STAGE G3A/A1, MODERATELY DECREASED GLOMERULAR FILTRATION RATE (GFR) BETWEEN 45-59 ML/MIN/1.73 SQUARE METER AND ALBUMINURIA CREATININE RATIO LESS THAN 30 MG/G (CMS/H*: ICD-10-CM

## 2023-10-24 DIAGNOSIS — N18.6 TYPE 2 DIABETES MELLITUS WITH ESRD (END-STAGE RENAL DISEASE): ICD-10-CM

## 2023-10-24 DIAGNOSIS — E11.22 TYPE 2 DIABETES MELLITUS WITH ESRD (END-STAGE RENAL DISEASE): ICD-10-CM

## 2023-10-24 DIAGNOSIS — N18.31 CHRONIC KIDNEY DISEASE (CKD) STAGE G3A/A1, MODERATELY DECREASED GLOMERULAR FILTRATION RATE (GFR) BETWEEN 45-59 ML/MIN/1.73 SQUARE METER AND ALBUMINURIA CREATININE RATIO LESS THAN 30 MG/G (CMS/H*: Primary | ICD-10-CM

## 2023-10-24 LAB
25(OH)D3 SERPL-MCNC: 54.8 NG/ML (ref 30–100)
ALBUMIN SERPL-MCNC: 3.5 G/DL (ref 3.5–5.2)
ANION GAP SERPL CALCULATED.3IONS-SCNC: 10 MMOL/L (ref 5–15)
BUN SERPL-MCNC: 35 MG/DL (ref 8–23)
BUN/CREAT SERPL: 20 (ref 7–25)
CALCIUM SPEC-SCNC: 9.3 MG/DL (ref 8.6–10.5)
CHLORIDE SERPL-SCNC: 108 MMOL/L (ref 98–107)
CO2 SERPL-SCNC: 26 MMOL/L (ref 22–29)
CREAT SERPL-MCNC: 1.75 MG/DL (ref 0.76–1.27)
CREAT UR-MCNC: 108.9 MG/DL
EGFRCR SERPLBLD CKD-EPI 2021: 39.1 ML/MIN/1.73
GLUCOSE SERPL-MCNC: 126 MG/DL (ref 65–99)
PHOSPHATE SERPL-MCNC: 3.3 MG/DL (ref 2.5–4.5)
POTASSIUM SERPL-SCNC: 4.4 MMOL/L (ref 3.5–5.2)
PROT ?TM UR-MCNC: 19 MG/DL
PROT/CREAT UR: 174.5 MG/G CREA (ref 0–200)
SODIUM SERPL-SCNC: 144 MMOL/L (ref 136–145)
SODIUM UR-SCNC: 110 MMOL/L
URATE SERPL-MCNC: 4.6 MG/DL (ref 3.4–7)

## 2023-10-24 PROCEDURE — 84156 ASSAY OF PROTEIN URINE: CPT

## 2023-10-24 PROCEDURE — 84550 ASSAY OF BLOOD/URIC ACID: CPT

## 2023-10-24 PROCEDURE — 80069 RENAL FUNCTION PANEL: CPT

## 2023-10-24 PROCEDURE — 81001 URINALYSIS AUTO W/SCOPE: CPT

## 2023-10-24 PROCEDURE — 36415 COLL VENOUS BLD VENIPUNCTURE: CPT

## 2023-10-24 PROCEDURE — 84300 ASSAY OF URINE SODIUM: CPT

## 2023-10-24 PROCEDURE — 82306 VITAMIN D 25 HYDROXY: CPT

## 2023-10-24 PROCEDURE — 82570 ASSAY OF URINE CREATININE: CPT

## 2023-10-25 LAB
BACTERIA UR QL AUTO: ABNORMAL /HPF
BILIRUB UR QL STRIP: NEGATIVE
CLARITY UR: CLEAR
COLOR UR: YELLOW
GLUCOSE UR STRIP-MCNC: NEGATIVE MG/DL
HGB UR QL STRIP.AUTO: ABNORMAL
HYALINE CASTS UR QL AUTO: ABNORMAL /LPF
KETONES UR QL STRIP: NEGATIVE
LEUKOCYTE ESTERASE UR QL STRIP.AUTO: NEGATIVE
NITRITE UR QL STRIP: NEGATIVE
PH UR STRIP.AUTO: 6 [PH] (ref 5–8)
PROT UR QL STRIP: ABNORMAL
RBC # UR STRIP: ABNORMAL /HPF
REF LAB TEST METHOD: ABNORMAL
SP GR UR STRIP: 1.02 (ref 1–1.03)
SQUAMOUS #/AREA URNS HPF: ABNORMAL /HPF
UROBILINOGEN UR QL STRIP: ABNORMAL
WBC # UR STRIP: ABNORMAL /HPF

## 2023-11-14 ENCOUNTER — LAB (OUTPATIENT)
Dept: LAB | Facility: HOSPITAL | Age: 78
End: 2023-11-14
Payer: MEDICARE

## 2023-11-14 ENCOUNTER — TRANSCRIBE ORDERS (OUTPATIENT)
Dept: ADMINISTRATIVE | Facility: HOSPITAL | Age: 78
End: 2023-11-14
Payer: MEDICARE

## 2023-11-14 DIAGNOSIS — C61 MALIGNANT NEOPLASM OF PROSTATE: Primary | ICD-10-CM

## 2023-11-14 DIAGNOSIS — C61 MALIGNANT NEOPLASM OF PROSTATE: ICD-10-CM

## 2023-11-14 LAB
ALBUMIN SERPL-MCNC: 3.6 G/DL (ref 3.5–5.2)
ALBUMIN/GLOB SERPL: 1.2 G/DL
ALP SERPL-CCNC: 68 U/L (ref 39–117)
ALT SERPL W P-5'-P-CCNC: 24 U/L (ref 1–41)
ANION GAP SERPL CALCULATED.3IONS-SCNC: 8.4 MMOL/L (ref 5–15)
AST SERPL-CCNC: 39 U/L (ref 1–40)
BASOPHILS # BLD AUTO: 0.03 10*3/MM3 (ref 0–0.2)
BASOPHILS NFR BLD AUTO: 1.1 % (ref 0–1.5)
BILIRUB SERPL-MCNC: 0.4 MG/DL (ref 0–1.2)
BUN SERPL-MCNC: 34 MG/DL (ref 8–23)
BUN/CREAT SERPL: 17.8 (ref 7–25)
CALCIUM SPEC-SCNC: 9.2 MG/DL (ref 8.6–10.5)
CHLORIDE SERPL-SCNC: 103 MMOL/L (ref 98–107)
CO2 SERPL-SCNC: 27.6 MMOL/L (ref 22–29)
CREAT SERPL-MCNC: 1.91 MG/DL (ref 0.76–1.27)
DEPRECATED RDW RBC AUTO: 54.6 FL (ref 37–54)
EGFRCR SERPLBLD CKD-EPI 2021: 35.2 ML/MIN/1.73
EOSINOPHIL # BLD AUTO: 0.15 10*3/MM3 (ref 0–0.4)
EOSINOPHIL NFR BLD AUTO: 5.5 % (ref 0.3–6.2)
ERYTHROCYTE [DISTWIDTH] IN BLOOD BY AUTOMATED COUNT: 13.4 % (ref 12.3–15.4)
GLOBULIN UR ELPH-MCNC: 2.9 GM/DL
GLUCOSE SERPL-MCNC: 101 MG/DL (ref 65–99)
HCT VFR BLD AUTO: 29.7 % (ref 37.5–51)
HGB BLD-MCNC: 10.4 G/DL (ref 13–17.7)
LDH SERPL-CCNC: 217 U/L (ref 135–225)
LYMPHOCYTES # BLD AUTO: 0.93 10*3/MM3 (ref 0.7–3.1)
LYMPHOCYTES NFR BLD AUTO: 34.3 % (ref 19.6–45.3)
MCH RBC QN AUTO: 39.5 PG (ref 26.6–33)
MCHC RBC AUTO-ENTMCNC: 35 G/DL (ref 31.5–35.7)
MCV RBC AUTO: 112.9 FL (ref 79–97)
MONOCYTES # BLD AUTO: 0.34 10*3/MM3 (ref 0.1–0.9)
MONOCYTES NFR BLD AUTO: 12.5 % (ref 5–12)
NEUTROPHILS NFR BLD AUTO: 1.25 10*3/MM3 (ref 1.7–7)
NEUTROPHILS NFR BLD AUTO: 46.2 % (ref 42.7–76)
PLATELET # BLD AUTO: 110 10*3/MM3 (ref 140–450)
PMV BLD AUTO: 10.3 FL (ref 6–12)
POTASSIUM SERPL-SCNC: 4.6 MMOL/L (ref 3.5–5.2)
PROT SERPL-MCNC: 6.5 G/DL (ref 6–8.5)
RBC # BLD AUTO: 2.63 10*6/MM3 (ref 4.14–5.8)
SODIUM SERPL-SCNC: 139 MMOL/L (ref 136–145)
TESTOST SERPL-MCNC: 8.44 NG/DL (ref 193–740)
WBC NRBC COR # BLD: 2.71 10*3/MM3 (ref 3.4–10.8)

## 2023-11-14 PROCEDURE — 84066 ASSAY PROSTATE PHOSPHATASE: CPT

## 2023-11-14 PROCEDURE — 85025 COMPLETE CBC W/AUTO DIFF WBC: CPT

## 2023-11-14 PROCEDURE — 36415 COLL VENOUS BLD VENIPUNCTURE: CPT

## 2023-11-14 PROCEDURE — 80053 COMPREHEN METABOLIC PANEL: CPT

## 2023-11-14 PROCEDURE — 84403 ASSAY OF TOTAL TESTOSTERONE: CPT

## 2023-11-14 PROCEDURE — 83615 LACTATE (LD) (LDH) ENZYME: CPT

## 2023-11-16 LAB — PACP SER-MCNC: 0.7 NG/ML (ref 0–3.5)

## 2023-11-21 DIAGNOSIS — E03.9 ACQUIRED HYPOTHYROIDISM: Primary | ICD-10-CM

## 2023-11-21 DIAGNOSIS — I10 ESSENTIAL HYPERTENSION: ICD-10-CM

## 2023-11-21 DIAGNOSIS — E11.9 TYPE 2 DIABETES MELLITUS WITHOUT COMPLICATION, WITHOUT LONG-TERM CURRENT USE OF INSULIN: ICD-10-CM

## 2023-11-21 DIAGNOSIS — E78.2 MIXED HYPERLIPIDEMIA: ICD-10-CM

## 2023-11-22 DIAGNOSIS — E03.9 ACQUIRED HYPOTHYROIDISM: ICD-10-CM

## 2023-11-22 DIAGNOSIS — E11.9 TYPE 2 DIABETES MELLITUS WITHOUT COMPLICATION, WITHOUT LONG-TERM CURRENT USE OF INSULIN: ICD-10-CM

## 2023-11-22 DIAGNOSIS — E78.2 MIXED HYPERLIPIDEMIA: ICD-10-CM

## 2023-11-22 DIAGNOSIS — I10 ESSENTIAL HYPERTENSION: ICD-10-CM

## 2023-11-23 LAB
ALBUMIN SERPL-MCNC: 3.8 G/DL (ref 3.5–5.2)
ALBUMIN/GLOB SERPL: 1.6 G/DL
ALP SERPL-CCNC: 73 U/L (ref 39–117)
ALT SERPL-CCNC: 20 U/L (ref 1–41)
AST SERPL-CCNC: 37 U/L (ref 1–40)
BILIRUB SERPL-MCNC: 0.4 MG/DL (ref 0–1.2)
BUN SERPL-MCNC: 31 MG/DL (ref 8–23)
BUN/CREAT SERPL: 18.5 (ref 7–25)
CALCIUM SERPL-MCNC: 9.2 MG/DL (ref 8.6–10.5)
CHLORIDE SERPL-SCNC: 104 MMOL/L (ref 98–107)
CHOLEST SERPL-MCNC: 151 MG/DL (ref 0–200)
CO2 SERPL-SCNC: 27.1 MMOL/L (ref 22–29)
CREAT SERPL-MCNC: 1.68 MG/DL (ref 0.76–1.27)
EGFRCR SERPLBLD CKD-EPI 2021: 41.1 ML/MIN/1.73
ERYTHROCYTE [DISTWIDTH] IN BLOOD BY AUTOMATED COUNT: 13.1 % (ref 12.3–15.4)
GLOBULIN SER CALC-MCNC: 2.4 GM/DL
GLUCOSE SERPL-MCNC: 109 MG/DL (ref 65–99)
HBA1C MFR BLD: 5.8 % (ref 4.8–5.6)
HCT VFR BLD AUTO: 29.7 % (ref 37.5–51)
HDLC SERPL-MCNC: 50 MG/DL (ref 40–60)
HGB BLD-MCNC: 10.1 G/DL (ref 13–17.7)
LDLC SERPL CALC-MCNC: 62 MG/DL (ref 0–100)
MCH RBC QN AUTO: 37.7 PG (ref 26.6–33)
MCHC RBC AUTO-ENTMCNC: 34 G/DL (ref 31.5–35.7)
MCV RBC AUTO: 110.8 FL (ref 79–97)
PLATELET # BLD AUTO: 124 10*3/MM3 (ref 140–450)
POTASSIUM SERPL-SCNC: 5.5 MMOL/L (ref 3.5–5.2)
PROT SERPL-MCNC: 6.2 G/DL (ref 6–8.5)
RBC # BLD AUTO: 2.68 10*6/MM3 (ref 4.14–5.8)
SODIUM SERPL-SCNC: 140 MMOL/L (ref 136–145)
TRIGL SERPL-MCNC: 244 MG/DL (ref 0–150)
TSH SERPL DL<=0.005 MIU/L-ACNC: 3.12 UIU/ML (ref 0.27–4.2)
VLDLC SERPL CALC-MCNC: 39 MG/DL (ref 5–40)
WBC # BLD AUTO: 3.11 10*3/MM3 (ref 3.4–10.8)

## 2023-11-29 ENCOUNTER — LAB (OUTPATIENT)
Dept: LAB | Facility: HOSPITAL | Age: 78
End: 2023-11-29
Payer: MEDICARE

## 2023-11-29 ENCOUNTER — TRANSCRIBE ORDERS (OUTPATIENT)
Dept: ADMINISTRATIVE | Facility: HOSPITAL | Age: 78
End: 2023-11-29
Payer: MEDICARE

## 2023-11-29 DIAGNOSIS — C61 MALIGNANT NEOPLASM OF PROSTATE: ICD-10-CM

## 2023-11-29 DIAGNOSIS — C61 MALIGNANT NEOPLASM OF PROSTATE: Primary | ICD-10-CM

## 2023-11-29 LAB
ALBUMIN SERPL-MCNC: 3.5 G/DL (ref 3.5–5.2)
ALBUMIN/GLOB SERPL: 1.3 G/DL
ALP SERPL-CCNC: 64 U/L (ref 39–117)
ALT SERPL W P-5'-P-CCNC: 20 U/L (ref 1–41)
ANION GAP SERPL CALCULATED.3IONS-SCNC: 8.7 MMOL/L (ref 5–15)
AST SERPL-CCNC: 36 U/L (ref 1–40)
BASOPHILS # BLD AUTO: 0.03 10*3/MM3 (ref 0–0.2)
BASOPHILS NFR BLD AUTO: 1.1 % (ref 0–1.5)
BILIRUB SERPL-MCNC: 0.4 MG/DL (ref 0–1.2)
BUN SERPL-MCNC: 35 MG/DL (ref 8–23)
BUN/CREAT SERPL: 19.7 (ref 7–25)
CALCIUM SPEC-SCNC: 9 MG/DL (ref 8.6–10.5)
CHLORIDE SERPL-SCNC: 106 MMOL/L (ref 98–107)
CO2 SERPL-SCNC: 26.3 MMOL/L (ref 22–29)
CREAT SERPL-MCNC: 1.78 MG/DL (ref 0.76–1.27)
DEPRECATED RDW RBC AUTO: 53.9 FL (ref 37–54)
EGFRCR SERPLBLD CKD-EPI 2021: 38.3 ML/MIN/1.73
EOSINOPHIL # BLD AUTO: 0.19 10*3/MM3 (ref 0–0.4)
EOSINOPHIL NFR BLD AUTO: 6.7 % (ref 0.3–6.2)
ERYTHROCYTE [DISTWIDTH] IN BLOOD BY AUTOMATED COUNT: 13.1 % (ref 12.3–15.4)
GLOBULIN UR ELPH-MCNC: 2.6 GM/DL
GLUCOSE SERPL-MCNC: 100 MG/DL (ref 65–99)
HCT VFR BLD AUTO: 27.4 % (ref 37.5–51)
HGB BLD-MCNC: 9.7 G/DL (ref 13–17.7)
LDH SERPL-CCNC: 227 U/L (ref 135–225)
LYMPHOCYTES # BLD AUTO: 0.95 10*3/MM3 (ref 0.7–3.1)
LYMPHOCYTES NFR BLD AUTO: 33.3 % (ref 19.6–45.3)
MCH RBC QN AUTO: 40.2 PG (ref 26.6–33)
MCHC RBC AUTO-ENTMCNC: 35.4 G/DL (ref 31.5–35.7)
MCV RBC AUTO: 113.7 FL (ref 79–97)
MONOCYTES # BLD AUTO: 0.34 10*3/MM3 (ref 0.1–0.9)
MONOCYTES NFR BLD AUTO: 11.9 % (ref 5–12)
NEUTROPHILS NFR BLD AUTO: 1.32 10*3/MM3 (ref 1.7–7)
NEUTROPHILS NFR BLD AUTO: 46.3 % (ref 42.7–76)
PLATELET # BLD AUTO: 106 10*3/MM3 (ref 140–450)
PMV BLD AUTO: 10.1 FL (ref 6–12)
POTASSIUM SERPL-SCNC: 4.9 MMOL/L (ref 3.5–5.2)
PROT SERPL-MCNC: 6.1 G/DL (ref 6–8.5)
RBC # BLD AUTO: 2.41 10*6/MM3 (ref 4.14–5.8)
SODIUM SERPL-SCNC: 141 MMOL/L (ref 136–145)
TESTOST SERPL-MCNC: 13.7 NG/DL (ref 193–740)
WBC NRBC COR # BLD AUTO: 2.85 10*3/MM3 (ref 3.4–10.8)

## 2023-11-29 PROCEDURE — 84403 ASSAY OF TOTAL TESTOSTERONE: CPT

## 2023-11-29 PROCEDURE — 85025 COMPLETE CBC W/AUTO DIFF WBC: CPT

## 2023-11-29 PROCEDURE — 83615 LACTATE (LD) (LDH) ENZYME: CPT

## 2023-11-29 PROCEDURE — 84153 ASSAY OF PSA TOTAL: CPT

## 2023-11-29 PROCEDURE — 36415 COLL VENOUS BLD VENIPUNCTURE: CPT

## 2023-11-29 PROCEDURE — 80053 COMPREHEN METABOLIC PANEL: CPT

## 2023-11-30 LAB — PSA SERPL DL<=0.01 NG/ML-MCNC: 0.2 NG/ML (ref 0–4)

## 2023-12-05 ENCOUNTER — OFFICE VISIT (OUTPATIENT)
Dept: FAMILY MEDICINE CLINIC | Facility: CLINIC | Age: 78
End: 2023-12-05
Payer: MEDICARE

## 2023-12-05 VITALS
BODY MASS INDEX: 37.03 KG/M2 | SYSTOLIC BLOOD PRESSURE: 118 MMHG | DIASTOLIC BLOOD PRESSURE: 62 MMHG | HEIGHT: 69 IN | HEART RATE: 66 BPM | OXYGEN SATURATION: 97 % | TEMPERATURE: 96.8 F | WEIGHT: 250 LBS

## 2023-12-05 DIAGNOSIS — D61.818 PANCYTOPENIA: Primary | ICD-10-CM

## 2023-12-05 DIAGNOSIS — E11.9 TYPE 2 DIABETES MELLITUS WITHOUT COMPLICATION, WITHOUT LONG-TERM CURRENT USE OF INSULIN: ICD-10-CM

## 2023-12-05 DIAGNOSIS — E03.9 ACQUIRED HYPOTHYROIDISM: ICD-10-CM

## 2023-12-05 PROBLEM — I82.409 DEEP VENOUS THROMBOSIS: Status: ACTIVE | Noted: 2021-01-07

## 2023-12-05 RX ORDER — PIOGLITAZONEHYDROCHLORIDE 30 MG/1
30 TABLET ORAL DAILY
Qty: 90 TABLET | Refills: 3 | Status: SHIPPED | OUTPATIENT
Start: 2023-12-05

## 2023-12-05 RX ORDER — LEVOTHYROXINE SODIUM 0.07 MG/1
75 TABLET ORAL EVERY MORNING
Qty: 90 TABLET | Refills: 3 | Status: SHIPPED | OUTPATIENT
Start: 2023-12-05

## 2023-12-05 NOTE — PROGRESS NOTES
"  Chief Complaint   Patient presents with    Hypothyroidism    Gout    Diabetes       Subjective   Bryan Landers is an 78 y.o. male who presents for follow up of diabetes. Current symptoms include: none. Patient denies foot ulcerations, paresthesia of the feet, and visual disturbances. Evaluation to date has included: fasting blood sugar, fasting lipid panel, hemoglobin A1C, and microalbuminuria. Home sugars: patient does not check sugars. Current treatments: no recent interventions. Discussed importance of yearly eye exams and checking feet for skin integrity.      The following portions of the patient's history were reviewed and updated as appropriate: allergies, current medications, past medical history, past social history, past surgical history, and problem list.        Review of Systems  Pertinent items are noted in HPI.     Vitals:    12/05/23 1336   BP: 118/62   Pulse: 66   Temp: 96.8 °F (36 °C)   SpO2: 97%   Weight: 113 kg (250 lb)   Height: 175.3 cm (69.02\")       Objective    Gen:  Alert, pleasant  Ears: canals clear, TMs normal  Throat: clear , no thrush, teeth ok  Neck: no bruit, no LAD  Lungs: clear  Heart: RR no murmur  Feet:  No rash, no skin breakdown, sensation grossly normal.    Laboratory:  Results for orders placed or performed in visit on 11/29/23   Comprehensive Metabolic Panel    Specimen: Blood   Result Value Ref Range    Glucose 100 (H) 65 - 99 mg/dL    BUN 35 (H) 8 - 23 mg/dL    Creatinine 1.78 (H) 0.76 - 1.27 mg/dL    Sodium 141 136 - 145 mmol/L    Potassium 4.9 3.5 - 5.2 mmol/L    Chloride 106 98 - 107 mmol/L    CO2 26.3 22.0 - 29.0 mmol/L    Calcium 9.0 8.6 - 10.5 mg/dL    Total Protein 6.1 6.0 - 8.5 g/dL    Albumin 3.5 3.5 - 5.2 g/dL    ALT (SGPT) 20 1 - 41 U/L    AST (SGOT) 36 1 - 40 U/L    Alkaline Phosphatase 64 39 - 117 U/L    Total Bilirubin 0.4 0.0 - 1.2 mg/dL    Globulin 2.6 gm/dL    A/G Ratio 1.3 g/dL    BUN/Creatinine Ratio 19.7 7.0 - 25.0    Anion Gap 8.7 5.0 - 15.0 mmol/L    " eGFR 38.3 (L) >60.0 mL/min/1.73   PSA, Ultrasensitive With / O Serial    Specimen: Blood   Result Value Ref Range    PSA 0.199 0.000 - 4.000 ng/mL   Testosterone    Specimen: Blood   Result Value Ref Range    Testosterone, Total 13.70 (L) 193.00 - 740.00 ng/dL   Lactate Dehydrogenase    Specimen: Blood   Result Value Ref Range     (H) 135 - 225 U/L   CBC Auto Differential    Specimen: Blood   Result Value Ref Range    WBC 2.85 (L) 3.40 - 10.80 10*3/mm3    RBC 2.41 (L) 4.14 - 5.80 10*6/mm3    Hemoglobin 9.7 (L) 13.0 - 17.7 g/dL    Hematocrit 27.4 (L) 37.5 - 51.0 %    .7 (H) 79.0 - 97.0 fL    MCH 40.2 (H) 26.6 - 33.0 pg    MCHC 35.4 31.5 - 35.7 g/dL    RDW 13.1 12.3 - 15.4 %    RDW-SD 53.9 37.0 - 54.0 fl    MPV 10.1 6.0 - 12.0 fL    Platelets 106 (L) 140 - 450 10*3/mm3    Neutrophil % 46.3 42.7 - 76.0 %    Lymphocyte % 33.3 19.6 - 45.3 %    Monocyte % 11.9 5.0 - 12.0 %    Eosinophil % 6.7 (H) 0.3 - 6.2 %    Basophil % 1.1 0.0 - 1.5 %    Neutrophils, Absolute 1.32 (L) 1.70 - 7.00 10*3/mm3    Lymphocytes, Absolute 0.95 0.70 - 3.10 10*3/mm3    Monocytes, Absolute 0.34 0.10 - 0.90 10*3/mm3    Eosinophils, Absolute 0.19 0.00 - 0.40 10*3/mm3    Basophils, Absolute 0.03 0.00 - 0.20 10*3/mm3        Assessment & Plan        Discussed general issues about diabetes pathophysiology and management.  Addressed ADA diet.  Continued Actos; see medication orders.  Continued statin drug see medication orders.  Continued ACE inhibitor; see medication orders.  Follow up in 6 months or as needed.    Diagnoses and all orders for this visit:    1. Pancytopenia (Primary)    2. Acquired hypothyroidism  -     levothyroxine (SYNTHROID, LEVOTHROID) 75 MCG tablet; Take 1 tablet by mouth Every Morning. Indications: Underactive Thyroid  Dispense: 90 tablet; Refill: 3    3. Type 2 diabetes mellitus without complication, without long-term current use of insulin  -     pioglitazone (ACTOS) 30 MG tablet; Take 1 tablet by mouth Daily.  Indications: Type 2 Diabetes  Dispense: 90 tablet; Refill: 3    Overall Mr. Landers is doing okay.  Labs reviewed.  He continues to be pancytopenic.  Diabetes fairly well controlled.  Mention is getting worse.  His wife does most of the driving now.  CKD 3B is stable  Probably not a candidate for further colonoscopies        Discussed healthy diabetic eating plan.  May refer to ADA web site, diabetes.org    Return in about 6 months (around 6/5/2024).  There are no Patient Instructions on file for this visit.  Medications Discontinued During This Encounter   Medication Reason    pioglitazone (ACTOS) 30 MG tablet Reorder    levothyroxine (SYNTHROID, LEVOTHROID) 75 MCG tablet Reorder         Dr. Jose Fonseca MD  [place], [place]  [facility].

## 2024-01-11 ENCOUNTER — OFFICE VISIT (OUTPATIENT)
Dept: FAMILY MEDICINE CLINIC | Facility: CLINIC | Age: 79
End: 2024-01-11
Payer: MEDICARE

## 2024-01-11 VITALS
OXYGEN SATURATION: 100 % | HEIGHT: 69 IN | DIASTOLIC BLOOD PRESSURE: 64 MMHG | TEMPERATURE: 98.2 F | HEART RATE: 106 BPM | SYSTOLIC BLOOD PRESSURE: 108 MMHG | WEIGHT: 248 LBS | BODY MASS INDEX: 36.73 KG/M2

## 2024-01-11 DIAGNOSIS — J40 BRONCHITIS: Primary | ICD-10-CM

## 2024-01-11 PROCEDURE — 3078F DIAST BP <80 MM HG: CPT | Performed by: NURSE PRACTITIONER

## 2024-01-11 PROCEDURE — 3074F SYST BP LT 130 MM HG: CPT | Performed by: NURSE PRACTITIONER

## 2024-01-11 PROCEDURE — 99213 OFFICE O/P EST LOW 20 MIN: CPT | Performed by: NURSE PRACTITIONER

## 2024-01-11 RX ORDER — ALBUTEROL SULFATE 90 UG/1
2 AEROSOL, METERED RESPIRATORY (INHALATION) EVERY 4 HOURS PRN
Qty: 18 G | Refills: 0 | Status: SHIPPED | OUTPATIENT
Start: 2024-01-11

## 2024-01-11 RX ORDER — AMOXICILLIN AND CLAVULANATE POTASSIUM 875; 125 MG/1; MG/1
1 TABLET, FILM COATED ORAL 2 TIMES DAILY
Qty: 14 TABLET | Refills: 0 | Status: SHIPPED | OUTPATIENT
Start: 2024-01-11 | End: 2024-01-18

## 2024-01-11 NOTE — PROGRESS NOTES
"Chief Complaint   Patient presents with    Cough       Upper Respiratory Infection: Patient presents with wife with complaints of symptoms of a URI. Symptoms include congestion and cough. Onset of symptoms was 1 week ago, gradually worsening since that time. He also c/o achiness, congestion, no  fever, and wheezing for the past 1 week .  He is drinking moderate amounts of fluids. Evaluation to date: none. Treatment to date:  Mucinex .  Ill contacts at home or school or work discussed.    The following portions of the patient's history were reviewed and updated as appropriate: allergies, current medications, past family history, past medical history, past social history, past surgical history and problem list.        Vitals:    01/11/24 1530   BP: 108/64   Pulse: 106   Temp: 98.2 °F (36.8 °C)   SpO2: 100%   Weight: 112 kg (248 lb)   Height: 175.3 cm (69.02\")     Gen: Mildly ill appearing, alert, masked  Ears: TM's bulging without redness  Nose:  Congestion  Throat:  Red without exudate, some drainage  Neck: No LAD  Lung: Scattered rhonchi, expiratory wheeze in upper lobes, regular RR  Heart: RR without murmur  Skin: No rash      Assessment & Plan   Diagnoses and all orders for this visit:    1. Bronchitis (Primary)  -     amoxicillin-clavulanate (AUGMENTIN) 875-125 MG per tablet; Take 1 tablet by mouth 2 (Two) Times a Day for 7 days.  Dispense: 14 tablet; Refill: 0  -     albuterol sulfate  (90 Base) MCG/ACT inhaler; Inhale 2 puffs Every 4 (Four) Hours As Needed for Wheezing.  Dispense: 18 g; Refill: 0           Tylenol or Advil as needed for pain, fever, muscle aches  Plenty of fluids  Hand washing discussed  Off work or school note given if needed.  Warm tea for throat.  Pros and cons of antibiotic use discussed.  Instructed to notify us if symptoms worsen or do not improve.      Patient was wearing face mask when I entered the room and throughout our encounter. Protective equipment was worn throughout this " patient encounter including a face mask.  Hand hygiene was performed before donning protective equipment and after removal when leaving the room.     Lamar Miner, NIRAJ  Family Practice  [facility]

## 2024-02-19 DIAGNOSIS — E78.2 MIXED HYPERLIPIDEMIA: ICD-10-CM

## 2024-02-19 DIAGNOSIS — M1A.0620 IDIOPATHIC CHRONIC GOUT OF LEFT KNEE WITHOUT TOPHUS: ICD-10-CM

## 2024-02-19 DIAGNOSIS — R60.9 DEPENDENT EDEMA: ICD-10-CM

## 2024-02-19 DIAGNOSIS — I10 ESSENTIAL HYPERTENSION: ICD-10-CM

## 2024-02-19 RX ORDER — ALLOPURINOL 100 MG/1
100 TABLET ORAL DAILY
Qty: 90 TABLET | Refills: 3 | Status: ON HOLD | OUTPATIENT
Start: 2024-02-19

## 2024-02-19 RX ORDER — LISINOPRIL 20 MG/1
20 TABLET ORAL DAILY
Qty: 90 TABLET | Refills: 3 | Status: ON HOLD | OUTPATIENT
Start: 2024-02-19

## 2024-02-19 RX ORDER — FUROSEMIDE 20 MG/1
20 TABLET ORAL DAILY
Qty: 90 TABLET | Refills: 1 | Status: ON HOLD | OUTPATIENT
Start: 2024-02-19

## 2024-02-19 RX ORDER — ATORVASTATIN CALCIUM 80 MG/1
80 TABLET, FILM COATED ORAL DAILY
Qty: 90 TABLET | Refills: 3 | Status: ON HOLD | OUTPATIENT
Start: 2024-02-19

## 2024-02-23 ENCOUNTER — APPOINTMENT (OUTPATIENT)
Dept: GENERAL RADIOLOGY | Facility: HOSPITAL | Age: 79
DRG: 200 | End: 2024-02-23
Payer: MEDICARE

## 2024-02-23 ENCOUNTER — HOSPITAL ENCOUNTER (INPATIENT)
Facility: HOSPITAL | Age: 79
LOS: 4 days | Discharge: HOME HEALTH | DRG: 200 | End: 2024-02-27
Attending: INTERNAL MEDICINE | Admitting: INTERNAL MEDICINE
Payer: MEDICARE

## 2024-02-23 ENCOUNTER — HOSPITAL ENCOUNTER (EMERGENCY)
Facility: HOSPITAL | Age: 79
Discharge: OTHER INSTITUTION | DRG: 200 | End: 2024-02-23
Attending: EMERGENCY MEDICINE
Payer: MEDICARE

## 2024-02-23 ENCOUNTER — APPOINTMENT (OUTPATIENT)
Dept: CT IMAGING | Facility: HOSPITAL | Age: 79
DRG: 200 | End: 2024-02-23
Payer: MEDICARE

## 2024-02-23 VITALS
DIASTOLIC BLOOD PRESSURE: 100 MMHG | HEIGHT: 70 IN | TEMPERATURE: 97.5 F | OXYGEN SATURATION: 96 % | RESPIRATION RATE: 15 BRPM | WEIGHT: 240 LBS | SYSTOLIC BLOOD PRESSURE: 138 MMHG | BODY MASS INDEX: 34.36 KG/M2 | HEART RATE: 101 BPM

## 2024-02-23 DIAGNOSIS — S61.218D LACERATION OF LITTLE FINGER WITHOUT DAMAGE TO NAIL, FOREIGN BODY PRESENCE UNSPECIFIED, UNSPECIFIED LATERALITY, SUBSEQUENT ENCOUNTER: ICD-10-CM

## 2024-02-23 DIAGNOSIS — S51.811A SKIN TEAR OF RIGHT FOREARM WITHOUT COMPLICATION, INITIAL ENCOUNTER: ICD-10-CM

## 2024-02-23 DIAGNOSIS — J93.9 PNEUMOTHORAX ON RIGHT: Primary | ICD-10-CM

## 2024-02-23 LAB
ALBUMIN SERPL-MCNC: 3.6 G/DL (ref 3.5–5.2)
ALBUMIN/GLOB SERPL: 1.4 G/DL
ALP SERPL-CCNC: 67 U/L (ref 39–117)
ALT SERPL W P-5'-P-CCNC: 22 U/L (ref 1–41)
ANION GAP SERPL CALCULATED.3IONS-SCNC: 10.3 MMOL/L (ref 5–15)
AST SERPL-CCNC: 45 U/L (ref 1–40)
BASOPHILS # BLD AUTO: 0.02 10*3/MM3 (ref 0–0.2)
BASOPHILS NFR BLD AUTO: 0.3 % (ref 0–1.5)
BILIRUB SERPL-MCNC: 0.6 MG/DL (ref 0–1.2)
BUN SERPL-MCNC: 23 MG/DL (ref 8–23)
BUN/CREAT SERPL: 15.6 (ref 7–25)
CALCIUM SPEC-SCNC: 8.5 MG/DL (ref 8.6–10.5)
CHLORIDE SERPL-SCNC: 103 MMOL/L (ref 98–107)
CO2 SERPL-SCNC: 25.7 MMOL/L (ref 22–29)
CREAT SERPL-MCNC: 1.47 MG/DL (ref 0.76–1.27)
DEPRECATED RDW RBC AUTO: 64.6 FL (ref 37–54)
EGFRCR SERPLBLD CKD-EPI 2021: 47.9 ML/MIN/1.73
EOSINOPHIL # BLD AUTO: 0.02 10*3/MM3 (ref 0–0.4)
EOSINOPHIL NFR BLD AUTO: 0.3 % (ref 0.3–6.2)
ERYTHROCYTE [DISTWIDTH] IN BLOOD BY AUTOMATED COUNT: 14.7 % (ref 12.3–15.4)
GLOBULIN UR ELPH-MCNC: 2.5 GM/DL
GLUCOSE SERPL-MCNC: 143 MG/DL (ref 65–99)
HCT VFR BLD AUTO: 32.2 % (ref 37.5–51)
HGB BLD-MCNC: 10.7 G/DL (ref 13–17.7)
IMM GRANULOCYTES # BLD AUTO: 0.04 10*3/MM3 (ref 0–0.05)
IMM GRANULOCYTES NFR BLD AUTO: 0.6 % (ref 0–0.5)
LYMPHOCYTES # BLD AUTO: 0.68 10*3/MM3 (ref 0.7–3.1)
LYMPHOCYTES NFR BLD AUTO: 9.7 % (ref 19.6–45.3)
MCH RBC QN AUTO: 39.6 PG (ref 26.6–33)
MCHC RBC AUTO-ENTMCNC: 33.2 G/DL (ref 31.5–35.7)
MCV RBC AUTO: 119.3 FL (ref 79–97)
MONOCYTES # BLD AUTO: 0.7 10*3/MM3 (ref 0.1–0.9)
MONOCYTES NFR BLD AUTO: 9.9 % (ref 5–12)
NEUTROPHILS NFR BLD AUTO: 5.58 10*3/MM3 (ref 1.7–7)
NEUTROPHILS NFR BLD AUTO: 79.2 % (ref 42.7–76)
NRBC BLD AUTO-RTO: 0 /100 WBC (ref 0–0.2)
PLATELET # BLD AUTO: 97 10*3/MM3 (ref 140–450)
PMV BLD AUTO: 9.8 FL (ref 6–12)
POTASSIUM SERPL-SCNC: 4.4 MMOL/L (ref 3.5–5.2)
PROT SERPL-MCNC: 6.1 G/DL (ref 6–8.5)
RBC # BLD AUTO: 2.7 10*6/MM3 (ref 4.14–5.8)
SODIUM SERPL-SCNC: 139 MMOL/L (ref 136–145)
WBC NRBC COR # BLD AUTO: 7.04 10*3/MM3 (ref 3.4–10.8)

## 2024-02-23 PROCEDURE — 71101 X-RAY EXAM UNILAT RIBS/CHEST: CPT

## 2024-02-23 PROCEDURE — 70450 CT HEAD/BRAIN W/O DYE: CPT

## 2024-02-23 PROCEDURE — 99285 EMERGENCY DEPT VISIT HI MDM: CPT

## 2024-02-23 PROCEDURE — 85025 COMPLETE CBC W/AUTO DIFF WBC: CPT | Performed by: EMERGENCY MEDICINE

## 2024-02-23 PROCEDURE — 73090 X-RAY EXAM OF FOREARM: CPT

## 2024-02-23 PROCEDURE — 80053 COMPREHEN METABOLIC PANEL: CPT | Performed by: EMERGENCY MEDICINE

## 2024-02-23 RX ORDER — SODIUM CHLORIDE 0.9 % (FLUSH) 0.9 %
10 SYRINGE (ML) INJECTION AS NEEDED
Status: DISCONTINUED | OUTPATIENT
Start: 2024-02-23 | End: 2024-02-27 | Stop reason: HOSPADM

## 2024-02-23 RX ORDER — NITROGLYCERIN 0.4 MG/1
0.4 TABLET SUBLINGUAL
Status: DISCONTINUED | OUTPATIENT
Start: 2024-02-23 | End: 2024-02-27 | Stop reason: HOSPADM

## 2024-02-23 RX ORDER — ACETAMINOPHEN 650 MG/1
650 SUPPOSITORY RECTAL EVERY 4 HOURS PRN
Status: DISCONTINUED | OUTPATIENT
Start: 2024-02-23 | End: 2024-02-27 | Stop reason: HOSPADM

## 2024-02-23 RX ORDER — SODIUM CHLORIDE 9 MG/ML
40 INJECTION, SOLUTION INTRAVENOUS AS NEEDED
Status: DISCONTINUED | OUTPATIENT
Start: 2024-02-23 | End: 2024-02-27 | Stop reason: HOSPADM

## 2024-02-23 RX ORDER — SODIUM CHLORIDE 0.9 % (FLUSH) 0.9 %
10 SYRINGE (ML) INJECTION EVERY 12 HOURS SCHEDULED
Status: DISCONTINUED | OUTPATIENT
Start: 2024-02-24 | End: 2024-02-27 | Stop reason: HOSPADM

## 2024-02-23 RX ORDER — NALOXONE HCL 0.4 MG/ML
0.4 VIAL (ML) INJECTION
Status: DISCONTINUED | OUTPATIENT
Start: 2024-02-23 | End: 2024-02-27 | Stop reason: HOSPADM

## 2024-02-23 RX ORDER — SODIUM CHLORIDE 9 MG/ML
100 INJECTION, SOLUTION INTRAVENOUS CONTINUOUS
Status: DISCONTINUED | OUTPATIENT
Start: 2024-02-24 | End: 2024-02-24

## 2024-02-23 RX ORDER — ACETAMINOPHEN 325 MG/1
650 TABLET ORAL EVERY 4 HOURS PRN
Status: DISCONTINUED | OUTPATIENT
Start: 2024-02-23 | End: 2024-02-27 | Stop reason: HOSPADM

## 2024-02-23 RX ORDER — CHOLECALCIFEROL (VITAMIN D3) 125 MCG
5 CAPSULE ORAL NIGHTLY PRN
Status: DISCONTINUED | OUTPATIENT
Start: 2024-02-23 | End: 2024-02-27 | Stop reason: HOSPADM

## 2024-02-23 RX ORDER — CALCIUM CARBONATE 500 MG/1
2 TABLET, CHEWABLE ORAL 3 TIMES DAILY PRN
Status: DISCONTINUED | OUTPATIENT
Start: 2024-02-23 | End: 2024-02-27 | Stop reason: HOSPADM

## 2024-02-23 RX ORDER — BISACODYL 5 MG/1
5 TABLET, DELAYED RELEASE ORAL DAILY PRN
Status: DISCONTINUED | OUTPATIENT
Start: 2024-02-23 | End: 2024-02-27 | Stop reason: HOSPADM

## 2024-02-23 RX ORDER — AMOXICILLIN 250 MG
2 CAPSULE ORAL 2 TIMES DAILY
Status: DISCONTINUED | OUTPATIENT
Start: 2024-02-24 | End: 2024-02-27 | Stop reason: HOSPADM

## 2024-02-23 RX ORDER — POLYETHYLENE GLYCOL 3350 17 G/17G
17 POWDER, FOR SOLUTION ORAL DAILY PRN
Status: DISCONTINUED | OUTPATIENT
Start: 2024-02-23 | End: 2024-02-27 | Stop reason: HOSPADM

## 2024-02-23 RX ORDER — ONDANSETRON 4 MG/1
4 TABLET, ORALLY DISINTEGRATING ORAL EVERY 6 HOURS PRN
Status: DISCONTINUED | OUTPATIENT
Start: 2024-02-23 | End: 2024-02-27 | Stop reason: HOSPADM

## 2024-02-23 RX ORDER — ACETAMINOPHEN 160 MG/5ML
650 SOLUTION ORAL EVERY 4 HOURS PRN
Status: DISCONTINUED | OUTPATIENT
Start: 2024-02-23 | End: 2024-02-27 | Stop reason: HOSPADM

## 2024-02-23 RX ORDER — HYDROMORPHONE HYDROCHLORIDE 1 MG/ML
0.5 INJECTION, SOLUTION INTRAMUSCULAR; INTRAVENOUS; SUBCUTANEOUS
Status: DISCONTINUED | OUTPATIENT
Start: 2024-02-23 | End: 2024-02-27 | Stop reason: HOSPADM

## 2024-02-23 RX ORDER — ONDANSETRON 2 MG/ML
4 INJECTION INTRAMUSCULAR; INTRAVENOUS EVERY 6 HOURS PRN
Status: DISCONTINUED | OUTPATIENT
Start: 2024-02-23 | End: 2024-02-27 | Stop reason: HOSPADM

## 2024-02-23 RX ORDER — BISACODYL 10 MG
10 SUPPOSITORY, RECTAL RECTAL DAILY PRN
Status: DISCONTINUED | OUTPATIENT
Start: 2024-02-23 | End: 2024-02-27 | Stop reason: HOSPADM

## 2024-02-23 RX ORDER — HYDROCODONE BITARTRATE AND ACETAMINOPHEN 5; 325 MG/1; MG/1
1 TABLET ORAL EVERY 4 HOURS PRN
Status: DISCONTINUED | OUTPATIENT
Start: 2024-02-23 | End: 2024-02-27 | Stop reason: HOSPADM

## 2024-02-23 RX ADMIN — Medication 10 ML: at 22:40

## 2024-02-23 NOTE — ED NOTES
Call placed to transfer to talk to thoracic surgeon at [facility]. Call back number left.

## 2024-02-23 NOTE — ED TRIAGE NOTES
EMS stated pt lives in Independent living at [facility]. Pt got into a car and drove and ended up in an apt complex where someone found him on the ground. Unwitnessed fall, unk LOC

## 2024-02-23 NOTE — ED PROVIDER NOTES
Subjective   History of Present Illness    Chief complaint: Fall with  forearm injury    Location: Right forearm    Quality/Severity: Not actively bleeding    Timing/Onset/Duration: Just prior to    Modifying Factors: Nothing makes it better or worse    Associated Symptoms: No headache.  No neck or back pain.  Patient complains of right posterior chest wall pain.  No shortness of breath.  No abdominal pain.  No numbness, tingling, weakness, or change in bladder or bowel function.    Narrative: This 79-year-old fell.  No loss of consciousness.  He is on Eliquis.  The patient states he tripped over a bush.  He states his tetanus is up-to-date.    PCP:Jose Fonseca MD    Review of Systems   HENT:  Negative for nosebleeds and rhinorrhea.    Respiratory:  Negative for shortness of breath.    Cardiovascular:  Negative for chest pain.        Right posterior inferior chest wall pain   Gastrointestinal:  Negative for abdominal pain, nausea and vomiting.   Genitourinary:  Negative for difficulty urinating.   Musculoskeletal:  Negative for back pain and neck pain.        Right forearm injury   Skin:  Positive for wound.   Neurological:  Negative for weakness, numbness and headaches.         Past Medical History:   Diagnosis Date    At risk for sleep apnea     Bladder mass     Bruises easily     Diabetes mellitus     Disease of thyroid gland     Elevated cholesterol     GI bleed     Gross hematuria 9/22/2021    History of transfusion     Poor historian     Short-term memory loss     Vitamin D deficiency        Allergies   Allergen Reactions    Nsaids GI Bleeding     BLEEDING    Aricept [Donepezil] Diarrhea       Past Surgical History:   Procedure Laterality Date    COLONOSCOPY  09/2016    COLONOSCOPY N/A 9/28/2018    Procedure: COLONOSCOPY;  Surgeon: Olaf Gomez MD;  Location: [facility] OR;  Service: Gastroenterology    COLONOSCOPY N/A 1/7/2019    Procedure: COLONOSCOPY;  Surgeon: Rosa Jack MD;   Location: [facility] OR;  Service: Gastroenterology    CYSTOSCOPY BLADDER BIOPSY N/A 2021    Procedure: CYSTOSCOPY BLADDER BIOPSY;  Surgeon: Roldan Mccarthy MD;  Location: [facility] MAIN OR;  Service: Urology;  Laterality: N/A;    HERNIA REPAIR Bilateral     PROSTATE ULTRASOUND BIOPSY      SIGMOIDOSCOPY N/A 10/2/2018    Procedure: FLEXIBLE SIGMOIDOSCOPY:  APC cautery bleeding using 60 joules;  Surgeon: Olaf Gomez MD;  Location: [facility] ENDOSCOPY;  Service: Gastroenterology    TONSILLECTOMY      TOTAL HIP ARTHROPLASTY Bilateral        Family History   Problem Relation Age of Onset    Stroke Mother     Stroke Father     No Known Problems Brother     Colon cancer Neg Hx     Colon polyps Neg Hx     Malig Hyperthermia Neg Hx        Social History     Socioeconomic History    Marital status:      Spouse name: Chloe    Number of children: 2   Tobacco Use    Smoking status: Former     Packs/day: 0.25     Years: 5.00     Additional pack years: 0.00     Total pack years: 1.25     Types: Cigars, Cigarettes     Quit date: 1986     Years since quittin.1    Smokeless tobacco: Never   Vaping Use    Vaping Use: Never used   Substance and Sexual Activity    Alcohol use: Yes     Alcohol/week: 32.0 - 34.0 standard drinks of alcohol     Types: 4 - 6 Shots of liquor, 28 Standard drinks or equivalent per week     Comment: 2-3 double vodkas a night    Drug use: No    Sexual activity: Yes     Partners: Female     Birth control/protection: Post-menopausal           Objective   Physical Exam  Vitals (The temperature is 97.5 °F, pulse 90, respirations 16, /78, room air pulse ox 98%.) and nursing note reviewed.   Constitutional:       Appearance: Normal appearance.   HENT:      Head: Normocephalic and atraumatic.      Right Ear: Tympanic membrane normal.      Left Ear: Tympanic membrane normal.      Nose: Nose normal.      Mouth/Throat:      Mouth: Mucous membranes are moist.   Eyes:      Extraocular  Movements: Extraocular movements intact.      Pupils: Pupils are equal, round, and reactive to light.   Neck:      Comments: There is no tenderness, deformity, or bony step-offs upon palpation of cervical, thoracic, lumbar sacrococcygeal spine  Cardiovascular:      Rate and Rhythm: Normal rate and regular rhythm.      Pulses: Normal pulses.      Heart sounds: Normal heart sounds. No murmur heard.     No friction rub. No gallop.   Pulmonary:      Effort: Pulmonary effort is normal.      Breath sounds: Normal breath sounds.   Chest:      Chest wall: Tenderness (Right posterior inferior chest wall) present.   Abdominal:      General: Abdomen is flat. Bowel sounds are normal. There is no distension.      Palpations: Abdomen is soft. There is no mass.      Tenderness: There is no abdominal tenderness. There is no right CVA tenderness, left CVA tenderness, guarding or rebound.      Hernia: No hernia is present.   Musculoskeletal:         General: Normal range of motion.      Comments: Right forearm is bandaged.   Skin:     General: Skin is warm and dry.      Capillary Refill: Capillary refill takes less than 2 seconds.   Neurological:      General: No focal deficit present.      Mental Status: He is alert and oriented to person, place, and time.      Cranial Nerves: No cranial nerve deficit.      Sensory: No sensory deficit.      Motor: No weakness.         Procedures           ED Course        The patient has a 1 cm laceration noted to the flexor surface of the right small finger.  It was copious irrigated with normal saline meticulously explored to the base in a bloodless field.  There was no nerve involvement, no tendon involvement, no foreign bodies could be appreciated.  The wound was closed with Dermabond  18:32 EST, 02/23/24:  The patient's diagnosis of fall with skin tear right forearm and right-sided pneumothorax was discussed with him.  The plan will be to transfer the patient to [facility] for  treatment of the pneumothorax.  All the patient's questions were answered the patient will be transferred in stable condition.  The patient is hemodynamically stable.  Patient is on Eliquis.    19:11 EST, 02/23/24:  I spoke with Dr. Hendrickson, she wants the patient admitted to the medicine service, they will consult for chest tube placement.                           20:24 EST, 02/23/24:  I spoke to Dr. Albarran, on-call for the hospitalist, he will accept the patient.            Medical Decision Making      Final diagnoses:   Pneumothorax on right   Skin tear of right forearm without complication, initial encounter       ED Disposition  ED Disposition       None            No follow-up provider specified.       Medication List      No changes were made to your prescriptions during this visit.            Toby Rivera MD  02/23/24 1002

## 2024-02-24 ENCOUNTER — APPOINTMENT (OUTPATIENT)
Dept: GENERAL RADIOLOGY | Facility: HOSPITAL | Age: 79
DRG: 200 | End: 2024-02-24
Payer: MEDICARE

## 2024-02-24 ENCOUNTER — APPOINTMENT (OUTPATIENT)
Dept: CT IMAGING | Facility: HOSPITAL | Age: 79
DRG: 200 | End: 2024-02-24
Payer: MEDICARE

## 2024-02-24 LAB
ANION GAP SERPL CALCULATED.3IONS-SCNC: 10.6 MMOL/L (ref 5–15)
BACTERIA UR QL AUTO: ABNORMAL /HPF
BASOPHILS # BLD AUTO: 0.01 10*3/MM3 (ref 0–0.2)
BASOPHILS NFR BLD AUTO: 0.2 % (ref 0–1.5)
BILIRUB UR QL STRIP: NEGATIVE
BUN SERPL-MCNC: 23 MG/DL (ref 8–23)
BUN/CREAT SERPL: 17.2 (ref 7–25)
CALCIUM SPEC-SCNC: 8.6 MG/DL (ref 8.6–10.5)
CHLORIDE SERPL-SCNC: 107 MMOL/L (ref 98–107)
CLARITY UR: ABNORMAL
CO2 SERPL-SCNC: 25.4 MMOL/L (ref 22–29)
COLOR UR: YELLOW
CREAT SERPL-MCNC: 1.34 MG/DL (ref 0.76–1.27)
DEPRECATED RDW RBC AUTO: 50.7 FL (ref 37–54)
EGFRCR SERPLBLD CKD-EPI 2021: 53.6 ML/MIN/1.73
EOSINOPHIL # BLD AUTO: 0.01 10*3/MM3 (ref 0–0.4)
EOSINOPHIL NFR BLD AUTO: 0.2 % (ref 0.3–6.2)
ERYTHROCYTE [DISTWIDTH] IN BLOOD BY AUTOMATED COUNT: 12.7 % (ref 12.3–15.4)
ETHANOL BLD-MCNC: <10 MG/DL (ref 0–10)
ETHANOL UR QL: <0.01 %
GLUCOSE BLDC GLUCOMTR-MCNC: 130 MG/DL (ref 70–130)
GLUCOSE BLDC GLUCOMTR-MCNC: 148 MG/DL (ref 70–130)
GLUCOSE BLDC GLUCOMTR-MCNC: 183 MG/DL (ref 70–130)
GLUCOSE SERPL-MCNC: 143 MG/DL (ref 65–99)
GLUCOSE UR STRIP-MCNC: NEGATIVE MG/DL
HCT VFR BLD AUTO: 28.6 % (ref 37.5–51)
HGB BLD-MCNC: 9.8 G/DL (ref 13–17.7)
HGB UR QL STRIP.AUTO: ABNORMAL
HYALINE CASTS UR QL AUTO: ABNORMAL /LPF
KETONES UR QL STRIP: NEGATIVE
LEUKOCYTE ESTERASE UR QL STRIP.AUTO: NEGATIVE
LYMPHOCYTES # BLD AUTO: 0.67 10*3/MM3 (ref 0.7–3.1)
LYMPHOCYTES NFR BLD AUTO: 11.7 % (ref 19.6–45.3)
MCH RBC QN AUTO: 38.1 PG (ref 26.6–33)
MCHC RBC AUTO-ENTMCNC: 34.3 G/DL (ref 31.5–35.7)
MCV RBC AUTO: 111.3 FL (ref 79–97)
MONOCYTES # BLD AUTO: 0.52 10*3/MM3 (ref 0.1–0.9)
MONOCYTES NFR BLD AUTO: 9.1 % (ref 5–12)
NEUTROPHILS NFR BLD AUTO: 4.49 10*3/MM3 (ref 1.7–7)
NEUTROPHILS NFR BLD AUTO: 78.3 % (ref 42.7–76)
NITRITE UR QL STRIP: NEGATIVE
PH UR STRIP.AUTO: <=5 [PH] (ref 5–8)
PLATELET # BLD AUTO: 94 10*3/MM3 (ref 140–450)
PMV BLD AUTO: 9.6 FL (ref 6–12)
POTASSIUM SERPL-SCNC: 4.9 MMOL/L (ref 3.5–5.2)
PROT UR QL STRIP: ABNORMAL
QT INTERVAL: 370 MS
QTC INTERVAL: 490 MS
RBC # BLD AUTO: 2.57 10*6/MM3 (ref 4.14–5.8)
RBC # UR STRIP: ABNORMAL /HPF
REF LAB TEST METHOD: ABNORMAL
SODIUM SERPL-SCNC: 143 MMOL/L (ref 136–145)
SP GR UR STRIP: 1.02 (ref 1–1.03)
SQUAMOUS #/AREA URNS HPF: ABNORMAL /HPF
UROBILINOGEN UR QL STRIP: ABNORMAL
WBC # UR STRIP: ABNORMAL /HPF
WBC NRBC COR # BLD AUTO: 5.73 10*3/MM3 (ref 3.4–10.8)

## 2024-02-24 PROCEDURE — 25810000003 SODIUM CHLORIDE 0.9 % SOLUTION: Performed by: INTERNAL MEDICINE

## 2024-02-24 PROCEDURE — 81001 URINALYSIS AUTO W/SCOPE: CPT | Performed by: STUDENT IN AN ORGANIZED HEALTH CARE EDUCATION/TRAINING PROGRAM

## 2024-02-24 PROCEDURE — 71045 X-RAY EXAM CHEST 1 VIEW: CPT

## 2024-02-24 PROCEDURE — 0W9930Z DRAINAGE OF RIGHT PLEURAL CAVITY WITH DRAINAGE DEVICE, PERCUTANEOUS APPROACH: ICD-10-PCS | Performed by: INTERNAL MEDICINE

## 2024-02-24 PROCEDURE — 85025 COMPLETE CBC W/AUTO DIFF WBC: CPT | Performed by: INTERNAL MEDICINE

## 2024-02-24 PROCEDURE — 82077 ASSAY SPEC XCP UR&BREATH IA: CPT | Performed by: STUDENT IN AN ORGANIZED HEALTH CARE EDUCATION/TRAINING PROGRAM

## 2024-02-24 PROCEDURE — 71250 CT THORAX DX C-: CPT

## 2024-02-24 PROCEDURE — 93005 ELECTROCARDIOGRAM TRACING: CPT | Performed by: NURSE PRACTITIONER

## 2024-02-24 PROCEDURE — 25010000002 THIAMINE HCL 200 MG/2ML SOLUTION: Performed by: STUDENT IN AN ORGANIZED HEALTH CARE EDUCATION/TRAINING PROGRAM

## 2024-02-24 PROCEDURE — 25010000002 HYDROMORPHONE PER 4 MG: Performed by: INTERNAL MEDICINE

## 2024-02-24 PROCEDURE — 80048 BASIC METABOLIC PNL TOTAL CA: CPT | Performed by: INTERNAL MEDICINE

## 2024-02-24 PROCEDURE — 99223 1ST HOSP IP/OBS HIGH 75: CPT | Performed by: NURSE PRACTITIONER

## 2024-02-24 PROCEDURE — 82948 REAGENT STRIP/BLOOD GLUCOSE: CPT

## 2024-02-24 PROCEDURE — 32550 INSERT PLEURAL CATH: CPT | Performed by: THORACIC SURGERY (CARDIOTHORACIC VASCULAR SURGERY)

## 2024-02-24 PROCEDURE — 93010 ELECTROCARDIOGRAM REPORT: CPT | Performed by: INTERNAL MEDICINE

## 2024-02-24 RX ORDER — ALBUTEROL SULFATE 2.5 MG/3ML
2.5 SOLUTION RESPIRATORY (INHALATION) EVERY 6 HOURS PRN
Status: DISCONTINUED | OUTPATIENT
Start: 2024-02-24 | End: 2024-02-27 | Stop reason: HOSPADM

## 2024-02-24 RX ORDER — FOLIC ACID 1 MG/1
1 TABLET ORAL DAILY
Status: DISCONTINUED | OUTPATIENT
Start: 2024-02-24 | End: 2024-02-27 | Stop reason: HOSPADM

## 2024-02-24 RX ORDER — ATORVASTATIN CALCIUM 80 MG/1
80 TABLET, FILM COATED ORAL DAILY
Status: DISCONTINUED | OUTPATIENT
Start: 2024-02-24 | End: 2024-02-27 | Stop reason: HOSPADM

## 2024-02-24 RX ORDER — ENZALUTAMIDE 40 MG/1
160 TABLET ORAL DAILY
COMMUNITY
Start: 2024-02-19

## 2024-02-24 RX ORDER — IBUPROFEN 600 MG/1
1 TABLET ORAL
Status: DISCONTINUED | OUTPATIENT
Start: 2024-02-24 | End: 2024-02-27 | Stop reason: HOSPADM

## 2024-02-24 RX ORDER — DEXTROSE MONOHYDRATE 25 G/50ML
25 INJECTION, SOLUTION INTRAVENOUS
Status: DISCONTINUED | OUTPATIENT
Start: 2024-02-24 | End: 2024-02-27 | Stop reason: HOSPADM

## 2024-02-24 RX ORDER — ALLOPURINOL 100 MG/1
100 TABLET ORAL DAILY
Status: DISCONTINUED | OUTPATIENT
Start: 2024-02-24 | End: 2024-02-27 | Stop reason: HOSPADM

## 2024-02-24 RX ORDER — HYDROMORPHONE HYDROCHLORIDE 1 MG/ML
0.5 INJECTION, SOLUTION INTRAMUSCULAR; INTRAVENOUS; SUBCUTANEOUS
Status: DISCONTINUED | OUTPATIENT
Start: 2024-02-24 | End: 2024-02-24

## 2024-02-24 RX ORDER — LIDOCAINE HYDROCHLORIDE 10 MG/ML
INJECTION, SOLUTION EPIDURAL; INFILTRATION; INTRACAUDAL; PERINEURAL
Status: DISCONTINUED
Start: 2024-02-24 | End: 2024-02-24 | Stop reason: WASHOUT

## 2024-02-24 RX ORDER — NICOTINE POLACRILEX 4 MG
15 LOZENGE BUCCAL
Status: DISCONTINUED | OUTPATIENT
Start: 2024-02-24 | End: 2024-02-27 | Stop reason: HOSPADM

## 2024-02-24 RX ORDER — LIDOCAINE HYDROCHLORIDE 10 MG/ML
10 INJECTION, SOLUTION INFILTRATION; PERINEURAL ONCE
Status: COMPLETED | OUTPATIENT
Start: 2024-02-24 | End: 2024-02-24

## 2024-02-24 RX ORDER — MEMANTINE HYDROCHLORIDE 10 MG/1
20 TABLET ORAL EVERY MORNING
Status: DISCONTINUED | OUTPATIENT
Start: 2024-02-24 | End: 2024-02-27 | Stop reason: HOSPADM

## 2024-02-24 RX ORDER — THIAMINE HYDROCHLORIDE 100 MG/ML
200 INJECTION, SOLUTION INTRAMUSCULAR; INTRAVENOUS EVERY 8 HOURS SCHEDULED
Status: DISCONTINUED | OUTPATIENT
Start: 2024-02-24 | End: 2024-02-27 | Stop reason: HOSPADM

## 2024-02-24 RX ORDER — QUETIAPINE FUMARATE 25 MG/1
25 TABLET, FILM COATED ORAL NIGHTLY
Status: DISCONTINUED | OUTPATIENT
Start: 2024-02-24 | End: 2024-02-27 | Stop reason: HOSPADM

## 2024-02-24 RX ORDER — FUROSEMIDE 20 MG/1
20 TABLET ORAL DAILY
Status: DISCONTINUED | OUTPATIENT
Start: 2024-02-24 | End: 2024-02-27 | Stop reason: HOSPADM

## 2024-02-24 RX ORDER — LEVOTHYROXINE SODIUM 0.07 MG/1
75 TABLET ORAL EVERY MORNING
Status: DISCONTINUED | OUTPATIENT
Start: 2024-02-24 | End: 2024-02-27 | Stop reason: HOSPADM

## 2024-02-24 RX ORDER — INSULIN LISPRO 100 [IU]/ML
2-9 INJECTION, SOLUTION INTRAVENOUS; SUBCUTANEOUS
Status: DISCONTINUED | OUTPATIENT
Start: 2024-02-24 | End: 2024-02-27 | Stop reason: HOSPADM

## 2024-02-24 RX ORDER — LIDOCAINE HYDROCHLORIDE 10 MG/ML
INJECTION, SOLUTION EPIDURAL; INFILTRATION; INTRACAUDAL; PERINEURAL
Status: COMPLETED
Start: 2024-02-24 | End: 2024-02-24

## 2024-02-24 RX ADMIN — Medication 10 ML: at 08:00

## 2024-02-24 RX ADMIN — QUETIAPINE FUMARATE 25 MG: 25 TABLET ORAL at 21:03

## 2024-02-24 RX ADMIN — THIAMINE HYDROCHLORIDE 200 MG: 100 INJECTION, SOLUTION INTRAMUSCULAR; INTRAVENOUS at 21:03

## 2024-02-24 RX ADMIN — ALLOPURINOL 100 MG: 100 TABLET ORAL at 08:01

## 2024-02-24 RX ADMIN — Medication 10 ML: at 21:03

## 2024-02-24 RX ADMIN — LIDOCAINE HYDROCHLORIDE 10 ML: 10 INJECTION, SOLUTION EPIDURAL; INFILTRATION; INTRACAUDAL; PERINEURAL at 16:37

## 2024-02-24 RX ADMIN — HYDROMORPHONE HYDROCHLORIDE 0.5 MG: 1 INJECTION, SOLUTION INTRAMUSCULAR; INTRAVENOUS; SUBCUTANEOUS at 16:36

## 2024-02-24 RX ADMIN — LEVOTHYROXINE SODIUM 75 MCG: 75 TABLET ORAL at 07:56

## 2024-02-24 RX ADMIN — LIDOCAINE HYDROCHLORIDE 10 ML: 10 INJECTION, SOLUTION INFILTRATION; PERINEURAL at 16:37

## 2024-02-24 RX ADMIN — FUROSEMIDE 20 MG: 20 TABLET ORAL at 13:33

## 2024-02-24 RX ADMIN — MEMANTINE HYDROCHLORIDE 20 MG: 10 TABLET, FILM COATED ORAL at 07:57

## 2024-02-24 RX ADMIN — DOCUSATE SODIUM 50MG AND SENNOSIDES 8.6MG 2 TABLET: 8.6; 5 TABLET, FILM COATED ORAL at 21:03

## 2024-02-24 RX ADMIN — FOLIC ACID 1 MG: 1 TABLET ORAL at 13:33

## 2024-02-24 RX ADMIN — THIAMINE HYDROCHLORIDE 200 MG: 100 INJECTION, SOLUTION INTRAMUSCULAR; INTRAVENOUS at 13:33

## 2024-02-24 RX ADMIN — ATORVASTATIN CALCIUM 80 MG: 80 TABLET, FILM COATED ORAL at 08:01

## 2024-02-24 RX ADMIN — Medication 5 MG: at 23:58

## 2024-02-24 RX ADMIN — SODIUM CHLORIDE 100 ML/HR: 9 INJECTION, SOLUTION INTRAVENOUS at 01:16

## 2024-02-24 NOTE — PLAN OF CARE
"Goal Outcome Evaluation:      Pt has been anxious, agitated and annoyed with staff because he wants to get out of bed knowing he has an unsteady gait and bilateral swollen extremities. He has been yelling and making demands to staff what he will do, if we don't allow him to get up and walk to the bathroom. He was placed on a pure wick with a brief , so we could accurately measure his urine output. He ripped it off and told staff, we \"will not hold him here and he wants to leave. NP on call has been notified, and pt's daughter was updated with his outrageous behavior towards staff. Daughter stated she will be here this am to visit.                                         "

## 2024-02-24 NOTE — NURSING NOTE
RN notified on call NP of pt's 4-5 beats of PVCs, and his increased confusion during the night. EKG order has been placed.

## 2024-02-24 NOTE — H&P
Patient Name:  Bryan Landers  YOB: 1945  MRN:  [id number]  Admit Date:  2/23/2024  Patient Care Team:  Jose Fonseca MD as PCP - General (Family Medicine)  Jose Michelle MD as Consulting Physician (Radiation Oncology)  Roldan Mccarthy MD as Consulting Physician (Urology)  Clarence Nieves MD (Hematology)  Grover Harris DPM as Consulting Physician (Podiatry)      Subjective   History Present Illness       Mr. Landers is a 79 y.o. former smoker with a history of HTN, HLD, Type 2 DM, Stage 3b CKD, hypothyroidism, and Factor V Leiden Heterozygote with hx of DVT and PE on eliquis that presents to [facility] complaining of right chest wall pain. He states he tripped and had a fall 3 days ago and the pain has been worsening since then. He has had some mild shortness of breath. He presented to [facility] with these complaints and was found to have a right-sided pneumothorax. He was transferred to this facility for further evaluation and management.       Review of Systems   All other systems reviewed and are negative.       Personal History     Past Medical History:   Diagnosis Date    At risk for sleep apnea     Bladder mass     Bruises easily     Diabetes mellitus     Disease of thyroid gland     Elevated cholesterol     GI bleed     Gross hematuria 9/22/2021    History of transfusion     Poor historian     Short-term memory loss     Vitamin D deficiency      Past Surgical History:   Procedure Laterality Date    COLONOSCOPY  09/2016    COLONOSCOPY N/A 9/28/2018    Procedure: COLONOSCOPY;  Surgeon: Olaf Gomez MD;  Location: [facility] OR;  Service: Gastroenterology    COLONOSCOPY N/A 1/7/2019    Procedure: COLONOSCOPY;  Surgeon: Rosa Jack MD;  Location: [facility] OR;  Service: Gastroenterology    CYSTOSCOPY BLADDER BIOPSY N/A 9/22/2021    Procedure: CYSTOSCOPY BLADDER BIOPSY;  Surgeon: Roldan Mccarthy MD;  Location: [facility]  OR;  Service: Urology;  Laterality: N/A;    HERNIA REPAIR Bilateral     PROSTATE ULTRASOUND BIOPSY      SIGMOIDOSCOPY N/A 10/2/2018    Procedure: FLEXIBLE SIGMOIDOSCOPY:  APC cautery bleeding using 60 joules;  Surgeon: Olaf Gomez MD;  Location: [facility] ENDOSCOPY;  Service: Gastroenterology    TONSILLECTOMY      TOTAL HIP ARTHROPLASTY Bilateral      Family History   Problem Relation Age of Onset    Stroke Mother     Stroke Father     No Known Problems Brother     Colon cancer Neg Hx     Colon polyps Neg Hx     Malig Hyperthermia Neg Hx      Social History     Tobacco Use    Smoking status: Former     Packs/day: 0.25     Years: 5.00     Additional pack years: 0.00     Total pack years: 1.25     Types: Cigars, Cigarettes     Quit date: 1986     Years since quittin.1    Smokeless tobacco: Never   Vaping Use    Vaping Use: Never used   Substance Use Topics    Alcohol use: Yes     Alcohol/week: 32.0 - 34.0 standard drinks of alcohol     Types: 4 - 6 Shots of liquor, 28 Standard drinks or equivalent per week     Comment: 2-3 double vodkas a night    Drug use: No     No current facility-administered medications on file prior to encounter.     Current Outpatient Medications on File Prior to Encounter   Medication Sig Dispense Refill    albuterol sulfate  (90 Base) MCG/ACT inhaler Inhale 2 puffs Every 4 (Four) Hours As Needed for Wheezing. 18 g 0    allopurinol (ZYLOPRIM) 100 MG tablet TAKE ONE TABLET BY MOUTH DAILY 90 tablet 3    ammonium lactate (AMLACTIN) 12 % cream       apixaban (Eliquis) 2.5 MG tablet tablet Take 1 tablet by mouth Every 12 (Twelve) Hours. Indications: history of DVT/PE 60 tablet 11    aspirin 81 MG EC tablet Take 1 tablet by mouth Daily. HOLDING FOR SURGERY      atorvastatin (LIPITOR) 80 MG tablet TAKE ONE TABLET BY MOUTH DAILY 90 tablet 3    cholecalciferol (VITAMIN D3) 1000 UNITS tablet Take 1 tablet by mouth Daily.      Cyanocobalamin (VITAMIN B 12 PO) Take 1,000 mg  by mouth Every Morning.      fluorouracil (EFUDEX) 5 % cream Apply  topically to the appropriate area as directed 2 (Two) Times a Day.      furosemide (LASIX) 20 MG tablet TAKE 1 TABLET BY MOUTH DAILY 90 tablet 1    levothyroxine (SYNTHROID, LEVOTHROID) 75 MCG tablet Take 1 tablet by mouth Every Morning. Indications: Underactive Thyroid 90 tablet 3    lisinopril (PRINIVIL,ZESTRIL) 20 MG tablet TAKE ONE TABLET BY MOUTH DAILY 90 tablet 3    memantine (NAMENDA) 10 MG tablet TAKE TWO TABLETS BY MOUTH EVERY MORNING 180 tablet 3    Multiple Vitamins-Minerals (MULTIVITAL PLATINUM PO) Take 1 tablet by mouth Daily. HOLD FOR SURGERY      pioglitazone (ACTOS) 30 MG tablet Take 1 tablet by mouth Daily. Indications: Type 2 Diabetes 90 tablet 3    POTASSIUM PO Take  by mouth. Dose unknown       Allergies   Allergen Reactions    Nsaids GI Bleeding     BLEEDING    Aricept [Donepezil] Diarrhea       Objective    Objective     Vital Signs  Temp:  [97.5 °F (36.4 °C)] 97.5 °F (36.4 °C)  Heart Rate:  [] 101  Resp:  [14-16] 15  BP: (110-142)/() 138/100  SpO2:  [95 %-98 %] 96 %  on   ;   Device (Oxygen Therapy): room air  There is no height or weight on file to calculate BMI.    Physical Exam  Vitals and nursing note reviewed.   Constitutional:       General: He is not in acute distress.     Appearance: He is not toxic-appearing or diaphoretic.   HENT:      Head: Normocephalic and atraumatic.      Nose: Nose normal.      Mouth/Throat:      Mouth: Mucous membranes are moist.      Pharynx: Oropharynx is clear.   Eyes:      Conjunctiva/sclera: Conjunctivae normal.      Pupils: Pupils are equal, round, and reactive to light.   Cardiovascular:      Rate and Rhythm: Normal rate and regular rhythm.      Pulses: Normal pulses.   Pulmonary:      Effort: Pulmonary effort is normal.      Breath sounds: Examination of the right-upper field reveals decreased breath sounds.   Abdominal:      General: Bowel sounds are normal.       Palpations: Abdomen is soft.      Tenderness: There is no abdominal tenderness.   Musculoskeletal:         General: Swelling (1-2+ BLE) present. No tenderness.      Cervical back: Neck supple.   Skin:     General: Skin is warm and dry.      Capillary Refill: Capillary refill takes less than 2 seconds.      Comments: Abrasions on right forearm   Neurological:      General: No focal deficit present.      Mental Status: He is alert and oriented to person, place, and time.   Psychiatric:         Mood and Affect: Mood normal.         Behavior: Behavior normal.       Results Review:  I reviewed the patient's new clinical results.  I reviewed the patient's new imaging results and agree with the interpretation.  I reviewed the patient's other test results and agree with the interpretation  I personally viewed and interpreted the patient's EKG/Telemetry data  Discussed with ED provider.    Lab Results (last 24 hours)       Procedure Component Value Units Date/Time    CBC & Differential [[id number]]  (Abnormal) Collected: 02/23/24 2100    Specimen: Blood Updated: 02/23/24 2108    Narrative:      The following orders were created for panel order CBC & Differential.  Procedure                               Abnormality         Status                     ---------                               -----------         ------                     CBC Auto Differential[[id number]]        Abnormal            Final result               Scan Slide[[id number]]                                                                    Please view results for these tests on the individual orders.    CBC Auto Differential [[id number]]  (Abnormal) Collected: 02/23/24 2100    Specimen: Blood Updated: 02/23/24 2108     WBC 7.04 10*3/mm3      RBC 2.70 10*6/mm3      Hemoglobin 10.7 g/dL      Hematocrit 32.2 %      .3 fL      MCH 39.6 pg      MCHC 33.2 g/dL      RDW 14.7 %      RDW-SD 64.6 fl      MPV 9.8 fL      Platelets 97 10*3/mm3      Neutrophil %  79.2 %      Lymphocyte % 9.7 %      Monocyte % 9.9 %      Eosinophil % 0.3 %      Basophil % 0.3 %      Immature Grans % 0.6 %      Neutrophils, Absolute 5.58 10*3/mm3      Lymphocytes, Absolute 0.68 10*3/mm3      Monocytes, Absolute 0.70 10*3/mm3      Eosinophils, Absolute 0.02 10*3/mm3      Basophils, Absolute 0.02 10*3/mm3      Immature Grans, Absolute 0.04 10*3/mm3      nRBC 0.0 /100 WBC     Comprehensive Metabolic Panel [[id number]]  (Abnormal) Collected: 02/23/24 2125    Specimen: Blood Updated: 02/23/24 2155     Glucose 143 mg/dL      BUN 23 mg/dL      Creatinine 1.47 mg/dL      Sodium 139 mmol/L      Potassium 4.4 mmol/L      Chloride 103 mmol/L      CO2 25.7 mmol/L      Calcium 8.5 mg/dL      Total Protein 6.1 g/dL      Albumin 3.6 g/dL      ALT (SGPT) 22 U/L      AST (SGOT) 45 U/L      Alkaline Phosphatase 67 U/L      Total Bilirubin 0.6 mg/dL      Globulin 2.5 gm/dL      A/G Ratio 1.4 g/dL      BUN/Creatinine Ratio 15.6     Anion Gap 10.3 mmol/L      eGFR 47.9 mL/min/1.73     Narrative:      GFR Normal >60  Chronic Kidney Disease <60  Kidney Failure <15    The GFR formula is only valid for adults with stable renal function between ages 18 and 70.            Imaging Results (Last 24 Hours)       ** No results found for the last 24 hours. **                No orders to display        Assessment/Plan     Active Hospital Problems    Diagnosis  POA    **Pneumothorax [J93.9]  Yes    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 [D68.51]  Yes    Chronic deep vein thrombosis (DVT) of popliteal vein of both lower extremities [I82.533]  Yes    Chronic pulmonary embolism without acute cor pulmonale [I27.82]  Yes    Acquired hypothyroidism [E03.9]  Yes    Stage 3b chronic kidney disease [N18.32]  Yes    Type 2 diabetes mellitus with chronic kidney disease, without long-term current use of insulin [E11.22]  Yes    Mixed hyperlipidemia [E78.2]  Yes    Essential hypertension [I10]  Yes      Resolved Hospital Problems   No resolved problems to  display.   Right-Sided Pneumothorax  - from mechanical fall  - he is hemodynamically stable and he is not hypoxemic  - will provide pain control, monitor on telemetry  - consult thoracic surgery-ER provider reports that he discussed with Dr. Hendrickson    Type 2 DM  - complications include nephropathy  - BG acceptable  - hold actos  - cover with ssi/hypoglycemia protocol    Stage 3b CKD  - baseline serum creatinine is around 1.7  - follows with Dr. Fabian  - monitor chemistries    HTN  - BP acceptable  - hold lisinopril for now     BLE Venous Insufficiency/Edema  - hold lasix for now given pneumothorax-can likely resume once his chest tube is placed    Hx of DVT/PE  - heterozygous for Factor V Leiden   - hold eliquis for now given possible chest tube placement    I discussed the patient's findings and my recommendations with patient, nursing staff, and ED provider.    VTE Prophylaxis - SCDs.  Code Status - Full code.       Hemanth Roberts MD  [facility]  02/23/24  23:10 EST

## 2024-02-24 NOTE — PROCEDURES
Chest Tube Insertion Procedure Note    Indications:  Clinically significant Pneumothorax    Pre-op Diagnosis:   Pneumothorax    Post-Op Diagnosis Codes:   pneumothorax    Surgeon: AMADOR Hendrickson MD        Anesthesia: Local    Procedure Details:   Informed consent was obtained for the procedure, including sedation.  Risks of lung perforation, hemorrhage, arrhythmia, and adverse drug reaction were discussed.     After sterile skin prep, using standard technique, a 14 Frisian tube was placed in the right anterior 3rd rib space.    Findings:  Rush of air    Estimated Blood Loss:  Minimal           Specimens: None                Complications:  None; patient tolerated the procedure well.           Disposition:  5E, stable           Condition: stable

## 2024-02-24 NOTE — NURSING NOTE
"Pt has been anxious, agitated and annoyed with staff because he wants to get out of bed knowing he has an unsteady gait and bilateral swollen extremities. He has been yelling and making demands to staff what he will do, if we don't allow him to get up and walk to the bathroom. He was placed on a pure wick with a brief , so we could accurately measure his urine output. He ripped it off and told staff, we \"will not hold him here and he wants to leave. NP on call has been notified, and pt's daughter was updated with his outrageous behavior towards staff. Daughter stated she will be here this am to visit.  "

## 2024-02-24 NOTE — PLAN OF CARE
Goal Outcome Evaluation:      Tatiana pt started off attentive, alert to self and answered some questions appropriately and followed commands. Around 04:30   Pt became really agitated and irritable with staff and wanted to leave. Pt 's family member has been contacted and we are awaiting call back.

## 2024-02-24 NOTE — CONSULTS
Inpatient Thoracic Surgery Consult  Consult performed by: Lani Rosenberg, LEONA, APRN  Consult ordered by: Hemanth Roberts MD          Patient Care Team:  Jose Fonseca MD as PCP - General (Family Medicine)  Jose Michelle MD as Consulting Physician (Radiation Oncology)  Roldan Mccarthy MD as Consulting Physician (Urology)  Clarence Nieves MD (Hematology)  Grover Harris DPM as Consulting Physician (Podiatry)    No chief complaint on file.      Subjective     History of Present Illness    Mr. Landers is a very pleasant 79-year-old gentleman with past medical history as listed below, anticoagulated on Eliquis at home.  He was found down at his independent living facility after tripping over a bluish, he denied loss of consciousness.  He presented to [facility] with an injury to the right forearm.  He also had mild shortness of breath and was found to have a possible right-sided pneumothorax on chest x-ray and was transferred to [facility] for further evaluation and management.  On exam today, he denies shortness of breath.  He is on nasal cannula.  He is intermittently confused but more oriented today.  His family is present at bedside.      Review of Systems   Constitutional: Negative.    HENT: Negative.     Respiratory:  Negative for shortness of breath.    Cardiovascular:  Positive for chest pain.   Gastrointestinal: Negative.    Endocrine: Negative.    Genitourinary: Negative.    Musculoskeletal: Negative.    Skin: Negative.    Allergic/Immunologic: Negative.    Hematological:  Bruises/bleeds easily.   Psychiatric/Behavioral:  Positive for confusion.         Past Medical History:   Diagnosis Date    At risk for sleep apnea     Bladder mass     Bruises easily     Diabetes mellitus     Disease of thyroid gland     Elevated cholesterol     GI bleed     Gross hematuria 9/22/2021    History of transfusion     Poor historian     Short-term memory loss     Vitamin D  deficiency      Past Surgical History:   Procedure Laterality Date    COLONOSCOPY  2016    COLONOSCOPY N/A 2018    Procedure: COLONOSCOPY;  Surgeon: Olaf Gomez MD;  Location: [facility] OR;  Service: Gastroenterology    COLONOSCOPY N/A 2019    Procedure: COLONOSCOPY;  Surgeon: Rosa Jack MD;  Location: [facility] OR;  Service: Gastroenterology    CYSTOSCOPY BLADDER BIOPSY N/A 2021    Procedure: CYSTOSCOPY BLADDER BIOPSY;  Surgeon: Roldan Mccarthy MD;  Location: [facility] MAIN OR;  Service: Urology;  Laterality: N/A;    HERNIA REPAIR Bilateral     PROSTATE ULTRASOUND BIOPSY      SIGMOIDOSCOPY N/A 10/2/2018    Procedure: FLEXIBLE SIGMOIDOSCOPY:  APC cautery bleeding using 60 joules;  Surgeon: Olaf Gomez MD;  Location: [facility] ENDOSCOPY;  Service: Gastroenterology    TONSILLECTOMY      TOTAL HIP ARTHROPLASTY Bilateral      Family History   Problem Relation Age of Onset    Stroke Mother     Stroke Father     No Known Problems Brother     Colon cancer Neg Hx     Colon polyps Neg Hx     Malig Hyperthermia Neg Hx      Social History     Socioeconomic History    Marital status:      Spouse name: Chloe    Number of children: 2   Tobacco Use    Smoking status: Former     Packs/day: 0.25     Years: 5.00     Additional pack years: 0.00     Total pack years: 1.25     Types: Cigars, Cigarettes     Quit date: 1986     Years since quittin.1    Smokeless tobacco: Never   Vaping Use    Vaping Use: Never used   Substance and Sexual Activity    Alcohol use: Yes     Alcohol/week: 32.0 - 34.0 standard drinks of alcohol     Types: 4 - 6 Shots of liquor, 28 Standard drinks or equivalent per week     Comment: 2-3 double vodkas a night    Drug use: No    Sexual activity: Yes     Partners: Female     Birth control/protection: Post-menopausal     Medications Prior to Admission   Medication Sig Dispense Refill Last Dose    albuterol sulfate  (90 Base) MCG/ACT inhaler  Inhale 2 puffs Every 4 (Four) Hours As Needed for Wheezing. 18 g 0 Unknown    allopurinol (ZYLOPRIM) 100 MG tablet TAKE ONE TABLET BY MOUTH DAILY 90 tablet 3 Unknown    ammonium lactate (AMLACTIN) 12 % cream    Unknown    apixaban (Eliquis) 2.5 MG tablet tablet Take 1 tablet by mouth Every 12 (Twelve) Hours. Indications: history of DVT/PE 60 tablet 11 Unknown    aspirin 81 MG EC tablet Take 1 tablet by mouth Daily. HOLDING FOR SURGERY   Unknown    atorvastatin (LIPITOR) 80 MG tablet TAKE ONE TABLET BY MOUTH DAILY 90 tablet 3 Unknown    cholecalciferol (VITAMIN D3) 1000 UNITS tablet Take 1 tablet by mouth Daily.   Unknown    Cyanocobalamin (VITAMIN B 12 PO) Take 1,000 mg by mouth Every Morning.   Unknown    fluorouracil (EFUDEX) 5 % cream Apply  topically to the appropriate area as directed 2 (Two) Times a Day.   Unknown    furosemide (LASIX) 20 MG tablet TAKE 1 TABLET BY MOUTH DAILY 90 tablet 1 Unknown    levothyroxine (SYNTHROID, LEVOTHROID) 75 MCG tablet Take 1 tablet by mouth Every Morning. Indications: Underactive Thyroid 90 tablet 3 Unknown    lisinopril (PRINIVIL,ZESTRIL) 20 MG tablet TAKE ONE TABLET BY MOUTH DAILY 90 tablet 3 Unknown    memantine (NAMENDA) 10 MG tablet TAKE TWO TABLETS BY MOUTH EVERY MORNING 180 tablet 3 Unknown    Multiple Vitamins-Minerals (MULTIVITAL PLATINUM PO) Take 1 tablet by mouth Daily. HOLD FOR SURGERY   Unknown    pioglitazone (ACTOS) 30 MG tablet Take 1 tablet by mouth Daily. Indications: Type 2 Diabetes 90 tablet 3 Unknown    POTASSIUM PO Take  by mouth. Dose unknown   Unknown     Allergies   Allergen Reactions    Nsaids GI Bleeding     BLEEDING    Aricept [Donepezil] Diarrhea       Objective      Vital Signs  Temp:  [97.5 °F (36.4 °C)-98.2 °F (36.8 °C)] 98.2 °F (36.8 °C)  Heart Rate:  [] 101  Resp:  [14-20] 20  BP: (110-142)/() 121/74    Intake & Output (last day)         02/23 0701  02/24 0700 02/24 0701  02/25 0700    Urine (mL/kg/hr) 75     Total Output 75     Net  -75           Urine Unmeasured Occurrence 1 x             Physical Exam  Constitutional:       General: He is not in acute distress.     Appearance: Normal appearance. He is not ill-appearing.   HENT:      Head: Normocephalic and atraumatic.      Comments: Nasal cannula  Cardiovascular:      Rate and Rhythm: Normal rate.   Pulmonary:      Effort: Pulmonary effort is normal. No prolonged expiration or respiratory distress.      Breath sounds: No wheezing.   Musculoskeletal:      Cervical back: Normal range of motion and neck supple.      Comments: Decreased ROM secondary to weakness, transfers from chair to bed with minimal assistance   Skin:     General: Skin is warm and dry.      Comments: Dressing intact to right forearm   Neurological:      Mental Status: He is alert. Mental status is at baseline.      Motor: Weakness present.   Psychiatric:         Mood and Affect: Mood normal.         Results Review:    I reviewed the patient's new clinical results.  I reviewed the patient's new imaging results and agree with the interpretation.  Discussed with patient, RN, family at bedside and Dr. Springer    Imaging reviewed independently and agree with interpretation.  Moderate right-sided pneumothorax    Imaging Results (Last 24 Hours)       Procedure Component Value Units Date/Time    XR Chest 1 View [[id number]] Resulted: 02/24/24 0627     Updated: 02/24/24 0628            Lab Results:  Lab Results (last 24 hours)       Procedure Component Value Units Date/Time    POC Glucose Once [[id number]]  (Abnormal) Collected: 02/24/24 0606    Specimen: Blood Updated: 02/24/24 0607     Glucose 183 mg/dL     Basic Metabolic Panel [[id number]]  (Abnormal) Collected: 02/24/24 0304    Specimen: Blood Updated: 02/24/24 0452     Glucose 143 mg/dL      BUN 23 mg/dL      Creatinine 1.34 mg/dL      Sodium 143 mmol/L      Potassium 4.9 mmol/L      Chloride 107 mmol/L      CO2 25.4 mmol/L      Calcium 8.6 mg/dL      BUN/Creatinine Ratio 17.2      Anion Gap 10.6 mmol/L      eGFR 53.6 mL/min/1.73     Narrative:      GFR Normal >60  Chronic Kidney Disease <60  Kidney Failure <15    The GFR formula is only valid for adults with stable renal function between ages 18 and 70.    CBC & Differential [[id number]]  (Abnormal) Collected: 02/24/24 0304    Specimen: Blood Updated: 02/24/24 0438    Narrative:      The following orders were created for panel order CBC & Differential.  Procedure                               Abnormality         Status                     ---------                               -----------         ------                     CBC Auto Differential[[id number]]        Abnormal            Final result                 Please view results for these tests on the individual orders.    CBC Auto Differential [[id number]]  (Abnormal) Collected: 02/24/24 0304    Specimen: Blood Updated: 02/24/24 0438     WBC 5.73 10*3/mm3      RBC 2.57 10*6/mm3      Hemoglobin 9.8 g/dL      Hematocrit 28.6 %      .3 fL      MCH 38.1 pg      MCHC 34.3 g/dL      RDW 12.7 %      RDW-SD 50.7 fl      MPV 9.6 fL      Platelets 94 10*3/mm3      Neutrophil % 78.3 %      Lymphocyte % 11.7 %      Monocyte % 9.1 %      Eosinophil % 0.2 %      Basophil % 0.2 %      Neutrophils, Absolute 4.49 10*3/mm3      Lymphocytes, Absolute 0.67 10*3/mm3      Monocytes, Absolute 0.52 10*3/mm3      Eosinophils, Absolute 0.01 10*3/mm3      Basophils, Absolute 0.01 10*3/mm3                 Assessment & Plan       Pneumothorax    Essential hypertension    Mixed hyperlipidemia    Type 2 diabetes mellitus with chronic kidney disease, without long-term current use of insulin    Stage 3b chronic kidney disease    Acquired hypothyroidism    Chronic pulmonary embolism without acute cor pulmonale    Chronic deep vein thrombosis (DVT) of popliteal vein of both lower extremities    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  Likely related to recent fall.   We will plan for CT chest to assess for bullous disease vs. Pneumothorax. Recommend supportive care if bulla, otherwise will plan for bedside chest tube placement by Dr. Hendrickson if he has a true pneumo.    Delirium: Add Seroquel nightly.  Leave lights on during the day and minimal stimulation overnight.    Generalized weakness: We will ask physical therapy to see him.  Appreciate their assistance.      I discussed the patients findings and our recommendations with patient, family, nursing staff, and Dr. Gian Hendrickson    Thank you for this consult and allowing us to participate in the care of your patient.  We will follow along with you during this hospitalization.       Lani Rosenberg DNP, APRN  [facility]  02/24/24  09:54 EST      I spent >62 minutes reviewing the patient's chart including medical history, notes, radiographic imaging, labs and performing an assessment and development of a plan and discussion with the patient/family at bedside.

## 2024-02-24 NOTE — PROGRESS NOTES
Name: Bryan Landers ADMIT: 2024   : 1945  PCP: Jose Fonseca MD    MRN: [id number] LOS: 1 days   AGE/SEX: 79 y.o. male  ROOM: [facility]     Subjective   Subjective   Patient sitting up in chair.  Spouse and daughter at bedside.  Patient states he has some pain with deep breaths but no dyspnea.    Review of Systems   Constitutional:  Positive for fatigue. Negative for chills and fever.   Respiratory:  Positive for cough. Negative for shortness of breath.    Cardiovascular:  Negative for chest pain and palpitations.   Gastrointestinal:  Negative for abdominal pain, diarrhea, nausea and vomiting.     As above     Objective   Objective   Vital Signs  Temp:  [97.5 °F (36.4 °C)-98.3 °F (36.8 °C)] 98.3 °F (36.8 °C)  Heart Rate:  [] 101  Resp:  [14-20] 20  BP: (110-153)/() 153/83  SpO2:  [94 %-99 %] 97 %  on  Flow (L/min):  [2] 2;   Device (Oxygen Therapy): nasal cannula  Body mass index is 36 kg/m².  Physical Exam  Vitals and nursing note reviewed.   Constitutional:       General: He is not in acute distress.  Cardiovascular:      Rate and Rhythm: Normal rate and regular rhythm.   Pulmonary:      Effort: Pulmonary effort is normal. No respiratory distress.   Abdominal:      General: Abdomen is flat. There is no distension.      Tenderness: There is no abdominal tenderness.   Musculoskeletal:         General: No swelling or deformity.      Right lower leg: Edema present.      Left lower leg: Edema present.   Skin:     General: Skin is warm and dry.   Neurological:      General: No focal deficit present.      Mental Status: He is alert. Mental status is at baseline.         Results Review     I reviewed the patient's new clinical results.  Results from last 7 days   Lab Units 24  0304 24  2100   WBC 10*3/mm3 5.73 7.04   HEMOGLOBIN g/dL 9.8* 10.7*   PLATELETS 10*3/mm3 94* 97*     Results from last 7 days   Lab Units 24  0304 24  2125   SODIUM mmol/L 143 139   POTASSIUM  mmol/L 4.9 4.4   CHLORIDE mmol/L 107 103   CO2 mmol/L 25.4 25.7   BUN mg/dL 23 23   CREATININE mg/dL 1.34* 1.47*   GLUCOSE mg/dL 143* 143*   Estimated Creatinine Clearance: 55.6 mL/min (A) (by C-G formula based on SCr of 1.34 mg/dL (H)).  Results from last 7 days   Lab Units 02/23/24  2125   ALBUMIN g/dL 3.6   BILIRUBIN mg/dL 0.6   ALK PHOS U/L 67   AST (SGOT) U/L 45*   ALT (SGPT) U/L 22     Results from last 7 days   Lab Units 02/24/24  0304 02/23/24  2125   CALCIUM mg/dL 8.6 8.5*   ALBUMIN g/dL  --  3.6       COVID19   Date Value Ref Range Status   09/20/2021 Not Detected Not Detected - Ref. Range Final     SARS-CoV-2, SANDRA   Date Value Ref Range Status   07/20/2020 Not Detected Not Detected Final     Comment:     This test was developed and its performance characteristics determined  by [facility]. This test has not been FDA cleared or  approved. This test has been authorized by FDA under an Emergency Use  Authorization (EUA). This test is only authorized for the duration of  time the declaration that circumstances exist justifying the  authorization of the emergency use of in vitro diagnostic tests for  detection of SARS-CoV-2 virus and/or diagnosis of COVID-19 infection  under section 564(b)(1) of the Act, 21 U.S.C. 360bbb-3(b)(1), unless  the authorization is terminated or revoked sooner.  When diagnostic testing is negative, the possibility of a false  negative result should be considered in the context of a patient's  recent exposures and the presence of clinical signs and symptoms  consistent with COVID-19. An individual without symptoms of COVID-19  and who is not shedding SARS-CoV-2 virus would expect to have a  negative (not detected) result in this assay.     Glucose   Date/Time Value Ref Range Status   02/24/2024 1146 148 (H) 70 - 130 mg/dL Final   02/24/2024 0606 183 (H) 70 - 130 mg/dL Final       CT Head Without Contrast  Narrative: CT HEAD WO CONTRAST    Date of Exam: 2/23/2024 5:25 PM  EST    Indication: fall, on Eliquis.    Comparison: 3/21/2019    Technique: Axial CT images were obtained of the head without contrast administration.  Coronal reconstructions were performed.  Automated exposure control and iterative reconstruction methods were used.    Findings: No intracranial hemorrhage. Gray-white matter differentiation is maintained without evidence of an acute infarction. Multiple foci of decreased attenuation are present within the subcortical, deep cerebral, and periventricular white matter   consistent with chronic small vessel/microangiopathic ischemic changes. No extra-axial mass or collection. The ventricles and sulci are prominent commensurate with involutional changes. The posterior fossa appears grossly normal. Sellar and suprasellar   structures are normal.    Lens replacements.. The paranasal sinuses, ethmoid air cells, and mastoid air cells are aerated. The bony calvarium is intact.  Impression: Impression: No acute intracranial pathology.    Electronically Signed: Jorge L Rhodes MD    2/23/2024 5:55 PM EST    Workstation ID: [id number]  XR Forearm 2 View Right  Narrative: XR FOREARM 2 VW RIGHT    Date of Exam: 2/23/2024 5:22 PM EST    Indication: Fall with bruising    Comparison: None available.    Findings: The right distal humerus, radius and ulna are intact. The visualized carpal bones are intact. Bony alignment is normal. No lytic or blastic disease.  Impression: No acute fracture or dislocation.    Electronically Signed: Jorge L Rhodes MD    2/23/2024 5:59 PM EST    Workstation ID: [id number]  XR Ribs Right With PA Chest  Narrative: XR RIBS RIGHT W PA CHEST    Date of Exam: 2/23/2024 5:23 PM EST    Indication: Pain    Comparison: 9/14/2022    Findings: Moderate size right-sided pneumothorax. The left lung is clear. Left midlung zone atelectatic changes. No mediastinal shift. Cardiac size is normal. The visualized upper abdomen is normal. No displaced acute rib fractures. The  clavicles are   intact.  Impression: Moderate-sized right-sided pneumothorax.    Findings were discussed with Dr. Rivera at 6:24 p.m. on 2/23/2024     Electronically Signed: Jorge L Rhodes MD    2/23/2024 6:25 PM EST    Workstation ID: [id number]    I reviewed the patient's daily medications.  Scheduled Medications  allopurinol, 100 mg, Oral, Daily  atorvastatin, 80 mg, Oral, Daily  folic acid, 1 mg, Oral, Daily  furosemide, 20 mg, Oral, Daily  insulin lispro, 2-9 Units, Subcutaneous, 4x Daily AC & at Bedtime  levothyroxine, 75 mcg, Oral, QAM  lidocaine, 10 mL, Subcutaneous, Once  lidocaine PF 1%, , ,   memantine, 20 mg, Oral, QAM  QUEtiapine, 25 mg, Oral, Nightly  senna-docusate sodium, 2 tablet, Oral, BID  sodium chloride, 10 mL, Intravenous, Q12H  thiamine (B-1) IV, 200 mg, Intravenous, Q8H   Followed by  [START ON 2/29/2024] thiamine, 100 mg, Oral, Daily    Infusions   Diet  NPO Diet NPO Type: Strict NPO         I have personally reviewed:  [x]  Laboratory   [x]  Microbiology   [x]  Radiology   []  EKG/Telemetry   []  Cardiology/Vascular   []  Pathology   [x]  Records     Assessment/Plan     Active Hospital Problems    Diagnosis  POA    **Pneumothorax [J93.9]  Yes    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 [D68.51]  Yes    Chronic deep vein thrombosis (DVT) of popliteal vein of both lower extremities [I82.533]  Yes    Chronic pulmonary embolism without acute cor pulmonale [I27.82]  Yes    Acquired hypothyroidism [E03.9]  Yes    Stage 3b chronic kidney disease [N18.32]  Yes    Type 2 diabetes mellitus with chronic kidney disease, without long-term current use of insulin [E11.22]  Yes    Mixed hyperlipidemia [E78.2]  Yes    Essential hypertension [I10]  Yes      Resolved Hospital Problems   No resolved problems to display.       79 y.o. male admitted with Pneumothorax.    Right-Sided Pneumothorax  - from mechanical fall  - he is hemodynamically stable and he is not hypoxemic  - will provide pain control, monitor on telemetry  -   discussed with Dr. Hendrickson, possibly large blood.  Plan for CT to help delineate     Type 2 DM  - complications include nephropathy  - BG acceptable  - hold actos  - cover with ssi/hypoglycemia protocol     Stage 3b CKD  - baseline serum creatinine is around 1.7  - follows with Dr. Fabian  - monitor chemistries     HTN  - BP acceptable  - hold lisinopril for now      BLE Venous Insufficiency/Edema  -Resume Lasix     Hx of DVT/PE  - heterozygous for Factor V Leiden   - hold eliquis for now given possible chest tube placement    Dementia  -continue home memantine  -Per family his mentation is been worse the last few weeks.  Afebrile without leukocytosis.  Chest x-ray without any acute infection.  Will check a UA, TSH, B12.  CT head without any acute findings.  -Add seroquel.    Alcohol use disorder  -Drinks about 4 vodkas a day and someone, has never had withdrawal before but does not usually go very long without drinking  -Start CIWA without Ativan.  Patient will be incredibly hard to score accurately with his dementia.  -IV thiamine, folic acid    SCDs for DVT prophylaxis.  DNR.  Discussed with patient, spouse, family, nursing staff, and consulting provider.  Anticipate discharge home with family in 1-2 days.    Expected discharge date/ time has not been documented.      Solo Springer MD  [facility]  02/24/24  11:51 EST

## 2024-02-24 NOTE — PLAN OF CARE
Goal Outcome Evaluation:  Plan of Care Reviewed With: patient        Progress: no change  Outcome Evaluation: Chest tube placed at bedside by Dr. Hendrickson and placed to -20 LWS per order. Chest xray ordered status post chest tube. Patient tolerated well. Patient states he feels well. Patient having difficulty understanding his need for chest tube; repetition of reason he is here and reason for chest tube this shift. Will continue to monitor.

## 2024-02-24 NOTE — ED NOTES
Called and updated daughter on room assignment and transport. All questions answered.    Patient placed on oxygen for transport.

## 2024-02-25 ENCOUNTER — APPOINTMENT (OUTPATIENT)
Dept: GENERAL RADIOLOGY | Facility: HOSPITAL | Age: 79
DRG: 200 | End: 2024-02-25
Payer: MEDICARE

## 2024-02-25 LAB
ANION GAP SERPL CALCULATED.3IONS-SCNC: 12.6 MMOL/L (ref 5–15)
BASOPHILS # BLD AUTO: 0.01 10*3/MM3 (ref 0–0.2)
BASOPHILS NFR BLD AUTO: 0.2 % (ref 0–1.5)
BUN SERPL-MCNC: 26 MG/DL (ref 8–23)
BUN/CREAT SERPL: 18.3 (ref 7–25)
CALCIUM SPEC-SCNC: 7.9 MG/DL (ref 8.6–10.5)
CHLORIDE SERPL-SCNC: 107 MMOL/L (ref 98–107)
CO2 SERPL-SCNC: 21.4 MMOL/L (ref 22–29)
CREAT SERPL-MCNC: 1.42 MG/DL (ref 0.76–1.27)
DEPRECATED RDW RBC AUTO: 52.7 FL (ref 37–54)
EGFRCR SERPLBLD CKD-EPI 2021: 50 ML/MIN/1.73
EOSINOPHIL # BLD AUTO: 0.03 10*3/MM3 (ref 0–0.4)
EOSINOPHIL NFR BLD AUTO: 0.5 % (ref 0.3–6.2)
ERYTHROCYTE [DISTWIDTH] IN BLOOD BY AUTOMATED COUNT: 13.2 % (ref 12.3–15.4)
GLUCOSE BLDC GLUCOMTR-MCNC: 116 MG/DL (ref 70–130)
GLUCOSE BLDC GLUCOMTR-MCNC: 120 MG/DL (ref 70–130)
GLUCOSE BLDC GLUCOMTR-MCNC: 150 MG/DL (ref 70–130)
GLUCOSE BLDC GLUCOMTR-MCNC: 168 MG/DL (ref 70–130)
GLUCOSE SERPL-MCNC: 99 MG/DL (ref 65–99)
HCT VFR BLD AUTO: 27.2 % (ref 37.5–51)
HGB BLD-MCNC: 9.3 G/DL (ref 13–17.7)
LYMPHOCYTES # BLD AUTO: 1.02 10*3/MM3 (ref 0.7–3.1)
LYMPHOCYTES NFR BLD AUTO: 17.4 % (ref 19.6–45.3)
MCH RBC QN AUTO: 38.1 PG (ref 26.6–33)
MCHC RBC AUTO-ENTMCNC: 34.2 G/DL (ref 31.5–35.7)
MCV RBC AUTO: 111.5 FL (ref 79–97)
MONOCYTES # BLD AUTO: 0.81 10*3/MM3 (ref 0.1–0.9)
MONOCYTES NFR BLD AUTO: 13.8 % (ref 5–12)
NEUTROPHILS NFR BLD AUTO: 3.94 10*3/MM3 (ref 1.7–7)
NEUTROPHILS NFR BLD AUTO: 67.2 % (ref 42.7–76)
PLAT MORPH BLD: NORMAL
PLATELET # BLD AUTO: 86 10*3/MM3 (ref 140–450)
PMV BLD AUTO: 10.2 FL (ref 6–12)
POTASSIUM SERPL-SCNC: 4.8 MMOL/L (ref 3.5–5.2)
RBC # BLD AUTO: 2.44 10*6/MM3 (ref 4.14–5.8)
SODIUM SERPL-SCNC: 141 MMOL/L (ref 136–145)
SPHEROCYTES BLD QL SMEAR: NORMAL
TSH SERPL DL<=0.05 MIU/L-ACNC: 3.24 UIU/ML (ref 0.27–4.2)
VIT B12 BLD-MCNC: 372 PG/ML (ref 211–946)
WBC MORPH BLD: NORMAL
WBC NRBC COR # BLD AUTO: 5.86 10*3/MM3 (ref 3.4–10.8)

## 2024-02-25 PROCEDURE — 82607 VITAMIN B-12: CPT | Performed by: STUDENT IN AN ORGANIZED HEALTH CARE EDUCATION/TRAINING PROGRAM

## 2024-02-25 PROCEDURE — 80048 BASIC METABOLIC PNL TOTAL CA: CPT | Performed by: INTERNAL MEDICINE

## 2024-02-25 PROCEDURE — 97530 THERAPEUTIC ACTIVITIES: CPT

## 2024-02-25 PROCEDURE — 82948 REAGENT STRIP/BLOOD GLUCOSE: CPT

## 2024-02-25 PROCEDURE — 71045 X-RAY EXAM CHEST 1 VIEW: CPT

## 2024-02-25 PROCEDURE — 85025 COMPLETE CBC W/AUTO DIFF WBC: CPT | Performed by: INTERNAL MEDICINE

## 2024-02-25 PROCEDURE — 25010000002 THIAMINE HCL 200 MG/2ML SOLUTION: Performed by: STUDENT IN AN ORGANIZED HEALTH CARE EDUCATION/TRAINING PROGRAM

## 2024-02-25 PROCEDURE — 99232 SBSQ HOSP IP/OBS MODERATE 35: CPT | Performed by: NURSE PRACTITIONER

## 2024-02-25 PROCEDURE — 97163 PT EVAL HIGH COMPLEX 45 MIN: CPT

## 2024-02-25 PROCEDURE — 84443 ASSAY THYROID STIM HORMONE: CPT | Performed by: STUDENT IN AN ORGANIZED HEALTH CARE EDUCATION/TRAINING PROGRAM

## 2024-02-25 PROCEDURE — 85007 BL SMEAR W/DIFF WBC COUNT: CPT | Performed by: INTERNAL MEDICINE

## 2024-02-25 RX ADMIN — Medication 5 MG: at 22:22

## 2024-02-25 RX ADMIN — ATORVASTATIN CALCIUM 80 MG: 80 TABLET, FILM COATED ORAL at 09:23

## 2024-02-25 RX ADMIN — ALLOPURINOL 100 MG: 100 TABLET ORAL at 09:23

## 2024-02-25 RX ADMIN — MEMANTINE HYDROCHLORIDE 20 MG: 10 TABLET, FILM COATED ORAL at 05:39

## 2024-02-25 RX ADMIN — DOCUSATE SODIUM 50MG AND SENNOSIDES 8.6MG 2 TABLET: 8.6; 5 TABLET, FILM COATED ORAL at 22:22

## 2024-02-25 RX ADMIN — Medication 10 ML: at 23:17

## 2024-02-25 RX ADMIN — THIAMINE HYDROCHLORIDE 200 MG: 100 INJECTION, SOLUTION INTRAMUSCULAR; INTRAVENOUS at 05:39

## 2024-02-25 RX ADMIN — LEVOTHYROXINE SODIUM 75 MCG: 75 TABLET ORAL at 05:40

## 2024-02-25 RX ADMIN — HYDROCODONE BITARTRATE AND ACETAMINOPHEN 1 TABLET: 5; 325 TABLET ORAL at 05:40

## 2024-02-25 RX ADMIN — Medication 10 ML: at 09:23

## 2024-02-25 RX ADMIN — FUROSEMIDE 20 MG: 20 TABLET ORAL at 09:23

## 2024-02-25 RX ADMIN — QUETIAPINE FUMARATE 25 MG: 25 TABLET ORAL at 22:23

## 2024-02-25 RX ADMIN — FOLIC ACID 1 MG: 1 TABLET ORAL at 09:23

## 2024-02-25 RX ADMIN — THIAMINE HYDROCHLORIDE 200 MG: 100 INJECTION, SOLUTION INTRAMUSCULAR; INTRAVENOUS at 16:38

## 2024-02-25 RX ADMIN — THIAMINE HYDROCHLORIDE 200 MG: 100 INJECTION, SOLUTION INTRAMUSCULAR; INTRAVENOUS at 22:23

## 2024-02-25 NOTE — PLAN OF CARE
Goal Outcome Evaluation:  Plan of Care Reviewed With: patient      Progress: no change  Outcome Evaluation: VSS on RA or 2L, CT to water seal, using IS, repeat CXR in morning then anticipate removing CT & possible DC, slept well during shift, up c/ assist, pt's son at bedside, CT c/ 20 cc output shift total

## 2024-02-25 NOTE — PROGRESS NOTES
Name: Bryan Landers ADMIT: 2024   : 1945  PCP: Jose Fonseca MD    MRN: [id number] LOS: 2 days   AGE/SEX: 79 y.o. male  ROOM: [facility]     Subjective   Subjective   Patient sitting up in chair.  Slept well overnight.  Son at bedside.    Review of Systems   Constitutional:  Positive for fatigue. Negative for chills and fever.   Respiratory:  Positive for cough. Negative for shortness of breath.    Cardiovascular:  Positive for chest pain. Negative for palpitations.   Gastrointestinal:  Negative for abdominal pain, diarrhea, nausea and vomiting.     As above     Objective   Objective   Vital Signs  Temp:  [97.7 °F (36.5 °C)-98.3 °F (36.8 °C)] 97.7 °F (36.5 °C)  Heart Rate:  [] 81  Resp:  [18-22] 18  BP: (111-153)/(58-88) 121/73  SpO2:  [91 %-100 %] 97 %  on  Flow (L/min):  [2] 2;   Device (Oxygen Therapy): nasal cannula  Body mass index is 36 kg/m².  Physical Exam  Vitals and nursing note reviewed.   Constitutional:       General: He is not in acute distress.  Cardiovascular:      Rate and Rhythm: Normal rate and regular rhythm.   Pulmonary:      Effort: Pulmonary effort is normal. No respiratory distress.      Comments: Right chest tube  Abdominal:      General: Abdomen is flat. There is no distension.      Tenderness: There is no abdominal tenderness.   Musculoskeletal:         General: No swelling or deformity.      Right lower leg: Edema present.      Left lower leg: Edema present.   Skin:     General: Skin is warm and dry.   Neurological:      General: No focal deficit present.      Mental Status: He is alert. Mental status is at baseline.         Results Review     I reviewed the patient's new clinical results.  Results from last 7 days   Lab Units 24  0458 24  0304 24  2100   WBC 10*3/mm3 5.86 5.73 7.04   HEMOGLOBIN g/dL 9.3* 9.8* 10.7*   PLATELETS 10*3/mm3 86* 94* 97*     Results from last 7 days   Lab Units 24  0458 24  0304 24  2125   SODIUM  mmol/L 141 143 139   POTASSIUM mmol/L 4.8 4.9 4.4   CHLORIDE mmol/L 107 107 103   CO2 mmol/L 21.4* 25.4 25.7   BUN mg/dL 26* 23 23   CREATININE mg/dL 1.42* 1.34* 1.47*   GLUCOSE mg/dL 99 143* 143*   Estimated Creatinine Clearance: 52.5 mL/min (A) (by C-G formula based on SCr of 1.42 mg/dL (H)).  Results from last 7 days   Lab Units 02/23/24  2125   ALBUMIN g/dL 3.6   BILIRUBIN mg/dL 0.6   ALK PHOS U/L 67   AST (SGOT) U/L 45*   ALT (SGPT) U/L 22     Results from last 7 days   Lab Units 02/25/24  0458 02/24/24  0304 02/23/24  2125   CALCIUM mg/dL 7.9* 8.6 8.5*   ALBUMIN g/dL  --   --  3.6       COVID19   Date Value Ref Range Status   09/20/2021 Not Detected Not Detected - Ref. Range Final     SARS-CoV-2, SANDRA   Date Value Ref Range Status   07/20/2020 Not Detected Not Detected Final     Comment:     This test was developed and its performance characteristics determined  by [employer]. This test has not been FDA cleared or  approved. This test has been authorized by FDA under an Emergency Use  Authorization (EUA). This test is only authorized for the duration of  time the declaration that circumstances exist justifying the  authorization of the emergency use of in vitro diagnostic tests for  detection of SARS-CoV-2 virus and/or diagnosis of COVID-19 infection  under section 564(b)(1) of the Act, 21 U.S.C. 360bbb-3(b)(1), unless  the authorization is terminated or revoked sooner.  When diagnostic testing is negative, the possibility of a false  negative result should be considered in the context of a patient's  recent exposures and the presence of clinical signs and symptoms  consistent with COVID-19. An individual without symptoms of COVID-19  and who is not shedding SARS-CoV-2 virus would expect to have a  negative (not detected) result in this assay.     Glucose   Date/Time Value Ref Range Status   02/25/2024 0536 116 70 - 130 mg/dL Final   02/24/2024 2106 130 70 - 130 mg/dL Final   02/24/2024 1148 148 (H)  70 - 130 mg/dL Final   02/24/2024 0606 183 (H) 70 - 130 mg/dL Final       XR Chest 1 View  Narrative: XR CHEST 1 VW-     HISTORY: Male who is 79 years-old, right pneumothorax     TECHNIQUE: Frontal view of the chest     COMPARISON: 2/24/2024     FINDINGS: Right chest tube appears stable. The heart size is borderline.  The aorta appears ectatic. Pulmonary vasculature is unremarkable. Likely  atelectasis in the right lung, partially improved. No pleural effusion,  or pneumothorax. Subcutaneous emphysema is seen in the right chest wall.  No acute osseous process.     Impression: No pneumothorax is identified.     This report was finalized on 2/25/2024 7:12 AM by Dr. Tejinder Colon M.D on Workstation: [email or web address]       I reviewed the patient's daily medications.  Scheduled Medications  allopurinol, 100 mg, Oral, Daily  atorvastatin, 80 mg, Oral, Daily  folic acid, 1 mg, Oral, Daily  furosemide, 20 mg, Oral, Daily  insulin lispro, 2-9 Units, Subcutaneous, 4x Daily AC & at Bedtime  levothyroxine, 75 mcg, Oral, QAM  memantine, 20 mg, Oral, QAM  QUEtiapine, 25 mg, Oral, Nightly  senna-docusate sodium, 2 tablet, Oral, BID  sodium chloride, 10 mL, Intravenous, Q12H  thiamine (B-1) IV, 200 mg, Intravenous, Q8H   Followed by  [START ON 2/29/2024] thiamine, 100 mg, Oral, Daily    Infusions   Diet  Diet: Regular/House Diet, Diabetic Diets; Consistent Carbohydrate; Texture: Regular Texture (IDDSI 7); Fluid Consistency: Thin (IDDSI 0)         I have personally reviewed:  [x]  Laboratory   [x]  Microbiology   [x]  Radiology   []  EKG/Telemetry   []  Cardiology/Vascular   []  Pathology   [x]  Records     Assessment/Plan     Active Hospital Problems    Diagnosis  POA    **Pneumothorax [J93.9]  Yes    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 [D68.51]  Yes    Chronic deep vein thrombosis (DVT) of popliteal vein of both lower extremities [I82.533]  Yes    Chronic pulmonary embolism without acute cor pulmonale [I27.82]  Yes    Acquired hypothyroidism  [E03.9]  Yes    Stage 3b chronic kidney disease [N18.32]  Yes    Type 2 diabetes mellitus with chronic kidney disease, without long-term current use of insulin [E11.22]  Yes    Mixed hyperlipidemia [E78.2]  Yes    Essential hypertension [I10]  Yes      Resolved Hospital Problems   No resolved problems to display.       79 y.o. male admitted with Pneumothorax.    Right-Sided Pneumothorax  Multiple right-sided rib fractures  - from mechanical fall  - cardiothoracic surgery consulted, patient recommendations     Type 2 DM  - complications include nephropathy  - BG at goal   - hold actos  - cover with ssi/hypoglycemia protocol     Stage 3b CKD  - baseline serum creatinine is around 1.7  - follows with Dr. Fabian  - monitor chemistries     HTN  - BP acceptable  - hold lisinopril for now      BLE Venous Insufficiency/Edema  -Resume Lasix     Hx of DVT/PE  - heterozygous for Factor V Leiden   - hold eliquis for now given possible chest tube placement    Dementia  -continue home memantine  -Per family his mentation is been worse the last few weeks.  Afebrile without leukocytosis.  Chest x-ray without any acute infection.  UA with some microscopic hematuria like to be followed up with his PCP as he is on Eliquis at goal.  TSH and B12 within normal limits.    CT head without any acute findings.  -A continue Seroquel while inpatient    Alcohol use disorder  -Drinks about 4 vodkas a day and someone, has never had withdrawal before but does not usually go very long without drinking  -Start CIWA without Ativan.  Patient will be incredibly hard to score accurately with his dementia.  -IV thiamine, folic acid no evidence of alcohol withdrawal at this time  -    SCDs for DVT prophylaxis.  DNR.  Discussed with patient, spouse, family, nursing staff, and consulting provider.  Anticipate discharge home with family in 1-2 days.    Expected discharge date/ time has not been documented.      Solo Springer MD  [facility]  02/25/24  10:44 EST

## 2024-02-25 NOTE — PROGRESS NOTES
"    Chief Complaint: Right pneumothorax, rib fractures  S/P: Right chest tube placement  POD # 1    Subjective  Improved today.  Not as confused overnight.  Resting comfortably in bed.  Denies significant pain or shortness of breath.  Vital Signs:  Temp:  [97.7 °F (36.5 °C)-98.2 °F (36.8 °C)] 97.7 °F (36.5 °C)  Heart Rate:  [] 92  Resp:  [18-22] 18  BP: (111-149)/(58-88) 121/73    Intake & Output (last day)         02/24 0701 02/25 0700 02/25 0701 02/26 0700    P.O.  60    Total Intake(mL/kg)  60 (0.5)    Urine (mL/kg/hr) 250 (0.1)     Chest Tube 25 20    Total Output 275 20    Net -275 +40                  Objective:  General Appearance:  Comfortable and well-appearing.    Vital signs: (most recent): Blood pressure 121/73, pulse 92, temperature 97.7 °F (36.5 °C), temperature source Oral, resp. rate 18, height 177.8 cm (70\"), weight 114 kg (250 lb 14.1 oz), SpO2 98%.    HEENT: (Nasal cannula)    Lungs:  Normal effort.  He is not in respiratory distress.  There are decreased breath sounds.    Heart: Normal rate.    Chest: Chest wall tenderness present.    Extremities: Decreased range of motion.  There is no dependent edema.    Neurological: Patient is alert.    Skin:  Warm and dry.                Chest tube:   Site: Right, Clean, Dry, Intact, and Securement device intact  Suction: -20 cm  Air Leak: negative  24 Hour Total: 17    Results Review:     I reviewed the patient's new clinical results.  I reviewed the patient's new imaging results and agree with the interpretation.  I reviewed the patient's other test results and agree with the interpretation  Discussed with patient, RN, family bedside and Dr. Hendrickson    Imaging reviewed independently and agree with interpretation.  Pneumothorax resolved status post chest tube placement.    Imaging Results (Last 24 Hours)       Procedure Component Value Units Date/Time    XR Chest 1 View [[id number]] Collected: 02/25/24 0706     Updated: 02/25/24 0715    Narrative:      " XR CHEST 1 VW-     HISTORY: Male who is 79 years-old, right pneumothorax     TECHNIQUE: Frontal view of the chest     COMPARISON: 2/24/2024     FINDINGS: Right chest tube appears stable. The heart size is borderline.  The aorta appears ectatic. Pulmonary vasculature is unremarkable. Likely  atelectasis in the right lung, partially improved. No pleural effusion,  or pneumothorax. Subcutaneous emphysema is seen in the right chest wall.  No acute osseous process.       Impression:      No pneumothorax is identified.     This report was finalized on 2/25/2024 7:12 AM by Dr. Tejinder Colon M.D on Workstation: [email or web address]       XR Chest 1 View [[id number]] Collected: 02/24/24 1747     Updated: 02/24/24 1752    Narrative:      CHEST SINGLE VIEW     HISTORY: Chest tube management. Follow-up exam     COMPARISON: AP chest 02/24/2024     FINDINGS: There has been placement of a small-caliber right pigtail  drainage catheter with reexpansion of the right lung. There remains  right perihilar and basilar atelectasis though there is no definite  pneumothorax. Left lung appears clear. Heart size is enlarged. There  remains subcutaneous emphysema along the right lateral chest wall       Impression:      Post placement of a right chest tube with reexpansion of the  right lung and areas of perihilar and basilar atelectasis without  demonstrated pneumothorax. Mild subcutaneous emphysema right lateral  chest wall.     This report was finalized on 2/24/2024 5:49 PM by Dr. Geoff Nesbitt M.D on Workstation: [id number]       CT Chest Without Contrast Diagnostic [[id number]] Collected: 02/24/24 1459     Updated: 02/24/24 1515    Narrative:      CT CHEST WITHOUT CONTRAST CONTRAST     HISTORY: Trauma. Bilateral rib pain.     TECHNIQUE:  CT chest includes axial imaging from the thoracic inlet to  the upper abdomen without IV contrast. Date reconstructed in coronal and  sagittal planes. Radiation dose reduction techniques were  utilized,  including automated exposure control and exposure modulation based on  body size.     COMPARISON: AP chest 02/24/2024.     FINDINGS: There are moderate coronary arterial calcifications. There is  a large right pneumothorax with partial collapse of the right lung.  Right upper lobe, middle lobe, lower lobes are partially collapsed and  there is also pleural fluid and suspected hemothorax layering  dependently within the right pleural space.     There is a nondisplaced fracture of the right anterior 3rd rib that  appears to be acute and is new when compared to the prior exam  09/14/2022. There are acute fractures of the right anterior 5th and 6h  ribs, right anterior lateral 7th rib. There is mild displacement of the  lateral 7th rib fracture. There is subcutaneous emphysema along the  right chest wall and extending to the upper flank. Calcified nodule is  present in the lingula. Left lung otherwise appears clear. Imaging  through the upper abdomen demonstrates several small gallstones. There  is bilateral renal atrophy. Multilevel endplate spur formation is  present within the thoracic spine.       Impression:      1. Large right hydropneumothorax is predominantly air, though there is a  small amount of fluid and suspected hemorrhage layering dependently.  Majority of the right lung is collapsed.   2. Right anterior 3rd and anterolateral 5th, 6th, 7th rib fractures with  displacement of the 7th rib fracture.  3. Subcutaneous emphysema along the right lateral chest wall extending  to the upper flank.  4. Atherosclerotic disease with moderate coronary arterial  calcifications.  5. Cholelithiasis.     Findings discussed with Brisa, the nurse caring for the patient on  02/24/2024 at 2:50 p.m.     Radiation dose reduction techniques were utilized, including automated  exposure control and exposure modulation based on body size.        This report was finalized on 2/24/2024 3:12 PM by Dr. Geoff Nesbitt M.D  on Workstation: [id number]       XR Chest 1 View [[id number]] Collected: 02/24/24 1203     Updated: 02/24/24 1300    Narrative:      CHEST SINGLE VIEW     HISTORY: Pneumothorax. Plan for chest tube placement today.      COMPARISON: PA chest 02/23/2024 at 4:56 p.m., PET/CT 09/22/2022.     FINDINGS: Right-sided pneumothorax appears to have increased in size  when compared with yesterday's exam at 4:56 p.m. Pneumothorax is largest  at the right apex and appears moderate to large with partial collapse of  the right lung. Heart size is upper normal. The left lung appears clear.  There is mild subcutaneous emphysema along the right lateral chest wall.       Impression:      Right pneumothorax has increased in size compared to exam  approximately 13 hours previous and is now moderate to large with  partial collapse of the right lung. Mild subcutaneous emphysema right  lateral chest wall.     This report was finalized on 2/24/2024 12:57 PM by Dr. Geoff Nesbitt M.D on Workstation: [id number]               Lab Results:     Lab Results (last 24 hours)       Procedure Component Value Units Date/Time    CBC & Differential [[id number]]  (Abnormal) Collected: 02/25/24 0458    Specimen: Blood Updated: 02/25/24 0710    Narrative:      The following orders were created for panel order CBC & Differential.  Procedure                               Abnormality         Status                     ---------                               -----------         ------                     CBC Auto Differential[[id number]]        Abnormal            Final result               Scan Slide[[id number]]                                       Final result                 Please view results for these tests on the individual orders.    Scan Slide [[id number]] Collected: 02/25/24 0458    Specimen: Blood Updated: 02/25/24 0710     Spherocytes Slight/1+     WBC Morphology Normal     Platelet Morphology Normal    Vitamin B12 [[id number]]  (Normal) Collected:  02/25/24 0458    Specimen: Blood Updated: 02/25/24 0704     Vitamin B-12 372 pg/mL     Narrative:      Results may be falsely increased if patient taking Biotin.      TSH Rfx On Abnormal To Free T4 [[id number]]  (Normal) Collected: 02/25/24 0458    Specimen: Blood Updated: 02/25/24 0654     TSH 3.240 uIU/mL     Basic Metabolic Panel [[id number]]  (Abnormal) Collected: 02/25/24 0458    Specimen: Blood Updated: 02/25/24 0650     Glucose 99 mg/dL      BUN 26 mg/dL      Creatinine 1.42 mg/dL      Sodium 141 mmol/L      Potassium 4.8 mmol/L      Comment: Slight hemolysis detected by analyzer. Result may be falsely elevated.        Chloride 107 mmol/L      CO2 21.4 mmol/L      Calcium 7.9 mg/dL      BUN/Creatinine Ratio 18.3     Anion Gap 12.6 mmol/L      eGFR 50.0 mL/min/1.73     Narrative:      GFR Normal >60  Chronic Kidney Disease <60  Kidney Failure <15    The GFR formula is only valid for adults with stable renal function between ages 18 and 70.    CBC Auto Differential [[id number]]  (Abnormal) Collected: 02/25/24 0458    Specimen: Blood Updated: 02/25/24 0640     WBC 5.86 10*3/mm3      RBC 2.44 10*6/mm3      Hemoglobin 9.3 g/dL      Hematocrit 27.2 %      .5 fL      MCH 38.1 pg      MCHC 34.2 g/dL      RDW 13.2 %      RDW-SD 52.7 fl      MPV 10.2 fL      Platelets 86 10*3/mm3      Neutrophil % 67.2 %      Lymphocyte % 17.4 %      Monocyte % 13.8 %      Eosinophil % 0.5 %      Basophil % 0.2 %      Neutrophils, Absolute 3.94 10*3/mm3      Lymphocytes, Absolute 1.02 10*3/mm3      Monocytes, Absolute 0.81 10*3/mm3      Eosinophils, Absolute 0.03 10*3/mm3      Basophils, Absolute 0.01 10*3/mm3     POC Glucose Once [[id number]]  (Normal) Collected: 02/25/24 0536    Specimen: Blood Updated: 02/25/24 0537     Glucose 116 mg/dL     POC Glucose Once [[id number]]  (Normal) Collected: 02/24/24 2106    Specimen: Blood Updated: 02/24/24 2107     Glucose 130 mg/dL     Urinalysis With Culture If Indicated - Urine, Clean Catch  [[id number]]  (Abnormal) Collected: 02/24/24 1929    Specimen: Urine, Clean Catch Updated: 02/24/24 1949     Color, UA Yellow     Appearance, UA Cloudy     pH, UA <=5.0     Specific Gravity, UA 1.019     Glucose, UA Negative     Ketones, UA Negative     Bilirubin, UA Negative     Blood, UA Large (3+)     Protein, UA 30 mg/dL (1+)     Leuk Esterase, UA Negative     Nitrite, UA Negative     Urobilinogen, UA 0.2 E.U./dL    Narrative:      In absence of clinical symptoms, the presence of pyuria, bacteria, and/or nitrites on the urinalysis result does not correlate with infection.    Urinalysis, Microscopic Only - Urine, Clean Catch [[id number]]  (Abnormal) Collected: 02/24/24 1929    Specimen: Urine, Clean Catch Updated: 02/24/24 1949     RBC, UA 21-50 /HPF      WBC, UA 3-5 /HPF      Comment: Urine culture not indicated.        Bacteria, UA None Seen /HPF      Squamous Epithelial Cells, UA 0-2 /HPF      Hyaline Casts, UA 0-2 /LPF      Methodology Automated Microscopy    Ethanol [[id number]] Collected: 02/24/24 1550    Specimen: Blood Updated: 02/24/24 1622     Ethanol <10 mg/dL      Ethanol % <0.010 %     POC Glucose Once [[id number]]  (Abnormal) Collected: 02/24/24 1146    Specimen: Blood Updated: 02/24/24 1147     Glucose 148 mg/dL              Assessment & Plan       Pneumothorax    Essential hypertension    Mixed hyperlipidemia    Type 2 diabetes mellitus with chronic kidney disease, without long-term current use of insulin    Stage 3b chronic kidney disease    Acquired hypothyroidism    Chronic pulmonary embolism without acute cor pulmonale    Chronic deep vein thrombosis (DVT) of popliteal vein of both lower extremities    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       Assessment & Plan    Right pneumothorax: Status post bedside chest tube placement on 2/24/2024.  Resolved on follow-up imaging today.  No airleak to chest tube.  Place chest tube to waterseal and recheck a chest x-ray in the morning.  Needs to perform incentive  spirometry 10 times per hour.      Multiple rib fractures: As seen on CT chest.  Patient denies significant pain.  Mostly domal analgesic regimen available.  Increase mobilization as able.      Lani Rosenberg DNP, APRN  [facility]  02/25/24  11:25 EST

## 2024-02-25 NOTE — THERAPY EVALUATION
Patient Name: Bryan Landers  : 1945    MRN: [id number]                              Today's Date: 2024       Admit Date: 2024    Visit Dx: No diagnosis found.  Patient Active Problem List   Diagnosis    Essential hypertension    Mixed hyperlipidemia    Arthritis    Type 2 diabetes mellitus with chronic kidney disease, without long-term current use of insulin    Severely overweight    Idiopathic chronic gout of left knee    Medication monitoring encounter    Microalbuminuria due to type 2 diabetes mellitus    History of prostate cancer    Skin tear of left elbow without complication    Stage 3b chronic kidney disease    Medicare annual wellness visit, subsequent    Radiation proctitis    History of hip replacement, total, bilateral    Cognitive decline    Acquired hypothyroidism    B12 deficiency    Sinus tachycardia by electrocardiogram    Chronic pulmonary embolism without acute cor pulmonale    Chronic deep vein thrombosis (DVT) of popliteal vein of both lower extremities    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    Bladder mass    Dependent edema    Pancytopenia    Deep venous thrombosis    Pneumothorax     Past Medical History:   Diagnosis Date    At risk for sleep apnea     Bladder mass     Bruises easily     Diabetes mellitus     Disease of thyroid gland     Elevated cholesterol     GI bleed     Gross hematuria 2021    History of transfusion     Poor historian     Short-term memory loss     Vitamin D deficiency      Past Surgical History:   Procedure Laterality Date    COLONOSCOPY  2016    COLONOSCOPY N/A 2018    Procedure: COLONOSCOPY;  Surgeon: Olaf Gomez MD;  Location: [facility] OR;  Service: Gastroenterology    COLONOSCOPY N/A 2019    Procedure: COLONOSCOPY;  Surgeon: Rosa Jack MD;  Location: [facility] OR;  Service: Gastroenterology    CYSTOSCOPY BLADDER BIOPSY N/A 2021    Procedure: CYSTOSCOPY BLADDER BIOPSY;  Surgeon: Roldan Mccarthy MD;  Location: [facility]  MAIN OR;  Service: Urology;  Laterality: N/A;    HERNIA REPAIR Bilateral     PROSTATE ULTRASOUND BIOPSY      SIGMOIDOSCOPY N/A 10/2/2018    Procedure: FLEXIBLE SIGMOIDOSCOPY:  APC cautery bleeding using 60 joules;  Surgeon: Olaf Gomez MD;  Location: [facility] ENDOSCOPY;  Service: Gastroenterology    TONSILLECTOMY      TOTAL HIP ARTHROPLASTY Bilateral 2007      General Information       Row Name 02/25/24 1319          Physical Therapy Time and Intention    Document Type evaluation  -RD     Mode of Treatment individual therapy;physical therapy  -RD       Row Name 02/25/24 1319          General Information    Patient Profile Reviewed yes  -RD               User Key  (r) = Recorded By, (t) = Taken By, (c) = Cosigned By      Initials Name Provider Type    RD Boom Donaldson PT Physical Therapist                   Mobility    No documentation.                  Obj/Interventions       Row Name 02/25/24 1320          Strength Comprehensive (MMT)    General Manual Muscle Testing (MMT) Assessment lower extremity strength deficits identified  -RD     Comment, General Manual Muscle Testing (MMT) Assessment RLE 4/5 LLE 3-/5  -RD               User Key  (r) = Recorded By, (t) = Taken By, (c) = Cosigned By      Initials Name Provider Type    RD Boom Donaldson PT Physical Therapist                   Goals/Plan       Row Name 02/25/24 1327          Bed Mobility Goal 1 (PT)    Activity/Assistive Device (Bed Mobility Goal 1, PT) bed mobility activities, all  -RD     [place] Level/Cues Needed (Bed Mobility Goal 1, PT) minimum assist (75% or more patient effort)  -RD     Time Frame (Bed Mobility Goal 1, PT) short term goal (STG);1 week  -RD     Progress/Outcomes (Bed Mobility Goal 1, PT) new goal  -RD       Row Name 02/25/24 1327          Transfer Goal 1 (PT)    Activity/Assistive Device (Transfer Goal 1, PT) transfers, all  -RD     [place] Level/Cues Needed (Transfer Goal 1, PT) minimum assist (75% or more  patient effort)  -RD     Time Frame (Transfer Goal 1, PT) short term goal (STG);1 week  -RD     Progress/Outcome (Transfer Goal 1, PT) new goal  -RD       Row Name 02/25/24 1327          Gait Training Goal 1 (PT)    Activity/Assistive Device (Gait Training Goal 1, PT) gait (walking locomotion);decrease fall risk;diminish gait deviation;improve balance and speed;increase endurance/gait distance;increase energy conservation;walker, rolling  -RD     [place] Level (Gait Training Goal 1, PT) moderate assist (50-74% patient effort)  -RD     Distance (Gait Training Goal 1, PT) 100ft  -RD     Time Frame (Gait Training Goal 1, PT) short term goal (STG);1 week  -RD     Progress/Outcome (Gait Training Goal 1, PT) new goal  -RD               User Key  (r) = Recorded By, (t) = Taken By, (c) = Cosigned By      Initials Name Provider Type    RD Boom Donaldson, PT Physical Therapist                   Clinical Impression       Row Name 02/25/24 1321          Pain    Pretreatment Pain Rating 4/10  -RD     Posttreatment Pain Rating 6/10  -RD     Pain Location - Side/Orientation other (see comments)  R ribs around chest tube.  -RD     Pain Intervention(s) Repositioned  -RD       Row Name 02/25/24 1321          Plan of Care Review    Plan of Care Reviewed With patient;son  -RD     Outcome Evaluation Pt found in room with son at bed side agreeable to therapy. Pt reported he lives at home with his wife, the son later corrected this reporting his father has dementia and lives in a home. Pt reported 4-5/10 pain in his right chest and ribs from the chest tube, he required min-modA for supine to sit. Once seated the pts heart rate elevated to 95-105bpm, after rest the heart rate reduced to 89bpm. Pt was able to perform 5 STS with modA, after each STS the pts heart rate elevated to 98-110bpm, he was returned to a seated position until his heart rate reduced to <90bmp. The patient reported increased fatigue and pain with STS so gait was not  assessed at todays visit. The pt demonstrates global weakness and poor endurance and will likely benefit from physical therapy to improve global mobility and funtion. Current PT discharge recommendation is SNF but may change based on pt progress.  -RD       Row Name 02/25/24 1321          Therapy Assessment/Plan (PT)    Rehab Potential (PT) fair, will monitor progress closely  -RD     Criteria for Skilled Interventions Met (PT) yes  -RD     Therapy Frequency (PT) 6 times/wk  -RD               User Key  (r) = Recorded By, (t) = Taken By, (c) = Cosigned By      Initials Name Provider Type    Boom Taylor PT Physical Therapist                   Outcome Measures       Row Name 02/25/24 1328 02/25/24 0920       How much help from another person do you currently need...    Turning from your back to your side while in flat bed without using bedrails? 2  -RD 3  -MR    Moving from lying on back to sitting on the side of a flat bed without bedrails? 2  -RD 3  -MR    Moving to and from a bed to a chair (including a wheelchair)? 2  -RD 2  -MR    Standing up from a chair using your arms (e.g., wheelchair, bedside chair)? 2  -RD 2  -MR    Climbing 3-5 steps with a railing? 1  -RD 2  -MR    To walk in hospital room? 2  -RD 2  -MR    AM-PAC 6 Clicks Score (PT) 11  -RD 14  -MR    Highest Level of Mobility Goal 4 --> Transfer to chair/commode  -RD 4 --> Transfer to chair/commode  -MR      Row Name 02/25/24 1328          Functional Assessment    Outcome Measure Options AM-PAC 6 Clicks Basic Mobility (PT)  -RD               User Key  (r) = Recorded By, (t) = Taken By, (c) = Cosigned By      Initials Name Provider Type    Belinda Calix, RN Registered Nurse    Boom Taylor, PT Physical Therapist                                 Physical Therapy Education       Title: PT OT SLP Therapies (Done)       Topic: Physical Therapy (Done)       Point: Mobility training (Done)       Learning Progress Summary             Patient  Acceptance, E, VU,NR by RD at 2/25/2024 1329   Family Acceptance, E, VU,NR by RD at 2/25/2024 1329                         Point: Home exercise program (Done)       Learning Progress Summary             Patient Acceptance, E, VU,NR by RD at 2/25/2024 1329   Family Acceptance, E, VU,NR by RD at 2/25/2024 1329                         Point: Body mechanics (Done)       Learning Progress Summary             Patient Acceptance, E, VU,NR by RD at 2/25/2024 1329   Family Acceptance, E, VU,NR by RD at 2/25/2024 1329                         Point: Precautions (Done)       Learning Progress Summary             Patient Acceptance, E, VU,NR by RD at 2/25/2024 1329   Family Acceptance, E, VU,NR by RD at 2/25/2024 1329                                         User Key       Initials Effective Dates Name Provider Type Discipline     05/03/23 -  Boom Donaldson, PT Physical Therapist PT                  PT Recommendation and Plan     Plan of Care Reviewed With: patient, son  Outcome Evaluation: Pt found in room with son at bed side agreeable to therapy. Pt reported he lives at home with his wife, the son later corrected this reporting his father has dementia and lives in a home. Pt reported 4-5/10 pain in his right chest and ribs from the chest tube, he required min-modA for supine to sit. Once seated the pts heart rate elevated to 95-105bpm, after rest the heart rate reduced to 89bpm. Pt was able to perform 5 STS with modA, after each STS the pts heart rate elevated to 98-110bpm, he was returned to a seated position until his heart rate reduced to <90bmp. The patient reported increased fatigue and pain with STS so gait was not assessed at todays visit. The pt demonstrates global weakness and poor endurance and will likely benefit from physical therapy to improve global mobility and funtion. Current PT discharge recommendation is SNF but may change based on pt progress.     Time Calculation:         PT Charges       Row Name  02/25/24 1329             Time Calculation    Start Time 0930  -RD      Stop Time 0955  -RD      Time Calculation (min) 25 min  -RD      PT Received On 02/25/24  -RD      PT - Next Appointment 02/26/24  -RD      PT Goal Re-Cert Due Date 03/03/24  -RD         Time Calculation- PT    Total Timed Code Minutes- PT 25 minute(s)  -RD                User Key  (r) = Recorded By, (t) = Taken By, (c) = Cosigned By      Initials Name Provider Type    RD Boom Donaldson PT Physical Therapist                  Therapy Charges for Today       Code Description Service Date Service Provider Modifiers Qty    43396671866 HC PT EVAL HIGH COMPLEXITY 4 2/25/2024 Boom Donaldson, PT GP 1    24828408277 HC PT THERAPEUTIC ACT EA 15 MIN 2/25/2024 Boom Donaldson, PT GP 1            PT G-Codes  Outcome Measure Options: AM-PAC 6 Clicks Basic Mobility (PT)  AM-PAC 6 Clicks Score (PT): 11  PT Discharge Summary  Anticipated Discharge Disposition (PT): skilled nursing facility    Boom Donaldson PT  2/25/2024

## 2024-02-25 NOTE — PLAN OF CARE
Goal Outcome Evaluation:  Plan of Care Reviewed With: patient, son           Outcome Evaluation: Pt found in room with son at bed side agreeable to therapy. Pt reported he lives at home with his wife, the son later corrected this reporting his father has dementia and lives in a home. Pt reported 4-5/10 pain in his right chest and ribs from the chest tube, he required min-modA for supine to sit. Once seated the pts heart rate elevated to 95-105bpm, after rest the heart rate reduced to 89bpm. Pt was able to perform 5 STS with modA, after each STS the pts heart rate elevated to 98-110bpm, he was returned to a seated position until his heart rate reduced to <90bmp. The patient reported increased fatigue and pain with STS so gait was not assessed at todays visit. The pt demonstrates global weakness and poor endurance and will likely benefit from physical therapy to improve global mobility and funtion. Current PT discharge recommendation is SNF but may change based on pt progress.      Anticipated Discharge Disposition (PT): skilled nursing facility

## 2024-02-26 ENCOUNTER — APPOINTMENT (OUTPATIENT)
Dept: GENERAL RADIOLOGY | Facility: HOSPITAL | Age: 79
DRG: 200 | End: 2024-02-26
Payer: MEDICARE

## 2024-02-26 LAB
ANION GAP SERPL CALCULATED.3IONS-SCNC: 8.7 MMOL/L (ref 5–15)
BASOPHILS # BLD AUTO: 0.01 10*3/MM3 (ref 0–0.2)
BASOPHILS NFR BLD AUTO: 0.3 % (ref 0–1.5)
BUN SERPL-MCNC: 25 MG/DL (ref 8–23)
BUN/CREAT SERPL: 18.4 (ref 7–25)
CALCIUM SPEC-SCNC: 7.6 MG/DL (ref 8.6–10.5)
CHLORIDE SERPL-SCNC: 107 MMOL/L (ref 98–107)
CO2 SERPL-SCNC: 25.3 MMOL/L (ref 22–29)
CREAT SERPL-MCNC: 1.36 MG/DL (ref 0.76–1.27)
DEPRECATED RDW RBC AUTO: 48.6 FL (ref 37–54)
EGFRCR SERPLBLD CKD-EPI 2021: 52.6 ML/MIN/1.73
EOSINOPHIL # BLD AUTO: 0.11 10*3/MM3 (ref 0–0.4)
EOSINOPHIL NFR BLD AUTO: 2.8 % (ref 0.3–6.2)
ERYTHROCYTE [DISTWIDTH] IN BLOOD BY AUTOMATED COUNT: 12.4 % (ref 12.3–15.4)
GLUCOSE BLDC GLUCOMTR-MCNC: 105 MG/DL (ref 70–130)
GLUCOSE BLDC GLUCOMTR-MCNC: 120 MG/DL (ref 70–130)
GLUCOSE BLDC GLUCOMTR-MCNC: 185 MG/DL (ref 70–130)
GLUCOSE BLDC GLUCOMTR-MCNC: 193 MG/DL (ref 70–130)
GLUCOSE SERPL-MCNC: 106 MG/DL (ref 65–99)
HCT VFR BLD AUTO: 24.2 % (ref 37.5–51)
HGB BLD-MCNC: 8.3 G/DL (ref 13–17.7)
LYMPHOCYTES # BLD AUTO: 0.61 10*3/MM3 (ref 0.7–3.1)
LYMPHOCYTES NFR BLD AUTO: 15.7 % (ref 19.6–45.3)
MCH RBC QN AUTO: 37.4 PG (ref 26.6–33)
MCHC RBC AUTO-ENTMCNC: 34.3 G/DL (ref 31.5–35.7)
MCV RBC AUTO: 109 FL (ref 79–97)
MONOCYTES # BLD AUTO: 0.5 10*3/MM3 (ref 0.1–0.9)
MONOCYTES NFR BLD AUTO: 12.9 % (ref 5–12)
NEUTROPHILS NFR BLD AUTO: 2.64 10*3/MM3 (ref 1.7–7)
NEUTROPHILS NFR BLD AUTO: 68 % (ref 42.7–76)
PLATELET # BLD AUTO: 82 10*3/MM3 (ref 140–450)
PMV BLD AUTO: 10 FL (ref 6–12)
POTASSIUM SERPL-SCNC: 4 MMOL/L (ref 3.5–5.2)
RBC # BLD AUTO: 2.22 10*6/MM3 (ref 4.14–5.8)
SODIUM SERPL-SCNC: 141 MMOL/L (ref 136–145)
WBC NRBC COR # BLD AUTO: 3.88 10*3/MM3 (ref 3.4–10.8)

## 2024-02-26 PROCEDURE — 25010000002 THIAMINE PER 100 MG: Performed by: STUDENT IN AN ORGANIZED HEALTH CARE EDUCATION/TRAINING PROGRAM

## 2024-02-26 PROCEDURE — 82948 REAGENT STRIP/BLOOD GLUCOSE: CPT

## 2024-02-26 PROCEDURE — 99232 SBSQ HOSP IP/OBS MODERATE 35: CPT | Performed by: NURSE PRACTITIONER

## 2024-02-26 PROCEDURE — 80048 BASIC METABOLIC PNL TOTAL CA: CPT | Performed by: INTERNAL MEDICINE

## 2024-02-26 PROCEDURE — 71045 X-RAY EXAM CHEST 1 VIEW: CPT

## 2024-02-26 PROCEDURE — 85025 COMPLETE CBC W/AUTO DIFF WBC: CPT | Performed by: INTERNAL MEDICINE

## 2024-02-26 PROCEDURE — 25010000002 THIAMINE HCL 200 MG/2ML SOLUTION: Performed by: STUDENT IN AN ORGANIZED HEALTH CARE EDUCATION/TRAINING PROGRAM

## 2024-02-26 PROCEDURE — 63710000001 INSULIN LISPRO (HUMAN) PER 5 UNITS: Performed by: INTERNAL MEDICINE

## 2024-02-26 RX ADMIN — THIAMINE HYDROCHLORIDE 200 MG: 100 INJECTION, SOLUTION INTRAMUSCULAR; INTRAVENOUS at 06:48

## 2024-02-26 RX ADMIN — THIAMINE HYDROCHLORIDE 200 MG: 100 INJECTION, SOLUTION INTRAMUSCULAR; INTRAVENOUS at 13:10

## 2024-02-26 RX ADMIN — FUROSEMIDE 20 MG: 20 TABLET ORAL at 08:18

## 2024-02-26 RX ADMIN — INSULIN LISPRO 2 UNITS: 100 INJECTION, SOLUTION INTRAVENOUS; SUBCUTANEOUS at 10:51

## 2024-02-26 RX ADMIN — THIAMINE HYDROCHLORIDE 200 MG: 100 INJECTION, SOLUTION INTRAMUSCULAR; INTRAVENOUS at 22:00

## 2024-02-26 RX ADMIN — Medication 10 ML: at 08:19

## 2024-02-26 RX ADMIN — ATORVASTATIN CALCIUM 80 MG: 80 TABLET, FILM COATED ORAL at 08:18

## 2024-02-26 RX ADMIN — MEMANTINE HYDROCHLORIDE 20 MG: 10 TABLET, FILM COATED ORAL at 06:48

## 2024-02-26 RX ADMIN — QUETIAPINE FUMARATE 25 MG: 25 TABLET ORAL at 21:26

## 2024-02-26 RX ADMIN — FOLIC ACID 1 MG: 1 TABLET ORAL at 08:18

## 2024-02-26 RX ADMIN — INSULIN LISPRO 2 UNITS: 100 INJECTION, SOLUTION INTRAVENOUS; SUBCUTANEOUS at 21:27

## 2024-02-26 RX ADMIN — LEVOTHYROXINE SODIUM 75 MCG: 75 TABLET ORAL at 06:48

## 2024-02-26 RX ADMIN — Medication 5 MG: at 21:26

## 2024-02-26 RX ADMIN — ALLOPURINOL 100 MG: 100 TABLET ORAL at 08:18

## 2024-02-26 RX ADMIN — DOCUSATE SODIUM 50MG AND SENNOSIDES 8.6MG 2 TABLET: 8.6; 5 TABLET, FILM COATED ORAL at 08:19

## 2024-02-26 NOTE — SIGNIFICANT NOTE
02/26/24 1034   OTHER   Discipline physical therapist   Rehab Time/Intention   Session Not Performed other (see comments)  (pt has ambulated multiple laps around unit already this am. He is hoping to DC home soon. Denies any further needs. Discussed w RN. Will sign off.)

## 2024-02-26 NOTE — PAYOR COMM NOTE
"Bryan Patel (79 y.o. Male)      SEE FOR INPATIENT:  REF# [id number]    UR DEPT: [phone],  [phone]    [facility]: NPI [id number] [facility]# [id number]    KATHARINE PUGA RN,CCP     Date of Birth   1945    Social Security Number       Address   [address] [place], [place] [address]    Home Phone   [phone]    MRN   [id number]       Congregational   None    Marital Status                               Admission Date   2/23/24    Admission Type   Urgent    Admitting Provider   Hemanth Roberts MD    Attending Provider   Olaf Gabriel MD    Department, Room/Bed   [address], E551/1       Discharge Date       Discharge Disposition       Discharge Destination                                 Attending Provider: Olaf Gabriel MD    Allergies: Nsaids, Aricept [Donepezil]    Isolation: None   Infection: None   Code Status: No CPR    Ht: 177.8 cm (70\")   Wt: 114 kg (250 lb 14.1 oz)    Admission Cmt: None   Principal Problem: Pneumothorax [J93.9]                   Active Insurance as of 2/23/2024       Primary Coverage       Payor Plan Insurance Group Employer/Plan Group    AETNA MEDICARE REPLACEMENT AETNA MED ADV PPO [id number]       Payor Plan Address Payor Plan Phone Number Payor Plan Fax Number Effective Dates    [address] [phone]  1/1/2024 - None Entered    [facility] [id number]         Subscriber Name Subscriber Birth Date Member ID       BRYAN PATEL 1945 [id number]                     Emergency Contacts        (Rel.) Home Phone Work Phone Mobile Phone    Chloe Patel (Spouse) -- -- [phone]    SobIsi rod (Daughter) -- -- [phone]                 History & Physical        Hemanth Roberts MD at 02/23/24 2310              Patient Name:  Bryan Patel  YOB: 1945  MRN:  [id number]  Admit Date:  2/23/2024  Patient Care Team:  Jose Fonseca MD as PCP - General (Family " Medicine)  Jose Michelle MD as Consulting Physician (Radiation Oncology)  Roldan Mccarthy MD as Consulting Physician (Urology)  Clarence Nieves MD (Hematology)  Grover Harris DPM as Consulting Physician (Podiatry)      Subjective  History Present Illness       Mr. Landers is a 79 y.o. former smoker with a history of HTN, HLD, Type 2 DM, Stage 3b CKD, hypothyroidism, and Factor V Leiden Heterozygote with hx of DVT and PE on eliquis that presents to [facility] complaining of right chest wall pain. He states he tripped and had a fall 3 days ago and the pain has been worsening since then. He has had some mild shortness of breath. He presented to [facility] with these complaints and was found to have a right-sided pneumothorax. He was transferred to this facility for further evaluation and management.       Review of Systems   All other systems reviewed and are negative.       Personal History     Past Medical History:   Diagnosis Date    At risk for sleep apnea     Bladder mass     Bruises easily     Diabetes mellitus     Disease of thyroid gland     Elevated cholesterol     GI bleed     Gross hematuria 9/22/2021    History of transfusion     Poor historian     Short-term memory loss     Vitamin D deficiency      Past Surgical History:   Procedure Laterality Date    COLONOSCOPY  09/2016    COLONOSCOPY N/A 9/28/2018    Procedure: COLONOSCOPY;  Surgeon: Olaf Gomez MD;  Location: [facility];  Service: Gastroenterology    COLONOSCOPY N/A 1/7/2019    Procedure: COLONOSCOPY;  Surgeon: Rosa Jack MD;  Location: [facility] OR;  Service: Gastroenterology    CYSTOSCOPY BLADDER BIOPSY N/A 9/22/2021    Procedure: CYSTOSCOPY BLADDER BIOPSY;  Surgeon: Roldan Mccarthy MD;  Location: [facility] OR;  Service: Urology;  Laterality: N/A;    HERNIA REPAIR Bilateral     PROSTATE ULTRASOUND BIOPSY      SIGMOIDOSCOPY N/A 10/2/2018    Procedure: FLEXIBLE SIGMOIDOSCOPY:   APC cautery bleeding using 60 joules;  Surgeon: Olaf Gomez MD;  Location: [facility] ENDOSCOPY;  Service: Gastroenterology    TONSILLECTOMY      TOTAL HIP ARTHROPLASTY Bilateral      Family History   Problem Relation Age of Onset    Stroke Mother     Stroke Father     No Known Problems Brother     Colon cancer Neg Hx     Colon polyps Neg Hx     Malig Hyperthermia Neg Hx      Social History     Tobacco Use    Smoking status: Former     Packs/day: 0.25     Years: 5.00     Additional pack years: 0.00     Total pack years: 1.25     Types: Cigars, Cigarettes     Quit date: 1986     Years since quittin.1    Smokeless tobacco: Never   Vaping Use    Vaping Use: Never used   Substance Use Topics    Alcohol use: Yes     Alcohol/week: 32.0 - 34.0 standard drinks of alcohol     Types: 4 - 6 Shots of liquor, 28 Standard drinks or equivalent per week     Comment: 2-3 double vodkas a night    Drug use: No     No current facility-administered medications on file prior to encounter.     Current Outpatient Medications on File Prior to Encounter   Medication Sig Dispense Refill    albuterol sulfate  (90 Base) MCG/ACT inhaler Inhale 2 puffs Every 4 (Four) Hours As Needed for Wheezing. 18 g 0    allopurinol (ZYLOPRIM) 100 MG tablet TAKE ONE TABLET BY MOUTH DAILY 90 tablet 3    ammonium lactate (AMLACTIN) 12 % cream       apixaban (Eliquis) 2.5 MG tablet tablet Take 1 tablet by mouth Every 12 (Twelve) Hours. Indications: history of DVT/PE 60 tablet 11    aspirin 81 MG EC tablet Take 1 tablet by mouth Daily. HOLDING FOR SURGERY      atorvastatin (LIPITOR) 80 MG tablet TAKE ONE TABLET BY MOUTH DAILY 90 tablet 3    cholecalciferol (VITAMIN D3) 1000 UNITS tablet Take 1 tablet by mouth Daily.      Cyanocobalamin (VITAMIN B 12 PO) Take 1,000 mg by mouth Every Morning.      fluorouracil (EFUDEX) 5 % cream Apply  topically to the appropriate area as directed 2 (Two) Times a Day.      furosemide (LASIX) 20 MG  tablet TAKE 1 TABLET BY MOUTH DAILY 90 tablet 1    levothyroxine (SYNTHROID, LEVOTHROID) 75 MCG tablet Take 1 tablet by mouth Every Morning. Indications: Underactive Thyroid 90 tablet 3    lisinopril (PRINIVIL,ZESTRIL) 20 MG tablet TAKE ONE TABLET BY MOUTH DAILY 90 tablet 3    memantine (NAMENDA) 10 MG tablet TAKE TWO TABLETS BY MOUTH EVERY MORNING 180 tablet 3    Multiple Vitamins-Minerals (MULTIVITAL PLATINUM PO) Take 1 tablet by mouth Daily. HOLD FOR SURGERY      pioglitazone (ACTOS) 30 MG tablet Take 1 tablet by mouth Daily. Indications: Type 2 Diabetes 90 tablet 3    POTASSIUM PO Take  by mouth. Dose unknown       Allergies   Allergen Reactions    Nsaids GI Bleeding     BLEEDING    Aricept [Donepezil] Diarrhea       Objective   Objective     Vital Signs  Temp:  [97.5 °F (36.4 °C)] 97.5 °F (36.4 °C)  Heart Rate:  [] 101  Resp:  [14-16] 15  BP: (110-142)/() 138/100  SpO2:  [95 %-98 %] 96 %  on   ;   Device (Oxygen Therapy): room air  There is no height or weight on file to calculate BMI.    Physical Exam  Vitals and nursing note reviewed.   Constitutional:       General: He is not in acute distress.     Appearance: He is not toxic-appearing or diaphoretic.   HENT:      Head: Normocephalic and atraumatic.      Nose: Nose normal.      Mouth/Throat:      Mouth: Mucous membranes are moist.      Pharynx: Oropharynx is clear.   Eyes:      Conjunctiva/sclera: Conjunctivae normal.      Pupils: Pupils are equal, round, and reactive to light.   Cardiovascular:      Rate and Rhythm: Normal rate and regular rhythm.      Pulses: Normal pulses.   Pulmonary:      Effort: Pulmonary effort is normal.      Breath sounds: Examination of the right-upper field reveals decreased breath sounds.   Abdominal:      General: Bowel sounds are normal.      Palpations: Abdomen is soft.      Tenderness: There is no abdominal tenderness.   Musculoskeletal:         General: Swelling (1-2+ BLE) present. No tenderness.      Cervical  back: Neck supple.   Skin:     General: Skin is warm and dry.      Capillary Refill: Capillary refill takes less than 2 seconds.      Comments: Abrasions on right forearm   Neurological:      General: No focal deficit present.      Mental Status: He is alert and oriented to person, place, and time.   Psychiatric:         Mood and Affect: Mood normal.         Behavior: Behavior normal.       Results Review:  I reviewed the patient's new clinical results.  I reviewed the patient's new imaging results and agree with the interpretation.  I reviewed the patient's other test results and agree with the interpretation  I personally viewed and interpreted the patient's EKG/Telemetry data  Discussed with ED provider.    Lab Results (last 24 hours)       Procedure Component Value Units Date/Time    CBC & Differential [[id number]]  (Abnormal) Collected: 02/23/24 2100    Specimen: Blood Updated: 02/23/24 2108    Narrative:      The following orders were created for panel order CBC & Differential.  Procedure                               Abnormality         Status                     ---------                               -----------         ------                     CBC Auto Differential[[id number]]        Abnormal            Final result               Scan Slide[[id number]]                                                                    Please view results for these tests on the individual orders.    CBC Auto Differential [[id number]]  (Abnormal) Collected: 02/23/24 2100    Specimen: Blood Updated: 02/23/24 2108     WBC 7.04 10*3/mm3      RBC 2.70 10*6/mm3      Hemoglobin 10.7 g/dL      Hematocrit 32.2 %      .3 fL      MCH 39.6 pg      MCHC 33.2 g/dL      RDW 14.7 %      RDW-SD 64.6 fl      MPV 9.8 fL      Platelets 97 10*3/mm3      Neutrophil % 79.2 %      Lymphocyte % 9.7 %      Monocyte % 9.9 %      Eosinophil % 0.3 %      Basophil % 0.3 %      Immature Grans % 0.6 %      Neutrophils, Absolute 5.58 10*3/mm3       Lymphocytes, Absolute 0.68 10*3/mm3      Monocytes, Absolute 0.70 10*3/mm3      Eosinophils, Absolute 0.02 10*3/mm3      Basophils, Absolute 0.02 10*3/mm3      Immature Grans, Absolute 0.04 10*3/mm3      nRBC 0.0 /100 WBC     Comprehensive Metabolic Panel [[id number]]  (Abnormal) Collected: 02/23/24 2125    Specimen: Blood Updated: 02/23/24 2155     Glucose 143 mg/dL      BUN 23 mg/dL      Creatinine 1.47 mg/dL      Sodium 139 mmol/L      Potassium 4.4 mmol/L      Chloride 103 mmol/L      CO2 25.7 mmol/L      Calcium 8.5 mg/dL      Total Protein 6.1 g/dL      Albumin 3.6 g/dL      ALT (SGPT) 22 U/L      AST (SGOT) 45 U/L      Alkaline Phosphatase 67 U/L      Total Bilirubin 0.6 mg/dL      Globulin 2.5 gm/dL      A/G Ratio 1.4 g/dL      BUN/Creatinine Ratio 15.6     Anion Gap 10.3 mmol/L      eGFR 47.9 mL/min/1.73     Narrative:      GFR Normal >60  Chronic Kidney Disease <60  Kidney Failure <15    The GFR formula is only valid for adults with stable renal function between ages 18 and 70.            Imaging Results (Last 24 Hours)       ** No results found for the last 24 hours. **                No orders to display        Assessment/Plan     Active Hospital Problems    Diagnosis  POA    **Pneumothorax [J93.9]  Yes    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 [D68.51]  Yes    Chronic deep vein thrombosis (DVT) of popliteal vein of both lower extremities [I82.533]  Yes    Chronic pulmonary embolism without acute cor pulmonale [I27.82]  Yes    Acquired hypothyroidism [E03.9]  Yes    Stage 3b chronic kidney disease [N18.32]  Yes    Type 2 diabetes mellitus with chronic kidney disease, without long-term current use of insulin [E11.22]  Yes    Mixed hyperlipidemia [E78.2]  Yes    Essential hypertension [I10]  Yes      Resolved Hospital Problems   No resolved problems to display.   Right-Sided Pneumothorax  - from mechanical fall  - he is hemodynamically stable and he is not hypoxemic  - will provide pain control, monitor on telemetry  -  consult thoracic surgery-ER provider reports that he discussed with Dr. Hendrickson    Type 2 DM  - complications include nephropathy  - BG acceptable  - hold actos  - cover with ssi/hypoglycemia protocol    Stage 3b CKD  - baseline serum creatinine is around 1.7  - follows with Dr. Fabian  - monitor chemistries    HTN  - BP acceptable  - hold lisinopril for now     BLE Venous Insufficiency/Edema  - hold lasix for now given pneumothorax-can likely resume once his chest tube is placed    Hx of DVT/PE  - heterozygous for Factor V Leiden   - hold eliquis for now given possible chest tube placement    I discussed the patient's findings and my recommendations with patient, nursing staff, and ED provider.    VTE Prophylaxis - SCDs.  Code Status - Full code.       Hemanth Roberts MD  [facility]  02/23/24  23:10 EST    Electronically signed by Hemanth Roberts MD at 02/24/24 0125       Emergency Department Notes    No notes of this type exist for this encounter.       Vital Signs (last 4 days)       Date/Time Temp Temp src Pulse Resp BP Patient Position SpO2    02/26/24 0700 98.4 (36.9) Oral 96 16 113/70 Lying 96    02/25/24 2326 98 (36.7) Oral -- 16 111/73 Lying --    02/25/24 1949 98.2 (36.8) Oral -- 20 109/62 Lying --    02/25/24 1824 -- -- 93 -- -- -- 96    02/25/24 1640 -- -- 94 -- -- -- 94    02/25/24 1630 98.1 (36.7) Oral -- 18 111/68 Lying --    02/25/24 1405 -- -- 90 -- -- -- 98    02/25/24 1205 -- -- 94 -- -- -- 99    02/25/24 1100 97.8 (36.6) Oral 91 18 109/68 Lying 98    02/25/24 1054 -- -- 92 -- -- -- 98    02/25/24 0920 -- -- 81 -- -- -- 97    02/25/24 0710 -- -- 82 -- -- -- 92    02/25/24 0700 97.7 (36.5) Oral 83 18 121/73 Lying 92    02/24/24 2300 98.2 (36.8) Oral 102 18 111/58 Lying 100    02/24/24 1947 97.8 (36.6) Oral 101 18 130/70 Lying 97    02/24/24 1615 -- -- 100 22 -- -- 95    02/24/24 1537 98.2 (36.8) Oral -- 20 149/88 Lying 93    02/24/24 1100 98.3 (36.8) Oral 102 20 153/83 --  91    02/24/24 0700 98.2 (36.8) Oral 101 20 121/74 -- 97    02/24/24 0500 -- -- 106 -- -- -- 94    02/24/24 0307 97.8 (36.6) Oral 112 20 140/82 Lying --    02/23/24 2315 97.7 (36.5) Oral 103 16 126/85 Lying 97    02/23/24 2300 -- -- 102 -- -- -- 99          Oxygen Therapy (last 4 days)       Date/Time SpO2 Device (Oxygen Therapy) Flow (L/min) Oxygen Concentration (%) ETCO2 (mmHg)    02/26/24 0700 96 room air -- -- --    02/25/24 2000 -- room air -- -- --    02/25/24 1824 96 room air 2 -- --    02/25/24 1640 94 room air -- -- --    02/25/24 1630 -- nasal cannula -- -- --    02/25/24 1405 98 nasal cannula 2 -- --    02/25/24 1205 99 nasal cannula 2 -- --    02/25/24 1100 98 nasal cannula 2 -- --    02/25/24 1054 98 nasal cannula 2 -- --    02/25/24 0920 97 nasal cannula 2 -- --    02/25/24 0710 92 nasal cannula 2 -- --    02/25/24 0700 92 nasal cannula 2 -- --    02/24/24 2300 100 nasal cannula 2 -- --    02/24/24 2000 -- nasal cannula 2 -- --    02/24/24 1947 97 nasal cannula 2 -- --    02/24/24 1615 95 nasal cannula 2 -- --    02/24/24 1537 93 nasal cannula 2 -- --    02/24/24 1100 91 nasal cannula 2 -- --    02/24/24 0700 97 nasal cannula 2 -- --    02/24/24 0500 94 -- -- -- --    02/24/24 0307 -- room air -- -- --    02/23/24 2315 97 room air -- -- --    02/23/24 2300 99 -- -- -- --    02/23/24 2240 -- nasal cannula 2 28 --          Intake & Output (last 4 days)         02/22 0701 02/23 0700 02/23 0701 02/24 0700 02/24 0701 02/25 0700 02/25 0701 02/26 0700 02/26 0701 02/27 0700    P.O.    300     Total Intake(mL/kg)    300 (2.6)     Urine (mL/kg/hr)  75 250 (0.1) 350 (0.1)     Stool    1     Chest Tube   25 20     Total Output  75 275 371     Net  -75 -275 -71              Urine Unmeasured Occurrence  1 x  1 x           Lines, Drains & Airways       Active LDAs       Name Placement date Placement time Site Days    Peripheral IV 02/23/24 2059 Anterior;Left Forearm 02/23/24 2059  Forearm  2    Chest Tube 1  Right Pleural 02/24/24  1625  Pleural  1                  CIWA (last 4 days)       Date/Time CIWA-Ar Score    02/25/24 2000 0    02/25/24 1405 0    02/25/24 0920 0    02/24/24 1400 4    02/24/24 1200 2          Medication Administration Report for Bryan Landers as of 02/26/24 0858     Legend:    Given Hold Not Given Due Canceled Entry Other Actions    Time Time (Time) Time Time-Action         Discontinued     Completed     Future     MAR Hold     Linked             Medications 02/23/24 02/24/24 02/25/24 02/26/24      acetaminophen (TYLENOL) tablet 650 mg  Dose: 650 mg  Freq: Every 4 Hours PRN Route: PO  PRN Reason: Mild Pain  Start: 02/23/24 2309   Admin Instructions:   If given for fever, use fever parameter: fever greater than 100.4 °F  Based on patient request - if ordered for moderate or severe pain, provider allows for administration of a medication prescribed for a lower pain scale.    Do not exceed 4 grams of acetaminophen in a 24 hr period. Max dose of 2gm for AST/ALT greater than 120 units/L.    If given for pain, use the following pain scale:   Mild Pain = Pain Score of 1-3, CPOT 1-2  Moderate Pain = Pain Score of 4-6, CPOT 3-4  Severe Pain = Pain Score of 7-10, CPOT 5-8          Or  acetaminophen (TYLENOL) 160 MG/5ML oral solution 650 mg  Dose: 650 mg  Freq: Every 4 Hours PRN Route: PO  PRN Reason: Mild Pain  Start: 02/23/24 2309   Admin Instructions:   If given for fever, use fever parameter: fever greater than 100.4 °F  Based on patient request - if ordered for moderate or severe pain, provider allows for administration of a medication prescribed for a lower pain scale.    Do not exceed 4 grams of acetaminophen in a 24 hr period. Max dose of 2gm for AST/ALT greater than 120 units/L.    If given for pain, use the following pain scale:   Mild Pain = Pain Score of 1-3, CPOT 1-2  Moderate Pain = Pain Score of 4-6, CPOT 3-4  Severe Pain = Pain Score of 7-10, CPOT 5-8          Or  acetaminophen (TYLENOL)  suppository 650 mg  Dose: 650 mg  Freq: Every 4 Hours PRN Route: RE  PRN Reason: Mild Pain  Start: 02/23/24 2309   Admin Instructions:   If given for fever, use fever parameter: fever greater than 100.4 °F  Based on patient request - if ordered for moderate or severe pain, provider allows for administration of a medication prescribed for a lower pain scale.    Do not exceed 4 grams of acetaminophen in a 24 hr period. Max dose of 2gm for AST/ALT greater than 120 units/L.    If given for pain, use the following pain scale:   Mild Pain = Pain Score of 1-3, CPOT 1-2  Moderate Pain = Pain Score of 4-6, CPOT 3-4  Severe Pain = Pain Score of 7-10, CPOT 5-8           albuterol (PROVENTIL) nebulizer solution 0.083% 2.5 mg/3mL  Dose: 2.5 mg  Freq: Every 6 Hours PRN Route: NEBULIZATION  PRN Reasons: Shortness of Air,Wheezing  Start: 02/24/24 0116           allopurinol (ZYLOPRIM) tablet 100 mg  Dose: 100 mg  Freq: Daily Route: PO  Start: 02/24/24 0900   Admin Instructions:   (BKC) Take with food if GI upset occurs.     0801-Given         0923-Given         0818-Given            atorvastatin (LIPITOR) tablet 80 mg  Dose: 80 mg  Freq: Daily Route: PO  Start: 02/24/24 0900   Admin Instructions:   Avoid grapefruit juice.     0801-Given         0923-Given         0818-Given            sennosides-docusate (PERICOLACE) 8.6-50 MG per tablet 2 tablet  Dose: 2 tablet  Freq: 2 Times Daily Route: PO  Start: 02/24/24 0000   Admin Instructions:   HOLD MEDICATION IF PATIENT HAS HAD BOWEL MOVEMENT. Start bowel management regimen if patient has not had a bowel movement after 12 hours.     (0106)-Not Given     (0904)-Not Given       2103-Given         (0923)-Not Given     2222-Given        0819-Given     2100          And  polyethylene glycol (MIRALAX) packet 17 g  Dose: 17 g  Freq: Daily PRN Route: PO  PRN Reason: Constipation  PRN Comment: Use if senna-docusate is ineffective  Start: 02/23/24 2309   Admin Instructions:   Use if no bowel  movement after 12 hours. Mix in 6-8 ounces of water.  Use 4-8 ounces of water, tea, or juice for each 17 gram dose.          And  bisacodyl (DULCOLAX) EC tablet 5 mg  Dose: 5 mg  Freq: Daily PRN Route: PO  PRN Reason: Constipation  PRN Comment: Use if polyethylene glycol is ineffective  Start: 02/23/24 2309   Admin Instructions:   Use if no bowel movement after 12 hours.  Swallow whole. Do not crush, split, or chew tablet.          And  bisacodyl (DULCOLAX) suppository 10 mg  Dose: 10 mg  Freq: Daily PRN Route: RE  PRN Reason: Constipation  PRN Comment: Use if bisacodyl oral is ineffective  Start: 02/23/24 2309   Admin Instructions:   Use if no bowel movement after 12 hours.  Hold for diarrhea           calcium carbonate (TUMS) chewable tablet 500 mg (200 mg elemental)  Dose: 2 tablet  Freq: 3 Times Daily PRN Route: PO  PRN Reasons: Heartburn,Indigestion  Start: 02/23/24 2309   Admin Instructions:   One tablet contains 200 mg elemental calcium.  Take with food.           dextrose (D50W) (25 g/50 mL) IV injection 25 g  Dose: 25 g  Freq: Every 15 Minutes PRN Route: IV  PRN Reason: Low Blood Sugar  PRN Comment: Blood Sugar Less Than 70  Start: 02/24/24 0120   Admin Instructions:   Blood sugar less than 70; patient has IV access - Unresponsive, NPO or Unable To Safely Swallow           dextrose (GLUTOSE) oral gel 15 g  Dose: 15 g  Freq: Every 15 Minutes PRN Route: PO  PRN Reason: Low Blood Sugar  PRN Comment: Blood sugar less than 70  Start: 02/24/24 0120   Admin Instructions:   BS<70, Patient Alert, Is not NPO, Can safely swallow.           folic acid (FOLVITE) tablet 1 mg  Dose: 1 mg  Freq: Daily Route: PO  Start: 02/24/24 1245     1333-Given         0923-Given         0818-Given            furosemide (LASIX) tablet 20 mg  Dose: 20 mg  Freq: Daily Route: PO  Start: 02/24/24 1115   Admin Instructions:   Hold for SBP less than 100, DBP less than 60     1333-Given [C]         0923-Given         0818-Given             glucagon (GLUCAGEN) injection 1 mg  Dose: 1 mg  Freq: Every 15 Minutes PRN Route: IM  PRN Reason: Low Blood Sugar  PRN Comment: Blood Glucose Less Than 70  Start: 02/24/24 0120   Admin Instructions:   Blood Glucose Less Than 70 - Patient Without IV Access - Unresponsive, NPO or Unable To Safely Swallow  Reconstitute powder for injection by adding 1 mL of -supplied sterile diluent or sterile water for injection to a vial containing 1 mg of the drug, to provide solutions containing 1 mg/mL. Shake vial gently to dissolve.           HYDROcodone-acetaminophen (NORCO) 5-325 MG per tablet 1 tablet  Dose: 1 tablet  Freq: Every 4 Hours PRN Route: PO  PRN Reason: Moderate Pain  Start: 02/23/24 2309   End: 02/28/24 2308   Admin Instructions:   Based on patient request - if ordered for moderate or severe pain, provider allows for administration of a medication prescribed for a lower pain scale.  [CLAIRE]    Do not exceed 4 grams of acetaminophen in a 24 hr period. Max dose of 2gm for AST/ALT greater than 120 units/L        If given for pain, use the following pain scale:   Mild Pain = Pain Score of 1-3, CPOT 1-2  Moderate Pain = Pain Score of 4-6, CPOT 3-4  Severe Pain = Pain Score of 7-10, CPOT 5-8      0540-Given             HYDROmorphone (DILAUDID) injection 0.5 mg  Dose: 0.5 mg  Freq: Every 2 Hours PRN Route: IV  PRN Reason: Severe Pain  Start: 02/23/24 2309   End: 02/28/24 2308   Admin Instructions:   Based on patient request - if ordered for moderate or severe pain, provider allows for administration of a medication prescribed for a lower pain scale.  If given for pain, use the following pain scale:  Mild Pain = Pain Score of 1-3, CPOT 1-2  Moderate Pain = Pain Score of 4-6, CPOT 3-4  Severe Pain = Pain Score of 7-10, CPOT 5-8     1200-Hold [C]     1636-Given [C]            And  naloxone (NARCAN) injection 0.4 mg  Dose: 0.4 mg  Freq: Every 5 Minutes PRN Route: IV  PRN Reason: Respiratory Depression  Start:  "02/23/24 2309   Admin Instructions:   If Respiratory Rate Less Than 8 or Patient is Difficult to Arouse, Stop ALL Narcotics & Contact Provider.  Administer Slow IV Push.  Repeat As Ordered Until Respiratory Rate is Greater Than 12.           insulin lispro (HUMALOG/ADMELOG) injection 2-9 Units  Dose: 2-9 Units  Freq: 4 Times Daily Before Meals & Nightly Route: SC  Start: 02/24/24 0730   Admin Instructions:   Correction Insulin - Moderate Dose (Total Insulin Dose 40-60 units/day, Average Weight Patient, Patient Taking Oral Hypoglycemic)    Blood Glucose 150-199 mg/dL - 2 units  Blood Glucose 200-249 mg/dL - 4 units  Blood Glucose 250-299 mg/dL - 6 units  Blood Glucose 300-349 mg/dL - 7 units  Blood Glucose 350-400 mg/dL - 8 units  Blood Glucose greater than 400 mg/dL - 9 units & Call Provider     Caution: Look alike/sound alike drug alert       0757-Hold     (1200)-Not Given       (1656)-Not Given [C]         (0035)-Not Given     0730-Canceled Entry       (1133)-Not Given [C]     (1638)-Not Given [C]       (2223)-Not Given         (0725)-Not Given     1130       1730     2100           levothyroxine (SYNTHROID, LEVOTHROID) tablet 75 mcg  Dose: 75 mcg  Freq: Every Morning Route: PO  Indications of Use: HYPOTHYROIDISM  Start: 02/24/24 0700   Admin Instructions:   Take on empty stomach.     0756-Given         0540-Given     0700-Canceled Entry        0648-Given            Magnesium Standard Dose Replacement - Follow Nurse / BPA Driven Protocol  Freq: As Needed Route: XX  PRN Reason: Other  Start: 02/24/24 1150   Admin Instructions:   Open Order & Select \"[facility] Electrolyte Replacement Protocol Algorithm\" to View Details           melatonin tablet 5 mg  Dose: 5 mg  Freq: Nightly PRN Route: PO  PRN Reason: Sleep  Start: 02/23/24 2309     2358-Given         2222-Given             memantine (NAMENDA) tablet 20 mg  Dose: 20 mg  Freq: Every Morning Route: PO  Start: 02/24/24 0700     0757-Given         0539-Given     " 0700-Canceled Entry        0648-Given            nitroglycerin (NITROSTAT) SL tablet 0.4 mg  Dose: 0.4 mg  Freq: Every 5 Minutes PRN Route: SL  PRN Reason: Chest Pain  PRN Comment: Only if SBP Greater Than 100  Start: 02/23/24 2308   Admin Instructions:   If Pain Unrelieved After 3 Doses Notify MD  May administer up to 3 doses per episode.           ondansetron ODT (ZOFRAN-ODT) disintegrating tablet 4 mg  Dose: 4 mg  Freq: Every 6 Hours PRN Route: PO  PRN Reasons: Nausea,Vomiting  Start: 02/23/24 2309   Admin Instructions:   If BOTH ondansetron (ZOFRAN) and promethazine (PHENERGAN) are ordered use ondansetron first and THEN promethazine IF ondansetron is ineffective.  Place on tongue and allow to dissolve.          Or  ondansetron (ZOFRAN) injection 4 mg  Dose: 4 mg  Freq: Every 6 Hours PRN Route: IV  PRN Reasons: Nausea,Vomiting  Start: 02/23/24 2309   Admin Instructions:   If BOTH ondansetron (ZOFRAN) and promethazine (PHENERGAN) are ordered use ondansetron first and THEN promethazine IF ondansetron is ineffective.           QUEtiapine (SEROquel) tablet 25 mg  Dose: 25 mg  Freq: Nightly Route: PO  Start: 02/24/24 2100   Admin Instructions:   Caution: Look alike/sound alike drug alert     2103-Given         2223-Given         2100            sodium chloride 0.9 % flush 10 mL  Dose: 10 mL  Freq: As Needed Route: IV  PRN Reason: Line Care  Start: 02/23/24 2308           sodium chloride 0.9 % flush 10 mL  Dose: 10 mL  Freq: Every 12 Hours Scheduled Route: IV  Start: 02/24/24 0000    2240-Given         0800-Given     2103-Given        0923-Given     2317-Given        0819-Given     2100           sodium chloride 0.9 % infusion 40 mL  Dose: 40 mL  Freq: As Needed Route: IV  PRN Reason: Line Care  Start: 02/23/24 2308   Admin Instructions:   Following administration of an IV intermittent medication, flush line with 40mL NS at 100mL/hr.           thiamine (B-1) injection 200 mg  Dose: 200 mg  Freq: Every 8 Hours Scheduled  Route: IV  Start: 02/24/24 1400   End: 02/29/24 1359   Admin Instructions:   Doses of up to 250mg, give over 1-2 minutes (IV push). Doses 250mg and above, give over 30 minutes.     1333-Given     2103-Given        0539-Given     1638-Given       2223-Given         0648-Given     1400       2200           Followed by  thiamine (VITAMIN B-1) tablet 100 mg  Dose: 100 mg  Freq: Daily Route: PO  Start: 02/29/24 1400          Completed Medications  Medications 02/23/24 02/24/24 02/25/24 02/26/24       lidocaine (XYLOCAINE) 1 % injection 10 mL  Dose: 10 mL  Freq: Once Route: SC  Start: 02/24/24 1045   End: 02/24/24 1637     1637-Given by Other [C]             Discontinued Medications  Medications 02/23/24 02/24/24 02/25/24 02/26/24       HYDROmorphone (DILAUDID) injection 0.5 mg  Dose: 0.5 mg  Freq: Every 2 Hours PRN Route: IV  PRN Reason: Severe Pain  Start: 02/24/24 0953   End: 02/24/24 0953   Admin Instructions:   Based on patient request - if ordered for moderate or severe pain, provider allows for administration of a medication prescribed for a lower pain scale.  If given for pain, use the following pain scale:  Mild Pain = Pain Score of 1-3, CPOT 1-2  Moderate Pain = Pain Score of 4-6, CPOT 3-4  Severe Pain = Pain Score of 7-10, CPOT 5-8           sodium chloride 0.9 % infusion  Rate: 100 mL/hr Dose: 100 mL/hr  Freq: Continuous Route: IV  Start: 02/24/24 0000   End: 02/24/24 1026     0116-New Bag                    Lab Results (all)       Procedure Component Value Units Date/Time    POC Glucose Once [[id number]]  (Normal) Collected: 02/26/24 0559    Specimen: Blood Updated: 02/26/24 0611     Glucose 105 mg/dL     Basic Metabolic Panel [[id number]]  (Abnormal) Collected: 02/26/24 0441    Specimen: Blood Updated: 02/26/24 0536     Glucose 106 mg/dL      BUN 25 mg/dL      Creatinine 1.36 mg/dL      Sodium 141 mmol/L      Potassium 4.0 mmol/L      Chloride 107 mmol/L      CO2 25.3 mmol/L      Calcium 7.6 mg/dL       BUN/Creatinine Ratio 18.4     Anion Gap 8.7 mmol/L      eGFR 52.6 mL/min/1.73     Narrative:      GFR Normal >60  Chronic Kidney Disease <60  Kidney Failure <15    The GFR formula is only valid for adults with stable renal function between ages 18 and 70.    CBC & Differential [[id number]]  (Abnormal) Collected: 02/26/24 0441    Specimen: Blood Updated: 02/26/24 0523    Narrative:      The following orders were created for panel order CBC & Differential.  Procedure                               Abnormality         Status                     ---------                               -----------         ------                     CBC Auto Differential[[id number]]        Abnormal            Final result                 Please view results for these tests on the individual orders.    CBC Auto Differential [[id number]]  (Abnormal) Collected: 02/26/24 0441    Specimen: Blood Updated: 02/26/24 0523     WBC 3.88 10*3/mm3      RBC 2.22 10*6/mm3      Hemoglobin 8.3 g/dL      Hematocrit 24.2 %      .0 fL      MCH 37.4 pg      MCHC 34.3 g/dL      RDW 12.4 %      RDW-SD 48.6 fl      MPV 10.0 fL      Platelets 82 10*3/mm3      Neutrophil % 68.0 %      Lymphocyte % 15.7 %      Monocyte % 12.9 %      Eosinophil % 2.8 %      Basophil % 0.3 %      Neutrophils, Absolute 2.64 10*3/mm3      Lymphocytes, Absolute 0.61 10*3/mm3      Monocytes, Absolute 0.50 10*3/mm3      Eosinophils, Absolute 0.11 10*3/mm3      Basophils, Absolute 0.01 10*3/mm3     POC Glucose Once [[id number]]  (Abnormal) Collected: 02/25/24 2048    Specimen: Blood Updated: 02/25/24 2049     Glucose 150 mg/dL     POC Glucose Once [[id number]]  (Abnormal) Collected: 02/25/24 1630    Specimen: Blood Updated: 02/25/24 1632     Glucose 168 mg/dL     POC Glucose Once [[id number]]  (Normal) Collected: 02/25/24 1128    Specimen: Blood Updated: 02/25/24 1131     Glucose 120 mg/dL     CBC & Differential [[id number]]  (Abnormal) Collected: 02/25/24 0458    Specimen: Blood Updated:  02/25/24 0710    Narrative:      The following orders were created for panel order CBC & Differential.  Procedure                               Abnormality         Status                     ---------                               -----------         ------                     CBC Auto Differential[[id number]]        Abnormal            Final result               Scan Slide[[id number]]                                       Final result                 Please view results for these tests on the individual orders.    Scan Slide [[id number]] Collected: 02/25/24 0458    Specimen: Blood Updated: 02/25/24 0710     Spherocytes Slight/1+     WBC Morphology Normal     Platelet Morphology Normal    Vitamin B12 [[id number]]  (Normal) Collected: 02/25/24 0458    Specimen: Blood Updated: 02/25/24 0704     Vitamin B-12 372 pg/mL     Narrative:      Results may be falsely increased if patient taking Biotin.      TSH Rfx On Abnormal To Free T4 [[id number]]  (Normal) Collected: 02/25/24 0458    Specimen: Blood Updated: 02/25/24 0654     TSH 3.240 uIU/mL     Basic Metabolic Panel [[id number]]  (Abnormal) Collected: 02/25/24 0458    Specimen: Blood Updated: 02/25/24 0650     Glucose 99 mg/dL      BUN 26 mg/dL      Creatinine 1.42 mg/dL      Sodium 141 mmol/L      Potassium 4.8 mmol/L      Comment: Slight hemolysis detected by analyzer. Result may be falsely elevated.        Chloride 107 mmol/L      CO2 21.4 mmol/L      Calcium 7.9 mg/dL      BUN/Creatinine Ratio 18.3     Anion Gap 12.6 mmol/L      eGFR 50.0 mL/min/1.73     Narrative:      GFR Normal >60  Chronic Kidney Disease <60  Kidney Failure <15    The GFR formula is only valid for adults with stable renal function between ages 18 and 70.    CBC Auto Differential [[id number]]  (Abnormal) Collected: 02/25/24 0458    Specimen: Blood Updated: 02/25/24 0640     WBC 5.86 10*3/mm3      RBC 2.44 10*6/mm3      Hemoglobin 9.3 g/dL      Hematocrit 27.2 %      .5 fL      MCH 38.1 pg       MCHC 34.2 g/dL      RDW 13.2 %      RDW-SD 52.7 fl      MPV 10.2 fL      Platelets 86 10*3/mm3      Neutrophil % 67.2 %      Lymphocyte % 17.4 %      Monocyte % 13.8 %      Eosinophil % 0.5 %      Basophil % 0.2 %      Neutrophils, Absolute 3.94 10*3/mm3      Lymphocytes, Absolute 1.02 10*3/mm3      Monocytes, Absolute 0.81 10*3/mm3      Eosinophils, Absolute 0.03 10*3/mm3      Basophils, Absolute 0.01 10*3/mm3     POC Glucose Once [[id number]]  (Normal) Collected: 02/25/24 0536    Specimen: Blood Updated: 02/25/24 0537     Glucose 116 mg/dL     POC Glucose Once [[id number]]  (Normal) Collected: 02/24/24 2106    Specimen: Blood Updated: 02/24/24 2107     Glucose 130 mg/dL     Urinalysis With Culture If Indicated - Urine, Clean Catch [[id number]]  (Abnormal) Collected: 02/24/24 1929    Specimen: Urine, Clean Catch Updated: 02/24/24 1949     Color, UA Yellow     Appearance, UA Cloudy     pH, UA <=5.0     Specific Gravity, UA 1.019     Glucose, UA Negative     Ketones, UA Negative     Bilirubin, UA Negative     Blood, UA Large (3+)     Protein, UA 30 mg/dL (1+)     Leuk Esterase, UA Negative     Nitrite, UA Negative     Urobilinogen, UA 0.2 E.U./dL    Narrative:      In absence of clinical symptoms, the presence of pyuria, bacteria, and/or nitrites on the urinalysis result does not correlate with infection.    Urinalysis, Microscopic Only - Urine, Clean Catch [[id number]]  (Abnormal) Collected: 02/24/24 1929    Specimen: Urine, Clean Catch Updated: 02/24/24 1949     RBC, UA 21-50 /HPF      WBC, UA 3-5 /HPF      Comment: Urine culture not indicated.        Bacteria, UA None Seen /HPF      Squamous Epithelial Cells, UA 0-2 /HPF      Hyaline Casts, UA 0-2 /LPF      Methodology Automated Microscopy    Ethanol [[id number]] Collected: 02/24/24 1550    Specimen: Blood Updated: 02/24/24 1622     Ethanol <10 mg/dL      Ethanol % <0.010 %     POC Glucose Once [[id number]]  (Abnormal) Collected: 02/24/24 1146    Specimen: Blood  Updated: 02/24/24 1147     Glucose 148 mg/dL     POC Glucose Once [[id number]]  (Abnormal) Collected: 02/24/24 0606    Specimen: Blood Updated: 02/24/24 0607     Glucose 183 mg/dL     Basic Metabolic Panel [[id number]]  (Abnormal) Collected: 02/24/24 0304    Specimen: Blood Updated: 02/24/24 0452     Glucose 143 mg/dL      BUN 23 mg/dL      Creatinine 1.34 mg/dL      Sodium 143 mmol/L      Potassium 4.9 mmol/L      Chloride 107 mmol/L      CO2 25.4 mmol/L      Calcium 8.6 mg/dL      BUN/Creatinine Ratio 17.2     Anion Gap 10.6 mmol/L      eGFR 53.6 mL/min/1.73     Narrative:      GFR Normal >60  Chronic Kidney Disease <60  Kidney Failure <15    The GFR formula is only valid for adults with stable renal function between ages 18 and 70.    CBC & Differential [[id number]]  (Abnormal) Collected: 02/24/24 0304    Specimen: Blood Updated: 02/24/24 0438    Narrative:      The following orders were created for panel order CBC & Differential.  Procedure                               Abnormality         Status                     ---------                               -----------         ------                     CBC Auto Differential[[id number]]        Abnormal            Final result                 Please view results for these tests on the individual orders.    CBC Auto Differential [[id number]]  (Abnormal) Collected: 02/24/24 0304    Specimen: Blood Updated: 02/24/24 0438     WBC 5.73 10*3/mm3      RBC 2.57 10*6/mm3      Hemoglobin 9.8 g/dL      Hematocrit 28.6 %      .3 fL      MCH 38.1 pg      MCHC 34.3 g/dL      RDW 12.7 %      RDW-SD 50.7 fl      MPV 9.6 fL      Platelets 94 10*3/mm3      Neutrophil % 78.3 %      Lymphocyte % 11.7 %      Monocyte % 9.1 %      Eosinophil % 0.2 %      Basophil % 0.2 %      Neutrophils, Absolute 4.49 10*3/mm3      Lymphocytes, Absolute 0.67 10*3/mm3      Monocytes, Absolute 0.52 10*3/mm3      Eosinophils, Absolute 0.01 10*3/mm3      Basophils, Absolute 0.01 10*3/mm3            Imaging Results (All)       Procedure Component Value Units Date/Time    XR Chest 1 View [[id number]] Collected: 02/26/24 0848     Updated: 02/26/24 0852    Narrative:      XR CHEST 1 VW-2/26/2024     HISTORY: Follow-up possible pneumothorax.     Heart size is mildly enlarged. The images taken in a somewhat lordotic  position. Pigtail catheter terminates in the right upper hemithorax. No  significant pneumothorax is seen. There is minimal atelectasis in the  right midlung. Small amount of soft tissue air is seen in the right  hemithorax.     Chest appears largely unchanged from yesterday's study.       Impression:      1. Stable appearance of the chest since yesterday.  2. No significant pneumothorax is seen.        This report was finalized on 2/26/2024 8:49 AM by Dr. Orion Alcaraz M.D on Workstation: [id number]       XR Chest 1 View [[id number]] Collected: 02/25/24 0706     Updated: 02/25/24 0715    Narrative:      XR CHEST 1 VW-     HISTORY: Male who is 79 years-old, right pneumothorax     TECHNIQUE: Frontal view of the chest     COMPARISON: 2/24/2024     FINDINGS: Right chest tube appears stable. The heart size is borderline.  The aorta appears ectatic. Pulmonary vasculature is unremarkable. Likely  atelectasis in the right lung, partially improved. No pleural effusion,  or pneumothorax. Subcutaneous emphysema is seen in the right chest wall.  No acute osseous process.       Impression:      No pneumothorax is identified.     This report was finalized on 2/25/2024 7:12 AM by Dr. Tejinder Colon M.D on Workstation: [id number]       XR Chest 1 View [[id number]] Collected: 02/24/24 1747     Updated: 02/24/24 1752    Narrative:      CHEST SINGLE VIEW     HISTORY: Chest tube management. Follow-up exam     COMPARISON: AP chest 02/24/2024     FINDINGS: There has been placement of a small-caliber right pigtail  drainage catheter with reexpansion of the right lung. There remains  right perihilar and basilar  atelectasis though there is no definite  pneumothorax. Left lung appears clear. Heart size is enlarged. There  remains subcutaneous emphysema along the right lateral chest wall       Impression:      Post placement of a right chest tube with reexpansion of the  right lung and areas of perihilar and basilar atelectasis without  demonstrated pneumothorax. Mild subcutaneous emphysema right lateral  chest wall.     This report was finalized on 2/24/2024 5:49 PM by Dr. Geoff Nesbitt M.D on Workstation: [id number]       CT Chest Without Contrast Diagnostic [[id number]] Collected: 02/24/24 1459     Updated: 02/24/24 1515    Narrative:      CT CHEST WITHOUT CONTRAST CONTRAST     HISTORY: Trauma. Bilateral rib pain.     TECHNIQUE:  CT chest includes axial imaging from the thoracic inlet to  the upper abdomen without IV contrast. Date reconstructed in coronal and  sagittal planes. Radiation dose reduction techniques were utilized,  including automated exposure control and exposure modulation based on  body size.     COMPARISON: AP chest 02/24/2024.     FINDINGS: There are moderate coronary arterial calcifications. There is  a large right pneumothorax with partial collapse of the right lung.  Right upper lobe, middle lobe, lower lobes are partially collapsed and  there is also pleural fluid and suspected hemothorax layering  dependently within the right pleural space.     There is a nondisplaced fracture of the right anterior 3rd rib that  appears to be acute and is new when compared to the prior exam  09/14/2022. There are acute fractures of the right anterior 5th and 6h  ribs, right anterior lateral 7th rib. There is mild displacement of the  lateral 7th rib fracture. There is subcutaneous emphysema along the  right chest wall and extending to the upper flank. Calcified nodule is  present in the lingula. Left lung otherwise appears clear. Imaging  through the upper abdomen demonstrates several small gallstones. There  is  bilateral renal atrophy. Multilevel endplate spur formation is  present within the thoracic spine.       Impression:      1. Large right hydropneumothorax is predominantly air, though there is a  small amount of fluid and suspected hemorrhage layering dependently.  Majority of the right lung is collapsed.   2. Right anterior 3rd and anterolateral 5th, 6th, 7th rib fractures with  displacement of the 7th rib fracture.  3. Subcutaneous emphysema along the right lateral chest wall extending  to the upper flank.  4. Atherosclerotic disease with moderate coronary arterial  calcifications.  5. Cholelithiasis.     Findings discussed with Brisa, the nurse caring for the patient on  02/24/2024 at 2:50 p.m.     Radiation dose reduction techniques were utilized, including automated  exposure control and exposure modulation based on body size.        This report was finalized on 2/24/2024 3:12 PM by Dr. Geoff Nesbitt M.D on Workstation: [email or web address]       XR Chest 1 View [[id number]] Collected: 02/24/24 1203     Updated: 02/24/24 1300    Narrative:      CHEST SINGLE VIEW     HISTORY: Pneumothorax. Plan for chest tube placement today.      COMPARISON: PA chest 02/23/2024 at 4:56 p.m., PET/CT 09/22/2022.     FINDINGS: Right-sided pneumothorax appears to have increased in size  when compared with yesterday's exam at 4:56 p.m. Pneumothorax is largest  at the right apex and appears moderate to large with partial collapse of  the right lung. Heart size is upper normal. The left lung appears clear.  There is mild subcutaneous emphysema along the right lateral chest wall.       Impression:      Right pneumothorax has increased in size compared to exam  approximately 13 hours previous and is now moderate to large with  partial collapse of the right lung. Mild subcutaneous emphysema right  lateral chest wall.     This report was finalized on 2/24/2024 12:57 PM by Dr. Geoff Nesbitt M.D on Workstation: [id number]             ECG/EMG  Results (all)       Procedure Component Value Units Date/Time    ECG 12 Lead Rhythm Change [[id number]] Collected: 02/24/24 0613     Updated: 02/24/24 1730     QT Interval 370 ms      QTC Interval 490 ms     Narrative:      HEART RATE= 105  bpm  RR Interval= 571  ms  NJ Interval= 147  ms  P Horizontal Axis= 165  deg  P Front Axis= 65  deg  QRSD Interval= 148  ms  QT Interval= 370  ms  QTcB= 490  ms  QRS Axis= 80  deg  T Wave Axis= 14  deg  - ABNORMAL ECG -  Sinus tachycardia  Right bundle branch block  PVCs have resolved  Electronically Signed By: Tigist Johnson ([facility]) 24-Feb-2024 17:29:48  Date and Time of Study: 2024-02-24 06:13:53    SCANNED - TELEMETRY   [[id number]] Resulted: 02/23/24     Updated: 02/25/24 1459    SCANNED - TELEMETRY   [[id number]] Resulted: 02/23/24     Updated: 02/25/24 1510    SCANNED - TELEMETRY   [[id number]] Resulted: 02/23/24     Updated: 02/25/24 1540    SCANNED - TELEMETRY   [[id number]] Resulted: 02/23/24     Updated: 02/26/24 0449    SCANNED - TELEMETRY   [[id number]] Resulted: 02/23/24     Updated: 02/26/24 0459    SCANNED - TELEMETRY   [[id number]] Resulted: 02/23/24     Updated: 02/26/24 0508    SCANNED - TELEMETRY   [[id number]] Resulted: 02/23/24     Updated: 02/26/24 0605    SCANNED - TELEMETRY   [[id number]] Resulted: 02/23/24     Updated: 02/26/24 0609    SCANNED - TELEMETRY   [[id number]] Resulted: 02/23/24     Updated: 02/26/24 0635    SCANNED - TELEMETRY   [[id number]] Resulted: 02/23/24     Updated: 02/26/24 0638    SCANNED - TELEMETRY   [[id number]] Resulted: 02/23/24     Updated: 02/26/24 0638    SCANNED - TELEMETRY   [[id number]] Resulted: 02/23/24     Updated: 02/26/24 0659    SCANNED - TELEMETRY   [[id number]] Resulted: 02/23/24     Updated: 02/26/24 0748    SCANNED - TELEMETRY   [[id number]] Resulted: 02/23/24     Updated: 02/26/24 0847             Operative/Procedure Notes (all)        Bindu Hendrickson MD at 02/24/24 1628  Version 1 of 1             Chest Tube Insertion Procedure  "Note    Indications:  Clinically significant Pneumothorax    Pre-op Diagnosis:   Pneumothorax    Post-Op Diagnosis Codes:   pneumothorax    Surgeon: AMADOR Hendrickson MD        Anesthesia: Local    Procedure Details:   Informed consent was obtained for the procedure, including sedation.  Risks of lung perforation, hemorrhage, arrhythmia, and adverse drug reaction were discussed.     After sterile skin prep, using standard technique, a 14 Armenian tube was placed in the right anterior 3rd rib space.    Findings:  Rush of air    Estimated Blood Loss:  Minimal           Specimens: None                Complications:  None; patient tolerated the procedure well.           Disposition:  5E, stable           Condition: stable        Electronically signed by Bindu Hendrickson MD at 02/24/24 1630          Physician Progress Notes (all)        Lani Rosenberg, LEONA, APRN at 02/25/24 1125              Chief Complaint: Right pneumothorax, rib fractures  S/P: Right chest tube placement  POD # 1    Subjective  Improved today.  Not as confused overnight.  Resting comfortably in bed.  Denies significant pain or shortness of breath.  Vital Signs:  Temp:  [97.7 °F (36.5 °C)-98.2 °F (36.8 °C)] 97.7 °F (36.5 °C)  Heart Rate:  [] 92  Resp:  [18-22] 18  BP: (111-149)/(58-88) 121/73    Intake & Output (last day)         02/24 0701  02/25 0700 02/25 0701  02/26 0700    P.O.  60    Total Intake(mL/kg)  60 (0.5)    Urine (mL/kg/hr) 250 (0.1)     Chest Tube 25 20    Total Output 275 20    Net -275 +40                  Objective:  General Appearance:  Comfortable and well-appearing.    Vital signs: (most recent): Blood pressure 121/73, pulse 92, temperature 97.7 °F (36.5 °C), temperature source Oral, resp. rate 18, height 177.8 cm (70\"), weight 114 kg (250 lb 14.1 oz), SpO2 98%.    HEENT: (Nasal cannula)    Lungs:  Normal effort.  He is not in respiratory distress.  There are decreased breath sounds.    Heart: Normal rate.    Chest: Chest wall " tenderness present.    Extremities: Decreased range of motion.  There is no dependent edema.    Neurological: Patient is alert.    Skin:  Warm and dry.                Chest tube:   Site: Right, Clean, Dry, Intact, and Securement device intact  Suction: -20 cm  Air Leak: negative  24 Hour Total: 17    Results Review:     I reviewed the patient's new clinical results.  I reviewed the patient's new imaging results and agree with the interpretation.  I reviewed the patient's other test results and agree with the interpretation  Discussed with patient, RN, family bedside and Dr. Hendrickson    Imaging reviewed independently and agree with interpretation.  Pneumothorax resolved status post chest tube placement.    Imaging Results (Last 24 Hours)       Procedure Component Value Units Date/Time    XR Chest 1 View [[id number]] Collected: 02/25/24 0706     Updated: 02/25/24 0715    Narrative:      XR CHEST 1 VW-     HISTORY: Male who is 79 years-old, right pneumothorax     TECHNIQUE: Frontal view of the chest     COMPARISON: 2/24/2024     FINDINGS: Right chest tube appears stable. The heart size is borderline.  The aorta appears ectatic. Pulmonary vasculature is unremarkable. Likely  atelectasis in the right lung, partially improved. No pleural effusion,  or pneumothorax. Subcutaneous emphysema is seen in the right chest wall.  No acute osseous process.       Impression:      No pneumothorax is identified.     This report was finalized on 2/25/2024 7:12 AM by Dr. Tejinder Colon M.D on Workstation: [id number]       XR Chest 1 View [[id number]] Collected: 02/24/24 1747     Updated: 02/24/24 1752    Narrative:      CHEST SINGLE VIEW     HISTORY: Chest tube management. Follow-up exam     COMPARISON: AP chest 02/24/2024     FINDINGS: There has been placement of a small-caliber right pigtail  drainage catheter with reexpansion of the right lung. There remains  right perihilar and basilar atelectasis though there is no  definite  pneumothorax. Left lung appears clear. Heart size is enlarged. There  remains subcutaneous emphysema along the right lateral chest wall       Impression:      Post placement of a right chest tube with reexpansion of the  right lung and areas of perihilar and basilar atelectasis without  demonstrated pneumothorax. Mild subcutaneous emphysema right lateral  chest wall.     This report was finalized on 2/24/2024 5:49 PM by Dr. Geoff Nesbitt M.D on Workstation: [id number]       CT Chest Without Contrast Diagnostic [[id number]] Collected: 02/24/24 1459     Updated: 02/24/24 1515    Narrative:      CT CHEST WITHOUT CONTRAST CONTRAST     HISTORY: Trauma. Bilateral rib pain.     TECHNIQUE:  CT chest includes axial imaging from the thoracic inlet to  the upper abdomen without IV contrast. Date reconstructed in coronal and  sagittal planes. Radiation dose reduction techniques were utilized,  including automated exposure control and exposure modulation based on  body size.     COMPARISON: AP chest 02/24/2024.     FINDINGS: There are moderate coronary arterial calcifications. There is  a large right pneumothorax with partial collapse of the right lung.  Right upper lobe, middle lobe, lower lobes are partially collapsed and  there is also pleural fluid and suspected hemothorax layering  dependently within the right pleural space.     There is a nondisplaced fracture of the right anterior 3rd rib that  appears to be acute and is new when compared to the prior exam  09/14/2022. There are acute fractures of the right anterior 5th and 6h  ribs, right anterior lateral 7th rib. There is mild displacement of the  lateral 7th rib fracture. There is subcutaneous emphysema along the  right chest wall and extending to the upper flank. Calcified nodule is  present in the lingula. Left lung otherwise appears clear. Imaging  through the upper abdomen demonstrates several small gallstones. There  is bilateral renal atrophy.  Multilevel endplate spur formation is  present within the thoracic spine.       Impression:      1. Large right hydropneumothorax is predominantly air, though there is a  small amount of fluid and suspected hemorrhage layering dependently.  Majority of the right lung is collapsed.   2. Right anterior 3rd and anterolateral 5th, 6th, 7th rib fractures with  displacement of the 7th rib fracture.  3. Subcutaneous emphysema along the right lateral chest wall extending  to the upper flank.  4. Atherosclerotic disease with moderate coronary arterial  calcifications.  5. Cholelithiasis.     Findings discussed with Brisa, the nurse caring for the patient on  02/24/2024 at 2:50 p.m.     Radiation dose reduction techniques were utilized, including automated  exposure control and exposure modulation based on body size.        This report was finalized on 2/24/2024 3:12 PM by Dr. Geoff Nesbitt M.D on Workstation: [id number]       XR Chest 1 View [[id number]] Collected: 02/24/24 1203     Updated: 02/24/24 1300    Narrative:      CHEST SINGLE VIEW     HISTORY: Pneumothorax. Plan for chest tube placement today.      COMPARISON: PA chest 02/23/2024 at 4:56 p.m., PET/CT 09/22/2022.     FINDINGS: Right-sided pneumothorax appears to have increased in size  when compared with yesterday's exam at 4:56 p.m. Pneumothorax is largest  at the right apex and appears moderate to large with partial collapse of  the right lung. Heart size is upper normal. The left lung appears clear.  There is mild subcutaneous emphysema along the right lateral chest wall.       Impression:      Right pneumothorax has increased in size compared to exam  approximately 13 hours previous and is now moderate to large with  partial collapse of the right lung. Mild subcutaneous emphysema right  lateral chest wall.     This report was finalized on 2/24/2024 12:57 PM by Dr. Geoff Nesbitt M.D on Workstation: [id number]               Lab Results:     Lab Results  (last 24 hours)       Procedure Component Value Units Date/Time    CBC & Differential [[id number]]  (Abnormal) Collected: 02/25/24 0458    Specimen: Blood Updated: 02/25/24 0710    Narrative:      The following orders were created for panel order CBC & Differential.  Procedure                               Abnormality         Status                     ---------                               -----------         ------                     CBC Auto Differential[[id number]]        Abnormal            Final result               Scan Slide[[id number]]                                       Final result                 Please view results for these tests on the individual orders.    Scan Slide [[id number]] Collected: 02/25/24 0458    Specimen: Blood Updated: 02/25/24 0710     Spherocytes Slight/1+     WBC Morphology Normal     Platelet Morphology Normal    Vitamin B12 [[id number]]  (Normal) Collected: 02/25/24 0458    Specimen: Blood Updated: 02/25/24 0704     Vitamin B-12 372 pg/mL     Narrative:      Results may be falsely increased if patient taking Biotin.      TSH Rfx On Abnormal To Free T4 [[id number]]  (Normal) Collected: 02/25/24 0458    Specimen: Blood Updated: 02/25/24 0654     TSH 3.240 uIU/mL     Basic Metabolic Panel [[id number]]  (Abnormal) Collected: 02/25/24 0458    Specimen: Blood Updated: 02/25/24 0650     Glucose 99 mg/dL      BUN 26 mg/dL      Creatinine 1.42 mg/dL      Sodium 141 mmol/L      Potassium 4.8 mmol/L      Comment: Slight hemolysis detected by analyzer. Result may be falsely elevated.        Chloride 107 mmol/L      CO2 21.4 mmol/L      Calcium 7.9 mg/dL      BUN/Creatinine Ratio 18.3     Anion Gap 12.6 mmol/L      eGFR 50.0 mL/min/1.73     Narrative:      GFR Normal >60  Chronic Kidney Disease <60  Kidney Failure <15    The GFR formula is only valid for adults with stable renal function between ages 18 and 70.    CBC Auto Differential [[id number]]  (Abnormal) Collected: 02/25/24 0458    Specimen:  Blood Updated: 02/25/24 0640     WBC 5.86 10*3/mm3      RBC 2.44 10*6/mm3      Hemoglobin 9.3 g/dL      Hematocrit 27.2 %      .5 fL      MCH 38.1 pg      MCHC 34.2 g/dL      RDW 13.2 %      RDW-SD 52.7 fl      MPV 10.2 fL      Platelets 86 10*3/mm3      Neutrophil % 67.2 %      Lymphocyte % 17.4 %      Monocyte % 13.8 %      Eosinophil % 0.5 %      Basophil % 0.2 %      Neutrophils, Absolute 3.94 10*3/mm3      Lymphocytes, Absolute 1.02 10*3/mm3      Monocytes, Absolute 0.81 10*3/mm3      Eosinophils, Absolute 0.03 10*3/mm3      Basophils, Absolute 0.01 10*3/mm3     POC Glucose Once [[id number]]  (Normal) Collected: 02/25/24 0536    Specimen: Blood Updated: 02/25/24 0537     Glucose 116 mg/dL     POC Glucose Once [[id number]]  (Normal) Collected: 02/24/24 2106    Specimen: Blood Updated: 02/24/24 2107     Glucose 130 mg/dL     Urinalysis With Culture If Indicated - Urine, Clean Catch [[id number]]  (Abnormal) Collected: 02/24/24 1929    Specimen: Urine, Clean Catch Updated: 02/24/24 1949     Color, UA Yellow     Appearance, UA Cloudy     pH, UA <=5.0     Specific Gravity, UA 1.019     Glucose, UA Negative     Ketones, UA Negative     Bilirubin, UA Negative     Blood, UA Large (3+)     Protein, UA 30 mg/dL (1+)     Leuk Esterase, UA Negative     Nitrite, UA Negative     Urobilinogen, UA 0.2 E.U./dL    Narrative:      In absence of clinical symptoms, the presence of pyuria, bacteria, and/or nitrites on the urinalysis result does not correlate with infection.    Urinalysis, Microscopic Only - Urine, Clean Catch [[id number]]  (Abnormal) Collected: 02/24/24 1929    Specimen: Urine, Clean Catch Updated: 02/24/24 1949     RBC, UA 21-50 /HPF      WBC, UA 3-5 /HPF      Comment: Urine culture not indicated.        Bacteria, UA None Seen /HPF      Squamous Epithelial Cells, UA 0-2 /HPF      Hyaline Casts, UA 0-2 /LPF      Methodology Automated Microscopy    Ethanol [[id number]] Collected: 02/24/24 0005    Specimen: Blood  Updated: 24 1622     Ethanol <10 mg/dL      Ethanol % <0.010 %     POC Glucose Once [[id number]]  (Abnormal) Collected: 24 1146    Specimen: Blood Updated: 24 1147     Glucose 148 mg/dL              Assessment & Plan       Pneumothorax    Essential hypertension    Mixed hyperlipidemia    Type 2 diabetes mellitus with chronic kidney disease, without long-term current use of insulin    Stage 3b chronic kidney disease    Acquired hypothyroidism    Chronic pulmonary embolism without acute cor pulmonale    Chronic deep vein thrombosis (DVT) of popliteal vein of both lower extremities    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  Resolved on follow-up imaging today.  No airleak to chest tube.  Place chest tube to waterseal and recheck a chest x-ray in the morning.  Needs to perform incentive spirometry 10 times per hour.      Multiple rib fractures: As seen on CT chest.  Patient denies significant pain.  Mostly domal analgesic regimen available.  Increase mobilization as able.      Lani Rosenberg DNP, NIRAJ  [facility]  24  11:25 EST            Electronically signed by Lani Rosenberg DNP, APRN at 24 1127       Solo Springer MD at 24 1044              Name: Bryan Landers ADMIT: 2024   : 1945  PCP: Jose Fonseca MD    MRN: [id number] LOS: 2 days   AGE/SEX: 79 y.o. male  ROOM: [facility]     Subjective   Subjective   Patient sitting up in chair.  Slept well overnight.  Son at bedside.    Review of Systems   Constitutional:  Positive for fatigue. Negative for chills and fever.   Respiratory:  Positive for cough. Negative for shortness of breath.    Cardiovascular:  Positive for chest pain. Negative for palpitations.   Gastrointestinal:  Negative for abdominal pain, diarrhea, nausea and vomiting.     As above    Objective   Objective   Vital Signs  Temp:  [97.7 °F (36.5  °C)-98.3 °F (36.8 °C)] 97.7 °F (36.5 °C)  Heart Rate:  [] 81  Resp:  [18-22] 18  BP: (111-153)/(58-88) 121/73  SpO2:  [91 %-100 %] 97 %  on  Flow (L/min):  [2] 2;   Device (Oxygen Therapy): nasal cannula  Body mass index is 36 kg/m².  Physical Exam  Vitals and nursing note reviewed.   Constitutional:       General: He is not in acute distress.  Cardiovascular:      Rate and Rhythm: Normal rate and regular rhythm.   Pulmonary:      Effort: Pulmonary effort is normal. No respiratory distress.      Comments: Right chest tube  Abdominal:      General: Abdomen is flat. There is no distension.      Tenderness: There is no abdominal tenderness.   Musculoskeletal:         General: No swelling or deformity.      Right lower leg: Edema present.      Left lower leg: Edema present.   Skin:     General: Skin is warm and dry.   Neurological:      General: No focal deficit present.      Mental Status: He is alert. Mental status is at baseline.         Results Review     I reviewed the patient's new clinical results.  Results from last 7 days   Lab Units 02/25/24  0458 02/24/24  0304 02/23/24  2100   WBC 10*3/mm3 5.86 5.73 7.04   HEMOGLOBIN g/dL 9.3* 9.8* 10.7*   PLATELETS 10*3/mm3 86* 94* 97*     Results from last 7 days   Lab Units 02/25/24  0458 02/24/24  0304 02/23/24  2125   SODIUM mmol/L 141 143 139   POTASSIUM mmol/L 4.8 4.9 4.4   CHLORIDE mmol/L 107 107 103   CO2 mmol/L 21.4* 25.4 25.7   BUN mg/dL 26* 23 23   CREATININE mg/dL 1.42* 1.34* 1.47*   GLUCOSE mg/dL 99 143* 143*   Estimated Creatinine Clearance: 52.5 mL/min (A) (by C-G formula based on SCr of 1.42 mg/dL (H)).  Results from last 7 days   Lab Units 02/23/24  2125   ALBUMIN g/dL 3.6   BILIRUBIN mg/dL 0.6   ALK PHOS U/L 67   AST (SGOT) U/L 45*   ALT (SGPT) U/L 22     Results from last 7 days   Lab Units 02/25/24  0458 02/24/24  0304 02/23/24  2125   CALCIUM mg/dL 7.9* 8.6 8.5*   ALBUMIN g/dL  --   --  3.6       COVID19   Date Value Ref Range Status   09/20/2021  Not Detected Not Detected - Ref. Range Final     SARS-CoV-2, SANDRA   Date Value Ref Range Status   07/20/2020 Not Detected Not Detected Final     Comment:     This test was developed and its performance characteristics determined  by [employer]. This test has not been FDA cleared or  approved. This test has been authorized by FDA under an Emergency Use  Authorization (EUA). This test is only authorized for the duration of  time the declaration that circumstances exist justifying the  authorization of the emergency use of in vitro diagnostic tests for  detection of SARS-CoV-2 virus and/or diagnosis of COVID-19 infection  under section 564(b)(1) of the Act, 21 U.S.C. 360bbb-3(b)(1), unless  the authorization is terminated or revoked sooner.  When diagnostic testing is negative, the possibility of a false  negative result should be considered in the context of a patient's  recent exposures and the presence of clinical signs and symptoms  consistent with COVID-19. An individual without symptoms of COVID-19  and who is not shedding SARS-CoV-2 virus would expect to have a  negative (not detected) result in this assay.     Glucose   Date/Time Value Ref Range Status   02/25/2024 0536 116 70 - 130 mg/dL Final   02/24/2024 2106 130 70 - 130 mg/dL Final   02/24/2024 1146 148 (H) 70 - 130 mg/dL Final   02/24/2024 0606 183 (H) 70 - 130 mg/dL Final       XR Chest 1 View  Narrative: XR CHEST 1 VW-     HISTORY: Male who is 79 years-old, right pneumothorax     TECHNIQUE: Frontal view of the chest     COMPARISON: 2/24/2024     FINDINGS: Right chest tube appears stable. The heart size is borderline.  The aorta appears ectatic. Pulmonary vasculature is unremarkable. Likely  atelectasis in the right lung, partially improved. No pleural effusion,  or pneumothorax. Subcutaneous emphysema is seen in the right chest wall.  No acute osseous process.     Impression: No pneumothorax is identified.     This report was finalized on  2/25/2024 7:12 AM by Dr. Tejinder Colon M.D on Workstation: [id number]       I reviewed the patient's daily medications.  Scheduled Medications  allopurinol, 100 mg, Oral, Daily  atorvastatin, 80 mg, Oral, Daily  folic acid, 1 mg, Oral, Daily  furosemide, 20 mg, Oral, Daily  insulin lispro, 2-9 Units, Subcutaneous, 4x Daily AC & at Bedtime  levothyroxine, 75 mcg, Oral, QAM  memantine, 20 mg, Oral, QAM  QUEtiapine, 25 mg, Oral, Nightly  senna-docusate sodium, 2 tablet, Oral, BID  sodium chloride, 10 mL, Intravenous, Q12H  thiamine (B-1) IV, 200 mg, Intravenous, Q8H   Followed by  [START ON 2/29/2024] thiamine, 100 mg, Oral, Daily    Infusions   Diet  Diet: Regular/House Diet, Diabetic Diets; Consistent Carbohydrate; Texture: Regular Texture (IDDSI 7); Fluid Consistency: Thin (IDDSI 0)        I have personally reviewed:  [x]  Laboratory   [x]  Microbiology   [x]  Radiology   []  EKG/Telemetry   []  Cardiology/Vascular   []  Pathology   [x]  Records     Assessment/Plan     Active Hospital Problems    Diagnosis  POA    **Pneumothorax [J93.9]  Yes    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 [D68.51]  Yes    Chronic deep vein thrombosis (DVT) of popliteal vein of both lower extremities [I82.533]  Yes    Chronic pulmonary embolism without acute cor pulmonale [I27.82]  Yes    Acquired hypothyroidism [E03.9]  Yes    Stage 3b chronic kidney disease [N18.32]  Yes    Type 2 diabetes mellitus with chronic kidney disease, without long-term current use of insulin [E11.22]  Yes    Mixed hyperlipidemia [E78.2]  Yes    Essential hypertension [I10]  Yes      Resolved Hospital Problems   No resolved problems to display.       79 y.o. male admitted with Pneumothorax.    Right-Sided Pneumothorax  Multiple right-sided rib fractures  - from mechanical fall  - cardiothoracic surgery consulted, patient recommendations     Type 2 DM  - complications include nephropathy  - BG at goal   - hold actos  - cover with ssi/hypoglycemia protocol     Stage 3b  CKD  - baseline serum creatinine is around 1.7  - follows with Dr. Fabian  - monitor chemistries     HTN  - BP acceptable  - hold lisinopril for now      BLE Venous Insufficiency/Edema  -Resume Lasix     Hx of DVT/PE  - heterozygous for Factor V Leiden   - hold eliquis for now given possible chest tube placement    Dementia  -continue home memantine  -Per family his mentation is been worse the last few weeks.  Afebrile without leukocytosis.  Chest x-ray without any acute infection.  UA with some microscopic hematuria like to be followed up with his PCP as he is on Eliquis at goal.  TSH and B12 within normal limits.    CT head without any acute findings.  -A continue Seroquel while inpatient    Alcohol use disorder  -Drinks about 4 vodkas a day and someone, has never had withdrawal before but does not usually go very long without drinking  -Start CIWA without Ativan.  Patient will be incredibly hard to score accurately with his dementia.  -IV thiamine, folic acid no evidence of alcohol withdrawal at this time  -    SCDs for DVT prophylaxis.  DNR.  Discussed with patient, spouse, family, nursing staff, and consulting provider.  Anticipate discharge home with family in 1-2 days.    Expected discharge date/ time has not been documented.      Solo Springer MD  [facility]  24  10:44 EST            Electronically signed by Solo Springer MD at 24 1046       Solo Springer MD at 24 1151              Name: Bryan Ladners ADMIT: 2024   : 1945  PCP: Jose Fonseca MD    MRN: [id number] LOS: 1 days   AGE/SEX: 79 y.o. male  ROOM: [facility]     Subjective   Subjective   Patient sitting up in chair.  Spouse and daughter at bedside.  Patient states he has some pain with deep breaths but no dyspnea.    Review of Systems   Constitutional:  Positive for fatigue. Negative for chills and fever.   Respiratory:  Positive for cough. Negative for shortness of breath.    Cardiovascular:   Negative for chest pain and palpitations.   Gastrointestinal:  Negative for abdominal pain, diarrhea, nausea and vomiting.     As above    Objective   Objective   Vital Signs  Temp:  [97.5 °F (36.4 °C)-98.3 °F (36.8 °C)] 98.3 °F (36.8 °C)  Heart Rate:  [] 101  Resp:  [14-20] 20  BP: (110-153)/() 153/83  SpO2:  [94 %-99 %] 97 %  on  Flow (L/min):  [2] 2;   Device (Oxygen Therapy): nasal cannula  Body mass index is 36 kg/m².  Physical Exam  Vitals and nursing note reviewed.   Constitutional:       General: He is not in acute distress.  Cardiovascular:      Rate and Rhythm: Normal rate and regular rhythm.   Pulmonary:      Effort: Pulmonary effort is normal. No respiratory distress.   Abdominal:      General: Abdomen is flat. There is no distension.      Tenderness: There is no abdominal tenderness.   Musculoskeletal:         General: No swelling or deformity.      Right lower leg: Edema present.      Left lower leg: Edema present.   Skin:     General: Skin is warm and dry.   Neurological:      General: No focal deficit present.      Mental Status: He is alert. Mental status is at baseline.         Results Review     I reviewed the patient's new clinical results.  Results from last 7 days   Lab Units 02/24/24  0304 02/23/24  2100   WBC 10*3/mm3 5.73 7.04   HEMOGLOBIN g/dL 9.8* 10.7*   PLATELETS 10*3/mm3 94* 97*     Results from last 7 days   Lab Units 02/24/24  0304 02/23/24  2125   SODIUM mmol/L 143 139   POTASSIUM mmol/L 4.9 4.4   CHLORIDE mmol/L 107 103   CO2 mmol/L 25.4 25.7   BUN mg/dL 23 23   CREATININE mg/dL 1.34* 1.47*   GLUCOSE mg/dL 143* 143*   Estimated Creatinine Clearance: 55.6 mL/min (A) (by C-G formula based on SCr of 1.34 mg/dL (H)).  Results from last 7 days   Lab Units 02/23/24  2125   ALBUMIN g/dL 3.6   BILIRUBIN mg/dL 0.6   ALK PHOS U/L 67   AST (SGOT) U/L 45*   ALT (SGPT) U/L 22     Results from last 7 days   Lab Units 02/24/24  0304 02/23/24  2125   CALCIUM mg/dL 8.6 8.5*   ALBUMIN  g/dL  --  3.6       COVID19   Date Value Ref Range Status   09/20/2021 Not Detected Not Detected - Ref. Range Final     SARS-CoV-2, SANDRA   Date Value Ref Range Status   07/20/2020 Not Detected Not Detected Final     Comment:     This test was developed and its performance characteristics determined  by [employer]. This test has not been FDA cleared or  approved. This test has been authorized by FDA under an Emergency Use  Authorization (EUA). This test is only authorized for the duration of  time the declaration that circumstances exist justifying the  authorization of the emergency use of in vitro diagnostic tests for  detection of SARS-CoV-2 virus and/or diagnosis of COVID-19 infection  under section 564(b)(1) of the Act, 21 U.S.C. 360bbb-3(b)(1), unless  the authorization is terminated or revoked sooner.  When diagnostic testing is negative, the possibility of a false  negative result should be considered in the context of a patient's  recent exposures and the presence of clinical signs and symptoms  consistent with COVID-19. An individual without symptoms of COVID-19  and who is not shedding SARS-CoV-2 virus would expect to have a  negative (not detected) result in this assay.     Glucose   Date/Time Value Ref Range Status   02/24/2024 1146 148 (H) 70 - 130 mg/dL Final   02/24/2024 0606 183 (H) 70 - 130 mg/dL Final       CT Head Without Contrast  Narrative: CT HEAD WO CONTRAST    Date of Exam: 2/23/2024 5:25 PM EST    Indication: fall, on Eliquis.    Comparison: 3/21/2019    Technique: Axial CT images were obtained of the head without contrast administration.  Coronal reconstructions were performed.  Automated exposure control and iterative reconstruction methods were used.    Findings: No intracranial hemorrhage. Gray-white matter differentiation is maintained without evidence of an acute infarction. Multiple foci of decreased attenuation are present within the subcortical, deep cerebral, and  periventricular white matter   consistent with chronic small vessel/microangiopathic ischemic changes. No extra-axial mass or collection. The ventricles and sulci are prominent commensurate with involutional changes. The posterior fossa appears grossly normal. Sellar and suprasellar   structures are normal.    Lens replacements.. The paranasal sinuses, ethmoid air cells, and mastoid air cells are aerated. The bony calvarium is intact.  Impression: Impression: No acute intracranial pathology.    Electronically Signed: Jorge L Rhodes MD    2/23/2024 5:55 PM EST    Workstation ID: [id number]  XR Forearm 2 View Right  Narrative: XR FOREARM 2 VW RIGHT    Date of Exam: 2/23/2024 5:22 PM EST    Indication: Fall with bruising    Comparison: None available.    Findings: The right distal humerus, radius and ulna are intact. The visualized carpal bones are intact. Bony alignment is normal. No lytic or blastic disease.  Impression: No acute fracture or dislocation.    Electronically Signed: Jorge L Rhodes MD    2/23/2024 5:59 PM EST    Workstation ID: [id number]  XR Ribs Right With PA Chest  Narrative: XR RIBS RIGHT W PA CHEST    Date of Exam: 2/23/2024 5:23 PM EST    Indication: Pain    Comparison: 9/14/2022    Findings: Moderate size right-sided pneumothorax. The left lung is clear. Left midlung zone atelectatic changes. No mediastinal shift. Cardiac size is normal. The visualized upper abdomen is normal. No displaced acute rib fractures. The clavicles are   intact.  Impression: Moderate-sized right-sided pneumothorax.    Findings were discussed with Dr. Rivera at 6:24 p.m. on 2/23/2024     Electronically Signed: Jorge L Rhodes MD    2/23/2024 6:25 PM EST    Workstation ID: [id number]    I reviewed the patient's daily medications.  Scheduled Medications  allopurinol, 100 mg, Oral, Daily  atorvastatin, 80 mg, Oral, Daily  folic acid, 1 mg, Oral, Daily  furosemide, 20 mg, Oral, Daily  insulin lispro, 2-9 Units, Subcutaneous, 4x Daily AC &  at Bedtime  levothyroxine, 75 mcg, Oral, QAM  lidocaine, 10 mL, Subcutaneous, Once  lidocaine PF 1%, , ,   memantine, 20 mg, Oral, QAM  QUEtiapine, 25 mg, Oral, Nightly  senna-docusate sodium, 2 tablet, Oral, BID  sodium chloride, 10 mL, Intravenous, Q12H  thiamine (B-1) IV, 200 mg, Intravenous, Q8H   Followed by  [START ON 2/29/2024] thiamine, 100 mg, Oral, Daily    Infusions   Diet  NPO Diet NPO Type: Strict NPO        I have personally reviewed:  [x]  Laboratory   [x]  Microbiology   [x]  Radiology   []  EKG/Telemetry   []  Cardiology/Vascular   []  Pathology   [x]  Records     Assessment/Plan     Active Hospital Problems    Diagnosis  POA    **Pneumothorax [J93.9]  Yes    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 [D68.51]  Yes    Chronic deep vein thrombosis (DVT) of popliteal vein of both lower extremities [I82.533]  Yes    Chronic pulmonary embolism without acute cor pulmonale [I27.82]  Yes    Acquired hypothyroidism [E03.9]  Yes    Stage 3b chronic kidney disease [N18.32]  Yes    Type 2 diabetes mellitus with chronic kidney disease, without long-term current use of insulin [E11.22]  Yes    Mixed hyperlipidemia [E78.2]  Yes    Essential hypertension [I10]  Yes      Resolved Hospital Problems   No resolved problems to display.       79 y.o. male admitted with Pneumothorax.    Right-Sided Pneumothorax  - from mechanical fall  - he is hemodynamically stable and he is not hypoxemic  - will provide pain control, monitor on telemetry  -  discussed with Dr. Hendrickson, possibly large blood.  Plan for CT to help delineate     Type 2 DM  - complications include nephropathy  - BG acceptable  - hold actos  - cover with ssi/hypoglycemia protocol     Stage 3b CKD  - baseline serum creatinine is around 1.7  - follows with Dr. Fabian  - monitor chemistries     HTN  - BP acceptable  - hold lisinopril for now      BLE Venous Insufficiency/Edema  -Resume Lasix     Hx of DVT/PE  - heterozygous for Factor V Leiden   - hold eliquis for now given  possible chest tube placement    Dementia  -continue home memantine  -Per family his mentation is been worse the last few weeks.  Afebrile without leukocytosis.  Chest x-ray without any acute infection.  Will check a UA, TSH, B12.  CT head without any acute findings.  -Add seroquel.    Alcohol use disorder  -Drinks about 4 vodkas a day and someone, has never had withdrawal before but does not usually go very long without drinking  -Start CIWA without Ativan.  Patient will be incredibly hard to score accurately with his dementia.  -IV thiamine, folic acid    SCDs for DVT prophylaxis.  DNR.  Discussed with patient, spouse, family, nursing staff, and consulting provider.  Anticipate discharge home with family in 1-2 days.    Expected discharge date/ time has not been documented.      Solo Springer MD  [facility]  02/24/24  11:51 EST            Electronically signed by Solo Springer MD at 02/24/24 1156          Consult Notes (all)        Lani Rosenberg DNP, APRELAINE at 02/24/24 0954        Consult Orders    1. Inpatient Thoracic Surgery Consult [[id number]] ordered by Hemanth Roberts MD              Attestation signed by Bindu Hendrickson MD at 02/24/24 1211    I have reviewed this documentation and agree.                      Inpatient Thoracic Surgery Consult  Consult performed by: Lani Rosenberg DNP, NIRAJ  Consult ordered by: Hemanth Roberts MD          Patient Care Team:  Jose Fonseca MD as PCP - General (Family Medicine)  Jose Michelle MD as Consulting Physician (Radiation Oncology)  Roldan Mccarthy MD as Consulting Physician (Urology)  Clarence Nieves MD (Hematology)  Grover Harris DPM as Consulting Physician (Podiatry)    No chief complaint on file.      Subjective     History of Present Illness    Mr. Landers is a very pleasant 79-year-old gentleman with past medical history as listed below, anticoagulated on Eliquis at home.  He was found down at his  independent living facility after tripping over a bluish, he denied loss of consciousness.  He presented to [facility] with an injury to the right forearm.  He also had mild shortness of breath and was found to have a possible right-sided pneumothorax on chest x-ray and was transferred to [facility] for further evaluation and management.  On exam today, he denies shortness of breath.  He is on nasal cannula.  He is intermittently confused but more oriented today.  His family is present at bedside.      Review of Systems   Constitutional: Negative.    HENT: Negative.     Respiratory:  Negative for shortness of breath.    Cardiovascular:  Positive for chest pain.   Gastrointestinal: Negative.    Endocrine: Negative.    Genitourinary: Negative.    Musculoskeletal: Negative.    Skin: Negative.    Allergic/Immunologic: Negative.    Hematological:  Bruises/bleeds easily.   Psychiatric/Behavioral:  Positive for confusion.         Past Medical History:   Diagnosis Date    At risk for sleep apnea     Bladder mass     Bruises easily     Diabetes mellitus     Disease of thyroid gland     Elevated cholesterol     GI bleed     Gross hematuria 9/22/2021    History of transfusion     Poor historian     Short-term memory loss     Vitamin D deficiency      Past Surgical History:   Procedure Laterality Date    COLONOSCOPY  09/2016    COLONOSCOPY N/A 9/28/2018    Procedure: COLONOSCOPY;  Surgeon: Olaf Gomez MD;  Location: [facility] OR;  Service: Gastroenterology    COLONOSCOPY N/A 1/7/2019    Procedure: COLONOSCOPY;  Surgeon: Rosa Jack MD;  Location: [facility] OR;  Service: Gastroenterology    CYSTOSCOPY BLADDER BIOPSY N/A 9/22/2021    Procedure: CYSTOSCOPY BLADDER BIOPSY;  Surgeon: Roldan Mccarthy MD;  Location: [facility] OR;  Service: Urology;  Laterality: N/A;    HERNIA REPAIR Bilateral     PROSTATE ULTRASOUND BIOPSY      SIGMOIDOSCOPY N/A 10/2/2018    Procedure: FLEXIBLE SIGMOIDOSCOPY:  APC  cautery bleeding using 60 joules;  Surgeon: Olaf Gomez MD;  Location: [facility] ENDOSCOPY;  Service: Gastroenterology    TONSILLECTOMY      TOTAL HIP ARTHROPLASTY Bilateral      Family History   Problem Relation Age of Onset    Stroke Mother     Stroke Father     No Known Problems Brother     Colon cancer Neg Hx     Colon polyps Neg Hx     Malig Hyperthermia Neg Hx      Social History     Socioeconomic History    Marital status:      Spouse name: Chloe    Number of children: 2   Tobacco Use    Smoking status: Former     Packs/day: 0.25     Years: 5.00     Additional pack years: 0.00     Total pack years: 1.25     Types: Cigars, Cigarettes     Quit date: 1986     Years since quittin.1    Smokeless tobacco: Never   Vaping Use    Vaping Use: Never used   Substance and Sexual Activity    Alcohol use: Yes     Alcohol/week: 32.0 - 34.0 standard drinks of alcohol     Types: 4 - 6 Shots of liquor, 28 Standard drinks or equivalent per week     Comment: 2-3 double vodkas a night    Drug use: No    Sexual activity: Yes     Partners: Female     Birth control/protection: Post-menopausal     Medications Prior to Admission   Medication Sig Dispense Refill Last Dose    albuterol sulfate  (90 Base) MCG/ACT inhaler Inhale 2 puffs Every 4 (Four) Hours As Needed for Wheezing. 18 g 0 Unknown    allopurinol (ZYLOPRIM) 100 MG tablet TAKE ONE TABLET BY MOUTH DAILY 90 tablet 3 Unknown    ammonium lactate (AMLACTIN) 12 % cream    Unknown    apixaban (Eliquis) 2.5 MG tablet tablet Take 1 tablet by mouth Every 12 (Twelve) Hours. Indications: history of DVT/PE 60 tablet 11 Unknown    aspirin 81 MG EC tablet Take 1 tablet by mouth Daily. HOLDING FOR SURGERY   Unknown    atorvastatin (LIPITOR) 80 MG tablet TAKE ONE TABLET BY MOUTH DAILY 90 tablet 3 Unknown    cholecalciferol (VITAMIN D3) 1000 UNITS tablet Take 1 tablet by mouth Daily.   Unknown    Cyanocobalamin (VITAMIN B 12 PO) Take 1,000 mg by mouth Every  Morning.   Unknown    fluorouracil (EFUDEX) 5 % cream Apply  topically to the appropriate area as directed 2 (Two) Times a Day.   Unknown    furosemide (LASIX) 20 MG tablet TAKE 1 TABLET BY MOUTH DAILY 90 tablet 1 Unknown    levothyroxine (SYNTHROID, LEVOTHROID) 75 MCG tablet Take 1 tablet by mouth Every Morning. Indications: Underactive Thyroid 90 tablet 3 Unknown    lisinopril (PRINIVIL,ZESTRIL) 20 MG tablet TAKE ONE TABLET BY MOUTH DAILY 90 tablet 3 Unknown    memantine (NAMENDA) 10 MG tablet TAKE TWO TABLETS BY MOUTH EVERY MORNING 180 tablet 3 Unknown    Multiple Vitamins-Minerals (MULTIVITAL PLATINUM PO) Take 1 tablet by mouth Daily. HOLD FOR SURGERY   Unknown    pioglitazone (ACTOS) 30 MG tablet Take 1 tablet by mouth Daily. Indications: Type 2 Diabetes 90 tablet 3 Unknown    POTASSIUM PO Take  by mouth. Dose unknown   Unknown     Allergies   Allergen Reactions    Nsaids GI Bleeding     BLEEDING    Aricept [Donepezil] Diarrhea       Objective      Vital Signs  Temp:  [97.5 °F (36.4 °C)-98.2 °F (36.8 °C)] 98.2 °F (36.8 °C)  Heart Rate:  [] 101  Resp:  [14-20] 20  BP: (110-142)/() 121/74    Intake & Output (last day)         02/23 0701  02/24 0700 02/24 0701  02/25 0700    Urine (mL/kg/hr) 75     Total Output 75     Net -75           Urine Unmeasured Occurrence 1 x             Physical Exam  Constitutional:       General: He is not in acute distress.     Appearance: Normal appearance. He is not ill-appearing.   HENT:      Head: Normocephalic and atraumatic.      Comments: Nasal cannula  Cardiovascular:      Rate and Rhythm: Normal rate.   Pulmonary:      Effort: Pulmonary effort is normal. No prolonged expiration or respiratory distress.      Breath sounds: No wheezing.   Musculoskeletal:      Cervical back: Normal range of motion and neck supple.      Comments: Decreased ROM secondary to weakness, transfers from chair to bed with minimal assistance   Skin:     General: Skin is warm and dry.       Comments: Dressing intact to right forearm   Neurological:      Mental Status: He is alert. Mental status is at baseline.      Motor: Weakness present.   Psychiatric:         Mood and Affect: Mood normal.         Results Review:    I reviewed the patient's new clinical results.  I reviewed the patient's new imaging results and agree with the interpretation.  Discussed with patient, RN, family at bedside and Dr. Springer    Imaging reviewed independently and agree with interpretation.  Moderate right-sided pneumothorax    Imaging Results (Last 24 Hours)       Procedure Component Value Units Date/Time    XR Chest 1 View [[id number]] Resulted: 02/24/24 0627     Updated: 02/24/24 0628            Lab Results:  Lab Results (last 24 hours)       Procedure Component Value Units Date/Time    POC Glucose Once [[id number]]  (Abnormal) Collected: 02/24/24 0606    Specimen: Blood Updated: 02/24/24 0607     Glucose 183 mg/dL     Basic Metabolic Panel [[id number]]  (Abnormal) Collected: 02/24/24 0304    Specimen: Blood Updated: 02/24/24 0452     Glucose 143 mg/dL      BUN 23 mg/dL      Creatinine 1.34 mg/dL      Sodium 143 mmol/L      Potassium 4.9 mmol/L      Chloride 107 mmol/L      CO2 25.4 mmol/L      Calcium 8.6 mg/dL      BUN/Creatinine Ratio 17.2     Anion Gap 10.6 mmol/L      eGFR 53.6 mL/min/1.73     Narrative:      GFR Normal >60  Chronic Kidney Disease <60  Kidney Failure <15    The GFR formula is only valid for adults with stable renal function between ages 18 and 70.    CBC & Differential [[id number]]  (Abnormal) Collected: 02/24/24 0304    Specimen: Blood Updated: 02/24/24 0438    Narrative:      The following orders were created for panel order CBC & Differential.  Procedure                               Abnormality         Status                     ---------                               -----------         ------                     CBC Auto Differential[[id number]]        Abnormal            Final result                  Please view results for these tests on the individual orders.    CBC Auto Differential [[id number]]  (Abnormal) Collected: 02/24/24 0304    Specimen: Blood Updated: 02/24/24 0438     WBC 5.73 10*3/mm3      RBC 2.57 10*6/mm3      Hemoglobin 9.8 g/dL      Hematocrit 28.6 %      .3 fL      MCH 38.1 pg      MCHC 34.3 g/dL      RDW 12.7 %      RDW-SD 50.7 fl      MPV 9.6 fL      Platelets 94 10*3/mm3      Neutrophil % 78.3 %      Lymphocyte % 11.7 %      Monocyte % 9.1 %      Eosinophil % 0.2 %      Basophil % 0.2 %      Neutrophils, Absolute 4.49 10*3/mm3      Lymphocytes, Absolute 0.67 10*3/mm3      Monocytes, Absolute 0.52 10*3/mm3      Eosinophils, Absolute 0.01 10*3/mm3      Basophils, Absolute 0.01 10*3/mm3                 Assessment & Plan       Pneumothorax    Essential hypertension    Mixed hyperlipidemia    Type 2 diabetes mellitus with chronic kidney disease, without long-term current use of insulin    Stage 3b chronic kidney disease    Acquired hypothyroidism    Chronic pulmonary embolism without acute cor pulmonale    Chronic deep vein thrombosis (DVT) of popliteal vein of both lower extremities    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  Likely related to recent fall.  We will plan for CT chest to assess for bullous disease vs. Pneumothorax. Recommend supportive care if bulla, otherwise will plan for bedside chest tube placement by Dr. Hendrickson if he has a true pneumo.    Delirium: Add Seroquel nightly.  Leave lights on during the day and minimal stimulation overnight.    Generalized weakness: We will ask physical therapy to see him.  Appreciate their assistance.      I discussed the patients findings and our recommendations with patient, family, nursing staff, and Dr. Gian Hendrickson    Thank you for this consult and allowing us to participate in the care of your patient.  We will follow along with you during this hospitalization.       Lani  LEONA Rosenberg, APRN  [facility]  02/24/24  09:54 EST      I spent >62 minutes reviewing the patient's chart including medical history, notes, radiographic imaging, labs and performing an assessment and development of a plan and discussion with the patient/family at bedside.      Electronically signed by Bindu Hendrickson MD at 02/24/24 5396

## 2024-02-26 NOTE — PROGRESS NOTES
[facility] Medicine Services  PROGRESS NOTE    Patient Name: Bryan Landers  : 1945  MRN: [id number]    Date of Admission: 2024  Primary Care Physician: Jose Fonseca MD    Subjective   Subjective     CC:  Follow-up for recent injury and pneumothorax    Subjective:  Patient reports pain is reasonably well-controlled.  He reports he is breathing comfortably at this moment.  He denies any other new complaints.    Review of Systems  No current fevers or chills  No current shortness of breath or cough  No current nausea, vomiting, or diarrhea  No current chest pain or palpitations       Objective   Objective     Vital Signs:   Temp:  [98 °F (36.7 °C)-98.4 °F (36.9 °C)] 98.1 °F (36.7 °C)  Heart Rate:  [90-96] 93  Resp:  [16-20] 16  BP: (104-113)/(60-73) 104/60        Physical Exam:  Constitutional:Awake, alert, no acute distress  HENT: NCAT, mucous membranes moist, neck supple  Respiratory: No cough or wheezes, normal respirations, nonlabored breathing, moderate inspiration  Cardiovascular: Pulse rate is normal, palpable radial pulses  Gastrointestinal:  soft, nontender, nondistended  Musculoskeletal: Elderly obese and debilitated, moderate lower extremity edema, BMI 36  Psychiatric: Appropriate affect, cooperative, conversational  Neurologic: No slurred speech or facial droop, follows commands  Skin: No rashes or jaundice, warm      Results Reviewed:  Results from last 7 days   Lab Units 24  04524  0304   WBC 10*3/mm3 3.88 5.86 5.73   HEMOGLOBIN g/dL 8.3* 9.3* 9.8*   HEMATOCRIT % 24.2* 27.2* 28.6*   PLATELETS 10*3/mm3 82* 86* 94*     Results from last 7 days   Lab Units 24  04424  0458 24  0304 24  2125   SODIUM mmol/L 141 141 143 139   POTASSIUM mmol/L 4.0 4.8 4.9 4.4   CHLORIDE mmol/L 107 107 107 103   CO2 mmol/L 25.3 21.4* 25.4 25.7   BUN mg/dL 25* 26* 23 23   CREATININE mg/dL 1.36* 1.42* 1.34* 1.47*   GLUCOSE mg/dL 106* 99 143* 143*    CALCIUM mg/dL 7.6* 7.9* 8.6 8.5*   ALK PHOS U/L  --   --   --  67   ALT (SGPT) U/L  --   --   --  22   AST (SGOT) U/L  --   --   --  45*     Estimated Creatinine Clearance: 54.8 mL/min (A) (by C-G formula based on SCr of 1.36 mg/dL (H)).    Microbiology Results Abnormal       None            Imaging Results (Last 24 Hours)       Procedure Component Value Units Date/Time    XR Chest 1 View [[id number]] Collected: 02/26/24 0848     Updated: 02/26/24 0852    Narrative:      XR CHEST 1 VW-2/26/2024     HISTORY: Follow-up possible pneumothorax.     Heart size is mildly enlarged. The images taken in a somewhat lordotic  position. Pigtail catheter terminates in the right upper hemithorax. No  significant pneumothorax is seen. There is minimal atelectasis in the  right midlung. Small amount of soft tissue air is seen in the right  hemithorax.     Chest appears largely unchanged from yesterday's study.       Impression:      1. Stable appearance of the chest since yesterday.  2. No significant pneumothorax is seen.        This report was finalized on 2/26/2024 8:49 AM by Dr. Orion Alcaraz M.D on Workstation: [id number]                   I have reviewed the medications:  Scheduled Meds:allopurinol, 100 mg, Oral, Daily  atorvastatin, 80 mg, Oral, Daily  folic acid, 1 mg, Oral, Daily  furosemide, 20 mg, Oral, Daily  insulin lispro, 2-9 Units, Subcutaneous, 4x Daily AC & at Bedtime  levothyroxine, 75 mcg, Oral, QAM  memantine, 20 mg, Oral, QAM  QUEtiapine, 25 mg, Oral, Nightly  senna-docusate sodium, 2 tablet, Oral, BID  sodium chloride, 10 mL, Intravenous, Q12H  thiamine (B-1) IV, 200 mg, Intravenous, Q8H   Followed by  [START ON 2/29/2024] thiamine, 100 mg, Oral, Daily      Continuous Infusions:   PRN Meds:.  acetaminophen **OR** acetaminophen **OR** acetaminophen    albuterol    senna-docusate sodium **AND** polyethylene glycol **AND** bisacodyl **AND** bisacodyl    calcium carbonate    dextrose    dextrose    glucagon  (human recombinant)    HYDROcodone-acetaminophen    HYDROmorphone **AND** naloxone    Magnesium Standard Dose Replacement - Follow Nurse / BPA Driven Protocol    melatonin    nitroglycerin    ondansetron ODT **OR** ondansetron    sodium chloride    sodium chloride    Assessment & Plan   Assessment & Plan     Active Hospital Problems    Diagnosis  POA    **Pneumothorax [J93.9]  Yes    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 [D68.51]  Yes    Chronic deep vein thrombosis (DVT) of popliteal vein of both lower extremities [I82.533]  Yes    Chronic pulmonary embolism without acute cor pulmonale [I27.82]  Yes    Acquired hypothyroidism [E03.9]  Yes    Stage 3b chronic kidney disease [N18.32]  Yes    Type 2 diabetes mellitus with chronic kidney disease, without long-term current use of insulin [E11.22]  Yes    Mixed hyperlipidemia [E78.2]  Yes    Essential hypertension [I10]  Yes      Resolved Hospital Problems   No resolved problems to display.        Brief Hospital Course to date:  Bryan Landers is a 79 y.o. male  with Pneumothorax.     Discussion/plan for today:  All medical problems are new under my management today.  Labs, vitals, and imaging reviewed.  Most recent chest x-ray images reviewed and pneumothorax appears to be resolved.  Continue supportive care and symptom treatment.  Plan to discuss with cardiothoracic surgery.  Possible removal of chest tube today.  Case discussed with case management team who is working on possible SNF placement.  Patient is very eager to discharge from the hospital and family feel he will need some time to recover.  Restart Eliquis once cleared by thoracic surgery team.  Continue to hold lisinopril.  No signs of withdrawal.  I have counseled patient to use walking assist devices when he is in the yard such as a cane or walker and patient is agreeable with this plan.  Treatment plan discussed with patient his family who were in agreement.  Also continue plan as outlined by my partner  previously.    Right-Sided Pneumothorax  Multiple right-sided rib fractures  - from mechanical fall  - cardiothoracic surgery consulted, patient recommendations     Type 2 DM  - complications include nephropathy  - BG at goal   - hold actos  - cover with ssi/hypoglycemia protocol     Stage 3b CKD  - baseline serum creatinine is around 1.7  - follows with Dr. Fabian  - monitor chemistries     HTN  - BP acceptable  - hold lisinopril for now      BLE Venous Insufficiency/Edema  -Resume Lasix     Hx of DVT/PE  - heterozygous for Factor V Leiden   - hold eliquis for now given possible chest tube placement     Dementia  -continue home memantine  -Per family his mentation is been worse the last few weeks.  Afebrile without leukocytosis.  Chest x-ray without any acute infection.  UA with some microscopic hematuria like to be followed up with his PCP as he is on Eliquis at goal.  TSH and B12 within normal limits.    CT head without any acute findings.  -continue Seroquel while inpatient     Alcohol use disorder  -Drinks about 4 vodkas a day and someone, has never had withdrawal before but does not usually go very long without drinking  -Start CIWA without Ativan.  Patient will be incredibly hard to score accurately with his dementia.  -IV thiamine, folic acid no evidence of alcohol withdrawal at this time  -     SCDs for DVT prophylaxis.  DNR.  Discussed with patient, spouse, family, nursing staff, and consulting provider.  Anticipate discharge home with family in 1-2 days.    CODE STATUS:   Code Status and Medical Interventions:   Ordered at: 02/24/24 1154     Code Status (Patient has no pulse and is not breathing):    No CPR (Do Not Attempt to Resuscitate)     Medical Interventions (Patient has pulse or is breathing):    Full Support       Olaf Gabriel MD  02/26/24

## 2024-02-26 NOTE — PROGRESS NOTES
Nutrition Services    Patient Name:  Bryan Landers  YOB: 1945  MRN: [id number]  Admit Date:  2/23/2024    Assessment Date:  02/26/24    Summary: Nutrition assessment initiated due to skin issues. Pt was admitted with PTX, multiple rib fx's. Weight hx reviewed - stable. Obese with BMI 36. Pt has a PI to coccyx and MASD  to hip. Images reviewed. Pt c/o his appetite isn't very good at this time but typically eats well. Per flowsheet, 0-25% of meals.  Encouraged good po to support healing, pt agreeable to try to eat better. May need supplements if doesn't improve.    Plan/Recommendations:  Encourage po intake  Monitor lytes and replace as needed.    Will follow clinical course, nutrition needs.      CLINICAL NUTRITION ASSESSMENT      Reason for Assessment Pressure Injury and/or Non-Healing Wound     Diagnosis/Problem   PTX, multiple rib fx's   Medical/Surgical History Past Medical History:   Diagnosis Date    At risk for sleep apnea     Bladder mass     Bruises easily     Diabetes mellitus     Disease of thyroid gland     Elevated cholesterol     GI bleed     Gross hematuria 9/22/2021    History of transfusion     Poor historian     Short-term memory loss     Vitamin D deficiency        Past Surgical History:   Procedure Laterality Date    COLONOSCOPY  09/2016    COLONOSCOPY N/A 9/28/2018    Procedure: COLONOSCOPY;  Surgeon: Olaf Gomez MD;  Location: [facility] OR;  Service: Gastroenterology    COLONOSCOPY N/A 1/7/2019    Procedure: COLONOSCOPY;  Surgeon: Rosa Jack MD;  Location: [facility] OR;  Service: Gastroenterology    CYSTOSCOPY BLADDER BIOPSY N/A 9/22/2021    Procedure: CYSTOSCOPY BLADDER BIOPSY;  Surgeon: Roldan Mccarthy MD;  Location: [facility] OR;  Service: Urology;  Laterality: N/A;    HERNIA REPAIR Bilateral     PROSTATE ULTRASOUND BIOPSY      SIGMOIDOSCOPY N/A 10/2/2018    Procedure: FLEXIBLE SIGMOIDOSCOPY:  APC cautery bleeding using 60 joules;  Surgeon: Patricia  "Olaf Abarca MD;  Location: [facility] ENDOSCOPY;  Service: Gastroenterology    TONSILLECTOMY      TOTAL HIP ARTHROPLASTY Bilateral 2007        Anthropometrics        Current Height  Current Weight  BMI kg/m2 Height: 177.8 cm (70\")  Weight: 114 kg (250 lb 14.1 oz) (02/24/24 0500)  Body mass index is 36 kg/m².   Adjusted BMI (if applicable)    BMI Category Obese, Class II (35 - 39.9)   Ideal Body Weight (IBW) 73kg   Usual Body Weight (UBW) 113kg   Weight Trend Stable   Weight History Wt Readings from Last 30 Encounters:   02/24/24 0500 114 kg (250 lb 14.1 oz)   02/23/24 2300 114 kg (250 lb 14.1 oz)   02/23/24 1623 109 kg (240 lb)   01/11/24 1530 112 kg (248 lb)   12/05/23 1336 113 kg (250 lb)   05/24/23 0944 112 kg (247 lb)   02/27/23 0937 112 kg (246 lb)   02/20/23 1333 110 kg (243 lb)   02/16/23 0940 109 kg (241 lb)   02/14/23 1509 109 kg (241 lb)   09/26/22 1358 113 kg (250 lb)   09/14/22 0852 113 kg (250 lb)   08/29/22 1111 113 kg (250 lb)   07/21/22 1028 114 kg (252 lb)   06/08/22 0914 119 kg (263 lb)   02/09/22 1344 114 kg (251 lb)   01/13/22 1019 112 kg (247 lb)   10/13/21 0934 111 kg (245 lb)   09/22/21 1415 113 kg (249 lb)   09/20/21 1532 113 kg (249 lb 3.2 oz)   08/27/21 1022 113 kg (249 lb 12.8 oz)   07/09/21 1411 112 kg (247 lb)   02/04/21 0853 111 kg (244 lb 1.6 oz)   01/21/21 0822 109 kg (239 lb 6.4 oz)   12/22/20 1315 104 kg (230 lb)   12/08/20 1318 110 kg (242 lb)   08/12/20 1458 112 kg (246 lb 9.6 oz)   07/20/20 1215 111 kg (245 lb)   02/14/20 1116 114 kg (251 lb)   01/30/20 1026 115 kg (253 lb)   10/29/19 0956 114 kg (252 lb)      --  Labs       Pertinent Labs    Results from last 7 days   Lab Units 02/26/24  0441 02/25/24  0458 02/24/24  0304 02/23/24  2125   SODIUM mmol/L 141 141 143 139   POTASSIUM mmol/L 4.0 4.8 4.9 4.4   CHLORIDE mmol/L 107 107 107 103   CO2 mmol/L 25.3 21.4* 25.4 25.7   BUN mg/dL 25* 26* 23 23   CREATININE mg/dL 1.36* 1.42* 1.34* 1.47*   CALCIUM mg/dL 7.6* 7.9* 8.6 8.5* "   BILIRUBIN mg/dL  --   --   --  0.6   ALK PHOS U/L  --   --   --  67   ALT (SGPT) U/L  --   --   --  22   AST (SGOT) U/L  --   --   --  45*   GLUCOSE mg/dL 106* 99 143* 143*     Results from last 7 days   Lab Units 02/26/24  0441 02/24/24  0304 02/23/24  2125   HEMOGLOBIN g/dL 8.3*   < >  --    HEMATOCRIT % 24.2*   < >  --    WBC 10*3/mm3 3.88   < >  --    ALBUMIN g/dL  --   --  3.6    < > = values in this interval not displayed.     Results from last 7 days   Lab Units 02/26/24 0441 02/25/24  0458 02/24/24  0304 02/23/24  2100   PLATELETS 10*3/mm3 82* 86* 94* 97*     COVID19   Date Value Ref Range Status   09/20/2021 Not Detected Not Detected - Ref. Range Final     Lab Results   Component Value Date    HGBA1C 5.80 (H) 11/22/2023          Medications           Scheduled Medications allopurinol, 100 mg, Oral, Daily  atorvastatin, 80 mg, Oral, Daily  folic acid, 1 mg, Oral, Daily  furosemide, 20 mg, Oral, Daily  insulin lispro, 2-9 Units, Subcutaneous, 4x Daily AC & at Bedtime  levothyroxine, 75 mcg, Oral, QAM  memantine, 20 mg, Oral, QAM  QUEtiapine, 25 mg, Oral, Nightly  senna-docusate sodium, 2 tablet, Oral, BID  sodium chloride, 10 mL, Intravenous, Q12H  thiamine (B-1) IV, 200 mg, Intravenous, Q8H   Followed by  [START ON 2/29/2024] thiamine, 100 mg, Oral, Daily       Infusions     PRN Medications   acetaminophen **OR** acetaminophen **OR** acetaminophen    albuterol    senna-docusate sodium **AND** polyethylene glycol **AND** bisacodyl **AND** bisacodyl    calcium carbonate    dextrose    dextrose    glucagon (human recombinant)    HYDROcodone-acetaminophen    HYDROmorphone **AND** naloxone    Magnesium Standard Dose Replacement - Follow Nurse / BPA Driven Protocol    melatonin    nitroglycerin    ondansetron ODT **OR** ondansetron    sodium chloride    sodium chloride     Physical Findings          General Findings alert, disoriented, obese   Oral/Mouth Cavity tooth or teeth missing   Edema  generalized, 2+  (mild)   Gastrointestinal last bowel movement: 2/25   Skin  MASD, pressure injury: coccyx, skin tear   Tubes/Drains/Lines chest tube   NFPE No clinical signs of muscle wasting or fat loss   --  Current Nutrition Orders & Evaluation of Intake       Oral Nutrition     Food Allergies NKFA   Current PO Diet Diet: Regular/House Diet, Diabetic Diets; Consistent Carbohydrate; Texture: Regular Texture (IDDSI 7); Fluid Consistency: Thin (IDDSI 0)   Supplement n/a   PO Evaluation     % PO Intake 0-25%    Factors Affecting Intake: decreased appetite   --  PES STATEMENT / NUTRITION DIAGNOSIS      Nutrition Dx Problem  Problem: Inadequate Oral Intake  Etiology: Medical Diagnosis - PTX, multiple rib fx's    Signs/Symptoms: Report of Minimal PO Intake     NUTRITION INTERVENTION / PLAN OF CARE      Intervention Goal(s) Maintain nutrition status, Tolerate PO , Increase intake, and No significant weight loss         RD Intervention/Action Interview for preferences, Encourage intake, Continue to monitor, and Care plan reviewed   --      Prescription/Orders:       PO Diet       Supplements       Enteral Nutrition       Parenteral Nutrition    New Prescription Ordered? No changes at this time   --      Monitor/Evaluation Per protocol   Discharge Plan/Needs Pending clinical course   --    RD to follow per protocol.      Electronically signed by:  Esther Ellison RD  02/26/24 12:36 EST

## 2024-02-26 NOTE — DISCHARGE PLACEMENT REQUEST
"Bryan Patel (79 y.o. Male)       Date of Birth   1945    Social Security Number       Address   [address] [place], [place] [address]    Home Phone   [phone]    MRN   [id number]       Anglican   None    Marital Status                               Admission Date   2/23/24    Admission Type   Urgent    Admitting Provider   Hemanth Roberts MD    Attending Provider   Olaf Gabriel MD    Department, Room/Bed   [address], E551/1       Discharge Date       Discharge Disposition       Discharge Destination                                 Attending Provider: Olaf Gabriel MD    Allergies: Nsaids, Aricept [Donepezil]    Isolation: None   Infection: None   Code Status: No CPR    Ht: 177.8 cm (70\")   Wt: 114 kg (250 lb 14.1 oz)    Admission Cmt: None   Principal Problem: Pneumothorax [J93.9]                   Active Insurance as of 2/23/2024       Primary Coverage       Payor Plan Insurance Group Employer/Plan Group    AETNA MEDICARE REPLACEMENT AETNA MED ADV PPO [id number]       Payor Plan Address Payor Plan Phone Number Payor Plan Fax Number Effective Dates    [address] [phone]  1/1/2024 - None Entered    [place] [address]         Subscriber Name Subscriber Birth Date Member ID       BRYAN PATEL 1945 [id number]                     Emergency Contacts        (Rel.) Home Phone Work Phone Mobile Phone    Chloe Patel (Spouse) -- -- [phone]    Isi Ellison (Daughter) -- -- [phone]              Emergency Contact Information       Name Relation Home Work Mobile    AnshulChloe Spouse   [phone]    Isi Ellison Daughter   [phone]          "

## 2024-02-26 NOTE — PROGRESS NOTES
"    Chief Complaint: Right pneumothorax, rib fractures  S/P: Right chest tube placement  POD # 2    Subjective  Continues to improve. Off oxygen. Denies significant pain or SOA. Resting comfortably in bed. Asking about discharge.     Vital Signs:  Temp:  [98 °F (36.7 °C)-98.4 °F (36.9 °C)] 98.1 °F (36.7 °C)  Heart Rate:  [93-96] 93  Resp:  [16-20] 16  BP: (104-113)/(60-73) 104/60    Intake & Output (last day)         02/25 0701  02/26 0700 02/26 0701  02/27 0700    P.O. 300 480    Total Intake(mL/kg) 300 (2.6) 480 (4.2)    Urine (mL/kg/hr) 350 (0.1)     Stool 1     Chest Tube 20     Total Output 371     Net -71 +480          Urine Unmeasured Occurrence 1 x             Objective:  General Appearance:  Comfortable and well-appearing.    Vital signs: (most recent): Blood pressure 104/60, pulse 93, temperature 98.1 °F (36.7 °C), temperature source Oral, resp. rate 16, height 177.8 cm (70\"), weight 114 kg (250 lb 14.1 oz), SpO2 96%.    HEENT: (Nasal cannula)    Lungs:  Normal effort.  He is not in respiratory distress.  There are decreased breath sounds.    Heart: Normal rate.    Chest: Chest wall tenderness present.    Extremities: Decreased range of motion.  There is no dependent edema.    Neurological: Patient is alert.    Skin:  Warm and dry.                Chest tube:   Site: Right, Clean, Dry, Intact, and Securement device intact  Suction: waterseal  Air Leak: negative  24 Hour Total: 20    Results Review:     I reviewed the patient's new clinical results.  I reviewed the patient's new imaging results and agree with the interpretation.  I reviewed the patient's other test results and agree with the interpretation  Discussed with patient, RN, family bedside and Dr. Hendrickson    Imaging reviewed independently and agree with interpretation.  No new PTX with chest tube to waterseal.    Imaging Results (Last 24 Hours)       Procedure Component Value Units Date/Time    XR Chest 1 View [[id number]] Collected: 02/26/24 0848     " Updated: 02/26/24 0852    Narrative:      XR CHEST 1 VW-2/26/2024     HISTORY: Follow-up possible pneumothorax.     Heart size is mildly enlarged. The images taken in a somewhat lordotic  position. Pigtail catheter terminates in the right upper hemithorax. No  significant pneumothorax is seen. There is minimal atelectasis in the  right midlung. Small amount of soft tissue air is seen in the right  hemithorax.     Chest appears largely unchanged from yesterday's study.       Impression:      1. Stable appearance of the chest since yesterday.  2. No significant pneumothorax is seen.        This report was finalized on 2/26/2024 8:49 AM by Dr. Orion Alcaraz M.D on Workstation: [id number]               Lab Results:     Lab Results (last 24 hours)       Procedure Component Value Units Date/Time    POC Glucose Once [[id number]]  (Abnormal) Collected: 02/26/24 1046    Specimen: Blood Updated: 02/26/24 1047     Glucose 185 mg/dL     POC Glucose Once [[id number]]  (Normal) Collected: 02/26/24 0559    Specimen: Blood Updated: 02/26/24 0611     Glucose 105 mg/dL     Basic Metabolic Panel [[id number]]  (Abnormal) Collected: 02/26/24 0441    Specimen: Blood Updated: 02/26/24 0536     Glucose 106 mg/dL      BUN 25 mg/dL      Creatinine 1.36 mg/dL      Sodium 141 mmol/L      Potassium 4.0 mmol/L      Chloride 107 mmol/L      CO2 25.3 mmol/L      Calcium 7.6 mg/dL      BUN/Creatinine Ratio 18.4     Anion Gap 8.7 mmol/L      eGFR 52.6 mL/min/1.73     Narrative:      GFR Normal >60  Chronic Kidney Disease <60  Kidney Failure <15    The GFR formula is only valid for adults with stable renal function between ages 18 and 70.    CBC & Differential [[id number]]  (Abnormal) Collected: 02/26/24 0441    Specimen: Blood Updated: 02/26/24 0523    Narrative:      The following orders were created for panel order CBC & Differential.  Procedure                               Abnormality         Status                     ---------                                -----------         ------                     CBC Auto Differential[[id number]]        Abnormal            Final result                 Please view results for these tests on the individual orders.    CBC Auto Differential [[id number]]  (Abnormal) Collected: 02/26/24 0441    Specimen: Blood Updated: 02/26/24 0523     WBC 3.88 10*3/mm3      RBC 2.22 10*6/mm3      Hemoglobin 8.3 g/dL      Hematocrit 24.2 %      .0 fL      MCH 37.4 pg      MCHC 34.3 g/dL      RDW 12.4 %      RDW-SD 48.6 fl      MPV 10.0 fL      Platelets 82 10*3/mm3      Neutrophil % 68.0 %      Lymphocyte % 15.7 %      Monocyte % 12.9 %      Eosinophil % 2.8 %      Basophil % 0.3 %      Neutrophils, Absolute 2.64 10*3/mm3      Lymphocytes, Absolute 0.61 10*3/mm3      Monocytes, Absolute 0.50 10*3/mm3      Eosinophils, Absolute 0.11 10*3/mm3      Basophils, Absolute 0.01 10*3/mm3     POC Glucose Once [[id number]]  (Abnormal) Collected: 02/25/24 2048    Specimen: Blood Updated: 02/25/24 2049     Glucose 150 mg/dL     POC Glucose Once [[id number]]  (Abnormal) Collected: 02/25/24 1630    Specimen: Blood Updated: 02/25/24 1632     Glucose 168 mg/dL              Assessment & Plan       Pneumothorax    Essential hypertension    Mixed hyperlipidemia    Type 2 diabetes mellitus with chronic kidney disease, without long-term current use of insulin    Stage 3b chronic kidney disease    Acquired hypothyroidism    Chronic pulmonary embolism without acute cor pulmonale    Chronic deep vein thrombosis (DVT) of popliteal vein of both lower extremities    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       Assessment & Plan    Right pneumothorax: Status post bedside chest tube placement on 2/24/2024.  Resolved on follow-up imaging.  No airleak to chest tube today. Chest tube removed at the bedside without difficulty. We will recheck a chest x-ray in the morning.  Continue to perform incentive spirometry 10 times per hour.    Multiple rib fractures: As seen on CT chest.   Patient denies significant pain.  Mostly domal analgesic regimen available.  Increase mobilization as able.      Lani Rosenberg DNP, APRN  [facility]  02/26/24  15:13 EST

## 2024-02-27 ENCOUNTER — HOSPITAL ENCOUNTER (INPATIENT)
Facility: HOSPITAL | Age: 79
LOS: 3 days | Discharge: HOME | DRG: 200 | End: 2024-03-02
Attending: EMERGENCY MEDICINE | Admitting: INTERNAL MEDICINE
Payer: MEDICARE

## 2024-02-27 ENCOUNTER — APPOINTMENT (OUTPATIENT)
Dept: GENERAL RADIOLOGY | Facility: HOSPITAL | Age: 79
DRG: 200 | End: 2024-02-27
Payer: MEDICARE

## 2024-02-27 ENCOUNTER — READMISSION MANAGEMENT (OUTPATIENT)
Dept: CALL CENTER | Facility: HOSPITAL | Age: 79
End: 2024-02-27
Payer: MEDICARE

## 2024-02-27 ENCOUNTER — TRANSCRIBE ORDERS (OUTPATIENT)
Dept: HOME HEALTH SERVICES | Facility: HOME HEALTHCARE | Age: 79
End: 2024-02-27
Payer: MEDICARE

## 2024-02-27 ENCOUNTER — HOME HEALTH ADMISSION (OUTPATIENT)
Dept: HOME HEALTH SERVICES | Facility: HOME HEALTHCARE | Age: 79
End: 2024-02-27
Payer: COMMERCIAL

## 2024-02-27 ENCOUNTER — TELEPHONE (OUTPATIENT)
Dept: SURGERY | Facility: CLINIC | Age: 79
End: 2024-02-27
Payer: MEDICARE

## 2024-02-27 ENCOUNTER — DOCUMENTATION (OUTPATIENT)
Dept: HOME HEALTH SERVICES | Facility: HOME HEALTHCARE | Age: 79
End: 2024-02-27
Payer: MEDICARE

## 2024-02-27 VITALS
HEIGHT: 70 IN | WEIGHT: 250.88 LBS | OXYGEN SATURATION: 97 % | DIASTOLIC BLOOD PRESSURE: 64 MMHG | BODY MASS INDEX: 35.92 KG/M2 | TEMPERATURE: 97.8 F | SYSTOLIC BLOOD PRESSURE: 120 MMHG | RESPIRATION RATE: 16 BRPM | HEART RATE: 88 BPM

## 2024-02-27 DIAGNOSIS — J93.9 PNEUMOTHORAX ON RIGHT: Primary | ICD-10-CM

## 2024-02-27 DIAGNOSIS — I82.5Y9 CHRONIC DEEP VEIN THROMBOSIS (DVT) OF PROXIMAL VEIN OF LOWER EXTREMITY, UNSPECIFIED LATERALITY: ICD-10-CM

## 2024-02-27 DIAGNOSIS — I10 HYPERTENSION, UNSPECIFIED TYPE: ICD-10-CM

## 2024-02-27 DIAGNOSIS — F03.90 DEMENTIA WITHOUT BEHAVIORAL DISTURBANCE, PSYCHOTIC DISTURBANCE, MOOD DISTURBANCE, OR ANXIETY, UNSPECIFIED DEMENTIA SEVERITY, UNSPECIFIED DEMENTIA TYPE: ICD-10-CM

## 2024-02-27 DIAGNOSIS — N18.32 STAGE 3B CHRONIC KIDNEY DISEASE: ICD-10-CM

## 2024-02-27 DIAGNOSIS — D68.51 FACTOR V LEIDEN: ICD-10-CM

## 2024-02-27 DIAGNOSIS — S27.0XXA TRAUMATIC PNEUMOTHORAX, INITIAL ENCOUNTER: Primary | ICD-10-CM

## 2024-02-27 PROBLEM — E66.812 CLASS 2 SEVERE OBESITY WITH SERIOUS COMORBIDITY IN ADULT: Status: ACTIVE | Noted: 2024-02-27

## 2024-02-27 PROBLEM — E66.01 CLASS 2 SEVERE OBESITY WITH SERIOUS COMORBIDITY IN ADULT: Status: ACTIVE | Noted: 2024-02-27

## 2024-02-27 PROBLEM — S22.41XA FRACTURE OF MULTIPLE RIBS OF RIGHT SIDE: Status: ACTIVE | Noted: 2024-02-27

## 2024-02-27 LAB
ANION GAP SERPL CALCULATED.3IONS-SCNC: 10 MMOL/L (ref 5–15)
ANION GAP SERPL CALCULATED.3IONS-SCNC: 11.8 MMOL/L (ref 5–15)
BASOPHILS # BLD AUTO: 0.02 10*3/MM3 (ref 0–0.2)
BASOPHILS # BLD AUTO: 0.02 10*3/MM3 (ref 0–0.2)
BASOPHILS NFR BLD AUTO: 0.4 % (ref 0–1.5)
BASOPHILS NFR BLD AUTO: 0.5 % (ref 0–1.5)
BUN SERPL-MCNC: 24 MG/DL (ref 8–23)
BUN SERPL-MCNC: 27 MG/DL (ref 8–23)
BUN/CREAT SERPL: 17.1 (ref 7–25)
BUN/CREAT SERPL: 18.8 (ref 7–25)
CALCIUM SPEC-SCNC: 7.9 MG/DL (ref 8.6–10.5)
CALCIUM SPEC-SCNC: 8.5 MG/DL (ref 8.6–10.5)
CHLORIDE SERPL-SCNC: 104 MMOL/L (ref 98–107)
CHLORIDE SERPL-SCNC: 105 MMOL/L (ref 98–107)
CO2 SERPL-SCNC: 23.2 MMOL/L (ref 22–29)
CO2 SERPL-SCNC: 26 MMOL/L (ref 22–29)
CREAT SERPL-MCNC: 1.4 MG/DL (ref 0.76–1.27)
CREAT SERPL-MCNC: 1.44 MG/DL (ref 0.76–1.27)
DEPRECATED RDW RBC AUTO: 50.1 FL (ref 37–54)
DEPRECATED RDW RBC AUTO: 52.4 FL (ref 37–54)
EGFRCR SERPLBLD CKD-EPI 2021: 49.1 ML/MIN/1.73
EGFRCR SERPLBLD CKD-EPI 2021: 50.8 ML/MIN/1.73
EOSINOPHIL # BLD AUTO: 0.09 10*3/MM3 (ref 0–0.4)
EOSINOPHIL # BLD AUTO: 0.13 10*3/MM3 (ref 0–0.4)
EOSINOPHIL NFR BLD AUTO: 1.9 % (ref 0.3–6.2)
EOSINOPHIL NFR BLD AUTO: 3.2 % (ref 0.3–6.2)
ERYTHROCYTE [DISTWIDTH] IN BLOOD BY AUTOMATED COUNT: 12.7 % (ref 12.3–15.4)
ERYTHROCYTE [DISTWIDTH] IN BLOOD BY AUTOMATED COUNT: 12.8 % (ref 12.3–15.4)
GLUCOSE BLDC GLUCOMTR-MCNC: 112 MG/DL (ref 70–130)
GLUCOSE SERPL-MCNC: 101 MG/DL (ref 65–99)
GLUCOSE SERPL-MCNC: 136 MG/DL (ref 65–99)
HCT VFR BLD AUTO: 27.1 % (ref 37.5–51)
HCT VFR BLD AUTO: 28.8 % (ref 37.5–51)
HGB BLD-MCNC: 9.3 G/DL (ref 13–17.7)
HGB BLD-MCNC: 9.9 G/DL (ref 13–17.7)
IMM GRANULOCYTES # BLD AUTO: 0.02 10*3/MM3 (ref 0–0.05)
IMM GRANULOCYTES NFR BLD AUTO: 0.4 % (ref 0–0.5)
LYMPHOCYTES # BLD AUTO: 0.64 10*3/MM3 (ref 0.7–3.1)
LYMPHOCYTES # BLD AUTO: 0.77 10*3/MM3 (ref 0.7–3.1)
LYMPHOCYTES NFR BLD AUTO: 13.8 % (ref 19.6–45.3)
LYMPHOCYTES NFR BLD AUTO: 19.1 % (ref 19.6–45.3)
MCH RBC QN AUTO: 37.9 PG (ref 26.6–33)
MCH RBC QN AUTO: 38.4 PG (ref 26.6–33)
MCHC RBC AUTO-ENTMCNC: 34.3 G/DL (ref 31.5–35.7)
MCHC RBC AUTO-ENTMCNC: 34.4 G/DL (ref 31.5–35.7)
MCV RBC AUTO: 110.3 FL (ref 79–97)
MCV RBC AUTO: 112 FL (ref 79–97)
MONOCYTES # BLD AUTO: 0.63 10*3/MM3 (ref 0.1–0.9)
MONOCYTES # BLD AUTO: 0.75 10*3/MM3 (ref 0.1–0.9)
MONOCYTES NFR BLD AUTO: 15.6 % (ref 5–12)
MONOCYTES NFR BLD AUTO: 16.2 % (ref 5–12)
NEUTROPHILS NFR BLD AUTO: 2.47 10*3/MM3 (ref 1.7–7)
NEUTROPHILS NFR BLD AUTO: 3.12 10*3/MM3 (ref 1.7–7)
NEUTROPHILS NFR BLD AUTO: 61.1 % (ref 42.7–76)
NEUTROPHILS NFR BLD AUTO: 67.3 % (ref 42.7–76)
NRBC BLD AUTO-RTO: 0 /100 WBC (ref 0–0.2)
PLATELET # BLD AUTO: 106 10*3/MM3 (ref 140–450)
PLATELET # BLD AUTO: 123 10*3/MM3 (ref 140–450)
PMV BLD AUTO: 9.5 FL (ref 6–12)
PMV BLD AUTO: 9.7 FL (ref 6–12)
POTASSIUM SERPL-SCNC: 4.4 MMOL/L (ref 3.5–5.2)
POTASSIUM SERPL-SCNC: 4.8 MMOL/L (ref 3.5–5.2)
RBC # BLD AUTO: 2.42 10*6/MM3 (ref 4.14–5.8)
RBC # BLD AUTO: 2.61 10*6/MM3 (ref 4.14–5.8)
SODIUM SERPL-SCNC: 140 MMOL/L (ref 136–145)
SODIUM SERPL-SCNC: 140 MMOL/L (ref 136–145)
WBC NRBC COR # BLD AUTO: 4.04 10*3/MM3 (ref 3.4–10.8)
WBC NRBC COR # BLD AUTO: 4.64 10*3/MM3 (ref 3.4–10.8)

## 2024-02-27 PROCEDURE — G0378 HOSPITAL OBSERVATION PER HR: HCPCS

## 2024-02-27 PROCEDURE — 71045 X-RAY EXAM CHEST 1 VIEW: CPT

## 2024-02-27 PROCEDURE — 99232 SBSQ HOSP IP/OBS MODERATE 35: CPT

## 2024-02-27 PROCEDURE — 85025 COMPLETE CBC W/AUTO DIFF WBC: CPT | Performed by: EMERGENCY MEDICINE

## 2024-02-27 PROCEDURE — 25010000002 THIAMINE PER 100 MG: Performed by: STUDENT IN AN ORGANIZED HEALTH CARE EDUCATION/TRAINING PROGRAM

## 2024-02-27 PROCEDURE — 80048 BASIC METABOLIC PNL TOTAL CA: CPT | Performed by: INTERNAL MEDICINE

## 2024-02-27 PROCEDURE — 80048 BASIC METABOLIC PNL TOTAL CA: CPT | Performed by: EMERGENCY MEDICINE

## 2024-02-27 PROCEDURE — 82948 REAGENT STRIP/BLOOD GLUCOSE: CPT

## 2024-02-27 PROCEDURE — 85025 COMPLETE CBC W/AUTO DIFF WBC: CPT | Performed by: INTERNAL MEDICINE

## 2024-02-27 PROCEDURE — 99285 EMERGENCY DEPT VISIT HI MDM: CPT

## 2024-02-27 RX ORDER — LEVOTHYROXINE SODIUM 0.07 MG/1
75 TABLET ORAL EVERY MORNING
Status: DISCONTINUED | OUTPATIENT
Start: 2024-02-28 | End: 2024-03-02 | Stop reason: HOSPADM

## 2024-02-27 RX ORDER — NITROGLYCERIN 0.4 MG/1
0.4 TABLET SUBLINGUAL
Status: DISCONTINUED | OUTPATIENT
Start: 2024-02-27 | End: 2024-03-02 | Stop reason: HOSPADM

## 2024-02-27 RX ORDER — POLYETHYLENE GLYCOL 3350 17 G/17G
17 POWDER, FOR SOLUTION ORAL DAILY PRN
Status: DISCONTINUED | OUTPATIENT
Start: 2024-02-27 | End: 2024-03-02 | Stop reason: HOSPADM

## 2024-02-27 RX ORDER — ACETAMINOPHEN 650 MG/1
650 SUPPOSITORY RECTAL EVERY 4 HOURS PRN
Status: DISCONTINUED | OUTPATIENT
Start: 2024-02-27 | End: 2024-03-02 | Stop reason: HOSPADM

## 2024-02-27 RX ORDER — ALBUTEROL SULFATE 2.5 MG/3ML
2.5 SOLUTION RESPIRATORY (INHALATION) EVERY 6 HOURS PRN
Status: DISCONTINUED | OUTPATIENT
Start: 2024-02-27 | End: 2024-03-02 | Stop reason: HOSPADM

## 2024-02-27 RX ORDER — ACETAMINOPHEN 325 MG/1
650 TABLET ORAL EVERY 6 HOURS PRN
Start: 2024-02-27

## 2024-02-27 RX ORDER — ATORVASTATIN CALCIUM 80 MG/1
80 TABLET, FILM COATED ORAL DAILY
Status: DISCONTINUED | OUTPATIENT
Start: 2024-02-28 | End: 2024-03-02 | Stop reason: HOSPADM

## 2024-02-27 RX ORDER — ALLOPURINOL 100 MG/1
100 TABLET ORAL DAILY
Status: DISCONTINUED | OUTPATIENT
Start: 2024-02-28 | End: 2024-03-02 | Stop reason: HOSPADM

## 2024-02-27 RX ORDER — ACETAMINOPHEN 325 MG/1
650 TABLET ORAL EVERY 4 HOURS PRN
Status: DISCONTINUED | OUTPATIENT
Start: 2024-02-27 | End: 2024-03-02 | Stop reason: HOSPADM

## 2024-02-27 RX ORDER — AMOXICILLIN 250 MG
2 CAPSULE ORAL 2 TIMES DAILY PRN
Status: DISCONTINUED | OUTPATIENT
Start: 2024-02-27 | End: 2024-03-02 | Stop reason: HOSPADM

## 2024-02-27 RX ORDER — SODIUM CHLORIDE 9 MG/ML
40 INJECTION, SOLUTION INTRAVENOUS AS NEEDED
Status: DISCONTINUED | OUTPATIENT
Start: 2024-02-27 | End: 2024-03-02 | Stop reason: HOSPADM

## 2024-02-27 RX ORDER — SODIUM CHLORIDE 0.9 % (FLUSH) 0.9 %
10 SYRINGE (ML) INJECTION EVERY 12 HOURS SCHEDULED
Status: DISCONTINUED | OUTPATIENT
Start: 2024-02-27 | End: 2024-03-02 | Stop reason: HOSPADM

## 2024-02-27 RX ORDER — ACETAMINOPHEN 160 MG/5ML
650 SOLUTION ORAL EVERY 4 HOURS PRN
Status: DISCONTINUED | OUTPATIENT
Start: 2024-02-27 | End: 2024-03-02 | Stop reason: HOSPADM

## 2024-02-27 RX ORDER — FUROSEMIDE 20 MG/1
20 TABLET ORAL DAILY
Status: DISCONTINUED | OUTPATIENT
Start: 2024-02-28 | End: 2024-03-02 | Stop reason: HOSPADM

## 2024-02-27 RX ORDER — SODIUM CHLORIDE 0.9 % (FLUSH) 0.9 %
10 SYRINGE (ML) INJECTION AS NEEDED
Status: DISCONTINUED | OUTPATIENT
Start: 2024-02-27 | End: 2024-03-02 | Stop reason: HOSPADM

## 2024-02-27 RX ORDER — BISACODYL 5 MG/1
5 TABLET, DELAYED RELEASE ORAL DAILY PRN
Status: DISCONTINUED | OUTPATIENT
Start: 2024-02-27 | End: 2024-03-02 | Stop reason: HOSPADM

## 2024-02-27 RX ORDER — ONDANSETRON 2 MG/ML
4 INJECTION INTRAMUSCULAR; INTRAVENOUS EVERY 6 HOURS PRN
Status: DISCONTINUED | OUTPATIENT
Start: 2024-02-27 | End: 2024-03-02 | Stop reason: HOSPADM

## 2024-02-27 RX ORDER — BISACODYL 10 MG
10 SUPPOSITORY, RECTAL RECTAL DAILY PRN
Status: DISCONTINUED | OUTPATIENT
Start: 2024-02-27 | End: 2024-03-02 | Stop reason: HOSPADM

## 2024-02-27 RX ORDER — MEMANTINE HYDROCHLORIDE 10 MG/1
20 TABLET ORAL EVERY MORNING
Status: DISCONTINUED | OUTPATIENT
Start: 2024-02-28 | End: 2024-03-02 | Stop reason: HOSPADM

## 2024-02-27 RX ADMIN — FUROSEMIDE 20 MG: 20 TABLET ORAL at 08:11

## 2024-02-27 RX ADMIN — MEMANTINE HYDROCHLORIDE 20 MG: 10 TABLET, FILM COATED ORAL at 06:21

## 2024-02-27 RX ADMIN — ALLOPURINOL 100 MG: 100 TABLET ORAL at 08:11

## 2024-02-27 RX ADMIN — DOCUSATE SODIUM 50MG AND SENNOSIDES 8.6MG 2 TABLET: 8.6; 5 TABLET, FILM COATED ORAL at 08:12

## 2024-02-27 RX ADMIN — LEVOTHYROXINE SODIUM 75 MCG: 75 TABLET ORAL at 06:21

## 2024-02-27 RX ADMIN — THIAMINE HYDROCHLORIDE 200 MG: 100 INJECTION, SOLUTION INTRAMUSCULAR; INTRAVENOUS at 06:21

## 2024-02-27 RX ADMIN — ATORVASTATIN CALCIUM 80 MG: 80 TABLET, FILM COATED ORAL at 08:11

## 2024-02-27 RX ADMIN — Medication 10 ML: at 23:17

## 2024-02-27 RX ADMIN — FOLIC ACID 1 MG: 1 TABLET ORAL at 08:11

## 2024-02-27 RX ADMIN — Medication 10 ML: at 04:10

## 2024-02-27 RX ADMIN — Medication 10 ML: at 08:12

## 2024-02-27 NOTE — ED PROVIDER NOTES
EMERGENCY DEPARTMENT ENCOUNTER    Room Number:  30/30  PCP: Jose Fonseca MD  Historian: Patient      HPI:  Chief Complaint: Abnormal imaging  A complete HPI/ROS/PMH/PSH/SH/FH are unobtainable due to: None    Context: Bryan aLnders is a 79 y.o. male who presents to the ED via private vehicle from home c/o normal imaging earlier today.  Patient was hospitalized over weekend with rib fractures and a pneumothorax, had a chest tube placed.  This was pulled yesterday, follow-up chest x-ray today showed a residual right apical pneumothorax.  He was referred to the emergency department for evaluation.  Patient currently denies any significant chest pain or shortness of breath.      MEDICAL RECORD REVIEW    External (non-ED) record review: Chest x-ray report reviewed from earlier today with a small right apical pneumothorax              PAST MEDICAL HISTORY  Active Ambulatory Problems     Diagnosis Date Noted    Essential hypertension     Mixed hyperlipidemia     Arthritis     Type 2 diabetes mellitus with chronic kidney disease, without long-term current use of insulin 07/28/2017    Severely overweight 07/28/2017    Idiopathic chronic gout of left knee 07/28/2017    Medication monitoring encounter 07/28/2017    Microalbuminuria due to type 2 diabetes mellitus 09/06/2017    History of prostate cancer 02/27/2018    Skin tear of left elbow without complication 04/23/2018    Stage 3b chronic kidney disease 08/23/2018    Medicare annual wellness visit, subsequent 08/23/2018    Radiation proctitis 10/01/2018    History of hip replacement, total, bilateral 12/13/2018    Cognitive decline 03/26/2019    Acquired hypothyroidism 08/15/2019    B12 deficiency 02/14/2020    Sinus tachycardia by electrocardiogram 12/22/2020    Chronic pulmonary embolism without acute cor pulmonale 12/28/2020    Chronic deep vein thrombosis (DVT) of popliteal vein of both lower extremities 01/07/2021    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/04/2021    Bladder  mass 09/22/2021    Dependent edema 06/08/2022    Pancytopenia 08/29/2022    Deep venous thrombosis 01/07/2021    Class 2 severe obesity with serious comorbidity in adult 02/27/2024     Resolved Ambulatory Problems     Diagnosis Date Noted    Routine adult health maintenance 07/28/2017    Traumatic hematoma of left lower leg 07/28/2017    Injury of anal canal 06/21/2018    Rectal bleeding 09/13/2018    Right hip pain 12/13/2018    Gastrointestinal hemorrhage 01/04/2019    Acute blood loss anemia 01/16/2019    Hypotension due to hypovolemia 01/16/2019    GI bleed     CERVANTES (dyspnea on exertion) 12/22/2020    Screening for prostate cancer 01/21/2021    Gross hematuria 09/22/2021    Pneumothorax 02/23/2024     Past Medical History:   Diagnosis Date    At risk for sleep apnea     Bruises easily     Diabetes mellitus     Disease of thyroid gland     Elevated cholesterol     History of transfusion     Poor historian     Short-term memory loss     Vitamin D deficiency          PAST SURGICAL HISTORY  Past Surgical History:   Procedure Laterality Date    COLONOSCOPY  09/2016    COLONOSCOPY N/A 9/28/2018    Procedure: COLONOSCOPY;  Surgeon: Olaf Gomez MD;  Location: [facility] OR;  Service: Gastroenterology    COLONOSCOPY N/A 1/7/2019    Procedure: COLONOSCOPY;  Surgeon: Rosa Jack MD;  Location: [facility] OR;  Service: Gastroenterology    CYSTOSCOPY BLADDER BIOPSY N/A 9/22/2021    Procedure: CYSTOSCOPY BLADDER BIOPSY;  Surgeon: Roldan Mccarthy MD;  Location: [facility] OR;  Service: Urology;  Laterality: N/A;    HERNIA REPAIR Bilateral     PROSTATE ULTRASOUND BIOPSY      SIGMOIDOSCOPY N/A 10/2/2018    Procedure: FLEXIBLE SIGMOIDOSCOPY:  APC cautery bleeding using 60 joules;  Surgeon: Olaf Gomez MD;  Location: [facility] ENDOSCOPY;  Service: Gastroenterology    TONSILLECTOMY      TOTAL HIP ARTHROPLASTY Bilateral 2007         FAMILY HISTORY  Family History   Problem Relation Age of Onset     Stroke Mother     Stroke Father     No Known Problems Brother     Colon cancer Neg Hx     Colon polyps Neg Hx     Malig Hyperthermia Neg Hx          SOCIAL HISTORY  Social History     Socioeconomic History    Marital status:      Spouse name: Chloe    Number of children: 2   Tobacco Use    Smoking status: Former     Packs/day: 0.25     Years: 5.00     Additional pack years: 0.00     Total pack years: 1.25     Types: Cigars, Cigarettes     Quit date: 1986     Years since quittin.1    Smokeless tobacco: Never   Vaping Use    Vaping Use: Never used   Substance and Sexual Activity    Alcohol use: Yes     Alcohol/week: 32.0 - 34.0 standard drinks of alcohol     Types: 4 - 6 Shots of liquor, 28 Standard drinks or equivalent per week     Comment: 2-3 double vodkas a night    Drug use: No    Sexual activity: Yes     Partners: Female     Birth control/protection: Post-menopausal         ALLERGIES  Nsaids and Aricept [donepezil]        REVIEW OF SYSTEMS  Review of Systems     All systems reviewed and negative except for those discussed in HPI.       PHYSICAL EXAM    I have reviewed the triage vital signs and nursing notes.    ED Triage Vitals [24 1756]   Temp Heart Rate Resp BP SpO2   98.6 °F (37 °C) 109 18 105/69 96 %      Temp src Heart Rate Source Patient Position BP Location FiO2 (%)   -- -- -- -- --       Physical Exam  General: No acute distress, nontoxic  HEENT: Mucous membranes moist, atraumatic, EOMI  Neck: Full ROM  Pulm: Symmetric chest rise, nonlabored, lungs CTAB though slightly diminished in the right upper lung field  Cardiovascular: Regular rate and rhythm, intact distal pulses  GI: Soft, nontender, nondistended, no rebound, no guarding, bowel sounds present  MSK: Full ROM, no deformity  Skin: Warm, dry  Neuro: Awake, alert, oriented x 4, GCS 15, moving all extremities, no focal deficits  Psych: Calm, cooperative        LAB RESULTS  Recent Results (from the past 24 hour(s))   POC Glucose  Once    Collection Time: 02/26/24  9:00 PM    Specimen: Blood   Result Value Ref Range    Glucose 193 (H) 70 - 130 mg/dL   Basic Metabolic Panel    Collection Time: 02/27/24  3:59 AM    Specimen: Arm, Left; Blood   Result Value Ref Range    Glucose 101 (H) 65 - 99 mg/dL    BUN 24 (H) 8 - 23 mg/dL    Creatinine 1.40 (H) 0.76 - 1.27 mg/dL    Sodium 140 136 - 145 mmol/L    Potassium 4.4 3.5 - 5.2 mmol/L    Chloride 105 98 - 107 mmol/L    CO2 23.2 22.0 - 29.0 mmol/L    Calcium 7.9 (L) 8.6 - 10.5 mg/dL    BUN/Creatinine Ratio 17.1 7.0 - 25.0    Anion Gap 11.8 5.0 - 15.0 mmol/L    eGFR 50.8 (L) >60.0 mL/min/1.73   CBC Auto Differential    Collection Time: 02/27/24  3:59 AM    Specimen: Arm, Left; Blood   Result Value Ref Range    WBC 4.04 3.40 - 10.80 10*3/mm3    RBC 2.42 (L) 4.14 - 5.80 10*6/mm3    Hemoglobin 9.3 (L) 13.0 - 17.7 g/dL    Hematocrit 27.1 (L) 37.5 - 51.0 %    .0 (H) 79.0 - 97.0 fL    MCH 38.4 (H) 26.6 - 33.0 pg    MCHC 34.3 31.5 - 35.7 g/dL    RDW 12.7 12.3 - 15.4 %    RDW-SD 52.4 37.0 - 54.0 fl    MPV 9.5 6.0 - 12.0 fL    Platelets 106 (L) 140 - 450 10*3/mm3    Neutrophil % 61.1 42.7 - 76.0 %    Lymphocyte % 19.1 (L) 19.6 - 45.3 %    Monocyte % 15.6 (H) 5.0 - 12.0 %    Eosinophil % 3.2 0.3 - 6.2 %    Basophil % 0.5 0.0 - 1.5 %    Neutrophils, Absolute 2.47 1.70 - 7.00 10*3/mm3    Lymphocytes, Absolute 0.77 0.70 - 3.10 10*3/mm3    Monocytes, Absolute 0.63 0.10 - 0.90 10*3/mm3    Eosinophils, Absolute 0.13 0.00 - 0.40 10*3/mm3    Basophils, Absolute 0.02 0.00 - 0.20 10*3/mm3   POC Glucose Once    Collection Time: 02/27/24  5:39 AM    Specimen: Blood   Result Value Ref Range    Glucose 112 70 - 130 mg/dL       Ordered the above labs and independently interpreted results. My findings will be discussed in the medical decision making section below        RADIOLOGY  XR Chest 1 View    Result Date: 2/27/2024  XR CHEST 1 VW-  Clinical: Chest tube removal on 2/26/2024, follow-up right sided pneumothorax   COMPARISON examination same day at 6:14 a.m., current examination 6:15 p.m.  FINDINGS: The right apical pneumothorax is similar in size compared to the earlier examination. The left lung is clear. The cardiomediastinal silhouette is stable. No edema. There is blunting of the right costophrenic angle, trace amount of pleural fluid suspected. There is very subtle opacity in right mid and lower lung zone, suspect atelectasis. Trace amount of subcutaneous emphysema within the right chest wall is again noted. The remainder is unremarkable.  This report was finalized on 2/27/2024 6:35 PM by Dr. Matheus Segura M.D on Workstation: [id number]      XR Chest 1 View    Result Date: 2/27/2024  XR CHEST 1 VW-  HISTORY: 79-year-old male with chest tube for pneumothorax. Follow-up.  FINDINGS: Removal of the right-sided chest tube since yesterday's radiograph. There is a 3 cm pneumothorax, measured at the right apex. No new opacities are seen and there is no CHF.       Ordered the above noted radiological studies.  Independently interpreted by me and my independent review of findings can be found in the ED Course.  See dictation for official radiology interpretation.      PROCEDURES    Procedures        MEDICATIONS GIVEN IN ER    Medications - No data to display      PROGRESS, DATA ANALYSIS, CONSULTS, AND MEDICAL DECISION MAKING    Please note that this section constitutes my independent interpretation of clinical data including lab results, radiology, EKG's.  This constitutes my independent professional opinion regarding differential diagnosis and management of this patient.  It may include any factors such as history from outside sources, review of external records, social determinants of health, management of medications, response to those treatments, and discussions with other providers.    Differential Diagnosis and Plan: Recurrent small right apical pneumothorax after chest tube pulled yesterday.  No shortness of breath or  chest pain currently.  Will repeat chest x-ray to compare from this morning and discussed with thoracic surgery.    Additional sources:  - Discussed/ obtained information from independent historians:       - (Social Determinants of Health): None     - Shared decision making:  Patient and family at bedside fully updated on and in agreement with the course and plan moving forward    ED Course as of 02/27/24 1839 Tue Feb 27, 2024   1818 XR Chest 1 View  Per my independent interpretation of the chest x-ray, persistent apical pneumothorax as compared to earlier today [DC]   1832 Discussed with Dr. Wall, thoracic surgery, discussed patient's clinical course and find today, he has reviewed x-rays, he is comfortable with watching the patient overnight on some supplemental O2 repeat chest x-ray in the morning but no need for emergent chest tube placement at this time [DC]   1837 Discussed with Dr. Avila, [facility], discussed patient's clinical course and findings today, treatment modalities, discussions with thoracic surgery, and need for hospitalization. [DC]      ED Course User Index  [DC] Yaakov Blankenship MD       Hospitalization Considered?: yes    Orders Placed During This Visit:  Orders Placed This Encounter   Procedures    XR Chest 1 View    Basic Metabolic Panel    CBC Auto Differential    Inpatient Thoracic Surgery Consult    [facility] (on-call MD unless specified) Details    Initiate Observation Status    CBC & Differential       Additional orders considered but not placed:      Independent interpretation of labs, radiology studies, and discussions with consultants: See ED Course        AS OF 18:39 EST VITALS:    BP - 105/69  HR - 109  TEMP - 98.6 °F (37 °C)  02 SATS - 96%          DIAGNOSIS  Final diagnoses:   Pneumothorax on right         DISPOSITION  ED Disposition       ED Disposition   Decision to Admit    Condition   --    Comment   Level of Care: Telemetry [5]   Diagnosis: Pneumothorax [[id number]]   Admitting  Physician: TITA BOJORQUEZ [[id number]]   Attending Physician: TITA BOJORQUEZ [[id number]]                  Please note that portions of this document were completed with a voice recognition program.    Note Disclaimer: At [facility], we believe that sharing information builds trust and better relationships. You are receiving this note because you recently visited [facility]. It is possible you will see health information before a provider has talked with you about it. This kind of information can be easy to misunderstand. To help you fully understand what it means for your health, we urge you to discuss this note with your provider.                       Yaakov Blankenship MD  02/27/24 2042

## 2024-02-27 NOTE — PROGRESS NOTES
[facility] to provide Home Care services. Patient and family agreeable. PCP and contact information confirmed. Patient lives at [facility], [address]. Address correct on Facesheet.  Daughter Isi can be back up contact for HH. VO received from Janette for Dr Fonseca to [facility] for HH.

## 2024-02-27 NOTE — OUTREACH NOTE
Prep Survey      Flowsheet Row Responses   [facility] facility patient discharged from? [facility]   Is LACE score < 7 ? No   Eligibility [facility]   Date of Admission 02/23/24   Date of Discharge 02/27/24   Discharge diagnosis Pneumothorax   Does the patient have one of the following disease processes/diagnoses(primary or secondary)? Other   Does the patient have Home health ordered? Yes   What is the Home health agency?  [facility]   Is there a DME ordered? No   Prep survey completed? Yes            Anna NORMAN - Registered Nurse

## 2024-02-27 NOTE — TELEPHONE ENCOUNTER
patient's wife reached out to our office and requested clarification regarding chest x-ray results.  Was able to discuss with her preliminary results of the chest x-ray.  Patient is complaining of mild dyspnea and some wheezing reiterated the importance of presenting to the emergency department for further workup and to seek emergency services should his dyspnea continue to progress at home. She is agreeable and plans to bring him to the emergency department.    NIRAJ Maria

## 2024-02-27 NOTE — PROGRESS NOTES
"    Chief Complaint: Right pneumothorax, rib fractures  S/P: Right chest tube placement      Subjective:  Symptoms:  Stable.  No shortness of breath.    Diet:  Adequate intake.  No nausea or vomiting.    Activity level: Returning to normal.    Pain:  He reports no pain.      No new complaints.   Eager to discharge     Vital Signs:  Temp:  [97.7 °F (36.5 °C)-98.7 °F (37.1 °C)] 97.8 °F (36.6 °C)  Heart Rate:  [] 88  Resp:  [16] 16  BP: (105-120)/(64-76) 120/64    Intake & Output (last day)         02/26 0701  02/27 0700 02/27 0701  02/28 0700    P.O. 720     Total Intake(mL/kg) 720 (6.3)     Urine (mL/kg/hr) 100 (0)     Stool      Chest Tube      Total Output 100     Net +620           Urine Unmeasured Occurrence 1 x     Stool Unmeasured Occurrence 1 x             Objective:  General Appearance:  Comfortable and well-appearing.    Vital signs: (most recent): Blood pressure 120/64, pulse 88, temperature 97.8 °F (36.6 °C), temperature source Oral, resp. rate 16, height 177.8 cm (70\"), weight 114 kg (250 lb 14.1 oz), SpO2 97%.    HEENT: (Nasal cannula)    Lungs:  Normal effort.  He is not in respiratory distress.  There are decreased breath sounds.    Heart: Normal rate.    Chest: Symmetric chest wall expansion. Chest wall tenderness present.    Extremities: Decreased range of motion.  There is no dependent edema.    Neurological: Patient is alert.    Skin:  Warm and dry.                  Results Review:     I reviewed the patient's new clinical results.  I reviewed the patient's new imaging results and agree with the interpretation.  I reviewed the patient's other test results and agree with the interpretation  Discussed with patient, RN, family bedside and Dr. Wall        Imaging Results (Last 24 Hours)       Procedure Component Value Units Date/Time    XR Chest 1 View [[id number]] Collected: 02/27/24 1006     Updated: 02/27/24 1006    Narrative:      XR CHEST 1 VW-     HISTORY: 79-year-old male with chest tube " for pneumothorax. Follow-up.     FINDINGS: Removal of the right-sided chest tube since yesterday's  radiograph. There is a 3 cm pneumothorax, measured at the right apex. No  new opacities are seen and there is no CHF.               Lab Results:     Lab Results (last 24 hours)       Procedure Component Value Units Date/Time    POC Glucose Once [[id number]]  (Normal) Collected: 02/27/24 0539    Specimen: Blood Updated: 02/27/24 0547     Glucose 112 mg/dL     Basic Metabolic Panel [[id number]]  (Abnormal) Collected: 02/27/24 0359    Specimen: Blood from Arm, Left Updated: 02/27/24 0501     Glucose 101 mg/dL      BUN 24 mg/dL      Creatinine 1.40 mg/dL      Sodium 140 mmol/L      Potassium 4.4 mmol/L      Comment: Slight hemolysis detected by analyzer. Result may be falsely elevated.        Chloride 105 mmol/L      CO2 23.2 mmol/L      Calcium 7.9 mg/dL      BUN/Creatinine Ratio 17.1     Anion Gap 11.8 mmol/L      eGFR 50.8 mL/min/1.73     Narrative:      GFR Normal >60  Chronic Kidney Disease <60  Kidney Failure <15    The GFR formula is only valid for adults with stable renal function between ages 18 and 70.    CBC & Differential [[id number]]  (Abnormal) Collected: 02/27/24 0359    Specimen: Blood from Arm, Left Updated: 02/27/24 0451    Narrative:      The following orders were created for panel order CBC & Differential.  Procedure                               Abnormality         Status                     ---------                               -----------         ------                     CBC Auto Differential[[id number]]        Abnormal            Final result                 Please view results for these tests on the individual orders.    CBC Auto Differential [[id number]]  (Abnormal) Collected: 02/27/24 0359    Specimen: Blood from Arm, Left Updated: 02/27/24 0451     WBC 4.04 10*3/mm3      RBC 2.42 10*6/mm3      Hemoglobin 9.3 g/dL      Hematocrit 27.1 %      .0 fL      MCH 38.4 pg      MCHC 34.3 g/dL       RDW 12.7 %      RDW-SD 52.4 fl      MPV 9.5 fL      Platelets 106 10*3/mm3      Neutrophil % 61.1 %      Lymphocyte % 19.1 %      Monocyte % 15.6 %      Eosinophil % 3.2 %      Basophil % 0.5 %      Neutrophils, Absolute 2.47 10*3/mm3      Lymphocytes, Absolute 0.77 10*3/mm3      Monocytes, Absolute 0.63 10*3/mm3      Eosinophils, Absolute 0.13 10*3/mm3      Basophils, Absolute 0.02 10*3/mm3     POC Glucose Once [[id number]]  (Abnormal) Collected: 02/26/24 2100    Specimen: Blood Updated: 02/26/24 2102     Glucose 193 mg/dL     POC Glucose Once [[id number]]  (Normal) Collected: 02/26/24 1601    Specimen: Blood Updated: 02/26/24 1613     Glucose 120 mg/dL              Assessment & Plan       Essential hypertension    Mixed hyperlipidemia    Type 2 diabetes mellitus with chronic kidney disease, without long-term current use of insulin    Stage 3b chronic kidney disease    Acquired hypothyroidism    Chronic pulmonary embolism without acute cor pulmonale    Chronic deep vein thrombosis (DVT) of popliteal vein of both lower extremities    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  S/p removal of chest tube yesterday.  Denies shortness of breath.  Awaiting follow-up chest x-ray results.    Multiple rib fractures: As seen on CT chest.  Patient denies significant pain.  Multimodal analgesic regimen available.  Increase mobilization as able.  Recommend not pushing pulling or lifting anything greater than 10 pounds for 6 to 8 weeks.    Patient had been discharged as of this afternoon.  Was able to reach patient via phone to relay preliminary results of his follow-up chest x-ray performed this morning.  Patient is endorsing some mild dyspnea and wheezing.  Recommended for him to present to the emergency department for evaluation.  Patient is agreeable.     NIRAJ Maria  [facility]  02/27/24  14:23 EST

## 2024-02-27 NOTE — DISCHARGE SUMMARY
[facility] Medicine Services  DISCHARGE SUMMARY    Patient Name: Bryan Landers  : 1945  MRN: [id number]    Date of Admission: 2024 10:55 PM  Date of Discharge:  2023   Primary Care Physician: Jose Fonseca MD    Consults       Date and Time Order Name Status Description    2024 11:54 PM Inpatient Thoracic Surgery Consult Completed             Hospital Course       Active Hospital Problems    Diagnosis  POA    Class 2 severe obesity with serious comorbidity in adult [E66.01]  Yes    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 [D68.51]  Yes    Chronic deep vein thrombosis (DVT) of popliteal vein of both lower extremities [I82.533]  Yes    Chronic pulmonary embolism without acute cor pulmonale [I27.82]  Yes    Acquired hypothyroidism [E03.9]  Yes    Stage 3b chronic kidney disease [N18.32]  Yes    Type 2 diabetes mellitus with chronic kidney disease, without long-term current use of insulin [E11.22]  Yes    Mixed hyperlipidemia [E78.2]  Yes    Essential hypertension [I10]  Yes      Resolved Hospital Problems    Diagnosis Date Resolved POA    **Pneumothorax [J93.9] 2024 Yes          Hospital Course:  Bryan Landers is a 79 y.o. male presents the hospital with pneumothorax      Right-Sided Pneumothorax  Multiple right-sided rib fractures  - from mechanical fall  -Patient received chest tube.  Chest tube airleak stopped and patient was placed on waterseal and airleak continued to do well.  Chest tube was removed and he is currently breathing well.    Per my conversation with thoracic surgery consult team patient was cleared to discharge and he agreed to call the clinic if he had worsening symptoms.     Type 2 DM  - complications include nephropathy  Discharged on home medications as per below     Stage 3b CKD  - baseline serum creatinine is around 1.7  - follows with Dr. Fabian  Patient agrees to follow-up with nephrology at discharge     HTN  - BP acceptable  - hold lisinopril for now      BLE Venous  Insufficiency/Edema  -Resume Lasix, tolerating well     Hx of DVT/PE  - heterozygous for Factor V Leiden   - hold eliquis for now given possible chest tube placement, anticoagulation was restarted after clearance from surgery     Dementia  -continue home memantine  -Per family his mentation is been worse the last few weeks.  Afebrile without leukocytosis.  Chest x-ray without any acute infection.  UA with some microscopic hematuria like to be followed up with his PCP as he is on Eliquis at goal.  TSH and B12 within normal limits.    CT head without any acute findings.  -continue Seroquel while inpatient, symptoms have stabilized and I do feel like he needs to continue this at discharge however primary care follow-up for continued monitoring of symptoms as needed.     Alcohol use disorder  -Drinks about 4 vodkas a day and someone, has never had withdrawal before but does not usually go very long without drinking  -Start CIWA without Ativan.  Patient will be incredibly hard to score accurately with his dementia.  -IV thiamine, folic acid no evidence of alcohol withdrawal at this time  -Patient was counseled sobriety    At the time of discharge patient was told to take all medications as prescribed, keep all follow-up appointments, and call their doctor or return to the hospital with any worsening or concerning symptoms.    Please note that this note was made using Dragon voice recognition software        Addendum: After patient was discharged I spoke with cardiothoracic surgery and patient spoke with their APC and felt he was having more shortness of breath when he returned home.  They recommend he return to the emergency room for further treatment    Day of Discharge     Patient states he is breathing very comfortably today.  He is very eager to discharge home.  He agrees to follow-up closely with thoracic surgery team.  He agrees to call their team or return to the hospital if he has any worsening shortness of  "breath or other new symptoms.    Vital Signs:   Temp:  [97.7 °F (36.5 °C)-98.7 °F (37.1 °C)] 97.8 °F (36.6 °C)  Heart Rate:  [] 88  Resp:  [16] 16  BP: (104-120)/(60-76) 120/64     Physical Exam:    Constitutional:Awake, alert, no acute distress  HENT: NCAT, mucous membranes moist, neck supple  Respiratory: Chest tube has been removed, no cough or wheezes, normal respirations, nonlabored breathing, moderate inspiration  Cardiovascular: Pulse rate is normal, palpable radial pulses  Gastrointestinal:  soft, nontender, nondistended  Musculoskeletal: Elderly obese and debilitated, moderate lower extremity edema, BMI 36  Psychiatric: Appropriate affect, cooperative, conversational  Neurologic: No slurred speech or facial droop, follows commands  Skin: No rashes or jaundice, warm    Pertinent  and/or Most Recent Results     Results from last 7 days   Lab Units 02/27/24  0359 02/26/24  0441 02/25/24  0458 02/24/24  0304 02/23/24 2125 02/23/24  2100   WBC 10*3/mm3 4.04 3.88 5.86 5.73  --  7.04   HEMOGLOBIN g/dL 9.3* 8.3* 9.3* 9.8*  --  10.7*   HEMATOCRIT % 27.1* 24.2* 27.2* 28.6*  --  32.2*   PLATELETS 10*3/mm3 106* 82* 86* 94*  --  97*   SODIUM mmol/L 140 141 141 143 139  --    POTASSIUM mmol/L 4.4 4.0 4.8 4.9 4.4  --    CHLORIDE mmol/L 105 107 107 107 103  --    CO2 mmol/L 23.2 25.3 21.4* 25.4 25.7  --    BUN mg/dL 24* 25* 26* 23 23  --    CREATININE mg/dL 1.40* 1.36* 1.42* 1.34* 1.47*  --    GLUCOSE mg/dL 101* 106* 99 143* 143*  --    CALCIUM mg/dL 7.9* 7.6* 7.9* 8.6 8.5*  --      Results from last 7 days   Lab Units 02/23/24  2125   BILIRUBIN mg/dL 0.6   ALK PHOS U/L 67   ALT (SGPT) U/L 22   AST (SGOT) U/L 45*           Invalid input(s): \"TG\", \"LDLCALC\", \"LDLREALC\"  Results from last 7 days   Lab Units 02/25/24  0458   TSH uIU/mL 3.240       Brief Urine Lab Results  (Last result in the past 365 days)        Color   Clarity   Blood   Leuk Est   Nitrite   Protein   CREAT   Urine HCG        02/24/24 1929 Yellow   " Cloudy   Large (3+)   Negative   Negative   30 mg/dL (1+)                       Discharge Details        Discharge Medications        New Medications        Instructions Start Date   acetaminophen 325 MG tablet  Commonly known as: TYLENOL   650 mg, Oral, Every 6 Hours PRN             Continue These Medications        Instructions Start Date   albuterol sulfate  (90 Base) MCG/ACT inhaler  Commonly known as: PROVENTIL HFA;VENTOLIN HFA;PROAIR HFA   2 puffs, Inhalation, Every 4 Hours PRN      allopurinol 100 MG tablet  Commonly known as: ZYLOPRIM   100 mg, Oral, Daily      ammonium lactate 12 % cream  Commonly known as: AMLACTIN   No dose, route, or frequency recorded.      apixaban 2.5 MG tablet tablet  Commonly known as: Eliquis   2.5 mg, Oral, Every 12 Hours      aspirin 81 MG EC tablet   81 mg, Oral, Daily, HOLDING FOR SURGERY       atorvastatin 80 MG tablet  Commonly known as: LIPITOR   80 mg, Oral, Daily      cholecalciferol 25 MCG (1000 UT) tablet  Commonly known as: VITAMIN D3   1,000 Units, Oral, Daily      fluorouracil 5 % cream  Commonly known as: EFUDEX   Topical, 2 Times Daily      furosemide 20 MG tablet  Commonly known as: LASIX   20 mg, Oral, Daily      levothyroxine 75 MCG tablet  Commonly known as: SYNTHROID, LEVOTHROID   75 mcg, Oral, Every Morning      memantine 10 MG tablet  Commonly known as: NAMENDA   TAKE TWO TABLETS BY MOUTH EVERY MORNING      multivitamin with minerals tablet tablet   1 tablet, Oral, Daily, HOLD FOR SURGERY      pioglitazone 30 MG tablet  Commonly known as: ACTOS   30 mg, Oral, Daily      POTASSIUM PO   Oral, Dose unknown      VITAMIN B 12 PO   1,000 mg, Oral, Every Morning      Xtandi 40 MG tablet tablet  Generic drug: enzalutamide   160 mg, Oral, Daily             Stop These Medications      lisinopril 20 MG tablet  Commonly known as: PRINIVIL,ZESTRIL              Allergies   Allergen Reactions    Nsaids GI Bleeding     BLEEDING    Aricept [Donepezil] Diarrhea          Discharge Disposition:  Home-Health Care [facility]    Diet:  Hospital:  Diet Order   Procedures    Diet: Regular/House Diet, Diabetic Diets; Consistent Carbohydrate; Texture: Regular Texture (IDDSI 7); Fluid Consistency: Thin (IDDSI 0)       Activity:  Activity Instructions       Activity as Tolerated                   CODE STATUS:    Code Status and Medical Interventions:   Ordered at: 02/24/24 1154     Code Status (Patient has no pulse and is not breathing):    No CPR (Do Not Attempt to Resuscitate)     Medical Interventions (Patient has pulse or is breathing):    Full Support       Future Appointments   Date Time Provider Department Center   3/28/2024  1:00 PM Lani Rosenberg DNP, APRN [facility]   5/15/2024 10:00 AM [facility] ANNA MARIE [facility]   5/21/2024 11:00 AM Jose Fonseca MD [facility]             Additional Instructions for the Follow-ups that You Need to Schedule       Discharge Follow-up with PCP   As directed       Currently Documented PCP:    Jose Fonseca MD    PCP Phone Number:    [phone]     Follow Up Details: 1 week, call today to schedule               Contact information for follow-up providers       Jose Fonseca MD .    Specialty: Family Medicine  Contact information:  [address] [facility] [address]  [phone]               Jose Fonseca MD .    Specialty: Family Medicine  Why: 1 week, call today to schedule  Contact information:  [address] [facility] [address]  [phone]                       Contact information for after-discharge care       Home Medical Care       [facility] .    Service: Home Health Services  Contact information:  [address] [address] [phone]                                       lOaf Gabriel MD  02/27/24      Time Spent on Discharge:  I spent greater than 40 minutes on this discharge activity which included:  face-to-face encounter with the patient, reviewing the data in the system, coordination of the care with the nursing staff as well as consultants, documentation, and entering orders.

## 2024-02-28 ENCOUNTER — APPOINTMENT (OUTPATIENT)
Dept: CT IMAGING | Facility: HOSPITAL | Age: 79
DRG: 200 | End: 2024-02-28
Payer: MEDICARE

## 2024-02-28 ENCOUNTER — APPOINTMENT (OUTPATIENT)
Dept: GENERAL RADIOLOGY | Facility: HOSPITAL | Age: 79
DRG: 200 | End: 2024-02-28
Payer: MEDICARE

## 2024-02-28 LAB
ANION GAP SERPL CALCULATED.3IONS-SCNC: 10 MMOL/L (ref 5–15)
BUN SERPL-MCNC: 25 MG/DL (ref 8–23)
BUN/CREAT SERPL: 18.1 (ref 7–25)
CALCIUM SPEC-SCNC: 8.4 MG/DL (ref 8.6–10.5)
CHLORIDE SERPL-SCNC: 106 MMOL/L (ref 98–107)
CO2 SERPL-SCNC: 25 MMOL/L (ref 22–29)
CREAT SERPL-MCNC: 1.38 MG/DL (ref 0.76–1.27)
DEPRECATED RDW RBC AUTO: 50.1 FL (ref 37–54)
EGFRCR SERPLBLD CKD-EPI 2021: 51.7 ML/MIN/1.73
EOSINOPHIL # BLD MANUAL: 0.22 10*3/MM3 (ref 0–0.4)
EOSINOPHIL NFR BLD MANUAL: 6 % (ref 0.3–6.2)
ERYTHROCYTE [DISTWIDTH] IN BLOOD BY AUTOMATED COUNT: 12.7 % (ref 12.3–15.4)
GLUCOSE BLDC GLUCOMTR-MCNC: 106 MG/DL (ref 70–130)
GLUCOSE BLDC GLUCOMTR-MCNC: 99 MG/DL (ref 70–130)
GLUCOSE SERPL-MCNC: 104 MG/DL (ref 65–99)
HCT VFR BLD AUTO: 27.3 % (ref 37.5–51)
HGB BLD-MCNC: 9.4 G/DL (ref 13–17.7)
HYPOCHROMIA BLD QL: ABNORMAL
LYMPHOCYTES # BLD MANUAL: 0.62 10*3/MM3 (ref 0.7–3.1)
LYMPHOCYTES NFR BLD MANUAL: 7 % (ref 5–12)
MCH RBC QN AUTO: 38.2 PG (ref 26.6–33)
MCHC RBC AUTO-ENTMCNC: 34.4 G/DL (ref 31.5–35.7)
MCV RBC AUTO: 111 FL (ref 79–97)
MONOCYTES # BLD: 0.25 10*3/MM3 (ref 0.1–0.9)
NEUTROPHILS # BLD AUTO: 2.53 10*3/MM3 (ref 1.7–7)
NEUTROPHILS NFR BLD MANUAL: 70 % (ref 42.7–76)
PLAT MORPH BLD: NORMAL
PLATELET # BLD AUTO: 124 10*3/MM3 (ref 140–450)
PMV BLD AUTO: 9.5 FL (ref 6–12)
POTASSIUM SERPL-SCNC: 4 MMOL/L (ref 3.5–5.2)
RBC # BLD AUTO: 2.46 10*6/MM3 (ref 4.14–5.8)
SODIUM SERPL-SCNC: 141 MMOL/L (ref 136–145)
VARIANT LYMPHS NFR BLD MANUAL: 17 % (ref 19.6–45.3)
WBC MORPH BLD: NORMAL
WBC NRBC COR # BLD AUTO: 3.62 10*3/MM3 (ref 3.4–10.8)

## 2024-02-28 PROCEDURE — 0W9930Z DRAINAGE OF RIGHT PLEURAL CAVITY WITH DRAINAGE DEVICE, PERCUTANEOUS APPROACH: ICD-10-PCS | Performed by: RADIOLOGY

## 2024-02-28 PROCEDURE — 25010000002 FENTANYL CITRATE (PF) 50 MCG/ML SOLUTION: Performed by: RADIOLOGY

## 2024-02-28 PROCEDURE — 85007 BL SMEAR W/DIFF WBC COUNT: CPT | Performed by: INTERNAL MEDICINE

## 2024-02-28 PROCEDURE — 25010000002 LIDOCAINE 1 % SOLUTION: Performed by: RADIOLOGY

## 2024-02-28 PROCEDURE — 25010000002 MIDAZOLAM PER 1 MG: Performed by: RADIOLOGY

## 2024-02-28 PROCEDURE — 99153 MOD SED SAME PHYS/QHP EA: CPT

## 2024-02-28 PROCEDURE — 36415 COLL VENOUS BLD VENIPUNCTURE: CPT | Performed by: INTERNAL MEDICINE

## 2024-02-28 PROCEDURE — 99223 1ST HOSP IP/OBS HIGH 75: CPT

## 2024-02-28 PROCEDURE — 99152 MOD SED SAME PHYS/QHP 5/>YRS: CPT

## 2024-02-28 PROCEDURE — 82948 REAGENT STRIP/BLOOD GLUCOSE: CPT

## 2024-02-28 PROCEDURE — 85025 COMPLETE CBC W/AUTO DIFF WBC: CPT | Performed by: INTERNAL MEDICINE

## 2024-02-28 PROCEDURE — 71046 X-RAY EXAM CHEST 2 VIEWS: CPT

## 2024-02-28 PROCEDURE — 80048 BASIC METABOLIC PNL TOTAL CA: CPT | Performed by: INTERNAL MEDICINE

## 2024-02-28 RX ORDER — MIDAZOLAM HYDROCHLORIDE 5 MG/ML
INJECTION INTRAMUSCULAR; INTRAVENOUS AS NEEDED
Status: COMPLETED | OUTPATIENT
Start: 2024-02-28 | End: 2024-02-28

## 2024-02-28 RX ORDER — LIDOCAINE HYDROCHLORIDE 10 MG/ML
20 INJECTION, SOLUTION INFILTRATION; PERINEURAL ONCE
Status: COMPLETED | OUTPATIENT
Start: 2024-02-28 | End: 2024-02-28

## 2024-02-28 RX ORDER — FENTANYL CITRATE 50 UG/ML
INJECTION, SOLUTION INTRAMUSCULAR; INTRAVENOUS AS NEEDED
Status: COMPLETED | OUTPATIENT
Start: 2024-02-28 | End: 2024-02-28

## 2024-02-28 RX ADMIN — FENTANYL CITRATE 25 MCG: 50 INJECTION, SOLUTION INTRAMUSCULAR; INTRAVENOUS at 13:25

## 2024-02-28 RX ADMIN — MIDAZOLAM 0.5 MG: 5 INJECTION INTRAMUSCULAR; INTRAVENOUS at 13:25

## 2024-02-28 RX ADMIN — ALLOPURINOL 100 MG: 100 TABLET ORAL at 08:10

## 2024-02-28 RX ADMIN — Medication 10 ML: at 08:10

## 2024-02-28 RX ADMIN — LIDOCAINE HYDROCHLORIDE 20 ML: 10 INJECTION, SOLUTION INFILTRATION; PERINEURAL at 13:31

## 2024-02-28 RX ADMIN — ATORVASTATIN CALCIUM 80 MG: 80 TABLET, FILM COATED ORAL at 08:10

## 2024-02-28 RX ADMIN — MEMANTINE HYDROCHLORIDE 20 MG: 10 TABLET, FILM COATED ORAL at 06:05

## 2024-02-28 RX ADMIN — FUROSEMIDE 20 MG: 20 TABLET ORAL at 08:10

## 2024-02-28 RX ADMIN — LEVOTHYROXINE SODIUM 75 MCG: 75 TABLET ORAL at 06:05

## 2024-02-28 RX ADMIN — Medication 10 ML: at 21:01

## 2024-02-28 NOTE — CONSULTS
"    Chief Complaint: Right pneumothorax, right rib fractures  S/P: Right chest tube placement s/p removal on 02/26/2024    Mr. Landers is a pleasant 79-year-old gentleman who initially presented to [facility] on 2/23/2024 with complaints of right chest wall pain after tripping and falling approximately 3 days prior.  Patient was subsequently transferred to [facility].  He was diagnosed with multiple rib fractures and right pneumothorax.  For his rib fractures non-operative medical management was recommended and patient underwent chest tube placement for his right pneumothorax which was ultimately removed on 2/26/2024.  Patient was discharged yesterday although images from his follow-up chest x-ray demonstrated a 3 cm pneumothorax.  He has been subsequently readmitted. Of note patient is on Eliquis with history of DVT and PE along with factor V Leiden.    Subjective:  Symptoms:  Stable.  He reports shortness of breath (Endorses mild shortness of breath.) and weakness.  No cough.    Diet:  NPO.  No nausea or vomiting.    Activity level: Impaired due to weakness.    Pain:  He complains of pain that is mild.  Pain is well controlled.        Vital Signs:  Temp:  [97.4 °F (36.3 °C)-98.6 °F (37 °C)] 97.8 °F (36.6 °C)  Heart Rate:  [] 90  Resp:  [16-18] 16  BP: (105-123)/(69-82) 116/77    Intake & Output (last day)         02/27 0701  02/28 0700 02/28 0701  02/29 0700    Urine (mL/kg/hr)  50 (0.1)    Total Output  50    Net  -50          Stool Unmeasured Occurrence 1 x             Objective:  General Appearance:  Comfortable and in no acute distress.    Vital signs: (most recent): Blood pressure 116/77, pulse 90, temperature 97.8 °F (36.6 °C), temperature source Oral, resp. rate 16, height 177.8 cm (70\"), weight 111 kg (244 lb 4.3 oz), SpO2 99%.    Lungs:  Normal effort and normal respiratory rate.  He is not in respiratory distress.    Heart: Normal rate.  Regular rhythm.    Chest: " Symmetric chest wall expansion. Chest wall tenderness present.    Abdomen: Abdomen is soft and non-distended.    Neurological: Patient is alert and oriented to person, place and time.    Skin:  Warm and dry.                Results Review:     I reviewed the patient's new clinical results.  I reviewed the patient's new imaging results and agree with the interpretation.  Discussed with patient, nurse, Dr. Wall    Imaging Results (Last 24 Hours)       Procedure Component Value Units Date/Time    XR Chest PA & Lateral [[id number]] Collected: 02/28/24 0815     Updated: 02/28/24 1019    Narrative:      Chest radiograph     HISTORY: Pneumothorax, follow-up     TECHNIQUE: Two PA and lateral radiographs     COMPARISON: Chest radiograph 2/27/2024       Impression:      FINDINGS AND IMPRESSION:  The right pneumothorax has increased in size, resulting in approximately  5 cm of pleural-parenchymal separation (previously 3.4 cm on 2/27/2024.  There is worsening pulmonary opacification within the bilateral mid to  lower lungs, right greater than left suggestion of a small right pleural  effusion, as before. The above findings were discussed with Vanessa, the  patient's nurse, by telephone by Nguyễn Jesus at   8:15 a.m. on   2/28/2024  . Cardiac silhouette appears to be borderline enlarged.           This report was finalized on 2/28/2024 10:16 AM by Dr. Nguyễn Jesus M.D on Workstation: [facility]       XR Chest 1 View [[id number]] Collected: 02/27/24 1832     Updated: 02/27/24 1838    Narrative:      XR CHEST 1 VW-     Clinical: Chest tube removal on 2/26/2024, follow-up right sided  pneumothorax     COMPARISON examination same day at 6:14 a.m., current examination 6:15  p.m.     FINDINGS: The right apical pneumothorax is similar in size compared to  the earlier examination. The left lung is clear. The cardiomediastinal  silhouette is stable. No edema. There is blunting of the right  costophrenic angle, trace amount of pleural  fluid suspected. There is  very subtle opacity in right mid and lower lung zone, suspect  atelectasis. Trace amount of subcutaneous emphysema within the right  chest wall is again noted. The remainder is unremarkable.     This report was finalized on 2/27/2024 6:35 PM by Dr. Matheus Segura M.D  on Workstation: [id number]               Lab Results:     Lab Results (last 24 hours)       Procedure Component Value Units Date/Time    CBC & Differential [[id number]]  (Abnormal) Collected: 02/28/24 0537    Specimen: Blood Updated: 02/28/24 0702    Narrative:      The following orders were created for panel order CBC & Differential.  Procedure                               Abnormality         Status                     ---------                               -----------         ------                     CBC Auto Differential[[id number]]        Abnormal            Final result                 Please view results for these tests on the individual orders.    CBC Auto Differential [[id number]]  (Abnormal) Collected: 02/28/24 0537    Specimen: Blood Updated: 02/28/24 0702     WBC 3.62 10*3/mm3      RBC 2.46 10*6/mm3      Hemoglobin 9.4 g/dL      Hematocrit 27.3 %      .0 fL      MCH 38.2 pg      MCHC 34.4 g/dL      RDW 12.7 %      RDW-SD 50.1 fl      MPV 9.5 fL      Platelets 124 10*3/mm3     Manual Differential [[id number]]  (Abnormal) Collected: 02/28/24 0537    Specimen: Blood Updated: 02/28/24 0702     Neutrophil % 70.0 %      Lymphocyte % 17.0 %      Monocyte % 7.0 %      Eosinophil % 6.0 %      Neutrophils Absolute 2.53 10*3/mm3      Lymphocytes Absolute 0.62 10*3/mm3      Monocytes Absolute 0.25 10*3/mm3      Eosinophils Absolute 0.22 10*3/mm3      Hypochromia Slight/1+     WBC Morphology Normal     Platelet Morphology Normal    Basic Metabolic Panel [[id number]]  (Abnormal) Collected: 02/28/24 0537    Specimen: Blood Updated: 02/28/24 0644     Glucose 104 mg/dL      BUN 25 mg/dL      Creatinine 1.38 mg/dL      Sodium  141 mmol/L      Potassium 4.0 mmol/L      Chloride 106 mmol/L      CO2 25.0 mmol/L      Calcium 8.4 mg/dL      BUN/Creatinine Ratio 18.1     Anion Gap 10.0 mmol/L      eGFR 51.7 mL/min/1.73     Narrative:      GFR Normal >60  Chronic Kidney Disease <60  Kidney Failure <15    The GFR formula is only valid for adults with stable renal function between ages 18 and 70.    POC Glucose Once [[id number]]  (Normal) Collected: 02/28/24 0523    Specimen: Blood Updated: 02/28/24 0525     Glucose 106 mg/dL     Basic Metabolic Panel [[id number]]  (Abnormal) Collected: 02/27/24 1904    Specimen: Blood Updated: 02/27/24 2006     Glucose 136 mg/dL      BUN 27 mg/dL      Creatinine 1.44 mg/dL      Sodium 140 mmol/L      Potassium 4.8 mmol/L      Comment: Slight hemolysis detected by analyzer. Result may be falsely elevated.        Chloride 104 mmol/L      CO2 26.0 mmol/L      Calcium 8.5 mg/dL      BUN/Creatinine Ratio 18.8     Anion Gap 10.0 mmol/L      eGFR 49.1 mL/min/1.73     Narrative:      GFR Normal >60  Chronic Kidney Disease <60  Kidney Failure <15    The GFR formula is only valid for adults with stable renal function between ages 18 and 70.    CBC & Differential [[id number]]  (Abnormal) Collected: 02/27/24 1904    Specimen: Blood Updated: 02/27/24 1939    Narrative:      The following orders were created for panel order CBC & Differential.  Procedure                               Abnormality         Status                     ---------                               -----------         ------                     CBC Auto Differential[[id number]]        Abnormal            Final result                 Please view results for these tests on the individual orders.    CBC Auto Differential [[id number]]  (Abnormal) Collected: 02/27/24 1904    Specimen: Blood Updated: 02/27/24 1939     WBC 4.64 10*3/mm3      RBC 2.61 10*6/mm3      Hemoglobin 9.9 g/dL      Hematocrit 28.8 %      .3 fL      MCH 37.9 pg      MCHC 34.4 g/dL       RDW 12.8 %      RDW-SD 50.1 fl      MPV 9.7 fL      Platelets 123 10*3/mm3      Neutrophil % 67.3 %      Lymphocyte % 13.8 %      Monocyte % 16.2 %      Eosinophil % 1.9 %      Basophil % 0.4 %      Immature Grans % 0.4 %      Neutrophils, Absolute 3.12 10*3/mm3      Lymphocytes, Absolute 0.64 10*3/mm3      Monocytes, Absolute 0.75 10*3/mm3      Eosinophils, Absolute 0.09 10*3/mm3      Basophils, Absolute 0.02 10*3/mm3      Immature Grans, Absolute 0.02 10*3/mm3      nRBC 0.0 /100 WBC              Assessment & Plan       Pneumothorax    Essential hypertension    Type 2 diabetes mellitus with chronic kidney disease, without long-term current use of insulin    Stage 3b chronic kidney disease    Acquired hypothyroidism    Chronic pulmonary embolism without acute cor pulmonale    Chronic deep vein thrombosis (DVT) of popliteal vein of both lower extremities    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    Fracture of multiple ribs of right side       Assessment & Plan  I have independently reviewed serial chest imaging.  Chest x-ray performed yesterday morning demonstrates a 3 cm pneumothorax and subsequent chest x-rays have demonstrated progression.  With the most recent performed this morning demonstrating 5 cm of pleural separation.  Patient does endorse some mild shortness of breath.  He is on 2 L via nasal cannula.     Right pneumothorax: Plan for right CT-guided chest tube placement today. Continue chest tube to -20 cm of suction once placed.  Repeat a.m. chest x-ray.  Encourage good pulmonary hygiene, use of I-S.      Multiple right rib fractures: Continue to recommend nonoperative medical management with analgesic medication.  Recommend not pushing pulling or lifting 10 pounds or greater for the next 6 to 8 weeks.  Increase mobilization, PT.    NIRAJ Maria  [facility]  02/28/24  11:45 EST    Greater than 35 minutes was spent reviewing the patient's chart, radiographic imaging, labs, provider notes,  assessing the patient and developing a plan of care.  This was discussed with the patient and RN.

## 2024-02-28 NOTE — OUTREACH NOTE
Medical Week 1 Survey      Flowsheet Row Responses   [facility] [place], operated by [facility] patient discharged from? [place]   Does the patient have one of the following disease processes/diagnoses(primary or secondary)? Other   Revoke Readmitted            Anna NORMAN - Registered Nurse

## 2024-02-28 NOTE — PAYOR COMM NOTE
"Bryan Patel (79 y.o. Male)      PATIENT DISCHARGED 02/27/2024:  REF# [id number]      DEPT: [phone],  [phone]     [facility]: NPI [id number] [facility]# [id number]     KATHARINE PUGA RN,[facility]       Date of Birth   1945    Social Security Number       Address   [address] [place] [address] Rd [address] [place], [place] [address]    Home Phone   [phone]    MRN   [id number]       Protestant   None    Marital Status                               Admission Date   2/23/24    Admission Type   Urgent    Admitting Provider   Hemanth Roberts MD    Attending Provider       Department, Room/Bed   [facility], E551/1       Discharge Date   2/27/2024    Discharge Disposition   Home-Health Care Sv    Discharge Destination                                 Attending Provider: (none)   Allergies: Nsaids, Aricept [Donepezil]    Isolation: None   Infection: None   Code Status: CPR    Ht: 177.8 cm (70\")   Wt: 114 kg (250 lb 14.1 oz)    Admission Cmt: None   Principal Problem: Pneumothorax [J93.9]                   Active Insurance as of 2/23/2024       Primary Coverage       Payor Plan Insurance Group Employer/Plan Group    AETNA MEDICARE REPLACEMENT AETNA MED ADV PPO [id number]       Payor Plan Address Payor Plan Phone Number Payor Plan Fax Number Effective Dates    [address] [phone]  1/1/2024 - None Entered    [facility] [address]         Subscriber Name Subscriber Birth Date Member ID       BRYAN PATEL 1945 [id number]                     Emergency Contacts        (Rel.) Home Phone Work Phone Mobile Phone    Jorge,Ann (Spouse) -- -- [phone]    Isi Ellison (Daughter) -- -- [phone]    JORGESTAN ROSE (Son) -- -- [phone]              Discharge Summary    No notes of this type exist for this encounter.       Discharge Order (From admission, onward)       Start     Ordered    02/27/24 0855  Discharge patient  " Once        Expected Discharge Date: 02/27/24   Discharge Disposition: Home-Health Care [facility]   Physician of Record for Attribution - Please select from Treatment Team: LEISA LARIOS [[id number]]   Review needed by CMO to determine Physician of Record: No      Question Answer Comment   Physician of Record for Attribution - Please select from Treatment Team LEISA LARIOS    Review needed by CMO to determine Physician of Record No        02/27/24 0857                    Yareli Honeycutt, RN     Case Management     Case Management/Social Work      Signed     Date of Service: 02/26/24 1509  Creation Time: 02/26/24 1509     Signed         Discharge Planning Assessment  [facility]     Patient Name: Bryan Landers               MRN: [id number]  Today's Date: 2/26/2024                Admit Date: 2/23/2024     Plan: Home to [facility] with     Discharge Needs Assessment         Row Name 02/26/24 1501           Living Environment     People in Home spouse     Current Living Arrangements assisted living facility     Potentially Unsafe Housing Conditions none     Primary Care Provided by self     Provides Primary Care For no one     Family Caregiver if Needed none     Quality of Family Relationships supportive           Resource/Environmental Concerns     Resource/Environmental Concerns none           Transition Planning     Patient/Family Anticipates Transition to home     Transportation Anticipated family or friend will provide           Discharge Needs Assessment     Equipment Currently Used at Home cane, straight     Concerns to be Addressed no discharge needs identified     Equipment Needed After Discharge none     Outpatient/Agency/Support Group Needs homecare agency                         Discharge Plan         Row Name 02/26/24 1504           Plan     Plan Home to [facility] with      Patient/Family in Agreement with Plan yes     Plan Comments Met with pts son at bedside. Introduced self, explained  CCP role, facesheet verified. Pt lives in [facility].  No history of HH or SNF.  Plan is to return to [facility] with additional services from [facility].  Daughter speaking with [facility] to coordinate.  Referral to [facility] placed in [facility]. Await determination. CCP will follow. DOMENIC Honeycutt RN                       Continued Care and Services - Admitted Since 2/23/2024         Destination         Service Provider Request Status Selected Services Address Phone Fax Patient Preferred     [facility] Pending - Request Sent N/A [address] [facility] [address] [phone] --                  Home Medical Care         Service Provider Request Status Selected Services Address Phone Fax Patient Preferred      [facility] Pending - Request Sent N/A [address] [address] [phone] --                            Demographic Summary         Row Name 02/26/24 1456           General Information     Admission Type inpatient     Arrived From home     Referral Source admission list     Reason for Consult discharge planning     Preferred Language English           Contact Information     Permission Granted to Share Info With family/designee  Isi Ellison (daughter) [phone]                         Functional Status    No documentation.                        Psychosocial    No documentation.                        Abuse/Neglect    No documentation.                        Legal    No documentation.                        Substance Abuse    No documentation.                        Patient Forms    No documentation.                            Yareli Honeycutt RN

## 2024-02-28 NOTE — H&P (VIEW-ONLY)
Name: Bryan Landers ADMIT: 2024   : 1945  PCP: Jose Fonseca MD    MRN: [id number] LOS: 0 days   AGE/SEX: 79 y.o. male  ROOM: [facility]     Subjective   Subjective   Patient resting in bed, he denies any shortness of breath at this time however he also says that he is just been laying in bed.    Objective   Objective   Vital Signs  Temp:  [97.4 °F (36.3 °C)-98.6 °F (37 °C)] 97.6 °F (36.4 °C)  Heart Rate:  [] 104  Resp:  [16-18] 18  BP: (105-120)/(64-82) 116/82  SpO2:  [95 %-97 %] 95 %  on  Flow (L/min):  [2] 2;   Device (Oxygen Therapy): room air  Body mass index is 35.05 kg/m².  Physical Exam  Vitals and nursing note reviewed.   Constitutional:       General: He is not in acute distress.     Appearance: Normal appearance. He is obese. He is not toxic-appearing.   Cardiovascular:      Rate and Rhythm: Normal rate and regular rhythm.   Pulmonary:      Effort: Pulmonary effort is normal. No respiratory distress.      Breath sounds: No wheezing.      Comments: Diminished bilaterally    Abdominal:      General: Abdomen is flat. Bowel sounds are normal. There is no distension.      Palpations: Abdomen is soft.      Tenderness: There is no abdominal tenderness.   Musculoskeletal:         General: No swelling, tenderness or deformity.      Right lower leg: Edema present.      Left lower leg: Edema present.   Skin:     General: Skin is warm and dry.   Neurological:      General: No focal deficit present.      Mental Status: He is alert and oriented to person, place, and time. Mental status is at baseline.      Motor: No weakness.         Results Review     I reviewed the patient's new clinical results.  Results from last 7 days   Lab Units 24  19024  03524  0441 24  0458   WBC 10*3/mm3 4.64 4.04 3.88 5.86   HEMOGLOBIN g/dL 9.9* 9.3* 8.3* 9.3*   PLATELETS 10*3/mm3 123* 106* 82* 86*     Results from last 7 days   Lab Units 24  19024  0359 24  0441  02/25/24  0458   SODIUM mmol/L 140 140 141 141   POTASSIUM mmol/L 4.8 4.4 4.0 4.8   CHLORIDE mmol/L 104 105 107 107   CO2 mmol/L 26.0 23.2 25.3 21.4*   BUN mg/dL 27* 24* 25* 26*   CREATININE mg/dL 1.44* 1.40* 1.36* 1.42*   GLUCOSE mg/dL 136* 101* 106* 99   EGFR mL/min/1.73 49.1* 50.8* 52.6* 50.0*     Results from last 7 days   Lab Units 02/23/24  2125   ALBUMIN g/dL 3.6   BILIRUBIN mg/dL 0.6   ALK PHOS U/L 67   AST (SGOT) U/L 45*   ALT (SGPT) U/L 22     Results from last 7 days   Lab Units 02/27/24  1904 02/27/24  0359 02/26/24  0441 02/25/24  0458 02/24/24  0304 02/23/24  2125   CALCIUM mg/dL 8.5* 7.9* 7.6* 7.9*   < > 8.5*   ALBUMIN g/dL  --   --   --   --   --  3.6    < > = values in this interval not displayed.       Glucose   Date/Time Value Ref Range Status   02/28/2024 0523 106 70 - 130 mg/dL Final   02/27/2024 0539 112 70 - 130 mg/dL Final   02/26/2024 2100 193 (H) 70 - 130 mg/dL Final   02/26/2024 1601 120 70 - 130 mg/dL Final   02/26/2024 1046 185 (H) 70 - 130 mg/dL Final   02/26/2024 0559 105 70 - 130 mg/dL Final   02/25/2024 2048 150 (H) 70 - 130 mg/dL Final       No radiology results for the last day  Scheduled Medications  allopurinol, 100 mg, Oral, Daily  atorvastatin, 80 mg, Oral, Daily  furosemide, 20 mg, Oral, Daily  levothyroxine, 75 mcg, Oral, QAM  memantine, 20 mg, Oral, QAM  sodium chloride, 10 mL, Intravenous, Q12H    Infusions   Diet  NPO Diet NPO Type: Sips with Meds       Assessment/Plan     Active Hospital Problems    Diagnosis  POA    **Pneumothorax [J93.9]  Yes    Fracture of multiple ribs of right side [S22.41XA]  Unknown    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 [D68.51]  Yes    Chronic deep vein thrombosis (DVT) of popliteal vein of both lower extremities [I82.533]  Yes    Chronic pulmonary embolism without acute cor pulmonale [I27.82]  Yes    Acquired hypothyroidism [E03.9]  Yes    Stage 3b chronic kidney disease [N18.32]  Yes    Type 2 diabetes mellitus with chronic kidney disease, without  long-term current use of insulin [E11.22]  Yes    Essential hypertension [I10]  Yes      Resolved Hospital Problems   No resolved problems to display.       79 y.o. male admitted with Pneumothorax.    Right sided pneumothorax  Right sided rib fractures  - CT surgery consulted, discussed with NICHOLAS Jones, plan for chest tube placement    Type 2 diabetes- A1c 5.8; glucose ok on am bmp, monitor and add ssi if needed    CKD 3b- Cr 1.4 stable at baseline    HTN- lasix     BLE venous insufficiency- home lasix    History of DVT/PE- Eliquis 2.5 bid on hold until ok with thoracic surgery-hold as patient is to receive chest tube    Dementia- namenda    Alcohol use disorder- history of, pt recently admitted here x4 days- no e/o w/d- hold off on CIWA    Flow (L/min):  [2] 2    DVT prophylaxis: SCDs  Discussed with patient and consulting provider.  Anticipated discharge home, TBD            Ceci Rivera MD  [facility]  02/28/24  05:47 EST

## 2024-02-28 NOTE — CASE MANAGEMENT/SOCIAL WORK
Continued Stay Note  [facility]     Patient Name: Bryan Landers  MRN: [id number]  Today's Date: 2/28/2024    Admit Date: 2/27/2024    Plan[place] with    Discharge Plan       Row Name 02/28/24 1144       Plan    [place] with     Patient/Family in Agreement with Plan yes    Plan Comments Pt just left hospital. [place] [facility].  [facility] following.  CCP will follow.  DOMENIC Honeycutt RN                   Discharge Codes    No documentation.                       Yareli Honeycutt, RN

## 2024-02-28 NOTE — POST-PROCEDURE NOTE
POST PROCEDURE NOTE    Procedure: chest tube    Pre-Procedure Diagnosis: pneumoth.    Post-procedure Diagnosis: same    Findings: successful chest tube plcmt    Complications: none    Blood loss: min    Specimen Removed: -    Disposition:   Transfer back to inpatient room

## 2024-02-28 NOTE — DISCHARGE PLACEMENT REQUEST
"Bryan Patel (79 y.o. Male)       Date of Birth   1945    Social Security Number       Address   [address] [place] [address] [address] [place], [place] [address]    Home Phone   [phone]    MRN   [id number]       Mosque   None    Marital Status                               Admission Date   2/27/24    Admission Type   Emergency    Admitting Provider   Beck Avila MD    Attending Provider   Ceci Rivera MD    Department, Room/Bed   [address], E551/1       Discharge Date       Discharge Disposition       Discharge Destination                                 Attending Provider: Ceci Rivera MD    Allergies: Nsaids, Aricept [Donepezil]    Isolation: None   Infection: None   Code Status: CPR    Ht: 177.8 cm (70\")   Wt: 111 kg (244 lb 4.3 oz)    Admission Cmt: None   Principal Problem: Pneumothorax [J93.9]                   Active Insurance as of 2/27/2024       Primary Coverage       Payor Plan Insurance Group Employer/Plan Group    AETNA MEDICARE REPLACEMENT AETNA MED ADV PPO [id number]       Payor Plan Address Payor Plan Phone Number Payor Plan Fax Number Effective Dates    [address] [phone]  1/1/2024 - None Entered    [facility] [place] [address]         Subscriber Name Subscriber Birth Date Member ID       BRYAN PATEL 1945 [id number]                     Emergency Contacts        (Rel.) Home Phone Work Phone Mobile Phone    JorgeChloe (Spouse) -- -- [phone]    Isi Ellison (Daughter) -- -- [phone]    STAN PATEL (Son) -- -- [phone]              Emergency Contact Information       Name Relation Home Work Mobile    Chloe Patel Spouse   [phone]    SarahyIsi tan Daughter   [phone]    JORGESTAN Son   [phone]          "

## 2024-02-28 NOTE — PLAN OF CARE
Goal Outcome Evaluation:      Pt A&OX4 however forgetful/short term memory loss. VSS. Currently on RA with O2 sats in the mid 90s. Denies pain. Son at bedside with patient. Awaiting CXR this AM.

## 2024-02-28 NOTE — ED NOTES
Nursing report ED to floor  Bryan Landers  79 y.o.  male    HPI :  HPI (Adult)  Stated Reason for Visit: was just d/c after having mult rib fx. told to return to ER d/t concerns for pneumothorax not being fully resolved    Chief Complaint  Chief Complaint   Patient presents with    Abnormal Imaging       Admitting doctor:   Beck Avila MD    Admitting diagnosis:   The encounter diagnosis was Pneumothorax on right.    Code status:   Current Code Status       Date Active Code Status Order ID Comments User Context       2/27/2024 1903 CPR (Attempt to Resuscitate) [id number]  Beck Avila MD ED        Question Answer    Code Status (Patient has no pulse and is not breathing) CPR (Attempt to Resuscitate)    Medical Interventions (Patient has pulse or is breathing) Full Support                    Allergies:   Nsaids and Aricept [donepezil]    Isolation:   No active isolations    Intake and Output  No intake or output data in the 24 hours ending 02/27/24 1931    Weight:   There were no vitals filed for this visit.    Most recent vitals:   Vitals:    02/27/24 1756   BP: 105/69   Pulse: 109   Resp: 18   Temp: 98.6 °F (37 °C)   SpO2: 96%       Active LDAs/IV Access:   Lines, Drains & Airways       Active LDAs       Name Placement date Placement time Site Days    Peripheral IV 02/27/24 1911 Anterior;Left Forearm 02/27/24 1911  Forearm  less than 1                    Labs (abnormal labs have a star):   Labs Reviewed   BASIC METABOLIC PANEL   CBC WITH AUTO DIFFERENTIAL   CBC AND DIFFERENTIAL    Narrative:     The following orders were created for panel order CBC & Differential.  Procedure                               Abnormality         Status                     ---------                               -----------         ------                     CBC Auto Differential[[id number]]                            In process                   Please view results for these tests on the individual orders.        EKG:   No orders to display       Meds given in ED:   Medications   sodium chloride 0.9 % flush 10 mL (has no administration in time range)   sodium chloride 0.9 % flush 10 mL (has no administration in time range)   sodium chloride 0.9 % infusion 40 mL (has no administration in time range)   nitroglycerin (NITROSTAT) SL tablet 0.4 mg (has no administration in time range)   sennosides-docusate (PERICOLACE) 8.6-50 MG per tablet 2 tablet (has no administration in time range)     And   polyethylene glycol (MIRALAX) packet 17 g (has no administration in time range)     And   bisacodyl (DULCOLAX) EC tablet 5 mg (has no administration in time range)     And   bisacodyl (DULCOLAX) suppository 10 mg (has no administration in time range)   acetaminophen (TYLENOL) tablet 650 mg (has no administration in time range)     Or   acetaminophen (TYLENOL) 160 MG/5ML oral solution 650 mg (has no administration in time range)     Or   acetaminophen (TYLENOL) suppository 650 mg (has no administration in time range)   ondansetron (ZOFRAN) injection 4 mg (has no administration in time range)       Imaging results:  XR Chest 1 View    Result Date: 2024  1. Stable appearance of the chest since yesterday. 2. No significant pneumothorax is seen.   This report was finalized on 2024 8:49 AM by Dr. Orion Alcaraz M.D on Workstation: [employer]       Ambulatory status:   Assistive device required (uses walker)    Social issues:   Social History     Socioeconomic History    Marital status:      Spouse name: Chloe    Number of children: 2   Tobacco Use    Smoking status: Former     Packs/day: 0.25     Years: 5.00     Additional pack years: 0.00     Total pack years: 1.25     Types: Cigars, Cigarettes     Quit date: 1986     Years since quittin.1    Smokeless tobacco: Never   Vaping Use    Vaping Use: Never used   Substance and Sexual Activity    Alcohol use: Yes     Alcohol/week: 32.0 - 34.0 standard drinks of alcohol      Types: 4 - 6 Shots of liquor, 28 Standard drinks or equivalent per week     Comment: 2-3 double vodkas a night    Drug use: No    Sexual activity: Yes     Partners: Female     Birth control/protection: Post-menopausal       Peripheral Neurovascular  Peripheral Neurovascular (Adult)  Peripheral Neurovascular WDL: WDL    Neuro Cognitive  Neuro Cognitive (Adult)  Cognitive/Neuro/Behavioral WDL: WDL    Learning  Learning Assessment (Adult)  Learning Readiness and Ability: cognitive limitation noted, physical limitation noted    Respiratory  Respiratory (Adult)  Airway WDL: WDL  Respiratory WDL  Respiratory WDL: .WDL except, rhythm/pattern  Rhythm/Pattern, Respiratory: shortness of breath    Abdominal Pain       Pain Assessments  Pain (Adult)  (0-10) Pain Rating: Rest: 5  Pain Location: back    NIH Stroke Scale       Olivia Liz RN  02/27/24 19:31 EST

## 2024-02-28 NOTE — PROGRESS NOTES
Name: Bryan Landers ADMIT: 2024   : 1945  PCP: Jose Fonseca MD    MRN: [id number] LOS: 0 days   AGE/SEX: 79 y.o. male  ROOM: [facility]     Subjective   Subjective   Patient resting in bed, he denies any shortness of breath at this time however he also says that he is just been laying in bed.    Objective   Objective   Vital Signs  Temp:  [97.4 °F (36.3 °C)-98.6 °F (37 °C)] 97.6 °F (36.4 °C)  Heart Rate:  [] 104  Resp:  [16-18] 18  BP: (105-120)/(64-82) 116/82  SpO2:  [95 %-97 %] 95 %  on  Flow (L/min):  [2] 2;   Device (Oxygen Therapy): room air  Body mass index is 35.05 kg/m².  Physical Exam  Vitals and nursing note reviewed.   Constitutional:       General: He is not in acute distress.     Appearance: Normal appearance. He is obese. He is not toxic-appearing.   Cardiovascular:      Rate and Rhythm: Normal rate and regular rhythm.   Pulmonary:      Effort: Pulmonary effort is normal. No respiratory distress.      Breath sounds: No wheezing.      Comments: Diminished bilaterally    Abdominal:      General: Abdomen is flat. Bowel sounds are normal. There is no distension.      Palpations: Abdomen is soft.      Tenderness: There is no abdominal tenderness.   Musculoskeletal:         General: No swelling, tenderness or deformity.      Right lower leg: Edema present.      Left lower leg: Edema present.   Skin:     General: Skin is warm and dry.   Neurological:      General: No focal deficit present.      Mental Status: He is alert and oriented to person, place, and time. Mental status is at baseline.      Motor: No weakness.         Results Review     I reviewed the patient's new clinical results.  Results from last 7 days   Lab Units 24  19024  03524  0441 24  0458   WBC 10*3/mm3 4.64 4.04 3.88 5.86   HEMOGLOBIN g/dL 9.9* 9.3* 8.3* 9.3*   PLATELETS 10*3/mm3 123* 106* 82* 86*     Results from last 7 days   Lab Units 24  19024  0359 24  0441  02/25/24  0458   SODIUM mmol/L 140 140 141 141   POTASSIUM mmol/L 4.8 4.4 4.0 4.8   CHLORIDE mmol/L 104 105 107 107   CO2 mmol/L 26.0 23.2 25.3 21.4*   BUN mg/dL 27* 24* 25* 26*   CREATININE mg/dL 1.44* 1.40* 1.36* 1.42*   GLUCOSE mg/dL 136* 101* 106* 99   EGFR mL/min/1.73 49.1* 50.8* 52.6* 50.0*     Results from last 7 days   Lab Units 02/23/24  2125   ALBUMIN g/dL 3.6   BILIRUBIN mg/dL 0.6   ALK PHOS U/L 67   AST (SGOT) U/L 45*   ALT (SGPT) U/L 22     Results from last 7 days   Lab Units 02/27/24  1904 02/27/24  0359 02/26/24  0441 02/25/24  0458 02/24/24  0304 02/23/24  2125   CALCIUM mg/dL 8.5* 7.9* 7.6* 7.9*   < > 8.5*   ALBUMIN g/dL  --   --   --   --   --  3.6    < > = values in this interval not displayed.       Glucose   Date/Time Value Ref Range Status   02/28/2024 0523 106 70 - 130 mg/dL Final   02/27/2024 0539 112 70 - 130 mg/dL Final   02/26/2024 2100 193 (H) 70 - 130 mg/dL Final   02/26/2024 1601 120 70 - 130 mg/dL Final   02/26/2024 1046 185 (H) 70 - 130 mg/dL Final   02/26/2024 0559 105 70 - 130 mg/dL Final   02/25/2024 2048 150 (H) 70 - 130 mg/dL Final       No radiology results for the last day  Scheduled Medications  allopurinol, 100 mg, Oral, Daily  atorvastatin, 80 mg, Oral, Daily  furosemide, 20 mg, Oral, Daily  levothyroxine, 75 mcg, Oral, QAM  memantine, 20 mg, Oral, QAM  sodium chloride, 10 mL, Intravenous, Q12H    Infusions   Diet  NPO Diet NPO Type: Sips with Meds       Assessment/Plan     Active Hospital Problems    Diagnosis  POA    **Pneumothorax [J93.9]  Yes    Fracture of multiple ribs of right side [S22.41XA]  Unknown    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 [D68.51]  Yes    Chronic deep vein thrombosis (DVT) of popliteal vein of both lower extremities [I82.533]  Yes    Chronic pulmonary embolism without acute cor pulmonale [I27.82]  Yes    Acquired hypothyroidism [E03.9]  Yes    Stage 3b chronic kidney disease [N18.32]  Yes    Type 2 diabetes mellitus with chronic kidney disease, without  long-term current use of insulin [E11.22]  Yes    Essential hypertension [I10]  Yes      Resolved Hospital Problems   No resolved problems to display.       79 y.o. male admitted with Pneumothorax.    Right sided pneumothorax  Right sided rib fractures  - CT surgery consulted, discussed with NICHOLAS Jones, plan for chest tube placement    Type 2 diabetes- A1c 5.8; glucose ok on am bmp, monitor and add ssi if needed    CKD 3b- Cr 1.4 stable at baseline    HTN- lasix     BLE venous insufficiency- home lasix    History of DVT/PE- Eliquis 2.5 bid on hold until ok with thoracic surgery-hold as patient is to receive chest tube    Dementia- namenda    Alcohol use disorder- history of, pt recently admitted here x4 days- no e/o w/d- hold off on CIWA    Flow (L/min):  [2] 2    DVT prophylaxis: SCDs  Discussed with patient and consulting provider.  Anticipated discharge home, TBD            Ceci Rivera MD  [facility]  02/28/24  05:47 EST

## 2024-02-28 NOTE — H&P
[facility] Admission H&P    Patient Care Team:  Jose Fonseca MD as PCP - General (Family Medicine)  Jose Michelle MD as Consulting Physician (Radiation Oncology)  Roldan Mccarthy MD as Consulting Physician (Urology)  Clarence Nieves MD (Hematology)  Grover Harris DPM as Consulting Physician (Podiatry)    Chief complaint: Told to come back in by thoracic surgery for pneumothorax seen on chest x-ray this morning    History of Present Illness    This is an 79-year-old male who was just discharged this morning after he was be admitted for multiple right-sided rib fractures and associated hemopneumothorax.  Later in the day today thoracic surgery looked at his chest x-ray which revealed increasing pneumothorax and he was asked to come back to the emergency room where this was redemonstrated on repeat x-ray.  Thoracic surgery requested readmission.  Patient has no complaints of shortness of breath.  States he only has minimal right-sided chest wall pain.  Denies any cough or fever.    Past Medical History:   Diagnosis Date    At risk for sleep apnea     Bladder mass     Bruises easily     Diabetes mellitus     Disease of thyroid gland     Elevated cholesterol     GI bleed     Gross hematuria 9/22/2021    History of transfusion     Poor historian     Short-term memory loss     Vitamin D deficiency      Past Surgical History:   Procedure Laterality Date    COLONOSCOPY  09/2016    COLONOSCOPY N/A 9/28/2018    Procedure: COLONOSCOPY;  Surgeon: Olaf Gomez MD;  Location: [facility] OR;  Service: Gastroenterology    COLONOSCOPY N/A 1/7/2019    Procedure: COLONOSCOPY;  Surgeon: Rosa Jack MD;  Location: [facility] OR;  Service: Gastroenterology    CYSTOSCOPY BLADDER BIOPSY N/A 9/22/2021    Procedure: CYSTOSCOPY BLADDER BIOPSY;  Surgeon: Roldan Mccarthy MD;  Location: [facility] OR;  Service: Urology;  Laterality: N/A;    HERNIA REPAIR Bilateral     PROSTATE ULTRASOUND BIOPSY       SIGMOIDOSCOPY N/A 10/2/2018    Procedure: FLEXIBLE SIGMOIDOSCOPY:  APC cautery bleeding using 60 joules;  Surgeon: Olaf Gomez MD;  Location: [facility] ENDOSCOPY;  Service: Gastroenterology    TONSILLECTOMY      TOTAL HIP ARTHROPLASTY Bilateral 2007     Family History   Problem Relation Age of Onset    Stroke Mother     Stroke Father     No Known Problems Brother     Colon cancer Neg Hx     Colon polyps Neg Hx     Malig Hyperthermia Neg Hx      Social History     Tobacco Use    Smoking status: Former     Packs/day: 0.25     Years: 5.00     Additional pack years: 0.00     Total pack years: 1.25     Types: Cigars, Cigarettes     Quit date: 1986     Years since quittin.1    Smokeless tobacco: Never   Vaping Use    Vaping Use: Never used   Substance Use Topics    Alcohol use: Yes     Alcohol/week: 32.0 - 34.0 standard drinks of alcohol     Types: 4 - 6 Shots of liquor, 28 Standard drinks or equivalent per week     Comment: 2-3 double vodkas a night    Drug use: No     (Not in a hospital admission)    Allergies:  Nsaids and Aricept [donepezil]    Review of Systems   Constitutional:  Negative for chills and fever.   HENT:  Negative for congestion and sore throat.    Eyes:  Negative for visual disturbance.   Respiratory:  Negative for cough, chest tightness, shortness of breath and wheezing.    Cardiovascular:  Negative for chest pain, palpitations and leg swelling.   Gastrointestinal:  Negative for abdominal distention, abdominal pain, diarrhea, nausea and vomiting.   Endocrine: Negative for polydipsia and polyuria.   Genitourinary:  Negative for difficulty urinating, dysuria, frequency and urgency.   Musculoskeletal:  Negative for arthralgias and myalgias.   Skin:  Negative for color change and rash.   Neurological:  Negative for dizziness and light-headedness.        PHYSICAL EXAM    Vital Signs  tMax 24 hrs:  Temp (24hrs), Av.1 °F (36.7 °C), Min:97.8 °F (36.6 °C), Max:98.6 °F (37 °C)    Vitals  Ranges:  Temp:  [97.8 °F (36.6 °C)-98.6 °F (37 °C)] 98.6 °F (37 °C)  Heart Rate:  [] 88  Resp:  [16-18] 18  BP: (105-120)/(64-76) 120/73    Physical Exam  Vitals and nursing note reviewed.   Constitutional:       General: He is not in acute distress.     Appearance: He is well-developed. He is not ill-appearing.   HENT:      Head: Normocephalic and atraumatic.      Nose: Nose normal.      Mouth/Throat:      Mouth: Mucous membranes are not dry.   Eyes:      Conjunctiva/sclera: Conjunctivae normal.      Pupils: Pupils are equal, round, and reactive to light.   Neck:      Vascular: No JVD.   Cardiovascular:      Rate and Rhythm: Normal rate and regular rhythm.      Heart sounds: No murmur heard.     No gallop.   Pulmonary:      Effort: Pulmonary effort is normal. No accessory muscle usage or respiratory distress.      Breath sounds: No decreased breath sounds or wheezing.   Abdominal:      General: Bowel sounds are normal. There is no distension.      Palpations: Abdomen is soft.      Tenderness: There is no abdominal tenderness. There is no guarding or rebound.   Musculoskeletal:         General: No deformity. Normal range of motion.      Cervical back: Normal range of motion and neck supple.   Lymphadenopathy:      Cervical: No cervical adenopathy.   Skin:     General: Skin is warm and dry.      Findings: No erythema or rash.   Neurological:      Mental Status: He is alert and oriented to person, place, and time.      Cranial Nerves: No cranial nerve deficit.         Results Review:  Results from last 7 days   Lab Units 02/27/24  1904   WBC 10*3/mm3 4.64   HEMOGLOBIN g/dL 9.9*   HEMATOCRIT % 28.8*   PLATELETS 10*3/mm3 123*     Results from last 7 days   Lab Units 02/27/24  1904 02/24/24  0304 02/23/24  2125   SODIUM mmol/L 140   < > 139   POTASSIUM mmol/L 4.8   < > 4.4   CHLORIDE mmol/L 104   < > 103   CO2 mmol/L 26.0   < > 25.7   BUN mg/dL 27*   < > 23   CREATININE mg/dL 1.44*   < > 1.47*   CALCIUM mg/dL 8.5*    < > 8.5*   BILIRUBIN mg/dL  --   --  0.6   ALK PHOS U/L  --   --  67   ALT (SGPT) U/L  --   --  22   AST (SGOT) U/L  --   --  45*   GLUCOSE mg/dL 136*   < > 143*    < > = values in this interval not displayed.        I reviewed the patient's new clinical results.  I reviewed the patient's new imaging results and agree with the interpretation.        Active Hospital Problems    Diagnosis  POA    **Pneumothorax [J93.9]  Yes    Fracture of multiple ribs of right side [S22.41XA]  Unknown    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       Assessment & Plan    Patient has been readmitted.  Thoracic surgery has been consulted.  Repeat chest x-ray in the morning.  Monitor overnight tonight.  He denies any shortness of breath.  Has minimal issues with pain from the rib fractures.  Oxygenation is 100% on 2 L and anticipate we can wean him off of this.  Other chronic medical conditions appear stable.  Discussed with patient and his son at the bedside.    I discussed the patients findings and my recommendations with patient and family      Beck Avila MD  02/27/24  21:26 EST

## 2024-02-29 ENCOUNTER — APPOINTMENT (OUTPATIENT)
Dept: GENERAL RADIOLOGY | Facility: HOSPITAL | Age: 79
DRG: 200 | End: 2024-02-29
Payer: MEDICARE

## 2024-02-29 LAB — GLUCOSE BLDC GLUCOMTR-MCNC: 139 MG/DL (ref 70–130)

## 2024-02-29 PROCEDURE — 97530 THERAPEUTIC ACTIVITIES: CPT

## 2024-02-29 PROCEDURE — 97162 PT EVAL MOD COMPLEX 30 MIN: CPT

## 2024-02-29 PROCEDURE — 82948 REAGENT STRIP/BLOOD GLUCOSE: CPT

## 2024-02-29 PROCEDURE — 71045 X-RAY EXAM CHEST 1 VIEW: CPT

## 2024-02-29 PROCEDURE — 99232 SBSQ HOSP IP/OBS MODERATE 35: CPT

## 2024-02-29 RX ADMIN — LEVOTHYROXINE SODIUM 75 MCG: 75 TABLET ORAL at 06:37

## 2024-02-29 RX ADMIN — MEMANTINE HYDROCHLORIDE 20 MG: 10 TABLET, FILM COATED ORAL at 06:37

## 2024-02-29 RX ADMIN — Medication 10 ML: at 09:01

## 2024-02-29 RX ADMIN — ALLOPURINOL 100 MG: 100 TABLET ORAL at 09:00

## 2024-02-29 RX ADMIN — FUROSEMIDE 20 MG: 20 TABLET ORAL at 09:00

## 2024-02-29 RX ADMIN — Medication 10 ML: at 20:34

## 2024-02-29 RX ADMIN — ATORVASTATIN CALCIUM 80 MG: 80 TABLET, FILM COATED ORAL at 09:00

## 2024-02-29 NOTE — THERAPY EVALUATION
Patient Name: Bryan Landers  : 1945    MRN: [id number]                              Today's Date: 2024       Admit Date: 2024    Visit Dx:     ICD-10-CM ICD-9-CM   1. Pneumothorax on right  J93.9 512.89     Patient Active Problem List   Diagnosis    Essential hypertension    Mixed hyperlipidemia    Arthritis    Type 2 diabetes mellitus with chronic kidney disease, without long-term current use of insulin    Severely overweight    Idiopathic chronic gout of left knee    Medication monitoring encounter    Microalbuminuria due to type 2 diabetes mellitus    History of prostate cancer    Skin tear of left elbow without complication    Stage 3b chronic kidney disease    Medicare annual wellness visit, subsequent    Radiation proctitis    History of hip replacement, total, bilateral    Cognitive decline    Acquired hypothyroidism    B12 deficiency    Sinus tachycardia by electrocardiogram    Chronic pulmonary embolism without acute cor pulmonale    Chronic deep vein thrombosis (DVT) of popliteal vein of both lower extremities    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    Bladder mass    Dependent edema    Pancytopenia    Deep venous thrombosis    Class 2 severe obesity with serious comorbidity in adult    Pneumothorax    Fracture of multiple ribs of right side     Past Medical History:   Diagnosis Date    At risk for sleep apnea     Bladder mass     Bruises easily     Diabetes mellitus     Disease of thyroid gland     Elevated cholesterol     GI bleed     Gross hematuria 2021    History of transfusion     Poor historian     Short-term memory loss     Vitamin D deficiency      Past Surgical History:   Procedure Laterality Date    COLONOSCOPY  2016    COLONOSCOPY N/A 2018    Procedure: COLONOSCOPY;  Surgeon: Olaf Gomez MD;  Location: [facility] OR;  Service: Gastroenterology    COLONOSCOPY N/A 2019    Procedure: COLONOSCOPY;  Surgeon: Rosa Jack MD;  Location:  [facility] OR;  Service:  Gastroenterology    CYSTOSCOPY BLADDER BIOPSY N/A 9/22/2021    Procedure: CYSTOSCOPY BLADDER BIOPSY;  Surgeon: Roldan Mccarthy MD;  Location: [facility] MAIN OR;  Service: Urology;  Laterality: N/A;    HERNIA REPAIR Bilateral     PROSTATE ULTRASOUND BIOPSY      SIGMOIDOSCOPY N/A 10/2/2018    Procedure: FLEXIBLE SIGMOIDOSCOPY:  APC cautery bleeding using 60 joules;  Surgeon: Olaf Gomez MD;  Location: [facility] ENDOSCOPY;  Service: Gastroenterology    TONSILLECTOMY      TOTAL HIP ARTHROPLASTY Bilateral 2007      General Information       Row Name 02/29/24 1407          Physical Therapy Time and Intention    Document Type evaluation  -EJ     Mode of Treatment individual therapy;physical therapy  -EJ       Row Name 02/29/24 1407          General Information    Patient Profile Reviewed yes  -EJ     Prior Level of Function independent:;all household mobility;ADL's  -EJ     Existing Precautions/Restrictions no known precautions/restrictions  chest tube  -EJ     Barriers to Rehab none identified  -       Row Name 02/29/24 1407          Living Environment    People in Home spouse  -EJ       Row Name 02/29/24 1407          Safety Issues, Functional Mobility    Impairments Affecting Function (Mobility) strength;endurance/activity tolerance;pain  -EJ               User Key  (r) = Recorded By, (t) = Taken By, (c) = Cosigned By      Initials Name Provider Type    EJ Samira Ortega, PT Physical Therapist                   Mobility       Row Name 02/29/24 1410          Bed Mobility    Bed Mobility supine-sit;sit-supine  -EJ     Supine-Sit Friona (Bed Mobility) verbal cues;minimum assist (75% patient effort);moderate assist (50% patient effort)  -EJ     Sit-Supine Friona (Bed Mobility) minimum assist (75% patient effort)  -EJ     Assistive Device (Bed Mobility) head of bed elevated;bed rails  -EJ       Row Name 02/29/24 1410          Sit-Stand Transfer    Sit-Stand Friona (Transfers) verbal  cues;contact guard;minimum assist (75% patient effort)  -EJ     Assistive Device (Sit-Stand Transfers) walker, front-wheeled  -EJ       Row Name 02/29/24 1410          Gait/Stairs (Locomotion)    Madison Level (Gait) verbal cues;contact guard  -EJ     Assistive Device (Gait) walker, front-wheeled  -EJ     Distance in Feet (Gait) 200  -EJ     Deviations/Abnormal Patterns (Gait) amber decreased;stride length decreased  -EJ     Bilateral Gait Deviations forward flexed posture  -EJ     Comment, (Gait/Stairs) no overt unsteadiness noted  -               User Key  (r) = Recorded By, (t) = Taken By, (c) = Cosigned By      Initials Name Provider Type     Samira Ortega, PT Physical Therapist                   Obj/Interventions       [facility] Name 02/29/24 1411          Range of Motion Comprehensive    General Range of Motion no range of motion deficits identified  -EJ       Row Name 02/29/24 1411          Strength Comprehensive (MMT)    Comment, General Manual Muscle Testing (MMT) Assessment generalized weakness  -EJ       Row Name 02/29/24 1411          Balance    Balance Assessment sitting static balance;standing static balance;standing dynamic balance  -EJ     Static Sitting Balance supervision  -EJ     Position, Sitting Balance sitting edge of bed  -EJ     Static Standing Balance contact guard  -EJ     Dynamic Standing Balance contact guard  -EJ     Position/Device Used, Standing Balance walker, front-wheeled  -EJ               User Key  (r) = Recorded By, (t) = Taken By, (c) = Cosigned By      Initials Name Provider Type     Samira Ortega, PT Physical Therapist                   Goals/Plan       Row Name 02/29/24 1445          Bed Mobility Goal 1 (PT)    Activity/Assistive Device (Bed Mobility Goal 1, PT) bed mobility activities, all  -EJ     Madison Level/Cues Needed (Bed Mobility Goal 1, PT) standby assist  -EJ     Time Frame (Bed Mobility Goal 1, PT) 2 weeks  -       Row Name 02/29/24 5880           Transfer Goal 1 (PT)    Activity/Assistive Device (Transfer Goal 1, PT) transfers, all;walker, rolling  -EJ     Kidder Level/Cues Needed (Transfer Goal 1, PT) standby assist  -EJ     Time Frame (Transfer Goal 1, PT) 2 weeks  -EJ       Row Name 02/29/24 1445          Gait Training Goal 1 (PT)    Activity/Assistive Device (Gait Training Goal 1, PT) gait (walking locomotion);walker, rolling  -EJ     K[place]der Level (Gait Training Goal 1, PT) standby assist  -EJ     Distance (Gait Training Goal 1, PT) 200  -EJ     Time Frame (Gait Training Goal 1, PT) 2 weeks  -EJ       Row Name 02/29/24 1448          Therapy Assessment/Plan (PT)    Planned Therapy Interventions (PT) balance training;bed mobility training;gait training;home exercise program;stretching;strengthening;stair training;ROM (range of motion);patient/family education;transfer training  -EJ               User Key  (r) = Recorded By, (t) = Taken By, (c) = Cosigned By      Initials Name Provider Type    EJ Samira Ortega, PT Physical Therapist                   Clinical Impression       Row Name 02/29/24 1412          Pain    Pretreatment Pain Rating 5/10  -EJ     Pain Location - Side/Orientation Right  -EJ     Pain Location - flank;chest  -EJ     Pre/Posttreatment Pain Comment R ribs, chest tube  -EJ     Pain Intervention(s) Repositioned;Ambulation/increased activity  -EJ       Row Name 02/29/24 1412          Plan of Care Review    Plan of Care Reviewed With patient  -EJ     Outcome Evaluation Pt seen for PT eval this afternoon. He was admitted to [facility] on 2/27 w pneumothorax.  Patient was just hospitalized over weekend with rib fractures and a pneumothorax, had a chest tube placed.  Chest tube was pulled on 2/26 and follow-up chest x-ray on 2/27 showed a residual right apical pneumothorax and therefore, pt referred to the emergency department for evaluation. Pt now w new chest tube placed. He presents w R sided rib/chest tube pain and  generalized weakness. Today, pt able to transition to EOB w min/mod A. Pt able to stand w CGA/min A using Rwx. He then ambulated approx 200 ft w CGA and Rwx. No overt unsteadiness noted. Pt assisted back to bed at end of session w min A. He plans to return home to Independent Living at [place]. He will continue to benefit from skilled PT to maximize safety, strength, and independence w mobility  -EJ       Row Name 02/29/24 1412          Therapy Assessment/Plan (PT)    Rehab Potential (PT) good, to achieve stated therapy goals  -EJ     Criteria for Skilled Interventions Met (PT) yes  -EJ     Therapy Frequency (PT) 5 times/wk  -EJ       Row Name 02/29/24 1412          Positioning and Restraints    Pre-Treatment Position in bed  -EJ     Post Treatment Position bed  -EJ     In Bed notified nsg;supine;call light within reach;encouraged to call for assist;exit alarm on  -EJ               User Key  (r) = Recorded By, (t) = Taken By, (c) = Cosigned By      Initials Name Provider Type    Samira Roberts, PT Physical Therapist                   Outcome Measures       Row Name 02/29/24 1445          How much help from another person do you currently need...    Turning from your back to your side while in flat bed without using bedrails? 3  -EJ     Moving from lying on back to sitting on the side of a flat bed without bedrails? 3  -EJ     Moving to and from a bed to a chair (including a wheelchair)? 3  -EJ     Standing up from a chair using your arms (e.g., wheelchair, bedside chair)? 3  -EJ     Climbing 3-5 steps with a railing? 3  -EJ     To walk in hospital room? 3  -EJ     AM-PAC 6 Clicks Score (PT) 18  -EJ     Highest Level of Mobility Goal 6 --> Walk 10 steps or more  -EJ               User Key  (r) = Recorded By, (t) = Taken By, (c) = Cosigned By      Initials Name Provider Type    Samira Roberts, PT Physical Therapist                                   PT Recommendation and Plan  Planned Therapy Interventions  (PT): balance training, bed mobility training, gait training, home exercise program, stretching, strengthening, stair training, ROM (range of motion), patient/family education, transfer training  Plan of Care Reviewed With: patient  Outcome Evaluation: Pt seen for PT eval this afternoon. He was admitted to [facility] on 2/27 w pneumothorax.  Patient was just hospitalized over weekend with rib fractures and a pneumothorax, had a chest tube placed.  Chest tube was pulled on 2/26 and follow-up chest x-ray on 2/27 showed a residual right apical pneumothorax and therefore, pt referred to the emergency department for evaluation. Pt now w new chest tube placed. He presents w R sided rib/chest tube pain and generalized weakness. Today, pt able to transition to EOB w min/mod A. Pt able to stand w CGA/min A using Rwx. He then ambulated approx 200 ft w CGA and Rwx. No overt unsteadiness noted. Pt assisted back to bed at end of session w min A. He plans to return home to Independent Living at [place]. He will continue to benefit from skilled PT to maximize safety, strength, and independence w mobility     Time Calculation:         PT Charges       Row Name 02/29/24 1446             Time Calculation    Start Time 1332  -EJ      Stop Time 1359  -EJ      Time Calculation (min) 27 min  -EJ      PT Received On 02/29/24  -EJ      PT - Next Appointment 03/01/24  -EJ      PT Goal Re-Cert Due Date 03/14/24  -EJ         Time Calculation- PT    Total Timed Code Minutes- PT 20 minute(s)  -EJ                User Key  (r) = Recorded By, (t) = Taken By, (c) = Cosigned By      Initials Name Provider Type    EJ Samira Ortega, PT Physical Therapist                  Therapy Charges for Today       Code Description Service Date Service Provider Modifiers Qty    37184155235 HC PT EVAL MOD COMPLEXITY 3 2/29/2024 Samira Ortega, PT GP 1    52493220201 HC PT THERAPEUTIC ACT EA 15 MIN 2/29/2024 Samira Ortega, PT GP 1            PT G-Codes  AM-PAC 6  Clicks Score (PT): 18  PT Discharge Summary  Anticipated Discharge Disposition (PT): assisted living    Samira Ortega, PT  2/29/2024

## 2024-02-29 NOTE — PLAN OF CARE
Goal Outcome Evaluation:      Chest tube to water seal. CXR in am. Ambulate with physical therapy. Up to bathroom. Right arm dressing changed. Son at bedside. Safety maintained. Anna ANTONIO

## 2024-02-29 NOTE — PLAN OF CARE
Goal Outcome Evaluation:  Plan of Care Reviewed With: patient           Outcome Evaluation: Pt seen for PT ashiaal this afternoon. He was admitted to [facility] on 2/27 w pneumothorax.  Patient was just hospitalized over weekend with rib fractures and a pneumothorax, had a chest tube placed.  Chest tube was pulled on 2/26 and follow-up chest x-ray on 2/27 showed a residual right apical pneumothorax and therefore, pt referred to the emergency department for evaluation. Pt now w new chest tube placed. He presents w R sided rib/chest tube pain and generalized weakness. Today, pt able to transition to EOB w min/mod A. Pt able to stand w CGA/min A using Rwx. He then ambulated approx 200 ft w CGA and Rwx. No overt unsteadiness noted. Pt assisted back to bed at end of session w min A. He plans to return home to Independent Living at [place]. He will continue to benefit from skilled PT to maximize safety, strength, and independence w mobility      Anticipated Discharge Disposition (PT): assisted living

## 2024-02-29 NOTE — PLAN OF CARE
Goal Outcome Evaluation:  VSS. Denied any pain throughout the night. Right CT to suction, no air leak or fluctuation noted. Son at bedside. Awaiting repeat CXR this AM.

## 2024-02-29 NOTE — PROGRESS NOTES
Nutrition Services    Patient Name:  Bryan Landers  YOB: 1945  MRN: [id number]  Admit Date:  2/27/2024    Assessment Date:  02/29/24    Summary: Nutrition assessment initiated due to skin issues. Pt was readmitted with PTX, multiple rib fx's. Chest tube placed yesterday in IR. Weight hx reviewed - fairly stable, possibly down a few pounds. Obese with BMI 35. Pt has a PI to coccyx and MASD  to hip. Images reviewed. Pt c/o his appetite isn't very good at this time but typically eats well. He feels like this illness has just zapped his appetite. 50% of breakfast today, lunch untouched. Encouraged good po to support healing, pt agreeable to try to eat better. May need supplements if doesn't improve.    Plan/Recommendations:  Encourage po intake  Monitor lytes and replace as needed.    Will follow clinical course, nutrition needs.      CLINICAL NUTRITION ASSESSMENT      Reason for Assessment Pressure Injury and/or Non-Healing Wound     Diagnosis/Problem   PTX, multiple rib fx's   Medical/Surgical History Past Medical History:   Diagnosis Date    At risk for sleep apnea     Bladder mass     Bruises easily     Diabetes mellitus     Disease of thyroid gland     Elevated cholesterol     GI bleed     Gross hematuria 9/22/2021    History of transfusion     Poor historian     Short-term memory loss     Vitamin D deficiency        Past Surgical History:   Procedure Laterality Date    COLONOSCOPY  09/2016    COLONOSCOPY N/A 9/28/2018    Procedure: COLONOSCOPY;  Surgeon: Olaf Gomez MD;  Location: [facility] OR;  Service: Gastroenterology    COLONOSCOPY N/A 1/7/2019    Procedure: COLONOSCOPY;  Surgeon: Rosa Jack MD;  Location: [facility] OR;  Service: Gastroenterology    CYSTOSCOPY BLADDER BIOPSY N/A 9/22/2021    Procedure: CYSTOSCOPY BLADDER BIOPSY;  Surgeon: Roldan Mccarthy MD;  Location: [facility] OR;  Service: Urology;  Laterality: N/A;    HERNIA REPAIR Bilateral     PROSTATE ULTRASOUND  "BIOPSY      SIGMOIDOSCOPY N/A 10/2/2018    Procedure: FLEXIBLE SIGMOIDOSCOPY:  APC cautery bleeding using 60 joules;  Surgeon: Olaf Gomez MD;  Location: [facility] ENDOSCOPY;  Service: Gastroenterology    TONSILLECTOMY      TOTAL HIP ARTHROPLASTY Bilateral 2007        Anthropometrics        Current Height  Current Weight  BMI kg/m2 Height: 177.8 cm (70\")  Weight: 111 kg (244 lb 4.3 oz) (02/27/24 2200)  Body mass index is 35.05 kg/m².   Adjusted BMI (if applicable)    BMI Category Obese, Class II (35 - 39.9)   Ideal Body Weight (IBW) 73kg   Usual Body Weight (UBW) 113kg   Weight Trend Stable   Weight History Wt Readings from Last 30 Encounters:   02/27/24 2200 111 kg (244 lb 4.3 oz)   02/24/24 0500 114 kg (250 lb 14.1 oz)   02/23/24 2300 114 kg (250 lb 14.1 oz)   02/23/24 1623 109 kg (240 lb)   01/11/24 1530 112 kg (248 lb)   12/05/23 1336 113 kg (250 lb)   05/24/23 0944 112 kg (247 lb)   02/27/23 0937 112 kg (246 lb)   02/20/23 1333 110 kg (243 lb)   02/16/23 0940 109 kg (241 lb)   02/14/23 1509 109 kg (241 lb)   09/26/22 1358 113 kg (250 lb)   09/14/22 0852 113 kg (250 lb)   08/29/22 1111 113 kg (250 lb)   07/21/22 1028 114 kg (252 lb)   06/08/22 0914 119 kg (263 lb)   02/09/22 1344 114 kg (251 lb)   01/13/22 1019 112 kg (247 lb)   10/13/21 0934 111 kg (245 lb)   09/22/21 1415 113 kg (249 lb)   09/20/21 1532 113 kg (249 lb 3.2 oz)   08/27/21 1022 113 kg (249 lb 12.8 oz)   07/09/21 1411 112 kg (247 lb)   02/04/21 0853 111 kg (244 lb 1.6 oz)   01/21/21 0822 109 kg (239 lb 6.4 oz)   12/22/20 1315 104 kg (230 lb)   12/08/20 1318 110 kg (242 lb)   08/12/20 1458 112 kg (246 lb 9.6 oz)   07/20/20 1215 111 kg (245 lb)   02/14/20 1116 114 kg (251 lb)   01/30/20 1026 115 kg (253 lb)      --  Labs       Pertinent Labs    Results from last 7 days   Lab Units 02/28/24  0537 02/27/24  1904 02/27/24  0359 02/24/24  0304 02/23/24  2125   SODIUM mmol/L 141 140 140   < > 139   POTASSIUM mmol/L 4.0 4.8 4.4   < > 4.4 "   CHLORIDE mmol/L 106 104 105   < > 103   CO2 mmol/L 25.0 26.0 23.2   < > 25.7   BUN mg/dL 25* 27* 24*   < > 23   CREATININE mg/dL 1.38* 1.44* 1.40*   < > 1.47*   CALCIUM mg/dL 8.4* 8.5* 7.9*   < > 8.5*   BILIRUBIN mg/dL  --   --   --   --  0.6   ALK PHOS U/L  --   --   --   --  67   ALT (SGPT) U/L  --   --   --   --  22   AST (SGOT) U/L  --   --   --   --  45*   GLUCOSE mg/dL 104* 136* 101*   < > 143*    < > = values in this interval not displayed.     Results from last 7 days   Lab Units 02/28/24  0537 02/24/24  0304 02/23/24  2125   HEMOGLOBIN g/dL 9.4*   < >  --    HEMATOCRIT % 27.3*   < >  --    WBC 10*3/mm3 3.62   < >  --    ALBUMIN g/dL  --   --  3.6    < > = values in this interval not displayed.     Results from last 7 days   Lab Units 02/28/24  0537 02/27/24  1904 02/27/24  0359 02/26/24  0441 02/25/24  0458   PLATELETS 10*3/mm3 124* 123* 106* 82* 86*     COVID19   Date Value Ref Range Status   09/20/2021 Not Detected Not Detected - Ref. Range Final     Lab Results   Component Value Date    HGBA1C 5.80 (H) 11/22/2023          Medications           Scheduled Medications allopurinol, 100 mg, Oral, Daily  atorvastatin, 80 mg, Oral, Daily  furosemide, 20 mg, Oral, Daily  levothyroxine, 75 mcg, Oral, QAM  memantine, 20 mg, Oral, QAM  sodium chloride, 10 mL, Intravenous, Q12H       Infusions     PRN Medications   acetaminophen **OR** acetaminophen **OR** acetaminophen    albuterol    senna-docusate sodium **AND** polyethylene glycol **AND** bisacodyl **AND** bisacodyl    nitroglycerin    ondansetron    sodium chloride    sodium chloride     Physical Findings          General Findings alert, disoriented, obese   Oral/Mouth Cavity tooth or teeth missing   Edema  2+ (mild), 3+ (moderate)   Gastrointestinal last bowel movement: 2/28   Skin  MASD, pressure injury: coccyx, skin tear   Tubes/Drains/Lines chest tube   NFPE No clinical signs of muscle wasting or fat loss   --  Current Nutrition Orders & Evaluation of  Intake       Oral Nutrition     Food Allergies NKFA   Current PO Diet Diet: Diabetic Diets; Consistent Carbohydrate; Texture: Regular Texture (IDDSI 7); Fluid Consistency: Thin (IDDSI 0)   Supplement n/a   PO Evaluation     % PO Intake 50%    Factors Affecting Intake: decreased appetite   --  PES STATEMENT / NUTRITION DIAGNOSIS      Nutrition Dx Problem  Problem: Inadequate Oral Intake  Etiology: Medical Diagnosis - PTX, multiple rib fx's    Signs/Symptoms: Report of Minimal PO Intake     NUTRITION INTERVENTION / PLAN OF CARE      Intervention Goal(s) Maintain nutrition status, Tolerate PO , Increase intake, and No significant weight loss         RD Intervention/Action Interview for preferences, Encourage intake, Continue to monitor, and Care plan reviewed   --      Prescription/Orders:       PO Diet       Supplements       Enteral Nutrition       Parenteral Nutrition    New Prescription Ordered? No changes at this time   --      Monitor/Evaluation Per protocol   Discharge Plan/Needs Pending clinical course   --    RD to follow per protocol.      Electronically signed by:  Esther Ellison RD  02/29/24 13:35 EST

## 2024-02-29 NOTE — PAYOR COMM NOTE
"Bryan Patel (79 y.o. Male)      SEE FOR INPATIENT:  REF# [id number]      DEPT: [phone],  [phone]    [facility]: NPI [id number] [facility]# [id number]    KATHARINE PUGA RN,CCP       Date of Birth   1945    Social Security Number       Address   [address] [place] [address] [address] [place], [place] [address]    Home Phone   [phone]    MRN   [id number]       Quaker   None    Marital Status                               Admission Date   2/27/24    Admission Type   Emergency    Admitting Provider   Beck Avila MD    Attending Provider   Ariel Novak MD    Department, Room/Bed   [facility], E551/1       Discharge Date       Discharge Disposition       Discharge Destination                                 Attending Provider: Ariel Novak MD    Allergies: Nsaids, Aricept [Donepezil]    Isolation: None   Infection: None   Code Status: CPR    Ht: 177.8 cm (70\")   Wt: 111 kg (244 lb 4.3 oz)    Admission Cmt: None   Principal Problem: Pneumothorax [J93.9]                   Active Insurance as of 2/27/2024       Primary Coverage       Payor Plan Insurance Group Employer/Plan Group    AETNA MEDICARE REPLACEMENT AETNA MED ADV PPO [id number]       Payor Plan Address Payor Plan Phone Number Payor Plan Fax Number Effective Dates    [address] [phone]  1/1/2024 - None Entered    [facility] [id number]         Subscriber Name Subscriber Birth Date Member ID       BRYAN PATEL 1945 [id number]                     Emergency Contacts        (Rel.) Home Phone Work Phone Mobile Phone    Chloe Patel (Spouse) -- -- [phone]    Isi Ellison (Daughter) -- -- [phone]    JORGESTAN (Son) -- -- [phone]                 History & Physical        Greg Cordova MD at 02/28/24 1229            H&P reviewed. The patient was examined and there are no changes to the H&P.          Electronically " signed by Greg Cordova MD at 24 1229   Source Note              Name: Bryan Landers ADMIT: 2024   : 1945  PCP: Jose Fonseca MD    MRN: [id number] LOS: 0 days   AGE/SEX: 79 y.o. male  ROOM: [facility]     Subjective   Subjective  Patient resting in bed, he denies any shortness of breath at this time however he also says that he is just been laying in bed.    Objective   Objective  Vital Signs  Temp:  [97.4 °F (36.3 °C)-98.6 °F (37 °C)] 97.6 °F (36.4 °C)  Heart Rate:  [] 104  Resp:  [16-18] 18  BP: (105-120)/(64-82) 116/82  SpO2:  [95 %-97 %] 95 %  on  Flow (L/min):  [2] 2;   Device (Oxygen Therapy): room air  Body mass index is 35.05 kg/m².  Physical Exam  Vitals and nursing note reviewed.   Constitutional:       General: He is not in acute distress.     Appearance: Normal appearance. He is obese. He is not toxic-appearing.   Cardiovascular:      Rate and Rhythm: Normal rate and regular rhythm.   Pulmonary:      Effort: Pulmonary effort is normal. No respiratory distress.      Breath sounds: No wheezing.      Comments: Diminished bilaterally    Abdominal:      General: Abdomen is flat. Bowel sounds are normal. There is no distension.      Palpations: Abdomen is soft.      Tenderness: There is no abdominal tenderness.   Musculoskeletal:         General: No swelling, tenderness or deformity.      Right lower leg: Edema present.      Left lower leg: Edema present.   Skin:     General: Skin is warm and dry.   Neurological:      General: No focal deficit present.      Mental Status: He is alert and oriented to person, place, and time. Mental status is at baseline.      Motor: No weakness.         Results Review     I reviewed the patient's new clinical results.  Results from last 7 days   Lab Units 24  1904 24  0359 24  0441 24  0458   WBC 10*3/mm3 4.64 4.04 3.88 5.86   HEMOGLOBIN g/dL 9.9* 9.3* 8.3* 9.3*   PLATELETS 10*3/mm3 123* 106* 82* 86*     Results from last 7  days   Lab Units 02/27/24  1904 02/27/24  0359 02/26/24  0441 02/25/24  0458   SODIUM mmol/L 140 140 141 141   POTASSIUM mmol/L 4.8 4.4 4.0 4.8   CHLORIDE mmol/L 104 105 107 107   CO2 mmol/L 26.0 23.2 25.3 21.4*   BUN mg/dL 27* 24* 25* 26*   CREATININE mg/dL 1.44* 1.40* 1.36* 1.42*   GLUCOSE mg/dL 136* 101* 106* 99   EGFR mL/min/1.73 49.1* 50.8* 52.6* 50.0*     Results from last 7 days   Lab Units 02/23/24  2125   ALBUMIN g/dL 3.6   BILIRUBIN mg/dL 0.6   ALK PHOS U/L 67   AST (SGOT) U/L 45*   ALT (SGPT) U/L 22     Results from last 7 days   Lab Units 02/27/24  1904 02/27/24 0359 02/26/24 0441 02/25/24  0458 02/24/24  0304 02/23/24  2125   CALCIUM mg/dL 8.5* 7.9* 7.6* 7.9*   < > 8.5*   ALBUMIN g/dL  --   --   --   --   --  3.6    < > = values in this interval not displayed.       Glucose   Date/Time Value Ref Range Status   02/28/2024 0523 106 70 - 130 mg/dL Final   02/27/2024 0539 112 70 - 130 mg/dL Final   02/26/2024 2100 193 (H) 70 - 130 mg/dL Final   02/26/2024 1601 120 70 - 130 mg/dL Final   02/26/2024 1046 185 (H) 70 - 130 mg/dL Final   02/26/2024 0559 105 70 - 130 mg/dL Final   02/25/2024 2048 150 (H) 70 - 130 mg/dL Final     No radiology results for the last day  Scheduled Medications  allopurinol, 100 mg, Oral, Daily  atorvastatin, 80 mg, Oral, Daily  furosemide, 20 mg, Oral, Daily  levothyroxine, 75 mcg, Oral, QAM  memantine, 20 mg, Oral, QAM  sodium chloride, 10 mL, Intravenous, Q12H    Infusions   Diet  NPO Diet NPO Type: Sips with Meds       Assessment/Plan     Active Hospital Problems    Diagnosis  POA    **Pneumothorax [J93.9]  Yes    Fracture of multiple ribs of right side [S22.41XA]  Unknown    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 [D68.51]  Yes    Chronic deep vein thrombosis (DVT) of popliteal vein of both lower extremities [I82.533]  Yes    Chronic pulmonary embolism without acute cor pulmonale [I27.82]  Yes    Acquired hypothyroidism [E03.9]  Yes    Stage 3b chronic kidney disease [N18.32]  Yes    Type 2  diabetes mellitus with chronic kidney disease, without long-term current use of insulin [E11.22]  Yes    Essential hypertension [I10]  Yes      Resolved Hospital Problems   No resolved problems to display.       79 y.o. male admitted with Pneumothorax.    Right sided pneumothorax  Right sided rib fractures  - CT surgery consulted, discussed with NP Robert, plan for chest tube placement    Type 2 diabetes- A1c 5.8; glucose ok on am bmp, monitor and add ssi if needed    CKD 3b- Cr 1.4 stable at baseline    HTN- lasix     BLE venous insufficiency- home lasix    History of DVT/PE- Eliquis 2.5 bid on hold until ok with thoracic surgery-hold as patient is to receive chest tube    Dementia- namenda    Alcohol use disorder- history of, pt recently admitted here x4 days- no e/o w/d- hold off on CIWA    Flow (L/min):  [2] 2    DVT prophylaxis: SCDs  Discussed with patient and consulting provider.  Anticipated discharge home, TBD            Ceci Rivera MD  [facility]  24  05:47 EST        Electronically signed by Ceci Rivera MD at 24 1119                 Ceci Rivera MD at 24 0546              Name: Bryan Landers ADMIT: 2024   : 1945  PCP: Jose Fonseca MD    MRN: [id number] LOS: 0 days   AGE/SEX: 79 y.o. male  ROOM: [facility]     Subjective   Subjective  Patient resting in bed, he denies any shortness of breath at this time however he also says that he is just been laying in bed.    Objective   Objective  Vital Signs  Temp:  [97.4 °F (36.3 °C)-98.6 °F (37 °C)] 97.6 °F (36.4 °C)  Heart Rate:  [] 104  Resp:  [16-18] 18  BP: (105-120)/(64-82) 116/82  SpO2:  [95 %-97 %] 95 %  on  Flow (L/min):  [2] 2;   Device (Oxygen Therapy): room air  Body mass index is 35.05 kg/m².  Physical Exam  Vitals and nursing note reviewed.   Constitutional:       General: He is not in acute distress.     Appearance: Normal appearance. He is obese. He is not  toxic-appearing.   Cardiovascular:      Rate and Rhythm: Normal rate and regular rhythm.   Pulmonary:      Effort: Pulmonary effort is normal. No respiratory distress.      Breath sounds: No wheezing.      Comments: Diminished bilaterally    Abdominal:      General: Abdomen is flat. Bowel sounds are normal. There is no distension.      Palpations: Abdomen is soft.      Tenderness: There is no abdominal tenderness.   Musculoskeletal:         General: No swelling, tenderness or deformity.      Right lower leg: Edema present.      Left lower leg: Edema present.   Skin:     General: Skin is warm and dry.   Neurological:      General: No focal deficit present.      Mental Status: He is alert and oriented to person, place, and time. Mental status is at baseline.      Motor: No weakness.         Results Review     I reviewed the patient's new clinical results.  Results from last 7 days   Lab Units 02/27/24 1904 02/27/24 0359 02/26/24 0441 02/25/24  0458   WBC 10*3/mm3 4.64 4.04 3.88 5.86   HEMOGLOBIN g/dL 9.9* 9.3* 8.3* 9.3*   PLATELETS 10*3/mm3 123* 106* 82* 86*     Results from last 7 days   Lab Units 02/27/24 1904 02/27/24 0359 02/26/24 0441 02/25/24  0458   SODIUM mmol/L 140 140 141 141   POTASSIUM mmol/L 4.8 4.4 4.0 4.8   CHLORIDE mmol/L 104 105 107 107   CO2 mmol/L 26.0 23.2 25.3 21.4*   BUN mg/dL 27* 24* 25* 26*   CREATININE mg/dL 1.44* 1.40* 1.36* 1.42*   GLUCOSE mg/dL 136* 101* 106* 99   EGFR mL/min/1.73 49.1* 50.8* 52.6* 50.0*     Results from last 7 days   Lab Units 02/23/24  2125   ALBUMIN g/dL 3.6   BILIRUBIN mg/dL 0.6   ALK PHOS U/L 67   AST (SGOT) U/L 45*   ALT (SGPT) U/L 22     Results from last 7 days   Lab Units 02/27/24  1904 02/27/24 0359 02/26/24 0441 02/25/24  0458 02/24/24  0304 02/23/24  2125   CALCIUM mg/dL 8.5* 7.9* 7.6* 7.9*   < > 8.5*   ALBUMIN g/dL  --   --   --   --   --  3.6    < > = values in this interval not displayed.       Glucose   Date/Time Value Ref Range Status   02/28/2024  0523 106 70 - 130 mg/dL Final   02/27/2024 0539 112 70 - 130 mg/dL Final   02/26/2024 2100 193 (H) 70 - 130 mg/dL Final   02/26/2024 1601 120 70 - 130 mg/dL Final   02/26/2024 1046 185 (H) 70 - 130 mg/dL Final   02/26/2024 0559 105 70 - 130 mg/dL Final   02/25/2024 2048 150 (H) 70 - 130 mg/dL Final       No radiology results for the last day  Scheduled Medications  allopurinol, 100 mg, Oral, Daily  atorvastatin, 80 mg, Oral, Daily  furosemide, 20 mg, Oral, Daily  levothyroxine, 75 mcg, Oral, QAM  memantine, 20 mg, Oral, QAM  sodium chloride, 10 mL, Intravenous, Q12H    Infusions   Diet  NPO Diet NPO Type: Sips with Meds       Assessment/Plan     Active Hospital Problems    Diagnosis  POA    **Pneumothorax [J93.9]  Yes    Fracture of multiple ribs of right side [S22.41XA]  Unknown    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       79 y.o. male admitted with Pneumothorax.    Right sided pneumothorax  Right sided rib fractures  - CT surgery consulted, discussed with NICHOLAS Jones, plan for chest tube placement    Type 2 diabetes- A1c 5.8; glucose ok on am bmp, monitor and add ssi if needed    CKD 3b- Cr 1.4 stable at baseline    HTN- lasix     BLE venous insufficiency- home lasix    History of DVT/PE- Eliquis 2.5 bid on hold until ok with thoracic surgery-hold as patient is to receive chest tube    Dementia- namenda    Alcohol use disorder- history of, pt recently admitted here x4 days- no e/o w/d- hold off on CIWA    Flow (L/min):  [2] 2    DVT prophylaxis:  SCDs  Discussed with patient and consulting provider.  Anticipated discharge home, TBD            Ceci Rivera MD  [facility]  02/28/24  05:47 EST        Electronically signed by Ceci Rivera MD at 02/28/24 1119       Beck Avila MD at 02/27/24 4575          [facility] Admission H&P    Patient Care Team:  Jose Fonseca MD as PCP - General (Family Medicine)  Jose Michelle MD as Consulting Physician (Radiation Oncology)  Roldan Mccarthy MD as Consulting Physician (Urology)  Clarence Nieves MD (Hematology)  Grover Harris DPM as Consulting Physician (Podiatry)    Chief complaint: Told to come back in by thoracic surgery for pneumothorax seen on chest x-ray this morning    History of Present Illness    This is an 79-year-old male who was just discharged this morning after he was be admitted for multiple right-sided rib fractures and associated hemopneumothorax.  Later in the day today thoracic surgery looked at his chest x-ray which revealed increasing pneumothorax and he was asked to come back to the emergency room where this was redemonstrated on repeat x-ray.  Thoracic surgery requested readmission.  Patient has no complaints of shortness of breath.  States he only has minimal right-sided chest wall pain.  Denies any cough or fever.    Past Medical History:   Diagnosis Date    At risk for sleep apnea     Bladder mass     Bruises easily     Diabetes mellitus     Disease of thyroid gland     Elevated cholesterol     GI bleed     Gross hematuria 9/22/2021    History of transfusion     Poor historian     Short-term memory loss     Vitamin D deficiency      Past Surgical History:   Procedure Laterality Date    COLONOSCOPY  09/2016    COLONOSCOPY N/A 9/28/2018    Procedure: COLONOSCOPY;  Surgeon: Olaf Gomez MD;  Location: [facility] OR;  Service: Gastroenterology    COLONOSCOPY N/A 1/7/2019    Procedure: COLONOSCOPY;  Surgeon: Guero  MD Rosa;  Location: [facility] OR;  Service: Gastroenterology    CYSTOSCOPY BLADDER BIOPSY N/A 2021    Procedure: CYSTOSCOPY BLADDER BIOPSY;  Surgeon: Roldan Mccarthy MD;  Location: [facility] OR;  Service: Urology;  Laterality: N/A;    HERNIA REPAIR Bilateral     PROSTATE ULTRASOUND BIOPSY      SIGMOIDOSCOPY N/A 10/2/2018    Procedure: FLEXIBLE SIGMOIDOSCOPY:  APC cautery bleeding using 60 joules;  Surgeon: Olaf Gomez MD;  Location: [facility] ENDOSCOPY;  Service: Gastroenterology    TONSILLECTOMY      TOTAL HIP ARTHROPLASTY Bilateral      Family History   Problem Relation Age of Onset    Stroke Mother     Stroke Father     No Known Problems Brother     Colon cancer Neg Hx     Colon polyps Neg Hx     Malig Hyperthermia Neg Hx      Social History     Tobacco Use    Smoking status: Former     Packs/day: 0.25     Years: 5.00     Additional pack years: 0.00     Total pack years: 1.25     Types: Cigars, Cigarettes     Quit date: 1986     Years since quittin.1    Smokeless tobacco: Never   Vaping Use    Vaping Use: Never used   Substance Use Topics    Alcohol use: Yes     Alcohol/week: 32.0 - 34.0 standard drinks of alcohol     Types: 4 - 6 Shots of liquor, 28 Standard drinks or equivalent per week     Comment: 2-3 double vodkas a night    Drug use: No     (Not in a hospital admission)    Allergies:  Nsaids and Aricept [donepezil]    Review of Systems   Constitutional:  Negative for chills and fever.   HENT:  Negative for congestion and sore throat.    Eyes:  Negative for visual disturbance.   Respiratory:  Negative for cough, chest tightness, shortness of breath and wheezing.    Cardiovascular:  Negative for chest pain, palpitations and leg swelling.   Gastrointestinal:  Negative for abdominal distention, abdominal pain, diarrhea, nausea and vomiting.   Endocrine: Negative for polydipsia and polyuria.   Genitourinary:  Negative for difficulty urinating, dysuria, frequency and urgency.    Musculoskeletal:  Negative for arthralgias and myalgias.   Skin:  Negative for color change and rash.   Neurological:  Negative for dizziness and light-headedness.        PHYSICAL EXAM    Vital Signs  tMax 24 hrs:  Temp (24hrs), Av.1 °F (36.7 °C), Min:97.8 °F (36.6 °C), Max:98.6 °F (37 °C)    Vitals Ranges:  Temp:  [97.8 °F (36.6 °C)-98.6 °F (37 °C)] 98.6 °F (37 °C)  Heart Rate:  [] 88  Resp:  [16-18] 18  BP: (105-120)/(64-76) 120/73    Physical Exam  Vitals and nursing note reviewed.   Constitutional:       General: He is not in acute distress.     Appearance: He is well-developed. He is not ill-appearing.   HENT:      Head: Normocephalic and atraumatic.      Nose: Nose normal.      Mouth/Throat:      Mouth: Mucous membranes are not dry.   Eyes:      Conjunctiva/sclera: Conjunctivae normal.      Pupils: Pupils are equal, round, and reactive to light.   Neck:      Vascular: No JVD.   Cardiovascular:      Rate and Rhythm: Normal rate and regular rhythm.      Heart sounds: No murmur heard.     No gallop.   Pulmonary:      Effort: Pulmonary effort is normal. No accessory muscle usage or respiratory distress.      Breath sounds: No decreased breath sounds or wheezing.   Abdominal:      General: Bowel sounds are normal. There is no distension.      Palpations: Abdomen is soft.      Tenderness: There is no abdominal tenderness. There is no guarding or rebound.   Musculoskeletal:         General: No deformity. Normal range of motion.      Cervical back: Normal range of motion and neck supple.   Lymphadenopathy:      Cervical: No cervical adenopathy.   Skin:     General: Skin is warm and dry.      Findings: No erythema or rash.   Neurological:      Mental Status: He is alert and oriented to person, place, and time.      Cranial Nerves: No cranial nerve deficit.         Results Review:  Results from last 7 days   Lab Units 24  1904   WBC 10*3/mm3 4.64   HEMOGLOBIN g/dL 9.9*   HEMATOCRIT % 28.8*   PLATELETS  10*3/mm3 123*     Results from last 7 days   Lab Units 02/27/24  1904 02/24/24  0304 02/23/24  2125   SODIUM mmol/L 140   < > 139   POTASSIUM mmol/L 4.8   < > 4.4   CHLORIDE mmol/L 104   < > 103   CO2 mmol/L 26.0   < > 25.7   BUN mg/dL 27*   < > 23   CREATININE mg/dL 1.44*   < > 1.47*   CALCIUM mg/dL 8.5*   < > 8.5*   BILIRUBIN mg/dL  --   --  0.6   ALK PHOS U/L  --   --  67   ALT (SGPT) U/L  --   --  22   AST (SGOT) U/L  --   --  45*   GLUCOSE mg/dL 136*   < > 143*    < > = values in this interval not displayed.        I reviewed the patient's new clinical results.  I reviewed the patient's new imaging results and agree with the interpretation.        Active Hospital Problems    Diagnosis  POA    **Pneumothorax [J93.9]  Yes    Fracture of multiple ribs of right side [S22.41XA]  Unknown    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       Assessment & Plan    Patient has been readmitted.  Thoracic surgery has been consulted.  Repeat chest x-ray in the morning.  Monitor overnight tonight.  He denies any shortness of breath.  Has minimal issues with pain from the rib fractures.  Oxygenation is 100% on 2 L and anticipate we can wean him off of this.  Other chronic medical conditions appear stable.  Discussed with patient and his son at the bedside.    I discussed the patients findings and my recommendations with patient and family      Beck Avila MD  02/27/24  21:26 EST              Electronically signed by Beck Avila MD at  02/27/24 2130          Emergency Department Notes        Olivia Liz, RN at 02/27/24 2018          Attempt to call report to floor unsuccessful. Nurse unavailable.     Electronically signed by Olivia Liz RN at 02/27/24 2027       Olivia Liz, RN at 02/27/24 1931          Nursing report ED to floor  Bryan Landers  79 y.o.  male    HPI :  HPI (Adult)  Stated Reason for Visit: was just d/c after having mult rib fx. told to return to ER d/t concerns for pneumothorax not being fully resolved    Chief Complaint  Chief Complaint   Patient presents with    Abnormal Imaging       Admitting doctor:   Beck Avila MD    Admitting diagnosis:   The encounter diagnosis was Pneumothorax on right.    Code status:   Current Code Status       Date Active Code Status Order ID Comments User Context       2/27/2024 1903 CPR (Attempt to Resuscitate) [id number]  Beck Avila MD ED        Question Answer    Code Status (Patient has no pulse and is not breathing) CPR (Attempt to Resuscitate)    Medical Interventions (Patient has pulse or is breathing) Full Support                    Allergies:   Nsaids and Aricept [donepezil]    Isolation:   No active isolations    Intake and Output  No intake or output data in the 24 hours ending 02/27/24 1931    Weight:   There were no vitals filed for this visit.    Most recent vitals:   Vitals:    02/27/24 1756   BP: 105/69   Pulse: 109   Resp: 18   Temp: 98.6 °F (37 °C)   SpO2: 96%       Active LDAs/IV Access:   Lines, Drains & Airways       Active LDAs       Name Placement date Placement time Site Days    Peripheral IV 02/27/24 1911 Anterior;Left Forearm 02/27/24 1911  Forearm  less than 1                    Labs (abnormal labs have a star):   Labs Reviewed   BASIC METABOLIC PANEL   CBC WITH AUTO DIFFERENTIAL   CBC AND DIFFERENTIAL    Narrative:     The following orders were created for panel order CBC & Differential.  Procedure                                Abnormality         Status                     ---------                               -----------         ------                     CBC Auto Differential[[id number]]                            In process                   Please view results for these tests on the individual orders.       EKG:   No orders to display       Meds given in ED:   Medications   sodium chloride 0.9 % flush 10 mL (has no administration in time range)   sodium chloride 0.9 % flush 10 mL (has no administration in time range)   sodium chloride 0.9 % infusion 40 mL (has no administration in time range)   nitroglycerin (NITROSTAT) SL tablet 0.4 mg (has no administration in time range)   sennosides-docusate (PERICOLACE) 8.6-50 MG per tablet 2 tablet (has no administration in time range)     And   polyethylene glycol (MIRALAX) packet 17 g (has no administration in time range)     And   bisacodyl (DULCOLAX) EC tablet 5 mg (has no administration in time range)     And   bisacodyl (DULCOLAX) suppository 10 mg (has no administration in time range)   acetaminophen (TYLENOL) tablet 650 mg (has no administration in time range)     Or   acetaminophen (TYLENOL) 160 MG/5ML oral solution 650 mg (has no administration in time range)     Or   acetaminophen (TYLENOL) suppository 650 mg (has no administration in time range)   ondansetron (ZOFRAN) injection 4 mg (has no administration in time range)       Imaging results:  XR Chest 1 View    Result Date: 2/26/2024  1. Stable appearance of the chest since yesterday. 2. No significant pneumothorax is seen.   This report was finalized on 2/26/2024 8:49 AM by Dr. Orion Alcaraz M.D on Workstation: [id number]       Ambulatory status:   Assistive device required (uses walker)    Social issues:   Social History     Socioeconomic History    Marital status:      Spouse name: Chloe    Number of children: 2   Tobacco Use    Smoking status: Former     Packs/day: 0.25     Years: 5.00     Additional  pack years: 0.00     Total pack years: 1.25     Types: Cigars, Cigarettes     Quit date: 1986     Years since quittin.1    Smokeless tobacco: Never   Vaping Use    Vaping Use: Never used   Substance and Sexual Activity    Alcohol use: Yes     Alcohol/week: 32.0 - 34.0 standard drinks of alcohol     Types: 4 - 6 Shots of liquor, 28 Standard drinks or equivalent per week     Comment: 2-3 double vodkas a night    Drug use: No    Sexual activity: Yes     Partners: Female     Birth control/protection: Post-menopausal       Peripheral Neurovascular  Peripheral Neurovascular (Adult)  Peripheral Neurovascular WDL: WDL    Neuro Cognitive  Neuro Cognitive (Adult)  Cognitive/Neuro/Behavioral WDL: WDL    Learning  Learning Assessment (Adult)  Learning Readiness and Ability: cognitive limitation noted, physical limitation noted    Respiratory  Respiratory (Adult)  Airway WDL: WDL  Respiratory WDL  Respiratory WDL: .WDL except, rhythm/pattern  Rhythm/Pattern, Respiratory: shortness of breath    Abdominal Pain       Pain Assessments  Pain (Adult)  (0-10) Pain Rating: Rest: 5  Pain Location: back    NIH Stroke Scale       Olivia Liz RN  24 19:31 EST     Electronically signed by Olivia Liz RN at 24 1931       Yaakov Blankenship MD at 24 1828           EMERGENCY DEPARTMENT ENCOUNTER    Room Number:    PCP: Jose Fonseca MD  Historian: Patient      HPI:  Chief Complaint: Abnormal imaging  A complete HPI/ROS/PMH/PSH/SH/FH are unobtainable due to: None    Context: Bryan Landers is a 79 y.o. male who presents to the ED via private vehicle from home c/o normal imaging earlier today.  Patient was hospitalized over weekend with rib fractures and a pneumothorax, had a chest tube placed.  This was pulled yesterday, follow-up chest x-ray today showed a residual right apical pneumothorax.  He was referred to the emergency department for evaluation.  Patient currently denies any  significant chest pain or shortness of breath.      MEDICAL RECORD REVIEW    External (non-ED) record review: Chest x-ray report reviewed from earlier today with a small right apical pneumothorax              PAST MEDICAL HISTORY  Active Ambulatory Problems     Diagnosis Date Noted    Essential hypertension     Mixed hyperlipidemia     Arthritis     Type 2 diabetes mellitus with chronic kidney disease, without long-term current use of insulin 07/28/2017    Severely overweight 07/28/2017    Idiopathic chronic gout of left knee 07/28/2017    Medication monitoring encounter 07/28/2017    Microalbuminuria due to type 2 diabetes mellitus 09/06/2017    History of prostate cancer 02/27/2018    Skin tear of left elbow without complication 04/23/2018    Stage 3b chronic kidney disease 08/23/2018    Medicare annual wellness visit, subsequent 08/23/2018    Radiation proctitis 10/01/2018    History of hip replacement, total, bilateral 12/13/2018    Cognitive decline 03/26/2019    Acquired hypothyroidism 08/15/2019    B12 deficiency 02/14/2020    Sinus tachycardia by electrocardiogram 12/22/2020    Chronic pulmonary embolism without acute cor pulmonale 12/28/2020    Chronic deep vein thrombosis (DVT) of popliteal vein of both lower extremities 01/07/2021    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/04/2021    Bladder mass 09/22/2021    Dependent edema 06/08/2022    Pancytopenia 08/29/2022    Deep venous thrombosis 01/07/2021    Class 2 severe obesity with serious comorbidity in adult 02/27/2024     Resolved Ambulatory Problems     Diagnosis Date Noted    Routine adult health maintenance 07/28/2017    Traumatic hematoma of left lower leg 07/28/2017    Injury of anal canal 06/21/2018    Rectal bleeding 09/13/2018    Right hip pain 12/13/2018    Gastrointestinal hemorrhage 01/04/2019    Acute blood loss anemia 01/16/2019    Hypotension due to hypovolemia 01/16/2019    GI bleed     CERVANTES (dyspnea on exertion) 12/22/2020    Screening for prostate  cancer 2021    Gross hematuria 2021    Pneumothorax 2024     Past Medical History:   Diagnosis Date    At risk for sleep apnea     Bruises easily     Diabetes mellitus     Disease of thyroid gland     Elevated cholesterol     History of transfusion     Poor historian     Short-term memory loss     Vitamin D deficiency          PAST SURGICAL HISTORY  Past Surgical History:   Procedure Laterality Date    COLONOSCOPY  2016    COLONOSCOPY N/A 2018    Procedure: COLONOSCOPY;  Surgeon: Olaf Gomez MD;  Location: [facility] OR;  Service: Gastroenterology    COLONOSCOPY N/A 2019    Procedure: COLONOSCOPY;  Surgeon: Rosa Jack MD;  Location: [facility] OR;  Service: Gastroenterology    CYSTOSCOPY BLADDER BIOPSY N/A 2021    Procedure: CYSTOSCOPY BLADDER BIOPSY;  Surgeon: Roldan Mccarthy MD;  Location: [facility] MAIN OR;  Service: Urology;  Laterality: N/A;    HERNIA REPAIR Bilateral     PROSTATE ULTRASOUND BIOPSY      SIGMOIDOSCOPY N/A 10/2/2018    Procedure: FLEXIBLE SIGMOIDOSCOPY:  APC cautery bleeding using 60 joules;  Surgeon: Olaf Gomez MD;  Location: [facility] ENDOSCOPY;  Service: Gastroenterology    TONSILLECTOMY      TOTAL HIP ARTHROPLASTY Bilateral          FAMILY HISTORY  Family History   Problem Relation Age of Onset    Stroke Mother     Stroke Father     No Known Problems Brother     Colon cancer Neg Hx     Colon polyps Neg Hx     Malig Hyperthermia Neg Hx          SOCIAL HISTORY  Social History     Socioeconomic History    Marital status:      Spouse name: Chloe    Number of children: 2   Tobacco Use    Smoking status: Former     Packs/day: 0.25     Years: 5.00     Additional pack years: 0.00     Total pack years: 1.25     Types: Cigars, Cigarettes     Quit date: 1986     Years since quittin.1    Smokeless tobacco: Never   Vaping Use    Vaping Use: Never used   Substance and Sexual Activity    Alcohol use: Yes     Alcohol/week: 32.0  - 34.0 standard drinks of alcohol     Types: 4 - 6 Shots of liquor, 28 Standard drinks or equivalent per week     Comment: 2-3 double vodkas a night    Drug use: No    Sexual activity: Yes     Partners: Female     Birth control/protection: Post-menopausal         ALLERGIES  Nsaids and Aricept [donepezil]        REVIEW OF SYSTEMS  Review of Systems     All systems reviewed and negative except for those discussed in HPI.       PHYSICAL EXAM    I have reviewed the triage vital signs and nursing notes.    ED Triage Vitals [02/27/24 1756]   Temp Heart Rate Resp BP SpO2   98.6 °F (37 °C) 109 18 105/69 96 %      Temp src Heart Rate Source Patient Position BP Location FiO2 (%)   -- -- -- -- --       Physical Exam  General: No acute distress, nontoxic  HEENT: Mucous membranes moist, atraumatic, EOMI  Neck: Full ROM  Pulm: Symmetric chest rise, nonlabored, lungs CTAB though slightly diminished in the right upper lung field  Cardiovascular: Regular rate and rhythm, intact distal pulses  GI: Soft, nontender, nondistended, no rebound, no guarding, bowel sounds present  MSK: Full ROM, no deformity  Skin: Warm, dry  Neuro: Awake, alert, oriented x 4, GCS 15, moving all extremities, no focal deficits  Psych: Calm, cooperative        LAB RESULTS  Recent Results (from the past 24 hour(s))   POC Glucose Once    Collection Time: 02/26/24  9:00 PM    Specimen: Blood   Result Value Ref Range    Glucose 193 (H) 70 - 130 mg/dL   Basic Metabolic Panel    Collection Time: 02/27/24  3:59 AM    Specimen: Arm, Left; Blood   Result Value Ref Range    Glucose 101 (H) 65 - 99 mg/dL    BUN 24 (H) 8 - 23 mg/dL    Creatinine 1.40 (H) 0.76 - 1.27 mg/dL    Sodium 140 136 - 145 mmol/L    Potassium 4.4 3.5 - 5.2 mmol/L    Chloride 105 98 - 107 mmol/L    CO2 23.2 22.0 - 29.0 mmol/L    Calcium 7.9 (L) 8.6 - 10.5 mg/dL    BUN/Creatinine Ratio 17.1 7.0 - 25.0    Anion Gap 11.8 5.0 - 15.0 mmol/L    eGFR 50.8 (L) >60.0 mL/min/1.73   CBC Auto Differential     Collection Time: 02/27/24  3:59 AM    Specimen: Arm, Left; Blood   Result Value Ref Range    WBC 4.04 3.40 - 10.80 10*3/mm3    RBC 2.42 (L) 4.14 - 5.80 10*6/mm3    Hemoglobin 9.3 (L) 13.0 - 17.7 g/dL    Hematocrit 27.1 (L) 37.5 - 51.0 %    .0 (H) 79.0 - 97.0 fL    MCH 38.4 (H) 26.6 - 33.0 pg    MCHC 34.3 31.5 - 35.7 g/dL    RDW 12.7 12.3 - 15.4 %    RDW-SD 52.4 37.0 - 54.0 fl    MPV 9.5 6.0 - 12.0 fL    Platelets 106 (L) 140 - 450 10*3/mm3    Neutrophil % 61.1 42.7 - 76.0 %    Lymphocyte % 19.1 (L) 19.6 - 45.3 %    Monocyte % 15.6 (H) 5.0 - 12.0 %    Eosinophil % 3.2 0.3 - 6.2 %    Basophil % 0.5 0.0 - 1.5 %    Neutrophils, Absolute 2.47 1.70 - 7.00 10*3/mm3    Lymphocytes, Absolute 0.77 0.70 - 3.10 10*3/mm3    Monocytes, Absolute 0.63 0.10 - 0.90 10*3/mm3    Eosinophils, Absolute 0.13 0.00 - 0.40 10*3/mm3    Basophils, Absolute 0.02 0.00 - 0.20 10*3/mm3   POC Glucose Once    Collection Time: 02/27/24  5:39 AM    Specimen: Blood   Result Value Ref Range    Glucose 112 70 - 130 mg/dL       Ordered the above labs and independently interpreted results. My findings will be discussed in the medical decision making section below        RADIOLOGY  XR Chest 1 View    Result Date: 2/27/2024  XR CHEST 1 VW-  Clinical: Chest tube removal on 2/26/2024, follow-up right sided pneumothorax  COMPARISON examination same day at 6:14 a.m., current examination 6:15 p.m.  FINDINGS: The right apical pneumothorax is similar in size compared to the earlier examination. The left lung is clear. The cardiomediastinal silhouette is stable. No edema. There is blunting of the right costophrenic angle, trace amount of pleural fluid suspected. There is very subtle opacity in right mid and lower lung zone, suspect atelectasis. Trace amount of subcutaneous emphysema within the right chest wall is again noted. The remainder is unremarkable.  This report was finalized on 2/27/2024 6:35 PM by Dr. Matheus Segura M.D on Workstation: [id number]       XR Chest 1 View    Result Date: 2/27/2024  XR CHEST 1 VW-  HISTORY: 79-year-old male with chest tube for pneumothorax. Follow-up.  FINDINGS: Removal of the right-sided chest tube since yesterday's radiograph. There is a 3 cm pneumothorax, measured at the right apex. No new opacities are seen and there is no CHF.       Ordered the above noted radiological studies.  Independently interpreted by me and my independent review of findings can be found in the ED Course.  See dictation for official radiology interpretation.      PROCEDURES    Procedures        MEDICATIONS GIVEN IN ER    Medications - No data to display      PROGRESS, DATA ANALYSIS, CONSULTS, AND MEDICAL DECISION MAKING    Please note that this section constitutes my independent interpretation of clinical data including lab results, radiology, EKG's.  This constitutes my independent professional opinion regarding differential diagnosis and management of this patient.  It may include any factors such as history from outside sources, review of external records, social determinants of health, management of medications, response to those treatments, and discussions with other providers.    Differential Diagnosis and Plan: Recurrent small right apical pneumothorax after chest tube pulled yesterday.  No shortness of breath or chest pain currently.  Will repeat chest x-ray to compare from this morning and discussed with thoracic surgery.    Additional sources:  - Discussed/ obtained information from independent historians:       - (Social Determinants of Health): None     - Shared decision making:  Patient and family at bedside fully updated on and in agreement with the course and plan moving forward    ED Course as of 02/27/24 1839   Tue Feb 27, 2024   1818 XR Chest 1 View  Per my independent interpretation of the chest x-ray, persistent apical pneumothorax as compared to earlier today [DC]   1832 Discussed with Dr. Wall, thoracic surgery, discussed patient's  clinical course and find today, he has reviewed x-rays, he is comfortable with watching the patient overnight on some supplemental O2 repeat chest x-ray in the morning but no need for emergent chest tube placement at this time [DC]   1837 Discussed with Dr. Bojorquez, [facility], discussed patient's clinical course and findings today, treatment modalities, discussions with thoracic surgery, and need for hospitalization. [DC]      ED Course User Index  [DC] Yaakov Blankenship MD       Hospitalization Considered?: yes    Orders Placed During This Visit:  Orders Placed This Encounter   Procedures    XR Chest 1 View    Basic Metabolic Panel    CBC Auto Differential    Inpatient Thoracic Surgery Consult    [facility] (on-call MD unless specified) Details    Initiate Observation Status    CBC & Differential       Additional orders considered but not placed:      Independent interpretation of labs, radiology studies, and discussions with consultants: See ED Course        AS OF 18:39 EST VITALS:    BP - 105/69  HR - 109  TEMP - 98.6 °F (37 °C)  02 SATS - 96%          DIAGNOSIS  Final diagnoses:   Pneumothorax on right         DISPOSITION  ED Disposition       ED Disposition   Decision to Admit    Condition   --    Comment   Level of Care: Telemetry [5]   Diagnosis: Pneumothorax [[id number]]   Admitting Physician: TITA BOJORQUEZ [[id number]]   Attending Physician: TITA BOJORQUEZ [[id number]]                  Please note that portions of this document were completed with a voice recognition program.    Note Disclaimer: At [facility], we believe that sharing information builds trust and better relationships. You are receiving this note because you recently visited [facility]. It is possible you will see health information before a provider has talked with you about it. This kind of information can be easy to misunderstand. To help you fully understand what it means for your health, we urge you to discuss this note with your  provider.                       Yaakov Blankenship MD  02/27/24 2045      Electronically signed by Yaakov Blankenship MD at 02/27/24 2045       Vital Signs (last 2 days)       Date/Time Temp Temp src Pulse Resp BP Patient Position SpO2    02/29/24 0733 97.8 (36.6) Oral 83 16 121/82 Lying 100    02/29/24 0422 98.2 (36.8) Oral 88 16 128/78 Lying 98    02/28/24 2310 98 (36.7) Oral 86 16 129/81 Lying 100    02/28/24 2011 98.3 (36.8) Oral 92 16 164/70 Lying 100    02/28/24 1439 97.6 (36.4) Oral 88 16 109/73 Lying 96    02/28/24 1417 -- -- -- -- -- -- --    Comment rows:    OBSERV: Pt transported by this RN and MA with pt on stretcher on 02 2L/min NC.  When pt arrived to room, PACO Castillo was present and bedside report was given.  All questions and concerns were addressed to PACO Castillo's satisfaction.Upon departing pt's room, pt continued to remain A&O x2 with no c/o at 02/28/24 1417    02/28/24 1356 -- -- 90 13 112/68 Lying 96    Comment rows:    OBSERV: Pt returned to Rad Triage after having CT guided Chest Tube placement.  Site is clean, dry and intact with clean, dry, dressing securely in place.  Pt is resting comforably on stretcher A&O x2.  Pt has no new c/o and is awaiting transport. at 02/28/24 1356    02/28/24 13:48:13 -- -- 86 12 99/53 -- 100     SpO2: O2 2l/min NC at 02/28/24 1348    02/28/24 13:46:48 -- -- 83 11 99/50  -- 100     BP: Pt continues to remain asymptomatic as evidenced by pt stating so as well as answering all questions appropriately. at 02/28/24 1346    SpO2: O2 2L/min NC at 02/28/24 1346    02/28/24 13:43:41 -- -- 86 11 104/48 -- 100     SpO2: O2 2L/min NC at 02/28/24 1343    02/28/24 13:38:59 -- -- 86 11 103/44 -- 100     SpO2: O2 2L/min NC at 02/28/24 1338    02/28/24 13:34:47 -- -- 87 12 102/44 -- 100     SpO2: O2 2L/min Nc at 02/28/24 1334    02/28/24 13:32:58 -- -- 86 12 99/50  -- 100     BP: Pt A&O as per baseline and states he is asymptomatic.  Answers questions appropriately and has no c/o. at  02/28/24 1332    SpO2: O2 2L/min NC at 02/28/24 1332    02/28/24 13:29:25 -- -- 89 11 102/57 -- 99     SpO2: O2 2L/min NC at 02/28/24 1329    02/28/24 13:26:24 -- -- 88 20 96/61 -- 100     SpO2: O2 2L/min NC at 02/28/24 1326    02/28/24 13:14:55 -- -- 89 11 112/89 -- 99     SpO2: 2L/min NC at 02/28/24 1314    02/28/24 1138 -- -- 90 16 116/77 Lying 99    Comment rows:    OBSERV: pt here for a CT guided right chest tube placement at 02/28/24 1138    02/28/24 1100 97.8 (36.6) Oral 89 16 123/81 Lying 98    02/28/24 0700 97.8 (36.6) Oral 87 16 109/71 Lying 95    02/28/24 0530 -- -- -- -- -- Other (Comment)  --    Patient Position: up to use urinal at 02/28/24 0530    02/28/24 0345 -- -- 104 -- 116/82 -- 95    02/27/24 2313 97.6 (36.4) Oral 100 18 111/72 Lying 96    02/27/24 2001 97.4 (36.3) Oral 88 18 120/73 Lying 97    02/27/24 1756 98.6 (37) -- 109 18 105/69 -- 96          Oxygen Therapy (last 3 days)       Date/Time SpO2 Device (Oxygen Therapy) Flow (L/min) Oxygen Concentration (%) ETCO2 (mmHg)    02/29/24 0733 100 -- -- -- --    02/29/24 0422 98 nasal cannula -- -- --    02/28/24 2310 100 nasal cannula -- -- --    02/28/24 2011 100 nasal cannula 2 -- --    02/28/24 1604 -- nasal cannula 2 -- --    02/28/24 1439 96 nasal cannula 2 -- --    02/28/24 1437 -- nasal cannula 2 -- --    02/28/24 1356 96 nasal cannula 2 -- --    02/28/24 13:48:13 100 -- -- -- --    02/28/24 13:46:48 100 -- -- -- --    02/28/24 13:43:41 100 -- -- -- --    02/28/24 13:38:59 100 -- -- -- --    02/28/24 13:34:47 100 -- -- -- --    02/28/24 13:32:58 100 -- -- -- --    02/28/24 13:29:25 99 -- -- -- --    02/28/24 13:26:24 100 -- -- -- --    02/28/24 13:14:55 99 -- -- -- --    02/28/24 1138 99 nasal cannula 2 -- --    02/28/24 1100 98 room air -- -- --    02/28/24 0740 -- room air -- -- --    02/28/24 0700 95 room air -- -- --    02/28/24 0345 95 room air -- -- --    02/27/24 2313 96 -- -- -- --    02/27/24 2001 97 nasal cannula 2 -- --    02/27/24  1756 96 room air -- -- --          Intake & Output (last 3 days)         02/26 0701  02/27 0700 02/27 0701  02/28 0700 02/28 0701 02/29 0700 02/29 0701 03/01 0700    Urine (mL/kg/hr)   50 (0)     Chest Tube   369     Total Output   419     Net   -419             Urine Unmeasured Occurrence   2 x     Stool Unmeasured Occurrence  1 x             Lines, Drains & Airways       Active LDAs       Name Placement date Placement time Site Days    Peripheral IV 02/27/24 1911 Anterior;Left Forearm 02/27/24  1911  Forearm  1    Chest Tube Right Pleural 02/28/24  1345  Pleural  less than 1                  CIWA (last 3 days)       Date/Time CIWA-Ar Score    02/28/24 0740 --    02/28/24 0245 0    02/27/24 2100 0          Medication Administration Report for Bryan Landers as of 02/29/24 0822     Legend:    Given Hold Not Given Due Canceled Entry Other Actions    Time Time (Time) Time Time-Action         Discontinued     Completed     Future     MAR Hold     Linked             Medications 02/27/24 02/28/24 02/29/24      acetaminophen (TYLENOL) tablet 650 mg  Dose: 650 mg  Freq: Every 4 Hours PRN Route: PO  PRN Reason: Mild Pain  Start: 02/27/24 1903   Admin Instructions:   If given for fever, use fever parameter: fever greater than 100.4 °F  Based on patient request - if ordered for moderate or severe pain, provider allows for administration of a medication prescribed for a lower pain scale.    Do not exceed 4 grams of acetaminophen in a 24 hr period. Max dose of 2gm for AST/ALT greater than 120 units/L.    If given for pain, use the following pain scale:   Mild Pain = Pain Score of 1-3, CPOT 1-2  Moderate Pain = Pain Score of 4-6, CPOT 3-4  Severe Pain = Pain Score of 7-10, CPOT 5-8         Or  acetaminophen (TYLENOL) 160 MG/5ML oral solution 650 mg  Dose: 650 mg  Freq: Every 4 Hours PRN Route: PO  PRN Reason: Mild Pain  Start: 02/27/24 1903   Admin Instructions:   If given for fever, use fever parameter: fever greater than  100.4 °F  Based on patient request - if ordered for moderate or severe pain, provider allows for administration of a medication prescribed for a lower pain scale.    Do not exceed 4 grams of acetaminophen in a 24 hr period. Max dose of 2gm for AST/ALT greater than 120 units/L.    If given for pain, use the following pain scale:   Mild Pain = Pain Score of 1-3, CPOT 1-2  Moderate Pain = Pain Score of 4-6, CPOT 3-4  Severe Pain = Pain Score of 7-10, CPOT 5-8         Or  acetaminophen (TYLENOL) suppository 650 mg  Dose: 650 mg  Freq: Every 4 Hours PRN Route: RE  PRN Reason: Mild Pain  Start: 02/27/24 1903   Admin Instructions:   If given for fever, use fever parameter: fever greater than 100.4 °F  Based on patient request - if ordered for moderate or severe pain, provider allows for administration of a medication prescribed for a lower pain scale.    Do not exceed 4 grams of acetaminophen in a 24 hr period. Max dose of 2gm for AST/ALT greater than 120 units/L.    If given for pain, use the following pain scale:   Mild Pain = Pain Score of 1-3, CPOT 1-2  Moderate Pain = Pain Score of 4-6, CPOT 3-4  Severe Pain = Pain Score of 7-10, CPOT 5-8          albuterol (PROVENTIL) nebulizer solution 0.083% 2.5 mg/3mL  Dose: 2.5 mg  Freq: Every 6 Hours PRN Route: NEBULIZATION  PRN Reasons: Shortness of Air,Wheezing  Start: 02/27/24 2237          allopurinol (ZYLOPRIM) tablet 100 mg  Dose: 100 mg  Freq: Daily Route: PO  Start: 02/28/24 0900   Admin Instructions:   ([facility]) Take with food if GI upset occurs.     0810-Given          0900             atorvastatin (LIPITOR) tablet 80 mg  Dose: 80 mg  Freq: Daily Route: PO  Start: 02/28/24 0900   Admin Instructions:   Avoid grapefruit juice.     0810-Given          0900             sennosides-docusate (PERICOLACE) 8.6-50 MG per tablet 2 tablet  Dose: 2 tablet  Freq: 2 Times Daily PRN Route: PO  PRN Reason: Constipation  Start: 02/27/24 1902   Admin Instructions:   Start bowel management  regimen if patient has not had a bowel movement after 12 hours.         And  polyethylene glycol (MIRALAX) packet 17 g  Dose: 17 g  Freq: Daily PRN Route: PO  PRN Reason: Constipation  PRN Comment: Use if senna-docusate is ineffective  Start: 02/27/24 1902   Admin Instructions:   Use if no bowel movement after 12 hours. Mix in 6-8 ounces of water.  Use 4-8 ounces of water, tea, or juice for each 17 gram dose.         And  bisacodyl (DULCOLAX) EC tablet 5 mg  Dose: 5 mg  Freq: Daily PRN Route: PO  PRN Reason: Constipation  PRN Comment: Use if polyethylene glycol is ineffective  Start: 02/27/24 1902   Admin Instructions:   Use if no bowel movement after 12 hours.  Swallow whole. Do not crush, split, or chew tablet.         And  bisacodyl (DULCOLAX) suppository 10 mg  Dose: 10 mg  Freq: Daily PRN Route: RE  PRN Reason: Constipation  PRN Comment: Use if bisacodyl oral is ineffective  Start: 02/27/24 1902   Admin Instructions:   Use if no bowel movement after 12 hours.  Hold for diarrhea          furosemide (LASIX) tablet 20 mg  Dose: 20 mg  Freq: Daily Route: PO  Start: 02/28/24 0900   Admin Instructions:   Hold for SBP less than 100, DBP less than 60     0810-Given          0900             levothyroxine (SYNTHROID, LEVOTHROID) tablet 75 mcg  Dose: 75 mcg  Freq: Every Morning Route: PO  Indications of Use: HYPOTHYROIDISM  Start: 02/28/24 0700   Admin Instructions:   Take on empty stomach.     0605-Given          0637-Given             memantine (NAMENDA) tablet 20 mg  Dose: 20 mg  Freq: Every Morning Route: PO  Start: 02/28/24 0700     0605-Given          0637-Given             nitroglycerin (NITROSTAT) SL tablet 0.4 mg  Dose: 0.4 mg  Freq: Every 5 Minutes PRN Route: SL  PRN Reason: Chest Pain  PRN Comment: Only if SBP Greater Than 100  Start: 02/27/24 1902   Admin Instructions:   If Pain Unrelieved After 3 Doses Notify MD  May administer up to 3 doses per episode.          ondansetron (ZOFRAN) injection 4 mg  Dose: 4  mg  Freq: Every 6 Hours PRN Route: IV  PRN Reasons: Nausea,Vomiting  Start: 02/27/24 1903   Admin Instructions:   If BOTH ondansetron (ZOFRAN) and promethazine (PHENERGAN) are ordered use ondansetron first and THEN promethazine IF ondansetron is ineffective.          sodium chloride 0.9 % flush 10 mL  Dose: 10 mL  Freq: As Needed Route: IV  PRN Reason: Line Care  Start: 02/27/24 1902          sodium chloride 0.9 % flush 10 mL  Dose: 10 mL  Freq: Every 12 Hours Scheduled Route: IV  Start: 02/27/24 2100    2317-Given          0810-Given     2101-Given         0900 2100            sodium chloride 0.9 % infusion 40 mL  Dose: 40 mL  Freq: As Needed Route: IV  PRN Reason: Line Care  Start: 02/27/24 1902   Admin Instructions:   Following administration of an IV intermittent medication, flush line with 40mL NS at 100mL/hr.         Completed Medications  Medications 02/27/24 02/28/24 02/29/24       fentaNYL citrate (PF) (SUBLIMAZE) injection  Freq: As Needed Route: IV  Start: 02/28/24 1325   End: 02/28/24 1325     1325-Given              lidocaine (XYLOCAINE) 1 % injection 20 mL  Dose: 20 mL  Freq: Once Route: SC  Start: 02/28/24 1430   End: 02/28/24 1331     1331-Given by Other [C]              midazolam (VERSED) injection  Freq: As Needed Route: IV  Start: 02/28/24 1325   End: 02/28/24 1325     1325-Given                    Lab Results (last 72 hours)       Procedure Component Value Units Date/Time    POC Glucose Once [[id number]]  (Normal) Collected: 02/28/24 1434    Specimen: Blood Updated: 02/28/24 1435     Glucose 99 mg/dL     CBC & Differential [[id number]]  (Abnormal) Collected: 02/28/24 0537    Specimen: Blood Updated: 02/28/24 0702    Narrative:      The following orders were created for panel order CBC & Differential.  Procedure                               Abnormality         Status                     ---------                               -----------         ------                     CBC Auto  Differential[[id number]]        Abnormal            Final result                 Please view results for these tests on the individual orders.    CBC Auto Differential [[id number]]  (Abnormal) Collected: 02/28/24 0537    Specimen: Blood Updated: 02/28/24 0702     WBC 3.62 10*3/mm3      RBC 2.46 10*6/mm3      Hemoglobin 9.4 g/dL      Hematocrit 27.3 %      .0 fL      MCH 38.2 pg      MCHC 34.4 g/dL      RDW 12.7 %      RDW-SD 50.1 fl      MPV 9.5 fL      Platelets 124 10*3/mm3     Manual Differential [[id number]]  (Abnormal) Collected: 02/28/24 0537    Specimen: Blood Updated: 02/28/24 0702     Neutrophil % 70.0 %      Lymphocyte % 17.0 %      Monocyte % 7.0 %      Eosinophil % 6.0 %      Neutrophils Absolute 2.53 10*3/mm3      Lymphocytes Absolute 0.62 10*3/mm3      Monocytes Absolute 0.25 10*3/mm3      Eosinophils Absolute 0.22 10*3/mm3      Hypochromia Slight/1+     WBC Morphology Normal     Platelet Morphology Normal    Basic Metabolic Panel [[id number]]  (Abnormal) Collected: 02/28/24 0537    Specimen: Blood Updated: 02/28/24 0644     Glucose 104 mg/dL      BUN 25 mg/dL      Creatinine 1.38 mg/dL      Sodium 141 mmol/L      Potassium 4.0 mmol/L      Chloride 106 mmol/L      CO2 25.0 mmol/L      Calcium 8.4 mg/dL      BUN/Creatinine Ratio 18.1     Anion Gap 10.0 mmol/L      eGFR 51.7 mL/min/1.73     Narrative:      GFR Normal >60  Chronic Kidney Disease <60  Kidney Failure <15    The GFR formula is only valid for adults with stable renal function between ages 18 and 70.    POC Glucose Once [[id number]]  (Normal) Collected: 02/28/24 0523    Specimen: Blood Updated: 02/28/24 0525     Glucose 106 mg/dL     Basic Metabolic Panel [[id number]]  (Abnormal) Collected: 02/27/24 1904    Specimen: Blood Updated: 02/27/24 2006     Glucose 136 mg/dL      BUN 27 mg/dL      Creatinine 1.44 mg/dL      Sodium 140 mmol/L      Potassium 4.8 mmol/L      Comment: Slight hemolysis detected by analyzer. Result may be falsely elevated.         Chloride 104 mmol/L      CO2 26.0 mmol/L      Calcium 8.5 mg/dL      BUN/Creatinine Ratio 18.8     Anion Gap 10.0 mmol/L      eGFR 49.1 mL/min/1.73     Narrative:      GFR Normal >60  Chronic Kidney Disease <60  Kidney Failure <15    The GFR formula is only valid for adults with stable renal function between ages 18 and 70.    CBC & Differential [[id number]]  (Abnormal) Collected: 02/27/24 1904    Specimen: Blood Updated: 02/27/24 1939    Narrative:      The following orders were created for panel order CBC & Differential.  Procedure                               Abnormality         Status                     ---------                               -----------         ------                     CBC Auto Differential[[id number]]        Abnormal            Final result                 Please view results for these tests on the individual orders.    CBC Auto Differential [[id number]]  (Abnormal) Collected: 02/27/24 1904    Specimen: Blood Updated: 02/27/24 1939     WBC 4.64 10*3/mm3      RBC 2.61 10*6/mm3      Hemoglobin 9.9 g/dL      Hematocrit 28.8 %      .3 fL      MCH 37.9 pg      MCHC 34.4 g/dL      RDW 12.8 %      RDW-SD 50.1 fl      MPV 9.7 fL      Platelets 123 10*3/mm3      Neutrophil % 67.3 %      Lymphocyte % 13.8 %      Monocyte % 16.2 %      Eosinophil % 1.9 %      Basophil % 0.4 %      Immature Grans % 0.4 %      Neutrophils, Absolute 3.12 10*3/mm3      Lymphocytes, Absolute 0.64 10*3/mm3      Monocytes, Absolute 0.75 10*3/mm3      Eosinophils, Absolute 0.09 10*3/mm3      Basophils, Absolute 0.02 10*3/mm3      Immature Grans, Absolute 0.02 10*3/mm3      nRBC 0.0 /100 WBC           Imaging Results (Last 72 Hours)       Procedure Component Value Units Date/Time    XR Chest 1 View [[id number]] Collected: 02/29/24 0712     Updated: 02/29/24 0716    Narrative:      XR CHEST 1 VW-     Clinical: Chest tube management, pneumothorax     COMPARISON examination 2/28/2024     FINDINGS: There is now a  pigtail chest tube located at the right lung  base, no pneumothorax. The cardiomediastinal silhouette is stable. Left  lung is clear. Resolved right lung atelectasis. No other interval change  has occurred.     This report was finalized on 2/29/2024 7:13 AM by Dr. Matheus Segura M.D  on Workstation: [id number]       CT Guided Chest Tube [[id number]] Collected: 02/28/24 1650     Updated: 02/28/24 1658    Narrative:      CT GUIDED CHEST TUBE     HISTORY:  Right pneumothorax    .     COMPARISON: 2/28/2024 chest x-ray     PROCEDURE: After informed consent was obtained and documented, the  patient was placed on the CT table in supine position. A verbal time out  was performed with appropriate identifiers confirmed. A nurse was  continuously present for monitoring and moderate sedation under  physician supervision from 1325 to 1347 hours utilizing 25 mcg fentanyl  and 1.5 mg Versed. A preliminary partial scan of the thorax was  obtained; a right hydropneumothorax was partially visualized. A route  was planned for catheter placement and an appropriate skin site chosen.  This site was then prepped and draped in the usual sterile manner and  the soft tissues anesthetized with 1% lidocaine down to the pleura. A  needle was placed into the right pneumothorax. A wire was advanced  through the needle which was then removed. After serial dilation, a 12  [place] skater drainage catheter was placed into the pneumothorax,  coiled, and locked. The visible pneumothorax was manually evacuated at  this time. The catheter was secured with Dermabond reinforced suture and  stay-fix dressing and attached to an atrium device. The patient was  stable throughout, there were no immediate complications. Radiation dose  reduction techniques were utilized, including automated exposure control  and exposure modulation based on body size.          Impression:      Successful 12 [place]an chest tube placement for right hydropneumothorax as  described  above.        This report was finalized on 2/28/2024 4:55 PM by Dr. Greg Cordova M.D  on Workstation: [id number]       XR Chest PA & Lateral [[id number]] Collected: 02/28/24 0815     Updated: 02/28/24 1019    Narrative:      Chest radiograph     HISTORY: Pneumothorax, follow-up     TECHNIQUE: Two PA and lateral radiographs     COMPARISON: Chest radiograph 2/27/2024       Impression:      FINDINGS AND IMPRESSION:  The right pneumothorax has increased in size, resulting in approximately  5 cm of pleural-parenchymal separation (previously 3.4 cm on 2/27/2024.  There is worsening pulmonary opacification within the bilateral mid to  lower lungs, right greater than left suggestion of a small right pleural  effusion, as before. The above findings were discussed with Vanessa, the  patient's nurse, by telephone by Nguyễn Jesus at   8:15 a.m. on   2/28/2024  . Cardiac silhouette appears to be borderline enlarged.           This report was finalized on 2/28/2024 10:16 AM by Dr. Nguyễn Jesus M.D on Workstation: [id number]       XR Chest 1 View [[id number]] Collected: 02/27/24 1832     Updated: 02/27/24 1838    Narrative:      XR CHEST 1 VW-     Clinical: Chest tube removal on 2/26/2024, follow-up right sided  pneumothorax     COMPARISON examination same day at 6:14 a.m., current examination 6:15  p.m.     FINDINGS: The right apical pneumothorax is similar in size compared to  the earlier examination. The left lung is clear. The cardiomediastinal  silhouette is stable. No edema. There is blunting of the right  costophrenic angle, trace amount of pleural fluid suspected. There is  very subtle opacity in right mid and lower lung zone, suspect  atelectasis. Trace amount of subcutaneous emphysema within the right  chest wall is again noted. The remainder is unremarkable.     This report was finalized on 2/27/2024 6:35 PM by Dr. Matheus Segura M.D  on Workstation: [id number]             ECG/EMG Results (last 72 hours)       Procedure  Component Value Units Date/Time    SCANNED - TELEMETRY   [[id number]] Resulted: 24     Updated: 24 0422    SCANNED - TELEMETRY   [[id number]] Resulted: 24     Updated: 24 0507    SCANNED - TELEMETRY   [[id number]] Resulted: 24     Updated: 24 1018    SCANNED - TELEMETRY   [[id number]] Resulted: 24     Updated: 24 1551    SCANNED - TELEMETRY   [[id number]] Resulted: 24     Updated: 24 0543    SCANNED - TELEMETRY   [[id number]] Resulted: 24     Updated: 24 0612             Physician Progress Notes (last 72 hours)        Ceci Rivera MD at 24 0546              Name: Bryan Landers ADMIT: 2024   : 1945  PCP: Jose Fonseca MD    MRN: [id number] LOS: 0 days   AGE/SEX: 79 y.o. male  ROOM: [facility]     Subjective   Subjective   Patient resting in bed, he denies any shortness of breath at this time however he also says that he is just been laying in bed.    Objective   Objective   Vital Signs  Temp:  [97.4 °F (36.3 °C)-98.6 °F (37 °C)] 97.6 °F (36.4 °C)  Heart Rate:  [] 104  Resp:  [16-18] 18  BP: (105-120)/(64-82) 116/82  SpO2:  [95 %-97 %] 95 %  on  Flow (L/min):  [2] 2;   Device (Oxygen Therapy): room air  Body mass index is 35.05 kg/m².  Physical Exam  Vitals and nursing note reviewed.   Constitutional:       General: He is not in acute distress.     Appearance: Normal appearance. He is obese. He is not toxic-appearing.   Cardiovascular:      Rate and Rhythm: Normal rate and regular rhythm.   Pulmonary:      Effort: Pulmonary effort is normal. No respiratory distress.      Breath sounds: No wheezing.      Comments: Diminished bilaterally    Abdominal:      General: Abdomen is flat. Bowel sounds are normal. There is no distension.      Palpations: Abdomen is soft.      Tenderness: There is no abdominal tenderness.   Musculoskeletal:         General: No swelling, tenderness or deformity.      Right lower leg: Edema present.       Left lower leg: Edema present.   Skin:     General: Skin is warm and dry.   Neurological:      General: No focal deficit present.      Mental Status: He is alert and oriented to person, place, and time. Mental status is at baseline.      Motor: No weakness.         Results Review     I reviewed the patient's new clinical results.  Results from last 7 days   Lab Units 02/27/24 1904 02/27/24 0359 02/26/24 0441 02/25/24  0458   WBC 10*3/mm3 4.64 4.04 3.88 5.86   HEMOGLOBIN g/dL 9.9* 9.3* 8.3* 9.3*   PLATELETS 10*3/mm3 123* 106* 82* 86*     Results from last 7 days   Lab Units 02/27/24 1904 02/27/24 0359 02/26/24 0441 02/25/24 0458   SODIUM mmol/L 140 140 141 141   POTASSIUM mmol/L 4.8 4.4 4.0 4.8   CHLORIDE mmol/L 104 105 107 107   CO2 mmol/L 26.0 23.2 25.3 21.4*   BUN mg/dL 27* 24* 25* 26*   CREATININE mg/dL 1.44* 1.40* 1.36* 1.42*   GLUCOSE mg/dL 136* 101* 106* 99   EGFR mL/min/1.73 49.1* 50.8* 52.6* 50.0*     Results from last 7 days   Lab Units 02/23/24  2125   ALBUMIN g/dL 3.6   BILIRUBIN mg/dL 0.6   ALK PHOS U/L 67   AST (SGOT) U/L 45*   ALT (SGPT) U/L 22     Results from last 7 days   Lab Units 02/27/24 1904 02/27/24 0359 02/26/24 0441 02/25/24 0458 02/24/24  0304 02/23/24  2125   CALCIUM mg/dL 8.5* 7.9* 7.6* 7.9*   < > 8.5*   ALBUMIN g/dL  --   --   --   --   --  3.6    < > = values in this interval not displayed.       Glucose   Date/Time Value Ref Range Status   02/28/2024 0523 106 70 - 130 mg/dL Final   02/27/2024 0539 112 70 - 130 mg/dL Final   02/26/2024 2100 193 (H) 70 - 130 mg/dL Final   02/26/2024 1601 120 70 - 130 mg/dL Final   02/26/2024 1046 185 (H) 70 - 130 mg/dL Final   02/26/2024 0559 105 70 - 130 mg/dL Final   02/25/2024 2048 150 (H) 70 - 130 mg/dL Final       No radiology results for the last day  Scheduled Medications  allopurinol, 100 mg, Oral, Daily  atorvastatin, 80 mg, Oral, Daily  furosemide, 20 mg, Oral, Daily  levothyroxine, 75 mcg, Oral, QAM  memantine, 20 mg, Oral,  QAM  sodium chloride, 10 mL, Intravenous, Q12H    Infusions   Diet  NPO Diet NPO Type: Sips with Meds      Assessment/Plan     Active Hospital Problems    Diagnosis  POA    **Pneumothorax [J93.9]  Yes    Fracture of multiple ribs of right side [S22.41XA]  Unknown    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       79 y.o. male admitted with Pneumothorax.    Right sided pneumothorax  Right sided rib fractures  - CT surgery consulted, discussed with NP Robert, plan for chest tube placement    Type 2 diabetes- A1c 5.8; glucose ok on am bmp, monitor and add ssi if needed    CKD 3b- Cr 1.4 stable at baseline    HTN- lasix     BLE venous insufficiency- home lasix    History of DVT/PE- Eliquis 2.5 bid on hold until ok with thoracic surgery-hold as patient is to receive chest tube    Dementia- namenda    Alcohol use disorder- history of, pt recently admitted here x4 days- no e/o w/d- hold off on CIWA    Flow (L/min):  [2] 2    DVT prophylaxis: SCDs  Discussed with patient and consulting provider.  Anticipated discharge home, TBD            Ceci Rivera MD  [facility]  02/28/24  05:47 EST        Electronically signed by Ceci Rivera MD at 02/28/24 1119          Consult Notes (last 72 hours)        Robert Love APRN at 02/28/24 1145        Consult Orders    1. Inpatient Thoracic Surgery Consult [[id number]] ordered by Yaakov Blankenship MD at 02/27/24 1819              Attestation signed by Skip Wall MD PhD at 02/28/24  "1502    I have reviewed this documentation and agree.                      Chief Complaint: Right pneumothorax, right rib fractures  S/P: Right chest tube placement s/p removal on 02/26/2024    Mr. Landers is a pleasant 79-year-old gentleman who initially presented to [facility] on 2/23/2024 with complaints of right chest wall pain after tripping and falling approximately 3 days prior.  Patient was subsequently transferred to [facility].  He was diagnosed with multiple rib fractures and right pneumothorax.  For his rib fractures non-operative medical management was recommended and patient underwent chest tube placement for his right pneumothorax which was ultimately removed on 2/26/2024.  Patient was discharged yesterday although images from his follow-up chest x-ray demonstrated a 3 cm pneumothorax.  He has been subsequently readmitted. Of note patient is on Eliquis with history of DVT and PE along with factor V Leiden.    Subjective:  Symptoms:  Stable.  He reports shortness of breath (Endorses mild shortness of breath.) and weakness.  No cough.    Diet:  NPO.  No nausea or vomiting.    Activity level: Impaired due to weakness.    Pain:  He complains of pain that is mild.  Pain is well controlled.        Vital Signs:  Temp:  [97.4 °F (36.3 °C)-98.6 °F (37 °C)] 97.8 °F (36.6 °C)  Heart Rate:  [] 90  Resp:  [16-18] 16  BP: (105-123)/(69-82) 116/77    Intake & Output (last day)         02/27 0701  02/28 0700 02/28 0701  02/29 0700    Urine (mL/kg/hr)  50 (0.1)    Total Output  50    Net  -50          Stool Unmeasured Occurrence 1 x             Objective:  General Appearance:  Comfortable and in no acute distress.    Vital signs: (most recent): Blood pressure 116/77, pulse 90, temperature 97.8 °F (36.6 °C), temperature source Oral, resp. rate 16, height 177.8 cm (70\"), weight 111 kg (244 lb 4.3 oz), SpO2 99%.    Lungs:  Normal effort and normal respiratory rate.  He is not in " respiratory distress.    Heart: Normal rate.  Regular rhythm.    Chest: Symmetric chest wall expansion. Chest wall tenderness present.    Abdomen: Abdomen is soft and non-distended.    Neurological: Patient is alert and oriented to person, place and time.    Skin:  Warm and dry.                Results Review:     I reviewed the patient's new clinical results.  I reviewed the patient's new imaging results and agree with the interpretation.  Discussed with patient, nurse, Dr. Wall    Imaging Results (Last 24 Hours)       Procedure Component Value Units Date/Time    XR Chest PA & Lateral [[id number]] Collected: 02/28/24 0815     Updated: 02/28/24 1019    Narrative:      Chest radiograph     HISTORY: Pneumothorax, follow-up     TECHNIQUE: Two PA and lateral radiographs     COMPARISON: Chest radiograph 2/27/2024       Impression:      FINDINGS AND IMPRESSION:  The right pneumothorax has increased in size, resulting in approximately  5 cm of pleural-parenchymal separation (previously 3.4 cm on 2/27/2024.  There is worsening pulmonary opacification within the bilateral mid to  lower lungs, right greater than left suggestion of a small right pleural  effusion, as before. The above findings were discussed with Vanessa, the  patient's nurse, by telephone by Nguyễn Jesus at   8:15 a.m. on   2/28/2024  . Cardiac silhouette appears to be borderline enlarged.           This report was finalized on 2/28/2024 10:16 AM by Dr. Nguyễn Jesus M.D on Workstation: [id number]       XR Chest 1 View [[id number]] Collected: 02/27/24 1832     Updated: 02/27/24 1838    Narrative:      XR CHEST 1 VW-     Clinical: Chest tube removal on 2/26/2024, follow-up right sided  pneumothorax     COMPARISON examination same day at 6:14 a.m., current examination 6:15  p.m.     FINDINGS: The right apical pneumothorax is similar in size compared to  the earlier examination. The left lung is clear. The cardiomediastinal  silhouette is stable. No edema. There  is blunting of the right  costophrenic angle, trace amount of pleural fluid suspected. There is  very subtle opacity in right mid and lower lung zone, suspect  atelectasis. Trace amount of subcutaneous emphysema within the right  chest wall is again noted. The remainder is unremarkable.     This report was finalized on 2/27/2024 6:35 PM by Dr. Matheus Segura M.D  on Workstation: [id number]               Lab Results:     Lab Results (last 24 hours)       Procedure Component Value Units Date/Time    CBC & Differential [[id number]]  (Abnormal) Collected: 02/28/24 0537    Specimen: Blood Updated: 02/28/24 0702    Narrative:      The following orders were created for panel order CBC & Differential.  Procedure                               Abnormality         Status                     ---------                               -----------         ------                     CBC Auto Differential[[id number]]        Abnormal            Final result                 Please view results for these tests on the individual orders.    CBC Auto Differential [[id number]]  (Abnormal) Collected: 02/28/24 0537    Specimen: Blood Updated: 02/28/24 0702     WBC 3.62 10*3/mm3      RBC 2.46 10*6/mm3      Hemoglobin 9.4 g/dL      Hematocrit 27.3 %      .0 fL      MCH 38.2 pg      MCHC 34.4 g/dL      RDW 12.7 %      RDW-SD 50.1 fl      MPV 9.5 fL      Platelets 124 10*3/mm3     Manual Differential [[id number]]  (Abnormal) Collected: 02/28/24 0537    Specimen: Blood Updated: 02/28/24 0702     Neutrophil % 70.0 %      Lymphocyte % 17.0 %      Monocyte % 7.0 %      Eosinophil % 6.0 %      Neutrophils Absolute 2.53 10*3/mm3      Lymphocytes Absolute 0.62 10*3/mm3      Monocytes Absolute 0.25 10*3/mm3      Eosinophils Absolute 0.22 10*3/mm3      Hypochromia Slight/1+     WBC Morphology Normal     Platelet Morphology Normal    Basic Metabolic Panel [[id number]]  (Abnormal) Collected: 02/28/24 0537    Specimen: Blood Updated: 02/28/24 0644      Glucose 104 mg/dL      BUN 25 mg/dL      Creatinine 1.38 mg/dL      Sodium 141 mmol/L      Potassium 4.0 mmol/L      Chloride 106 mmol/L      CO2 25.0 mmol/L      Calcium 8.4 mg/dL      BUN/Creatinine Ratio 18.1     Anion Gap 10.0 mmol/L      eGFR 51.7 mL/min/1.73     Narrative:      GFR Normal >60  Chronic Kidney Disease <60  Kidney Failure <15    The GFR formula is only valid for adults with stable renal function between ages 18 and 70.    POC Glucose Once [[id number]]  (Normal) Collected: 02/28/24 0523    Specimen: Blood Updated: 02/28/24 0525     Glucose 106 mg/dL     Basic Metabolic Panel [[id number]]  (Abnormal) Collected: 02/27/24 1904    Specimen: Blood Updated: 02/27/24 2006     Glucose 136 mg/dL      BUN 27 mg/dL      Creatinine 1.44 mg/dL      Sodium 140 mmol/L      Potassium 4.8 mmol/L      Comment: Slight hemolysis detected by analyzer. Result may be falsely elevated.        Chloride 104 mmol/L      CO2 26.0 mmol/L      Calcium 8.5 mg/dL      BUN/Creatinine Ratio 18.8     Anion Gap 10.0 mmol/L      eGFR 49.1 mL/min/1.73     Narrative:      GFR Normal >60  Chronic Kidney Disease <60  Kidney Failure <15    The GFR formula is only valid for adults with stable renal function between ages 18 and 70.    CBC & Differential [[id number]]  (Abnormal) Collected: 02/27/24 1904    Specimen: Blood Updated: 02/27/24 1939    Narrative:      The following orders were created for panel order CBC & Differential.  Procedure                               Abnormality         Status                     ---------                               -----------         ------                     CBC Auto Differential[[id number]]        Abnormal            Final result                 Please view results for these tests on the individual orders.    CBC Auto Differential [[id number]]  (Abnormal) Collected: 02/27/24 1904    Specimen: Blood Updated: 02/27/24 1939     WBC 4.64 10*3/mm3      RBC 2.61 10*6/mm3      Hemoglobin 9.9 g/dL       Hematocrit 28.8 %      .3 fL      MCH 37.9 pg      MCHC 34.4 g/dL      RDW 12.8 %      RDW-SD 50.1 fl      MPV 9.7 fL      Platelets 123 10*3/mm3      Neutrophil % 67.3 %      Lymphocyte % 13.8 %      Monocyte % 16.2 %      Eosinophil % 1.9 %      Basophil % 0.4 %      Immature Grans % 0.4 %      Neutrophils, Absolute 3.12 10*3/mm3      Lymphocytes, Absolute 0.64 10*3/mm3      Monocytes, Absolute 0.75 10*3/mm3      Eosinophils, Absolute 0.09 10*3/mm3      Basophils, Absolute 0.02 10*3/mm3      Immature Grans, Absolute 0.02 10*3/mm3      nRBC 0.0 /100 WBC              Assessment & Plan       Pneumothorax    Essential hypertension    Type 2 diabetes mellitus with chronic kidney disease, without long-term current use of insulin    Stage 3b chronic kidney disease    Acquired hypothyroidism    Chronic pulmonary embolism without acute cor pulmonale    Chronic deep vein thrombosis (DVT) of popliteal vein of both lower extremities    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    Fracture of multiple ribs of right side       Assessment & Plan  I have independently reviewed serial chest imaging.  Chest x-ray performed yesterday morning demonstrates a 3 cm pneumothorax and subsequent chest x-rays have demonstrated progression.  With the most recent performed this morning demonstrating 5 cm of pleural separation.  Patient does endorse some mild shortness of breath.  He is on 2 L via nasal cannula.     Right pneumothorax: Plan for right CT-guided chest tube placement today. Continue chest tube to -20 cm of suction once placed.  Repeat a.m. chest x-ray.  Encourage good pulmonary hygiene, use of I-S.      Multiple right rib fractures: Continue to recommend nonoperative medical management with analgesic medication.  Recommend not pushing pulling or lifting 10 pounds or greater for the next 6 to 8 weeks.  Increase mobilization, PT.    NIRAJ Maria  [facility]  02/28/24  11:45 EST    Greater than 35 minutes was spent  reviewing the patient's chart, radiographic imaging, labs, provider notes, assessing the patient and developing a plan of care.  This was discussed with the patient and RN.        Electronically signed by Skip Wall MD PhD at 02/28/24 4959

## 2024-02-29 NOTE — PLAN OF CARE
Goal Outcome Evaluation:         Right chest tube to suction. Side of bed using urinal. Son at side. Rfa dressing changed done with vaseline gauze and kerlex. Safety maintained. Anna ANTONIO

## 2024-02-29 NOTE — PROGRESS NOTES
"    Chief Complaint: Right pneumothorax, right rib fractures  S/P: CT-guided chest tube placement, right  POD # 1    Subjective:  Symptoms:  Stable.  No shortness of breath or cough.    Diet:  Adequate intake.  No nausea or vomiting.    Activity level: Impaired due to weakness.    Pain:  He reports no pain.    Feels better today.  Denies shortness of breath.    Vital Signs:  Temp:  [97.6 °F (36.4 °C)-98.3 °F (36.8 °C)] 97.6 °F (36.4 °C)  Heart Rate:  [82-92] 82  Resp:  [16] 16  BP: (109-164)/(70-82) 109/74    Intake & Output (last day)         02/28 0701  02/29 0700 02/29 0701 03/01 0700    P.O.  120    Total Intake(mL/kg)  120 (1.1)    Urine (mL/kg/hr) 50 (0) 25 (0)    Stool  0    Chest Tube 369     Total Output 419 25    Net -419 +95          Urine Unmeasured Occurrence 2 x     Stool Unmeasured Occurrence  1 x            Objective:  Vital signs: (most recent): Blood pressure 109/74, pulse 82, temperature 97.6 °F (36.4 °C), temperature source Oral, resp. rate 16, height 177.8 cm (70\"), weight 111 kg (244 lb 4.3 oz), SpO2 100%.                Chest tube:   Site: Right, Clean, Dry, Intact, and Securement device intact  Suction: -20 cm  Air Leak: positive  24 Hour Total: None documented    Results Review:     I reviewed the patient's new clinical results.  I reviewed the patient's new imaging results and agree with the interpretation.  Discussed with patient, nurse, Dr. Hendrickson.    Imaging Results (Last 24 Hours)       Procedure Component Value Units Date/Time    XR Chest 1 View [[id number]] Collected: 02/29/24 0712     Updated: 02/29/24 0716    Narrative:      XR CHEST 1 VW-     Clinical: Chest tube management, pneumothorax     COMPARISON examination 2/28/2024     FINDINGS: There is now a pigtail chest tube located at the right lung  base, no pneumothorax. The cardiomediastinal silhouette is stable. Left  lung is clear. Resolved right lung atelectasis. No other interval change  has occurred.     This report was " finalized on 2/29/2024 7:13 AM by Dr. Matheus Segura M.D  on Workstation: [id number]       CT Guided Chest Tube [[id number]] Collected: 02/28/24 1650     Updated: 02/28/24 1658    Narrative:      CT GUIDED CHEST TUBE     HISTORY:  Right pneumothorax    .     COMPARISON: 2/28/2024 chest x-ray     PROCEDURE: After informed consent was obtained and documented, the  patient was placed on the CT table in supine position. A verbal time out  was performed with appropriate identifiers confirmed. A nurse was  continuously present for monitoring and moderate sedation under  physician supervision from 1325 to 1347 hours utilizing 25 mcg fentanyl  and 1.5 mg Versed. A preliminary partial scan of the thorax was  obtained; a right hydropneumothorax was partially visualized. A route  was planned for catheter placement and an appropriate skin site chosen.  This site was then prepped and draped in the usual sterile manner and  the soft tissues anesthetized with 1% lidocaine down to the pleura. A  needle was placed into the right pneumothorax. A wire was advanced  through the needle which was then removed. After serial dilation, a 12  Sudanese skater drainage catheter was placed into the pneumothorax,  coiled, and locked. The visible pneumothorax was manually evacuated at  this time. The catheter was secured with Dermabond reinforced suture and  stay-fix dressing and attached to an atrium device. The patient was  stable throughout, there were no immediate complications. Radiation dose  reduction techniques were utilized, including automated exposure control  and exposure modulation based on body size.          Impression:      Successful 12 Sudanese chest tube placement for right hydropneumothorax as  described above.        This report was finalized on 2/28/2024 4:55 PM by Dr. Greg Cordova M.D  on Workstation: [id number]               Lab Results:     Lab Results (last 24 hours)       ** No results found for the last 24 hours. **              Assessment & Plan       Pneumothorax    Essential hypertension    Type 2 diabetes mellitus with chronic kidney disease, without long-term current use of insulin    Stage 3b chronic kidney disease    Acquired hypothyroidism    Chronic pulmonary embolism without acute cor pulmonale    Chronic deep vein thrombosis (DVT) of popliteal vein of both lower extremities    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    Fracture of multiple ribs of right side       Assessment & Plan  S/p CT-guided chest tube placement yesterday.  A.m. chest x-ray reviewed which demonstrates no pneumothorax.  Right basilar chest tube seen in position.  Small airleak noted on exam.  Pneumothorax resolved on x-ray, transition chest tube to waterseal.  Patient denies any rib fracture or pain.  Continue to recommend nonoperative medical management with analgesic medications.  No pushing pulling or lifting anything greater than 10 pounds for next 6 to 8 weeks. Repeat a.m. chest x-ray.  Encourage   pulmonary hygiene, use of I-S.  Increase mobilization, PT eval.    NIRAJ Maria  [facility]  02/29/24  14:41 EST    Greater than 30 minutes was spent reviewing the patient's chart, radiographic imaging, labs, provider notes, assessing the patient and developing a plan of care.  This was discussed with the patient and RN.

## 2024-02-29 NOTE — PROGRESS NOTES
Name: Bryan Landers ADMIT: 2024   : 1945  PCP: Jose Fonseca MD    MRN: [id number] LOS: 1 days   AGE/SEX: 79 y.o. male  ROOM: [facility] [place]     Subjective   Subjective   Patient is seen at bedside, no new complaints.       Objective   Objective   Vital Signs  Temp:  [97.6 °F (36.4 °C)-98.3 °F (36.8 °C)] 97.6 °F (36.4 °C)  Heart Rate:  [82-92] 82  Resp:  [16] 16  BP: (109-164)/(70-82) 109/74  SpO2:  [98 %-100 %] 100 %  on  Flow (L/min):  [2] 2;   Device (Oxygen Therapy): room air  Body mass index is 35.05 kg/m².  Physical Exam  General, awake and alert.  Head and ENT, normocephalic and atraumatic.  Lungs, symmetric expansion, equal air entry bilaterally.  Heart, regular rate and rhythm.  Abdomen, soft and nontender.  Extremities, no clubbing or cyanosis.  Neuro, no focal deficits.  Skin: Warm and no rash.  Psych, normal mood and affect.  Musculoskeletal, joint examination is grossly normal.          Results Review     I reviewed the patient's new clinical results.  Results from last 7 days   Lab Units 24  0441   WBC 10*3/mm3 3.62 4.64 4.04 3.88   HEMOGLOBIN g/dL 9.4* 9.9* 9.3* 8.3*   PLATELETS 10*3/mm3 124* 123* 106* 82*     Results from last 7 days   Lab Units 24  19024  03524  0441   SODIUM mmol/L 141 140 140 141   POTASSIUM mmol/L 4.0 4.8 4.4 4.0   CHLORIDE mmol/L 106 104 105 107   CO2 mmol/L 25.0 26.0 23.2 25.3   BUN mg/dL 25* 27* 24* 25*   CREATININE mg/dL 1.38* 1.44* 1.40* 1.36*   GLUCOSE mg/dL 104* 136* 101* 106*   EGFR mL/min/1.73 51.7* 49.1* 50.8* 52.6*     Results from last 7 days   Lab Units 24  2125   ALBUMIN g/dL 3.6   BILIRUBIN mg/dL 0.6   ALK PHOS U/L 67   AST (SGOT) U/L 45*   ALT (SGPT) U/L 22     Results from last 7 days   Lab Units 24  0537 24  1904 24  0359 24  0441 24  0304 24  2125   CALCIUM mg/dL 8.4* 8.5* 7.9* 7.6*   < > 8.5*   ALBUMIN g/dL  --   --    --   --   --  3.6    < > = values in this interval not displayed.       Glucose   Date/Time Value Ref Range Status   02/28/2024 1434 99 70 - 130 mg/dL Final   02/28/2024 0523 106 70 - 130 mg/dL Final   02/27/2024 0539 112 70 - 130 mg/dL Final   02/26/2024 2100 193 (H) 70 - 130 mg/dL Final   02/26/2024 1601 120 70 - 130 mg/dL Final       CT Guided Chest Tube    Result Date: 2/28/2024  Successful 12 Italian chest tube placement for right hydropneumothorax as described above.   This report was finalized on 2/28/2024 4:55 PM by Dr. Greg Cordova M.D on Workstation: [id number]      XR Chest PA & Lateral    Result Date: 2/28/2024  FINDINGS AND IMPRESSION: The right pneumothorax has increased in size, resulting in approximately 5 cm of pleural-parenchymal separation (previously 3.4 cm on 2/27/2024. There is worsening pulmonary opacification within the bilateral mid to lower lungs, right greater than left suggestion of a small right pleural effusion, as before. The above findings were discussed with Vanessa, the patient's nurse, by telephone by Nguyễn Jesus at   8:15 a.m. on 2/28/2024  . Cardiac silhouette appears to be borderline enlarged.    This report was finalized on 2/28/2024 10:16 AM by Dr. Nguyễn Jesus M.D on Workstation: [id number]       I have personally reviewed all medications:  Scheduled Medications  allopurinol, 100 mg, Oral, Daily  atorvastatin, 80 mg, Oral, Daily  furosemide, 20 mg, Oral, Daily  levothyroxine, 75 mcg, Oral, QAM  memantine, 20 mg, Oral, QAM  sodium chloride, 10 mL, Intravenous, Q12H    Infusions   Diet  Diet: Diabetic Diets; Consistent Carbohydrate; Texture: Regular Texture (IDDSI 7); Fluid Consistency: Thin (IDDSI 0)    I have personally reviewed:  [x]  Laboratory   [x]  Microbiology   [x]  Radiology   [x]  EKG/Telemetry  [x]  Cardiology/Vascular   []  Pathology    []  Records       Assessment/Plan     Active Hospital Problems    Diagnosis  POA    **Pneumothorax [J93.9]  Yes    Fracture of  multiple ribs of right side [S22.41XA]  Unknown    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       79 y.o. male admitted with Pneumothorax.    Assessment and plan  1.  Right-sided rib fractures, right-sided pneumothorax, CT surgery on board, follow management recommendations.    2.  Type 2 diabetes mellitus, continue Accu-Cheks and sliding scale insulin coverage.    3.  Chronic kidney disease, monitor renal function, avoid nephrotoxic medications.    4.  Hypertension, monitor BP trend, continue current medications.    5.  History of DVT/PE, chronic condition.  Eliquis on hold.    6.  Dementia, continue home medications.    7.  CODE STATUS is full code.  Further plans based on hospital course.          Ariel Novak MD  [facility]  02/29/24  14:46 EST

## 2024-03-01 ENCOUNTER — APPOINTMENT (OUTPATIENT)
Dept: GENERAL RADIOLOGY | Facility: HOSPITAL | Age: 79
DRG: 200 | End: 2024-03-01
Payer: MEDICARE

## 2024-03-01 LAB
ALBUMIN SERPL-MCNC: 2.8 G/DL (ref 3.5–5.2)
ALBUMIN/GLOB SERPL: 1 G/DL
ALP SERPL-CCNC: 57 U/L (ref 39–117)
ALT SERPL W P-5'-P-CCNC: 18 U/L (ref 1–41)
ANION GAP SERPL CALCULATED.3IONS-SCNC: 9.3 MMOL/L (ref 5–15)
AST SERPL-CCNC: 37 U/L (ref 1–40)
BASOPHILS # BLD AUTO: 0.02 10*3/MM3 (ref 0–0.2)
BASOPHILS NFR BLD AUTO: 0.7 % (ref 0–1.5)
BILIRUB SERPL-MCNC: 0.7 MG/DL (ref 0–1.2)
BUN SERPL-MCNC: 17 MG/DL (ref 8–23)
BUN/CREAT SERPL: 13.9 (ref 7–25)
CALCIUM SPEC-SCNC: 7.9 MG/DL (ref 8.6–10.5)
CHLORIDE SERPL-SCNC: 106 MMOL/L (ref 98–107)
CO2 SERPL-SCNC: 23.7 MMOL/L (ref 22–29)
CREAT SERPL-MCNC: 1.22 MG/DL (ref 0.76–1.27)
DEPRECATED RDW RBC AUTO: 50.9 FL (ref 37–54)
EGFRCR SERPLBLD CKD-EPI 2021: 59.9 ML/MIN/1.73
EOSINOPHIL # BLD AUTO: 0.14 10*3/MM3 (ref 0–0.4)
EOSINOPHIL NFR BLD AUTO: 4.8 % (ref 0.3–6.2)
ERYTHROCYTE [DISTWIDTH] IN BLOOD BY AUTOMATED COUNT: 12.8 % (ref 12.3–15.4)
GLOBULIN UR ELPH-MCNC: 2.7 GM/DL
GLUCOSE BLDC GLUCOMTR-MCNC: 93 MG/DL (ref 70–130)
GLUCOSE SERPL-MCNC: 93 MG/DL (ref 65–99)
HCT VFR BLD AUTO: 26.6 % (ref 37.5–51)
HGB BLD-MCNC: 9.3 G/DL (ref 13–17.7)
LYMPHOCYTES # BLD AUTO: 0.52 10*3/MM3 (ref 0.7–3.1)
LYMPHOCYTES NFR BLD AUTO: 17.8 % (ref 19.6–45.3)
MCH RBC QN AUTO: 38.6 PG (ref 26.6–33)
MCHC RBC AUTO-ENTMCNC: 35 G/DL (ref 31.5–35.7)
MCV RBC AUTO: 110.4 FL (ref 79–97)
MONOCYTES # BLD AUTO: 0.5 10*3/MM3 (ref 0.1–0.9)
MONOCYTES NFR BLD AUTO: 17.1 % (ref 5–12)
NEUTROPHILS NFR BLD AUTO: 1.73 10*3/MM3 (ref 1.7–7)
NEUTROPHILS NFR BLD AUTO: 59.3 % (ref 42.7–76)
PLATELET # BLD AUTO: 121 10*3/MM3 (ref 140–450)
PMV BLD AUTO: 8.9 FL (ref 6–12)
POTASSIUM SERPL-SCNC: 4 MMOL/L (ref 3.5–5.2)
PROT SERPL-MCNC: 5.5 G/DL (ref 6–8.5)
RBC # BLD AUTO: 2.41 10*6/MM3 (ref 4.14–5.8)
SODIUM SERPL-SCNC: 139 MMOL/L (ref 136–145)
WBC NRBC COR # BLD AUTO: 2.92 10*3/MM3 (ref 3.4–10.8)

## 2024-03-01 PROCEDURE — 80053 COMPREHEN METABOLIC PANEL: CPT | Performed by: INTERNAL MEDICINE

## 2024-03-01 PROCEDURE — 71045 X-RAY EXAM CHEST 1 VIEW: CPT

## 2024-03-01 PROCEDURE — 97530 THERAPEUTIC ACTIVITIES: CPT

## 2024-03-01 PROCEDURE — 85025 COMPLETE CBC W/AUTO DIFF WBC: CPT | Performed by: INTERNAL MEDICINE

## 2024-03-01 PROCEDURE — 99232 SBSQ HOSP IP/OBS MODERATE 35: CPT

## 2024-03-01 PROCEDURE — 82948 REAGENT STRIP/BLOOD GLUCOSE: CPT

## 2024-03-01 RX ADMIN — FUROSEMIDE 20 MG: 20 TABLET ORAL at 08:09

## 2024-03-01 RX ADMIN — ALLOPURINOL 100 MG: 100 TABLET ORAL at 08:09

## 2024-03-01 RX ADMIN — Medication 10 ML: at 08:10

## 2024-03-01 RX ADMIN — LEVOTHYROXINE SODIUM 75 MCG: 75 TABLET ORAL at 06:54

## 2024-03-01 RX ADMIN — ATORVASTATIN CALCIUM 80 MG: 80 TABLET, FILM COATED ORAL at 08:10

## 2024-03-01 RX ADMIN — MEMANTINE HYDROCHLORIDE 20 MG: 10 TABLET, FILM COATED ORAL at 06:54

## 2024-03-01 NOTE — PLAN OF CARE
Problem: Adult Inpatient Plan of Care  Goal: Plan of Care Review  Outcome: Ongoing, Progressing  Flowsheets (Taken 3/1/2024 6883)  Progress: no change  Plan of Care Reviewed With: patient  Outcome Evaluation: vss. up with x1 assist. chest tube clamped, plan for repeat chest x-ray 1415   Goal Outcome Evaluation:  Plan of Care Reviewed With: patient        Progress: no change  Outcome Evaluation: vss. up with x1 assist. chest tube clamped, plan for repeat chest x-ray 1415

## 2024-03-01 NOTE — PROGRESS NOTES
Name: Bryan Landers ADMIT: 2024   : 1945  PCP: Jose Fonseca MD    MRN: [id number] LOS: 2 days   AGE/SEX: 79 y.o. male  ROOM: [facility]     Subjective   Subjective   Patient seen at bedside.       Objective   Objective   Vital Signs  Temp:  [97 °F (36.1 °C)-98 °F (36.7 °C)] 97.5 °F (36.4 °C)  Heart Rate:  [84-91] 89  Resp:  [16] 16  BP: (104-112)/(67-75) 105/67  SpO2:  [97 %-100 %] 100 %  on   ;   Device (Oxygen Therapy): room air  Body mass index is 35.05 kg/m².  Physical Exam  General, awake and alert.  Head and ENT, normocephalic and atraumatic.  Lungs, symmetric expansion, equal air entry bilaterally.  Heart, regular rate and rhythm.  Abdomen, soft and nontender.  Extremities, no clubbing or cyanosis.  Neuro, no focal deficits.  Skin: Warm and no rash.  Psych, normal mood and affect.  Musculoskeletal, joint examination is grossly normal.    Copied text material from yesterday's note has been reviewed for appropriate changes and remains accurate as of 3/1/24.      Results Review     I reviewed the patient's new clinical results.  Results from last 7 days   Lab Units 24  0359   WBC 10*3/mm3 2.92* 3.62 4.64 4.04   HEMOGLOBIN g/dL 9.3* 9.4* 9.9* 9.3*   PLATELETS 10*3/mm3 121* 124* 123* 106*     Results from last 7 days   Lab Units 2437 24  0359   SODIUM mmol/L 139 141 140 140   POTASSIUM mmol/L 4.0 4.0 4.8 4.4   CHLORIDE mmol/L 106 106 104 105   CO2 mmol/L 23.7 25.0 26.0 23.2   BUN mg/dL 17 25* 27* 24*   CREATININE mg/dL 1.22 1.38* 1.44* 1.40*   GLUCOSE mg/dL 93 104* 136* 101*   EGFR mL/min/1.73 59.9* 51.7* 49.1* 50.8*     Results from last 7 days   Lab Units 24  0425 24  2125   ALBUMIN g/dL 2.8* 3.6   BILIRUBIN mg/dL 0.7 0.6   ALK PHOS U/L 57 67   AST (SGOT) U/L 37 45*   ALT (SGPT) U/L 18 22     Results from last 7 days   Lab Units 24  0537 24  1904  02/27/24  0359 02/24/24  0304 02/23/24  2125   CALCIUM mg/dL 7.9* 8.4* 8.5* 7.9*   < > 8.5*   ALBUMIN g/dL 2.8*  --   --   --   --  3.6    < > = values in this interval not displayed.       Glucose   Date/Time Value Ref Range Status   03/01/2024 0523 93 70 - 130 mg/dL Final   02/29/2024 2013 139 (H) 70 - 130 mg/dL Final   02/28/2024 1434 99 70 - 130 mg/dL Final   02/28/2024 0523 106 70 - 130 mg/dL Final       No radiology results for the last day    I have personally reviewed all medications:  Scheduled Medications  allopurinol, 100 mg, Oral, Daily  atorvastatin, 80 mg, Oral, Daily  furosemide, 20 mg, Oral, Daily  levothyroxine, 75 mcg, Oral, QAM  memantine, 20 mg, Oral, QAM  sodium chloride, 10 mL, Intravenous, Q12H    Infusions   Diet  Diet: Diabetic Diets; Consistent Carbohydrate; Texture: Regular Texture (IDDSI 7); Fluid Consistency: Thin (IDDSI 0)    I have personally reviewed:  [x]  Laboratory   [x]  Microbiology   [x]  Radiology   [x]  EKG/Telemetry  [x]  Cardiology/Vascular   []  Pathology    []  Records       Assessment/Plan     Active Hospital Problems    Diagnosis  POA    **Pneumothorax [J93.9]  Yes    Fracture of multiple ribs of right side [S22.41XA]  Unknown    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       79 y.o. male admitted with Pneumothorax.    Assessment and plan  1.  Right-sided rib fractures, right-sided pneumothorax, CT surgery on board, follow management recommendations.     2.  Type 2 diabetes mellitus, continue Accu-Cheks and  sliding scale insulin coverage.     3.  Chronic kidney disease, monitor renal function, avoid nephrotoxic medications.     4.  Hypertension, monitor BP trend, continue current medications.     5.  History of DVT/PE, chronic condition.  Eliquis on hold.     6.  Dementia, continue home medications.     7.  CODE STATUS is full code.  Further plans based on hospital course.         Ariel Novak MD  [facility]  03/01/24  16:02 EST

## 2024-03-01 NOTE — PROGRESS NOTES
"    Chief Complaint: Right pneumothorax, right rib fractures  S/P: CT-guided chest tube placement, right  POD # 2    Subjective:  Symptoms:  Improved.  No shortness of breath or cough.    Diet:  Adequate intake.  No nausea or vomiting.    Activity level: Impaired due to weakness.    Pain:  He reports no pain.    Continues to do well.  Ambulating halls with assistance.  Denies shortness of breath.    Vital Signs:  Temp:  [97 °F (36.1 °C)-98 °F (36.7 °C)] 97.5 °F (36.4 °C)  Heart Rate:  [84-91] 89  Resp:  [16-17] 16  BP: (104-112)/(67-75) 105/67    Intake & Output (last day)         02/29 0701  03/01 0700 03/01 0701  03/02 0700    P.O. 360     Total Intake(mL/kg) 360 (3.2)     Urine (mL/kg/hr) 75 (0)     Stool 0     Chest Tube 50     Total Output 125     Net +235           Urine Unmeasured Occurrence 1 x     Stool Unmeasured Occurrence 2 x             Objective:  General Appearance:  Comfortable, in no acute distress and well-appearing.    Vital signs: (most recent): Blood pressure 105/67, pulse 89, temperature 97.5 °F (36.4 °C), temperature source Oral, resp. rate 16, height 177.8 cm (70\"), weight 111 kg (244 lb 4.3 oz), SpO2 100%.    Lungs:  Normal effort and normal respiratory rate.  He is not in respiratory distress.    Heart: Normal rate.  Regular rhythm.    Chest: Symmetric chest wall expansion. Chest wall tenderness (Around chest tube) present.    Abdomen: Abdomen is soft and non-distended.    Neurological: Patient is alert and oriented to person, place and time.    Skin:  Warm and dry.                Chest tube:   Site: Right, Clean, Dry, Intact, and Securement device intact  Suction: Waterseal  Air Leak: Negative  24 Hour Total: 50ml    Results Review:     I reviewed the patient's new clinical results.  I reviewed the patient's new imaging results and agree with the interpretation.  Discussed with patient, nurse, Dr. Wall.    Imaging Results (Last 24 Hours)       Procedure Component Value Units Date/Time    " XR Chest 1 View [[id number]] Collected: 03/01/24 0717     Updated: 03/01/24 0721    Narrative:      XR CHEST 1 VW-     Clinical: Chest tube management, pneumothorax     COMPARISON examination 2/29/2024     FINDINGS: Pigtail chest tube superimposes the right lung base as before.  No pneumothorax. The cardiomediastinal silhouette is stable and the left  lung is clear. The remainder is unremarkable.     This report was finalized on 3/1/2024 7:18 AM by Dr. Matheus Segura M.D  on Workstation: [id number]               Lab Results:     Lab Results (last 24 hours)       Procedure Component Value Units Date/Time    POC Glucose Once [[id number]]  (Normal) Collected: 03/01/24 0523    Specimen: Blood Updated: 03/01/24 0525     Glucose 93 mg/dL     Comprehensive Metabolic Panel [[id number]]  (Abnormal) Collected: 03/01/24 0425    Specimen: Blood Updated: 03/01/24 0504     Glucose 93 mg/dL      BUN 17 mg/dL      Creatinine 1.22 mg/dL      Sodium 139 mmol/L      Potassium 4.0 mmol/L      Chloride 106 mmol/L      CO2 23.7 mmol/L      Calcium 7.9 mg/dL      Total Protein 5.5 g/dL      Albumin 2.8 g/dL      ALT (SGPT) 18 U/L      AST (SGOT) 37 U/L      Alkaline Phosphatase 57 U/L      Total Bilirubin 0.7 mg/dL      Globulin 2.7 gm/dL      A/G Ratio 1.0 g/dL      BUN/Creatinine Ratio 13.9     Anion Gap 9.3 mmol/L      eGFR 59.9 mL/min/1.73     Narrative:      GFR Normal >60  Chronic Kidney Disease <60  Kidney Failure <15    The GFR formula is only valid for adults with stable renal function between ages 18 and 70.    CBC & Differential [[id number]]  (Abnormal) Collected: 03/01/24 0425    Specimen: Blood Updated: 03/01/24 0449    Narrative:      The following orders were created for panel order CBC & Differential.  Procedure                               Abnormality         Status                     ---------                               -----------         ------                     CBC Auto Differential[[id number]]        Abnormal             Final result                 Please view results for these tests on the individual orders.    CBC Auto Differential [[id number]]  (Abnormal) Collected: 03/01/24 0425    Specimen: Blood Updated: 03/01/24 0449     WBC 2.92 10*3/mm3      RBC 2.41 10*6/mm3      Hemoglobin 9.3 g/dL      Hematocrit 26.6 %      .4 fL      MCH 38.6 pg      MCHC 35.0 g/dL      RDW 12.8 %      RDW-SD 50.9 fl      MPV 8.9 fL      Platelets 121 10*3/mm3      Neutrophil % 59.3 %      Lymphocyte % 17.8 %      Monocyte % 17.1 %      Eosinophil % 4.8 %      Basophil % 0.7 %      Neutrophils, Absolute 1.73 10*3/mm3      Lymphocytes, Absolute 0.52 10*3/mm3      Monocytes, Absolute 0.50 10*3/mm3      Eosinophils, Absolute 0.14 10*3/mm3      Basophils, Absolute 0.02 10*3/mm3     POC Glucose Once [[id number]]  (Abnormal) Collected: 02/29/24 2013    Specimen: Blood Updated: 02/29/24 2015     Glucose 139 mg/dL              Assessment & Plan       Pneumothorax    Essential hypertension    Type 2 diabetes mellitus with chronic kidney disease, without long-term current use of insulin    Stage 3b chronic kidney disease    Acquired hypothyroidism    Chronic pulmonary embolism without acute cor pulmonale    Chronic deep vein thrombosis (DVT) of popliteal vein of both lower extremities    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    Fracture of multiple ribs of right side       Assessment & Plan  S/p CT-guided chest tube placement. Denies any shortness of breath.  He is on room air.  A.m. chest x-ray reviewed which demonstrates no pneumothorax.  No obvious leak on exam.  Will try clamping chest tube today and obtain follow-up chest x-ray this afternoon.  If unremarkable, continue chest tube clamped and repeat a.m. chest x-ray.  Likely removal of chest tube tomorrow contingent on stable clinical course, am x-ray.  Continue analgesic medications for multiple rib fractures.  No pushing pulling or lifting anything greater than 10 pounds for 6 to 8 weeks.  Encourage good  pulmonary hygiene.  Continue to increase mobilization with assistance from nursing, PT.    NIRAJ Maria  [facility]  03/01/24  13:40 EST    Greater than 35 minutes was spent reviewing the patient's chart, radiographic imaging, labs, provider notes, assessing the patient and developing a plan of care.  This was discussed with the patient and RN.

## 2024-03-01 NOTE — PLAN OF CARE
"Goal Outcome Evaluation:  Plan of Care Reviewed With: patient        Progress: no change  Outcome Evaluation: Patient in bed on arrival and agreeable to therapy this AM. He performed bed mobility [place], STS from [facility], and took small steps bed>recliner before requesting to sit due to reports of feeling \"light headed.\" Patient sat for extended period of time with no improvement. BP in sitting read 98/62 and following 1 min in standing BP read 56/37 after multiple attempts by the monitor to take measurement. Patient returned to recliner and was reclined with feet elevated. BP cuff repositioned with arm resting on pillow and read 105/67 with reports of improving dizziness. NSG notified. Deferred additional mobility this AM, patient encouraged to sit in recliner as tolerated. PT will continue to monitor and progress as able.                               "

## 2024-03-01 NOTE — THERAPY TREATMENT NOTE
Patient Name: Bryan Landers  : 1945    MRN: [id number]                              Today's Date: 3/1/2024       Admit Date: 2024    Visit Dx:     ICD-10-CM ICD-9-CM   1. Pneumothorax on right  J93.9 512.89     Patient Active Problem List   Diagnosis    Essential hypertension    Mixed hyperlipidemia    Arthritis    Type 2 diabetes mellitus with chronic kidney disease, without long-term current use of insulin    Severely overweight    Idiopathic chronic gout of left knee    Medication monitoring encounter    Microalbuminuria due to type 2 diabetes mellitus    History of prostate cancer    Skin tear of left elbow without complication    Stage 3b chronic kidney disease    Medicare annual wellness visit, subsequent    Radiation proctitis    History of hip replacement, total, bilateral    Cognitive decline    Acquired hypothyroidism    B12 deficiency    Sinus tachycardia by electrocardiogram    Chronic pulmonary embolism without acute cor pulmonale    Chronic deep vein thrombosis (DVT) of popliteal vein of both lower extremities    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    Bladder mass    Dependent edema    Pancytopenia    Deep venous thrombosis    Class 2 severe obesity with serious comorbidity in adult    Pneumothorax    Fracture of multiple ribs of right side     Past Medical History:   Diagnosis Date    At risk for sleep apnea     Bladder mass     Bruises easily     Diabetes mellitus     Disease of thyroid gland     Elevated cholesterol     GI bleed     Gross hematuria 2021    History of transfusion     Poor historian     Short-term memory loss     Vitamin D deficiency      Past Surgical History:   Procedure Laterality Date    COLONOSCOPY  2016    COLONOSCOPY N/A 2018    Procedure: COLONOSCOPY;  Surgeon: Olaf Gomez MD;  Location: [facility] OR;  Service: Gastroenterology    COLONOSCOPY N/A 2019    Procedure: COLONOSCOPY;  Surgeon: Rosa Jack MD;  Location:  [facility] OR;  Service:  Gastroenterology    CYSTOSCOPY BLADDER BIOPSY N/A 9/22/2021    Procedure: CYSTOSCOPY BLADDER BIOPSY;  Surgeon: Roldan Mccarthy MD;  Location: [facility] MAIN OR;  Service: Urology;  Laterality: N/A;    HERNIA REPAIR Bilateral     PROSTATE ULTRASOUND BIOPSY      SIGMOIDOSCOPY N/A 10/2/2018    Procedure: FLEXIBLE SIGMOIDOSCOPY:  APC cautery bleeding using 60 joules;  Surgeon: Olaf Gomez MD;  Location: [facility] ENDOSCOPY;  Service: Gastroenterology    TONSILLECTOMY      TOTAL HIP ARTHROPLASTY Bilateral 2007      General Information       Row Name 03/01/24 1339          Physical Therapy Time and Intention    Document Type therapy note (daily note)  -ZB     Mode of Treatment individual therapy;physical therapy  -ZB       Row Name 03/01/24 1339          General Information    Patient Profile Reviewed yes  -ZB     Existing Precautions/Restrictions other (see comments)  chest tube to water seal  -ZB       Row Name 03/01/24 1339          Safety Issues, Functional Mobility    Impairments Affecting Function (Mobility) strength;endurance/activity tolerance;pain  -ZB     Comment, Safety Issues/Impairments (Mobility) gait belt and non skid socks donned  -ZB               User Key  (r) = Recorded By, (t) = Taken By, (c) = Cosigned By      Initials Name Provider Type    ZB Armaan Jiang PT Physical Therapist                   Mobility       Row Name 03/01/24 1340          Bed Mobility    Bed Mobility supine-sit  -ZB     Supine-Sit [place] (Bed Mobility) minimum assist (75% patient effort);verbal cues  -ZB     Comment, (Bed Mobility) UIC to end session  -ZB       Row Name 03/01/24 1340          Bed-Chair Transfer    Bed-Chair [place] (Transfers) minimum assist (75% patient effort);verbal cues  -ZB     Assistive Device (Bed-Chair Transfers) walker, front-wheeled  -ZB       Row Name 03/01/24 1340          Sit-Stand Transfer    Sit-Stand [place] (Transfers) contact guard;minimum assist (75% patient  "effort);verbal cues  -ZB     Assistive Device (Sit-Stand Transfers) walker, front-wheeled  -ZB       Row Name 03/01/24 1340          Gait/Stairs (Locomotion)    [place] Level (Gait) unable to assess  -ZB     Comment, (Gait/Stairs) unable to assess gait due to reports of dizziness and drop in BP with transition from sitting to standing  -ZB               User Key  (r) = Recorded By, (t) = Taken By, (c) = Cosigned By      Initials Name Provider Type    TRINIDADB Armaan Jiang, PT Physical Therapist                   Obj/Interventions    No documentation.                  Goals/Plan    No documentation.                  Clinical Impression       Row Name 03/01/24 1341          Pain    Pretreatment Pain Rating 5/10  -ZB     Posttreatment Pain Rating 5/10  -ZB     Pain Location incisional  -ZB     Pain Location - chest;flank  -ZB     Pain Intervention(s) Repositioned;Rest  -ZB       Row Name 03/01/24 1341          Plan of Care Review    Plan of Care Reviewed With patient  -ZB     Progress no change  -ZB     Outcome Evaluation Patient in bed on arrival and agreeable to therapy this AM. He performed bed mobility [place], STS from [place] [facility]-[place], and took small steps bed>recliner before requesting to sit due to reports of feeling \"light headed.\" Patient sat for extended period of time with no improvement. BP in sitting read 98/62 and following 1 min in standing BP read 56/37 after multiple attempts by the monitor to take measurement. Patient returned to recliner and was reclined with feet elevated. BP cuff repositioned with arm resting on pillow and read 105/67 with reports of improving dizziness. NSG notified. Deferred additional mobility this AM, patient encouraged to sit in recliner as tolerated. PT will continue to monitor and progress as able.  -ZB       Row Name 03/01/24 1341          Positioning and Restraints    Pre-Treatment Position in bed  -ZB     Post Treatment Position chair  -ZB     In Chair notified " nsg;reclined;call light within reach;encouraged to call for assist;exit alarm on  -ZB               User Key  (r) = Recorded By, (t) = Taken By, (c) = Cosigned By      Initials Name Provider Type    Armaan Cunningham, ABELARDO Physical Therapist                   Outcome Measures       Row Name 03/01/24 1347 03/01/24 0809       How much help from another person do you currently need...    Turning from your back to your side while in flat bed without using bedrails? 3  -ZB 3  -HP    Moving from lying on back to sitting on the side of a flat bed without bedrails? 3  -ZB 3  -HP    Moving to and from a bed to a chair (including a wheelchair)? 3  -ZB 3  -HP    Standing up from a chair using your arms (e.g., wheelchair, bedside chair)? 3  -ZB 3  -HP    Climbing 3-5 steps with a railing? 2  -ZB 3  -HP    To walk in hospital room? 3  -ZB 3  -HP    AM-PAC 6 Clicks Score (PT) 17  -ZB 18  -HP    Highest Level of Mobility Goal 5 --> Static standing  -ZB 6 --> Walk 10 steps or more  -HP      Row Name 03/01/24 0407          How much help from another person do you currently need...    Turning from your back to your side while in flat bed without using bedrails? 3  -BASSAM     Moving from lying on back to sitting on the side of a flat bed without bedrails? 3  -BASSAM     Moving to and from a bed to a chair (including a wheelchair)? 3  -BASSAM     Standing up from a chair using your arms (e.g., wheelchair, bedside chair)? 3  -BASSAM     Climbing 3-5 steps with a railing? 3  -BASSAM     To walk in hospital room? 3  -BASSAM     AM-PAC 6 Clicks Score (PT) 18  -BASSAM     Highest Level of Mobility Goal 6 --> Walk 10 steps or more  -BASSAM       Row Name 03/01/24 1347          Functional Assessment    Outcome Measure Options AM-PAC 6 Clicks Basic Mobility (PT)  -ZB               User Key  (r) = Recorded By, (t) = Taken By, (c) = Cosigned By      Initials Name Provider Type    Elsy Clifton RN Registered Nurse    Usha Grya RN Registered Nurse    YANI  "Armaan Jiang, PT Physical Therapist                                   PT Recommendation and Plan     Plan of Care Reviewed With: patient  Progress: no change  Outcome Evaluation: Patient in bed on arrival and agreeable to therapy this AM. He performed bed mobility [place], STS from [place] c[facility]-[place], and took small steps bed>recliner before requesting to sit due to reports of feeling \"light headed.\" Patient sat for extended period of time with no improvement. BP in sitting read 98/62 and following 1 min in standing BP read 56/37 after multiple attempts by the monitor to take measurement. Patient returned to recliner and was reclined with feet elevated. BP cuff repositioned with arm resting on pillow and read 105/67 with reports of improving dizziness. NSG notified. Deferred additional mobility this AM, patient encouraged to sit in recliner as tolerated. PT will continue to monitor and progress as able.     Time Calculation:         PT Charges       Row Name 03/01/24 1348             Time Calculation    Start Time 1017  -ZB      Stop Time 1045  -ZB      Time Calculation (min) 28 min  -ZB      PT Received On 03/01/24  -ZB      PT - Next Appointment 03/02/24  -ZB         Time Calculation- PT    Total Timed Code Minutes- PT 25 minute(s)  -ZB         Timed Charges    01360 - PT Therapeutic Activity Minutes 25  -ZB         Total Minutes    Timed Charges Total Minutes 25  -ZB       Total Minutes 25  -ZB                User Key  (r) = Recorded By, (t) = Taken By, (c) = Cosigned By      Initials Name Provider Type    ZB Armaan Jiang, ABELARDO Physical Therapist                  Therapy Charges for Today       Code Description Service Date Service Provider Modifiers Qty    [id number]  PT THERAPEUTIC ACT EA 15 MIN 3/1/2024 Armaan Jiang, PT GP 2            PT G-Codes  Outcome Measure Options: AM-PAC 6 Clicks Basic Mobility (PT)  AM-PAC 6 Clicks Score (PT): 17       Brook Jiang PT  3/1/2024    " Protocol For Uva1: The patient received UVA1.

## 2024-03-02 ENCOUNTER — TRANSCRIBE ORDERS (OUTPATIENT)
Dept: HOME HEALTH SERVICES | Facility: HOME HEALTHCARE | Age: 79
End: 2024-03-02
Payer: MEDICARE

## 2024-03-02 ENCOUNTER — APPOINTMENT (OUTPATIENT)
Dept: GENERAL RADIOLOGY | Facility: HOSPITAL | Age: 79
DRG: 200 | End: 2024-03-02
Payer: MEDICARE

## 2024-03-02 ENCOUNTER — READMISSION MANAGEMENT (OUTPATIENT)
Dept: CALL CENTER | Facility: HOSPITAL | Age: 79
End: 2024-03-02
Payer: MEDICARE

## 2024-03-02 ENCOUNTER — DOCUMENTATION (OUTPATIENT)
Dept: HOME HEALTH SERVICES | Facility: HOME HEALTHCARE | Age: 79
End: 2024-03-02
Payer: MEDICARE

## 2024-03-02 ENCOUNTER — HOME HEALTH ADMISSION (OUTPATIENT)
Dept: HOME HEALTH SERVICES | Facility: HOME HEALTHCARE | Age: 79
End: 2024-03-02
Payer: COMMERCIAL

## 2024-03-02 VITALS
HEIGHT: 70 IN | TEMPERATURE: 97.9 F | HEART RATE: 89 BPM | RESPIRATION RATE: 18 BRPM | DIASTOLIC BLOOD PRESSURE: 68 MMHG | OXYGEN SATURATION: 95 % | BODY MASS INDEX: 34.97 KG/M2 | SYSTOLIC BLOOD PRESSURE: 113 MMHG | WEIGHT: 244.27 LBS

## 2024-03-02 DIAGNOSIS — S22.41XA CLOSED FRACTURE OF MULTIPLE RIBS OF RIGHT SIDE, INITIAL ENCOUNTER: Primary | ICD-10-CM

## 2024-03-02 LAB
ALBUMIN SERPL-MCNC: 2.9 G/DL (ref 3.5–5.2)
ALBUMIN/GLOB SERPL: 1 G/DL
ALP SERPL-CCNC: 64 U/L (ref 39–117)
ALT SERPL W P-5'-P-CCNC: 15 U/L (ref 1–41)
ANION GAP SERPL CALCULATED.3IONS-SCNC: 11.5 MMOL/L (ref 5–15)
AST SERPL-CCNC: 38 U/L (ref 1–40)
BASOPHILS # BLD AUTO: 0.02 10*3/MM3 (ref 0–0.2)
BASOPHILS NFR BLD AUTO: 0.5 % (ref 0–1.5)
BILIRUB SERPL-MCNC: 0.6 MG/DL (ref 0–1.2)
BUN SERPL-MCNC: 16 MG/DL (ref 8–23)
BUN/CREAT SERPL: 14.8 (ref 7–25)
CALCIUM SPEC-SCNC: 7.8 MG/DL (ref 8.6–10.5)
CHLORIDE SERPL-SCNC: 106 MMOL/L (ref 98–107)
CO2 SERPL-SCNC: 24.5 MMOL/L (ref 22–29)
CREAT SERPL-MCNC: 1.08 MG/DL (ref 0.76–1.27)
DEPRECATED RDW RBC AUTO: 50.7 FL (ref 37–54)
EGFRCR SERPLBLD CKD-EPI 2021: 69.4 ML/MIN/1.73
EOSINOPHIL # BLD AUTO: 0.17 10*3/MM3 (ref 0–0.4)
EOSINOPHIL NFR BLD AUTO: 4.7 % (ref 0.3–6.2)
ERYTHROCYTE [DISTWIDTH] IN BLOOD BY AUTOMATED COUNT: 12.8 % (ref 12.3–15.4)
GLOBULIN UR ELPH-MCNC: 3 GM/DL
GLUCOSE SERPL-MCNC: 92 MG/DL (ref 65–99)
HCT VFR BLD AUTO: 28.7 % (ref 37.5–51)
HGB BLD-MCNC: 9.9 G/DL (ref 13–17.7)
LYMPHOCYTES # BLD AUTO: 0.66 10*3/MM3 (ref 0.7–3.1)
LYMPHOCYTES NFR BLD AUTO: 18.1 % (ref 19.6–45.3)
MCH RBC QN AUTO: 37.6 PG (ref 26.6–33)
MCHC RBC AUTO-ENTMCNC: 34.5 G/DL (ref 31.5–35.7)
MCV RBC AUTO: 109.1 FL (ref 79–97)
MONOCYTES # BLD AUTO: 0.53 10*3/MM3 (ref 0.1–0.9)
MONOCYTES NFR BLD AUTO: 14.6 % (ref 5–12)
NEUTROPHILS NFR BLD AUTO: 2.24 10*3/MM3 (ref 1.7–7)
NEUTROPHILS NFR BLD AUTO: 61.6 % (ref 42.7–76)
PLATELET # BLD AUTO: 133 10*3/MM3 (ref 140–450)
PMV BLD AUTO: 9.2 FL (ref 6–12)
POTASSIUM SERPL-SCNC: 3.8 MMOL/L (ref 3.5–5.2)
PROT SERPL-MCNC: 5.9 G/DL (ref 6–8.5)
RBC # BLD AUTO: 2.63 10*6/MM3 (ref 4.14–5.8)
SODIUM SERPL-SCNC: 142 MMOL/L (ref 136–145)
WBC NRBC COR # BLD AUTO: 3.64 10*3/MM3 (ref 3.4–10.8)

## 2024-03-02 PROCEDURE — 99232 SBSQ HOSP IP/OBS MODERATE 35: CPT | Performed by: STUDENT IN AN ORGANIZED HEALTH CARE EDUCATION/TRAINING PROGRAM

## 2024-03-02 PROCEDURE — 71045 X-RAY EXAM CHEST 1 VIEW: CPT

## 2024-03-02 PROCEDURE — 85025 COMPLETE CBC W/AUTO DIFF WBC: CPT | Performed by: INTERNAL MEDICINE

## 2024-03-02 PROCEDURE — 80053 COMPREHEN METABOLIC PANEL: CPT | Performed by: INTERNAL MEDICINE

## 2024-03-02 RX ADMIN — ALLOPURINOL 100 MG: 100 TABLET ORAL at 09:35

## 2024-03-02 RX ADMIN — Medication 10 ML: at 11:51

## 2024-03-02 RX ADMIN — MEMANTINE HYDROCHLORIDE 20 MG: 10 TABLET, FILM COATED ORAL at 06:32

## 2024-03-02 RX ADMIN — ACETAMINOPHEN 325MG 650 MG: 325 TABLET ORAL at 09:43

## 2024-03-02 RX ADMIN — LEVOTHYROXINE SODIUM 75 MCG: 75 TABLET ORAL at 06:32

## 2024-03-02 RX ADMIN — ATORVASTATIN CALCIUM 80 MG: 80 TABLET, FILM COATED ORAL at 09:35

## 2024-03-02 RX ADMIN — FUROSEMIDE 20 MG: 20 TABLET ORAL at 09:35

## 2024-03-02 NOTE — DISCHARGE SUMMARY
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          Consults       Date and Time Order Name Status Description    2/27/2024  6:33 PM [facility] (on-call MD unless specified) Details      2/27/2024  6:19 PM Inpatient Thoracic Surgery Consult Completed     2/23/2024 11:54 PM Inpatient Thoracic Surgery Consult Completed           Hospital Course  79 y.o. male with past medical history significant for type 2 diabetes mellitus, chronic kidney disease, hypertension, DVT, PE, dementia, GERD admitted to the hospital with right-sided rib fractures, right-sided pneumothorax, patient had chest tube placement done by cardiothoracic surgery.  Patient has done well over the last few days, his chest tube has been removed.  He is currently on room air.  There is no evidence of pneumothorax on chest imaging.  Patient has been cleared by cardiothoracic surgery for discharge.  I would assume it is safe for him to resume his anticoagulation, he will closely follow-up with his PCP in 7 days.  I have seen and examined patient at bedside, total time spent on discharge management is more than 30 minutes.        Temp:  [97.4 °F (36.3 °C)-98.1 °F (36.7 °C)] 97.9 °F (36.6 °C)  Heart Rate:  [82-91]  89  Resp:  [16-18] 18  BP: (106-123)/(68-71) 113/68  Body mass index is 35.05 kg/m².    Physical Exam  General, awake and alert.  Head and ENT, normocephalic and atraumatic.  Lungs, symmetric expansion, equal air entry bilaterally.  Heart, regular rate and rhythm.  Abdomen, soft and nontender.  Extremities, no clubbing or cyanosis.  Neuro, no focal deficits.  Skin: Warm and no rash.  Psych, normal mood and affect.  Musculoskeletal, joint examination is grossly normal.      Disposition: Home or Self Care       Discharge Medications        Continue These Medications        Instructions Start Date   acetaminophen 325 MG tablet  Commonly known as: TYLENOL   650 mg, Oral, Every 6 Hours PRN      albuterol sulfate  (90 Base) MCG/ACT inhaler  Commonly known as: PROVENTIL HFA;VENTOLIN HFA;PROAIR HFA   2 puffs, Inhalation, Every 4 Hours PRN      allopurinol 100 MG tablet  Commonly known as: ZYLOPRIM   100 mg, Oral, Daily      ammonium lactate 12 % cream  Commonly known as: AMLACTIN   No dose, route, or frequency recorded.      apixaban 2.5 MG tablet tablet  Commonly known as: Eliquis   2.5 mg, Oral, Every 12 Hours      aspirin 81 MG EC tablet   81 mg, Oral, Daily, HOLDING FOR SURGERY       atorvastatin 80 MG tablet  Commonly known as: LIPITOR   80 mg, Oral, Daily      cholecalciferol 25 MCG (1000 UT) tablet  Commonly known as: VITAMIN D3   1,000 Units, Oral, Daily      fluorouracil 5 % cream  Commonly known as: EFUDEX   Topical, 2 Times Daily      furosemide 20 MG tablet  Commonly known as: LASIX   20 mg, Oral, Daily      levothyroxine 75 MCG tablet  Commonly known as: SYNTHROID, LEVOTHROID   75 mcg, Oral, Every Morning      memantine 10 MG tablet  Commonly known as: NAMENDA   TAKE TWO TABLETS BY MOUTH EVERY MORNING      multivitamin with minerals tablet tablet   1 tablet, Oral, Daily, HOLD FOR SURGERY      pioglitazone 30 MG tablet  Commonly known as: ACTOS   30 mg, Oral, Daily      POTASSIUM PO   Oral, Dose unknown       VITAMIN B 12 PO   1,000 mg, Oral, Every Morning      Xtandi 40 MG tablet tablet  Generic drug: enzalutamide   160 mg, Oral, Daily                Additional Instructions for the Follow-ups that You Need to Schedule       Discharge Follow-up with PCP   As directed       Currently Documented PCP:    Jose Fonseca MD    PCP Phone Number:    [phone]     Follow Up Details: Follow-up with PCP in 7 days.               Contact information for follow-up providers       Jose Fonseca MD .    Specialty: Family Medicine  Why: Follow-up with PCP in 7 days.  Contact information:  [address] [facility] [address]  [phone]                       Contact information for after-discharge care       Home Medical Care       [facility] .    Service: Home Health Services  Contact information:  [address]  [facility] [address] [phone]                                  Future Appointments   Date Time Provider Department Center   3/28/2024  1:00 PM Lani Rosenberg DNP, [facility]   5/15/2024 10:00 AM [facility]   5/21/2024 11:00 AM Jose Fonseca MD [facility]        Ariel Novak MD  [facility]  03/02/24    Discharge time spent greater than 30 minutes.

## 2024-03-02 NOTE — OUTREACH NOTE
Prep Survey      Flowsheet Row Responses   [facility] patient discharged from? [facility]   Is LACE score < 7 ? No   Eligibility [facility]   Date of Admission 02/27/24   Date of Discharge 03/02/24   Discharge Disposition Home-Health Care Svc   Discharge diagnosis Pneumothorax   Does the patient have one of the following disease processes/diagnoses(primary or secondary)? Other   Does the patient have Home health ordered? Yes   [facility] the [facility]   Is there a DME ordered? No   Comments regarding appointments f/u with Thoracic Surgery, Lani Rosenberg on 3/28/24.   Prep survey completed? Yes            Saniya TEJADA - Registered Nurse

## 2024-03-02 NOTE — PROGRESS NOTES
[facility] will follow post hospital as requested. Patient/spouse agreeable to services. Contact information and PCP informed. Verbal order received from Shayna with PCP, Dr. Fonseca, for home health care.

## 2024-03-02 NOTE — PLAN OF CARE
Goal Outcome Evaluation:      VSS. RA. R CT clamped. Drx CDI, no subQ air. AM CXR. R side brusing & R FA skin tear wrapped in kerlex. AxO x3-4, forgetful to situation and place at times. Son @ bedside. Possible plans to D[place] back [place] to [place] @ [place] [facility].

## 2024-03-02 NOTE — PROGRESS NOTES
"    Chief Complaint: Right pneumothorax, right rib fractures  S/P: CT-guided chest tube placement, right  POD # 3    Subjective:  Symptoms:  Improved.  No shortness of breath or cough.    Diet:  Adequate intake.  No nausea or vomiting.    Activity level: Impaired due to weakness.    Pain:  He reports no pain.    No events noted.  Denies shortness of breath.  Chest tube has been clamped for 24 hours.  A.m. chest x-ray does not appreciate pneumothorax.    Vital Signs:  Temp:  [97.4 °F (36.3 °C)-98.1 °F (36.7 °C)] 97.9 °F (36.6 °C)  Heart Rate:  [82-91] 89  Resp:  [16-18] 18  BP: (105-123)/(67-71) 113/68    Intake & Output (last day)         03/01 0701  03/02 0700 03/02 0701  03/03 0700    P.O. 480     Total Intake(mL/kg) 480 (4.3)     Urine (mL/kg/hr) 150 (0.1)     Stool 0     Chest Tube 0     Total Output 150     Net +330           Urine Unmeasured Occurrence 3 x     Stool Unmeasured Occurrence 1 x             Objective:  General Appearance:  Comfortable, in no acute distress and well-appearing.    Vital signs: (most recent): Blood pressure 113/68, pulse 89, temperature 97.9 °F (36.6 °C), temperature source Oral, resp. rate 18, height 177.8 cm (70\"), weight 111 kg (244 lb 4.3 oz), SpO2 95%.    Lungs:  Normal effort and normal respiratory rate.  He is not in respiratory distress.    Heart: Normal rate.  Regular rhythm.    Chest: Symmetric chest wall expansion. Chest wall tenderness (Around chest tube) present.    Abdomen: Abdomen is soft and non-distended.    Neurological: Patient is alert and oriented to person, place and time.    Skin:  Warm and dry.                Chest tube:   Site: Right, Clean, Dry, Intact, and Securement device intact  Suction: Waterseal  Air Leak: Negative  No airleak noted when taken off of clamp trial.  Good titling.    Results Review:     I reviewed the patient's new clinical results.  I reviewed the patient's new imaging results and agree with the interpretation.  Discussed with patient, " nurse    Imaging Results (Last 24 Hours)       Procedure Component Value Units Date/Time    XR Chest 1 View [[id number]] Collected: 03/02/24 0647     Updated: 03/02/24 0651    Narrative:      ONE-VIEW PORTABLE CHEST AT 5:36 A.M.     HISTORY: Right-sided chest tube. Pneumothorax.     FINDINGS: A small gauge pigtail chest tube is present at the right base  without change from yesterday and there is no evidence of pneumothorax.  The lungs are clear. The heart remains mildly enlarged.     This report was finalized on 3/2/2024 6:48 AM by Dr. Rex Rodriguez M.D  on Workstation: [id number]       XR Chest 1 View [[id number]] Collected: 03/01/24 1509     Updated: 03/01/24 1513    Narrative:      XR CHEST 1 VW-     Clinical: Chest tube management     COMPARISON examination 3/1/2024 at 5:28 a.m., current examination at  3:27 p.m.     FINDINGS: Right lung base pigtail chest tube in position as before. No  pneumothorax. Lungs are clear. No effusion or edema. The  cardiomediastinal silhouette is stable. No other interval change has  occurred.     CONCLUSION: Stable chest     This report was finalized on 3/1/2024 3:09 PM by Dr. Matheus Segura M.D  on Workstation: [id number]               Lab Results:     Lab Results (last 24 hours)       Procedure Component Value Units Date/Time    CBC & Differential [[id number]]  (Abnormal) Collected: 03/02/24 0550    Specimen: Blood Updated: 03/02/24 0705    Narrative:      The following orders were created for panel order CBC & Differential.  Procedure                               Abnormality         Status                     ---------                               -----------         ------                     CBC Auto Differential[[id number]]        Abnormal            Final result                 Please view results for these tests on the individual orders.    CBC Auto Differential [[id number]]  (Abnormal) Collected: 03/02/24 0550    Specimen: Blood Updated: 03/02/24 0705     WBC 3.64 10*3/mm3       RBC 2.63 10*6/mm3      Hemoglobin 9.9 g/dL      Hematocrit 28.7 %      .1 fL      MCH 37.6 pg      MCHC 34.5 g/dL      RDW 12.8 %      RDW-SD 50.7 fl      MPV 9.2 fL      Platelets 133 10*3/mm3      Neutrophil % 61.6 %      Lymphocyte % 18.1 %      Monocyte % 14.6 %      Eosinophil % 4.7 %      Basophil % 0.5 %      Neutrophils, Absolute 2.24 10*3/mm3      Lymphocytes, Absolute 0.66 10*3/mm3      Monocytes, Absolute 0.53 10*3/mm3      Eosinophils, Absolute 0.17 10*3/mm3      Basophils, Absolute 0.02 10*3/mm3     Comprehensive Metabolic Panel [[id number]]  (Abnormal) Collected: 03/02/24 0550    Specimen: Blood Updated: 03/02/24 0703     Glucose 92 mg/dL      BUN 16 mg/dL      Creatinine 1.08 mg/dL      Sodium 142 mmol/L      Potassium 3.8 mmol/L      Chloride 106 mmol/L      CO2 24.5 mmol/L      Calcium 7.8 mg/dL      Total Protein 5.9 g/dL      Albumin 2.9 g/dL      ALT (SGPT) 15 U/L      AST (SGOT) 38 U/L      Alkaline Phosphatase 64 U/L      Total Bilirubin 0.6 mg/dL      Globulin 3.0 gm/dL      A/G Ratio 1.0 g/dL      BUN/Creatinine Ratio 14.8     Anion Gap 11.5 mmol/L      eGFR 69.4 mL/min/1.73     Narrative:      GFR Normal >60  Chronic Kidney Disease <60  Kidney Failure <15    The GFR formula is only valid for adults with stable renal function between ages 18 and 70.             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 Denies any shortness of breath.  He is on room air.  A.m. chest x-ray reviewed which demonstrates no pneumothorax.  No obvious leak on exam.     He has tolerated clamp trial over the last 24 hours.  No signs of pneumothorax on a.m. chest x-ray.  He had no rush of air when taken off of clamp trial this morning on physical exam.  I removed his chest tube at bedside this morning.  From a thoracic surgery standpoint it is safe for discharge.    Skip Wall MD PhD  [facility]  03/02/24  10:02 EST    Greater than 35 minutes was spent reviewing the patient's chart, radiographic imaging, labs, provider notes, assessing the patient and developing a plan of care.  This was discussed with the patient and RN.

## 2024-03-02 NOTE — PLAN OF CARE
Problem: Adult Inpatient Plan of Care  Goal: Plan of Care Review  Outcome: Met  Goal: Patient-Specific Goal (Individualized)  Outcome: Met  Goal: Absence of Hospital-Acquired Illness or Injury  Outcome: Met  Goal: Optimal Comfort and Wellbeing  Outcome: Met  Intervention: Provide Person-Centered Care  Recent Flowsheet Documentation  Taken 3/2/2024 0943 by Key Cope, RN  Trust Relationship/Rapport:   care explained   emotional support provided   choices provided   empathic listening provided   questions answered  Taken 3/2/2024 0804 by Key Cope RN  Trust Relationship/Rapport:   care explained   choices provided   questions answered   questions encouraged   reassurance provided   emotional support provided  Goal: Readiness for Transition of Care  Outcome: Met     Problem: Fall Injury Risk  Goal: Absence of Fall and Fall-Related Injury  Outcome: Met     Problem: Skin Injury Risk Increased  Goal: Skin Health and Integrity  Outcome: Met   Goal Outcome Evaluation:

## 2024-03-03 NOTE — PAYOR COMM NOTE
"Bryan Patel DENILSON (79 y.o. Male)      PATIENT DISCHARGED 03/02/2024:  REF# [id number]       DEPT: [phone],  [phone]     [facility]: NPI [id number] [facility]# [id number]     KATHARINE PUGA RN,[facility]       Date of Birth   1945    Social Security Number       Address   [address] [place] [address] [address] [place], [place] [address]    Home Phone   [phone]    MRN   [id number]       Hoahaoism   None    Marital Status                               Admission Date   2/27/24    Admission Type   Emergency    Admitting Provider   Beck Avila MD    Attending Provider       Department, Room/Bed   [facility], E551/1       Discharge Date   3/2/2024    Discharge Disposition   Home or Self Care    Discharge Destination                                 Attending Provider: (none)   Allergies: Nsaids, Aricept [Donepezil]    Isolation: None   Infection: None   Code Status: Prior    Ht: 177.8 cm (70\")   Wt: 111 kg (244 lb 4.3 oz)    Admission Cmt: None   Principal Problem: Pneumothorax [J93.9]                   Active Insurance as of 2/27/2024       Primary Coverage       Payor Plan Insurance Group Employer/Plan Group    AETNA MEDICARE REPLACEMENT AETNA MED ADV PPO [id number]       Payor Plan Address Payor Plan Phone Number Payor Plan Fax Number Effective Dates    [address] [phone]  1/1/2024 - None Entered    [place] [address]         Subscriber Name Subscriber Birth Date Member ID       BRYAN PATEL 1945 [id number]                     Emergency Contacts        (Rel.) Home Phone Work Phone Mobile Phone    Chloe Patel (Spouse) [phone] -- [phone]    Isi Ellison (Daughter) [phone] -- [phone]    JORGESTAN (Son) [phone] -- [phone]                 Discharge Summary        Ariel Novak MD at 03/02/24 [address] [place] [address] [facility]               " [facility]    Date of Discharge:  3/2/2024    PCP: Jose Fonseca MD    Discharge Diagnosis:   Active Hospital Problems    Diagnosis  POA    **Pneumothorax [J93.9]  Yes    Fracture of multiple ribs of right side [S22.41XA]  Unknown    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          Consults       Date and Time Order Name Status Description    2/27/2024  6:33 PM [facility] (on-call MD unless specified) Details      2/27/2024  6:19 PM Inpatient Thoracic Surgery Consult Completed     2/23/2024 11:54 PM Inpatient Thoracic Surgery Consult Completed           Hospital Course  79 y.o. male with past medical history significant for type 2 diabetes mellitus, chronic kidney disease, hypertension, DVT, PE, dementia, GERD admitted to the hospital with right-sided rib fractures, right-sided pneumothorax, patient had chest tube placement done by cardiothoracic surgery.  Patient has done well over the last few days, his chest tube has been removed.  He is currently on room air.  There is no evidence of pneumothorax on chest imaging.  Patient has been cleared by cardiothoracic surgery for discharge.  I would assume it is safe for him to resume his anticoagulation, he will closely follow-up with his PCP in 7 days.  I have seen and examined patient at bedside, total time spent on discharge management is more than 30 minutes.        Temp:  [97.4 °F (36.3 °C)-98.1 °F (36.7 °C)] 97.9 °F (36.6 °C)  Heart Rate:  [82-91] 89  Resp:  [16-18] 18  BP: (106-123)/(68-71) 113/68  Body  mass index is 35.05 kg/m².    Physical Exam  General, awake and alert.  Head and ENT, normocephalic and atraumatic.  Lungs, symmetric expansion, equal air entry bilaterally.  Heart, regular rate and rhythm.  Abdomen, soft and nontender.  Extremities, no clubbing or cyanosis.  Neuro, no focal deficits.  Skin: Warm and no rash.  Psych, normal mood and affect.  Musculoskeletal, joint examination is grossly normal.      Disposition: Home or Self Care       Discharge Medications        Continue These Medications        Instructions Start Date   acetaminophen 325 MG tablet  Commonly known as: TYLENOL   650 mg, Oral, Every 6 Hours PRN      albuterol sulfate  (90 Base) MCG/ACT inhaler  Commonly known as: PROVENTIL HFA;VENTOLIN HFA;PROAIR HFA   2 puffs, Inhalation, Every 4 Hours PRN      allopurinol 100 MG tablet  Commonly known as: ZYLOPRIM   100 mg, Oral, Daily      ammonium lactate 12 % cream  Commonly known as: AMLACTIN   No dose, route, or frequency recorded.      apixaban 2.5 MG tablet tablet  Commonly known as: Eliquis   2.5 mg, Oral, Every 12 Hours      aspirin 81 MG EC tablet   81 mg, Oral, Daily, HOLDING FOR SURGERY       atorvastatin 80 MG tablet  Commonly known as: LIPITOR   80 mg, Oral, Daily      cholecalciferol 25 MCG (1000 UT) tablet  Commonly known as: VITAMIN D3   1,000 Units, Oral, Daily      fluorouracil 5 % cream  Commonly known as: EFUDEX   Topical, 2 Times Daily      furosemide 20 MG tablet  Commonly known as: LASIX   20 mg, Oral, Daily      levothyroxine 75 MCG tablet  Commonly known as: SYNTHROID, LEVOTHROID   75 mcg, Oral, Every Morning      memantine 10 MG tablet  Commonly known as: NAMENDA   TAKE TWO TABLETS BY MOUTH EVERY MORNING      multivitamin with minerals tablet tablet   1 tablet, Oral, Daily, HOLD FOR SURGERY      pioglitazone 30 MG tablet  Commonly known as: ACTOS   30 mg, Oral, Daily      POTASSIUM PO   Oral, Dose unknown      VITAMIN B 12 PO   1,000 mg, Oral, Every Morning       Xtandi 40 MG tablet tablet  Generic drug: enzalutamide   160 mg, Oral, Daily                Additional Instructions for the Follow-ups that You Need to Schedule       Discharge Follow-up with PCP   As directed       Currently Documented PCP:    Jose Fonseca MD    PCP Phone Number:    [phone]     Follow Up Details: Follow-up with PCP in 7 days.               Contact information for follow-up providers       Jose Fonseca MD .    Specialty: Family Medicine  Why: Follow-up with PCP in 7 days.  Contact information:  [address]  [place] [address]  [phone]                       Contact information for after-discharge care       Home Medical Care       [facility] .    Service: Home Health Services  Contact information:  [address]  [facility] [address] [phone]                                  Future Appointments   Date Time Provider Department Center   3/28/2024  1:00 PM Lani Rosenberg DNP, APRN [facility]   5/15/2024 10:00 AM [facility]   5/21/2024 11:00 AM Jose Fonseca MD [facility]        Ariel Novak MD  [facility]  03/02/24    Discharge time spent greater than 30 minutes.    Electronically signed by Ariel Novak MD at 03/02/24 1113       Discharge Order (From admission, onward)       Start     Ordered    03/02/24 1112  Discharge patient  Once        Expected Discharge Date: 03/02/24   Discharge Disposition: Home or Self Care   Physician of Record for Attribution - Please select from Treatment Team: ARIEL NOVAK [[id number]]   Review needed by CMO to determine Physician of Record: No      Question Answer Comment   Physician of Record for Attribution - Please select from Treatment Team ARIEL NOVAK    Review needed by CMO to determine Physician of Record No        03/02/24 1112

## 2024-03-04 ENCOUNTER — TRANSITIONAL CARE MANAGEMENT TELEPHONE ENCOUNTER (OUTPATIENT)
Dept: CALL CENTER | Facility: HOSPITAL | Age: 79
End: 2024-03-04
Payer: MEDICARE

## 2024-03-04 ENCOUNTER — HOME CARE VISIT (OUTPATIENT)
Dept: HOME HEALTH SERVICES | Facility: HOME HEALTHCARE | Age: 79
End: 2024-03-04
Payer: COMMERCIAL

## 2024-03-04 VITALS
WEIGHT: 230 LBS | BODY MASS INDEX: 33 KG/M2 | TEMPERATURE: 97.3 F | HEART RATE: 82 BPM | OXYGEN SATURATION: 99 % | DIASTOLIC BLOOD PRESSURE: 62 MMHG | SYSTOLIC BLOOD PRESSURE: 122 MMHG | RESPIRATION RATE: 18 BRPM

## 2024-03-04 PROCEDURE — G0299 HHS/HOSPICE OF RN EA 15 MIN: HCPCS

## 2024-03-04 NOTE — Clinical Note
Good evening,    Please clarify the following medications:    Taking K citrate 99mg day, DC lists POTASSIUM by mouth/dose UNKNOWN  Taking lisinopril 20mg daily, NOT on DC summary  Taking Furosemide 20mg EVERY OTHER day, DC summary lists 20mg DAILY    Also, patient could likely benefit from OT eval and SLP eval for cognition, may I place orders?    Thank you,    ANKUR FREY RN  [facility]  [phone] C  [phone] O

## 2024-03-04 NOTE — OUTREACH NOTE
Call Center TCM Note      Flowsheet Row Responses   [facility] patient discharged from? [place]   Does the patient have one of the following disease processes/diagnoses(primary or secondary)? Other   TCM attempt successful? No   Unsuccessful attempts Attempt 1   Call Status Left message            Sun White MA    3/4/2024, 12:33 EST

## 2024-03-04 NOTE — Clinical Note
"SOC Note: 3/4/24; spouse present         78 yo male, patient of Dr. Fonseca.  Hospitalized 2/23-2/27 and again from 2/27 PM to 3/2 for complications rt sided rib fxs, right sided skin tears, and a right sided pneumothorax s/p fall.  Patient and spouse moved into assisted living facility few days prior to admission.  Patient left  [facility] in family car and became lost.  He has dementia and unexpectedly \"took\" the car.  While staff and family were trying to locate patient the daughter was able to reach patient by phone, he was able to provide landmarks, and directed her to his location.  Spouse reports he was near the former residence and found down next to car in a parking lot of an apartment complex.  A resident offered assistance, he declined stating he was fine and family was coming.  Resident assumed family resided in the complex.  Resident went back to her car approximately 1 hour later and Mr. Landers was still on the ground.  She assisted patient, daughter arrived a few minutes after, he was transported to .  Patient discharged 2/27, patient was called back to ED for readmission the same evening following review of CXR from earlier in the day, then discharged 3/2.      PMH includes factor V leiden carrier, chronic bilateral DVTs, chronic PE, hypothyroidism, CKD 3B, DM type 2, htn, dementia, gerd.  Patient AAO to self, person, place.  BIMS 4.  [place]. Spouse present for visit, provides clarification for inconsistent/incorrect responses.  Patient reports continued mid-right chest discomfort s/p fall.  Scattered, healing, skin tears on right arm.  Right knuckle skin tear scabbed.  Coccyx pink and scaly, blanchable.  Sn discussed skin protections measures.  He reports fatigue, muscle weakness, and mild SOA when ambulating 10-20 feet.  SN assessed patient walk from cafe back to room, approximately 100ft, he seemed to be doing reasonable well with mild SOA.ly SOA. He reports SOA depends on how fatigue level.  He also " "reports intermittent dizziness while standing.  Multiple falls over the last several months.  Daughter/POA lives in town, able to assist.  Meds delivered by pharmacy.  Son resides out of town.  He reports constipation, unable to determine last BM date.  Advised spouse to monitor and report if no BM in the next 1-2 days, or if he becomes symptomatic.  Spouse manages meds.  Several medication inconsistencies. Messages left for Dr. Roper's nurse, case communication also sent requesting med clarification, OT eval, and SLP eval for cognitive decline, request for f/u appt.       Plan for SN 2w2, 1w2, PT eval        Requested OT and SLP evals    Med clarifications requested:  Taking K citrate 99mg day, DC lists POTASSIUM by mouth/dose UNKNOWN  Taking lisinopril 20mg daily, NOT on DC summary  Taking Furosemide 20mg EVERY OTHER day, DC summary lists 20mg DAILY    Reason for Hosp/Primary Dx/Co-morbidities:  Right rib fractures/s/p right pneumothorax    Focus of Care: Aftercare for right rib fractures and s/p right pneumothorax related to fall    Patient's goal(s): \"walk more steady\"    Current Functional status/mobility/DME: uses walker/rollator, requires moderate assistance    HB status/Living Arrangements: with spouse in custodial    Skin Integrity/wound status: right arm skin tears, scales/pink on buttocks/blanchable, right flank ecchymosis    Code Status: FULL/CPR.  Spouse thought DNR only meant in the hospital.  She plans to call  to discuss.    Fall Risk/Safety concerns: High risk    Educated on Emergency Plan, steps to take prior to going to the ER and when to Call Home Health First:  Yes     SDOH Barriers/ need for MSW: No    Plan for next visit: SN assess/instruct; wound assess    ANKUR FREY RN  [facility]  [phone] C  [phone] O"

## 2024-03-04 NOTE — OUTREACH NOTE
Call Center TCM Note      Flowsheet Row Responses   [facility] patient discharged from? [place]   Does the patient have one of the following disease processes/diagnoses(primary or secondary)? Other   TCM attempt successful? No   Unsuccessful attempts Attempt 2            Sun White MA    3/4/2024, 16:13 EST

## 2024-03-04 NOTE — HOME HEALTH
"SOC Note: 3/4/24; spouse present         80 yo male, patient of Dr. Fonseca.  Hospitalized 2/23-2/27 and again from 2/27 PM to 3/2 for complications rt sided rib fxs, right sided skin tears, and a right sided pneumothorax s/p fall.  Patient and spouse moved into assisted living facility few days prior to admission.  Patient left  [facility] in family car and became lost.  He has dementia and unexpectedly \"took\" the car.  While staff and family were trying to locate patient the daughter was able to reach patient by phone, he was able to provide landmarks, and directed her to his location.  Spouse reports he was near the former residence and found down next to car in a parking lot of an apartment complex.  A resident offered assistance, he declined stating he was fine and family was coming.  Resident assumed family resided in the complex.  Resident went back to her car approximately 1 hour later and Mr. Landers was still on the ground.  She assisted patient, daughter arrived a few minutes after, he was transported to .  Patient discharged 2/27, patient was called back to ED for readmission the same evening following review of CXR from earlier in the day, then discharged 3/2.      PMH includes factor V leiden carrier, chronic bilateral DVTs, chronic PE, hypothyroidism, CKD 3B, DM type 2, htn, dementia, gerd.  Patient AAO to self, person, place.  BIMS 4.  [place]. Spouse present for visit, provides clarification for inconsistent/incorrect responses.  Patient reports continued mid-right chest discomfort s/p fall.  Scattered, healing, skin tears on right arm.  Right knuckle skin tear scabbed.  Coccyx pink and scaly, blanchable.  Sn discussed skin protections measures.  He reports fatigue, muscle weakness, and mild SOA when ambulating 10-20 feet.  SN assessed patient walk from cafe back to room, approximately 100ft, he seemed to be doing reasonable well with mild SOA.ly SOA. He reports SOA depends on how fatigue level.  He also " "reports intermittent dizziness while standing.  Multiple falls over the last several months.  Daughter/POA lives in town, able to assist.  Meds delivered by pharmacy.  Son resides out of town.  He reports constipation, unable to determine last BM date.  Advised spouse to monitor and report if no BM in the next 1-2 days, or if he becomes symptomatic.  Spouse manages meds.  Several medication inconsistencies. Messages left for Dr. Roper's nurse, case communication also sent requesting med clarification, OT eval, and SLP eval for cognitive decline, request for f/u appt.       Plan for SN 2w2, 1w2, PT eval        Requested OT and SLP evals declined by Dr. Fonseca    Med clarifications requested:  Taking K citrate 99mg day, DC lists POTASSIUM by mouth/dose UNKNOWN  Taking lisinopril 20mg daily, NOT on DC summary  Taking Furosemide 20mg EVERY OTHER day, DC summary lists 20mg DAILY    Reason for Hosp/Primary Dx/Co-morbidities:  Right rib fractures/s/p right pneumothorax    Focus of Care: Aftercare for right rib fractures and s/p right pneumothorax related to fall    Patient's goal(s): \"walk more steady\"    Current Functional status/mobility/DME: uses walker/rollator, requires moderate assistance    HB status/Living Arrangements: with spouse in assisted    Skin Integrity/wound status: right arm skin tears, scales/pink on buttocks/blanchable, right flank ecchymosis    Code Status: FULL/CPR.  Spouse thought DNR only meant in the hospital.  She plans to call  to discuss.    Fall Risk/Safety concerns: High risk    Educated on Emergency Plan, steps to take prior to going to the ER and when to Call Home Health First:  Yes     SDOH Barriers/ need for MSW: No    Plan for next visit: SN assess/instruct; wound assess    ANKUR FREY RN  [facility]  [phone] C  [phone] O"

## 2024-03-05 ENCOUNTER — TRANSITIONAL CARE MANAGEMENT TELEPHONE ENCOUNTER (OUTPATIENT)
Dept: CALL CENTER | Facility: HOSPITAL | Age: 79
End: 2024-03-05
Payer: MEDICARE

## 2024-03-05 ENCOUNTER — HOME CARE VISIT (OUTPATIENT)
Dept: HOME HEALTH SERVICES | Facility: HOME HEALTHCARE | Age: 79
End: 2024-03-05
Payer: MEDICARE

## 2024-03-05 ENCOUNTER — HOME CARE VISIT (OUTPATIENT)
Dept: HOME HEALTH SERVICES | Facility: HOME HEALTHCARE | Age: 79
End: 2024-03-05
Payer: COMMERCIAL

## 2024-03-05 ENCOUNTER — TELEPHONE (OUTPATIENT)
Dept: FAMILY MEDICINE CLINIC | Facility: CLINIC | Age: 79
End: 2024-03-05
Payer: MEDICARE

## 2024-03-05 VITALS
SYSTOLIC BLOOD PRESSURE: 130 MMHG | DIASTOLIC BLOOD PRESSURE: 66 MMHG | HEART RATE: 71 BPM | RESPIRATION RATE: 18 BRPM | TEMPERATURE: 97.6 F | OXYGEN SATURATION: 97 %

## 2024-03-05 PROCEDURE — G0151 HHCP-SERV OF PT,EA 15 MIN: HCPCS

## 2024-03-05 NOTE — TELEPHONE ENCOUNTER
Anabelle from [facility] called for med clarification.    She stated that potassium was on discharge papers but dose was unknown but he has been taking 99 mg at home daily. Needing to know what he is supposed to taking. She also stated he has been taking 20 mg furosemide every other day but med list says to take daily and finally, he is taking lisinopril but this was not in his discharge paperwork. Currently being given 20 mg daily.      Also needing orders for OT and speech for cognitive function.

## 2024-03-05 NOTE — Clinical Note
"Reason for referral/Focus of Care:  78 yo M referred to home health PT with decreased ability to transfer and amb after recent hospitalization s/p fall with right rib fractures and R pneumothorax after getting disoriented/lost.    Subjective: \"His legs are just so weak.\" per spouse    Patient's PT Goal: \"walk by myself\" per patient    Pertinent Medical History: dementia, V leiden carrier, chronic bilateral DVTs, chronic PE, hypothyroidism, CKD 3B, DM type 2, htn, dementia, gerd.   Previous level of function: amb with walker, IND with bathing/dressing. Recently moved into AFL with spouse. Eating meals in dining room. Spouse assists as needed.    Social Environment/DME/Potential Barriers for Goal Attainment:  Recently moved into AFL with spouse. Eating meals in dining room. Spouse assists as needed. Has FWW, rollator walker, grab bars, shower seat.    Skin Integrity/wound status: see wound care screen for details.    Code Status: Full    Medication issues/concerns: none identified    HB status: yes. See homebound screen for details.    Problems Identified: see Care Plan    Functional Status/Fall Risk/Safety: see PT evaluation/care plan    POC notification to  Dr. Fonseca    on  3/5/24   via case communication.    Assessment: Skilled physical therapy is needed for 2w3 due to decreased ability to transfer and amb after recent hospitalization s/p fall with rib fractures and R pneumothorax for evaluation, caregiver training, ther ex, HEP, fall prevention, home safety and pain/edema management    Plan for next visit: HEP instruction, gait training, ther ex  "

## 2024-03-05 NOTE — OUTREACH NOTE
Call Center TCM Note      Flowsheet Row Responses   [facility] patient discharged from? [place]   Does the patient have one of the following disease processes/diagnoses(primary or secondary)? Other   TCM attempt successful? No   Unsuccessful attempts Attempt 3            Lou Segura RN    3/5/2024, 14:17 EST

## 2024-03-06 ENCOUNTER — HOME CARE VISIT (OUTPATIENT)
Dept: HOME HEALTH SERVICES | Facility: HOME HEALTHCARE | Age: 79
End: 2024-03-06
Payer: COMMERCIAL

## 2024-03-06 NOTE — HOME HEALTH
"Reason for referral/Focus of Care:  80 yo M referred to home health PT with decreased ability to transfer and amb after recent hospitalization s/p fall with right rib fractures and R pneumothorax after getting disoriented/lost.    Subjective: \"His legs are just so weak.\" per spouse    Patient's PT Goal: \"walk by myself\" per patient    Pertinent Medical History: dementia, V leiden carrier, chronic bilateral DVTs, chronic PE, hypothyroidism, CKD 3B, DM type 2, htn, dementia, gerd.   Previous level of function: amb with walker, IND with bathing/dressing. Recently moved into AFL with spouse. Eating meals in dining room. Spouse assists as needed.    Social Environment/DME/Potential Barriers for Goal Attainment:  Recently moved into AFL with spouse. Eating meals in dining room. Spouse assists as needed. Has FWW, rollator walker, grab bars, shower seat.    Skin Integrity/wound status: see wound care screen for details.    Code Status: Full    Medication issues/concerns: none identified    HB status: yes. See homebound screen for details.    Problems Identified: see Care Plan    Functional Status/Fall Risk/Safety: see PT evaluation/care plan    POC notification to  Dr. Fonseca    on  3/5/24   via case communication.    Assessment: Skilled physical therapy is needed for 2w3 due to decreased ability to transfer and amb after recent hospitalization s/p fall with rib fractures and R pneumothorax for evaluation, caregiver training, ther ex, HEP, fall prevention, home safety and pain/edema management    Plan for next visit: HEP instruction, gait training, ther ex"

## 2024-03-06 NOTE — CASE COMMUNICATION
"SUPPLY ORDER PLACED WITH LC SUPPLIES FULFILLED BASED ON COVERAGE  ITEMS REQUESTED:  4\" KERLIX  4X4 GAUZE  VASELINE GAUZE  SILICONE FOAM DRESSING  SALINE WOUND CLEANSER"

## 2024-03-07 ENCOUNTER — HOME CARE VISIT (OUTPATIENT)
Dept: HOME HEALTH SERVICES | Facility: HOME HEALTHCARE | Age: 79
End: 2024-03-07
Payer: COMMERCIAL

## 2024-03-07 ENCOUNTER — HOME CARE VISIT (OUTPATIENT)
Dept: HOME HEALTH SERVICES | Facility: HOME HEALTHCARE | Age: 79
End: 2024-03-07
Payer: MEDICARE

## 2024-03-07 PROCEDURE — G0299 HHS/HOSPICE OF RN EA 15 MIN: HCPCS

## 2024-03-07 PROCEDURE — G0151 HHCP-SERV OF PT,EA 15 MIN: HCPCS

## 2024-03-08 VITALS
RESPIRATION RATE: 18 BRPM | SYSTOLIC BLOOD PRESSURE: 114 MMHG | DIASTOLIC BLOOD PRESSURE: 68 MMHG | HEART RATE: 82 BPM | TEMPERATURE: 96.6 F | OXYGEN SATURATION: 100 %

## 2024-03-08 VITALS
RESPIRATION RATE: 18 BRPM | SYSTOLIC BLOOD PRESSURE: 98 MMHG | HEART RATE: 80 BPM | DIASTOLIC BLOOD PRESSURE: 60 MMHG | OXYGEN SATURATION: 97 % | TEMPERATURE: 97.8 F

## 2024-03-08 NOTE — HOME HEALTH
"Subjective: \"I am good.\" per patient. Spouse reports that they haven't had time for patient to walk in the hallways since therapist's last visit    no new med changes  no recent falls    Skill/education provided: see interventions for details    Patient/caregiver response: see interventions for details    Assessment: patient able to progress to standing ther ex and reports improved gait after therapist elevated walker last visit    Plan for next visit: gait training, progress HEP, fall prevention"

## 2024-03-09 NOTE — HOME HEALTH
Routine Visit Note:  Spouse present; 3/7/24    Skill/education provided:   SN assessment instruction of disease mgmt, meds, pain, safety, s/s infection.  Wound care provided per order.  Legs measured for compression hose.  Daughter purchased XXL and do not fit.  Patient falls into 2-3x category.  Spouse reports daughter will ourchase 3 x with zipper.    Patient/caregiver response:   v/u    Plan for next visit:   Sn assess/instruct; wound care    Other pertinent info:   mild crackles rll, instructed to continue with I/S during commercial breaks.    ANKUR FREY RN  [facility]  [phone] C  [phone] O

## 2024-03-11 ENCOUNTER — HOME CARE VISIT (OUTPATIENT)
Dept: HOME HEALTH SERVICES | Facility: HOME HEALTHCARE | Age: 79
End: 2024-03-11
Payer: COMMERCIAL

## 2024-03-11 VITALS
RESPIRATION RATE: 18 BRPM | SYSTOLIC BLOOD PRESSURE: 112 MMHG | OXYGEN SATURATION: 100 % | DIASTOLIC BLOOD PRESSURE: 59 MMHG | HEART RATE: 67 BPM | TEMPERATURE: 96.7 F

## 2024-03-11 VITALS
RESPIRATION RATE: 18 BRPM | HEART RATE: 60 BPM | TEMPERATURE: 97.4 F | SYSTOLIC BLOOD PRESSURE: 110 MMHG | OXYGEN SATURATION: 100 % | DIASTOLIC BLOOD PRESSURE: 58 MMHG

## 2024-03-11 PROCEDURE — G0151 HHCP-SERV OF PT,EA 15 MIN: HCPCS

## 2024-03-11 PROCEDURE — G0299 HHS/HOSPICE OF RN EA 15 MIN: HCPCS

## 2024-03-11 NOTE — HOME HEALTH
"Subjective:\"He really hasn't done any exercises or walking over the weekend.\" per spouse    no new med changes  no recent falls    Skill/education provided: see interventions for details    Patient/caregiver response: see interventions for details    Assessment: patient with minimal increase in SOA with amb, poor/fair compliance     Plan for next visit: gait training, ther ex, HEP progression"

## 2024-03-12 ENCOUNTER — OFFICE VISIT (OUTPATIENT)
Dept: FAMILY MEDICINE CLINIC | Facility: CLINIC | Age: 79
End: 2024-03-12
Payer: MEDICARE

## 2024-03-12 ENCOUNTER — HOME CARE VISIT (OUTPATIENT)
Dept: HOME HEALTH SERVICES | Facility: HOME HEALTHCARE | Age: 79
End: 2024-03-12
Payer: COMMERCIAL

## 2024-03-12 ENCOUNTER — TELEPHONE (OUTPATIENT)
Dept: FAMILY MEDICINE CLINIC | Facility: CLINIC | Age: 79
End: 2024-03-12

## 2024-03-12 VITALS
WEIGHT: 252 LBS | TEMPERATURE: 97.3 F | HEART RATE: 72 BPM | HEIGHT: 70 IN | SYSTOLIC BLOOD PRESSURE: 112 MMHG | DIASTOLIC BLOOD PRESSURE: 58 MMHG | BODY MASS INDEX: 36.08 KG/M2 | OXYGEN SATURATION: 96 %

## 2024-03-12 DIAGNOSIS — Z09 HOSPITAL DISCHARGE FOLLOW-UP: ICD-10-CM

## 2024-03-12 DIAGNOSIS — D62 ACUTE BLOOD LOSS ANEMIA (ABLA): ICD-10-CM

## 2024-03-12 DIAGNOSIS — S22.41XK CLOSED FRACTURE OF MULTIPLE RIBS OF RIGHT SIDE WITH NONUNION: ICD-10-CM

## 2024-03-12 DIAGNOSIS — S27.0XXS TRAUMATIC PNEUMOTHORAX, SEQUELA: Primary | ICD-10-CM

## 2024-03-12 PROBLEM — S51.012A SKIN TEAR OF LEFT ELBOW WITHOUT COMPLICATION: Status: RESOLVED | Noted: 2018-04-23 | Resolved: 2024-03-12

## 2024-03-12 PROBLEM — Z96.643 HISTORY OF HIP REPLACEMENT, TOTAL, BILATERAL: Status: RESOLVED | Noted: 2018-12-13 | Resolved: 2024-03-12

## 2024-03-12 RX ORDER — OXYCODONE HYDROCHLORIDE AND ACETAMINOPHEN 5; 325 MG/1; MG/1
1 TABLET ORAL EVERY 6 HOURS PRN
Qty: 10 TABLET | Refills: 0 | Status: SHIPPED | OUTPATIENT
Start: 2024-03-12

## 2024-03-12 NOTE — PROGRESS NOTES
Transitional Care Follow Up Visit  Subjective     Bryan Landers is a 79 y.o. male who presents for a transitional care management visit.    Within 48 business hours after discharge our office contacted him via telephone to coordinate his care and needs.      I reviewed and discussed the details of that call along with the discharge summary, hospital problems, inpatient lab results, inpatient diagnostic studies, and consultation reports with Bryan.     Current outpatient and discharge medications have been reconciled for the patient.  Reviewed by: Jose Fonseca MD          3/2/2024     3:14 PM   Date of TCM Phone Call   [facility]   Date of Admission 2/27/2024   Date of Discharge 3/2/2024   Discharge Disposition Home-Health Care [facility] [place] [place]     Risk for Readmission (LACE) Score: 14 (3/2/2024  6:01 AM)      History of Present Illness   Course During Hospital Stay:    Mr. Landers is here in hospital follow-up.  He fell several weeks ago in a parking lot.  Apparently landing on his right ribs.  He does not remember the event very well.  He thinks he just passed out.  He woke up was able to call his family.  He was taken to [facility] ER found to have multiple traumatic right-sided rib fractures with traumatic pneumothorax.  Then transferred to [facility].  He was admitted there by the hospitalist seen by the thoracic surgeons had a chest tube placed.  Chest tube was removed.  Was sent home.  Unfortunate the follow-up chest x-ray revealed an enlarging reoccurring right-sided pneumothorax.  Thus he was admitted a second time.  He is now at home.  Feeling miserable.  Tired, lots of right-sided chest pain.  I have already signed his home health orders.  Left the hospital quite anemic,     The following portions of the patient's history were reviewed and updated as appropriate: allergies, current medications, past family history, past medical history, past social history, past  surgical history, and problem list.    Review of Systems    Objective   Physical Exam  Vitals reviewed.   Cardiovascular:      Rate and Rhythm: Tachycardia present.   Chest:      Chest wall: Tenderness present.   Neurological:      Mental Status: He is alert.      Motor: Weakness present.      Gait: Gait abnormal.     Right chest very tender.  Entire side is full of purple bruising  Bandage over where the chest tube was likely inserted.  This was pulled back.  Is closed with no bleeding no open areas.  Bandage replaced        Current outpatient and discharge medications have been reconciled for the patient.  Reviewed by: Jose Fonseca MD    Assessment & Plan   Diagnoses and all orders for this visit:    1. Traumatic pneumothorax, sequela (Primary)    2. 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  Dispense: 10 tablet; Refill: 0    3. Hospital discharge follow-up    4. Acute blood loss anemia (ABLA)  -     Hemoglobin & Hematocrit, Blood    Send in Percocet for as needed severe pain.  He is told not to mix this with alcohol    Will check an H&H today to follow-up on his acute blood loss anemia    Gave the wife [facility] thoracic surgery's office number to call for follow-up    He and wife have moved into an assisted living facility at [facility].    As his health is not decline.  Dementia is worsening.    As above when he fell he grabbed the car keys and drove to his old home.  Then was driving back to his assisted living apartment and got lost.  Pulled into another apartment complex where he fell.  He did not know where he was.  Had to call family and they had to find him  He is certainly not safe to drive anymore

## 2024-03-12 NOTE — HOME HEALTH
Routine Visit Note:  Spouse present; 3/11/24    Skill/education provided:   SN assessment instruction of disease mgmt, meds, pain, safety, s/s infection, skin protection measures.  Wound care provided per order.     Patient/caregiver response:   v/u    Plan for next visit:   Sn assess/instruct; wound care    Other pertinent info:   Spouse requests SN to discuss driving ability with Dr. Fonseca prior to appt with Dr. Fonseca 3/12.  Message left with /case communication sent.    ANKUR FREY RN  [facility]  [phone] C  [phone] O

## 2024-03-12 NOTE — TELEPHONE ENCOUNTER
Caller: ANKUR    Relationship: Other    Best call back number: [phone]    What is the best time to reach you: AT APPT TODAY    Who are you requesting to speak with (clinical staff, provider,  specific staff member): CLINICAL    Do you know the name of the person who called: ANKUR    What was the call regarding:ANKUR CALLING ON BEHALF OF PATIENTS FAMILY.    THEY ARE REQUESTING TO DISCUSS THAT PATIENT SHOULD NO LONGER BE DRIVING AS OF TODAY.    ANKUR AND FAMILY FEELS THAT HE IS BECOMING A DANGER TO HIMSELF AND OTHERS IF HE CONTINUES TO DRIVE.    Is it okay if the provider responds through MyChart: NO

## 2024-03-13 LAB
HCT VFR BLD AUTO: 28.9 % (ref 37.5–51)
HGB BLD-MCNC: 10.2 G/DL (ref 13–17.7)

## 2024-03-14 ENCOUNTER — HOME CARE VISIT (OUTPATIENT)
Dept: HOME HEALTH SERVICES | Facility: HOME HEALTHCARE | Age: 79
End: 2024-03-14
Payer: COMMERCIAL

## 2024-03-14 VITALS
HEART RATE: 75 BPM | TEMPERATURE: 97.3 F | OXYGEN SATURATION: 100 % | DIASTOLIC BLOOD PRESSURE: 65 MMHG | SYSTOLIC BLOOD PRESSURE: 120 MMHG | RESPIRATION RATE: 18 BRPM

## 2024-03-14 PROCEDURE — G0151 HHCP-SERV OF PT,EA 15 MIN: HCPCS

## 2024-03-14 PROCEDURE — G0299 HHS/HOSPICE OF RN EA 15 MIN: HCPCS

## 2024-03-15 VITALS
SYSTOLIC BLOOD PRESSURE: 110 MMHG | DIASTOLIC BLOOD PRESSURE: 78 MMHG | HEART RATE: 86 BPM | OXYGEN SATURATION: 98 % | TEMPERATURE: 97.6 F | RESPIRATION RATE: 18 BRPM

## 2024-03-15 NOTE — HOME HEALTH
Routine Visit Note:  Spouse present; 3/14    Skill/education provided:   SN assessment instruction of disease mgmt, meds, pain, safety, s/s infection.  Wound care provided per order.    Patient/caregiver response:   v/u    Plan for next visit:   Sn assess/instruct; wound care    Other pertinent info:   chest tube incision site remains WNL, healed, no longer visable.     ANKUR FREY RN  [facility]  [phone] C  [phone] O

## 2024-03-19 ENCOUNTER — HOME CARE VISIT (OUTPATIENT)
Dept: HOME HEALTH SERVICES | Facility: HOME HEALTHCARE | Age: 79
End: 2024-03-19
Payer: COMMERCIAL

## 2024-03-19 PROCEDURE — G0299 HHS/HOSPICE OF RN EA 15 MIN: HCPCS

## 2024-03-20 ENCOUNTER — HOME CARE VISIT (OUTPATIENT)
Dept: HOME HEALTH SERVICES | Facility: HOME HEALTHCARE | Age: 79
End: 2024-03-20
Payer: COMMERCIAL

## 2024-03-20 VITALS
SYSTOLIC BLOOD PRESSURE: 116 MMHG | HEART RATE: 92 BPM | OXYGEN SATURATION: 97 % | RESPIRATION RATE: 18 BRPM | DIASTOLIC BLOOD PRESSURE: 78 MMHG | TEMPERATURE: 97.9 F

## 2024-03-20 VITALS
OXYGEN SATURATION: 99 % | TEMPERATURE: 97.1 F | SYSTOLIC BLOOD PRESSURE: 135 MMHG | DIASTOLIC BLOOD PRESSURE: 63 MMHG | HEART RATE: 90 BPM | RESPIRATION RATE: 18 BRPM

## 2024-03-20 PROCEDURE — G0151 HHCP-SERV OF PT,EA 15 MIN: HCPCS

## 2024-03-20 NOTE — HOME HEALTH
Discharge Summary:    Patient was seen for PT discharge today due to PT goals met see interventions/goal status for details. Patient was seen for a total of 5  visits for  recent fall      for evaluation, gait training, transfer training, home safety, fall prevention, and pain/edema management. Currently patient is able to to amb IND/SUP with FWW, IND with transfers, IND/SUP with HEP with written handouts, IND with pain/edema management and fall prevention. Patient/spouse agrees with PT discharge.    [place] environment: [facility] with spouse who provides assist as needed.    Follow up: with MD

## 2024-03-20 NOTE — HOME HEALTH
Routine Visit Note:  Spouse present; 3/19/24    Skill/education provided:   SN assessment instruction of disease mgmt, meds, pain, safety, s/s infection.  Wound care provided per order.    Patient/caregiver response:   v/u    Plan for next visit:   Sn assess/instruct; wound care.     Other pertinent info:   Patient has received all supplies.  Wound is healing slowly.  Patient's appetite remains fair.  Reinstructed on increasing protein consumption.    ANKUR FREY RN  [facility]  [phone] C  [phone] O

## 2024-03-20 NOTE — Clinical Note
Discharge Summary:    Patient was seen for PT discharge today due to PT goals met see interventions/goal status for details. Patient was seen for a total of 5  visits for  recent fall      for evaluation, gait training, transfer training, home safety, fall prevention, and pain/edema management. Currently patient is able to to amb IND/SUP with FWW, IND with transfers, IND/SUP with HEP with written handouts, IND with pain/edema management and fall prevention. Patient/spouse agrees with PT discharge.    Home environment: patient lives in residential with spouse who provides assist as needed.    Follow up: with MD

## 2024-03-28 ENCOUNTER — HOSPITAL ENCOUNTER (OUTPATIENT)
Dept: GENERAL RADIOLOGY | Facility: HOSPITAL | Age: 79
Discharge: HOME | End: 2024-03-28
Admitting: NURSE PRACTITIONER
Payer: MEDICARE

## 2024-03-28 ENCOUNTER — OFFICE VISIT (OUTPATIENT)
Dept: SURGERY | Facility: CLINIC | Age: 79
End: 2024-03-28
Payer: MEDICARE

## 2024-03-28 VITALS — HEART RATE: 94 BPM | OXYGEN SATURATION: 97 % | WEIGHT: 230 LBS | BODY MASS INDEX: 32.93 KG/M2 | HEIGHT: 70 IN

## 2024-03-28 DIAGNOSIS — R06.2 WHEEZING: ICD-10-CM

## 2024-03-28 DIAGNOSIS — R13.19 OTHER DYSPHAGIA: ICD-10-CM

## 2024-03-28 DIAGNOSIS — Z87.09 HISTORY OF PNEUMOTHORAX: Primary | ICD-10-CM

## 2024-03-28 PROCEDURE — 71046 X-RAY EXAM CHEST 2 VIEWS: CPT

## 2024-03-28 RX ORDER — ALBUTEROL SULFATE 90 UG/1
2 AEROSOL, METERED RESPIRATORY (INHALATION) EVERY 4 HOURS PRN
Qty: 18 G | Refills: 0 | Status: SHIPPED | OUTPATIENT
Start: 2024-03-28

## 2024-03-28 NOTE — PROGRESS NOTES
"Chief Complaint  Hospital follow-up, right pneumothorax and rib fractures    Subjective        Bryan Landers presents to [facility] THORACIC SURGERY  History of Present Illness    Mr. Landers is a very pleasant 79-year-old gentleman who is known to the thoracic surgery service after hospitalization earlier this month where he was admitted for right pneumothorax and multiple right rib fractures status post fall.  Chest tube in place without air leak and this was removed.  He then developed a subsequent pneumothorax and chest tube was replaced.  He underwent clamping trial without evidence of pneumothorax and it was removed without difficulty.  He was discharged in stable condition.  He has returned to his daily activities.  He has minimal pain.  Denies new onset shortness of breath but does have some wheezing with exertion and is asking for refill of his albuterol inhaler today which she uses intermittently.  Overall he is feeling well.  He presents today with chest x-ray.      Objective   Vital Signs:  Pulse 94   Ht 177.8 cm (70\")   Wt 104 kg (230 lb)   SpO2 97%   BMI 33.00 kg/m²   Estimated body mass index is 33 kg/m² as calculated from the following:    Height as of this encounter: 177.8 cm (70\").    Weight as of this encounter: 104 kg (230 lb).             Physical Exam  Constitutional:       General: He is not in acute distress.     Appearance: Normal appearance. He is not ill-appearing.   HENT:      Head: Normocephalic and atraumatic.      Nose: Nose normal.   Cardiovascular:      Rate and Rhythm: Normal rate.   Pulmonary:      Effort: Pulmonary effort is normal. No respiratory distress.   Musculoskeletal:      Cervical back: Normal range of motion and neck supple.      Right lower leg: No edema.      Left lower leg: No edema.      Comments: Using cane to assist with ambulation   Neurological:      General: No focal deficit present.      Mental Status: He is alert.      Motor: No weakness. "   Psychiatric:         Mood and Affect: Mood normal.         Thought Content: Thought content normal.        Result Review :            I independently reviewed the chest x-ray performed prior to his appointment today which demonstrates adequate aeration and expansion of the lungs bilaterally.         Assessment and Plan     Diagnoses and all orders for this visit:    1. History of pneumothorax (Primary)  -     XR Chest 2 View; Future    2. Wheezing  -     albuterol sulfate  (90 Base) MCG/ACT inhaler; Inhale 2 puffs Every 4 (Four) Hours As Needed for Wheezing. Indications: Spasm of Lung Air Passages  Dispense: 18 g; Refill: 0    Mr. Landers's chest x-ray performed prior to his appointment today demonstrates adequate aeration without evidence of pneumothorax.  His rib fractures are healing as expected.  I refilled his albuterol inhaler per his request but advised him to follow-up with his PCP for refills in the future.  He may follow-up with us as needed.  Of course  we would be happy to see Mr. Landers in the future should the need arise.  We appreciate the opportunity to be involved in his care.       I spent 32 minutes caring for Bryan on this date of service. This time includes time spent by me in the following activities:preparing for the visit, reviewing tests, performing a medically appropriate examination and/or evaluation , counseling and educating the patient/family/caregiver, ordering medications, tests, or procedures, documenting information in the medical record, independently interpreting results and communicating that information with the patient/family/caregiver, and care coordination  Follow Up     Return if symptoms worsen or fail to improve.  Patient was given instructions and counseling regarding his condition or for health maintenance advice. Please see specific information pulled into the AVS if appropriate.

## 2024-03-29 ENCOUNTER — HOME CARE VISIT (OUTPATIENT)
Dept: HOME HEALTH SERVICES | Facility: HOME HEALTHCARE | Age: 79
End: 2024-03-29
Payer: COMMERCIAL

## 2024-03-29 VITALS
SYSTOLIC BLOOD PRESSURE: 116 MMHG | RESPIRATION RATE: 18 BRPM | OXYGEN SATURATION: 99 % | HEART RATE: 67 BPM | TEMPERATURE: 97.4 F | DIASTOLIC BLOOD PRESSURE: 70 MMHG

## 2024-03-29 PROCEDURE — G0299 HHS/HOSPICE OF RN EA 15 MIN: HCPCS

## 2024-03-29 NOTE — HOME HEALTH
CP and general assessment completed.  vss, afebrile.  Right elbow skin tear remains open  picture and measurement taken.  Pcg wife present during entire sn visit.  Patient to keep dressing on during the day, and remove at night to have open to air.  Hopefully will heal in a couple weeks.  Patient denies pain, Lungs clear, 99%n on room air.  Wife able to change dressing to right elbow.  Sn visits once a week for 2 weeks to monitor for infection.      NEXT SN VISIT; CP assess, wound care to right elbow skin tear.

## 2024-04-01 DIAGNOSIS — I27.82 OTHER CHRONIC PULMONARY EMBOLISM WITHOUT ACUTE COR PULMONALE: ICD-10-CM

## 2024-04-01 RX ORDER — APIXABAN 2.5 MG/1
2.5 TABLET, FILM COATED ORAL EVERY 12 HOURS
Qty: 60 TABLET | Refills: 11 | Status: SHIPPED | OUTPATIENT
Start: 2024-04-01

## 2024-04-02 ENCOUNTER — HOME CARE VISIT (OUTPATIENT)
Dept: HOME HEALTH SERVICES | Facility: HOME HEALTHCARE | Age: 79
End: 2024-04-02
Payer: COMMERCIAL

## 2024-04-02 PROCEDURE — G0495 RN CARE TRAIN/EDU IN HH: HCPCS

## 2024-04-02 NOTE — Clinical Note
Raymundo Ibarra,    Home health [facility] DC 4/2/24  Patient, spouse, and daughter present for visit.    Discharge Summary/Summary of Care Provided: SN assessment/instruction of disease mgmt, diet, safety, s/s infection, d/c instructions.  Patient received home health for diagnosis: Weakness, SOA s/p R RIB FRACTURES, S/P R PNEUMOTHORAX following fall  Current level of functional ability: supervision with rollator  Living arrangements: [facility] with spouse  Progress towards goals and/or Were all goals met? met/requests DC  SDOH concerns: none ([facility]/spouse and daughter provide assistance)  Follow-up appointment plans and community resources provided: Keep f/y appts as scheduled.  Other important information: DC instructions discussed.  F/u with providers as scheduled, meds as prescribed, continue to work on increasing protein, lean meats, and vegetables in diet.    ANKUR FREY RN  [facility]  [phone] C  [phone] [facility] 4/2/24

## 2024-04-03 ENCOUNTER — HOME CARE VISIT (OUTPATIENT)
Dept: HOME HEALTH SERVICES | Facility: HOME HEALTHCARE | Age: 79
End: 2024-04-03
Payer: COMMERCIAL

## 2024-04-03 VITALS
TEMPERATURE: 98 F | HEART RATE: 72 BPM | RESPIRATION RATE: 18 BRPM | OXYGEN SATURATION: 97 % | SYSTOLIC BLOOD PRESSURE: 130 MMHG | DIASTOLIC BLOOD PRESSURE: 70 MMHG

## 2024-04-03 NOTE — HOME HEALTH
[facility] 4/2/24  Patient, spouse, and daughter present for visit.    Discharge Summary/Summary of Care Provided: SN assessment/instruction of disease mgmt, diet, safety, s/s infection, d/c instructions.  Patient received home health for diagnosis: Weakness, SOA s/p R RIB FRACTURES, S/P R PNEUMOTHORAX following fall  Current level of functional ability: supervision with rollator  Living arrangements: [facility] with spouse  Progress towards goals and/or Were all goals met? met/requests DC  SDOH concerns: none ([facility]/spouse and daughter provide assistance)  Follow-up appointment plans and community resources provided: Keep f/y appts as scheduled.  Other important information: DC instructions discussed.  F/u with providers as scheduled, meds as prescribed, continue to work on increasing protein, lean meats, and vegetables in diet.    ANKUR FREY RN  [facility]  [phone] C  [phone] O

## 2024-04-04 ENCOUNTER — TRANSCRIBE ORDERS (OUTPATIENT)
Dept: ADMINISTRATIVE | Facility: HOSPITAL | Age: 79
End: 2024-04-04
Payer: MEDICARE

## 2024-04-04 DIAGNOSIS — C61 CANCER OF PROSTATE: Primary | ICD-10-CM

## 2024-04-10 ENCOUNTER — OFFICE VISIT (OUTPATIENT)
Dept: FAMILY MEDICINE CLINIC | Facility: CLINIC | Age: 79
End: 2024-04-10
Payer: MEDICARE

## 2024-04-10 VITALS
DIASTOLIC BLOOD PRESSURE: 70 MMHG | SYSTOLIC BLOOD PRESSURE: 128 MMHG | RESPIRATION RATE: 16 BRPM | BODY MASS INDEX: 36.66 KG/M2 | OXYGEN SATURATION: 98 % | HEIGHT: 70 IN | WEIGHT: 256.1 LBS | HEART RATE: 61 BPM | TEMPERATURE: 95.1 F

## 2024-04-10 DIAGNOSIS — R60.9 DEPENDENT EDEMA: ICD-10-CM

## 2024-04-10 PROCEDURE — 99213 OFFICE O/P EST LOW 20 MIN: CPT | Performed by: FAMILY MEDICINE

## 2024-04-10 PROCEDURE — 1160F RVW MEDS BY RX/DR IN RCRD: CPT | Performed by: FAMILY MEDICINE

## 2024-04-10 PROCEDURE — 3078F DIAST BP <80 MM HG: CPT | Performed by: FAMILY MEDICINE

## 2024-04-10 PROCEDURE — 1159F MED LIST DOCD IN RCRD: CPT | Performed by: FAMILY MEDICINE

## 2024-04-10 PROCEDURE — 3074F SYST BP LT 130 MM HG: CPT | Performed by: FAMILY MEDICINE

## 2024-04-10 RX ORDER — FUROSEMIDE 40 MG/1
40 TABLET ORAL
Qty: 90 TABLET | Refills: 0 | Status: SHIPPED | OUTPATIENT
Start: 2024-04-10

## 2024-04-10 RX ORDER — POTASSIUM CHLORIDE 750 MG/1
10 TABLET, FILM COATED, EXTENDED RELEASE ORAL
Qty: 90 TABLET | Refills: 1 | Status: SHIPPED | OUTPATIENT
Start: 2024-04-10

## 2024-04-10 NOTE — PROGRESS NOTES
"  Subjective   Bryan Landers is a 79 y.o. male who is here for   Chief Complaint   Patient presents with    Fall    dementia   .     History of Present Illness     Patient in follow-up for his fall and broken ribs and right-sided pneumothorax.  Feeling better.  Ribs seem to be healing up.  Breathing better.  Still hurts when he coughs or sneezes.  Not coughing up any mucus or blood  No fever  Not having taken Percocet anymore    Wife reports that he is sleeping and eating and behavior is pretty good.  He sleeps a lot  Memory and judgment continues to decline    But his legs are still very swollen.  20 mg Lasix not helping much.  We discussed risk and benefits of increasing Lasix with his known chronic kidney disease.  Will bump it up to 40 mg daily.  Adding on 10 mEq of potassium  He will be getting renal labs with Dr. Renner in a few weeks      The following portions of the patient's history were reviewed and updated as appropriate: allergies, current medications, past medical history, past social history, past surgical history, and problem list.    Review of Systems    Objective   Vitals:    04/10/24 1115   BP: 128/70   Pulse: 61   Resp: 16   Temp: 95.1 °F (35.1 °C)   TempSrc: Temporal   SpO2: 98%   Weight: 116 kg (256 lb 1.6 oz)   Height: 177.8 cm (70\")      Physical Exam  Vitals reviewed.   Pulmonary:      Effort: Pulmonary effort is normal.   Chest:      Chest wall: Tenderness present.   Neurological:      Mental Status: He is alert.         Assessment & Plan   Diagnoses and all orders for this visit:    1. Dependent edema  -     furosemide (LASIX) 40 MG tablet; Take 1 tablet by mouth Daily With Breakfast. Indications: Edema  Dispense: 90 tablet; Refill: 0  -     potassium chloride 10 MEQ CR tablet; Take 1 tablet by mouth Daily With Breakfast. Indications: Low Amount of Potassium in the Blood, take with Lasix  Dispense: 90 tablet; Refill: 1      There are no Patient Instructions on file for this " visit.    Medications Discontinued During This Encounter   Medication Reason    oxyCODONE-acetaminophen (Percocet) 5-325 MG per tablet *Therapy completed    furosemide (LASIX) 20 MG tablet Reorder    Potassium Citrate,Elemental K, 99 MG capsule *Therapy completed        Return in about 1 month (around 5/10/2024) for cancel the lab slot on may 15th.    Dr. Jose Fonseca  [place], [place]

## 2024-04-22 ENCOUNTER — LAB (OUTPATIENT)
Dept: LAB | Facility: HOSPITAL | Age: 79
End: 2024-04-22
Payer: MEDICARE

## 2024-04-22 ENCOUNTER — TRANSCRIBE ORDERS (OUTPATIENT)
Dept: ADMINISTRATIVE | Facility: HOSPITAL | Age: 79
End: 2024-04-22
Payer: MEDICARE

## 2024-04-22 DIAGNOSIS — E55.9 AVITAMINOSIS D: ICD-10-CM

## 2024-04-22 DIAGNOSIS — N18.31 CHRONIC KIDNEY DISEASE (CKD) STAGE G3A/A1, MODERATELY DECREASED GLOMERULAR FILTRATION RATE (GFR) BETWEEN 45-59 ML/MIN/1.73 SQUARE METER AND ALBUMINURIA CREATININE RATIO LESS THAN 30 MG/G (CMS/H*: Primary | ICD-10-CM

## 2024-04-22 DIAGNOSIS — I12.9 HYPERTENSIVE NEPHROPATHY: ICD-10-CM

## 2024-04-22 DIAGNOSIS — E55.0 RICKETS, ACTIVE: ICD-10-CM

## 2024-04-22 DIAGNOSIS — N18.31 CHRONIC KIDNEY DISEASE (CKD) STAGE G3A/A1, MODERATELY DECREASED GLOMERULAR FILTRATION RATE (GFR) BETWEEN 45-59 ML/MIN/1.73 SQUARE METER AND ALBUMINURIA CREATININE RATIO LESS THAN 30 MG/G (CMS/H*: ICD-10-CM

## 2024-04-22 DIAGNOSIS — N18.9 CHRONIC KIDNEY DISEASE, UNSPECIFIED CKD STAGE: ICD-10-CM

## 2024-04-22 DIAGNOSIS — E11.22 TYPE 2 DIABETES MELLITUS WITH DIABETIC CHRONIC KIDNEY DISEASE, UNSPECIFIED CKD STAGE, UNSPECIFIED WHETHER LONG TERM INSULIN USE: ICD-10-CM

## 2024-04-22 LAB
ALBUMIN SERPL-MCNC: 3.4 G/DL (ref 3.5–5.2)
ANION GAP SERPL CALCULATED.3IONS-SCNC: 12.5 MMOL/L (ref 5–15)
BILIRUB UR QL STRIP: NEGATIVE
BUN SERPL-MCNC: 19 MG/DL (ref 8–23)
BUN/CREAT SERPL: 13.7 (ref 7–25)
CALCIUM SPEC-SCNC: 9.1 MG/DL (ref 8.6–10.5)
CHLORIDE SERPL-SCNC: 99 MMOL/L (ref 98–107)
CLARITY UR: CLEAR
CO2 SERPL-SCNC: 25.5 MMOL/L (ref 22–29)
COLOR UR: YELLOW
CREAT SERPL-MCNC: 1.39 MG/DL (ref 0.76–1.27)
CREAT UR-MCNC: 59.6 MG/DL
EGFRCR SERPLBLD CKD-EPI 2021: 51.2 ML/MIN/1.73
GLUCOSE SERPL-MCNC: 124 MG/DL (ref 65–99)
GLUCOSE UR STRIP-MCNC: NEGATIVE MG/DL
HGB UR QL STRIP.AUTO: NEGATIVE
KETONES UR QL STRIP: NEGATIVE
LEUKOCYTE ESTERASE UR QL STRIP.AUTO: NEGATIVE
NITRITE UR QL STRIP: NEGATIVE
PH UR STRIP.AUTO: 6.5 [PH] (ref 5–8)
PHOSPHATE SERPL-MCNC: 3.1 MG/DL (ref 2.5–4.5)
POTASSIUM SERPL-SCNC: 4.6 MMOL/L (ref 3.5–5.2)
PROT ?TM UR-MCNC: 6.8 MG/DL
PROT UR QL STRIP: NEGATIVE
PROT/CREAT UR: 114.1 MG/G CREA (ref 0–200)
SODIUM SERPL-SCNC: 137 MMOL/L (ref 136–145)
SP GR UR STRIP: 1.01 (ref 1–1.03)
UROBILINOGEN UR QL STRIP: NORMAL

## 2024-04-22 PROCEDURE — 84156 ASSAY OF PROTEIN URINE: CPT

## 2024-04-22 PROCEDURE — 36415 COLL VENOUS BLD VENIPUNCTURE: CPT

## 2024-04-22 PROCEDURE — 80069 RENAL FUNCTION PANEL: CPT

## 2024-04-22 PROCEDURE — 81003 URINALYSIS AUTO W/O SCOPE: CPT

## 2024-04-22 PROCEDURE — 82570 ASSAY OF URINE CREATININE: CPT

## 2024-05-23 ENCOUNTER — OFFICE VISIT (OUTPATIENT)
Dept: FAMILY MEDICINE CLINIC | Facility: CLINIC | Age: 79
End: 2024-05-23
Payer: MEDICARE

## 2024-05-23 VITALS
WEIGHT: 247 LBS | SYSTOLIC BLOOD PRESSURE: 118 MMHG | TEMPERATURE: 97.1 F | OXYGEN SATURATION: 97 % | HEART RATE: 84 BPM | DIASTOLIC BLOOD PRESSURE: 70 MMHG | HEIGHT: 70 IN | BODY MASS INDEX: 35.36 KG/M2

## 2024-05-23 DIAGNOSIS — E11.22 TYPE 2 DIABETES MELLITUS WITH STAGE 3B CHRONIC KIDNEY DISEASE, WITHOUT LONG-TERM CURRENT USE OF INSULIN: ICD-10-CM

## 2024-05-23 DIAGNOSIS — R42 ORTHOSTATIC DIZZINESS: ICD-10-CM

## 2024-05-23 DIAGNOSIS — N18.32 TYPE 2 DIABETES MELLITUS WITH STAGE 3B CHRONIC KIDNEY DISEASE, WITHOUT LONG-TERM CURRENT USE OF INSULIN: ICD-10-CM

## 2024-05-23 DIAGNOSIS — N18.32 STAGE 3B CHRONIC KIDNEY DISEASE: Primary | ICD-10-CM

## 2024-05-23 DIAGNOSIS — E03.9 ACQUIRED HYPOTHYROIDISM: ICD-10-CM

## 2024-05-23 PROBLEM — J93.9 PNEUMOTHORAX: Status: RESOLVED | Noted: 2024-02-27 | Resolved: 2024-05-23

## 2024-05-23 PROCEDURE — G2211 COMPLEX E/M VISIT ADD ON: HCPCS | Performed by: FAMILY MEDICINE

## 2024-05-23 PROCEDURE — 1160F RVW MEDS BY RX/DR IN RCRD: CPT | Performed by: FAMILY MEDICINE

## 2024-05-23 PROCEDURE — 1125F AMNT PAIN NOTED PAIN PRSNT: CPT | Performed by: FAMILY MEDICINE

## 2024-05-23 PROCEDURE — 99214 OFFICE O/P EST MOD 30 MIN: CPT | Performed by: FAMILY MEDICINE

## 2024-05-23 PROCEDURE — 3078F DIAST BP <80 MM HG: CPT | Performed by: FAMILY MEDICINE

## 2024-05-23 PROCEDURE — 1159F MED LIST DOCD IN RCRD: CPT | Performed by: FAMILY MEDICINE

## 2024-05-23 PROCEDURE — 3074F SYST BP LT 130 MM HG: CPT | Performed by: FAMILY MEDICINE

## 2024-05-23 RX ORDER — LISINOPRIL 10 MG/1
10 TABLET ORAL DAILY
Qty: 90 TABLET | Refills: 0 | Status: SHIPPED | OUTPATIENT
Start: 2024-05-23

## 2024-05-23 NOTE — PROGRESS NOTES
Chief Complaint   Patient presents with    Hypertension    Hypothyroidism    Hyperlipidemia    Gout    Pulmonary Embolism       Subjective     Patient here for follow-up of elevated blood pressure.    He is exercising and is adherent to a low-salt diet.    Blood pressure is well controlled at home.   Cardiac symptoms: dyspnea, fatigue, and lower extremity edema.   Patient denies: chest pain.   Cardiovascular risk factors: advanced age (older than 55 for men, 65 for women), dyslipidemia, family history of premature cardiovascular disease, hypertension, male gender, and obesity (BMI >= 30 kg/m2).   Use of agents associated with hypertension: none.   History of target organ damage: chronic kidney disease.  Patient is taking prescribed hypertension medications as prescribed without side effects.    The following portions of the patient's history were reviewed and updated as appropriate: allergies, current medications, past medical history, past social history, past surgical history, and problem list.    Review of Systems  Pertinent items are noted in HPI.    Results for orders placed or performed in visit on 04/22/24   Urinalysis With Microscopic If Indicated (No Culture) - Urine, Clean Catch    Specimen: Urine, Clean Catch   Result Value Ref Range    Color, UA Yellow Yellow, Straw    Appearance, UA Clear Clear    pH, UA 6.5 5.0 - 8.0    Specific Gravity, UA 1.010 1.005 - 1.030    Glucose, UA Negative Negative    Ketones, UA Negative Negative    Bilirubin, UA Negative Negative    Blood, UA Negative Negative    Protein, UA Negative Negative    Leuk Esterase, UA Negative Negative    Nitrite, UA Negative Negative    Urobilinogen, UA 0.2 E.U./dL 0.2 - 1.0 E.U./dL   Protein / Creatinine Ratio, Urine - Urine, Clean Catch    Specimen: Urine, Clean Catch   Result Value Ref Range    Protein/Creatinine Ratio, Urine 114.1 0.0 - 200.0 mg/G Crea    Creatinine, Urine 59.6 mg/dL    Total Protein, Urine 6.8 mg/dL   Renal Function  "Panel    Specimen: Blood   Result Value Ref Range    Glucose 124 (H) 65 - 99 mg/dL    BUN 19 8 - 23 mg/dL    Creatinine 1.39 (H) 0.76 - 1.27 mg/dL    Sodium 137 136 - 145 mmol/L    Potassium 4.6 3.5 - 5.2 mmol/L    Chloride 99 98 - 107 mmol/L    CO2 25.5 22.0 - 29.0 mmol/L    Calcium 9.1 8.6 - 10.5 mg/dL    Albumin 3.4 (L) 3.5 - 5.2 g/dL    Phosphorus 3.1 2.5 - 4.5 mg/dL    Anion Gap 12.5 5.0 - 15.0 mmol/L    BUN/Creatinine Ratio 13.7 7.0 - 25.0    eGFR 51.2 (L) >60.0 mL/min/1.73        Vitals:    05/23/24 1039   BP: 118/70   BP Location: Left arm   Patient Position: Sitting   Cuff Size: Adult   Pulse: 84   Temp: 97.1 °F (36.2 °C)   SpO2: 97%   Weight: 112 kg (247 lb)   Height: 177.8 cm (70\")     BP Readings from Last 3 Encounters:   05/23/24 118/70   04/10/24 128/70   04/02/24 130/70     Objective    Class 2 Severe Obesity (BMI >=35 and <=39.9). Obesity-related health conditions include the following: hypertension and osteoarthritis. Obesity is improving with treatment. BMI is is above average; BMI management plan is completed. We discussed portion control and increasing exercise.       Gen: alert, pleasant.  Neck: no bruit, no enlarged thyroid  Lungs: CTA  Heart: RR, no murmur  Feet: Edema, wearing compression stocks today      Assessment & Plan   Hypertension,  mildly low  Evidence of target organ damage: chronic kidney disease.    Diagnoses and all orders for this visit:    1. Stage 3b chronic kidney disease (Primary)  -     lisinopril (Prinivil) 10 MG tablet; Take 1 tablet by mouth Daily. Indications: High Blood Pressure Disorder  Dispense: 90 tablet; Refill: 0    2. Type 2 diabetes mellitus with stage 3b chronic kidney disease, without long-term current use of insulin    3. Acquired hypothyroidism    4. Orthostatic dizziness    Bryan is tolerating the 40 mg of Lasix daily well.  Also taking potassium  A nice 9 pound fluid weight loss in the past 1 month  Legs with much less edema  Blood pressure is on the low " side.  Does describe some lightheadedness when he gets up from a seated position or when he bends over.  With this in mind, lets decrease lisinopril from 20 mg down to 10 mg    No falls at home  Using a rollator and walker  Still adjusting to life in the new apartment at the [facility]    Medication: continue Lasix and decrease to 10 mg of lisinopril.  Follow up: 1 month and as needed.    There are no Patient Instructions on file for this visit.  Medications Discontinued During This Encounter   Medication Reason    lisinopril (Prinivil) 20 MG tablet Reorder        Return in about 1 month (around 6/23/2024) for new medication follow up, blood pressure.    Limit salt  Limit alcoholic drinks to 1 a day  Limit caffeine to 1-2 servings a day    Dr. Jose Fonseca MD  [place], [place]  [facility].

## 2024-06-20 ENCOUNTER — OFFICE VISIT (OUTPATIENT)
Dept: FAMILY MEDICINE CLINIC | Facility: CLINIC | Age: 79
End: 2024-06-20
Payer: MEDICARE

## 2024-06-20 VITALS
SYSTOLIC BLOOD PRESSURE: 92 MMHG | HEIGHT: 70 IN | OXYGEN SATURATION: 99 % | WEIGHT: 242 LBS | TEMPERATURE: 96.9 F | BODY MASS INDEX: 34.65 KG/M2 | DIASTOLIC BLOOD PRESSURE: 58 MMHG | HEART RATE: 49 BPM

## 2024-06-20 DIAGNOSIS — N18.32 STAGE 3B CHRONIC KIDNEY DISEASE: Primary | ICD-10-CM

## 2024-06-20 DIAGNOSIS — I95.2 HYPOTENSION DUE TO DRUGS: ICD-10-CM

## 2024-06-20 PROCEDURE — 1160F RVW MEDS BY RX/DR IN RCRD: CPT | Performed by: FAMILY MEDICINE

## 2024-06-20 PROCEDURE — 99213 OFFICE O/P EST LOW 20 MIN: CPT | Performed by: FAMILY MEDICINE

## 2024-06-20 PROCEDURE — 3078F DIAST BP <80 MM HG: CPT | Performed by: FAMILY MEDICINE

## 2024-06-20 PROCEDURE — 1159F MED LIST DOCD IN RCRD: CPT | Performed by: FAMILY MEDICINE

## 2024-06-20 PROCEDURE — 3074F SYST BP LT 130 MM HG: CPT | Performed by: FAMILY MEDICINE

## 2024-06-20 PROCEDURE — 1125F AMNT PAIN NOTED PAIN PRSNT: CPT | Performed by: FAMILY MEDICINE

## 2024-06-20 RX ORDER — LISINOPRIL 2.5 MG/1
2.5 TABLET ORAL DAILY
Qty: 90 TABLET | Refills: 0 | Status: SHIPPED | OUTPATIENT
Start: 2024-06-20

## 2024-06-20 NOTE — PROGRESS NOTES
"  Chief Complaint   Patient presents with    Hypertension     New medication follow up       Subjective     Patient here for follow-up of elevated blood pressure.    He is exercising and is adherent to a low-salt diet.    Blood pressure is not well controlled at home.   Cardiac symptoms: fatigue and near-syncope.   Patient denies: chest pressure/discomfort.   Cardiovascular risk factors: advanced age (older than 55 for men, 65 for women), diabetes mellitus, dyslipidemia, family history of premature cardiovascular disease, hypertension, male gender, and obesity (BMI >= 30 kg/m2).   Use of agents associated with hypertension: none.   History of target organ damage: chronic kidney disease.  Patient is taking prescribed hypertension medications as prescribed without side effects.  Due to continued edema in the legs we had to increase his Lasix.  Do not drop his blood pressure we decrease lisinopril from 20 down to 10 mg  But his blood pressure is still a little on the low side.  Having some orthostatic hypotension symptoms.  He is remain on the Lasix.  And his edema in legs is much improved.  He is down 5 pounds    The following portions of the patient's history were reviewed and updated as appropriate: allergies, current medications, past family history, past medical history, past social history, past surgical history, and problem list.    Review of Systems  Pertinent items are noted in HPI.         Vitals:    06/20/24 1118   BP: 92/58   BP Location: Left arm   Patient Position: Sitting   Cuff Size: Adult   Pulse: (!) 49   Temp: 96.9 °F (36.1 °C)   SpO2: 99%   Weight: 110 kg (242 lb)   Height: 177.8 cm (70\")     BP Readings from Last 3 Encounters:   06/20/24 92/58   05/23/24 118/70   04/10/24 128/70     Objective            Gen: alert, pleasant.  Neck: no bruit, no enlarged thyroid  Lungs: CTA  Heart: RR, no murmur  Feet: + edema  Pulses: intact    Assessment & Plan   Hypertension,  low blood pressure  Evidence of " target organ damage: chronic kidney disease.    Diagnoses and all orders for this visit:    1. Stage 3b chronic kidney disease (Primary)  -     lisinopril (PRINIVIL,ZESTRIL) 2.5 MG tablet; Take 1 tablet by mouth Daily. Indications: High Blood Pressure Disorder  Dispense: 90 tablet; Refill: 0    2. Hypotension due to drugs    Blood pressure still little too low.  Will cut his lisinopril down to 2.5 mg daily    Medication: decrease to 2.5 mg of lisinopril.  Follow up: 4 months and as needed.    There are no Patient Instructions on file for this visit.  Medications Discontinued During This Encounter   Medication Reason    acetaminophen (TYLENOL) 325 MG tablet Patient Reported Not Taking    lisinopril (Prinivil) 10 MG tablet Reorder        Return in about 4 months (around 10/20/2024).    Limit salt  Limit alcoholic drinks to 1 a day  Limit caffeine to 1-2 servings a day    Dr. Jose Fonseca MD  [place], [place]  [facility].

## 2024-06-27 ENCOUNTER — TRANSCRIBE ORDERS (OUTPATIENT)
Dept: ADMINISTRATIVE | Facility: HOSPITAL | Age: 79
End: 2024-06-27
Payer: MEDICARE

## 2024-06-27 ENCOUNTER — LAB (OUTPATIENT)
Dept: LAB | Facility: HOSPITAL | Age: 79
End: 2024-06-27
Payer: MEDICARE

## 2024-06-27 DIAGNOSIS — C61 PROSTATE CANCER: Primary | ICD-10-CM

## 2024-06-27 DIAGNOSIS — C61 PROSTATE CANCER: ICD-10-CM

## 2024-06-27 LAB
ALBUMIN SERPL-MCNC: 3.4 G/DL (ref 3.5–5.2)
ALBUMIN/GLOB SERPL: 1.2 G/DL
ALP SERPL-CCNC: 62 U/L (ref 39–117)
ALT SERPL W P-5'-P-CCNC: 17 U/L (ref 1–41)
ANION GAP SERPL CALCULATED.3IONS-SCNC: 9.4 MMOL/L (ref 5–15)
AST SERPL-CCNC: 28 U/L (ref 1–40)
BASOPHILS # BLD AUTO: 0.03 10*3/MM3 (ref 0–0.2)
BASOPHILS NFR BLD AUTO: 0.9 % (ref 0–1.5)
BILIRUB SERPL-MCNC: 0.3 MG/DL (ref 0–1.2)
BUN SERPL-MCNC: 18 MG/DL (ref 8–23)
BUN/CREAT SERPL: 12.2 (ref 7–25)
CALCIUM SPEC-SCNC: 9.5 MG/DL (ref 8.6–10.5)
CHLORIDE SERPL-SCNC: 98 MMOL/L (ref 98–107)
CO2 SERPL-SCNC: 28.6 MMOL/L (ref 22–29)
CREAT SERPL-MCNC: 1.47 MG/DL (ref 0.76–1.27)
DEPRECATED RDW RBC AUTO: 50.7 FL (ref 37–54)
EGFRCR SERPLBLD CKD-EPI 2021: 47.9 ML/MIN/1.73
EOSINOPHIL # BLD AUTO: 0.13 10*3/MM3 (ref 0–0.4)
EOSINOPHIL NFR BLD AUTO: 4 % (ref 0.3–6.2)
ERYTHROCYTE [DISTWIDTH] IN BLOOD BY AUTOMATED COUNT: 13.2 % (ref 12.3–15.4)
GLOBULIN UR ELPH-MCNC: 2.9 GM/DL
GLUCOSE SERPL-MCNC: 108 MG/DL (ref 65–99)
HCT VFR BLD AUTO: 30.2 % (ref 37.5–51)
HGB BLD-MCNC: 10.5 G/DL (ref 13–17.7)
IMM GRANULOCYTES # BLD AUTO: 0.01 10*3/MM3 (ref 0–0.05)
IMM GRANULOCYTES NFR BLD AUTO: 0.3 % (ref 0–0.5)
LDH SERPL-CCNC: 212 U/L (ref 135–225)
LYMPHOCYTES # BLD AUTO: 1.13 10*3/MM3 (ref 0.7–3.1)
LYMPHOCYTES NFR BLD AUTO: 34.9 % (ref 19.6–45.3)
MCH RBC QN AUTO: 37.1 PG (ref 26.6–33)
MCHC RBC AUTO-ENTMCNC: 34.8 G/DL (ref 31.5–35.7)
MCV RBC AUTO: 106.7 FL (ref 79–97)
MONOCYTES # BLD AUTO: 0.33 10*3/MM3 (ref 0.1–0.9)
MONOCYTES NFR BLD AUTO: 10.2 % (ref 5–12)
NEUTROPHILS NFR BLD AUTO: 1.61 10*3/MM3 (ref 1.7–7)
NEUTROPHILS NFR BLD AUTO: 49.7 % (ref 42.7–76)
NRBC BLD AUTO-RTO: 0 /100 WBC (ref 0–0.2)
PLATELET # BLD AUTO: 138 10*3/MM3 (ref 140–450)
PMV BLD AUTO: 9.6 FL (ref 6–12)
POTASSIUM SERPL-SCNC: 4.6 MMOL/L (ref 3.5–5.2)
PROT SERPL-MCNC: 6.3 G/DL (ref 6–8.5)
RBC # BLD AUTO: 2.83 10*6/MM3 (ref 4.14–5.8)
SODIUM SERPL-SCNC: 136 MMOL/L (ref 136–145)
TESTOST SERPL-MCNC: 5.9 NG/DL (ref 193–740)
WBC NRBC COR # BLD AUTO: 3.24 10*3/MM3 (ref 3.4–10.8)

## 2024-06-27 PROCEDURE — 84153 ASSAY OF PSA TOTAL: CPT

## 2024-06-27 PROCEDURE — 84154 ASSAY OF PSA FREE: CPT

## 2024-06-27 PROCEDURE — 80053 COMPREHEN METABOLIC PANEL: CPT

## 2024-06-27 PROCEDURE — 83615 LACTATE (LD) (LDH) ENZYME: CPT

## 2024-06-27 PROCEDURE — 84403 ASSAY OF TOTAL TESTOSTERONE: CPT

## 2024-06-27 PROCEDURE — 36415 COLL VENOUS BLD VENIPUNCTURE: CPT

## 2024-06-27 PROCEDURE — 85025 COMPLETE CBC W/AUTO DIFF WBC: CPT

## 2024-06-28 LAB
PSA FREE MFR SERPL: NORMAL %
PSA FREE SERPL-MCNC: <0.02 NG/ML
PSA SERPL-MCNC: <0.1 NG/ML (ref 0–4)

## 2024-07-12 ENCOUNTER — TRANSCRIBE ORDERS (OUTPATIENT)
Dept: ADMINISTRATIVE | Facility: HOSPITAL | Age: 79
End: 2024-07-12
Payer: MEDICARE

## 2024-07-12 DIAGNOSIS — C61 PROSTATE CANCER: Primary | ICD-10-CM

## 2024-07-16 ENCOUNTER — TRANSCRIBE ORDERS (OUTPATIENT)
Dept: ADMINISTRATIVE | Facility: HOSPITAL | Age: 79
End: 2024-07-16
Payer: MEDICARE

## 2024-07-16 DIAGNOSIS — C77.9 LYMPH NODE CANCER: ICD-10-CM

## 2024-07-16 DIAGNOSIS — Z19.1 HORMONE SENSITIVE MALIGNANCY STATUS: ICD-10-CM

## 2024-07-16 DIAGNOSIS — C61 PROSTATE CANCER: Primary | ICD-10-CM

## 2024-07-19 ENCOUNTER — HOSPITAL ENCOUNTER (OUTPATIENT)
Dept: CT IMAGING | Facility: HOSPITAL | Age: 79
Discharge: HOME | End: 2024-07-19
Payer: MEDICARE

## 2024-07-19 ENCOUNTER — HOSPITAL ENCOUNTER (OUTPATIENT)
Dept: NUCLEAR MEDICINE | Facility: HOSPITAL | Age: 79
Discharge: HOME | End: 2024-07-19
Payer: MEDICARE

## 2024-07-19 DIAGNOSIS — C61 PROSTATE CANCER: ICD-10-CM

## 2024-07-19 DIAGNOSIS — C77.9 LYMPH NODE CANCER: ICD-10-CM

## 2024-07-19 DIAGNOSIS — Z19.1 HORMONE SENSITIVE MALIGNANCY STATUS: ICD-10-CM

## 2024-07-19 PROCEDURE — A9503 TC99M MEDRONATE: HCPCS | Performed by: NURSE PRACTITIONER

## 2024-07-19 PROCEDURE — 0 TECHNETIUM MEDRONATE KIT: Performed by: NURSE PRACTITIONER

## 2024-07-19 PROCEDURE — 71260 CT THORAX DX C+: CPT

## 2024-07-19 PROCEDURE — 78306 BONE IMAGING WHOLE BODY: CPT

## 2024-07-19 PROCEDURE — 25510000001 IOPAMIDOL PER 1 ML: Performed by: NURSE PRACTITIONER

## 2024-07-19 PROCEDURE — 74177 CT ABD & PELVIS W/CONTRAST: CPT

## 2024-07-19 RX ORDER — TC 99M MEDRONATE 20 MG/10ML
22.2 INJECTION, POWDER, LYOPHILIZED, FOR SOLUTION INTRAVENOUS
Status: COMPLETED | OUTPATIENT
Start: 2024-07-19 | End: 2024-07-19

## 2024-07-19 RX ADMIN — TC 99M MEDRONATE 22.2 MILLICURIE: 20 INJECTION, POWDER, LYOPHILIZED, FOR SOLUTION INTRAVENOUS at 16:49

## 2024-07-19 RX ADMIN — IOPAMIDOL 100 ML: 755 INJECTION, SOLUTION INTRAVENOUS at 19:01

## 2024-08-29 DIAGNOSIS — R60.9 DEPENDENT EDEMA: ICD-10-CM

## 2024-08-29 RX ORDER — FUROSEMIDE 40 MG
40 TABLET ORAL
Qty: 90 TABLET | Refills: 0 | Status: SHIPPED | OUTPATIENT
Start: 2024-08-29

## 2024-09-13 ENCOUNTER — OFFICE VISIT (OUTPATIENT)
Dept: FAMILY MEDICINE CLINIC | Facility: CLINIC | Age: 79
End: 2024-09-13
Payer: MEDICARE

## 2024-09-13 VITALS
WEIGHT: 260 LBS | TEMPERATURE: 97.1 F | OXYGEN SATURATION: 96 % | BODY MASS INDEX: 37.22 KG/M2 | SYSTOLIC BLOOD PRESSURE: 122 MMHG | HEIGHT: 70 IN | DIASTOLIC BLOOD PRESSURE: 60 MMHG | HEART RATE: 82 BPM

## 2024-09-13 DIAGNOSIS — N18.32 TYPE 2 DIABETES MELLITUS WITH STAGE 3B CHRONIC KIDNEY DISEASE, WITHOUT LONG-TERM CURRENT USE OF INSULIN: ICD-10-CM

## 2024-09-13 DIAGNOSIS — R60.9 DEPENDENT EDEMA: Primary | ICD-10-CM

## 2024-09-13 DIAGNOSIS — E11.22 TYPE 2 DIABETES MELLITUS WITH STAGE 3B CHRONIC KIDNEY DISEASE, WITHOUT LONG-TERM CURRENT USE OF INSULIN: ICD-10-CM

## 2024-09-13 PROCEDURE — 99214 OFFICE O/P EST MOD 30 MIN: CPT | Performed by: FAMILY MEDICINE

## 2024-09-13 PROCEDURE — 3074F SYST BP LT 130 MM HG: CPT | Performed by: FAMILY MEDICINE

## 2024-09-13 PROCEDURE — 3078F DIAST BP <80 MM HG: CPT | Performed by: FAMILY MEDICINE

## 2024-09-13 PROCEDURE — 1125F AMNT PAIN NOTED PAIN PRSNT: CPT | Performed by: FAMILY MEDICINE

## 2024-09-13 RX ORDER — RELUGOLIX 120 MG/1
120 TABLET, FILM COATED ORAL DAILY
COMMUNITY
Start: 2024-08-21

## 2024-09-13 NOTE — PROGRESS NOTES
"  Subjective   Bryan Landers is a 79 y.o. male who is here for   Chief Complaint   Patient presents with    feet and legs swelling   .     Leg Swelling  This is a new problem. The current episode started 1 to 4 weeks ago. The problem occurs constantly. The problem has been gradually worsening. Pertinent negatives include no abdominal pain, anorexia, arthralgias, change in bowel habit, chest pain, chills, coughing, fatigue, fever, nausea or rash. Nothing aggravates the symptoms. Treatments tried: lasix 40 mg po daily, compression socks. The treatment provided no relief.      History of Present Illness      Review of Systems   Constitutional:  Negative for chills, fatigue and fever.   Respiratory:  Negative for cough and shortness of breath.    Cardiovascular:  Positive for leg swelling. Negative for chest pain.   Gastrointestinal:  Negative for abdominal pain, anorexia, change in bowel habit and nausea.   Musculoskeletal:  Negative for arthralgias.   Skin:  Negative for rash.       Objective   Vitals:    09/13/24 1257   BP: 122/60   Pulse: 82   Temp: 97.1 °F (36.2 °C)   SpO2: 96%   Weight: 118 kg (260 lb)   Height: 177.8 cm (70\")      Physical Exam  Vitals and nursing note reviewed.   Constitutional:       Appearance: Normal appearance. He is obese.   HENT:      Head: Normocephalic and atraumatic.   Cardiovascular:      Rate and Rhythm: Normal rate and regular rhythm.      Pulses: Normal pulses.      Heart sounds: No murmur heard.  Pulmonary:      Effort: Pulmonary effort is normal. No respiratory distress.      Breath sounds: Normal breath sounds. No wheezing.   Musculoskeletal:      Right lower leg: Edema present.      Left lower leg: Edema present.   Skin:     General: Skin is warm and dry.   Neurological:      General: No focal deficit present.      Mental Status: He is alert.   Psychiatric:         Mood and Affect: Mood normal.         Thought Content: Thought content normal.       Physical " Exam        Assessment & Plan   Assessment & Plan    Diagnoses and all orders for this visit:    1. Dependent edema (Primary)  No recent changes to previous medication.  Unclear etiology.  Will increase Lasix to 40 mg twice daily.  Will monitor for improvement.  Will also obtain baseline labs including TSH and BMP.  Will consider discontinuing pioglitazone and starting Jardiance if appropriate kidney function and if swelling does not improve.    2. 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  Discussed that pioglitazone may be contributing to his lower extremity edema.  Patient is to follow-up in 1 week.  -     TSH Rfx On Abnormal To Free T4  -     Basic metabolic panel      Results      There are no Patient Instructions on file for this visit.    There are no discontinued medications.     Return in about 1 week (around 9/20/2024), or Edema, for Recheck.           Roldan Escalona MD  [place], [place]

## 2024-09-14 LAB
BUN SERPL-MCNC: 22 MG/DL (ref 8–27)
BUN/CREAT SERPL: 15 (ref 10–24)
CALCIUM SERPL-MCNC: 8.2 MG/DL (ref 8.6–10.2)
CHLORIDE SERPL-SCNC: 100 MMOL/L (ref 96–106)
CO2 SERPL-SCNC: 23 MMOL/L (ref 20–29)
CREAT SERPL-MCNC: 1.5 MG/DL (ref 0.76–1.27)
EGFRCR SERPLBLD CKD-EPI 2021: 47 ML/MIN/1.73
GLUCOSE SERPL-MCNC: 110 MG/DL (ref 70–99)
POTASSIUM SERPL-SCNC: 5.4 MMOL/L (ref 3.5–5.2)
SODIUM SERPL-SCNC: 136 MMOL/L (ref 134–144)
T4 FREE SERPL-MCNC: 1.47 NG/DL (ref 0.82–1.77)
TSH SERPL DL<=0.005 MIU/L-ACNC: 5.54 UIU/ML (ref 0.45–4.5)

## 2024-09-17 DIAGNOSIS — E03.9 ACQUIRED HYPOTHYROIDISM: ICD-10-CM

## 2024-09-17 DIAGNOSIS — N18.32 STAGE 3B CHRONIC KIDNEY DISEASE: Primary | ICD-10-CM

## 2024-09-20 ENCOUNTER — OFFICE VISIT (OUTPATIENT)
Dept: FAMILY MEDICINE CLINIC | Facility: CLINIC | Age: 79
End: 2024-09-20
Payer: MEDICARE

## 2024-09-20 VITALS
SYSTOLIC BLOOD PRESSURE: 106 MMHG | TEMPERATURE: 97.8 F | DIASTOLIC BLOOD PRESSURE: 62 MMHG | OXYGEN SATURATION: 96 % | WEIGHT: 259 LBS | HEART RATE: 84 BPM | BODY MASS INDEX: 37.08 KG/M2 | HEIGHT: 70 IN

## 2024-09-20 DIAGNOSIS — R60.9 DEPENDENT EDEMA: ICD-10-CM

## 2024-09-20 PROCEDURE — 1159F MED LIST DOCD IN RCRD: CPT | Performed by: FAMILY MEDICINE

## 2024-09-20 PROCEDURE — 3074F SYST BP LT 130 MM HG: CPT | Performed by: FAMILY MEDICINE

## 2024-09-20 PROCEDURE — 3078F DIAST BP <80 MM HG: CPT | Performed by: FAMILY MEDICINE

## 2024-09-20 PROCEDURE — 99213 OFFICE O/P EST LOW 20 MIN: CPT | Performed by: FAMILY MEDICINE

## 2024-09-20 PROCEDURE — 1160F RVW MEDS BY RX/DR IN RCRD: CPT | Performed by: FAMILY MEDICINE

## 2024-09-20 PROCEDURE — 1125F AMNT PAIN NOTED PAIN PRSNT: CPT | Performed by: FAMILY MEDICINE

## 2024-09-20 RX ORDER — FUROSEMIDE 40 MG
40 TABLET ORAL 2 TIMES DAILY
Qty: 180 TABLET | Refills: 0 | Status: SHIPPED | OUTPATIENT
Start: 2024-09-20

## 2024-09-20 RX ORDER — POTASSIUM CHLORIDE 750 MG/1
10 TABLET, EXTENDED RELEASE ORAL 2 TIMES DAILY
Qty: 180 TABLET | Refills: 1 | Status: SHIPPED | OUTPATIENT
Start: 2024-09-20

## 2024-09-25 ENCOUNTER — TRANSCRIBE ORDERS (OUTPATIENT)
Dept: ADMINISTRATIVE | Facility: HOSPITAL | Age: 79
End: 2024-09-25
Payer: MEDICARE

## 2024-09-25 ENCOUNTER — LAB (OUTPATIENT)
Dept: LAB | Facility: HOSPITAL | Age: 79
End: 2024-09-25
Payer: MEDICARE

## 2024-09-25 DIAGNOSIS — E03.9 ACQUIRED HYPOTHYROIDISM: ICD-10-CM

## 2024-09-25 DIAGNOSIS — I12.9 HYPERTENSIVE NEPHROPATHY: ICD-10-CM

## 2024-09-25 DIAGNOSIS — E11.22 TYPE 2 DIABETES MELLITUS WITH DIABETIC CHRONIC KIDNEY DISEASE, UNSPECIFIED CKD STAGE, UNSPECIFIED WHETHER LONG TERM INSULIN USE: ICD-10-CM

## 2024-09-25 DIAGNOSIS — E55.9 AVITAMINOSIS D: ICD-10-CM

## 2024-09-25 DIAGNOSIS — N18.31 CHRONIC KIDNEY DISEASE (CKD) STAGE G3A/A1, MODERATELY DECREASED GLOMERULAR FILTRATION RATE (GFR) BETWEEN 45-59 ML/MIN/1.73 SQUARE METER AND ALBUMINURIA CREATININE RATIO LESS THAN 30 MG/G (CMS/H*: Primary | ICD-10-CM

## 2024-09-25 DIAGNOSIS — N18.32 STAGE 3B CHRONIC KIDNEY DISEASE: ICD-10-CM

## 2024-09-25 DIAGNOSIS — N18.31 CHRONIC KIDNEY DISEASE (CKD) STAGE G3A/A1, MODERATELY DECREASED GLOMERULAR FILTRATION RATE (GFR) BETWEEN 45-59 ML/MIN/1.73 SQUARE METER AND ALBUMINURIA CREATININE RATIO LESS THAN 30 MG/G (CMS/H*: ICD-10-CM

## 2024-09-25 DIAGNOSIS — N18.9 CHRONIC KIDNEY DISEASE, UNSPECIFIED CKD STAGE: ICD-10-CM

## 2024-09-25 LAB
25(OH)D3 SERPL-MCNC: 63.2 NG/ML (ref 30–100)
ALBUMIN SERPL-MCNC: 3.5 G/DL (ref 3.5–5.2)
ANION GAP SERPL CALCULATED.3IONS-SCNC: 15 MMOL/L (ref 5–15)
BACTERIA UR QL AUTO: NORMAL /HPF
BASOPHILS # BLD AUTO: 0.02 10*3/MM3 (ref 0–0.2)
BASOPHILS NFR BLD AUTO: 0.5 % (ref 0–1.5)
BILIRUB UR QL STRIP: NEGATIVE
BUN SERPL-MCNC: 15 MG/DL (ref 8–23)
BUN/CREAT SERPL: 11 (ref 7–25)
CALCIUM SPEC-SCNC: 8.4 MG/DL (ref 8.6–10.5)
CHLORIDE SERPL-SCNC: 99 MMOL/L (ref 98–107)
CLARITY UR: CLEAR
CO2 SERPL-SCNC: 23 MMOL/L (ref 22–29)
COLOR UR: YELLOW
CREAT SERPL-MCNC: 1.36 MG/DL (ref 0.76–1.27)
CREAT UR-MCNC: 67.2 MG/DL
DEPRECATED RDW RBC AUTO: 50.6 FL (ref 37–54)
EGFRCR SERPLBLD CKD-EPI 2021: 52.6 ML/MIN/1.73
EOSINOPHIL # BLD AUTO: 0.18 10*3/MM3 (ref 0–0.4)
EOSINOPHIL NFR BLD AUTO: 4.4 % (ref 0.3–6.2)
ERYTHROCYTE [DISTWIDTH] IN BLOOD BY AUTOMATED COUNT: 13 % (ref 12.3–15.4)
GLUCOSE SERPL-MCNC: 142 MG/DL (ref 65–99)
GLUCOSE UR STRIP-MCNC: NEGATIVE MG/DL
HCT VFR BLD AUTO: 28.2 % (ref 37.5–51)
HGB BLD-MCNC: 10 G/DL (ref 13–17.7)
HGB UR QL STRIP.AUTO: ABNORMAL
HYALINE CASTS UR QL AUTO: NORMAL /LPF
IMM GRANULOCYTES # BLD AUTO: 0 10*3/MM3 (ref 0–0.05)
IMM GRANULOCYTES NFR BLD AUTO: 0 % (ref 0–0.5)
KETONES UR QL STRIP: NEGATIVE
LEUKOCYTE ESTERASE UR QL STRIP.AUTO: NEGATIVE
LYMPHOCYTES # BLD AUTO: 0.72 10*3/MM3 (ref 0.7–3.1)
LYMPHOCYTES NFR BLD AUTO: 17.5 % (ref 19.6–45.3)
MCH RBC QN AUTO: 37.9 PG (ref 26.6–33)
MCHC RBC AUTO-ENTMCNC: 35.5 G/DL (ref 31.5–35.7)
MCV RBC AUTO: 106.8 FL (ref 79–97)
MONOCYTES # BLD AUTO: 0.48 10*3/MM3 (ref 0.1–0.9)
MONOCYTES NFR BLD AUTO: 11.7 % (ref 5–12)
NEUTROPHILS NFR BLD AUTO: 2.71 10*3/MM3 (ref 1.7–7)
NEUTROPHILS NFR BLD AUTO: 65.9 % (ref 42.7–76)
NITRITE UR QL STRIP: NEGATIVE
PH UR STRIP.AUTO: 7 [PH] (ref 4.5–8)
PHOSPHATE SERPL-MCNC: 2.6 MG/DL (ref 2.5–4.5)
PLATELET # BLD AUTO: 148 10*3/MM3 (ref 140–450)
PMV BLD AUTO: 9.7 FL (ref 6–12)
POTASSIUM SERPL-SCNC: 4.2 MMOL/L (ref 3.5–5.2)
PROT ?TM UR-MCNC: 8.7 MG/DL
PROT UR QL STRIP: NEGATIVE
PROT/CREAT UR: 129.5 MG/G CREA (ref 0–200)
RBC # BLD AUTO: 2.64 10*6/MM3 (ref 4.14–5.8)
RBC # UR STRIP: NORMAL /HPF
REF LAB TEST METHOD: NORMAL
SODIUM SERPL-SCNC: 137 MMOL/L (ref 136–145)
SP GR UR STRIP: 1.01 (ref 1–1.03)
SQUAMOUS #/AREA URNS HPF: NORMAL /HPF
UROBILINOGEN UR QL STRIP: ABNORMAL
WBC # UR STRIP: NORMAL /HPF
WBC NRBC COR # BLD AUTO: 4.11 10*3/MM3 (ref 3.4–10.8)

## 2024-09-25 PROCEDURE — 84439 ASSAY OF FREE THYROXINE: CPT

## 2024-09-25 PROCEDURE — 36415 COLL VENOUS BLD VENIPUNCTURE: CPT

## 2024-09-25 PROCEDURE — 81001 URINALYSIS AUTO W/SCOPE: CPT

## 2024-09-25 PROCEDURE — 84443 ASSAY THYROID STIM HORMONE: CPT

## 2024-09-25 PROCEDURE — 84156 ASSAY OF PROTEIN URINE: CPT

## 2024-09-25 PROCEDURE — 82306 VITAMIN D 25 HYDROXY: CPT

## 2024-09-25 PROCEDURE — 85025 COMPLETE CBC W/AUTO DIFF WBC: CPT

## 2024-09-25 PROCEDURE — 80069 RENAL FUNCTION PANEL: CPT

## 2024-09-25 PROCEDURE — 82570 ASSAY OF URINE CREATININE: CPT

## 2024-09-26 LAB
T4 FREE SERPL-MCNC: 1.59 NG/DL (ref 0.82–1.77)
TSH SERPL DL<=0.005 MIU/L-ACNC: 5.77 UIU/ML (ref 0.45–4.5)

## 2024-10-01 DIAGNOSIS — R41.89 COGNITIVE DECLINE: ICD-10-CM

## 2024-10-01 RX ORDER — MEMANTINE HYDROCHLORIDE 10 MG/1
20 TABLET ORAL EVERY MORNING
Qty: 180 TABLET | Refills: 3 | Status: SHIPPED | OUTPATIENT
Start: 2024-10-01

## 2024-10-18 ENCOUNTER — LAB (OUTPATIENT)
Dept: LAB | Facility: HOSPITAL | Age: 79
End: 2024-10-18
Payer: MEDICARE

## 2024-10-18 ENCOUNTER — OFFICE VISIT (OUTPATIENT)
Dept: FAMILY MEDICINE CLINIC | Facility: CLINIC | Age: 79
End: 2024-10-18
Payer: MEDICARE

## 2024-10-18 ENCOUNTER — TRANSCRIBE ORDERS (OUTPATIENT)
Dept: ADMINISTRATIVE | Facility: HOSPITAL | Age: 79
End: 2024-10-18
Payer: MEDICARE

## 2024-10-18 VITALS
OXYGEN SATURATION: 92 % | BODY MASS INDEX: 34.79 KG/M2 | HEART RATE: 80 BPM | WEIGHT: 243 LBS | HEIGHT: 70 IN | DIASTOLIC BLOOD PRESSURE: 62 MMHG | SYSTOLIC BLOOD PRESSURE: 106 MMHG | TEMPERATURE: 97.5 F

## 2024-10-18 DIAGNOSIS — Z19.1 MALIGNANT NEOPLASM WITH HORMONE SENSITIVE STATUS: ICD-10-CM

## 2024-10-18 DIAGNOSIS — C61 MALIGNANT NEOPLASM OF PROSTATE: ICD-10-CM

## 2024-10-18 DIAGNOSIS — Z79.818 PROPHYLACTIC USE OF AGENTS AFFECTING ESTROGEN RECEPTORS OR LEVELS: ICD-10-CM

## 2024-10-18 DIAGNOSIS — C77.9 REGIONAL LYMPH NODE METASTASIS PRESENT: ICD-10-CM

## 2024-10-18 DIAGNOSIS — C61 MALIGNANT NEOPLASM OF PROSTATE: Primary | ICD-10-CM

## 2024-10-18 DIAGNOSIS — C77.9 LYMPH NODE CANCER: ICD-10-CM

## 2024-10-18 DIAGNOSIS — R60.9 DEPENDENT EDEMA: Primary | ICD-10-CM

## 2024-10-18 DIAGNOSIS — C61 CANCER OF PROSTATE: ICD-10-CM

## 2024-10-18 LAB
ALBUMIN SERPL-MCNC: 3.3 G/DL (ref 3.5–5.2)
ALBUMIN/GLOB SERPL: 0.9 G/DL
ALP SERPL-CCNC: 75 U/L (ref 39–117)
ALT SERPL W P-5'-P-CCNC: 10 U/L (ref 1–41)
ANION GAP SERPL CALCULATED.3IONS-SCNC: 9.4 MMOL/L (ref 5–15)
AST SERPL-CCNC: 24 U/L (ref 1–40)
BASOPHILS # BLD AUTO: 0.04 10*3/MM3 (ref 0–0.2)
BASOPHILS NFR BLD AUTO: 0.8 % (ref 0–1.5)
BILIRUB SERPL-MCNC: 0.4 MG/DL (ref 0–1.2)
BUN SERPL-MCNC: 32 MG/DL (ref 8–23)
BUN/CREAT SERPL: 14.5 (ref 7–25)
CALCIUM SPEC-SCNC: 9.1 MG/DL (ref 8.6–10.5)
CHLORIDE SERPL-SCNC: 101 MMOL/L (ref 98–107)
CO2 SERPL-SCNC: 24.6 MMOL/L (ref 22–29)
CREAT SERPL-MCNC: 2.21 MG/DL (ref 0.76–1.27)
DEPRECATED RDW RBC AUTO: 47.3 FL (ref 37–54)
EGFRCR SERPLBLD CKD-EPI 2021: 29.4 ML/MIN/1.73
EOSINOPHIL # BLD AUTO: 0.12 10*3/MM3 (ref 0–0.4)
EOSINOPHIL NFR BLD AUTO: 2.5 % (ref 0.3–6.2)
ERYTHROCYTE [DISTWIDTH] IN BLOOD BY AUTOMATED COUNT: 12.7 % (ref 12.3–15.4)
GLOBULIN UR ELPH-MCNC: 3.6 GM/DL
GLUCOSE SERPL-MCNC: 104 MG/DL (ref 65–99)
HCT VFR BLD AUTO: 27.8 % (ref 37.5–51)
HGB BLD-MCNC: 10.2 G/DL (ref 13–17.7)
IMM GRANULOCYTES # BLD AUTO: 0.04 10*3/MM3 (ref 0–0.05)
IMM GRANULOCYTES NFR BLD AUTO: 0.8 % (ref 0–0.5)
LDH SERPL-CCNC: 206 U/L (ref 135–225)
LYMPHOCYTES # BLD AUTO: 0.92 10*3/MM3 (ref 0.7–3.1)
LYMPHOCYTES NFR BLD AUTO: 18.9 % (ref 19.6–45.3)
MCH RBC QN AUTO: 38.6 PG (ref 26.6–33)
MCHC RBC AUTO-ENTMCNC: 36.7 G/DL (ref 31.5–35.7)
MCV RBC AUTO: 105.3 FL (ref 79–97)
MONOCYTES # BLD AUTO: 0.58 10*3/MM3 (ref 0.1–0.9)
MONOCYTES NFR BLD AUTO: 11.9 % (ref 5–12)
NEUTROPHILS NFR BLD AUTO: 3.18 10*3/MM3 (ref 1.7–7)
NEUTROPHILS NFR BLD AUTO: 65.1 % (ref 42.7–76)
PLATELET # BLD AUTO: 181 10*3/MM3 (ref 140–450)
PMV BLD AUTO: 9.1 FL (ref 6–12)
POTASSIUM SERPL-SCNC: 5.5 MMOL/L (ref 3.5–5.2)
PROT SERPL-MCNC: 6.9 G/DL (ref 6–8.5)
RBC # BLD AUTO: 2.64 10*6/MM3 (ref 4.14–5.8)
SODIUM SERPL-SCNC: 135 MMOL/L (ref 136–145)
TESTOST SERPL-MCNC: 19 NG/DL (ref 193–740)
WBC NRBC COR # BLD AUTO: 4.88 10*3/MM3 (ref 3.4–10.8)

## 2024-10-18 PROCEDURE — 1125F AMNT PAIN NOTED PAIN PRSNT: CPT | Performed by: FAMILY MEDICINE

## 2024-10-18 PROCEDURE — 1160F RVW MEDS BY RX/DR IN RCRD: CPT | Performed by: FAMILY MEDICINE

## 2024-10-18 PROCEDURE — 80053 COMPREHEN METABOLIC PANEL: CPT

## 2024-10-18 PROCEDURE — 84153 ASSAY OF PSA TOTAL: CPT

## 2024-10-18 PROCEDURE — 85025 COMPLETE CBC W/AUTO DIFF WBC: CPT

## 2024-10-18 PROCEDURE — 1159F MED LIST DOCD IN RCRD: CPT | Performed by: FAMILY MEDICINE

## 2024-10-18 PROCEDURE — 84403 ASSAY OF TOTAL TESTOSTERONE: CPT

## 2024-10-18 PROCEDURE — 36415 COLL VENOUS BLD VENIPUNCTURE: CPT

## 2024-10-18 PROCEDURE — 3078F DIAST BP <80 MM HG: CPT | Performed by: FAMILY MEDICINE

## 2024-10-18 PROCEDURE — 3074F SYST BP LT 130 MM HG: CPT | Performed by: FAMILY MEDICINE

## 2024-10-18 PROCEDURE — 99213 OFFICE O/P EST LOW 20 MIN: CPT | Performed by: FAMILY MEDICINE

## 2024-10-18 PROCEDURE — 83615 LACTATE (LD) (LDH) ENZYME: CPT

## 2024-10-18 NOTE — PROGRESS NOTES
"  Subjective   Bryan Landers is a 79 y.o. male who is here for   Chief Complaint   Patient presents with    Edema   .     History of Present Illness   We doubled lasix and added K last month  Due to painful leg edema  Weigh is down 16 lbs  Feels much better  Blood pressure did drop while he was in physical therapy 1 day.  Explained this certainly could be the furosemide        The following portions of the patient's history were reviewed and updated as appropriate: allergies, current medications, past family history, past medical history, past social history, past surgical history, and problem list.    Review of Systems    Objective   Vitals:    10/18/24 1434   BP: 106/62   BP Location: Left arm   Patient Position: Sitting   Cuff Size: Adult   Pulse: 80   Temp: 97.5 °F (36.4 °C)   SpO2: 92%   Weight: 110 kg (243 lb)   Height: 177.8 cm (70\")      Physical Exam  Vitals reviewed.   Neurological:      Mental Status: He is alert.     Dependent edema in both his legs is much improved    Assessment & Plan   Diagnoses and all orders for this visit:    1. Dependent edema (Primary)    Counseled patient and wife to reduce the furosemide to once a day use.  If he finds his weight has gone up or the swelling has worsened in his legs and ankles to go back to twice daily dosing for 2 to 7 days and just the dose as tolerated.  Stay on the potassium    There are no Patient Instructions on file for this visit.    There are no discontinued medications.     No follow-ups on file.    Dr. Jose Fonseca  [place], [place]    "

## 2024-10-19 LAB — PSA SERPL DL<=0.01 NG/ML-MCNC: 0.01 NG/ML (ref 0–4)

## 2024-10-30 ENCOUNTER — FLU SHOT (OUTPATIENT)
Dept: FAMILY MEDICINE CLINIC | Facility: CLINIC | Age: 79
End: 2024-10-30
Payer: MEDICARE

## 2024-10-30 DIAGNOSIS — Z23 NEED FOR INFLUENZA VACCINATION: Primary | ICD-10-CM

## 2024-10-30 PROCEDURE — 90662 IIV NO PRSV INCREASED AG IM: CPT | Performed by: FAMILY MEDICINE

## 2024-10-30 PROCEDURE — G0008 ADMIN INFLUENZA VIRUS VAC: HCPCS | Performed by: FAMILY MEDICINE

## 2024-11-22 DIAGNOSIS — N18.32 STAGE 3B CHRONIC KIDNEY DISEASE: ICD-10-CM

## 2024-11-22 RX ORDER — LISINOPRIL 2.5 MG/1
2.5 TABLET ORAL DAILY
Qty: 90 TABLET | Refills: 0 | Status: SHIPPED | OUTPATIENT
Start: 2024-11-22

## 2024-12-13 ENCOUNTER — TELEPHONE (OUTPATIENT)
Dept: FAMILY MEDICINE CLINIC | Facility: CLINIC | Age: 79
End: 2024-12-13

## 2024-12-13 DIAGNOSIS — R41.89 COGNITIVE DECLINE: Primary | ICD-10-CM

## 2024-12-13 DIAGNOSIS — R29.6 FALLS FREQUENTLY: ICD-10-CM

## 2024-12-13 NOTE — TELEPHONE ENCOUNTER
Caller: Chloe Landers    Relationship: Emergency Contact    Best call back number: [phone]     What orders are you requesting (i.e. lab or imaging): PT ORDERS    In what timeframe would the patient need to come in: ASAP    Where will you receive your lab/imaging services: THE [facility]    Additional notes: FAX: [phone] ATTN: DEL SHIELDS PHONE: [phone]        Yes will order PT for fall prevention

## 2025-01-03 NOTE — TELEPHONE ENCOUNTER
Caller: Chloe Landers    Relationship: Emergency Contact    Best call back number: [phone]     What orders are you requesting (i.e. lab or imaging): PT ORDERS     In what timeframe would the patient need to come in: ASAP    Where will you receive your lab/imaging services: THE [facility]    Additional notes: FAX: [phone] ATTN: DEL SHIELDS PHONE: [phone]    PATIENT IS WANTING PHYSICAL THERAPY TO HELP WITH LEG WEAKNESS.       Yes be glad to order the physical therapy to be done by the in-house therapist at the [facility].  For fall prevention

## 2025-01-21 ENCOUNTER — LAB (OUTPATIENT)
Dept: LAB | Facility: HOSPITAL | Age: 80
End: 2025-01-21
Payer: MEDICARE

## 2025-01-21 ENCOUNTER — TRANSCRIBE ORDERS (OUTPATIENT)
Dept: ADMINISTRATIVE | Facility: HOSPITAL | Age: 80
End: 2025-01-21
Payer: MEDICARE

## 2025-01-21 DIAGNOSIS — Z79.818 PROPHYLACTIC USE OF AGENTS AFFECTING ESTROGEN RECEPTORS OR LEVELS: ICD-10-CM

## 2025-01-21 DIAGNOSIS — C61 MALIGNANT NEOPLASM OF PROSTATE: ICD-10-CM

## 2025-01-21 DIAGNOSIS — Z92.3 PERSONAL HISTORY OF IRRADIATION, PRESENTING HAZARDS TO HEALTH: ICD-10-CM

## 2025-01-21 DIAGNOSIS — C77.9 LYMPH NODE CANCER: ICD-10-CM

## 2025-01-21 DIAGNOSIS — Z92.3 PERSONAL HISTORY OF IRRADIATION, PRESENTING HAZARDS TO HEALTH: Primary | ICD-10-CM

## 2025-01-21 DIAGNOSIS — Z19.1 MALIGNANT NEOPLASM WITH HORMONE SENSITIVE STATUS: ICD-10-CM

## 2025-01-21 LAB
ALBUMIN SERPL-MCNC: 3.7 G/DL (ref 3.5–5.2)
ALBUMIN/GLOB SERPL: 1.2 G/DL
ALP SERPL-CCNC: 76 U/L (ref 39–117)
ALT SERPL W P-5'-P-CCNC: 17 U/L (ref 1–41)
ANION GAP SERPL CALCULATED.3IONS-SCNC: 15.4 MMOL/L (ref 5–15)
AST SERPL-CCNC: 29 U/L (ref 1–40)
BASOPHILS # BLD AUTO: 0.03 10*3/MM3 (ref 0–0.2)
BASOPHILS NFR BLD AUTO: 0.7 % (ref 0–1.5)
BILIRUB SERPL-MCNC: 0.4 MG/DL (ref 0–1.2)
BUN SERPL-MCNC: 33 MG/DL (ref 8–23)
BUN/CREAT SERPL: 16.8 (ref 7–25)
CALCIUM SPEC-SCNC: 10.2 MG/DL (ref 8.6–10.5)
CHLORIDE SERPL-SCNC: 96 MMOL/L (ref 98–107)
CO2 SERPL-SCNC: 25.6 MMOL/L (ref 22–29)
CREAT SERPL-MCNC: 1.97 MG/DL (ref 0.76–1.27)
DEPRECATED RDW RBC AUTO: 50.8 FL (ref 37–54)
EGFRCR SERPLBLD CKD-EPI 2021: 33.5 ML/MIN/1.73
EOSINOPHIL # BLD AUTO: 0.12 10*3/MM3 (ref 0–0.4)
EOSINOPHIL NFR BLD AUTO: 2.7 % (ref 0.3–6.2)
ERYTHROCYTE [DISTWIDTH] IN BLOOD BY AUTOMATED COUNT: 13.5 % (ref 12.3–15.4)
GLOBULIN UR ELPH-MCNC: 3.2 GM/DL
GLUCOSE SERPL-MCNC: 111 MG/DL (ref 65–99)
HCT VFR BLD AUTO: 29.1 % (ref 37.5–51)
HGB BLD-MCNC: 10.5 G/DL (ref 13–17.7)
IMM GRANULOCYTES # BLD AUTO: 0.02 10*3/MM3 (ref 0–0.05)
IMM GRANULOCYTES NFR BLD AUTO: 0.4 % (ref 0–0.5)
LDH SERPL-CCNC: 186 U/L (ref 135–225)
LYMPHOCYTES # BLD AUTO: 1.11 10*3/MM3 (ref 0.7–3.1)
LYMPHOCYTES NFR BLD AUTO: 24.7 % (ref 19.6–45.3)
MCH RBC QN AUTO: 37.2 PG (ref 26.6–33)
MCHC RBC AUTO-ENTMCNC: 36.1 G/DL (ref 31.5–35.7)
MCV RBC AUTO: 103.2 FL (ref 79–97)
MONOCYTES # BLD AUTO: 0.54 10*3/MM3 (ref 0.1–0.9)
MONOCYTES NFR BLD AUTO: 12 % (ref 5–12)
NEUTROPHILS NFR BLD AUTO: 2.68 10*3/MM3 (ref 1.7–7)
NEUTROPHILS NFR BLD AUTO: 59.5 % (ref 42.7–76)
NRBC BLD AUTO-RTO: 0 /100 WBC (ref 0–0.2)
PLATELET # BLD AUTO: 154 10*3/MM3 (ref 140–450)
PMV BLD AUTO: 9.2 FL (ref 6–12)
POTASSIUM SERPL-SCNC: 4.5 MMOL/L (ref 3.5–5.2)
PROT SERPL-MCNC: 6.9 G/DL (ref 6–8.5)
PSA SERPL-MCNC: <0.014 NG/ML (ref 0–4)
RBC # BLD AUTO: 2.82 10*6/MM3 (ref 4.14–5.8)
SODIUM SERPL-SCNC: 137 MMOL/L (ref 136–145)
TESTOST SERPL-MCNC: 13.8 NG/DL (ref 193–740)
WBC NRBC COR # BLD AUTO: 4.5 10*3/MM3 (ref 3.4–10.8)

## 2025-01-21 PROCEDURE — 84153 ASSAY OF PSA TOTAL: CPT

## 2025-01-21 PROCEDURE — 85025 COMPLETE CBC W/AUTO DIFF WBC: CPT

## 2025-01-21 PROCEDURE — 84403 ASSAY OF TOTAL TESTOSTERONE: CPT

## 2025-01-21 PROCEDURE — 36415 COLL VENOUS BLD VENIPUNCTURE: CPT

## 2025-01-21 PROCEDURE — 83615 LACTATE (LD) (LDH) ENZYME: CPT

## 2025-01-21 PROCEDURE — 80053 COMPREHEN METABOLIC PANEL: CPT

## 2025-02-02 DIAGNOSIS — E03.9 ACQUIRED HYPOTHYROIDISM: ICD-10-CM

## 2025-02-03 RX ORDER — LEVOTHYROXINE SODIUM 75 UG/1
TABLET ORAL
Qty: 90 TABLET | Refills: 3 | Status: SHIPPED | OUTPATIENT
Start: 2025-02-03

## 2025-02-22 DIAGNOSIS — E11.9 TYPE 2 DIABETES MELLITUS WITHOUT COMPLICATION, WITHOUT LONG-TERM CURRENT USE OF INSULIN: ICD-10-CM

## 2025-02-22 DIAGNOSIS — N18.32 STAGE 3B CHRONIC KIDNEY DISEASE: ICD-10-CM

## 2025-02-26 RX ORDER — PIOGLITAZONE 30 MG/1
30 TABLET ORAL DAILY
Qty: 90 TABLET | Refills: 3 | OUTPATIENT
Start: 2025-02-26

## 2025-02-26 RX ORDER — LISINOPRIL 2.5 MG/1
2.5 TABLET ORAL DAILY
Qty: 90 TABLET | Refills: 0 | Status: SHIPPED | OUTPATIENT
Start: 2025-02-26

## 2025-03-04 DIAGNOSIS — E11.9 TYPE 2 DIABETES MELLITUS WITHOUT COMPLICATION, WITHOUT LONG-TERM CURRENT USE OF INSULIN: ICD-10-CM

## 2025-03-04 RX ORDER — PIOGLITAZONE 30 MG/1
30 TABLET ORAL DAILY
Qty: 90 TABLET | Refills: 3 | OUTPATIENT
Start: 2025-03-04

## 2025-03-20 ENCOUNTER — OFFICE VISIT (OUTPATIENT)
Dept: FAMILY MEDICINE CLINIC | Facility: CLINIC | Age: 80
End: 2025-03-20
Payer: MEDICARE

## 2025-03-20 VITALS
OXYGEN SATURATION: 98 % | SYSTOLIC BLOOD PRESSURE: 118 MMHG | BODY MASS INDEX: 35.65 KG/M2 | WEIGHT: 249 LBS | HEART RATE: 87 BPM | DIASTOLIC BLOOD PRESSURE: 70 MMHG | HEIGHT: 70 IN | TEMPERATURE: 96.9 F

## 2025-03-20 DIAGNOSIS — M1A.0620 IDIOPATHIC CHRONIC GOUT OF LEFT KNEE WITHOUT TOPHUS: ICD-10-CM

## 2025-03-20 DIAGNOSIS — N18.32 STAGE 3B CHRONIC KIDNEY DISEASE: ICD-10-CM

## 2025-03-20 DIAGNOSIS — D68.51 FACTOR V LEIDEN CARRIER: Primary | ICD-10-CM

## 2025-03-20 DIAGNOSIS — I27.82 OTHER CHRONIC PULMONARY EMBOLISM WITHOUT ACUTE COR PULMONALE: ICD-10-CM

## 2025-03-20 DIAGNOSIS — E78.2 MIXED HYPERLIPIDEMIA: ICD-10-CM

## 2025-03-20 DIAGNOSIS — R60.9 DEPENDENT EDEMA: ICD-10-CM

## 2025-03-20 PROBLEM — D63.1 ANEMIA OF CHRONIC KIDNEY FAILURE, STAGE 3 (MODERATE): Status: ACTIVE | Noted: 2022-08-29

## 2025-03-20 PROBLEM — N18.30 ANEMIA OF CHRONIC KIDNEY FAILURE, STAGE 3 (MODERATE): Status: ACTIVE | Noted: 2022-08-29

## 2025-03-20 RX ORDER — LISINOPRIL 2.5 MG/1
2.5 TABLET ORAL DAILY
Qty: 90 TABLET | Refills: 4 | Status: SHIPPED | OUTPATIENT
Start: 2025-03-20

## 2025-03-20 RX ORDER — ALLOPURINOL 100 MG/1
100 TABLET ORAL DAILY
Qty: 90 TABLET | Refills: 3 | Status: SHIPPED | OUTPATIENT
Start: 2025-03-20

## 2025-03-20 RX ORDER — FUROSEMIDE 40 MG/1
40 TABLET ORAL DAILY
Qty: 90 TABLET | Refills: 3 | Status: SHIPPED | OUTPATIENT
Start: 2025-03-20

## 2025-03-20 RX ORDER — ATORVASTATIN CALCIUM 80 MG/1
80 TABLET, FILM COATED ORAL DAILY
Qty: 90 TABLET | Refills: 3 | Status: SHIPPED | OUTPATIENT
Start: 2025-03-20

## 2025-03-20 RX ORDER — POTASSIUM CHLORIDE 750 MG/1
10 TABLET, EXTENDED RELEASE ORAL
Qty: 90 TABLET | Refills: 3 | Status: SHIPPED | OUTPATIENT
Start: 2025-03-20

## 2025-03-20 NOTE — PROGRESS NOTES
"  Chief Complaint   Patient presents with    Memory Loss     worsening    Hypothyroidism    Diabetes       Subjective   Bryan Landers is an 80 y.o. male who presents for follow up of diabetes. Current symptoms include: hyperglycemia and paresthesia of the feet. Patient denies foot ulcerations. Evaluation to date has included: fasting blood sugar, fasting lipid panel, hemoglobin A1C, and microalbuminuria. Home sugars: patient does not check sugars. Current treatments: more intensive attention to diet which has been somewhat effective, Continued Actos which has been somewhat effective, Continued statin which has been effective, and Continued ACE inhibitor/ARB which has been effective. Discussed importance of yearly eye exams and checking feet for skin integrity.      The following portions of the patient's history were reviewed and updated as appropriate: allergies, current medications, past medical history, past social history, past surgical history, and problem list.    Review of Systems  Pertinent items are noted in HPI.     Vitals:    03/20/25 1041   BP: 118/70   Pulse: 87   Temp: 96.9 °F (36.1 °C)   SpO2: 98%   Weight: 113 kg (249 lb)   Height: 177.8 cm (70\")       Objective    Gen:  Alert, pleasant  Ears: canals clear, TMs normal  Throat: clear , no thrush, teeth ok  Neck: no bruit, no LAD  Lungs: clear  Heart: RR no murmur  Feet:  No rash, no skin breakdown, sensation grossly normal.    Laboratory:  Results for orders placed or performed in visit on 01/21/25   Lactate Dehydrogenase    Collection Time: 01/21/25  2:32 PM    Specimen: Blood   Result Value Ref Range     135 - 225 U/L   Testosterone    Collection Time: 01/21/25  2:32 PM    Specimen: Blood   Result Value Ref Range    Testosterone, Total 13.80 (L) 193.00 - 740.00 ng/dL   Comprehensive Metabolic Panel    Collection Time: 01/21/25  2:32 PM    Specimen: Blood   Result Value Ref Range    Glucose 111 (H) 65 - 99 mg/dL    BUN 33 (H) 8 - 23 mg/dL    " Creatinine 1.97 (H) 0.76 - 1.27 mg/dL    Sodium 137 136 - 145 mmol/L    Potassium 4.5 3.5 - 5.2 mmol/L    Chloride 96 (L) 98 - 107 mmol/L    CO2 25.6 22.0 - 29.0 mmol/L    Calcium 10.2 8.6 - 10.5 mg/dL    Total Protein 6.9 6.0 - 8.5 g/dL    Albumin 3.7 3.5 - 5.2 g/dL    ALT (SGPT) 17 1 - 41 U/L    AST (SGOT) 29 1 - 40 U/L    Alkaline Phosphatase 76 39 - 117 U/L    Total Bilirubin 0.4 0.0 - 1.2 mg/dL    Globulin 3.2 gm/dL    A/G Ratio 1.2 g/dL    BUN/Creatinine Ratio 16.8 7.0 - 25.0    Anion Gap 15.4 (H) 5.0 - 15.0 mmol/L    eGFR 33.5 (L) >60.0 mL/min/1.73   CBC Auto Differential    Collection Time: 01/21/25  2:32 PM    Specimen: Blood   Result Value Ref Range    WBC 4.50 3.40 - 10.80 10*3/mm3    RBC 2.82 (L) 4.14 - 5.80 10*6/mm3    Hemoglobin 10.5 (L) 13.0 - 17.7 g/dL    Hematocrit 29.1 (L) 37.5 - 51.0 %    .2 (H) 79.0 - 97.0 fL    MCH 37.2 (H) 26.6 - 33.0 pg    MCHC 36.1 (H) 31.5 - 35.7 g/dL    RDW 13.5 12.3 - 15.4 %    RDW-SD 50.8 37.0 - 54.0 fl    MPV 9.2 6.0 - 12.0 fL    Platelets 154 140 - 450 10*3/mm3    Neutrophil % 59.5 42.7 - 76.0 %    Lymphocyte % 24.7 19.6 - 45.3 %    Monocyte % 12.0 5.0 - 12.0 %    Eosinophil % 2.7 0.3 - 6.2 %    Basophil % 0.7 0.0 - 1.5 %    Immature Grans % 0.4 0.0 - 0.5 %    Neutrophils, Absolute 2.68 1.70 - 7.00 10*3/mm3    Lymphocytes, Absolute 1.11 0.70 - 3.10 10*3/mm3    Monocytes, Absolute 0.54 0.10 - 0.90 10*3/mm3    Eosinophils, Absolute 0.12 0.00 - 0.40 10*3/mm3    Basophils, Absolute 0.03 0.00 - 0.20 10*3/mm3    Immature Grans, Absolute 0.02 0.00 - 0.05 10*3/mm3    nRBC 0.0 0.0 - 0.2 /100 WBC   PSA Diagnostic    Collection Time: 01/21/25  2:32 PM    Specimen: Blood   Result Value Ref Range    PSA <0.014 0.000 - 4.000 ng/mL         Assessment & Plan        Addressed ADA diet.  Continued Actos; see medication orders.  Continued statin drug see medication orders.  Continued ACE inhibitor; see medication orders.  Follow up in 6 months or as needed.    Diagnoses and all  orders for this visit:    1. 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 (Primary)  -     apixaban (Eliquis) 2.5 MG tablet tablet; Take 1 tablet by mouth Every 12 (Twelve) Hours.  Dispense: 60 tablet; Refill: 11    2. Idiopathic chronic gout of left knee without tophus  -     allopurinol (ZYLOPRIM) 100 MG tablet; Take 1 tablet by mouth Daily. Indications: Gout  Dispense: 90 tablet; Refill: 3    3. Mixed hyperlipidemia  -     atorvastatin (LIPITOR) 80 MG tablet; Take 1 tablet by mouth Daily. Indications: High Amount of Fats in the Blood  Dispense: 90 tablet; Refill: 3    4. Other chronic pulmonary embolism without acute cor pulmonale  -     apixaban (Eliquis) 2.5 MG tablet tablet; Take 1 tablet by mouth Every 12 (Twelve) Hours.  Dispense: 60 tablet; Refill: 11    5. Dependent edema  -     furosemide (LASIX) 40 MG tablet; Take 1 tablet by mouth Daily. Indications: Edema  Dispense: 90 tablet; Refill: 3  -     potassium chloride 10 MEQ CR tablet; Take 1 tablet by mouth Daily With Breakfast. Indications: Low Amount of Potassium in the Blood, take with Lasix  Dispense: 90 tablet; Refill: 3    6. Stage 3b chronic kidney disease  -     lisinopril (PRINIVIL,ZESTRIL) 2.5 MG tablet; Take 1 tablet by mouth Daily. Indications: High Blood Pressure  Dispense: 90 tablet; Refill: 4    Bryan brought in today by his daughter.  Bryan and his wife are living in an assisted living facility  He did fall 1 time in the dining room  Reports he is drinking less alcohol  Current with nephrology  Due for refills  Reviewed his medication list with daughter so she understands what he is on  Still quite anemic.  This is chronic, likely due to chronic kidney disease  Dr. Ba will take a look see if he qualifies for Procrit    Education: Reviewed ‘ABCs’ of diabetes management:    A1C (<7), blood pressure (<130/80), and cholesterol (LDL <100).  Discussed healthy diabetic eating plan.  May refer to ADA web site, diabetes.org    Return in about 6 months (around 9/20/2025)  for Medicare Wellness visit.  There are no Patient Instructions on file for this visit.  Medications Discontinued During This Encounter   Medication Reason    allopurinol (ZYLOPRIM) 100 MG tablet Reorder    atorvastatin (LIPITOR) 80 MG tablet Reorder    Eliquis 2.5 MG tablet tablet Reorder    furosemide (LASIX) 40 MG tablet Reorder    potassium chloride 10 MEQ CR tablet Reorder    lisinopril (PRINIVIL,ZESTRIL) 2.5 MG tablet Reorder         Dr. Jose Fonseca MD  [place], [place]  [facility].

## 2025-04-09 ENCOUNTER — LAB (OUTPATIENT)
Dept: LAB | Facility: HOSPITAL | Age: 80
End: 2025-04-09
Payer: MEDICARE

## 2025-04-09 ENCOUNTER — TRANSCRIBE ORDERS (OUTPATIENT)
Dept: ADMINISTRATIVE | Facility: HOSPITAL | Age: 80
End: 2025-04-09
Payer: MEDICARE

## 2025-04-09 DIAGNOSIS — E11.22 TYPE 2 DIABETES MELLITUS WITH ESRD (END-STAGE RENAL DISEASE): ICD-10-CM

## 2025-04-09 DIAGNOSIS — N18.31 CHRONIC KIDNEY DISEASE (CKD) STAGE G3A/A1, MODERATELY DECREASED GLOMERULAR FILTRATION RATE (GFR) BETWEEN 45-59 ML/MIN/1.73 SQUARE METER AND ALBUMINURIA CREATININE RATIO LESS THAN 30 MG/G (CMS/H*: ICD-10-CM

## 2025-04-09 DIAGNOSIS — E55.9 AVITAMINOSIS D: ICD-10-CM

## 2025-04-09 DIAGNOSIS — N18.31 CHRONIC KIDNEY DISEASE (CKD) STAGE G3A/A1, MODERATELY DECREASED GLOMERULAR FILTRATION RATE (GFR) BETWEEN 45-59 ML/MIN/1.73 SQUARE METER AND ALBUMINURIA CREATININE RATIO LESS THAN 30 MG/G (CMS/H*: Primary | ICD-10-CM

## 2025-04-09 DIAGNOSIS — N18.6 TYPE 2 DIABETES MELLITUS WITH ESRD (END-STAGE RENAL DISEASE): ICD-10-CM

## 2025-04-09 DIAGNOSIS — N18.9 CHRONIC KIDNEY DISEASE, UNSPECIFIED CKD STAGE: ICD-10-CM

## 2025-04-09 DIAGNOSIS — I12.9 HYPERTENSIVE NEPHROPATHY: ICD-10-CM

## 2025-04-09 LAB
25(OH)D3 SERPL-MCNC: 68.7 NG/ML (ref 30–100)
ALBUMIN SERPL-MCNC: 3.4 G/DL (ref 3.5–5.2)
ANION GAP SERPL CALCULATED.3IONS-SCNC: 15.3 MMOL/L (ref 5–15)
BACTERIA UR QL AUTO: NORMAL /HPF
BILIRUB UR QL STRIP: NEGATIVE
BUN SERPL-MCNC: 18 MG/DL (ref 8–23)
BUN/CREAT SERPL: 11.3 (ref 7–25)
CALCIUM SPEC-SCNC: 9.3 MG/DL (ref 8.6–10.5)
CALCIUM SPEC-SCNC: 9.3 MG/DL (ref 8.6–10.5)
CHLORIDE SERPL-SCNC: 96 MMOL/L (ref 98–107)
CLARITY UR: CLEAR
CO2 SERPL-SCNC: 28.7 MMOL/L (ref 22–29)
COLOR UR: YELLOW
CREAT SERPL-MCNC: 1.59 MG/DL (ref 0.76–1.27)
CREAT UR-MCNC: 50.4 MG/DL
EGFRCR SERPLBLD CKD-EPI 2021: 43.3 ML/MIN/1.73
GLUCOSE SERPL-MCNC: 112 MG/DL (ref 65–99)
GLUCOSE UR STRIP-MCNC: NEGATIVE MG/DL
HGB UR QL STRIP.AUTO: NEGATIVE
HYALINE CASTS UR QL AUTO: NORMAL /LPF
KETONES UR QL STRIP: NEGATIVE
LEUKOCYTE ESTERASE UR QL STRIP.AUTO: NEGATIVE
NITRITE UR QL STRIP: NEGATIVE
PH UR STRIP.AUTO: 6.5 [PH] (ref 5–8)
PHOSPHATE SERPL-MCNC: 3.7 MG/DL (ref 2.5–4.5)
POTASSIUM SERPL-SCNC: 3.6 MMOL/L (ref 3.5–5.2)
PROT ?TM UR-MCNC: 6.1 MG/DL
PROT UR QL STRIP: NEGATIVE
PROT/CREAT UR: 121 MG/G CREA (ref 0–200)
PTH-INTACT SERPL-MCNC: 40.2 PG/ML (ref 15–65)
RBC # UR STRIP: NORMAL /HPF
REF LAB TEST METHOD: NORMAL
SODIUM SERPL-SCNC: 140 MMOL/L (ref 136–145)
SP GR UR STRIP: 1.01 (ref 1–1.03)
SQUAMOUS #/AREA URNS HPF: NORMAL /HPF
UROBILINOGEN UR QL STRIP: NORMAL
WBC # UR STRIP: NORMAL /HPF

## 2025-04-09 PROCEDURE — 80069 RENAL FUNCTION PANEL: CPT

## 2025-04-09 PROCEDURE — 82570 ASSAY OF URINE CREATININE: CPT

## 2025-04-09 PROCEDURE — 82310 ASSAY OF CALCIUM: CPT

## 2025-04-09 PROCEDURE — 84156 ASSAY OF PROTEIN URINE: CPT

## 2025-04-09 PROCEDURE — 83970 ASSAY OF PARATHORMONE: CPT

## 2025-04-09 PROCEDURE — 36415 COLL VENOUS BLD VENIPUNCTURE: CPT

## 2025-04-09 PROCEDURE — 82306 VITAMIN D 25 HYDROXY: CPT

## 2025-04-09 PROCEDURE — 81001 URINALYSIS AUTO W/SCOPE: CPT

## 2025-06-04 ENCOUNTER — APPOINTMENT (OUTPATIENT)
Dept: GENERAL RADIOLOGY | Facility: HOSPITAL | Age: 80
End: 2025-06-04
Payer: MEDICARE

## 2025-06-04 ENCOUNTER — APPOINTMENT (OUTPATIENT)
Dept: CT IMAGING | Facility: HOSPITAL | Age: 80
End: 2025-06-04
Payer: MEDICARE

## 2025-06-04 ENCOUNTER — HOSPITAL ENCOUNTER (INPATIENT)
Facility: HOSPITAL | Age: 80
LOS: 11 days | Discharge: NURSING, REHAB OR LONG-TERM CARE | End: 2025-06-16
Attending: EMERGENCY MEDICINE | Admitting: INTERNAL MEDICINE
Payer: MEDICARE

## 2025-06-04 DIAGNOSIS — R55 SYNCOPE, UNSPECIFIED SYNCOPE TYPE: ICD-10-CM

## 2025-06-04 DIAGNOSIS — N18.9 ACUTE RENAL FAILURE SUPERIMPOSED ON CHRONIC KIDNEY DISEASE, UNSPECIFIED ACUTE RENAL FAILURE TYPE, UNSPECIFIED CKD STAGE: Primary | ICD-10-CM

## 2025-06-04 DIAGNOSIS — D64.9 ANEMIA, UNSPECIFIED TYPE: ICD-10-CM

## 2025-06-04 DIAGNOSIS — R00.2 PALPITATIONS: ICD-10-CM

## 2025-06-04 DIAGNOSIS — R73.9 HYPERGLYCEMIA: ICD-10-CM

## 2025-06-04 DIAGNOSIS — I49.3 MULTIFOCAL PVCS: ICD-10-CM

## 2025-06-04 DIAGNOSIS — R79.89 ELEVATED TROPONIN: ICD-10-CM

## 2025-06-04 DIAGNOSIS — Z79.01 ANTICOAGULATED: ICD-10-CM

## 2025-06-04 DIAGNOSIS — N17.9 ACUTE RENAL FAILURE SUPERIMPOSED ON CHRONIC KIDNEY DISEASE, UNSPECIFIED ACUTE RENAL FAILURE TYPE, UNSPECIFIED CKD STAGE: Primary | ICD-10-CM

## 2025-06-04 DIAGNOSIS — S09.90XA CLOSED HEAD INJURY, INITIAL ENCOUNTER: ICD-10-CM

## 2025-06-04 LAB
ALBUMIN SERPL-MCNC: 3.2 G/DL (ref 3.5–5.2)
ALBUMIN/GLOB SERPL: 1 G/DL
ALP SERPL-CCNC: 105 U/L (ref 39–117)
ALT SERPL W P-5'-P-CCNC: 20 U/L (ref 1–41)
ANION GAP SERPL CALCULATED.3IONS-SCNC: 11.8 MMOL/L (ref 5–15)
APTT PPP: 23.7 SECONDS (ref 22.7–35.4)
AST SERPL-CCNC: 32 U/L (ref 1–40)
BASOPHILS # BLD AUTO: 0.03 10*3/MM3 (ref 0–0.2)
BASOPHILS NFR BLD AUTO: 0.5 % (ref 0–1.5)
BILIRUB SERPL-MCNC: 0.4 MG/DL (ref 0–1.2)
BUN SERPL-MCNC: 25 MG/DL (ref 8–23)
BUN/CREAT SERPL: 11 (ref 7–25)
CALCIUM SPEC-SCNC: 9.3 MG/DL (ref 8.6–10.5)
CHLORIDE SERPL-SCNC: 99 MMOL/L (ref 98–107)
CO2 SERPL-SCNC: 27.2 MMOL/L (ref 22–29)
CREAT SERPL-MCNC: 2.27 MG/DL (ref 0.76–1.27)
DEPRECATED RDW RBC AUTO: 52.7 FL (ref 37–54)
EGFRCR SERPLBLD CKD-EPI 2021: 28.3 ML/MIN/1.73
EOSINOPHIL # BLD AUTO: 0.13 10*3/MM3 (ref 0–0.4)
EOSINOPHIL NFR BLD AUTO: 2.3 % (ref 0.3–6.2)
ERYTHROCYTE [DISTWIDTH] IN BLOOD BY AUTOMATED COUNT: 14.4 % (ref 12.3–15.4)
GLOBULIN UR ELPH-MCNC: 3.3 GM/DL
GLUCOSE SERPL-MCNC: 111 MG/DL (ref 65–99)
HCT VFR BLD AUTO: 31 % (ref 37.5–51)
HGB BLD-MCNC: 11 G/DL (ref 13–17.7)
IMM GRANULOCYTES # BLD AUTO: 0.04 10*3/MM3 (ref 0–0.05)
IMM GRANULOCYTES NFR BLD AUTO: 0.7 % (ref 0–0.5)
INR PPP: 1.24 (ref 0.9–1.1)
LYMPHOCYTES # BLD AUTO: 1.4 10*3/MM3 (ref 0.7–3.1)
LYMPHOCYTES NFR BLD AUTO: 24.4 % (ref 19.6–45.3)
MAGNESIUM SERPL-MCNC: 1.6 MG/DL (ref 1.6–2.4)
MCH RBC QN AUTO: 36.7 PG (ref 26.6–33)
MCHC RBC AUTO-ENTMCNC: 35.5 G/DL (ref 31.5–35.7)
MCV RBC AUTO: 103.3 FL (ref 79–97)
MONOCYTES # BLD AUTO: 0.63 10*3/MM3 (ref 0.1–0.9)
MONOCYTES NFR BLD AUTO: 11 % (ref 5–12)
NEUTROPHILS NFR BLD AUTO: 3.5 10*3/MM3 (ref 1.7–7)
NEUTROPHILS NFR BLD AUTO: 61.1 % (ref 42.7–76)
NRBC BLD AUTO-RTO: 0 /100 WBC (ref 0–0.2)
NT-PROBNP SERPL-MCNC: 1950 PG/ML (ref 0–1800)
PLATELET # BLD AUTO: 167 10*3/MM3 (ref 140–450)
PMV BLD AUTO: 9.2 FL (ref 6–12)
POTASSIUM SERPL-SCNC: 4.4 MMOL/L (ref 3.5–5.2)
PROT SERPL-MCNC: 6.5 G/DL (ref 6–8.5)
PROTHROMBIN TIME: 15.6 SECONDS (ref 11.7–14.2)
RBC # BLD AUTO: 3 10*6/MM3 (ref 4.14–5.8)
SODIUM SERPL-SCNC: 138 MMOL/L (ref 136–145)
TROPONIN T SERPL HS-MCNC: 50 NG/L
WBC NRBC COR # BLD AUTO: 5.73 10*3/MM3 (ref 3.4–10.8)

## 2025-06-04 PROCEDURE — 99285 EMERGENCY DEPT VISIT HI MDM: CPT

## 2025-06-04 PROCEDURE — 25810000003 LACTATED RINGERS SOLUTION

## 2025-06-04 PROCEDURE — 83735 ASSAY OF MAGNESIUM: CPT

## 2025-06-04 PROCEDURE — 83880 ASSAY OF NATRIURETIC PEPTIDE: CPT

## 2025-06-04 PROCEDURE — 70450 CT HEAD/BRAIN W/O DYE: CPT

## 2025-06-04 PROCEDURE — 36415 COLL VENOUS BLD VENIPUNCTURE: CPT

## 2025-06-04 PROCEDURE — 85610 PROTHROMBIN TIME: CPT | Performed by: EMERGENCY MEDICINE

## 2025-06-04 PROCEDURE — 82077 ASSAY SPEC XCP UR&BREATH IA: CPT

## 2025-06-04 PROCEDURE — 93010 ELECTROCARDIOGRAM REPORT: CPT | Performed by: INTERNAL MEDICINE

## 2025-06-04 PROCEDURE — 93005 ELECTROCARDIOGRAM TRACING: CPT

## 2025-06-04 PROCEDURE — 85025 COMPLETE CBC W/AUTO DIFF WBC: CPT

## 2025-06-04 PROCEDURE — 25010000002 MAGNESIUM SULFATE 2 GM/50ML SOLUTION: Performed by: EMERGENCY MEDICINE

## 2025-06-04 PROCEDURE — 85730 THROMBOPLASTIN TIME PARTIAL: CPT | Performed by: EMERGENCY MEDICINE

## 2025-06-04 PROCEDURE — 80053 COMPREHEN METABOLIC PANEL: CPT

## 2025-06-04 PROCEDURE — 71045 X-RAY EXAM CHEST 1 VIEW: CPT

## 2025-06-04 PROCEDURE — 72125 CT NECK SPINE W/O DYE: CPT

## 2025-06-04 PROCEDURE — 84484 ASSAY OF TROPONIN QUANT: CPT

## 2025-06-04 RX ORDER — MAGNESIUM SULFATE HEPTAHYDRATE 40 MG/ML
2 INJECTION, SOLUTION INTRAVENOUS ONCE
Status: COMPLETED | OUTPATIENT
Start: 2025-06-04 | End: 2025-06-05

## 2025-06-04 RX ORDER — METOPROLOL TARTRATE 25 MG/1
25 TABLET, FILM COATED ORAL ONCE
Status: COMPLETED | OUTPATIENT
Start: 2025-06-04 | End: 2025-06-04

## 2025-06-04 RX ADMIN — MAGNESIUM SULFATE HEPTAHYDRATE 2 G: 40 INJECTION, SOLUTION INTRAVENOUS at 23:40

## 2025-06-04 RX ADMIN — METOPROLOL TARTRATE 25 MG: 25 TABLET, FILM COATED ORAL at 23:38

## 2025-06-04 RX ADMIN — SODIUM CHLORIDE, POTASSIUM CHLORIDE, SODIUM LACTATE AND CALCIUM CHLORIDE 1000 ML: 600; 310; 30; 20 INJECTION, SOLUTION INTRAVENOUS at 22:23

## 2025-06-04 NOTE — Clinical Note
Hemostasis started on the right radial artery. R-Band was used in achieving hemostasis. Radial compression device applied to vessel. Hemostasis achieved successfully. Closure device additional comment: Damien irby 12cc

## 2025-06-05 ENCOUNTER — APPOINTMENT (OUTPATIENT)
Dept: CARDIOLOGY | Facility: HOSPITAL | Age: 80
End: 2025-06-05
Payer: MEDICARE

## 2025-06-05 PROBLEM — R55 SYNCOPE: Status: ACTIVE | Noted: 2025-06-05

## 2025-06-05 PROBLEM — F10.90 ALCOHOL USE DISORDER: Status: ACTIVE | Noted: 2025-06-05

## 2025-06-05 LAB
ANION GAP SERPL CALCULATED.3IONS-SCNC: 13 MMOL/L (ref 5–15)
AORTIC DIMENSIONLESS INDEX: 0.59 (DI)
AV MEAN PRESS GRAD SYS DOP V1V2: 5.8 MMHG
AV VMAX SYS DOP: 151.3 CM/SEC
BH CV ECHO MEAS - AO MAX PG: 9.2 MMHG
BH CV ECHO MEAS - AO V2 VTI: 28.5 CM
BH CV ECHO MEAS - AVA(I,D): 2.43 CM2
BH CV ECHO MEAS - EDV(MOD-SP2): 173 ML
BH CV ECHO MEAS - EDV(MOD-SP4): 166 ML
BH CV ECHO MEAS - EF(MOD-SP2): 48.6 %
BH CV ECHO MEAS - EF(MOD-SP4): 42.8 %
BH CV ECHO MEAS - ESV(MOD-SP2): 89 ML
BH CV ECHO MEAS - ESV(MOD-SP4): 95 ML
BH CV ECHO MEAS - LV DIASTOLIC VOL/BSA (35-75): 73.2 CM2
BH CV ECHO MEAS - LV MAX PG: 2.9 MMHG
BH CV ECHO MEAS - LV MEAN PG: 1.38 MMHG
BH CV ECHO MEAS - LV SYSTOLIC VOL/BSA (12-30): 41.9 CM2
BH CV ECHO MEAS - LV V1 MAX: 85.3 CM/SEC
BH CV ECHO MEAS - LV V1 VTI: 17 CM
BH CV ECHO MEAS - LVOT AREA: 4.1 CM2
BH CV ECHO MEAS - LVOT DIAM: 2.28 CM
BH CV ECHO MEAS - MR MAX PG: 15.5 MMHG
BH CV ECHO MEAS - MR MAX VEL: 196.5 CM/SEC
BH CV ECHO MEAS - MV DEC SLOPE: 724.9 CM/SEC2
BH CV ECHO MEAS - MV DEC TIME: 0.15 SEC
BH CV ECHO MEAS - MV E MAX VEL: 106.5 CM/SEC
BH CV ECHO MEAS - MV MAX PG: 4.3 MMHG
BH CV ECHO MEAS - MV MEAN PG: 1.66 MMHG
BH CV ECHO MEAS - MV P1/2T: 40.1 MSEC
BH CV ECHO MEAS - MV V2 VTI: 14.5 CM
BH CV ECHO MEAS - MVA(P1/2T): 5.5 CM2
BH CV ECHO MEAS - MVA(VTI): 4.8 CM2
BH CV ECHO MEAS - PA ACC TIME: 0.11 SEC
BH CV ECHO MEAS - PA V2 MAX: 109.9 CM/SEC
BH CV ECHO MEAS - RV MAX PG: 2.25 MMHG
BH CV ECHO MEAS - RV V1 MAX: 74.9 CM/SEC
BH CV ECHO MEAS - RV V1 VTI: 12.5 CM
BH CV ECHO MEAS - SV(LVOT): 69.2 ML
BH CV ECHO MEAS - SV(MOD-SP2): 84 ML
BH CV ECHO MEAS - SV(MOD-SP4): 71 ML
BH CV ECHO MEAS - SVI(LVOT): 30.5 ML/M2
BH CV ECHO MEAS - SVI(MOD-SP2): 37 ML/M2
BH CV ECHO MEAS - SVI(MOD-SP4): 31.3 ML/M2
BH CV ECHO MEAS - TAPSE (>1.6): 2.9 CM
BH CV XLRA MEAS LEFT DIST CCA EDV: 11 CM/SEC
BH CV XLRA MEAS LEFT DIST CCA PSV: 40.8 CM/SEC
BH CV XLRA MEAS LEFT DIST ICA EDV: -21.7 CM/SEC
BH CV XLRA MEAS LEFT DIST ICA PSV: 59.1 CM/SEC
BH CV XLRA MEAS LEFT ICA/CCA RATIO: 1.45
BH CV XLRA MEAS LEFT MID ICA EDV: -20.3 CM/SEC
BH CV XLRA MEAS LEFT MID ICA PSV: -55.4 CM/SEC
BH CV XLRA MEAS LEFT PROX CCA EDV: 17.6 CM/SEC
BH CV XLRA MEAS LEFT PROX CCA PSV: 70.9 CM/SEC
BH CV XLRA MEAS LEFT PROX ECA EDV: -9 CM/SEC
BH CV XLRA MEAS LEFT PROX ECA PSV: -40.5 CM/SEC
BH CV XLRA MEAS LEFT PROX ICA EDV: -18.9 CM/SEC
BH CV XLRA MEAS LEFT PROX ICA PSV: -42.5 CM/SEC
BH CV XLRA MEAS LEFT PROX SCLA PSV: 60.6 CM/SEC
BH CV XLRA MEAS LEFT VERTEBRAL A EDV: 11.8 CM/SEC
BH CV XLRA MEAS LEFT VERTEBRAL A PSV: 33.2 CM/SEC
BH CV XLRA MEAS RIGHT DIST CCA EDV: -18 CM/SEC
BH CV XLRA MEAS RIGHT DIST CCA PSV: -61 CM/SEC
BH CV XLRA MEAS RIGHT DIST ICA EDV: -29.6 CM/SEC
BH CV XLRA MEAS RIGHT DIST ICA PSV: -77.4 CM/SEC
BH CV XLRA MEAS RIGHT ICA/CCA RATIO: 1.27
BH CV XLRA MEAS RIGHT MID ICA EDV: 21.4 CM/SEC
BH CV XLRA MEAS RIGHT MID ICA PSV: 64.8 CM/SEC
BH CV XLRA MEAS RIGHT PROX CCA EDV: -7.7 CM/SEC
BH CV XLRA MEAS RIGHT PROX CCA PSV: -42 CM/SEC
BH CV XLRA MEAS RIGHT PROX ECA PSV: -40.7 CM/SEC
BH CV XLRA MEAS RIGHT PROX ICA EDV: -19.2 CM/SEC
BH CV XLRA MEAS RIGHT PROX ICA PSV: -56 CM/SEC
BH CV XLRA MEAS RIGHT PROX SCLA PSV: -76.8 CM/SEC
BH CV XLRA MEAS RIGHT VERTEBRAL A EDV: -4.2 CM/SEC
BH CV XLRA MEAS RIGHT VERTEBRAL A PSV: -17.9 CM/SEC
BUN SERPL-MCNC: 24 MG/DL (ref 8–23)
BUN/CREAT SERPL: 11.2 (ref 7–25)
CALCIUM SPEC-SCNC: 8.7 MG/DL (ref 8.6–10.5)
CHLORIDE SERPL-SCNC: 98 MMOL/L (ref 98–107)
CO2 SERPL-SCNC: 27 MMOL/L (ref 22–29)
CREAT SERPL-MCNC: 2.14 MG/DL (ref 0.76–1.27)
DEPRECATED RDW RBC AUTO: 52.3 FL (ref 37–54)
EGFRCR SERPLBLD CKD-EPI 2021: 30.3 ML/MIN/1.73
ERYTHROCYTE [DISTWIDTH] IN BLOOD BY AUTOMATED COUNT: 14.4 % (ref 12.3–15.4)
ETHANOL BLD-MCNC: <10 MG/DL (ref 0–10)
ETHANOL UR QL: <0.01 %
GEN 5 1HR TROPONIN T REFLEX: 51 NG/L
GLUCOSE SERPL-MCNC: 123 MG/DL (ref 65–99)
HCT VFR BLD AUTO: 29.1 % (ref 37.5–51)
HGB BLD-MCNC: 10.3 G/DL (ref 13–17.7)
LEFT ARM BP: NORMAL MMHG
LEFT ATRIUM VOLUME INDEX: 20.4 ML/M2
LV EF BIPLANE MOD: 46.2 %
MCH RBC QN AUTO: 35.9 PG (ref 26.6–33)
MCHC RBC AUTO-ENTMCNC: 35.4 G/DL (ref 31.5–35.7)
MCV RBC AUTO: 101.4 FL (ref 79–97)
PLATELET # BLD AUTO: 137 10*3/MM3 (ref 140–450)
PMV BLD AUTO: 9.2 FL (ref 6–12)
POTASSIUM SERPL-SCNC: 5.2 MMOL/L (ref 3.5–5.2)
RBC # BLD AUTO: 2.87 10*6/MM3 (ref 4.14–5.8)
RIGHT ARM BP: NORMAL MMHG
SINUS: 3.4 CM
SODIUM SERPL-SCNC: 138 MMOL/L (ref 136–145)
STJ: 3.5 CM
TROPONIN T % DELTA: 2
TROPONIN T NUMERIC DELTA: 1 NG/L
WBC NRBC COR # BLD AUTO: 6.07 10*3/MM3 (ref 3.4–10.8)

## 2025-06-05 PROCEDURE — 85027 COMPLETE CBC AUTOMATED: CPT

## 2025-06-05 PROCEDURE — 25010000002 MAGNESIUM SULFATE 2 GM/50ML SOLUTION: Performed by: INTERNAL MEDICINE

## 2025-06-05 PROCEDURE — 93005 ELECTROCARDIOGRAM TRACING: CPT | Performed by: INTERNAL MEDICINE

## 2025-06-05 PROCEDURE — 80048 BASIC METABOLIC PNL TOTAL CA: CPT

## 2025-06-05 PROCEDURE — 93306 TTE W/DOPPLER COMPLETE: CPT

## 2025-06-05 PROCEDURE — 25010000002 THIAMINE PER 100 MG

## 2025-06-05 PROCEDURE — 90791 PSYCH DIAGNOSTIC EVALUATION: CPT

## 2025-06-05 PROCEDURE — 93880 EXTRACRANIAL BILAT STUDY: CPT

## 2025-06-05 PROCEDURE — 99204 OFFICE O/P NEW MOD 45 MIN: CPT | Performed by: INTERNAL MEDICINE

## 2025-06-05 PROCEDURE — 25510000001 PERFLUTREN 6.52 MG/ML SUSPENSION 2 ML VIAL

## 2025-06-05 PROCEDURE — 97166 OT EVAL MOD COMPLEX 45 MIN: CPT

## 2025-06-05 PROCEDURE — 25010000002 ENOXAPARIN PER 10 MG: Performed by: INTERNAL MEDICINE

## 2025-06-05 PROCEDURE — 36415 COLL VENOUS BLD VENIPUNCTURE: CPT

## 2025-06-05 PROCEDURE — 93306 TTE W/DOPPLER COMPLETE: CPT | Performed by: INTERNAL MEDICINE

## 2025-06-05 PROCEDURE — 93880 EXTRACRANIAL BILAT STUDY: CPT | Performed by: SURGERY

## 2025-06-05 PROCEDURE — 25810000003 SODIUM CHLORIDE 0.9 % SOLUTION: Performed by: INTERNAL MEDICINE

## 2025-06-05 RX ORDER — FOLIC ACID 1 MG/1
1 TABLET ORAL DAILY
Status: DISCONTINUED | OUTPATIENT
Start: 2025-06-05 | End: 2025-06-16 | Stop reason: HOSPADM

## 2025-06-05 RX ORDER — ACETAMINOPHEN 325 MG/1
650 TABLET ORAL EVERY 4 HOURS PRN
Status: DISCONTINUED | OUTPATIENT
Start: 2025-06-05 | End: 2025-06-06 | Stop reason: SDUPTHER

## 2025-06-05 RX ORDER — ENOXAPARIN SODIUM 100 MG/ML
40 INJECTION SUBCUTANEOUS EVERY 12 HOURS
Status: DISCONTINUED | OUTPATIENT
Start: 2025-06-05 | End: 2025-06-05

## 2025-06-05 RX ORDER — THIAMINE HYDROCHLORIDE 100 MG/ML
200 INJECTION, SOLUTION INTRAMUSCULAR; INTRAVENOUS EVERY 8 HOURS SCHEDULED
Status: DISPENSED | OUTPATIENT
Start: 2025-06-05 | End: 2025-06-10

## 2025-06-05 RX ORDER — LORAZEPAM 1 MG/1
1 TABLET ORAL
Status: DISPENSED | OUTPATIENT
Start: 2025-06-05 | End: 2025-06-10

## 2025-06-05 RX ORDER — NITROGLYCERIN 0.4 MG/1
0.4 TABLET SUBLINGUAL
Status: DISCONTINUED | OUTPATIENT
Start: 2025-06-05 | End: 2025-06-16 | Stop reason: HOSPADM

## 2025-06-05 RX ORDER — LORAZEPAM 1 MG/1
2 TABLET ORAL
Status: DISPENSED | OUTPATIENT
Start: 2025-06-05 | End: 2025-06-10

## 2025-06-05 RX ORDER — ENOXAPARIN SODIUM 150 MG/ML
1 INJECTION SUBCUTANEOUS ONCE
Status: COMPLETED | OUTPATIENT
Start: 2025-06-05 | End: 2025-06-05

## 2025-06-05 RX ORDER — MULTIPLE VITAMINS W/ MINERALS TAB 9MG-400MCG
1 TAB ORAL DAILY
Status: DISCONTINUED | OUTPATIENT
Start: 2025-06-05 | End: 2025-06-16 | Stop reason: HOSPADM

## 2025-06-05 RX ORDER — LORAZEPAM 2 MG/ML
2 INJECTION INTRAMUSCULAR
Status: ACTIVE | OUTPATIENT
Start: 2025-06-05 | End: 2025-06-10

## 2025-06-05 RX ORDER — SODIUM CHLORIDE 9 MG/ML
125 INJECTION, SOLUTION INTRAVENOUS CONTINUOUS
Status: DISCONTINUED | OUTPATIENT
Start: 2025-06-05 | End: 2025-06-05

## 2025-06-05 RX ORDER — LORAZEPAM 2 MG/ML
1 INJECTION INTRAMUSCULAR
Status: DISPENSED | OUTPATIENT
Start: 2025-06-05 | End: 2025-06-10

## 2025-06-05 RX ORDER — BISACODYL 5 MG/1
5 TABLET, DELAYED RELEASE ORAL DAILY PRN
Status: DISCONTINUED | OUTPATIENT
Start: 2025-06-05 | End: 2025-06-16 | Stop reason: HOSPADM

## 2025-06-05 RX ORDER — MIDODRINE HYDROCHLORIDE 5 MG/1
5 TABLET ORAL
Status: DISCONTINUED | OUTPATIENT
Start: 2025-06-05 | End: 2025-06-06

## 2025-06-05 RX ORDER — CLONIDINE HYDROCHLORIDE 0.1 MG/1
0.1 TABLET ORAL EVERY 4 HOURS PRN
Status: DISCONTINUED | OUTPATIENT
Start: 2025-06-05 | End: 2025-06-08

## 2025-06-05 RX ORDER — SODIUM CHLORIDE 9 MG/ML
125 INJECTION, SOLUTION INTRAVENOUS CONTINUOUS
Status: ACTIVE | OUTPATIENT
Start: 2025-06-05 | End: 2025-06-07

## 2025-06-05 RX ORDER — BISACODYL 10 MG
10 SUPPOSITORY, RECTAL RECTAL DAILY PRN
Status: DISCONTINUED | OUTPATIENT
Start: 2025-06-05 | End: 2025-06-16 | Stop reason: HOSPADM

## 2025-06-05 RX ORDER — ONDANSETRON 4 MG/1
4 TABLET, ORALLY DISINTEGRATING ORAL EVERY 6 HOURS PRN
Status: DISCONTINUED | OUTPATIENT
Start: 2025-06-05 | End: 2025-06-16 | Stop reason: HOSPADM

## 2025-06-05 RX ORDER — AMOXICILLIN 250 MG
2 CAPSULE ORAL 2 TIMES DAILY PRN
Status: DISCONTINUED | OUTPATIENT
Start: 2025-06-05 | End: 2025-06-16 | Stop reason: HOSPADM

## 2025-06-05 RX ORDER — ONDANSETRON 2 MG/ML
4 INJECTION INTRAMUSCULAR; INTRAVENOUS EVERY 6 HOURS PRN
Status: DISCONTINUED | OUTPATIENT
Start: 2025-06-05 | End: 2025-06-16 | Stop reason: HOSPADM

## 2025-06-05 RX ORDER — POLYETHYLENE GLYCOL 3350 17 G/17G
17 POWDER, FOR SOLUTION ORAL DAILY PRN
Status: DISCONTINUED | OUTPATIENT
Start: 2025-06-05 | End: 2025-06-16 | Stop reason: HOSPADM

## 2025-06-05 RX ORDER — METOPROLOL TARTRATE 25 MG/1
25 TABLET, FILM COATED ORAL EVERY 12 HOURS SCHEDULED
Status: DISCONTINUED | OUTPATIENT
Start: 2025-06-05 | End: 2025-06-06

## 2025-06-05 RX ORDER — MAGNESIUM SULFATE HEPTAHYDRATE 40 MG/ML
2 INJECTION, SOLUTION INTRAVENOUS ONCE
Status: COMPLETED | OUTPATIENT
Start: 2025-06-05 | End: 2025-06-05

## 2025-06-05 RX ADMIN — METOPROLOL TARTRATE 25 MG: 25 TABLET, FILM COATED ORAL at 20:59

## 2025-06-05 RX ADMIN — THIAMINE HYDROCHLORIDE 200 MG: 100 INJECTION, SOLUTION INTRAMUSCULAR; INTRAVENOUS at 14:18

## 2025-06-05 RX ADMIN — METOPROLOL TARTRATE 25 MG: 25 TABLET, FILM COATED ORAL at 14:18

## 2025-06-05 RX ADMIN — Medication 1 TABLET: at 08:54

## 2025-06-05 RX ADMIN — FOLIC ACID 1 MG: 1 TABLET ORAL at 08:54

## 2025-06-05 RX ADMIN — MAGNESIUM SULFATE HEPTAHYDRATE 2 G: 40 INJECTION, SOLUTION INTRAVENOUS at 18:56

## 2025-06-05 RX ADMIN — THIAMINE HYDROCHLORIDE 200 MG: 100 INJECTION, SOLUTION INTRAMUSCULAR; INTRAVENOUS at 06:20

## 2025-06-05 RX ADMIN — SODIUM CHLORIDE 125 ML/HR: 9 INJECTION, SOLUTION INTRAVENOUS at 12:52

## 2025-06-05 RX ADMIN — LORAZEPAM 2 MG: 1 TABLET ORAL at 20:58

## 2025-06-05 RX ADMIN — PERFLUTREN 3 ML: 6.52 INJECTION, SUSPENSION INTRAVENOUS at 11:28

## 2025-06-05 RX ADMIN — MIDODRINE HYDROCHLORIDE 5 MG: 5 TABLET ORAL at 18:43

## 2025-06-05 RX ADMIN — THIAMINE HYDROCHLORIDE 200 MG: 100 INJECTION, SOLUTION INTRAMUSCULAR; INTRAVENOUS at 20:58

## 2025-06-05 RX ADMIN — ENOXAPARIN SODIUM 105 MG: 150 INJECTION SUBCUTANEOUS at 18:43

## 2025-06-05 NOTE — PLAN OF CARE
Goal Outcome Evaluation:  Plan of Care Reviewed With: patient        Progress: no change  Outcome Evaluation: pt is an 79 yo male admitted following a fall at his ILF, hitting back of head. He is seen this date for OT keisha, A&O to self however noted confusion as pt unable to state what brought him to hospital, thinking his home with his hospital room this date. He presents with decreased safety awareness, activity tolerance, strength and overall (I) with ADLs at this time. He resides with his spouse at [facility] and per chart, requires some assist for ADLs. He demo's bed mob with SBA this date, CGA for LBD while seated and SBA for g/h tasks at EOB. He completed x1 STS with CGA however deferred further activity due to fatigue and mild dizziness. He will continue to benefit from skilled OT to address deficits. ILF vs SNF pending pt progression as cognition limiting overall optimal safety awarenss.    Anticipated Discharge Disposition (OT): home, home with assist, skilled nursing facility

## 2025-06-05 NOTE — H&P
Patient Name:  Bryan Landers  YOB: 1945  MRN:  [id number]  Admit Date:  6/4/2025  Patient Care Team:  Jose Fonseca MD as PCP - General (Family Medicine)  Jose Michelle MD as Consulting Physician (Radiation Oncology)  Roldan Mccarthy MD as Consulting Physician (Urology)  Clarence Nieves MD (Hematology)  Grover Harris DPM as Consulting Physician (Podiatry)  kSip Wall MD PhD as Consulting Physician (Thoracic Surgery)      Subjective   History Present Illness     Chief Complaint   Patient presents with    Syncope     Patient is 80-year-old male with known history of dementia, alcohol use, CKD stage IIIb, hypertension and multiple medical problems reportedly was on his way to the bathroom and subsequently fell and passed out according to family members.  Reportedly hit his head but no reports of any seizure-like activity or urinary or bowel incontinence.  Patient has been complaining of dizziness for the past several days but no reports of any nausea, vomiting, diarrhea.  Has not been any changes to his medications.  Given the above was brought to the emergency room and upon arrival his blood pressure was soft to low and BNP was elevated at 1950, troponin was 50 and subsequent check was 51.  EKG with no acute ST or T wave changes.  Chest x-ray with no acute findings and UA with no evidence of UTI.  CT brain with no acute findings and CT cervical spine nothing acute.  Chest x-ray with no evidence of any infiltrate.  Of note baseline creatinine was 1.4-1.5 and in the emergency room creatinine was 2.27.  Given the above patient is being hospitalized.  Blood alcohol level was less than 0.010.  At the time of my evaluation patient is confused and unable to provide any meaningful information.  Denies any nausea, vomiting, chest pain, shortness of breath, palpitations, dizziness.      Review of Systems   A 12 system review has been performed and they are negative other than  mentioned in the H&P      Personal History     Past Medical History:   Diagnosis Date    At risk for sleep apnea     Bladder mass     Bruises easily     Diabetes mellitus     Disease of thyroid gland     Elevated cholesterol     GI bleed     Gross hematuria 2021    History of hip replacement, total, bilateral 2018    History of transfusion     Pneumothorax 2024    Poor historian     Short-term memory loss     Vitamin D deficiency      Past Surgical History:   Procedure Laterality Date    COLONOSCOPY  2016    COLONOSCOPY N/A 2018    Procedure: COLONOSCOPY;  Surgeon: Olaf Gomez MD;  Location: [facility] OR;  Service: Gastroenterology    COLONOSCOPY N/A 2019    Procedure: COLONOSCOPY;  Surgeon: Rosa Jack MD;  Location: [facility] OR;  Service: Gastroenterology    CYSTOSCOPY BLADDER BIOPSY N/A 2021    Procedure: CYSTOSCOPY BLADDER BIOPSY;  Surgeon: Roldan Mccarthy MD;  Location: [facility] MAIN OR;  Service: Urology;  Laterality: N/A;    HERNIA REPAIR Bilateral     PROSTATE ULTRASOUND BIOPSY      SIGMOIDOSCOPY N/A 10/2/2018    Procedure: FLEXIBLE SIGMOIDOSCOPY:  APC cautery bleeding using 60 joules;  Surgeon: Olaf Gomez MD;  Location: [facility] ENDOSCOPY;  Service: Gastroenterology    TONSILLECTOMY      TOTAL HIP ARTHROPLASTY Bilateral      Family History   Problem Relation Age of Onset    Stroke Mother     Stroke Father     No Known Problems Brother     Colon cancer Neg Hx     Colon polyps Neg Hx     Malig Hyperthermia Neg Hx      Social History     Tobacco Use    Smoking status: Former     Current packs/day: 0.00     Average packs/day: 0.3 packs/day for 5.0 years (1.3 ttl pk-yrs)     Types: Cigars, Cigarettes     Start date: 1981     Quit date: 1986     Years since quittin.4     Passive exposure: Never    Smokeless tobacco: Never   Vaping Use    Vaping status: Never Used   Substance Use Topics    Alcohol use: Yes     Alcohol/week: 32.0 -  34.0 standard drinks of alcohol     Types: 4 - 6 Shots of liquor, 28 Standard drinks or equivalent per week     Comment: 2-3 double vodkas a night    Drug use: No     No current facility-administered medications on file prior to encounter.     Current Outpatient Medications on File Prior to Encounter   Medication Sig Dispense Refill    allopurinol (ZYLOPRIM) 100 MG tablet Take 1 tablet by mouth Daily. Indications: Gout 90 tablet 3    apixaban (Eliquis) 2.5 MG tablet tablet Take 1 tablet by mouth Every 12 (Twelve) Hours. 60 tablet 11    aspirin 81 MG chewable tablet Chew 1 tablet Daily. Indications: Blood Clot Within a Vein, DVTs AND PE      atorvastatin (LIPITOR) 80 MG tablet Take 1 tablet by mouth Daily. Indications: High Amount of Fats in the Blood 90 tablet 3    cholecalciferol (VITAMIN D3) 1000 UNITS tablet Take 1 tablet by mouth Daily. Indications: Vitamin D Deficiency      Cyanocobalamin (VITAMIN B 12 PO) Take 1,000 mg by mouth Every Morning. Indications: SUPPLEMENT      furosemide (LASIX) 40 MG tablet Take 1 tablet by mouth Daily. Indications: Edema 90 tablet 3    levothyroxine (SYNTHROID, LEVOTHROID) 75 MCG tablet TAKE 1 TABLET BY MOUTH EVERY MORNING INDICATIONS: UNDER ACTIVE THYROID 90 tablet 3    lisinopril (PRINIVIL,ZESTRIL) 2.5 MG tablet Take 1 tablet by mouth Daily. Indications: High Blood Pressure 90 tablet 4    memantine (NAMENDA) 10 MG tablet TAKE 2 TABLETS BY MOUTH EVERY MORNING 180 tablet 3    Multiple Vitamins-Minerals (MULTIVITAL PLATINUM PO) Take 1 tablet by mouth Daily. HOLD FOR SURGERY  Indications: SUPPLEMENT      Orgovyx 120 MG tablet tablet Take 1 tablet by mouth Daily.      potassium chloride 10 MEQ CR tablet Take 1 tablet by mouth Daily With Breakfast. Indications: Low Amount of Potassium in the Blood, take with Lasix 90 tablet 3    Xtandi 40 MG tablet tablet Take 4 tablets by mouth Daily. 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 Indications: Spasm of Lung Air Passages (Patient not taking: Reported on 3/20/2025) 18 g 0     Allergies   Allergen Reactions    Nsaids GI Bleeding    Aricept [Donepezil] Diarrhea       Objective    Objective     Vital Signs  Temp:  [97.2 °F (36.2 °C)-98.1 °F (36.7 °C)] 97.2 °F (36.2 °C)  Heart Rate:  [] 73  Resp:  [16-18] 16  BP: ()/(31-99) 124/81  SpO2:  [95 %-98 %] 96 %  on   ;   Device (Oxygen Therapy): room air  Body mass index is 34.87 kg/m².    Physical Exam  HEENT: PERRLA, extraocular movements intact, Scleras no icterus  Neck: Supple, no JVD  Cardiovascular: Regular rate and rhythm with normal S1 and S2  Respiratory: Fairly clear to auscultation anteriorly with no wheezes  GI: Soft, nontender, bowel sounds are present  Extremities: No edema, palpable pedal pulses  Neurologic: Grossly nonfocal, no facial asymmetry    Results Review:  I reviewed the patient's new clinical results.  I reviewed the patient's new imaging results and agree with the interpretation.  I reviewed the patient's other test results and agree with the interpretation  I personally viewed and interpreted the patient's EKG/Telemetry data  Discussed with ED provider.    Lab Results (last 24 hours)       Procedure Component Value Units Date/Time    CBC & Differential [[id number]]  (Abnormal) Collected: 06/04/25 2213    Specimen: Blood Updated: 06/04/25 2235    Narrative:      The following orders were created for panel order CBC & Differential.  Procedure                               Abnormality         Status                     ---------                               -----------         ------                     CBC Auto Differential[[id number]]        Abnormal            Final result                 Please view results for these tests on the individual orders.    Comprehensive Metabolic Panel [[id number]]   (Abnormal) Collected: 06/04/25 2213    Specimen: Blood Updated: 06/04/25 2255     Glucose 111 mg/dL      BUN 25.0 mg/dL      Creatinine 2.27 mg/dL      Sodium 138 mmol/L      Potassium 4.4 mmol/L      Chloride 99 mmol/L      CO2 27.2 mmol/L      Calcium 9.3 mg/dL      Total Protein 6.5 g/dL      Albumin 3.2 g/dL      ALT (SGPT) 20 U/L      AST (SGOT) 32 U/L      Alkaline Phosphatase 105 U/L      Total Bilirubin 0.4 mg/dL      Globulin 3.3 gm/dL      A/G Ratio 1.0 g/dL      BUN/Creatinine Ratio 11.0     Anion Gap 11.8 mmol/L      eGFR 28.3 mL/min/1.73     Narrative:      GFR Categories in Chronic Kidney Disease (CKD)              GFR Category          GFR (mL/min/1.73)    Interpretation  G1                    90 or greater        Normal or high (1)  G2                    60-89                Mild decrease (1)  G3a                   45-59                Mild to moderate decrease  G3b                   30-44                Moderate to severe decrease  G4                    15-29                Severe decrease  G5                    14 or less           Kidney failure    (1)In the absence of evidence of kidney disease, neither GFR category G1 or G2 fulfill the criteria for CKD.    eGFR calculation 2021 CKD-EPI creatinine equation, which does not include race as a factor    Magnesium [[id number]]  (Normal) Collected: 06/04/25 2213    Specimen: Blood Updated: 06/04/25 2255     Magnesium 1.6 mg/dL     High Sensitivity Troponin T [[id number]]  (Abnormal) Collected: 06/04/25 2213    Specimen: Blood Updated: 06/04/25 2255     HS Troponin T 50 ng/L     Narrative:      High Sensitive Troponin T Reference Range:  <14.0 ng/L- Negative Female for AMI  <22.0 ng/L- Negative Male for AMI  >=14 - Abnormal Female indicating possible myocardial injury.  >=22 - Abnormal Male indicating possible myocardial injury.   Clinicians would have to utilize clinical acumen, EKG, Troponin, and serial changes to determine if it is an Acute Myocardial  Infarction or myocardial injury due to an underlying chronic condition.         BNP [[id number]]  (Abnormal) Collected: 06/04/25 2213    Specimen: Blood Updated: 06/04/25 2255     proBNP 1,950.0 pg/mL     Narrative:      This assay is used as an aid in the diagnosis of individuals suspected of having heart failure. It can be used as an aid in the diagnosis of acute decompensated heart failure (ADHF) in patients presenting with signs and symptoms of ADHF to the emergency department (ED). In addition, NT-proBNP of <300 pg/mL indicates ADHF is not likely.    Age Range Result Interpretation  NT-proBNP Concentration (pg/mL:      <50             Positive            >450                   Gray                 300-450                    Negative             <300    50-75           Positive            >900                  Gray                300-900                  Negative            <300      >75             Positive            >1800                  Gray                300-1800                  Negative            <300    CBC Auto Differential [[id number]]  (Abnormal) Collected: 06/04/25 2213    Specimen: Blood Updated: 06/04/25 2235     WBC 5.73 10*3/mm3      RBC 3.00 10*6/mm3      Hemoglobin 11.0 g/dL      Hematocrit 31.0 %      .3 fL      MCH 36.7 pg      MCHC 35.5 g/dL      RDW 14.4 %      RDW-SD 52.7 fl      MPV 9.2 fL      Platelets 167 10*3/mm3      Neutrophil % 61.1 %      Lymphocyte % 24.4 %      Monocyte % 11.0 %      Eosinophil % 2.3 %      Basophil % 0.5 %      Immature Grans % 0.7 %      Neutrophils, Absolute 3.50 10*3/mm3      Lymphocytes, Absolute 1.40 10*3/mm3      Monocytes, Absolute 0.63 10*3/mm3      Eosinophils, Absolute 0.13 10*3/mm3      Basophils, Absolute 0.03 10*3/mm3      Immature Grans, Absolute 0.04 10*3/mm3      nRBC 0.0 /100 WBC     Protime-INR [[id number]]  (Abnormal) Collected: 06/04/25 2306    Specimen: Blood Updated: 06/04/25 2329     Protime 15.6 Seconds      INR 1.24    aPTT  [[id number]]  (Normal) Collected: 06/04/25 2306    Specimen: Blood Updated: 06/04/25 2329     PTT 23.7 seconds     High Sensitivity Troponin T 1Hr [[id number]]  (Abnormal) Collected: 06/04/25 2334    Specimen: Blood from Arm, Left Updated: 06/05/25 0005     HS Troponin T 51 ng/L      Troponin T Numeric Delta 1 ng/L      Troponin T % Delta 2    Narrative:      High Sensitive Troponin T Reference Range:  <14.0 ng/L- Negative Female for AMI  <22.0 ng/L- Negative Male for AMI  >=14 - Abnormal Female indicating possible myocardial injury.  >=22 - Abnormal Male indicating possible myocardial injury.   Clinicians would have to utilize clinical acumen, EKG, Troponin, and serial changes to determine if it is an Acute Myocardial Infarction or myocardial injury due to an underlying chronic condition.         Ethanol [[id number]] Collected: 06/04/25 2334    Specimen: Blood from Arm, Left Updated: 06/05/25 0015     Ethanol <10 mg/dL      Ethanol % <0.010 %     Basic Metabolic Panel [[id number]]  (Abnormal) Collected: 06/05/25 0557    Specimen: Blood Updated: 06/05/25 0719     Glucose 123 mg/dL      BUN 24.0 mg/dL      Creatinine 2.14 mg/dL      Sodium 138 mmol/L      Potassium 5.2 mmol/L      Comment: Slight hemolysis detected by analyzer. Result may be falsely elevated.        Chloride 98 mmol/L      CO2 27.0 mmol/L      Calcium 8.7 mg/dL      BUN/Creatinine Ratio 11.2     Anion Gap 13.0 mmol/L      eGFR 30.3 mL/min/1.73     Narrative:      GFR Categories in Chronic Kidney Disease (CKD)              GFR Category          GFR (mL/min/1.73)    Interpretation  G1                    90 or greater        Normal or high (1)  G2                    60-89                Mild decrease (1)  G3a                   45-59                Mild to moderate decrease  G3b                   30-44                Moderate to severe decrease  G4                    15-29                Severe decrease  G5                    14 or less           Kidney  failure    (1)In the absence of evidence of kidney disease, neither GFR category G1 or G2 fulfill the criteria for CKD.    eGFR calculation 2021 CKD-EPI creatinine equation, which does not include race as a factor    CBC (No Diff) [[id number]]  (Abnormal) Collected: 06/05/25 0557    Specimen: Blood Updated: 06/05/25 0650     WBC 6.07 10*3/mm3      RBC 2.87 10*6/mm3      Hemoglobin 10.3 g/dL      Hematocrit 29.1 %      .4 fL      MCH 35.9 pg      MCHC 35.4 g/dL      RDW 14.4 %      RDW-SD 52.3 fl      MPV 9.2 fL      Platelets 137 10*3/mm3             Imaging Results (Last 24 Hours)       Procedure Component Value Units Date/Time    XR Chest 1 View [[id number]] Collected: 06/04/25 2337     Updated: 06/04/25 2341    Narrative:      CXR ONE VIEW      HISTORY: syncope     COMPARISON: 3/28/2024     TECHNIQUE: single portable AP       Impression:         Allowing for submaximal inspiratory result, heart size appears within  normal limits.     There is a submaximal inspiratory result. Allowing for that, there is no  convincing evidence of infiltrate, effusion or pneumothorax.           This report was finalized on 6/4/2025 11:38 PM by Dr. Caden Ballard M.D on Workstation: [id number]       CT Head Without Contrast [[id number]] Collected: 06/04/25 2331     Updated: 06/04/25 2339    Narrative:      NON-CONTRAST CT HEAD & CT CERVICAL SPINE     REASON:  The patient is being seen in the ED for trauma and is  determined to have a high energy mechanism and/or high risk for missed  injuries due to presence of associated injuries or distracting pain. The  patient will need imaging of the head per the trauma protocol given  diagnoses such as intracranial hemorrhage cannot be excluded.    The  patient will need imaging of the cervical spine given diagnoses such as  cervical spine fracture cannot be excluded.  The diagnostic information  gained in this study exceeds the estimated risk of radiation induced  injury at the time  of order placement.  Mechanism of injury: Syncope and fall from standing, headache and neck  pain     COMPARISON STUDIES:  None Available.     TECHNIQUE:  Axial images were acquired from the skull base to vertex  without contrast, including multiplanar reformats, per standard  departmental protocol.   Routine cervical spine CT without contrast.  Multiplanar reformats were created. Radiation dose reduction techniques  were utilized, including automated exposure control, and exposure  modulation based on body size.     FINDINGS:     Head CT: There is no CT evidence of acute intracranial hemorrhage, mass,  or infarct. There is volume loss, but there is no evidence of  hydrocephalus or extra-axial fluid collection.  There are nonspecific  white matter changes, but there is no acute intracranial abnormality.   Skull base and calvarium show no acute abnormality, and the extracranial  soft tissues show no acute abnormality.     C-spine CT: There is no evidence of acute alignment abnormality.  There  are spinal degenerative changes, but there is no fracture or other acute  bony abnormality.  The paraspinous soft tissues show no acute  abnormality.       Impression:         Negative unenhanced head CT, no acute abnormality.      Negative cervical spine CT, degenerative change without acute  abnormality.           This report was finalized on 6/4/2025 11:36 PM by Dr. Caedn Ballard M.D on Workstation: [id number]       CT Cervical Spine Without Contrast [[id number]] Collected: 06/04/25 2331     Updated: 06/04/25 2339    Narrative:      NON-CONTRAST CT HEAD & CT CERVICAL SPINE     REASON:  The patient is being seen in the ED for trauma and is  determined to have a high energy mechanism and/or high risk for missed  injuries due to presence of associated injuries or distracting pain. The  patient will need imaging of the head per the trauma protocol given  diagnoses such as intracranial hemorrhage cannot be excluded.     The  patient will need imaging of the cervical spine given diagnoses such as  cervical spine fracture cannot be excluded.  The diagnostic information  gained in this study exceeds the estimated risk of radiation induced  injury at the time of order placement.  Mechanism of injury: Syncope and fall from standing, headache and neck  pain     COMPARISON STUDIES:  None Available.     TECHNIQUE:  Axial images were acquired from the skull base to vertex  without contrast, including multiplanar reformats, per standard  departmental protocol.   Routine cervical spine CT without contrast.  Multiplanar reformats were created. Radiation dose reduction techniques  were utilized, including automated exposure control, and exposure  modulation based on body size.     FINDINGS:     Head CT: There is no CT evidence of acute intracranial hemorrhage, mass,  or infarct. There is volume loss, but there is no evidence of  hydrocephalus or extra-axial fluid collection.  There are nonspecific  white matter changes, but there is no acute intracranial abnormality.   Skull base and calvarium show no acute abnormality, and the extracranial  soft tissues show no acute abnormality.     C-spine CT: There is no evidence of acute alignment abnormality.  There  are spinal degenerative changes, but there is no fracture or other acute  bony abnormality.  The paraspinous soft tissues show no acute  abnormality.       Impression:         Negative unenhanced head CT, no acute abnormality.      Negative cervical spine CT, degenerative change without acute  abnormality.           This report was finalized on 6/4/2025 11:36 PM by Dr. Caden Ballard M.D on Workstation: [id number]               Results for orders placed during the hospital encounter of 06/04/25    Adult Transthoracic Echo Complete W/ Cont if Necessary Per Protocol    Interpretation Summary    Left ventricular systolic function is low normal. Calculated left ventricular EF = 46.2%.  There  is global left ventricular hypokinesis present.    Left ventricular diastolic function is consistent with (grade I) impaired relaxation.    Frequent ventricular ectopies noted during study.      ECG 12 Lead Rhythm Change   Preliminary Result   HEART YZEI=026  bpm   RR Hntkyepd=693  ms   GA Ctpbppvl=159  ms   P Horizontal Axis=-1  deg   P Front Axis=0  deg   QRSD Hsxzxkft=184  ms   QT Rcpcslva=033  ms   TChM=528  ms   QRS Axis=-7  deg   T Wave Axis=27  deg   - ABNORMAL ECG -   Sinus tachycardia   Multiform ventricular premature complexes   Right bundle branch block   When compared with ECG of 24-Feb-2024 06:13:53,   No significant change   Date and Time of Study:2025-06-04 22:10:51           Assessment/Plan     Active Hospital Problems    Diagnosis  POA    **Syncope [R55]  Yes    Alcohol use disorder [F10.90]  Yes    Acquired hypothyroidism [E03.9]  Yes    Cognitive decline [R41.89]  Yes    Stage 3b chronic kidney disease [N18.32]  Yes    Essential hypertension [I10]  Yes      Resolved Hospital Problems   No resolved problems to display.       1. Syncope/fall/orthostasis, CT brain and CT cervical spine with no acute findings.  Will check carotid Dopplers and etiology is likely soft to low blood pressures.  He is also orthostatic and will initiate midodrine and is on IV fluids.  Home lisinopril has been held.  2D echo has been ordered as well.  Monitor for any arrhythmia.  2.  Alcohol use, unclear if he still drinks on a regular basis however will be on a [facility] protocol and monitor for withdrawals.  3.  Acute/CKD stage 3B, baseline creatinine is around 1.4-1.5 and upon admission creatinine was 2.27.  Continue with fluids and lisinopril has been held.  Anticipate renal function to return back to baseline.  4.  Hypertension, currently soft low blood pressure therefore lisinopril is on hold.  5.  Hyperlipidemia, statins.  6.  Gout, on allopurinol.  7.  Hypothyroidism, will continue with Synthroid.  8.  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  9.  CODE STATUS is full code.       Feliberto Rowe MD  [facility]  06/05/25  16:23 EDT

## 2025-06-05 NOTE — PAYOR COMM NOTE
"Bryan Patel (80 y.o. Male)        PLEASE SEE ATTACHED FOR INPT AUTH.     REF#[id number]    PLEASE CALL  OR  [phone]    THANK YOU    RASHI FELICIANO LPN CCP   Date of Birth   1945    Social Security Number       Address   [address] [place] [address] [facility] [address]    Home Phone   [phone]    MRN   [id number]       Synagogue   None    Marital Status                               Admission Date   6/4/2025 OBS   6/5/25 -- INPT Admission Type   Emergency    Admitting Provider   Feliberto Rowe MD    Attending Provider   Feliberto Rowe MD    Department, Room/Bed   [address], E651/1       Discharge Date       Discharge Disposition       Discharge Destination                                 Attending Provider: Feliberto Rowe MD    Allergies: Nsaids, Aricept [Donepezil]    Isolation: None   Infection: None   Code Status: CPR    Ht: 177.8 cm (70\")   Wt: 110 kg (243 lb)    Admission Cmt: None   Principal Problem: Syncope [R55]                   Active Insurance as of 6/4/2025       Primary Coverage       Payor Plan Insurance Group Employer/Plan Group    AETNA MEDICARE REPLACEMENT AETNA MEDICARE ADVANTAGE PPO [id number]       Payor Plan Address Payor Plan Phone Number Payor Plan Fax Number Effective Dates    [address] [phone]  1/1/2024 - None Entered    [facility] [id number]         Subscriber Name Subscriber Birth Date Member ID       BRYAN PATEL 1945 [id number]                     Emergency Contacts        (Rel.) Home Phone Work Phone Mobile Phone    Chloe Patel (Spouse) [phone] -- [phone]    Isi Ellison (Daughter) [phone] -- [phone]    JORGESTAN (Son) [phone] -- [phone]              [place]: [facility] [id number]  Tax ID [id number]     History & Physical        Feliberto Rowe MD at 06/05/25 1123              Patient Name:  Bryan OREILLY " Anshul  YOB: 1945  MRN:  [id number]  Admit Date:  6/4/2025  Patient Care Team:  Jose Fonseca MD as PCP - General (Family Medicine)  Jose Michelle MD as Consulting Physician (Radiation Oncology)  Roldan Mccarthy MD as Consulting Physician (Urology)  Clarence Nieves MD (Hematology)  Grover Harris DPM as Consulting Physician (Podiatry)  Skip Wall MD PhD as Consulting Physician (Thoracic Surgery)      Subjective  History Present Illness     Chief Complaint   Patient presents with    Syncope     Patient is 80-year-old male with known history of dementia, alcohol use, CKD stage IIIb, hypertension and multiple medical problems reportedly was on his way to the bathroom and subsequently fell and passed out according to family members.  Reportedly hit his head but no reports of any seizure-like activity or urinary or bowel incontinence.  Patient has been complaining of dizziness for the past several days but no reports of any nausea, vomiting, diarrhea.  Has not been any changes to his medications.  Given the above was brought to the emergency room and upon arrival his blood pressure was soft to low and BNP was elevated at 1950, troponin was 50 and subsequent check was 51.  EKG with no acute ST or T wave changes.  Chest x-ray with no acute findings and UA with no evidence of UTI.  CT brain with no acute findings and CT cervical spine nothing acute.  Chest x-ray with no evidence of any infiltrate.  Of note baseline creatinine was 1.4-1.5 and in the emergency room creatinine was 2.27.  Given the above patient is being hospitalized.  Blood alcohol level was less than 0.010.  At the time of my evaluation patient is confused and unable to provide any meaningful information.  Denies any nausea, vomiting, chest pain, shortness of breath, palpitations, dizziness.      Review of Systems   A 12 system review has been performed and they are negative other than mentioned in the  H&P      Personal History     Past Medical History:   Diagnosis Date    At risk for sleep apnea     Bladder mass     Bruises easily     Diabetes mellitus     Disease of thyroid gland     Elevated cholesterol     GI bleed     Gross hematuria 2021    History of hip replacement, total, bilateral 2018    History of transfusion     Pneumothorax 2024    Poor historian     Short-term memory loss     Vitamin D deficiency      Past Surgical History:   Procedure Laterality Date    COLONOSCOPY  2016    COLONOSCOPY N/A 2018    Procedure: COLONOSCOPY;  Surgeon: Olaf Gomez MD;  Location: [facility] OR;  Service: Gastroenterology    COLONOSCOPY N/A 2019    Procedure: COLONOSCOPY;  Surgeon: Rosa Jack MD;  Location: [facility] OR;  Service: Gastroenterology    CYSTOSCOPY BLADDER BIOPSY N/A 2021    Procedure: CYSTOSCOPY BLADDER BIOPSY;  Surgeon: Roldan Mccarthy MD;  Location: [facility] OR;  Service: Urology;  Laterality: N/A;    HERNIA REPAIR Bilateral     PROSTATE ULTRASOUND BIOPSY      SIGMOIDOSCOPY N/A 10/2/2018    Procedure: FLEXIBLE SIGMOIDOSCOPY:  APC cautery bleeding using 60 joules;  Surgeon: Olaf Gomez MD;  Location: [facility] ENDOSCOPY;  Service: Gastroenterology    TONSILLECTOMY      TOTAL HIP ARTHROPLASTY Bilateral      Family History   Problem Relation Age of Onset    Stroke Mother     Stroke Father     No Known Problems Brother     Colon cancer Neg Hx     Colon polyps Neg Hx     Malig Hyperthermia Neg Hx      Social History     Tobacco Use    Smoking status: Former     Current packs/day: 0.00     Average packs/day: 0.3 packs/day for 5.0 years (1.3 ttl pk-yrs)     Types: Cigars, Cigarettes     Start date: 1981     Quit date: 1986     Years since quittin.4     Passive exposure: Never    Smokeless tobacco: Never   Vaping Use    Vaping status: Never Used   Substance Use Topics    Alcohol use: Yes     Alcohol/week: 32.0 - 34.0 standard  drinks of alcohol     Types: 4 - 6 Shots of liquor, 28 Standard drinks or equivalent per week     Comment: 2-3 double vodkas a night    Drug use: No     No current facility-administered medications on file prior to encounter.     Current Outpatient Medications on File Prior to Encounter   Medication Sig Dispense Refill    allopurinol (ZYLOPRIM) 100 MG tablet Take 1 tablet by mouth Daily. Indications: Gout 90 tablet 3    apixaban (Eliquis) 2.5 MG tablet tablet Take 1 tablet by mouth Every 12 (Twelve) Hours. 60 tablet 11    aspirin 81 MG chewable tablet Chew 1 tablet Daily. Indications: Blood Clot Within a Vein, DVTs AND PE      atorvastatin (LIPITOR) 80 MG tablet Take 1 tablet by mouth Daily. Indications: High Amount of Fats in the Blood 90 tablet 3    cholecalciferol (VITAMIN D3) 1000 UNITS tablet Take 1 tablet by mouth Daily. Indications: Vitamin D Deficiency      Cyanocobalamin (VITAMIN B 12 PO) Take 1,000 mg by mouth Every Morning. Indications: SUPPLEMENT      furosemide (LASIX) 40 MG tablet Take 1 tablet by mouth Daily. Indications: Edema 90 tablet 3    levothyroxine (SYNTHROID, LEVOTHROID) 75 MCG tablet TAKE 1 TABLET BY MOUTH EVERY MORNING INDICATIONS: UNDER ACTIVE THYROID 90 tablet 3    lisinopril (PRINIVIL,ZESTRIL) 2.5 MG tablet Take 1 tablet by mouth Daily. Indications: High Blood Pressure 90 tablet 4    memantine (NAMENDA) 10 MG tablet TAKE 2 TABLETS BY MOUTH EVERY MORNING 180 tablet 3    Multiple Vitamins-Minerals (MULTIVITAL PLATINUM PO) Take 1 tablet by mouth Daily. HOLD FOR SURGERY  Indications: SUPPLEMENT      Orgovyx 120 MG tablet tablet Take 1 tablet by mouth Daily.      potassium chloride 10 MEQ CR tablet Take 1 tablet by mouth Daily With Breakfast. Indications: Low Amount of Potassium in the Blood, take with Lasix 90 tablet 3    Xtandi 40 MG tablet tablet Take 4 tablets by mouth Daily. 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 Indications: Spasm of Lung Air Passages (Patient not taking: Reported on 3/20/2025) 18 g 0     Allergies   Allergen Reactions    Nsaids GI Bleeding    Aricept [Donepezil] Diarrhea       Objective   Objective     Vital Signs  Temp:  [97.2 °F (36.2 °C)-98.1 °F (36.7 °C)] 97.2 °F (36.2 °C)  Heart Rate:  [] 73  Resp:  [16-18] 16  BP: ()/(31-99) 124/81  SpO2:  [95 %-98 %] 96 %  on   ;   Device (Oxygen Therapy): room air  Body mass index is 34.87 kg/m².    Physical Exam  HEENT: PERRLA, extraocular movements intact, Scleras no icterus  Neck: Supple, no JVD  Cardiovascular: Regular rate and rhythm with normal S1 and S2  Respiratory: Fairly clear to auscultation anteriorly with no wheezes  GI: Soft, nontender, bowel sounds are present  Extremities: No edema, palpable pedal pulses  Neurologic: Grossly nonfocal, no facial asymmetry    Results Review:  I reviewed the patient's new clinical results.  I reviewed the patient's new imaging results and agree with the interpretation.  I reviewed the patient's other test results and agree with the interpretation  I personally viewed and interpreted the patient's EKG/Telemetry data  Discussed with ED provider.    Lab Results (last 24 hours)       Procedure Component Value Units Date/Time    CBC & Differential [[id number]]  (Abnormal) Collected: 06/04/25 2213    Specimen: Blood Updated: 06/04/25 2235    Narrative:      The following orders were created for panel order CBC & Differential.  Procedure                               Abnormality         Status                     ---------                               -----------         ------                     CBC Auto Differential[[id number]]        Abnormal            Final result                 Please view results for these tests on the individual orders.    Comprehensive Metabolic Panel [[id number]]  (Abnormal)  Collected: 06/04/25 2213    Specimen: Blood Updated: 06/04/25 2255     Glucose 111 mg/dL      BUN 25.0 mg/dL      Creatinine 2.27 mg/dL      Sodium 138 mmol/L      Potassium 4.4 mmol/L      Chloride 99 mmol/L      CO2 27.2 mmol/L      Calcium 9.3 mg/dL      Total Protein 6.5 g/dL      Albumin 3.2 g/dL      ALT (SGPT) 20 U/L      AST (SGOT) 32 U/L      Alkaline Phosphatase 105 U/L      Total Bilirubin 0.4 mg/dL      Globulin 3.3 gm/dL      A/G Ratio 1.0 g/dL      BUN/Creatinine Ratio 11.0     Anion Gap 11.8 mmol/L      eGFR 28.3 mL/min/1.73     Narrative:      GFR Categories in Chronic Kidney Disease (CKD)              GFR Category          GFR (mL/min/1.73)    Interpretation  G1                    90 or greater        Normal or high (1)  G2                    60-89                Mild decrease (1)  G3a                   45-59                Mild to moderate decrease  G3b                   30-44                Moderate to severe decrease  G4                    15-29                Severe decrease  G5                    14 or less           Kidney failure    (1)In the absence of evidence of kidney disease, neither GFR category G1 or G2 fulfill the criteria for CKD.    eGFR calculation 2021 CKD-EPI creatinine equation, which does not include race as a factor    Magnesium [[id number]]  (Normal) Collected: 06/04/25 2213    Specimen: Blood Updated: 06/04/25 2255     Magnesium 1.6 mg/dL     High Sensitivity Troponin T [[id number]]  (Abnormal) Collected: 06/04/25 2213    Specimen: Blood Updated: 06/04/25 2255     HS Troponin T 50 ng/L     Narrative:      High Sensitive Troponin T Reference Range:  <14.0 ng/L- Negative Female for AMI  <22.0 ng/L- Negative Male for AMI  >=14 - Abnormal Female indicating possible myocardial injury.  >=22 - Abnormal Male indicating possible myocardial injury.   Clinicians would have to utilize clinical acumen, EKG, Troponin, and serial changes to determine if it is an Acute Myocardial Infarction  or myocardial injury due to an underlying chronic condition.         BNP [[id number]]  (Abnormal) Collected: 06/04/25 2213    Specimen: Blood Updated: 06/04/25 2255     proBNP 1,950.0 pg/mL     Narrative:      This assay is used as an aid in the diagnosis of individuals suspected of having heart failure. It can be used as an aid in the diagnosis of acute decompensated heart failure (ADHF) in patients presenting with signs and symptoms of ADHF to the emergency department (ED). In addition, NT-proBNP of <300 pg/mL indicates ADHF is not likely.    Age Range Result Interpretation  NT-proBNP Concentration (pg/mL:      <50             Positive            >450                   Gray                 300-450                    Negative             <300    50-75           Positive            >900                  Gray                300-900                  Negative            <300      >75             Positive            >1800                  Gray                300-1800                  Negative            <300    CBC Auto Differential [[id number]]  (Abnormal) Collected: 06/04/25 2213    Specimen: Blood Updated: 06/04/25 2235     WBC 5.73 10*3/mm3      RBC 3.00 10*6/mm3      Hemoglobin 11.0 g/dL      Hematocrit 31.0 %      .3 fL      MCH 36.7 pg      MCHC 35.5 g/dL      RDW 14.4 %      RDW-SD 52.7 fl      MPV 9.2 fL      Platelets 167 10*3/mm3      Neutrophil % 61.1 %      Lymphocyte % 24.4 %      Monocyte % 11.0 %      Eosinophil % 2.3 %      Basophil % 0.5 %      Immature Grans % 0.7 %      Neutrophils, Absolute 3.50 10*3/mm3      Lymphocytes, Absolute 1.40 10*3/mm3      Monocytes, Absolute 0.63 10*3/mm3      Eosinophils, Absolute 0.13 10*3/mm3      Basophils, Absolute 0.03 10*3/mm3      Immature Grans, Absolute 0.04 10*3/mm3      nRBC 0.0 /100 WBC     Protime-INR [[id number]]  (Abnormal) Collected: 06/04/25 2306    Specimen: Blood Updated: 06/04/25 2329     Protime 15.6 Seconds      INR 1.24    aPTT [[id number]]   (Normal) Collected: 06/04/25 2306    Specimen: Blood Updated: 06/04/25 2329     PTT 23.7 seconds     High Sensitivity Troponin T 1Hr [[id number]]  (Abnormal) Collected: 06/04/25 2334    Specimen: Blood from Arm, Left Updated: 06/05/25 0005     HS Troponin T 51 ng/L      Troponin T Numeric Delta 1 ng/L      Troponin T % Delta 2    Narrative:      High Sensitive Troponin T Reference Range:  <14.0 ng/L- Negative Female for AMI  <22.0 ng/L- Negative Male for AMI  >=14 - Abnormal Female indicating possible myocardial injury.  >=22 - Abnormal Male indicating possible myocardial injury.   Clinicians would have to utilize clinical acumen, EKG, Troponin, and serial changes to determine if it is an Acute Myocardial Infarction or myocardial injury due to an underlying chronic condition.         Ethanol [[id number]] Collected: 06/04/25 2334    Specimen: Blood from Arm, Left Updated: 06/05/25 0015     Ethanol <10 mg/dL      Ethanol % <0.010 %     Basic Metabolic Panel [[id number]]  (Abnormal) Collected: 06/05/25 0557    Specimen: Blood Updated: 06/05/25 0719     Glucose 123 mg/dL      BUN 24.0 mg/dL      Creatinine 2.14 mg/dL      Sodium 138 mmol/L      Potassium 5.2 mmol/L      Comment: Slight hemolysis detected by analyzer. Result may be falsely elevated.        Chloride 98 mmol/L      CO2 27.0 mmol/L      Calcium 8.7 mg/dL      BUN/Creatinine Ratio 11.2     Anion Gap 13.0 mmol/L      eGFR 30.3 mL/min/1.73     Narrative:      GFR Categories in Chronic Kidney Disease (CKD)              GFR Category          GFR (mL/min/1.73)    Interpretation  G1                    90 or greater        Normal or high (1)  G2                    60-89                Mild decrease (1)  G3a                   45-59                Mild to moderate decrease  G3b                   30-44                Moderate to severe decrease  G4                    15-29                Severe decrease  G5                    14 or less           Kidney failure    (1)In  the absence of evidence of kidney disease, neither GFR category G1 or G2 fulfill the criteria for CKD.    eGFR calculation 2021 CKD-EPI creatinine equation, which does not include race as a factor    CBC (No Diff) [[id number]]  (Abnormal) Collected: 06/05/25 0557    Specimen: Blood Updated: 06/05/25 0650     WBC 6.07 10*3/mm3      RBC 2.87 10*6/mm3      Hemoglobin 10.3 g/dL      Hematocrit 29.1 %      .4 fL      MCH 35.9 pg      MCHC 35.4 g/dL      RDW 14.4 %      RDW-SD 52.3 fl      MPV 9.2 fL      Platelets 137 10*3/mm3             Imaging Results (Last 24 Hours)       Procedure Component Value Units Date/Time    XR Chest 1 View [[id number]] Collected: 06/04/25 2337     Updated: 06/04/25 2341    Narrative:      CXR ONE VIEW      HISTORY: syncope     COMPARISON: 3/28/2024     TECHNIQUE: single portable AP       Impression:         Allowing for submaximal inspiratory result, heart size appears within  normal limits.     There is a submaximal inspiratory result. Allowing for that, there is no  convincing evidence of infiltrate, effusion or pneumothorax.           This report was finalized on 6/4/2025 11:38 PM by Dr. Caden Ballard M.D on Workstation: [id number]       CT Head Without Contrast [[id number]] Collected: 06/04/25 2331     Updated: 06/04/25 2339    Narrative:      NON-CONTRAST CT HEAD & CT CERVICAL SPINE     REASON:  The patient is being seen in the ED for trauma and is  determined to have a high energy mechanism and/or high risk for missed  injuries due to presence of associated injuries or distracting pain. The  patient will need imaging of the head per the trauma protocol given  diagnoses such as intracranial hemorrhage cannot be excluded.    The  patient will need imaging of the cervical spine given diagnoses such as  cervical spine fracture cannot be excluded.  The diagnostic information  gained in this study exceeds the estimated risk of radiation induced  injury at the time of order  placement.  Mechanism of injury: Syncope and fall from standing, headache and neck  pain     COMPARISON STUDIES:  None Available.     TECHNIQUE:  Axial images were acquired from the skull base to vertex  without contrast, including multiplanar reformats, per standard  departmental protocol.   Routine cervical spine CT without contrast.  Multiplanar reformats were created. Radiation dose reduction techniques  were utilized, including automated exposure control, and exposure  modulation based on body size.     FINDINGS:     Head CT: There is no CT evidence of acute intracranial hemorrhage, mass,  or infarct. There is volume loss, but there is no evidence of  hydrocephalus or extra-axial fluid collection.  There are nonspecific  white matter changes, but there is no acute intracranial abnormality.   Skull base and calvarium show no acute abnormality, and the extracranial  soft tissues show no acute abnormality.     C-spine CT: There is no evidence of acute alignment abnormality.  There  are spinal degenerative changes, but there is no fracture or other acute  bony abnormality.  The paraspinous soft tissues show no acute  abnormality.       Impression:         Negative unenhanced head CT, no acute abnormality.      Negative cervical spine CT, degenerative change without acute  abnormality.           This report was finalized on 6/4/2025 11:36 PM by Dr. Caden Ballard M.D on Workstation: [id number]       CT Cervical Spine Without Contrast [[id number]] Collected: 06/04/25 2331     Updated: 06/04/25 2339    Narrative:      NON-CONTRAST CT HEAD & CT CERVICAL SPINE     REASON:  The patient is being seen in the ED for trauma and is  determined to have a high energy mechanism and/or high risk for missed  injuries due to presence of associated injuries or distracting pain. The  patient will need imaging of the head per the trauma protocol given  diagnoses such as intracranial hemorrhage cannot be excluded.    The  patient  will need imaging of the cervical spine given diagnoses such as  cervical spine fracture cannot be excluded.  The diagnostic information  gained in this study exceeds the estimated risk of radiation induced  injury at the time of order placement.  Mechanism of injury: Syncope and fall from standing, headache and neck  pain     COMPARISON STUDIES:  None Available.     TECHNIQUE:  Axial images were acquired from the skull base to vertex  without contrast, including multiplanar reformats, per standard  departmental protocol.   Routine cervical spine CT without contrast.  Multiplanar reformats were created. Radiation dose reduction techniques  were utilized, including automated exposure control, and exposure  modulation based on body size.     FINDINGS:     Head CT: There is no CT evidence of acute intracranial hemorrhage, mass,  or infarct. There is volume loss, but there is no evidence of  hydrocephalus or extra-axial fluid collection.  There are nonspecific  white matter changes, but there is no acute intracranial abnormality.   Skull base and calvarium show no acute abnormality, and the extracranial  soft tissues show no acute abnormality.     C-spine CT: There is no evidence of acute alignment abnormality.  There  are spinal degenerative changes, but there is no fracture or other acute  bony abnormality.  The paraspinous soft tissues show no acute  abnormality.       Impression:         Negative unenhanced head CT, no acute abnormality.      Negative cervical spine CT, degenerative change without acute  abnormality.           This report was finalized on 6/4/2025 11:36 PM by Dr. Caden Ballard M.D on Workstation: [id number]               Results for orders placed during the hospital encounter of 06/04/25    Adult Transthoracic Echo Complete W/ Cont if Necessary Per Protocol    Interpretation Summary    Left ventricular systolic function is low normal. Calculated left ventricular EF = 46.2%.  There is global  left ventricular hypokinesis present.    Left ventricular diastolic function is consistent with (grade I) impaired relaxation.    Frequent ventricular ectopies noted during study.      ECG 12 Lead Rhythm Change   Preliminary Result   HEART GJXK=796  bpm   RR Ouwdhkfy=830  ms   LA Daddhgoc=690  ms   P Horizontal Axis=-1  deg   P Front Axis=0  deg   QRSD Bumbdjhs=857  ms   QT Xadkupur=076  ms   PKpT=615  ms   QRS Axis=-7  deg   T Wave Axis=27  deg   - ABNORMAL ECG -   Sinus tachycardia   Multiform ventricular premature complexes   Right bundle branch block   When compared with ECG of 24-Feb-2024 06:13:53,   No significant change   Date and Time of Study:2025-06-04 22:10:51           Assessment/Plan     Active Hospital Problems    Diagnosis  POA    **Syncope [R55]  Yes    Alcohol use disorder [F10.90]  Yes    Acquired hypothyroidism [E03.9]  Yes    Cognitive decline [R41.89]  Yes    Stage 3b chronic kidney disease [N18.32]  Yes    Essential hypertension [I10]  Yes      Resolved Hospital Problems   No resolved problems to display.       1. Syncope/fall/orthostasis, CT brain and CT cervical spine with no acute findings.  Will check carotid Dopplers and etiology is likely soft to low blood pressures.  He is also orthostatic and will initiate midodrine and is on IV fluids.  Home lisinopril has been held.  2D echo has been ordered as well.  Monitor for any arrhythmia.  2.  Alcohol use, unclear if he still drinks on a regular basis however will be on a [facility] protocol and monitor for withdrawals.  3.  Acute/CKD stage 3B, baseline creatinine is around 1.4-1.5 and upon admission creatinine was 2.27.  Continue with fluids and lisinopril has been held.  Anticipate renal function to return back to baseline.  4.  Hypertension, currently soft low blood pressure therefore lisinopril is on hold.  5.  Hyperlipidemia, statins.  6.  Gout, on allopurinol.  7.  Hypothyroidism, will continue with Synthroid.  8.  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  9.  CODE STATUS is full code.       Feliberto Rowe MD  [facility]  06/05/25  16:23 EDT      Electronically signed by Feliberto Rowe MD at 06/05/25 1629          Emergency Department Notes        Zeinab Anna RN at 06/05/25 0120          ..Nursing report ED to floor  Bryan Landers  80 y.o.  male    HPI :  HPI  Stated Reason for Visit: syncopal episode - hematoma on back of head, neck pain. Runs of vtach no cardiac hx  History Obtained From: EMS, patient    Chief Complaint  Chief Complaint   Patient presents with    Syncope       Admitting doctor:   Heron Rader DO    Admitting diagnosis:   The primary encounter diagnosis was Acute renal failure superimposed on chronic kidney disease, unspecified acute renal failure type, unspecified CKD stage. Diagnoses of Syncope, unspecified syncope type, Multifocal PVCs, Closed head injury, initial encounter, Anticoagulated, Anemia, unspecified type, Hyperglycemia, and Elevated troponin were also pertinent to this visit.    Code status:   Current Code Status       Date Active Code Status Order ID Comments User Context       Prior            Allergies:   Nsaids and Aricept [donepezil]    Isolation:   No active isolations    Intake and Output    Intake/Output Summary (Last 24 hours) at 6/5/2025 0120  Last data filed at 6/4/2025 2253  Gross per 24 hour   Intake 1000 ml   Output --   Net 1000 ml       Weight:       06/04/25 2159   Weight: 86.2 kg (190 lb)       Most recent vitals:   Vitals:    06/04/25 2159 06/04/25 2303 06/04/25 2307 06/05/25 0001   BP: 123/84 137/90 147/86 141/88   BP Location:   Right arm    Patient Position:   Lying    Pulse: 108 96 96 101   Resp: 18 18 18    Temp: 98.1 °F (36.7 °C)      SpO2: 98% 97% 97% 96%   Weight: 86.2 kg (190 lb)      Height: 177.8 cm (70\")          Active LDAs/IV Access:   Lines, Drains & Airways       Active LDAs       Name Placement date Placement time Site Days    " Peripheral IV 06/04/25 2158 20 G Anterior;Left Forearm 06/04/25 2158  Forearm  less than 1                    Labs (abnormal labs have a star):   Labs Reviewed   COMPREHENSIVE METABOLIC PANEL - Abnormal; Notable for the following components:       Result Value    Glucose 111 (*)     BUN 25.0 (*)     Creatinine 2.27 (*)     Albumin 3.2 (*)     eGFR 28.3 (*)     All other components within normal limits    Narrative:     GFR Categories in Chronic Kidney Disease (CKD)              GFR Category          GFR (mL/min/1.73)    Interpretation  G1                    90 or greater        Normal or high (1)  G2                    60-89                Mild decrease (1)  G3a                   45-59                Mild to moderate decrease  G3b                   30-44                Moderate to severe decrease  G4                    15-29                Severe decrease  G5                    14 or less           Kidney failure    (1)In the absence of evidence of kidney disease, neither GFR category G1 or G2 fulfill the criteria for CKD.    eGFR calculation 2021 CKD-EPI creatinine equation, which does not include race as a factor   TROPONIN - Abnormal; Notable for the following components:    HS Troponin T 50 (*)     All other components within normal limits    Narrative:     High Sensitive Troponin T Reference Range:  <14.0 ng/L- Negative Female for AMI  <22.0 ng/L- Negative Male for AMI  >=14 - Abnormal Female indicating possible myocardial injury.  >=22 - Abnormal Male indicating possible myocardial injury.   Clinicians would have to utilize clinical acumen, EKG, Troponin, and serial changes to determine if it is an Acute Myocardial Infarction or myocardial injury due to an underlying chronic condition.        BNP (IN-HOUSE) - Abnormal; Notable for the following components:    proBNP 1,950.0 (*)     All other components within normal limits    Narrative:     This assay is used as an aid in the diagnosis of individuals  suspected of having heart failure. It can be used as an aid in the diagnosis of acute decompensated heart failure (ADHF) in patients presenting with signs and symptoms of ADHF to the emergency department (ED). In addition, NT-proBNP of <300 pg/mL indicates ADHF is not likely.    Age Range Result Interpretation  NT-proBNP Concentration (pg/mL:      <50             Positive            >450                   Gray                 300-450                    Negative             <300    50-75           Positive            >900                  Gray                300-900                  Negative            <300      >75             Positive            >1800                  Gray                300-1800                  Negative            <300   CBC WITH AUTO DIFFERENTIAL - Abnormal; Notable for the following components:    RBC 3.00 (*)     Hemoglobin 11.0 (*)     Hematocrit 31.0 (*)     .3 (*)     MCH 36.7 (*)     Immature Grans % 0.7 (*)     All other components within normal limits   PROTIME-INR - Abnormal; Notable for the following components:    Protime 15.6 (*)     INR 1.24 (*)     All other components within normal limits   HIGH SENSITIVITIY TROPONIN T 1HR - Abnormal; Notable for the following components:    HS Troponin T 51 (*)     All other components within normal limits    Narrative:     High Sensitive Troponin T Reference Range:  <14.0 ng/L- Negative Female for AMI  <22.0 ng/L- Negative Male for AMI  >=14 - Abnormal Female indicating possible myocardial injury.  >=22 - Abnormal Male indicating possible myocardial injury.   Clinicians would have to utilize clinical acumen, EKG, Troponin, and serial changes to determine if it is an Acute Myocardial Infarction or myocardial injury due to an underlying chronic condition.        MAGNESIUM - Normal   APTT - Normal   ETHANOL   CBC AND DIFFERENTIAL    Narrative:     The following orders were created for panel order CBC & Differential.  Procedure                                Abnormality         Status                     ---------                               -----------         ------                     CBC Auto Differential[[id number]]        Abnormal            Final result                 Please view results for these tests on the individual orders.       EKG:   ECG 12 Lead Rhythm Change   Preliminary Result   HEART HWCF=742  bpm   RR Yjeeqxrk=309  ms   AL Xtfisnat=955  ms   P Horizontal Axis=-1  deg   P Front Axis=0  deg   QRSD Dwylgmma=138  ms   QT Arpswmnb=054  ms   AFpX=755  ms   QRS Axis=-7  deg   T Wave Axis=27  deg   - ABNORMAL ECG -   Sinus tachycardia   Multiform ventricular premature complexes   Right bundle branch block   When compared with ECG of 24-Feb-2024 06:13:53,   No significant change   Date and Time of Study:2025-06-04 22:10:51          Meds given in ED:   Medications   magnesium sulfate 2g/50 mL (PREMIX) infusion (2 g Intravenous New Bag 6/4/25 3700)   lactated ringers bolus 1,000 mL (0 mL Intravenous Stopped 6/4/25 3533)   metoprolol tartrate (LOPRESSOR) tablet 25 mg (25 mg Oral Given 6/4/25 1771)       Imaging results:  XR Chest 1 View  Result Date: 6/4/2025   Allowing for submaximal inspiratory result, heart size appears within normal limits.  There is a submaximal inspiratory result. Allowing for that, there is no convincing evidence of infiltrate, effusion or pneumothorax.    This report was finalized on 6/4/2025 11:38 PM by Dr. Caden Ballard M.D on Workstation: [id number]      CT Head Without Contrast  Result Date: 6/4/2025   Negative unenhanced head CT, no acute abnormality.  Negative cervical spine CT, degenerative change without acute abnormality.    This report was finalized on 6/4/2025 11:36 PM by Dr. Caden Ballard M.D on Workstation: [id number]      CT Cervical Spine Without Contrast  Result Date: 6/4/2025   Negative unenhanced head CT, no acute abnormality.  Negative cervical spine CT, degenerative change without acute  abnormality.    This report was finalized on 2025 11:36 PM by Dr. Caden Ballard M.D on Workstation: [id number]        Ambulatory status:   - assist x2    Social issues:   Social History     Socioeconomic History    Marital status:      Spouse name: Chloe    Number of children: 2   Tobacco Use    Smoking status: Former     Current packs/day: 0.00     Average packs/day: 0.3 packs/day for 5.0 years (1.3 ttl pk-yrs)     Types: Cigars, Cigarettes     Start date: 1981     Quit date: 1986     Years since quittin.4     Passive exposure: Never    Smokeless tobacco: Never   Vaping Use    Vaping status: Never Used   Substance and Sexual Activity    Alcohol use: Yes     Alcohol/week: 32.0 - 34.0 standard drinks of alcohol     Types: 4 - 6 Shots of liquor, 28 Standard drinks or equivalent per week     Comment: 2-3 double vodkas a night    Drug use: No    Sexual activity: Yes     Partners: Female     Birth control/protection: Post-menopausal       Peripheral Neurovascular  Peripheral Neurovascular (Adult)  Peripheral Neurovascular WDL: WDL    Neuro Cognitive  Neuro Cognitive (Adult)  Cognitive/Neuro/Behavioral WDL: WDL, orientation  Orientation: oriented x 4    Learning  Learning Assessment  Learning Readiness and Ability: no barriers identified  Education Provided  Person Taught: patient, family member/friend  Teaching Method: verbal instruction    Respiratory  Respiratory WDL  Respiratory WDL: WDL    Abdominal Pain       Pain Assessments  Pain (Adult)  (0-10) Pain Rating: Rest: 3  (0-10) Pain Rating: Activity: 3  Pain Location: neck    NIH Stroke Scale       Zeinab Anna RN  25 01:20 EDT     Electronically signed by Zeinab Anna RN at 25 0120       Joshua Wheat MD at 25 2563          MD ATTESTATION NOTE  I supervised care provided by the midlevel provider. We have discussed this patient's history, physical exam, and treatment plan. I have reviewed the midlevel provider's note  and I agree with the midlevel provider's findings and plan of care.   SHARED VISIT: This visit was performed by BOTH a physician and an APC. The substantive portion of the medical decision making was performed by this attesting physician who made or approved the management plan and takes responsibility for patient management. All studies in the APC note (if performed) were independently interpreted by me.   I have personally had a face to face encounter with the patient.     PCP: Jose Fonseca MD  Patient Care Team:  Jose Fonseca MD as PCP - General (Family Medicine)  Jose Michelle MD as Consulting Physician (Radiation Oncology)  Roldan Mccarthy MD as Consulting Physician (Urology)  Clarence Nieves MD (Hematology)  Grover Harris DPM as Consulting Physician (Podiatry)  Skip Wall MD PhD as Consulting Physician (Thoracic Surgery)     Bryan Landers is a 80 y.o. male who presents to the ED c/o syncope.  Patient has history of dementia, history supplied by patient's family.  Patient was going to the bathroom when he passed out, fell and struck his head, unsure on what.  Family denies any seizure-like activity, patient was not postictal, no complaints of weakness or numbness.  Family does report that patient has been feeling lightheaded for several days, no vomiting or diarrhea, extremely poor p.o. intake.  No cough or fevers, no complaints of chest pain or shortness of breath.  EMS reports patient had several runs of nonsustained V. tach and route.    On exam:  General: NAD.  Head: Contusion on occiput, no crepitus or deformity, no raccoon eyes or Vidales sign..  ENT: nares patent, no scleral icterus  Neck: Supple, trachea midline.  Cervical collar in place: No step-offs or deformities, no midline tenderness to palpation.  Cardiac: regular rate and rhythm.  Lungs: normal effort, clear to auscultation bilaterally  Abdomen: Soft, nondistended, NTTP, no rebound tenderness, no guarding  or rigidity.   Extremities: Moves all extremities well, no peripheral edema  Neuro: Alert and oriented, no facial droop, speech clear, no dysarthria or aphasia, moves all extremities well, sensation intact light touch all extremities, no focal deficits  Psych: calm, cooperative  Skin: Warm, dry.  Bandaged wound on right elbow.    Medical Decision Making:  After the initial H&P, I discussed pertinent information from history and physical exam with patient/family.  Discussed differential diagnosis.  Discussed plan for ED evaluation/work-up/treatment.  All questions answered.  Patient/family is agreeable with plan.    ED Course as of 06/05/25 0015 Wed Jun 04, 2025 2240 My differential diagnosis for syncope includes but is not limited to:  Vasovagal reflex - situational stimulus, micturition, defecation, cough, sneezing, swallowing, postprandial state, react sinus hypersensitivity  Vascular-prolonged recumbency, sudden postural change, prolonged standing, hypovolemia, vasodilator drugs, autonomic neuropathy, adrenal insufficiency, subclavian steal, pulmonary embolism  Cardiac -arrhythmia, heart block, myocardial infarction, aortic stenosis, cardiac myxoma, cardiac, LV Dysfunction, Aortic Dissection, Pulmonary Hypertension, Pulmonary Stenosis, Pacemaker Failure  CNS-seizure, hypoxia, hypoglycemia, TIA,(basal vertebral), hydrocephalus     [JG]   2240 Fall with closed head injury, patient demented, anticoagulated, high risk of intracranial hemorrhage, high risk of occult cervical spine fracture, obtaining CT imaging, checking lab work including coagulation factors. [JG]   2241 EKG independently viewed and contemporaneously interpreted by ED physician. Time: 2210.  Rate 103.  Interpretation: Sinus tachycardia, normal axis, right bundle branch block, no acute ST changes, occasional PVC, no contiguous PVCs, patient does have 2 distinct focus of PVCs. [JG]   2255 proBNP(!): 1,950.0 [CV]   2255 HS Troponin T(!): 50 [CV]    2255 BUN(!): 25.0 [CV]   2255 Creatinine(!): 2.27 [CV]   2309 Acute on chronic kidney injury, creatinine elevated 2.27, 1.59 a month ago.  Patient receiving IV fluids. [JG]   Thu Jun 05, 2025   0004 Upon reassessment, patient's family did mention that patient does have history of daily alcohol use, did drink earlier today, has possibly withdrawal in the past.  Ethanol level drawn, patient to be admitted to the hospital for further workup and evaluation.  [CV]   0014 Phone call with Chantal, NOAH with [facility].  Discussed the patient, relevant history, exam, diagnostics, ED findings/progress, and concerns. They agree to admit the patient to telemetry observation under Dr. Rader. Care assumed by the admitting physician at this time.     [JG]      ED Course User Index  [CV] Roldan Garrido PA-C  [JG] Joshua Wheat MD       Diagnosis  Final diagnoses:   Acute renal failure superimposed on chronic kidney disease, unspecified acute renal failure type, unspecified CKD stage   Syncope, unspecified syncope type   Multifocal PVCs   Closed head injury, initial encounter   Anticoagulated   Anemia, unspecified type   Hyperglycemia   Elevated troponin          Joshua Wheat MD  06/05/25 0015      Electronically signed by Joshua Wheat MD at 06/05/25 0015       Roldan Garrido PA-C at 06/04/25 2209       Attestation signed by Joshua Wheat MD at 06/05/25 0140      I supervised care provided by the midlevel provider. We have discussed this patient's history, physical exam, and treatment plan. I have reviewed the midlevel provider's note and I agree with the midlevel provider's findings and plan of care.   SHARED VISIT: This visit was performed by BOTH a physician and an NOAH. The substantive portion of the medical decision making was performed by this attesting physician who made or approved the management plan and takes responsibility for patient management. All studies in the NOAH note (if  performed) were independently interpreted by me.                            EMERGENCY DEPARTMENT ENCOUNTER    Room Number:  02/02  Date of encounter:  6/5/2025  PCP: Jose Fonseca MD  Historian: Patient  Full history not obtainable due to: None    HPI:  Chief Complaint: Syncope    Context: Bryan Landers is a 80 y.o. male with a PMH significant for hypertension, hyperlipidemia, diabetes, gout, microalbumin, CKD, cognitive decline, hypothyroidism, B12 deficiency, sinus tachycardia, chronic pulmonary embolism who was BIBA following a syncopal episode.  Patient had a syncopal episode where he then hit his head, on EMS ride over patient did have some nonsustained runs of ventricle tachycardia reported by EMS.  Patient denies any chest pain or shortness of breath at this time, denies any headache or neck pain.  Patient denies any blood thinner use but does take aspirin daily.      MEDICAL RECORD REVIEW:    Upon review of the medical record it appears the patient was evaluated 4/9/2025.  The patient had a normal vitamin D and PTH.    PAST MEDICAL HISTORY    Active Ambulatory Problems     Diagnosis Date Noted    Essential hypertension     Mixed hyperlipidemia     Arthritis     Type 2 diabetes mellitus with chronic kidney disease, without long-term current use of insulin 07/28/2017    Severely overweight 07/28/2017    Idiopathic chronic gout of left knee 07/28/2017    Medication monitoring encounter 07/28/2017    Microalbuminuria due to type 2 diabetes mellitus 09/06/2017    History of prostate cancer 02/27/2018    Stage 3b chronic kidney disease 08/23/2018    Medicare annual wellness visit, subsequent 08/23/2018    Radiation proctitis 10/01/2018    Cognitive decline 03/26/2019    Acquired hypothyroidism 08/15/2019    B12 deficiency 02/14/2020    Sinus tachycardia by electrocardiogram 12/22/2020    Chronic pulmonary embolism without acute cor pulmonale 12/28/2020    Chronic deep vein thrombosis (DVT) of popliteal vein  of both lower extremities 01/07/2021    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/04/2021    Bladder mass 09/22/2021    Dependent edema 06/08/2022    Anemia of chronic kidney failure, stage 3 (moderate) 08/29/2022    Deep venous thrombosis 01/07/2021    Class 2 severe obesity with serious comorbidity in adult 02/27/2024    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 02/27/2024     Resolved Ambulatory Problems     Diagnosis Date Noted    Routine adult health maintenance 07/28/2017    Traumatic hematoma of left lower leg 07/28/2017    Skin tear of left elbow without complication 04/23/2018    Injury of anal canal 06/21/2018    Rectal bleeding 09/13/2018    History of hip replacement, total, bilateral 12/13/2018    Right hip pain 12/13/2018    Gastrointestinal hemorrhage 01/04/2019    Acute blood loss anemia 01/16/2019    Hypotension due to hypovolemia 01/16/2019    GI bleed     CERVANTES (dyspnea on exertion) 12/22/2020    Screening for prostate cancer 01/21/2021    Gross hematuria 09/22/2021    Pneumothorax 02/23/2024    Pneumothorax 02/27/2024     Past Medical History:   Diagnosis Date    At risk for sleep apnea     Bruises easily     Diabetes mellitus     Disease of thyroid gland     Elevated cholesterol     History of transfusion     Poor historian     Short-term memory loss     Vitamin D deficiency          PAST SURGICAL HISTORY  Past Surgical History:   Procedure Laterality Date    COLONOSCOPY  09/2016    COLONOSCOPY N/A 9/28/2018    Procedure: COLONOSCOPY;  Surgeon: Olaf Gomez MD;  Location: [facility] OR;  Service: Gastroenterology    COLONOSCOPY N/A 1/7/2019    Procedure: COLONOSCOPY;  Surgeon: Rosa Jack MD;  Location: [facility] OR;  Service: Gastroenterology    CYSTOSCOPY BLADDER BIOPSY N/A 9/22/2021    Procedure: CYSTOSCOPY BLADDER BIOPSY;  Surgeon: Roldan Mccarthy MD;  Location: [facility] OR;  Service: Urology;  Laterality: N/A;    HERNIA REPAIR Bilateral     PROSTATE ULTRASOUND BIOPSY       SIGMOIDOSCOPY N/A 10/2/2018    Procedure: FLEXIBLE SIGMOIDOSCOPY:  APC cautery bleeding using 60 joules;  Surgeon: Olaf Gomez MD;  Location: [facility] ENDOSCOPY;  Service: Gastroenterology    TONSILLECTOMY      TOTAL HIP ARTHROPLASTY Bilateral          FAMILY HISTORY  Family History   Problem Relation Age of Onset    Stroke Mother     Stroke Father     No Known Problems Brother     Colon cancer Neg Hx     Colon polyps Neg Hx     Malig Hyperthermia Neg Hx          SOCIAL HISTORY  Social History     Socioeconomic History    Marital status:      Spouse name: Chloe    Number of children: 2   Tobacco Use    Smoking status: Former     Current packs/day: 0.00     Average packs/day: 0.3 packs/day for 5.0 years (1.3 ttl pk-yrs)     Types: Cigars, Cigarettes     Start date: 1981     Quit date: 1986     Years since quittin.4     Passive exposure: Never    Smokeless tobacco: Never   Vaping Use    Vaping status: Never Used   Substance and Sexual Activity    Alcohol use: Yes     Alcohol/week: 32.0 - 34.0 standard drinks of alcohol     Types: 4 - 6 Shots of liquor, 28 Standard drinks or equivalent per week     Comment: 2-3 double vodkas a night    Drug use: No    Sexual activity: Yes     Partners: Female     Birth control/protection: Post-menopausal         ALLERGIES  Nsaids and Aricept [donepezil]        REVIEW OF SYSTEMS    All systems reviewed and marked as negative except as listed in HPI     PHYSICAL EXAM    I have reviewed the triage vital signs and nursing notes.    ED Triage Vitals [25 2159]   Temp Heart Rate Resp BP SpO2   98.1 °F (36.7 °C) 108 18 123/84 98 %      Temp src Heart Rate Source Patient Position BP Location FiO2 (%)   -- -- -- -- --       Physical Exam    Vital signs and nursing notes reviewed.            LAB RESULTS  Recent Results (from the past 24 hours)   ECG 12 Lead Rhythm Change    Collection Time: 25 10:10 PM   Result Value Ref Range    QT Interval 369  ms    QTC Interval 485 ms   Comprehensive Metabolic Panel    Collection Time: 06/04/25 10:13 PM    Specimen: Blood   Result Value Ref Range    Glucose 111 (H) 65 - 99 mg/dL    BUN 25.0 (H) 8.0 - 23.0 mg/dL    Creatinine 2.27 (H) 0.76 - 1.27 mg/dL    Sodium 138 136 - 145 mmol/L    Potassium 4.4 3.5 - 5.2 mmol/L    Chloride 99 98 - 107 mmol/L    CO2 27.2 22.0 - 29.0 mmol/L    Calcium 9.3 8.6 - 10.5 mg/dL    Total Protein 6.5 6.0 - 8.5 g/dL    Albumin 3.2 (L) 3.5 - 5.2 g/dL    ALT (SGPT) 20 1 - 41 U/L    AST (SGOT) 32 1 - 40 U/L    Alkaline Phosphatase 105 39 - 117 U/L    Total Bilirubin 0.4 0.0 - 1.2 mg/dL    Globulin 3.3 gm/dL    A/G Ratio 1.0 g/dL    BUN/Creatinine Ratio 11.0 7.0 - 25.0    Anion Gap 11.8 5.0 - 15.0 mmol/L    eGFR 28.3 (L) >60.0 mL/min/1.73   Magnesium    Collection Time: 06/04/25 10:13 PM    Specimen: Blood   Result Value Ref Range    Magnesium 1.6 1.6 - 2.4 mg/dL   High Sensitivity Troponin T    Collection Time: 06/04/25 10:13 PM    Specimen: Blood   Result Value Ref Range    HS Troponin T 50 (H) <22 ng/L   BNP    Collection Time: 06/04/25 10:13 PM    Specimen: Blood   Result Value Ref Range    proBNP 1,950.0 (H) 0.0 - 1,800.0 pg/mL   CBC Auto Differential    Collection Time: 06/04/25 10:13 PM    Specimen: Blood   Result Value Ref Range    WBC 5.73 3.40 - 10.80 10*3/mm3    RBC 3.00 (L) 4.14 - 5.80 10*6/mm3    Hemoglobin 11.0 (L) 13.0 - 17.7 g/dL    Hematocrit 31.0 (L) 37.5 - 51.0 %    .3 (H) 79.0 - 97.0 fL    MCH 36.7 (H) 26.6 - 33.0 pg    MCHC 35.5 31.5 - 35.7 g/dL    RDW 14.4 12.3 - 15.4 %    RDW-SD 52.7 37.0 - 54.0 fl    MPV 9.2 6.0 - 12.0 fL    Platelets 167 140 - 450 10*3/mm3    Neutrophil % 61.1 42.7 - 76.0 %    Lymphocyte % 24.4 19.6 - 45.3 %    Monocyte % 11.0 5.0 - 12.0 %    Eosinophil % 2.3 0.3 - 6.2 %    Basophil % 0.5 0.0 - 1.5 %    Immature Grans % 0.7 (H) 0.0 - 0.5 %    Neutrophils, Absolute 3.50 1.70 - 7.00 10*3/mm3    Lymphocytes, Absolute 1.40 0.70 - 3.10 10*3/mm3     Monocytes, Absolute 0.63 0.10 - 0.90 10*3/mm3    Eosinophils, Absolute 0.13 0.00 - 0.40 10*3/mm3    Basophils, Absolute 0.03 0.00 - 0.20 10*3/mm3    Immature Grans, Absolute 0.04 0.00 - 0.05 10*3/mm3    nRBC 0.0 0.0 - 0.2 /100 WBC   Protime-INR    Collection Time: 06/04/25 11:06 PM    Specimen: Blood   Result Value Ref Range    Protime 15.6 (H) 11.7 - 14.2 Seconds    INR 1.24 (H) 0.90 - 1.10   aPTT    Collection Time: 06/04/25 11:06 PM    Specimen: Blood   Result Value Ref Range    PTT 23.7 22.7 - 35.4 seconds   High Sensitivity Troponin T 1Hr    Collection Time: 06/04/25 11:34 PM    Specimen: Arm, Left; Blood   Result Value Ref Range    HS Troponin T 51 (H) <22 ng/L    Troponin T Numeric Delta 1 ng/L    Troponin T % Delta 2 Abnormal if >/= 20%   Ethanol    Collection Time: 06/04/25 11:34 PM    Specimen: Arm, Left; Blood   Result Value Ref Range    Ethanol <10 0 - 10 mg/dL    Ethanol % <0.010 %       Ordered the above labs and independently reviewed the results.        RADIOLOGY  XR Chest 1 View  Result Date: 6/4/2025  CXR ONE VIEW  HISTORY: syncope  COMPARISON: 3/28/2024  TECHNIQUE: single portable AP       Allowing for submaximal inspiratory result, heart size appears within normal limits.  There is a submaximal inspiratory result. Allowing for that, there is no convincing evidence of infiltrate, effusion or pneumothorax.    This report was finalized on 6/4/2025 11:38 PM by Dr. Caden Ballard M.D on Workstation: [id number]      CT Head Without Contrast  CT Head Without Contrast, CT Cervical Spine Without Contrast  Result Date: 6/4/2025  NON-CONTRAST CT HEAD & CT CERVICAL SPINE  REASON:  The patient is being seen in the ED for trauma and is determined to have a high energy mechanism and/or high risk for missed injuries due to presence of associated injuries or distracting pain. The patient will need imaging of the head per the trauma protocol given diagnoses such as intracranial hemorrhage cannot be excluded.     The patient will need imaging of the cervical spine given diagnoses such as cervical spine fracture cannot be excluded.  The diagnostic information gained in this study exceeds the estimated risk of radiation induced injury at the time of order placement. Mechanism of injury: Syncope and fall from standing, headache and neck pain  COMPARISON STUDIES:  None Available.  TECHNIQUE:  Axial images were acquired from the skull base to vertex without contrast, including multiplanar reformats, per standard departmental protocol.   Routine cervical spine CT without contrast. Multiplanar reformats were created. Radiation dose reduction techniques were utilized, including automated exposure control, and exposure modulation based on body size.  FINDINGS:  Head CT: There is no CT evidence of acute intracranial hemorrhage, mass, or infarct. There is volume loss, but there is no evidence of hydrocephalus or extra-axial fluid collection.  There are nonspecific white matter changes, but there is no acute intracranial abnormality. Skull base and calvarium show no acute abnormality, and the extracranial soft tissues show no acute abnormality.  C-spine CT: There is no evidence of acute alignment abnormality.  There are spinal degenerative changes, but there is no fracture or other acute bony abnormality.  The paraspinous soft tissues show no acute abnormality.       Negative unenhanced head CT, no acute abnormality.  Negative cervical spine CT, degenerative change without acute abnormality.    This report was finalized on 6/4/2025 11:36 PM by Dr. Caden Ballard M.D on Workstation: [id number]        I ordered the above noted radiological studies. Independently reviewed by me and discussed with radiologist.  See dictation above for official radiology interpretation.      PROCEDURES    Procedures        MEDICATIONS GIVEN IN ER    Medications   magnesium sulfate 2g/50 mL (PREMIX) infusion (2 g Intravenous New Bag 6/4/25 7553)    lactated ringers bolus 1,000 mL (0 mL Intravenous Stopped 6/4/25 9080)   metoprolol tartrate (LOPRESSOR) tablet 25 mg (25 mg Oral Given 6/4/25 6558)         PROGRESS, DATA ANALYSIS, CONSULTS, AND MEDICAL DECISION MAKING    All labs have been independently interpreted by me.  All radiology studies have been interpreted by me.  Discussion below represents my analysis of pertinent findings related to patient's condition, differential diagnosis, treatment plan and final disposition.    CT of head and neck were negative following syncopal episode.  No abnormalities noted on chest x-ray.  Patient given 25 mg metoprolol p.o. due to reported runs of V. tach and tachycardia in the emergency department with PVCs.  Patient also given 1 L fluids.  Etiology of syncopal episode is unknown at this time, patient's history of alcohol abuse could contribute to this syncopal episode.  Initial troponin is 50 with a repeat of 51, BNP of 1950, patient continues to deny chest pain, no significant electrolyte derangement to explain syncopal episode.    - Chronic or social conditions impacting care: Frequent alcohol use      DIFFERENTIAL DIAGNOSIS INCLUDE BUT NOT LIMITED TO: Differential diagnosis for head injury includes but is not limited to:  - subarachnoid hemorrhage  - subdural hematoma  - epidural hematoma  - concussion  - scalp contusion  - ACS  - Alcohol abuse      Orders placed during this visit:  Orders Placed This Encounter   Procedures    CT Head Without Contrast    CT Cervical Spine Without Contrast    XR Chest 1 View    Comprehensive Metabolic Panel    Magnesium    High Sensitivity Troponin T    BNP    CBC Auto Differential    Protime-INR    aPTT    High Sensitivity Troponin T 1Hr    Ethanol    LHA (on-call MD unless specified) Details    ECG 12 Lead Rhythm Change    Initiate Observation Status    CBC & Differential         ED Course as of 06/05/25 0018   Wed Jun 04, 2025 2240 My differential diagnosis for syncope includes  but is not limited to:  Vasovagal reflex - situational stimulus, micturition, defecation, cough, sneezing, swallowing, postprandial state, react sinus hypersensitivity  Vascular-prolonged recumbency, sudden postural change, prolonged standing, hypovolemia, vasodilator drugs, autonomic neuropathy, adrenal insufficiency, subclavian steal, pulmonary embolism  Cardiac -arrhythmia, heart block, myocardial infarction, aortic stenosis, cardiac myxoma, cardiac, LV Dysfunction, Aortic Dissection, Pulmonary Hypertension, Pulmonary Stenosis, Pacemaker Failure  CNS-seizure, hypoxia, hypoglycemia, TIA,(basal vertebral), hydrocephalus     [JG]   2240 Fall with closed head injury, patient demented, anticoagulated, high risk of intracranial hemorrhage, high risk of occult cervical spine fracture, obtaining CT imaging, checking lab work including coagulation factors. [JG]   2241 EKG independently viewed and contemporaneously interpreted by ED physician. Time: 2210.  Rate 103.  Interpretation: Sinus tachycardia, normal axis, right bundle branch block, no acute ST changes, occasional PVC, no contiguous PVCs, patient does have 2 distinct focus of PVCs. [JG]   2255 proBNP(!): 1,950.0 [CV]   2255 HS Troponin T(!): 50 [CV]   2255 BUN(!): 25.0 [CV]   2255 Creatinine(!): 2.27 [CV]   2309 Acute on chronic kidney injury, creatinine elevated 2.27, 1.59 a month ago.  Patient receiving IV fluids. [JG]   Thu Jun 05, 2025   0004 Upon reassessment, patient's family did mention that patient does have history of daily alcohol use, did drink earlier today, has possibly withdrawal in the past.  Ethanol level drawn, patient to be admitted to the hospital for further workup and evaluation.  [CV]   0014 Phone call with Chantal, NOAH with [facility].  Discussed the patient, relevant history, exam, diagnostics, ED findings/progress, and concerns. They agree to admit the patient to telemetry observation under Dr. Rader. Care assumed by the admitting physician at this  "time.     [JG]      ED Course User Index  [CV] Roldan Garrido PA-C  [JG] Joshua Wheat MD       AS OF 00:18 EDT VITALS:    BP - 141/88  HR - 101  TEMP - 98.1 °F (36.7 °C)  02 SATS - 96%      No follow-up provider specified.       Medication List      No changes were made to your prescriptions during this visit.         I rechecked the patient.  I discussed the patient's labs, radiology findings (including all incidental findings), diagnosis, and plan for admission. The patient understands and agrees with the plan.      DIAGNOSIS  Final diagnoses:   Acute renal failure superimposed on chronic kidney disease, unspecified acute renal failure type, unspecified CKD stage   Syncope, unspecified syncope type   Multifocal PVCs   Closed head injury, initial encounter   Anticoagulated   Anemia, unspecified type   Hyperglycemia   Elevated troponin         DISPOSITION  Admit    Pt masked in first look. I wore a surgical mask throughout my encounters with the pt. I performed hand hygiene on entry into the pt room and upon exit.     Dictated utilizing Passpackon dictation     Note Disclaimer: At [facility], we believe that sharing information builds trust and better relationships. You are receiving this note because you recently visited [facility]. It is possible you will see health information before a provider has talked with you about it. This kind of information can be easy to misunderstand. To help you fully understand what it means for your health, we urge you to discuss this note with your provider.      Roldan Garrido PA-C  06/05/25 0020      Electronically signed by Joshua Wheat MD at 06/05/25 0140       Ariana Bruce RN at 06/04/25 2154          Pt to ED via EMS from [facility] for a fall. EMS states pt was going to the restroom when he fell, does not remember how he fell, \"I just fainted.\" - hematoma to back of head. Pt also c/o neck pain when in truck and skin tear on " arm. Facility states Eliquis but pt states he thinks he only takes ASA. Pt placed in c-collar on arrival.    EMS also states he had several runs of v-tach - pt denies any cardiac hx or cp    Electronically signed by Ariana Bruce RN at 06/04/25 2157       Oxygen Therapy (since admission)       Date/Time SpO2 Device (Oxygen Therapy) Flow (L/min) (Oxygen Therapy) Oxygen Concentration (%) ETCO2 (mmHg)    06/05/25 1418 -- room air -- -- --    06/05/25 0800 -- room air -- -- --    06/05/25 0627 96 -- -- -- --    06/05/25 0239 -- room air -- -- --    06/05/25 0217 98 -- -- -- --    06/05/25 0131 95 room air -- -- --    06/05/25 0001 96 room air -- -- --    06/04/25 2307 97 room air -- -- --    06/04/25 2303 97 room air -- -- --    06/04/25 2159 98 room air -- -- --          Intake & Output (last 2 days)         06/03 0701  06/04 0700 06/04 0701  06/05 0700 06/05 0701  06/06 0700    P.O.  90 120    I.V. (mL/kg)  50 (0.5) 1000 (9.1)    IV Piggyback  1000     Total Intake(mL/kg)  1140 (10.4) 1120 (10.2)    Urine (mL/kg/hr)  150 200 (0.2)    Total Output  150 200    Net  +990 +920           Urine Unmeasured Occurrence   1 x          Lines, Drains & Airways       Active LDAs       Name Placement date Placement time Site Days    Peripheral IV 06/04/25 2158 20 G Anterior;Left Forearm 06/04/25 2158  Forearm  less than 1              Inactive LDAs       None                  Medication Administration Report for AnshulBryan as of 6/4/25 through 6/5/25     Legend:    Given Hold Not Given Due Canceled Entry Other Actions    Time Time (Time) Time Time-Action         Discontinued     Completed     Future     MAR Hold     Linked             Medications 06/04/25 06/05/25     acetaminophen (TYLENOL) tablet 650 mg  Dose: 650 mg  Freq: Every 4 Hours PRN Route: PO  PRN Reason: Mild Pain  Start: 06/05/25 0153     Admin Instructions:   If given for fever, use fever parameter: fever greater than 100.4 °F  Based on patient request - if  ordered for moderate or severe pain, provider allows for administration of a medication prescribed for a lower pain scale.    Do not exceed 4 grams of acetaminophen in a 24 hr period. Max dose of 2gm for AST/ALT greater than 120 units/L.    If given for pain, use the following pain scale:   Mild Pain = Pain Score of 1-3, CPOT 1-2  Moderate Pain = Pain Score of 4-6, CPOT 3-4  Severe Pain = Pain Score of 7-10, CPOT 5-8           sennosides-docusate (PERICOLACE) 8.6-50 MG per tablet 2 tablet  Dose: 2 tablet  Freq: 2 Times Daily PRN Route: PO  PRN Reason: Constipation  Start: 06/05/25 0153     Admin Instructions:   Start bowel management regimen if patient has not had a bowel movement after 12 hours.          And   polyethylene glycol (MIRALAX) packet 17 g  Dose: 17 g  Freq: Daily PRN Route: PO  PRN Reason: Constipation  PRN Comment: Use if senna-docusate is ineffective  Start: 06/05/25 0153     Admin Instructions:   Use if no bowel movement after 12 hours. Mix in 6-8 ounces of water.  Use 4-8 ounces of water, tea, or juice for each 17 gram dose.          And   bisacodyl (DULCOLAX) EC tablet 5 mg  Dose: 5 mg  Freq: Daily PRN Route: PO  PRN Reason: Constipation  PRN Comment: Use if polyethylene glycol is ineffective  Start: 06/05/25 0153     Admin Instructions:   Use if no bowel movement after 12 hours.  Swallow whole. Do not crush, split, or chew tablet.          And   bisacodyl (DULCOLAX) suppository 10 mg  Dose: 10 mg  Freq: Daily PRN Route: RE  PRN Reason: Constipation  PRN Comment: Use if bisacodyl oral is ineffective  Start: 06/05/25 0153     Admin Instructions:   Use if no bowel movement after 12 hours.  Hold for diarrhea          cloNIDine (CATAPRES) tablet 0.1 mg  Dose: 0.1 mg  Freq: Every 4 Hours PRN Route: PO  PRN Reason: High Blood Pressure  PRN Comment: SBP over 160  Start: 06/05/25 0157     Admin Instructions:   For systolic blood pressure greater than 160 mmHg  Caution: Look alike/sound alike drug alert.            LORazepam (ATIVAN) tablet 1 mg  Dose: 1 mg  Freq: Every 1 Hour PRN Route: PO  PRN Reason: Withdrawal  PRN Comment: For CIWA-Ar 8-10  Start: 06/05/25 0157 End: 06/10/25 0156     Admin Instructions:   Reassess 1 Hour After Administration  (CLAIRE) Caution: Look alike/sound alike drug alert          Or   LORazepam (ATIVAN) injection 1 mg  Dose: 1 mg  Freq: Every 1 Hour PRN Route: IV  PRN Reason: Withdrawal  PRN Comment: For CIWA-Ar 8-10  Start: 06/05/25 0157 End: 06/10/25 0156     Admin Instructions:   Reassess 1 Hour After Administration          Or   LORazepam (ATIVAN) tablet 2 mg  Dose: 2 mg  Freq: Every 1 Hour PRN Route: PO  PRN Reason: Withdrawal  PRN Comment: Withdrawal. For CIWA-Ar 11-15.  Start: 06/05/25 0157 End: 06/10/25 0156     Admin Instructions:   Reassess 1 Hour After Administration.  (CLAIRE) Caution: Look alike/sound alike drug alert          Or   LORazepam (ATIVAN) injection 2 mg  Dose: 2 mg  Freq: Every 1 Hour PRN Route: IV  PRN Reason: Withdrawal  PRN Comment: Withdrawal. For CIWA-Ar 11-15.  Start: 06/05/25 0157 End: 06/10/25 0156     Admin Instructions:   Reassess 1 Hour After Administration          Or   LORazepam (ATIVAN) injection 2 mg  Dose: 2 mg  Freq: Every 15 Minutes PRN Route: IV  PRN Reason: Withdrawal  PRN Comment: Withdrawal. For CIWA-Ar Greater Than 15.  Start: 06/05/25 0157 End: 06/10/25 0156     Admin Instructions:   Reassess 15 Minutes After Each Administration.  If CIWA-Ar Does Not Decrease Contact Provider To Discuss Transfer to Higher Level of Care.          Or   LORazepam (ATIVAN) injection 2 mg  Dose: 2 mg  Freq: Every 15 Minutes PRN Route: IM  PRN Reason: Withdrawal  PRN Comment: Withdrawal. For CIWA-Ar Greater Than 15.  Start: 06/05/25 0157 End: 06/10/25 0156     Admin Instructions:   Reassess 15 Minutes After Each Administration.  If CIWA-Ar Does Not Decrease Contact Provider To Discuss Transfer to Higher Level of Care.          Magnesium Standard Dose Replacement - Follow  "Nurse / BPA Driven Protocol  Freq: As Needed Route: XX  PRN Reason: Other  Start: 25     Admin Instructions:   Open Order & Select \"[facility] Electrolyte Replacement Protocol Algorithm\" to View Details          nitroglycerin (NITROSTAT) SL tablet 0.4 mg  Dose: 0.4 mg  Freq: Every 5 Minutes PRN Route: SL  PRN Reason: Chest Pain  PRN Comment: Only if SBP Greater Than 100  Start: 25     Admin Instructions:   If Pain Unrelieved After 3 Doses Notify MD  May administer up to 3 doses per episode. Hold if SBP less than 100.           ondansetron ODT (ZOFRAN-ODT) disintegrating tablet 4 mg  Dose: 4 mg  Freq: Every 6 Hours PRN Route: PO  PRN Reasons: Nausea,Vomiting  Start: 25     Admin Instructions:   If BOTH ondansetron (ZOFRAN) and promethazine (PHENERGAN) are ordered use ondansetron first and THEN promethazine IF ondansetron is ineffective.  Place on tongue and allow to dissolve.          Or   ondansetron (ZOFRAN) injection 4 mg  Dose: 4 mg  Freq: Every 6 Hours PRN Route: IV  PRN Reasons: Nausea,Vomiting  Start: 25                      Blood Administration Record (From admission, onward)      None          Operative/Procedure Notes (all)    No notes of this type exist for this encounter.       Physician Progress Notes (all)    No notes of this type exist for this encounter.          Consult Notes (all)        Rayshawn Navarro MD at 25 0846        Consult Orders    1. Inpatient Cardiology Consult [[id number]] ordered by Chantal Fontaine APRN at 25                 Date of Hospital Visit: 2025  Date of consult: 25  Encounter Provider: Rayshawn Navarro MD  Place of Service: [facility] CARDIOLOGY  Patient Name: Bryan Landers  :1945  Referral Provider: Joshua Wheat MD    Chief complaint: syncope     Reason for consult: syncope    History of Present Illness Bryan Landers is a 80 year old pt with a history of HTN, " HLD, diabetes, gout, chronic kidney disease, cognitive decline, hypothyroidism, B12 deficiency, sinus tachycardia,and chronic pulmonary embolism.   Pt presented to ER on 6/4/25 with complaints of a syncopal episode.  Patient had a syncopal episode and hit his head.  Per EMS en route patient had some nonsustained runs of VT.  He denied any chest pain or shortness of breath at the time.  He denied headache or neck pain.  He denies any blood thinner use but takes an aspirin daily. In ER, BUN25/CR 2.27, troponin T 50/51, proBNP 1, 950, hemoglobin 11, INR 1.24, ethanol level negative, chest x-ray showed no infiltrate, effusion or pneumothorax, CT of the head showed nothing acute, CT of cervical spine showed nothing acute.  Patient admitted for syncope and have been asked to see.     ECHO 12/28/20   Summary    Technically difficult examination.    The ejection fraction biplane was calculated at 48%.    Intravenous contrast was used to enhance endocardial border definition.    Global left ventricular wall motion and contractility appear to be at the    lower limits of normal.    The estimated ejection fraction is 45-50%.    Normal left ventricular wall thickness.    Left ventricular chamber size is normal.    Mildly dilated right ventricle.    Right ventricular global systolic function is mildly reduced.    There is mild right atrial enlargement.    There is aortic valve sclerosis without evidence of stenosis.    RV:LV ratio <1     Stress test 12/18/20  The patient reported shortness of breath during the stress test.  Resting HR above THR of 85% (122).  Equivocal ECG evidence of myocardial ischemia.Indeterminate clinical evidence of myocardial ischemia. Findings consistent with an equivocal ECG stress test.    Past Medical History:   Diagnosis Date    At risk for sleep apnea     Bladder mass     Bruises easily     Diabetes mellitus     Disease of thyroid gland     Elevated cholesterol     GI bleed     Gross hematuria  09/22/2021    History of hip replacement, total, bilateral 12/13/2018    History of transfusion     Pneumothorax 02/27/2024    Poor historian     Short-term memory loss     Vitamin D deficiency        Past Surgical History:   Procedure Laterality Date    COLONOSCOPY  09/2016    COLONOSCOPY N/A 9/28/2018    Procedure: COLONOSCOPY;  Surgeon: Olaf Gomez MD;  Location: [facility] OR;  Service: Gastroenterology    COLONOSCOPY N/A 1/7/2019    Procedure: COLONOSCOPY;  Surgeon: Rosa Jack MD;  Location: [facility] OR;  Service: Gastroenterology    CYSTOSCOPY BLADDER BIOPSY N/A 9/22/2021    Procedure: CYSTOSCOPY BLADDER BIOPSY;  Surgeon: Roldan Mccarthy MD;  Location: [facility] MAIN OR;  Service: Urology;  Laterality: N/A;    HERNIA REPAIR Bilateral     PROSTATE ULTRASOUND BIOPSY      SIGMOIDOSCOPY N/A 10/2/2018    Procedure: FLEXIBLE SIGMOIDOSCOPY:  APC cautery bleeding using 60 joules;  Surgeon: Olaf Gomez MD;  Location: [facility] ENDOSCOPY;  Service: Gastroenterology    TONSILLECTOMY      TOTAL HIP ARTHROPLASTY Bilateral 2007       Medications Prior to Admission   Medication Sig Dispense Refill Last Dose/Taking    allopurinol (ZYLOPRIM) 100 MG tablet Take 1 tablet by mouth Daily. Indications: Gout 90 tablet 3 6/4/2025    apixaban (Eliquis) 2.5 MG tablet tablet Take 1 tablet by mouth Every 12 (Twelve) Hours. 60 tablet 11 6/4/2025    aspirin 81 MG chewable tablet Chew 1 tablet Daily. Indications: Blood Clot Within a Vein, DVTs AND PE   6/4/2025    atorvastatin (LIPITOR) 80 MG tablet Take 1 tablet by mouth Daily. Indications: High Amount of Fats in the Blood 90 tablet 3 6/4/2025    cholecalciferol (VITAMIN D3) 1000 UNITS tablet Take 1 tablet by mouth Daily. Indications: Vitamin D Deficiency   6/4/2025    Cyanocobalamin (VITAMIN B 12 PO) Take 1,000 mg by mouth Every Morning. Indications: SUPPLEMENT   6/4/2025    furosemide (LASIX) 40 MG tablet Take 1 tablet by mouth Daily. Indications: Edema 90  tablet 3 6/4/2025    levothyroxine (SYNTHROID, LEVOTHROID) 75 MCG tablet TAKE 1 TABLET BY MOUTH EVERY MORNING INDICATIONS: UNDER ACTIVE THYROID 90 tablet 3 6/4/2025    lisinopril (PRINIVIL,ZESTRIL) 2.5 MG tablet Take 1 tablet by mouth Daily. Indications: High Blood Pressure 90 tablet 4 6/4/2025    memantine (NAMENDA) 10 MG tablet TAKE 2 TABLETS BY MOUTH EVERY MORNING 180 tablet 3 6/4/2025    Multiple Vitamins-Minerals (MULTIVITAL PLATINUM PO) Take 1 tablet by mouth Daily. HOLD FOR SURGERY  Indications: SUPPLEMENT   6/4/2025    Orgovyx 120 MG tablet tablet Take 1 tablet by mouth Daily.   6/4/2025    potassium chloride 10 MEQ CR tablet Take 1 tablet by mouth Daily With Breakfast. Indications: Low Amount of Potassium in the Blood, take with Lasix 90 tablet 3 6/4/2025    Xtandi 40 MG tablet tablet Take 4 tablets by mouth Daily. 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 Indications: Spasm of Lung Air Passages (Patient not taking: Reported on 3/20/2025) 18 g 0        Current Meds  Scheduled Meds:folic acid, 1 mg, Oral, Daily  multivitamin with minerals, 1 tablet, Oral, Daily  thiamine (B-1) IV, 200 mg, Intravenous, Q8H   Followed by  [START ON 6/10/2025] thiamine, 100 mg, Oral, Daily      Continuous Infusions:   PRN Meds:.  acetaminophen    senna-docusate sodium **AND** polyethylene glycol **AND** bisacodyl **AND** bisacodyl    cloNIDine    LORazepam **OR** LORazepam **OR** LORazepam **OR** LORazepam **OR** LORazepam **OR** LORazepam    Magnesium Standard Dose Replacement - Follow Nurse / BPA Driven Protocol    nitroglycerin    ondansetron ODT **OR** ondansetron    Allergies as of 06/04/2025 - Reviewed 06/04/2025   Allergen Reaction Noted    Nsaids GI Bleeding 01/04/2019    Aricept [donepezil] Diarrhea 10/13/2021       Social History  "    Socioeconomic History    Marital status:      Spouse name: Chloe    Number of children: 2   Tobacco Use    Smoking status: Former     Current packs/day: 0.00     Average packs/day: 0.3 packs/day for 5.0 years (1.3 ttl pk-yrs)     Types: Cigars, Cigarettes     Start date: 1981     Quit date: 1986     Years since quittin.4     Passive exposure: Never    Smokeless tobacco: Never   Vaping Use    Vaping status: Never Used   Substance and Sexual Activity    Alcohol use: Yes     Alcohol/week: 32.0 - 34.0 standard drinks of alcohol     Types: 4 - 6 Shots of liquor, 28 Standard drinks or equivalent per week     Comment: 2-3 double vodkas a night    Drug use: No    Sexual activity: Yes     Partners: Female     Birth control/protection: Post-menopausal       Family History   Problem Relation Age of Onset    Stroke Mother     Stroke Father     No Known Problems Brother     Colon cancer Neg Hx     Colon polyps Neg Hx     Malig Hyperthermia Neg Hx        REVIEW OF SYSTEMS:   All systems reviewed and pertinent positives include in HPI otherwise negative review of systems.       Objective:   Temp:  [97.3 °F (36.3 °C)-98.1 °F (36.7 °C)] 97.5 °F (36.4 °C)  Heart Rate:  [] 94  Resp:  [16-18] 16  BP: ()/(52-99) 122/74  Body mass index is 34.87 kg/m².  Flowsheet Rows      Flowsheet Row First Filed Value   Admission Height 177.8 cm (70\") Documented at 2025   Admission Weight 86.2 kg (190 lb) Documented at 2025          Vitals:    25 0700   BP: 122/74   Pulse:    Resp:    Temp: 97.5 °F (36.4 °C)   SpO2:        General Appearance:    Alert, cooperative, in no acute distress   Head:    Normocephalic, without obvious abnormality, atraumatic   Eyes:            Lids and lashes normal, conjunctivae and sclerae normal, no   icterus, no pallor, corneas clear, PERRLA   Ears:    Ears appear intact with no abnormalities noted   Throat:   No oral lesions, no thrush, oral mucosa moist   Neck: "   No adenopathy, supple, trachea midline, no thyromegaly, no   carotid bruit, no JVD   Back:     No kyphosis present, no scoliosis present, no skin lesions, erythema or scars, no tenderness to percussion or palpation, range of motion normal   Lungs:     Clear to auscultation,respirations regular, even and unlabored    Heart:    Regular rhythm and normal rate, normal S1 and S2, no murmur, no gallop, no rub, no click   Chest Wall:    No abnormalities observed   Abdomen:     Normal bowel sounds, no masses, no organomegaly, soft nontender, nondistended, no guarding, no rebound  tenderness   Extremities:   Moves all extremities well, no edema, no cyanosis, no redness   Pulses:   Pulses palpable and equal bilaterally. Normal radial, carotid, femoral, dorsalis pedis and posterior tibial pulses bilaterally. Normal abdominal aorta   Skin:  Neurology:   Psychiatric:   No bleeding, bruising or rash   Normal speech and cranial nerve exam, no focal deficit   Alert and oriented x 3, normal mood and affect             Review of Data:      Results from last 7 days   Lab Units 06/05/25  0557 06/04/25  2213   SODIUM mmol/L 138 138   POTASSIUM mmol/L 5.2 4.4   CHLORIDE mmol/L 98 99   CO2 mmol/L 27.0 27.2   BUN mg/dL 24.0* 25.0*   CREATININE mg/dL 2.14* 2.27*   CALCIUM mg/dL 8.7 9.3   BILIRUBIN mg/dL  --  0.4   ALK PHOS U/L  --  105   ALT (SGPT) U/L  --  20   AST (SGOT) U/L  --  32   GLUCOSE mg/dL 123* 111*     Results from last 7 days   Lab Units 06/04/25  2334 06/04/25  2213   HSTROP T ng/L 51* 50*     @LABRCNTbnp@  Results from last 7 days   Lab Units 06/05/25  0557 06/04/25  2213   WBC 10*3/mm3 6.07 5.73   HEMOGLOBIN g/dL 10.3* 11.0*   HEMATOCRIT % 29.1* 31.0*   PLATELETS 10*3/mm3 137* 167     Results from last 7 days   Lab Units 06/04/25  2306   INR  1.24*   APTT seconds 23.7     Results from last 7 days   Lab Units 06/04/25  2213   MAGNESIUM mg/dL 1.6     @LABRCNTIP(chol,trig,hdl,ldl)                            I personally  viewed and interpreted the patient's EKG/Telemetry data  )   Syncope    Essential hypertension    Stage 3b chronic kidney disease    Cognitive decline    Acquired hypothyroidism    Alcohol use disorder        Assessment and Plan:    A syncopal spell-patient does not remember about having prodromal symptoms.  EMS report nonsustained VT.  EKG and telemetry reviewed showing frequent polymorphic PVCs as singlets, couplets, triplets and some quadruplets , no sustained VT.  Echocardiogram shows mild global left ventricular hypokinesis.  Elevated troponin without significant delta and ischemic changes.  Patient denies having any chest pain.  Essential hypertension  Bilateral DVT and PE in 2020-thought to be provoked.  He has been on Eliquis 2.5 mg p.o. twice daily  Acute on chronic chronic kidney disease-sees nephrology  Essential hypertension-was hypotensive on admission-and hypertensive on hold.  Home medications include Lasix 40 mg p.o. daily, atorvastatin, aspirin, apixaban, lisinopril    Syncopal spell could be from ventricular tachycardia/hypotension.  Echo with reduced ventricular ejection fraction.  Need to rule out significant obstructive CAD  On IV fluids.  I will try him on low-dose metoprolol to suppress PVCs.  Monitor and replete electrolytes including magnesium sulfate 2 g IV x 1.  Ask nephrology to see patient for management of CKD as well as clearance for coronary angiogram.      Rayshawn Navarro MD  06/05/25  11:51 EDT.      Time spent in reviewing chart, discussion and examination:                     Electronically signed by Rayshawn Navarro MD at 06/05/25 4500

## 2025-06-05 NOTE — ED PROVIDER NOTES
MD ATTESTATION NOTE  I supervised care provided by the midlevel provider. We have discussed this patient's history, physical exam, and treatment plan. I have reviewed the midlevel provider's note and I agree with the midlevel provider's findings and plan of care.   SHARED VISIT: This visit was performed by BOTH a physician and an APC. The substantive portion of the medical decision making was performed by this attesting physician who made or approved the management plan and takes responsibility for patient management. All studies in the APC note (if performed) were independently interpreted by me.   I have personally had a face to face encounter with the patient.     PCP: Jose Fonseca MD  Patient Care Team:  Jose Fonseca MD as PCP - General (Family Medicine)  Jose Michelle MD as Consulting Physician (Radiation Oncology)  Roldan Mccarthy MD as Consulting Physician (Urology)  Clarence Nieves MD (Hematology)  Grover Harris DPM as Consulting Physician (Podiatry)  Skip Wall MD PhD as Consulting Physician (Thoracic Surgery)     Bryan Landers is a 80 y.o. male who presents to the ED c/o syncope.  Patient has history of dementia, history supplied by patient's family.  Patient was going to the bathroom when he passed out, fell and struck his head, unsure on what.  Family denies any seizure-like activity, patient was not postictal, no complaints of weakness or numbness.  Family does report that patient has been feeling lightheaded for several days, no vomiting or diarrhea, extremely poor p.o. intake.  No cough or fevers, no complaints of chest pain or shortness of breath.  EMS reports patient had several runs of nonsustained V. tach and route.    On exam:  General: NAD.  Head: Contusion on occiput, no crepitus or deformity, no raccoon eyes or Vidales sign..  ENT: nares patent, no scleral icterus  Neck: Supple, trachea midline.  Cervical collar in place: No step-offs or deformities, no  midline tenderness to palpation.  Cardiac: regular rate and rhythm.  Lungs: normal effort, clear to auscultation bilaterally  Abdomen: Soft, nondistended, NTTP, no rebound tenderness, no guarding or rigidity.   Extremities: Moves all extremities well, no peripheral edema  Neuro: Alert and oriented, no facial droop, speech clear, no dysarthria or aphasia, moves all extremities well, sensation intact light touch all extremities, no focal deficits  Psych: calm, cooperative  Skin: Warm, dry.  Bandaged wound on right elbow.    Medical Decision Making:  After the initial H&P, I discussed pertinent information from history and physical exam with patient/family.  Discussed differential diagnosis.  Discussed plan for ED evaluation/work-up/treatment.  All questions answered.  Patient/family is agreeable with plan.    ED Course as of 06/05/25 0015   Wed Jun 04, 2025 2240 My differential diagnosis for syncope includes but is not limited to:  Vasovagal reflex - situational stimulus, micturition, defecation, cough, sneezing, swallowing, postprandial state, react sinus hypersensitivity  Vascular-prolonged recumbency, sudden postural change, prolonged standing, hypovolemia, vasodilator drugs, autonomic neuropathy, adrenal insufficiency, subclavian steal, pulmonary embolism  Cardiac -arrhythmia, heart block, myocardial infarction, aortic stenosis, cardiac myxoma, cardiac, LV Dysfunction, Aortic Dissection, Pulmonary Hypertension, Pulmonary Stenosis, Pacemaker Failure  CNS-seizure, hypoxia, hypoglycemia, TIA,(basal vertebral), hydrocephalus     [JG]   2240 Fall with closed head injury, patient demented, anticoagulated, high risk of intracranial hemorrhage, high risk of occult cervical spine fracture, obtaining CT imaging, checking lab work including coagulation factors. [JG]   2241 EKG independently viewed and contemporaneously interpreted by ED physician. Time: 2210.  Rate 103.  Interpretation: Sinus tachycardia, normal axis,  right bundle branch block, no acute ST changes, occasional PVC, no contiguous PVCs, patient does have 2 distinct focus of PVCs. [JG]   2255 proBNP(!): 1,950.0 [CV]   2255 HS Troponin T(!): 50 [CV]   2255 BUN(!): 25.0 [CV]   2255 Creatinine(!): 2.27 [CV]   2309 Acute on chronic kidney injury, creatinine elevated 2.27, 1.59 a month ago.  Patient receiving IV fluids. [JG]   Thu Jun 05, 2025   0004 Upon reassessment, patient's family did mention that patient does have history of daily alcohol use, did drink earlier today, has possibly withdrawal in the past.  Ethanol level drawn, patient to be admitted to the hospital for further workup and evaluation.  [CV]   0014 Phone call with Chantal, NOAH with [facility].  Discussed the patient, relevant history, exam, diagnostics, ED findings/progress, and concerns. They agree to admit the patient to telemetry observation under Dr. Rader. Care assumed by the admitting physician at this time.     [JG]      ED Course User Index  [CV] Roldan Garrido PA-C  [JG] Joshua Wheat MD       Diagnosis  Final diagnoses:   Acute renal failure superimposed on chronic kidney disease, unspecified acute renal failure type, unspecified CKD stage   Syncope, unspecified syncope type   Multifocal PVCs   Closed head injury, initial encounter   Anticoagulated   Anemia, unspecified type   Hyperglycemia   Elevated troponin          Joshua Wheat MD  06/05/25 0015

## 2025-06-05 NOTE — THERAPY EVALUATION
Patient Name: Bryan Landers  : 1945    MRN: [id number]                              Today's Date: 2025       Admit Date: 2025    Visit Dx:     ICD-10-CM ICD-9-CM   1. Acute renal failure superimposed on chronic kidney disease, unspecified acute renal failure type, unspecified CKD stage  N17.9 584.9    N18.9 585.9   2. Syncope, unspecified syncope type  R55 780.2   3. Multifocal PVCs  I49.3 427.69   4. Closed head injury, initial encounter  S09.90XA 959.01   5. Anticoagulated  Z79.01 V58.61   6. Anemia, unspecified type  D64.9 285.9   7. Hyperglycemia  R73.9 790.29   8. Elevated troponin  R79.89 790.6     Patient Active Problem List   Diagnosis    Essential hypertension    Mixed hyperlipidemia    Arthritis    Type 2 diabetes mellitus with chronic kidney disease, without long-term current use of insulin    Severely overweight    Idiopathic chronic gout of left knee    Medication monitoring encounter    Microalbuminuria due to type 2 diabetes mellitus    History of prostate cancer    Stage 3b chronic kidney disease    Medicare annual wellness visit, subsequent    Radiation proctitis    Cognitive decline    Acquired hypothyroidism    B12 deficiency    Sinus tachycardia by electrocardiogram    Chronic pulmonary embolism without acute cor pulmonale    Chronic deep vein thrombosis (DVT) of popliteal vein of both lower extremities    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    Bladder mass    Dependent edema    Anemia of chronic kidney failure, stage 3 (moderate)    Deep venous thrombosis    Class 2 severe obesity with serious comorbidity in adult    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    Syncope    Alcohol use disorder     Past Medical History:   Diagnosis Date    At risk for sleep apnea     Bladder mass     Bruises easily     Diabetes mellitus     Disease of thyroid gland     Elevated cholesterol     GI bleed     Gross hematuria 2021    History of hip replacement, total, bilateral 2018     History of transfusion     Pneumothorax 02/27/2024    Poor historian     Short-term memory loss     Vitamin D deficiency      Past Surgical History:   Procedure Laterality Date    COLONOSCOPY  09/2016    COLONOSCOPY N/A 9/28/2018    Procedure: COLONOSCOPY;  Surgeon: Olaf Gomez MD;  Location: [facility] OR;  Service: Gastroenterology    COLONOSCOPY N/A 1/7/2019    Procedure: COLONOSCOPY;  Surgeon: Rosa Jack MD;  Location: [facility] OR;  Service: Gastroenterology    CYSTOSCOPY BLADDER BIOPSY N/A 9/22/2021    Procedure: CYSTOSCOPY BLADDER BIOPSY;  Surgeon: Roldan Mccarthy MD;  Location: [facility] MAIN OR;  Service: Urology;  Laterality: N/A;    HERNIA REPAIR Bilateral     PROSTATE ULTRASOUND BIOPSY      SIGMOIDOSCOPY N/A 10/2/2018    Procedure: FLEXIBLE SIGMOIDOSCOPY:  APC cautery bleeding using 60 joules;  Surgeon: Olaf Gomez MD;  Location: [facility] ENDOSCOPY;  Service: Gastroenterology    TONSILLECTOMY      TOTAL HIP ARTHROPLASTY Bilateral 2007      General Information       Row Name 06/05/25 7411          OT Time and Intention    Document Type evaluation  -MM     Mode of Treatment occupational therapy;individual therapy  -MM     Patient Effort adequate  -MM     Symptoms Noted During/After Treatment none  -MM       Row Name 06/05/25 1547          General Information    Patient Profile Reviewed yes  -MM     Prior Level of Function --  noted confusion in regards to prior level- per CCP- pt resides in ILF with spouse, min A for ADLs. Pt could not recall what brought him into hospital  -MM     Existing Precautions/Restrictions fall  -MM     Barriers to Rehab cognitive status  -MM       Row Name 06/05/25 7534          Living Environment    Current Living Arrangements independent living facility  -MM     People in Home spouse  -MM       Row Name 06/05/25 1540          Home Main Entrance    Number of Stairs, Main Entrance none  -MM       Row Name 06/05/25 1540          Cognition     Orientation Status (Cognition) oriented to;person;verbal cues/prompts needed for orientation  -MM       Row Name 06/05/25 1540          Safety Issues/Impairments Affecting Functional Mobility    Safety Issues Affecting Function (Mobility) problem-solving;safety precaution awareness;impulsivity;safety precautions follow-through/compliance  -MM     Impairments Affecting Function (Mobility) endurance/activity tolerance;strength;balance  -MM               User Key  (r) = Recorded By, (t) = Taken By, (c) = Cosigned By      Initials Name Provider Type    MM Danielle Red OT Occupational Therapist                     Mobility/ADL's       Row Name 06/05/25 1542          Bed Mobility    Bed Mobility supine-sit;sit-supine  -MM     Supine-Sit [place] (Bed Mobility) standby assist  -MM     Sit-Supine [place] (Bed Mobility) standby assist  -MM     Assistive Device (Bed Mobility) bed rails  -MM       Row Name 06/05/25 1542          Transfers    Transfers sit-stand transfer  -MM     Comment, (Transfers) agreeable to x1 STS, reports mild dizziness  -       Row Name 06/05/25 1542          Sit-Stand Transfer    Sit-Stand [place] (Transfers) contact guard  -     Comment, (Sit-Stand Transfer) deferred chair por further mobility due to fatigue and dizziness, RN reports + orthostatics earlier this date  -MM       Row Name 06/05/25 1542          Activities of Daily Living    BADL Assessment/Intervention lower body dressing;feeding;grooming  -MM       Row Name 06/05/25 1542          Lower Body Dressing Assessment/Training    [place] Level (Lower Body Dressing) contact guard assist;don;doff;socks  -MM     Position (Lower Body Dressing) edge of bed sitting  -MM       Row Name 06/05/25 1542          Self-Feeding Assessment/Training    Comment, (Feeding) hand to mouth bilat WFL  -MM       Row Name 06/05/25 1542          Grooming Assessment/Training    [place] Level (Grooming) set up;standby assist;oral care  regimen;wash face, hands  -MM     Position (Grooming) edge of bed sitting  -MM               User Key  (r) = Recorded By, (t) = Taken By, (c) = Cosigned By      Initials Name Provider Type    MM Danielle Red OT Occupational Therapist                   Obj/Interventions       [facility] Name 06/05/25 1543          Sensory Assessment (Somatosensory)    Sensory Assessment (Somatosensory) UE sensation intact  -MM       Row Name 06/05/25 1543          Vision Assessment/Intervention    Visual Impairment/Limitations WFL  -MM       [facility] Name 06/05/25 1543          Range of Motion Comprehensive    Comment, General Range of Motion L shoulder very limited due to previous dislocation  -MM       Row Name 06/05/25 1543          Strength Comprehensive (MMT)    General Manual Muscle Testing (MMT) Assessment upper extremity strength deficits identified  -MM     Comment, General Manual Muscle Testing (MMT) Assessment RUE 4-/5,m LUE 3-/5 in shoulder plane, 3+/5 in L elbow plane  -MM       [facility] Name 06/05/25 1543          Motor Skills    Motor Skills functional endurance  -MM     Functional Endurance fair  -MM       Row Name 06/05/25 1543          Balance    Comment, Balance unsteady with static standing however no LOB  -MM               User Key  (r) = Recorded By, (t) = Taken By, (c) = Cosigned By      Initials Name Provider Type    Danielle Nunez OT Occupational Therapist                   Goals/Plan       Row Name 06/05/25 1547          Transfer Goal 1 (OT)    Activity/Assistive Device (Transfer Goal 1, OT) transfers, all  -MM     [place] Level/Cues Needed (Transfer Goal 1, OT) modified independence  -MM     Time Frame (Transfer Goal 1, OT) short term goal (STG);2 weeks  -MM     Progress/Outcome (Transfer Goal 1, OT) goal ongoing  -MM       Row Name 06/05/25 1547          Dressing Goal 1 (OT)    Activity/Device (Dressing Goal 1, OT) dressing skills, all  -MM     [place]/Cues Needed (Dressing Goal 1, OT) standby assist  -MM      Time Frame (Dressing Goal 1, OT) short term goal (STG);2 weeks  -MM     Progress/Outcome (Dressing Goal 1, OT) goal ongoing  -MM       Row Name 06/05/25 1547          Toileting Goal 1 (OT)    Activity/Device (Toileting Goal 1, OT) toileting skills, all  -MM     [place] Level/Cues Needed (Toileting Goal 1, OT) modified independence  -MM     Time Frame (Toileting Goal 1, OT) short term goal (STG);2 weeks  -MM     Progress/Outcome (Toileting Goal 1, OT) goal ongoing  -MM       Row Name 06/05/25 1547          Grooming Goal 1 (OT)    Activity/Device (Grooming Goal 1, OT) grooming skills, all  -MM     [place] (Grooming Goal 1, OT) modified independence  -MM     Time Frame (Grooming Goal 1, OT) short term goal (STG);2 weeks  -MM     Strategies/Barriers (Grooming Goal 1, OT) standing at sinkside  -MM     Progress/Outcome (Grooming Goal 1, OT) goal ongoing  -MM       Row Name 06/05/25 1543          Therapy Assessment/Plan (OT)    Planned Therapy Interventions (OT) activity tolerance training;BADL retraining;cognitive/visual perception retraining;neuromuscular control/coordination retraining;patient/caregiver education/training;transfer/mobility retraining;strengthening exercise;occupation/activity based interventions;functional balance retraining;ROM/therapeutic exercise  -MM               User Key  (r) = Recorded By, (t) = Taken By, (c) = Cosigned By      Initials Name Provider Type    Danielle Nunez OT Occupational Therapist                   Clinical Impression       Row Name 06/05/25 1544          Pain Assessment    Pretreatment Pain Rating 0/10 - no pain  -MM     Posttreatment Pain Rating 0/10 - no pain  -MM       Row Name 06/05/25 1544          Plan of Care Review    Plan of Care Reviewed With patient  -MM     Progress no change  -MM     Outcome Evaluation pt is an 81 yo male admitted following a fall at his ILF, hitting back of head. He is seen this date for OT keisha, A&O to self however noted confusion  as pt unable to state what brought him to hospital, thinking his home with his hospital room this date. He presents with decreased safety awareness, activity tolerance, strength and overall (I) with ADLs at this time. He resides with his spouse at [facility] and per chart, requires some assist for ADLs. He demo's bed mob with SBA this date, CGA for LBD while seated and SBA for g/h tasks at EOB. He completed x1 STS with CGA however deferred further activity due to fatigue and mild dizziness. He will continue to benefit from skilled OT to address deficits. ILF vs SNF pending pt progression as cognition limiting overall optimal safety awarenss.  -MM       Row Name 06/05/25 1544          Therapy Assessment/Plan (OT)    Rehab Potential (OT) good  -MM     Criteria for Skilled Therapeutic Interventions Met (OT) skilled treatment is necessary;meets criteria;yes  -MM     Therapy Frequency (OT) 5 times/wk  -MM       Row Name 06/05/25 1544          Therapy Plan Review/Discharge Plan (OT)    Anticipated Discharge Disposition (OT) home;home with assist;skilled nursing facility  -MM       Row Name 06/05/25 1544          Vital Signs    O2 Delivery Pre Treatment room air  -MM       Row Name 06/05/25 1544          Positioning and Restraints    Pre-Treatment Position in bed  -MM     Post Treatment Position bed  -MM     In Bed notified nsg;fowlers;call light within reach;encouraged to call for assist;exit alarm on;side rails up x3  -MM               User Key  (r) = Recorded By, (t) = Taken By, (c) = Cosigned By      Initials Name Provider Type    Danielle Nunez OT Occupational Therapist                   Outcome Measures       Row Name 06/05/25 1548          How much help from another is currently needed...    Putting on and taking off regular lower body clothing? 3  -MM     Bathing (including washing, rinsing, and drying) 3  -MM     Toileting (which includes using toilet bed pan or urinal) 3  -MM     Putting on and taking off regular  upper body clothing 3  -MM     Taking care of personal grooming (such as brushing teeth) 3  -MM     Eating meals 3  -MM     AM-PAC 6 Clicks Score (OT) 18  -MM       Row Name 06/05/25 0800          How much help from another person do you currently need...    Turning from your back to your side while in flat bed without using bedrails? 3  -JS     Moving from lying on back to sitting on the side of a flat bed without bedrails? 3  -JS     Moving to and from a bed to a chair (including a wheelchair)? 3  -JS     Standing up from a chair using your arms (e.g., wheelchair, bedside chair)? 3  -JS     Climbing 3-5 steps with a railing? 3  -JS     To walk in hospital room? 3  -JS     AM-PAC 6 Clicks Score (PT) 18  -JS       Row Name 06/05/25 1548          Modified [place] Scale    Modified [place] Scale 1 - No significant disability despite symptoms.  Able to carry out all usual duties and activities.  -MM       Row Name 06/05/25 1548          Functional Assessment    Outcome Measure Options AM-PAC 6 Clicks Daily Activity (OT);Modified [place]  -MM               User Key  (r) = Recorded By, (t) = Taken By, (c) = Cosigned By      Initials Name Provider Type    Freddie Castrejon, RN Registered Nurse    Danielle Nunez OT Occupational Therapist                    Occupational Therapy Education       Title: PT OT SLP Therapies (In Progress)       Topic: Occupational Therapy (In Progress)       Point: ADL training (In Progress)       Learning Progress Summary            Patient Acceptance, E, NR by MM at 6/5/2025 1548    Comment: role of OT, d/c rec                      Point: Precautions (In Progress)       Learning Progress Summary            Patient Acceptance, E, NR by MM at 6/5/2025 1548    Comment: role of OT, d/c rec                      Point: Body mechanics (In Progress)       Learning Progress Summary            Patient Acceptance, E, NR by MM at 6/5/2025 1548    Comment: role of OT, d/c rec                                       User Key       Initials Effective Dates Name Provider Type Discipline     05/31/24 -  Danielle Red OT Occupational Therapist OT                  OT Recommendation and Plan  Planned Therapy Interventions (OT): activity tolerance training, BADL retraining, cognitive/visual perception retraining, neuromuscular control/coordination retraining, patient/caregiver education/training, transfer/mobility retraining, strengthening exercise, occupation/activity based interventions, functional balance retraining, ROM/therapeutic exercise  Therapy Frequency (OT): 5 times/wk  Plan of Care Review  Plan of Care Reviewed With: patient  Progress: no change  Outcome Evaluation: pt is an 79 yo male admitted following a fall at his ILF, hitting back of head. He is seen this date for OT eval, A&O to self however noted confusion as pt unable to state what brought him to hospital, thinking his home with his hospital room this date. He presents with decreased safety awareness, activity tolerance, strength and overall (I) with ADLs at this time. He resides with his spouse at [facility] and per chart, requires some assist for ADLs. He demo's bed mob with SBA this date, CGA for LBD while seated and SBA for g/h tasks at EOB. He completed x1 STS with CGA however deferred further activity due to fatigue and mild dizziness. He will continue to benefit from skilled OT to address deficits. ILF vs SNF pending pt progression as cognition limiting overall optimal safety awarenss.     Time Calculation:   Evaluation Complexity (OT)  Review Occupational Profile/Medical/Therapy History Complexity: expanded/moderate complexity  Assessment, Occupational Performance/Identification of Deficit Complexity: 3-5 performance deficits  Clinical Decision Making Complexity (OT): detailed assessment/moderate complexity  Overall Complexity of Evaluation (OT): moderate complexity     Time Calculation- OT       Row Name 06/05/25 1549             Time Calculation- OT     OT Start Time 1452  -MM      OT Stop Time 1505  -MM      OT Time Calculation (min) 13 min  -MM      OT Received On 06/05/25  -MM      OT - Next Appointment 06/06/25  -MM      OT Goal Re-Cert Due Date 06/19/25  -MM         Untimed Charges    OT Eval/Re-eval Minutes 13  -MM         Total Minutes    Untimed Charges Total Minutes 13  -MM       Total Minutes 13  -MM                User Key  (r) = Recorded By, (t) = Taken By, (c) = Cosigned By      Initials Name Provider Type    Danielle Nunez OT Occupational Therapist                  Therapy Charges for Today       Code Description Service Date Service Provider Modifiers Qty    [id number] HC OT EVAL MOD COMPLEXITY 2 6/5/2025 Danielle Red OT GO 1                 Danielle Red OT  6/5/2025

## 2025-06-05 NOTE — CASE MANAGEMENT/SOCIAL WORK
Discharge Planning Assessment  [facility]     Patient Name: Bryan Landers  MRN: [id number]  Today's Date: 6/5/2025    Admit Date: 6/4/2025    Plan: Plan home or [facility] for skilled care.   YURY Frost RN   Discharge Needs Assessment       Row Name 06/05/25 0852       Living Environment    People in Home spouse    Name(s) of People in Home Spouse  ( Chloe Landers [phone])    Current Living Arrangements independent living facility    Potentially Unsafe Housing Conditions none    In the past 12 months has the electric, gas, oil, or water company threatened to shut off services in your home? No    Primary Care Provided by self    Provides Primary Care For no one, unable/limited ability to care for self    Family Caregiver if Needed child(suni), adult;spouse    Family Caregiver Names Spouse ( Chloe Landers [phone]) and daughter ( Isi Ellison  [phone])    Quality of Family Relationships helpful;involved;supportive    Able to Return to Prior Arrangements yes    Living Arrangement Comments Pt lives at [facility] with his spouse ( Chloe Landers [phone].       Resource/Environmental Concerns    Resource/Environmental Concerns none    Transportation Concerns none       Transportation Needs    In the past 12 months, has lack of transportation kept you from medical appointments or from getting medications? no    In the past 12 months, has lack of transportation kept you from meetings, work, or from getting things needed for daily living? No       Food Insecurity    Within the past 12 months, you worried that your food would run out before you got the money to buy more. Never true    Within the past 12 months, the food you bought just didn't last and you didn't have money to get more. Never true       Transition Planning    Patient/Family Anticipates Transition to inpatient rehabilitation facility    Patient/Family Anticipated Services at Transition none    Transportation  Anticipated family or friend will provide       Discharge Needs Assessment    Readmission Within the Last 30 Days no previous admission in last 30 days    Equipment Currently Used at Home grab bar;rollator;shower chair;walker, rolling    Concerns to be Addressed no discharge needs identified;denies needs/concerns at this time    Anticipated Changes Related to Illness none    Equipment Needed After Discharge grab bar, tub/shower;rollator;shower chair;walker, rolling    Discharge Facility/Level of Care Needs nursing facility, skilled                   Discharge Plan       Row Name 06/05/25 0856       Plan    Plan Plan home or [facility] for skilled care.   YURY Frost RN    Patient/Family in Agreement with Plan yes    Plan Comments FACE SHEET VERIFIED/ DAVIS SIGNED.  Spoke with pt at bedside.  Pt requested CCP call his spouse.  Called his spouse ( Chloe).  Pt's PCP is Dr. Jose Fonseca.  Pt lives at [facility] with his spouse ( Chloe Landers [phone].  Pt does require minimal assistance with ADLs.  Pt has a garb bar, rolator, shower chair and rolling walker for home use if needed.  Pt gets his prescriptions at [facility].  Pt does not have any issues affording medications.  Pt is not current with .  Pt has not been in SNF.  Pt's spouse states if pt needs skilled care his facility of choice would be [facility].  Maria Isabel ([phone]) called to follow.  Plan home or [facility] for skilled care. YURY Frost RN                  Continued Care and Services - Admitted Since 6/4/2025       Destination       Service Provider Request Status Services Address Phone Fax Patient Preferred    [facility] Pending - Request Sent -- [address] [address] [phone] --                  Expected Discharge Date and Time       Expected Discharge Date Expected Discharge Time    Jun 9, 2025            Demographic Summary       Row Name 06/05/25  0851       General Information    Admission Type observation    Arrived From emergency department    Required Notices Provided Observation Status Notice    Referral Source admission list    Reason for Consult discharge planning    Preferred Language English                   Functional Status       Row Name 06/05/25 0851       Functional Status    Usual Activity Tolerance moderate    Current Activity Tolerance moderate       Functional Status, IADL    Medications independent    Meal Preparation assistive person    Housekeeping assistive person    Laundry assistive person    Shopping assistive person       Mental Status    General Appearance WDL WDL                   Psychosocial    No documentation.                  Abuse/Neglect    No documentation.                  Legal    No documentation.                  Substance Abuse    No documentation.                  Patient Forms    No documentation.                     Miroslava Frost RN

## 2025-06-05 NOTE — CONSULTS
"Patient seen by [facility] d/t ETOH. Patient interviewed alone in room 651 (6E). Introduced self and role. Patient agreed to participate in evaluation. Patient is A/O to self. Patient disoriented to time, place and situation. The patient originally did not realize he was in a hospital. After being informed he was in the hospital the patient voiced he was here d/t prostate cancer. The patient voiced he was told by a doctor he has 1.5 year left to live.  Although patient only oriented to self the patient was able to converse appropriately and able to answer questions with appropriate content.     The patient is an 80 y.o.  male. The patient voiced he has been  for 61 years. It was noted the patient lives at [facility] independent living. The patient was confused about living situation and stated he still lives in a house but his wife stays somewhere else sometimes. The patient did not recognize the name of [facility].   Zoroastrian/Spiritual: Presbyterian. The patient denied any Caodaism or spiritual concerns.   Children:2 children. The patient voiced his son lives in [place] and his daughter lives locally. The patient named his daughter as a good support.   Occupation: Retired. 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   Hobbies: The patient voiced he used to love to golf and hunt but now he mainly watches TV.   Education: College. The patient voiced he had a scholarship to play football at [employer].   : No  Support System: Daughter, Wife  Hx of Violence: Denies  Hx of Abuse/Trauma: Denies  Feel Safe at Home: Yes    The patient presented to the ED on 6/4/25 after suffering a fall at home. Patient admitted with acute renal failure. It was documented the patient has 2-3 double vodkas/night.     The patient denied daily ETOH use and stated he typically drinks 5 nights/week. The patient was not able to quantify the amount of ETOH he drinks other than \"a " "couple of drinks.\" The patient voiced \"it just depends.\" The patient voiced his last drink was on 6/3/25. BAL negative upon ED arrival. The patient denied any history of ETOH withdrawal. The patient voiced he drinks with his wife at night but they \"do not get drunk.\" The patient stated \"I've seen a lot of drunks and I'm not one of them.\" The patient denied any blackouts. The patient voiced he used to go out for drinks with potential customers when he was selling steel. The patient voiced he does not ETOH as being a problem for him and he is not interested in quitting. The patient denied any history of MAKENNA treatment. The patient denied any other substance use. No UDS collected.     The patient denied any mental health history or treatment.     The patient denied any anxiety, depression, SI, wish to be dead, Hi, or hallucinations. The patient reported good sleep and appetite.     The patient voiced he is not interested in abstaining from ETOH and is not interested in any type of MAKENNA treatment. The patient denied any mental health issues. [facility] will sign off.     "

## 2025-06-05 NOTE — CASE MANAGEMENT/SOCIAL WORK
Continued Stay Note  [facility]     Patient Name: Bryan Landers  MRN: [id number]  Today's Date: 6/5/2025    Admit Date: 6/4/2025    Plan: Plan home or [facility] for skilled care.   YURY Frost RN   Discharge Plan       Row Name 06/05/25 0856       Plan    Plan Plan home or [facility] for skilled care.   YURY Frost RN    Patient/Family in Agreement with Plan yes    Plan Comments FACE SHEET VERIFIED/ DAVIS SIGNED.  Spoke with pt at bedside.  Pt requested CCP call his spouse.  Called his spouse ( Chloe).  Pt's PCP is Dr. Jose Fonseca.  Pt lives at [facility] with his spouse ( Chloe Landers [phone].  Pt does require minimal assistance with ADLs.  Pt has a garb bar, rolator, shower chair and rolling walker for home use if needed.  Pt gets his prescriptions at [facility] in [place].  Pt does not have any issues affording medications.  Pt is not current with .  Pt has not been in SNF.  Pt's spouse states if pt needs skilled care his facility of choice would be [facility].  Maria Isabel ([phone]) called to follow.  Plan home or [facility] for skilled care. YURY Frost RN                   Discharge Codes    No documentation.                 Expected Discharge Date and Time       Expected Discharge Date Expected Discharge Time    Jun 9, 2025               Miroslava Frost RN

## 2025-06-05 NOTE — DISCHARGE PLACEMENT REQUEST
"Bryan Patel DENILSON (80 y.o. Male)       Date of Birth   1945    Social Security Number       Address   [address] [place] [address]    Home Phone   [phone]    MRN   [id number]       Faith   None    Marital Status                               Admission Date   6/4/2025    Admission Type   Emergency    Admitting Provider   Feliberto Rowe MD    Attending Provider   Feliberto Rowe MD    Department, Room/Bed   [address], E651/1       Discharge Date       Discharge Disposition       Discharge Destination                                 Attending Provider: Feliberto Rowe MD    Allergies: Nsaids, Aricept [Donepezil]    Isolation: None   Infection: None   Code Status: CPR    Ht: 177.8 cm (70\")   Wt: 110 kg (243 lb)    Admission Cmt: None   Principal Problem: Syncope [R55]                   Active Insurance as of 6/4/2025       Primary Coverage       Payor Plan Insurance Group Employer/Plan Group    AETNA MEDICARE REPLACEMENT AETNA MEDICARE ADVANTAGE PPO [id number]       Payor Plan Address Payor Plan Phone Number Payor Plan Fax Number Effective Dates    [address] [phone]  1/1/2024 - None Entered    [place] [address]         Subscriber Name Subscriber Birth Date Member ID       BRYAN PATEL 1945 [id number]                     Emergency Contacts        (Rel.) Home Phone Work Phone Mobile Phone    Chloe Patel (Spouse) [phone] -- [phone]    Isi Ellison (Daughter) [phone] -- [phone]    JORGESTAN (Son) [phone] -- [phone]              "

## 2025-06-05 NOTE — PLAN OF CARE
Goal Outcome Evaluation:         Patient was admitted this morning.Alert and oriented.Denies any pain or SOA.Admission database completed.VSS.NSR w multiple PVCs.Cardio consult called.Access to follow.CIWA of 0.RN will attempt to call wife for verify med list.Plan of care ongoing        Orthostatic BP positive: 106/73 lying                                          96/76 standing                                            71/52 standing

## 2025-06-05 NOTE — NURSING NOTE
Dr. Styles, nephro called and update , land ab results given , no new orders, CMP already order for am.

## 2025-06-05 NOTE — ED NOTES
"..Nursing report ED to floor  Bryan Landers  80 y.o.  male    HPI :  HPI  Stated Reason for Visit: syncopal episode - hematoma on back of head, neck pain. Runs of vtach no cardiac hx  History Obtained From: EMS, patient    Chief Complaint  Chief Complaint   Patient presents with    Syncope       Admitting doctor:   Heron Rader DO    Admitting diagnosis:   The primary encounter diagnosis was Acute renal failure superimposed on chronic kidney disease, unspecified acute renal failure type, unspecified CKD stage. Diagnoses of Syncope, unspecified syncope type, Multifocal PVCs, Closed head injury, initial encounter, Anticoagulated, Anemia, unspecified type, Hyperglycemia, and Elevated troponin were also pertinent to this visit.    Code status:   Current Code Status       Date Active Code Status Order ID Comments User Context       Prior            Allergies:   Nsaids and Aricept [donepezil]    Isolation:   No active isolations    Intake and Output    Intake/Output Summary (Last 24 hours) at 6/5/2025 0120  Last data filed at 6/4/2025 2253  Gross per 24 hour   Intake 1000 ml   Output --   Net 1000 ml       Weight:       06/04/25 2159   Weight: 86.2 kg (190 lb)       Most recent vitals:   Vitals:    06/04/25 2159 06/04/25 2303 06/04/25 2307 06/05/25 0001   BP: 123/84 137/90 147/86 141/88   BP Location:   Right arm    Patient Position:   Lying    Pulse: 108 96 96 101   Resp: 18 18 18    Temp: 98.1 °F (36.7 °C)      SpO2: 98% 97% 97% 96%   Weight: 86.2 kg (190 lb)      Height: 177.8 cm (70\")          Active LDAs/IV Access:   Lines, Drains & Airways       Active LDAs       Name Placement date Placement time Site Days    Peripheral IV 06/04/25 2158 20 G Anterior;Left Forearm 06/04/25 2158  Forearm  less than 1                    Labs (abnormal labs have a star):   Labs Reviewed   COMPREHENSIVE METABOLIC PANEL - Abnormal; Notable for the following components:       Result Value    Glucose 111 (*)     BUN 25.0 (*)     " Creatinine 2.27 (*)     Albumin 3.2 (*)     eGFR 28.3 (*)     All other components within normal limits    Narrative:     GFR Categories in Chronic Kidney Disease (CKD)              GFR Category          GFR (mL/min/1.73)    Interpretation  G1                    90 or greater        Normal or high (1)  G2                    60-89                Mild decrease (1)  G3a                   45-59                Mild to moderate decrease  G3b                   30-44                Moderate to severe decrease  G4                    15-29                Severe decrease  G5                    14 or less           Kidney failure    (1)In the absence of evidence of kidney disease, neither GFR category G1 or G2 fulfill the criteria for CKD.    eGFR calculation 2021 CKD-EPI creatinine equation, which does not include race as a factor   TROPONIN - Abnormal; Notable for the following components:    HS Troponin T 50 (*)     All other components within normal limits    Narrative:     High Sensitive Troponin T Reference Range:  <14.0 ng/L- Negative Female for AMI  <22.0 ng/L- Negative Male for AMI  >=14 - Abnormal Female indicating possible myocardial injury.  >=22 - Abnormal Male indicating possible myocardial injury.   Clinicians would have to utilize clinical acumen, EKG, Troponin, and serial changes to determine if it is an Acute Myocardial Infarction or myocardial injury due to an underlying chronic condition.        BNP (IN-HOUSE) - Abnormal; Notable for the following components:    proBNP 1,950.0 (*)     All other components within normal limits    Narrative:     This assay is used as an aid in the diagnosis of individuals suspected of having heart failure. It can be used as an aid in the diagnosis of acute decompensated heart failure (ADHF) in patients presenting with signs and symptoms of ADHF to the emergency department (ED). In addition, NT-proBNP of <300 pg/mL indicates ADHF is not likely.    Age Range Result  Interpretation  NT-proBNP Concentration (pg/mL:      <50             Positive            >450                   Gray                 300-450                    Negative             <300    50-75           Positive            >900                  Gray                300-900                  Negative            <300      >75             Positive            >1800                  Gray                300-1800                  Negative            <300   CBC WITH AUTO DIFFERENTIAL - Abnormal; Notable for the following components:    RBC 3.00 (*)     Hemoglobin 11.0 (*)     Hematocrit 31.0 (*)     .3 (*)     MCH 36.7 (*)     Immature Grans % 0.7 (*)     All other components within normal limits   PROTIME-INR - Abnormal; Notable for the following components:    Protime 15.6 (*)     INR 1.24 (*)     All other components within normal limits   HIGH SENSITIVITIY TROPONIN T 1HR - Abnormal; Notable for the following components:    HS Troponin T 51 (*)     All other components within normal limits    Narrative:     High Sensitive Troponin T Reference Range:  <14.0 ng/L- Negative Female for AMI  <22.0 ng/L- Negative Male for AMI  >=14 - Abnormal Female indicating possible myocardial injury.  >=22 - Abnormal Male indicating possible myocardial injury.   Clinicians would have to utilize clinical acumen, EKG, Troponin, and serial changes to determine if it is an Acute Myocardial Infarction or myocardial injury due to an underlying chronic condition.        MAGNESIUM - Normal   APTT - Normal   ETHANOL   CBC AND DIFFERENTIAL    Narrative:     The following orders were created for panel order CBC & Differential.  Procedure                               Abnormality         Status                     ---------                               -----------         ------                     CBC Auto Differential[[id number]]        Abnormal            Final result                 Please view results for these tests on the individual  orders.       EKG:   ECG 12 Lead Rhythm Change   Preliminary Result   HEART SYLR=563  bpm   RR Anxddvru=526  ms   [place] Otdbipbu=236  ms   P Horizontal Axis=-1  deg   P Front Axis=0  deg   QRSD Alhxmjsq=087  ms   QT Eeqbwtmu=590  ms   CUcV=550  ms   QRS Axis=-7  deg   T Wave Axis=27  deg   - ABNORMAL ECG -   Sinus tachycardia   Multiform ventricular premature complexes   Right bundle branch block   When compared with ECG of 24-Feb-2024 06:13:53,   No significant change   Date and Time of Study:2025-06-04 22:10:51          Meds given in ED:   Medications   magnesium sulfate 2g/50 mL (PREMIX) infusion (2 g Intravenous New Bag 6/4/25 7970)   lactated ringers bolus 1,000 mL (0 mL Intravenous Stopped 6/4/25 8576)   metoprolol tartrate (LOPRESSOR) tablet 25 mg (25 mg Oral Given 6/4/25 8882)       Imaging results:  XR Chest 1 View  Result Date: 6/4/2025   Allowing for submaximal inspiratory result, heart size appears within normal limits.  There is a submaximal inspiratory result. Allowing for that, there is no convincing evidence of infiltrate, effusion or pneumothorax.    This report was finalized on 6/4/2025 11:38 PM by Dr. Caden Ballard M.D on Workstation: [id number]      CT Head Without Contrast  Result Date: 6/4/2025   Negative unenhanced head CT, no acute abnormality.  Negative cervical spine CT, degenerative change without acute abnormality.    This report was finalized on 6/4/2025 11:36 PM by Dr. Caden Ballard M.D on Workstation: [id number]      CT Cervical Spine Without Contrast  Result Date: 6/4/2025   Negative unenhanced head CT, no acute abnormality.  Negative cervical spine CT, degenerative change without acute abnormality.    This report was finalized on 6/4/2025 11:36 PM by Dr. Caden Ballard M.D on Workstation: [id number]        Ambulatory status:   - assist x2    Social issues:   Social History     Socioeconomic History    Marital status:      Spouse name: Chloe    Number of children: 2    Tobacco Use    Smoking status: Former     Current packs/day: 0.00     Average packs/day: 0.3 packs/day for 5.0 years (1.3 ttl pk-yrs)     Types: Cigars, Cigarettes     Start date: 1981     Quit date: 1986     Years since quittin.4     Passive exposure: Never    Smokeless tobacco: Never   Vaping Use    Vaping status: Never Used   Substance and Sexual Activity    Alcohol use: Yes     Alcohol/week: 32.0 - 34.0 standard drinks of alcohol     Types: 4 - 6 Shots of liquor, 28 Standard drinks or equivalent per week     Comment: 2-3 double vodkas a night    Drug use: No    Sexual activity: Yes     Partners: Female     Birth control/protection: Post-menopausal       Peripheral Neurovascular  Peripheral Neurovascular (Adult)  Peripheral Neurovascular WDL: WDL    Neuro Cognitive  Neuro Cognitive (Adult)  Cognitive/Neuro/Behavioral WDL: WDL, orientation  Orientation: oriented x 4    Learning  Learning Assessment  Learning Readiness and Ability: no barriers identified  Education Provided  Person Taught: patient, family member/friend  Teaching Method: verbal instruction    Respiratory  Respiratory WDL  Respiratory WDL: WDL    Abdominal Pain       Pain Assessments  Pain (Adult)  (0-10) Pain Rating: Rest: 3  (0-10) Pain Rating: Activity: 3  Pain Location: neck    NIH Stroke Scale       Zeinab Anna RN  25 01:20 EDT

## 2025-06-05 NOTE — ED PROVIDER NOTES
EMERGENCY DEPARTMENT ENCOUNTER    Room Number:  02/02  Date of encounter:  6/5/2025  PCP: Jose Fonseca MD  Historian: Patient  Full history not obtainable due to: None    HPI:  Chief Complaint: Syncope    Context: Bryan Landers is a 80 y.o. male with a PMH significant for hypertension, hyperlipidemia, diabetes, gout, microalbumin, CKD, cognitive decline, hypothyroidism, B12 deficiency, sinus tachycardia, chronic pulmonary embolism who was BIBA following a syncopal episode.  Patient had a syncopal episode where he then hit his head, on EMS ride over patient did have some nonsustained runs of ventricle tachycardia reported by EMS.  Patient denies any chest pain or shortness of breath at this time, denies any headache or neck pain.  Patient denies any blood thinner use but does take aspirin daily.      MEDICAL RECORD REVIEW:    Upon review of the medical record it appears the patient was evaluated 4/9/2025.  The patient had a normal vitamin D and PTH.    PAST MEDICAL HISTORY    Active Ambulatory Problems     Diagnosis Date Noted    Essential hypertension     Mixed hyperlipidemia     Arthritis     Type 2 diabetes mellitus with chronic kidney disease, without long-term current use of insulin 07/28/2017    Severely overweight 07/28/2017    Idiopathic chronic gout of left knee 07/28/2017    Medication monitoring encounter 07/28/2017    Microalbuminuria due to type 2 diabetes mellitus 09/06/2017    History of prostate cancer 02/27/2018    Stage 3b chronic kidney disease 08/23/2018    Medicare annual wellness visit, subsequent 08/23/2018    Radiation proctitis 10/01/2018    Cognitive decline 03/26/2019    Acquired hypothyroidism 08/15/2019    B12 deficiency 02/14/2020    Sinus tachycardia by electrocardiogram 12/22/2020    Chronic pulmonary embolism without acute cor pulmonale 12/28/2020    Chronic deep vein thrombosis (DVT) of popliteal vein of both lower extremities 01/07/2021    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/04/2021     Bladder mass 09/22/2021    Dependent edema 06/08/2022    Anemia of chronic kidney failure, stage 3 (moderate) 08/29/2022    Deep venous thrombosis 01/07/2021    Class 2 severe obesity with serious comorbidity in adult 02/27/2024    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 02/27/2024     Resolved Ambulatory Problems     Diagnosis Date Noted    Routine adult health maintenance 07/28/2017    Traumatic hematoma of left lower leg 07/28/2017    Skin tear of left elbow without complication 04/23/2018    Injury of anal canal 06/21/2018    Rectal bleeding 09/13/2018    History of hip replacement, total, bilateral 12/13/2018    Right hip pain 12/13/2018    Gastrointestinal hemorrhage 01/04/2019    Acute blood loss anemia 01/16/2019    Hypotension due to hypovolemia 01/16/2019    GI bleed     CERVANTES (dyspnea on exertion) 12/22/2020    Screening for prostate cancer 01/21/2021    Gross hematuria 09/22/2021    Pneumothorax 02/23/2024    Pneumothorax 02/27/2024     Past Medical History:   Diagnosis Date    At risk for sleep apnea     Bruises easily     Diabetes mellitus     Disease of thyroid gland     Elevated cholesterol     History of transfusion     Poor historian     Short-term memory loss     Vitamin D deficiency          PAST SURGICAL HISTORY  Past Surgical History:   Procedure Laterality Date    COLONOSCOPY  09/2016    COLONOSCOPY N/A 9/28/2018    Procedure: COLONOSCOPY;  Surgeon: Olaf Gomez MD;  Location: [facility] OR;  Service: Gastroenterology    COLONOSCOPY N/A 1/7/2019    Procedure: COLONOSCOPY;  Surgeon: Rosa Jack MD;  Location: [facility] OR;  Service: Gastroenterology    CYSTOSCOPY BLADDER BIOPSY N/A 9/22/2021    Procedure: CYSTOSCOPY BLADDER BIOPSY;  Surgeon: Roldan Mccarthy MD;  Location: [facility] OR;  Service: Urology;  Laterality: N/A;    HERNIA REPAIR Bilateral     PROSTATE ULTRASOUND BIOPSY      SIGMOIDOSCOPY N/A 10/2/2018    Procedure: FLEXIBLE SIGMOIDOSCOPY:  APC  cautery bleeding using 60 joules;  Surgeon: Olaf Gomez MD;  Location: [facility] ENDOSCOPY;  Service: Gastroenterology    TONSILLECTOMY      TOTAL HIP ARTHROPLASTY Bilateral          FAMILY HISTORY  Family History   Problem Relation Age of Onset    Stroke Mother     Stroke Father     No Known Problems Brother     Colon cancer Neg Hx     Colon polyps Neg Hx     Malig Hyperthermia Neg Hx          SOCIAL HISTORY  Social History     Socioeconomic History    Marital status:      Spouse name: Chloe    Number of children: 2   Tobacco Use    Smoking status: Former     Current packs/day: 0.00     Average packs/day: 0.3 packs/day for 5.0 years (1.3 ttl pk-yrs)     Types: Cigars, Cigarettes     Start date: 1981     Quit date: 1986     Years since quittin.4     Passive exposure: Never    Smokeless tobacco: Never   Vaping Use    Vaping status: Never Used   Substance and Sexual Activity    Alcohol use: Yes     Alcohol/week: 32.0 - 34.0 standard drinks of alcohol     Types: 4 - 6 Shots of liquor, 28 Standard drinks or equivalent per week     Comment: 2-3 double vodkas a night    Drug use: No    Sexual activity: Yes     Partners: Female     Birth control/protection: Post-menopausal         ALLERGIES  Nsaids and Aricept [donepezil]        REVIEW OF SYSTEMS    All systems reviewed and marked as negative except as listed in HPI     PHYSICAL EXAM    I have reviewed the triage vital signs and nursing notes.    ED Triage Vitals [25 2159]   Temp Heart Rate Resp BP SpO2   98.1 °F (36.7 °C) 108 18 123/84 98 %      Temp src Heart Rate Source Patient Position BP Location FiO2 (%)   -- -- -- -- --       Physical Exam    Vital signs and nursing notes reviewed.            LAB RESULTS  Recent Results (from the past 24 hours)   ECG 12 Lead Rhythm Change    Collection Time: 25 10:10 PM   Result Value Ref Range    QT Interval 369 ms    QTC Interval 485 ms   Comprehensive Metabolic Panel    Collection  Time: 06/04/25 10:13 PM    Specimen: Blood   Result Value Ref Range    Glucose 111 (H) 65 - 99 mg/dL    BUN 25.0 (H) 8.0 - 23.0 mg/dL    Creatinine 2.27 (H) 0.76 - 1.27 mg/dL    Sodium 138 136 - 145 mmol/L    Potassium 4.4 3.5 - 5.2 mmol/L    Chloride 99 98 - 107 mmol/L    CO2 27.2 22.0 - 29.0 mmol/L    Calcium 9.3 8.6 - 10.5 mg/dL    Total Protein 6.5 6.0 - 8.5 g/dL    Albumin 3.2 (L) 3.5 - 5.2 g/dL    ALT (SGPT) 20 1 - 41 U/L    AST (SGOT) 32 1 - 40 U/L    Alkaline Phosphatase 105 39 - 117 U/L    Total Bilirubin 0.4 0.0 - 1.2 mg/dL    Globulin 3.3 gm/dL    A/G Ratio 1.0 g/dL    BUN/Creatinine Ratio 11.0 7.0 - 25.0    Anion Gap 11.8 5.0 - 15.0 mmol/L    eGFR 28.3 (L) >60.0 mL/min/1.73   Magnesium    Collection Time: 06/04/25 10:13 PM    Specimen: Blood   Result Value Ref Range    Magnesium 1.6 1.6 - 2.4 mg/dL   High Sensitivity Troponin T    Collection Time: 06/04/25 10:13 PM    Specimen: Blood   Result Value Ref Range    HS Troponin T 50 (H) <22 ng/L   BNP    Collection Time: 06/04/25 10:13 PM    Specimen: Blood   Result Value Ref Range    proBNP 1,950.0 (H) 0.0 - 1,800.0 pg/mL   CBC Auto Differential    Collection Time: 06/04/25 10:13 PM    Specimen: Blood   Result Value Ref Range    WBC 5.73 3.40 - 10.80 10*3/mm3    RBC 3.00 (L) 4.14 - 5.80 10*6/mm3    Hemoglobin 11.0 (L) 13.0 - 17.7 g/dL    Hematocrit 31.0 (L) 37.5 - 51.0 %    .3 (H) 79.0 - 97.0 fL    MCH 36.7 (H) 26.6 - 33.0 pg    MCHC 35.5 31.5 - 35.7 g/dL    RDW 14.4 12.3 - 15.4 %    RDW-SD 52.7 37.0 - 54.0 fl    MPV 9.2 6.0 - 12.0 fL    Platelets 167 140 - 450 10*3/mm3    Neutrophil % 61.1 42.7 - 76.0 %    Lymphocyte % 24.4 19.6 - 45.3 %    Monocyte % 11.0 5.0 - 12.0 %    Eosinophil % 2.3 0.3 - 6.2 %    Basophil % 0.5 0.0 - 1.5 %    Immature Grans % 0.7 (H) 0.0 - 0.5 %    Neutrophils, Absolute 3.50 1.70 - 7.00 10*3/mm3    Lymphocytes, Absolute 1.40 0.70 - 3.10 10*3/mm3    Monocytes, Absolute 0.63 0.10 - 0.90 10*3/mm3    Eosinophils, Absolute 0.13  0.00 - 0.40 10*3/mm3    Basophils, Absolute 0.03 0.00 - 0.20 10*3/mm3    Immature Grans, Absolute 0.04 0.00 - 0.05 10*3/mm3    nRBC 0.0 0.0 - 0.2 /100 WBC   Protime-INR    Collection Time: 06/04/25 11:06 PM    Specimen: Blood   Result Value Ref Range    Protime 15.6 (H) 11.7 - 14.2 Seconds    INR 1.24 (H) 0.90 - 1.10   aPTT    Collection Time: 06/04/25 11:06 PM    Specimen: Blood   Result Value Ref Range    PTT 23.7 22.7 - 35.4 seconds   High Sensitivity Troponin T 1Hr    Collection Time: 06/04/25 11:34 PM    Specimen: Arm, Left; Blood   Result Value Ref Range    HS Troponin T 51 (H) <22 ng/L    Troponin T Numeric Delta 1 ng/L    Troponin T % Delta 2 Abnormal if >/= 20%   Ethanol    Collection Time: 06/04/25 11:34 PM    Specimen: Arm, Left; Blood   Result Value Ref Range    Ethanol <10 0 - 10 mg/dL    Ethanol % <0.010 %       Ordered the above labs and independently reviewed the results.        RADIOLOGY  XR Chest 1 View  Result Date: 6/4/2025  CXR ONE VIEW  HISTORY: syncope  COMPARISON: 3/28/2024  TECHNIQUE: single portable AP       Allowing for submaximal inspiratory result, heart size appears within normal limits.  There is a submaximal inspiratory result. Allowing for that, there is no convincing evidence of infiltrate, effusion or pneumothorax.    This report was finalized on 6/4/2025 11:38 PM by Dr. Caden Ballard M.D on Workstation: [id number]      CT Head Without Contrast  CT Head Without Contrast, CT Cervical Spine Without Contrast  Result Date: 6/4/2025  NON-CONTRAST CT HEAD & CT CERVICAL SPINE  REASON:  The patient is being seen in the ED for trauma and is determined to have a high energy mechanism and/or high risk for missed injuries due to presence of associated injuries or distracting pain. The patient will need imaging of the head per the trauma protocol given diagnoses such as intracranial hemorrhage cannot be excluded.    The patient will need imaging of the cervical spine given diagnoses such  as cervical spine fracture cannot be excluded.  The diagnostic information gained in this study exceeds the estimated risk of radiation induced injury at the time of order placement. Mechanism of injury: Syncope and fall from standing, headache and neck pain  COMPARISON STUDIES:  None Available.  TECHNIQUE:  Axial images were acquired from the skull base to vertex without contrast, including multiplanar reformats, per standard departmental protocol.   Routine cervical spine CT without contrast. Multiplanar reformats were created. Radiation dose reduction techniques were utilized, including automated exposure control, and exposure modulation based on body size.  FINDINGS:  Head CT: There is no CT evidence of acute intracranial hemorrhage, mass, or infarct. There is volume loss, but there is no evidence of hydrocephalus or extra-axial fluid collection.  There are nonspecific white matter changes, but there is no acute intracranial abnormality. Skull base and calvarium show no acute abnormality, and the extracranial soft tissues show no acute abnormality.  C-spine CT: There is no evidence of acute alignment abnormality.  There are spinal degenerative changes, but there is no fracture or other acute bony abnormality.  The paraspinous soft tissues show no acute abnormality.       Negative unenhanced head CT, no acute abnormality.  Negative cervical spine CT, degenerative change without acute abnormality.    This report was finalized on 6/4/2025 11:36 PM by Dr. Caden Ballard M.D on Workstation: [id number]        I ordered the above noted radiological studies. Independently reviewed by me and discussed with radiologist.  See dictation above for official radiology interpretation.      PROCEDURES    Procedures        MEDICATIONS GIVEN IN ER    Medications   magnesium sulfate 2g/50 mL (PREMIX) infusion (2 g Intravenous New Bag 6/4/25 6223)   lactated ringers bolus 1,000 mL (0 mL Intravenous Stopped 6/4/25 0313)    metoprolol tartrate (LOPRESSOR) tablet 25 mg (25 mg Oral Given 6/4/25 1549)         PROGRESS, DATA ANALYSIS, CONSULTS, AND MEDICAL DECISION MAKING    All labs have been independently interpreted by me.  All radiology studies have been interpreted by me.  Discussion below represents my analysis of pertinent findings related to patient's condition, differential diagnosis, treatment plan and final disposition.    CT of head and neck were negative following syncopal episode.  No abnormalities noted on chest x-ray.  Patient given 25 mg metoprolol p.o. due to reported runs of V. tach and tachycardia in the emergency department with PVCs.  Patient also given 1 L fluids.  Etiology of syncopal episode is unknown at this time, patient's history of alcohol abuse could contribute to this syncopal episode.  Initial troponin is 50 with a repeat of 51, BNP of 1950, patient continues to deny chest pain, no significant electrolyte derangement to explain syncopal episode.    - Chronic or social conditions impacting care: Frequent alcohol use      DIFFERENTIAL DIAGNOSIS INCLUDE BUT NOT LIMITED TO: Differential diagnosis for head injury includes but is not limited to:  - subarachnoid hemorrhage  - subdural hematoma  - epidural hematoma  - concussion  - scalp contusion  - ACS  - Alcohol abuse      Orders placed during this visit:  Orders Placed This Encounter   Procedures    CT Head Without Contrast    CT Cervical Spine Without Contrast    XR Chest 1 View    Comprehensive Metabolic Panel    Magnesium    High Sensitivity Troponin T    BNP    CBC Auto Differential    Protime-INR    aPTT    High Sensitivity Troponin T 1Hr    Ethanol    LHA (on-call MD unless specified) Details    ECG 12 Lead Rhythm Change    Initiate Observation Status    CBC & Differential         ED Course as of 06/05/25 0018   Wed Jun 04, 2025 2240 My differential diagnosis for syncope includes but is not limited to:  Vasovagal reflex - situational stimulus,  micturition, defecation, cough, sneezing, swallowing, postprandial state, react sinus hypersensitivity  Vascular-prolonged recumbency, sudden postural change, prolonged standing, hypovolemia, vasodilator drugs, autonomic neuropathy, adrenal insufficiency, subclavian steal, pulmonary embolism  Cardiac -arrhythmia, heart block, myocardial infarction, aortic stenosis, cardiac myxoma, cardiac, LV Dysfunction, Aortic Dissection, Pulmonary Hypertension, Pulmonary Stenosis, Pacemaker Failure  CNS-seizure, hypoxia, hypoglycemia, TIA,(basal vertebral), hydrocephalus     [JG]   2240 Fall with closed head injury, patient demented, anticoagulated, high risk of intracranial hemorrhage, high risk of occult cervical spine fracture, obtaining CT imaging, checking lab work including coagulation factors. [JG]   2241 EKG independently viewed and contemporaneously interpreted by ED physician. Time: 2210.  Rate 103.  Interpretation: Sinus tachycardia, normal axis, right bundle branch block, no acute ST changes, occasional PVC, no contiguous PVCs, patient does have 2 distinct focus of PVCs. [JG]   2255 proBNP(!): 1,950.0 [CV]   2255 HS Troponin T(!): 50 [CV]   2255 BUN(!): 25.0 [CV]   2255 Creatinine(!): 2.27 [CV]   2309 Acute on chronic kidney injury, creatinine elevated 2.27, 1.59 a month ago.  Patient receiving IV fluids. [JG]   Thu Jun 05, 2025   0004 Upon reassessment, patient's family did mention that patient does have history of daily alcohol use, did drink earlier today, has possibly withdrawal in the past.  Ethanol level drawn, patient to be admitted to the hospital for further workup and evaluation.  [CV]   0014 Phone call with Chantal, NOAH with [facility].  Discussed the patient, relevant history, exam, diagnostics, ED findings/progress, and concerns. They agree to admit the patient to telemetry observation under Dr. Rader. Care assumed by the admitting physician at this time.     [JG]      ED Course User Index  [CV] belinda Knutson  Roldan TEJADA PA-C  [JG] Joshua Wheat MD       AS OF 00:18 EDT VITALS:    BP - 141/88  HR - 101  TEMP - 98.1 °F (36.7 °C)  02 SATS - 96%      No follow-up provider specified.       Medication List      No changes were made to your prescriptions during this visit.         I rechecked the patient.  I discussed the patient's labs, radiology findings (including all incidental findings), diagnosis, and plan for admission. The patient understands and agrees with the plan.      DIAGNOSIS  Final diagnoses:   Acute renal failure superimposed on chronic kidney disease, unspecified acute renal failure type, unspecified CKD stage   Syncope, unspecified syncope type   Multifocal PVCs   Closed head injury, initial encounter   Anticoagulated   Anemia, unspecified type   Hyperglycemia   Elevated troponin         DISPOSITION  Admit    Pt masked in first look. I wore a surgical mask throughout my encounters with the pt. I performed hand hygiene on entry into the pt room and upon exit.     Dictated utilizing Dragon dictation     Note Disclaimer: At [facility], we believe that sharing information builds trust and better relationships. You are receiving this note because you recently visited [facility]. It is possible you will see health information before a provider has talked with you about it. This kind of information can be easy to misunderstand. To help you fully understand what it means for your health, we urge you to discuss this note with your provider.      Roldan Garrido PA-C  06/05/25 0020

## 2025-06-05 NOTE — PLAN OF CARE
Goal Outcome Evaluation:  Plan of Care Reviewed With: patient        Progress: no change  Outcome Evaluation: Pt confused all day ut cooperative, does not remember or know how to call for help despite frequent reminders and reorienttion, echo completed, pt just pulled iv out, unable to hang mag sulfate, will have IV nurse restart iv after return from doppler. Voiding adequetely per urinal but also incontinent. Cardiology came, see notes and orders, consult to Nephro called, bed alarm on at all times. ferquent checks r/t confusion. Ortho bp positive, Dr Calvo made aware. Wife has been updated.

## 2025-06-05 NOTE — CONSULTS
Date of Hospital Visit: 2025  Date of consult: 25  Encounter Provider: Rayshawn Navarro MD  Place of Service: [facility] CARDIOLOGY  Patient Name: Bryan Landers  :1945  Referral Provider: Joshua Wheat MD    Chief complaint: syncope     Reason for consult: syncope    History of Present Illness Bryan Landers is a 80 year old pt with a history of HTN, HLD, diabetes, gout, chronic kidney disease, cognitive decline, hypothyroidism, B12 deficiency, sinus tachycardia,and chronic pulmonary embolism.   Pt presented to ER on 25 with complaints of a syncopal episode.  Patient had a syncopal episode and hit his head.  Per EMS en route patient had some nonsustained runs of VT.  He denied any chest pain or shortness of breath at the time.  He denied headache or neck pain.  He denies any blood thinner use but takes an aspirin daily. In ER, BUN25/CR 2.27, troponin T 50/51, proBNP 1, 950, hemoglobin 11, INR 1.24, ethanol level negative, chest x-ray showed no infiltrate, effusion or pneumothorax, CT of the head showed nothing acute, CT of cervical spine showed nothing acute.  Patient admitted for syncope and have been asked to see.     ECHO 20   Summary    Technically difficult examination.    The ejection fraction biplane was calculated at 48%.    Intravenous contrast was used to enhance endocardial border definition.    Global left ventricular wall motion and contractility appear to be at the    lower limits of normal.    The estimated ejection fraction is 45-50%.    Normal left ventricular wall thickness.    Left ventricular chamber size is normal.    Mildly dilated right ventricle.    Right ventricular global systolic function is mildly reduced.    There is mild right atrial enlargement.    There is aortic valve sclerosis without evidence of stenosis.    RV:LV ratio <1     Stress test 20  The patient reported shortness of breath during the stress test.  Resting  HR above THR of 85% (122).  Equivocal ECG evidence of myocardial ischemia.Indeterminate clinical evidence of myocardial ischemia. Findings consistent with an equivocal ECG stress test.    Past Medical History:   Diagnosis Date    At risk for sleep apnea     Bladder mass     Bruises easily     Diabetes mellitus     Disease of thyroid gland     Elevated cholesterol     GI bleed     Gross hematuria 09/22/2021    History of hip replacement, total, bilateral 12/13/2018    History of transfusion     Pneumothorax 02/27/2024    Poor historian     Short-term memory loss     Vitamin D deficiency        Past Surgical History:   Procedure Laterality Date    COLONOSCOPY  09/2016    COLONOSCOPY N/A 9/28/2018    Procedure: COLONOSCOPY;  Surgeon: Olaf Gomez MD;  Location: [facility] OR;  Service: Gastroenterology    COLONOSCOPY N/A 1/7/2019    Procedure: COLONOSCOPY;  Surgeon: Rosa Jack MD;  Location: [facility];  Service: Gastroenterology    CYSTOSCOPY BLADDER BIOPSY N/A 9/22/2021    Procedure: CYSTOSCOPY BLADDER BIOPSY;  Surgeon: Roldan Mccarthy MD;  Location: [facility] MAIN OR;  Service: Urology;  Laterality: N/A;    HERNIA REPAIR Bilateral     PROSTATE ULTRASOUND BIOPSY      SIGMOIDOSCOPY N/A 10/2/2018    Procedure: FLEXIBLE SIGMOIDOSCOPY:  APC cautery bleeding using 60 joules;  Surgeon: Olaf Gomez MD;  Location: [facility] ENDOSCOPY;  Service: Gastroenterology    TONSILLECTOMY      TOTAL HIP ARTHROPLASTY Bilateral 2007       Medications Prior to Admission   Medication Sig Dispense Refill Last Dose/Taking    allopurinol (ZYLOPRIM) 100 MG tablet Take 1 tablet by mouth Daily. Indications: Gout 90 tablet 3 6/4/2025    apixaban (Eliquis) 2.5 MG tablet tablet Take 1 tablet by mouth Every 12 (Twelve) Hours. 60 tablet 11 6/4/2025    aspirin 81 MG chewable tablet Chew 1 tablet Daily. Indications: Blood Clot Within a Vein, DVTs AND PE   6/4/2025    atorvastatin (LIPITOR) 80 MG tablet Take 1 tablet by  mouth Daily. Indications: High Amount of Fats in the Blood 90 tablet 3 6/4/2025    cholecalciferol (VITAMIN D3) 1000 UNITS tablet Take 1 tablet by mouth Daily. Indications: Vitamin D Deficiency   6/4/2025    Cyanocobalamin (VITAMIN B 12 PO) Take 1,000 mg by mouth Every Morning. Indications: SUPPLEMENT   6/4/2025    furosemide (LASIX) 40 MG tablet Take 1 tablet by mouth Daily. Indications: Edema 90 tablet 3 6/4/2025    levothyroxine (SYNTHROID, LEVOTHROID) 75 MCG tablet TAKE 1 TABLET BY MOUTH EVERY MORNING INDICATIONS: UNDER ACTIVE THYROID 90 tablet 3 6/4/2025    lisinopril (PRINIVIL,ZESTRIL) 2.5 MG tablet Take 1 tablet by mouth Daily. Indications: High Blood Pressure 90 tablet 4 6/4/2025    memantine (NAMENDA) 10 MG tablet TAKE 2 TABLETS BY MOUTH EVERY MORNING 180 tablet 3 6/4/2025    Multiple Vitamins-Minerals (MULTIVITAL PLATINUM PO) Take 1 tablet by mouth Daily. HOLD FOR SURGERY  Indications: SUPPLEMENT   6/4/2025    Orgovyx 120 MG tablet tablet Take 1 tablet by mouth Daily.   6/4/2025    potassium chloride 10 MEQ CR tablet Take 1 tablet by mouth Daily With Breakfast. Indications: Low Amount of Potassium in the Blood, take with Lasix 90 tablet 3 6/4/2025    Xtandi 40 MG tablet tablet Take 4 tablets by mouth Daily. 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 Indications: Spasm of Lung Air Passages (Patient not taking: Reported on 3/20/2025) 18 g 0        Current Meds  Scheduled Meds:folic acid, 1 mg, Oral, Daily  multivitamin with minerals, 1 tablet, Oral, Daily  thiamine (B-1) IV, 200 mg, Intravenous, Q8H   Followed by  [START ON 6/10/2025] thiamine, 100 mg, Oral, Daily      Continuous Infusions:   PRN Meds:.  acetaminophen    senna-docusate sodium **AND** polyethylene glycol **AND** bisacodyl **AND** bisacodyl    cloNIDine     "LORazepam **OR** LORazepam **OR** LORazepam **OR** LORazepam **OR** LORazepam **OR** LORazepam    Magnesium Standard Dose Replacement - Follow Nurse / BPA Driven Protocol    nitroglycerin    ondansetron ODT **OR** ondansetron    Allergies as of 2025 - Reviewed 2025   Allergen Reaction Noted    Nsaids GI Bleeding 2019    Aricept [donepezil] Diarrhea 10/13/2021       Social History     Socioeconomic History    Marital status:      Spouse name: Chloe    Number of children: 2   Tobacco Use    Smoking status: Former     Current packs/day: 0.00     Average packs/day: 0.3 packs/day for 5.0 years (1.3 ttl pk-yrs)     Types: Cigars, Cigarettes     Start date: 1981     Quit date: 1986     Years since quittin.4     Passive exposure: Never    Smokeless tobacco: Never   Vaping Use    Vaping status: Never Used   Substance and Sexual Activity    Alcohol use: Yes     Alcohol/week: 32.0 - 34.0 standard drinks of alcohol     Types: 4 - 6 Shots of liquor, 28 Standard drinks or equivalent per week     Comment: 2-3 double vodkas a night    Drug use: No    Sexual activity: Yes     Partners: Female     Birth control/protection: Post-menopausal       Family History   Problem Relation Age of Onset    Stroke Mother     Stroke Father     No Known Problems Brother     Colon cancer Neg Hx     Colon polyps Neg Hx     Malig Hyperthermia Neg Hx        REVIEW OF SYSTEMS:   All systems reviewed and pertinent positives include in HPI otherwise negative review of systems.       Objective:   Temp:  [97.3 °F (36.3 °C)-98.1 °F (36.7 °C)] 97.5 °F (36.4 °C)  Heart Rate:  [] 94  Resp:  [16-18] 16  BP: ()/(52-99) 122/74  Body mass index is 34.87 kg/m².  Flowsheet Rows      Flowsheet Row First Filed Value   Admission Height 177.8 cm (70\") Documented at 2025   Admission Weight 86.2 kg (190 lb) Documented at 2025          Vitals:    25 0700   BP: 122/74   Pulse:    Resp:    Temp: 97.5 °F " (36.4 °C)   SpO2:        General Appearance:    Alert, cooperative, in no acute distress   Head:    Normocephalic, without obvious abnormality, atraumatic   Eyes:            Lids and lashes normal, conjunctivae and sclerae normal, no   icterus, no pallor, corneas clear, PERRLA   Ears:    Ears appear intact with no abnormalities noted   Throat:   No oral lesions, no thrush, oral mucosa moist   Neck:   No adenopathy, supple, trachea midline, no thyromegaly, no   carotid bruit, no JVD   Back:     No kyphosis present, no scoliosis present, no skin lesions, erythema or scars, no tenderness to percussion or palpation, range of motion normal   Lungs:     Clear to auscultation,respirations regular, even and unlabored    Heart:    Regular rhythm and normal rate, normal S1 and S2, no murmur, no gallop, no rub, no click   Chest Wall:    No abnormalities observed   Abdomen:     Normal bowel sounds, no masses, no organomegaly, soft nontender, nondistended, no guarding, no rebound  tenderness   Extremities:   Moves all extremities well, no edema, no cyanosis, no redness   Pulses:   Pulses palpable and equal bilaterally. Normal radial, carotid, femoral, dorsalis pedis and posterior tibial pulses bilaterally. Normal abdominal aorta   Skin:  Neurology:   Psychiatric:   No bleeding, bruising or rash   Normal speech and cranial nerve exam, no focal deficit   Alert and oriented x 3, normal mood and affect             Review of Data:      Results from last 7 days   Lab Units 06/05/25  0557 06/04/25  2213   SODIUM mmol/L 138 138   POTASSIUM mmol/L 5.2 4.4   CHLORIDE mmol/L 98 99   CO2 mmol/L 27.0 27.2   BUN mg/dL 24.0* 25.0*   CREATININE mg/dL 2.14* 2.27*   CALCIUM mg/dL 8.7 9.3   BILIRUBIN mg/dL  --  0.4   ALK PHOS U/L  --  105   ALT (SGPT) U/L  --  20   AST (SGOT) U/L  --  32   GLUCOSE mg/dL 123* 111*     Results from last 7 days   Lab Units 06/04/25  2334 06/04/25  2213   HSTROP T ng/L 51* 50*     @LABRCNTbnp@  Results from last 7  days   Lab Units 06/05/25  0557 06/04/25  2213   WBC 10*3/mm3 6.07 5.73   HEMOGLOBIN g/dL 10.3* 11.0*   HEMATOCRIT % 29.1* 31.0*   PLATELETS 10*3/mm3 137* 167     Results from last 7 days   Lab Units 06/04/25  2306   INR  1.24*   APTT seconds 23.7     Results from last 7 days   Lab Units 06/04/25  2213   MAGNESIUM mg/dL 1.6     @LABRCNTIP(chol,trig,hdl,ldl)                            I personally viewed and interpreted the patient's EKG/Telemetry data  )   Syncope    Essential hypertension    Stage 3b chronic kidney disease    Cognitive decline    Acquired hypothyroidism    Alcohol use disorder        Assessment and Plan:    A syncopal spell-patient does not remember about having prodromal symptoms.  EMS report nonsustained VT.  EKG and telemetry reviewed showing frequent polymorphic PVCs as singlets, couplets, triplets and some quadruplets , no sustained VT.  Echocardiogram shows mild global left ventricular hypokinesis.  Elevated troponin without significant delta and ischemic changes.  Patient denies having any chest pain.  Essential hypertension  Bilateral DVT and PE in 2020-thought to be provoked.  He has been on Eliquis 2.5 mg p.o. twice daily  Acute on chronic chronic kidney disease-sees nephrology  Essential hypertension-was hypotensive on admission-and hypertensive on hold.  Home medications include Lasix 40 mg p.o. daily, atorvastatin, aspirin, apixaban, lisinopril    Syncopal spell could be from ventricular tachycardia/hypotension.  Echo with reduced ventricular ejection fraction.  Need to rule out significant obstructive CAD  On IV fluids.  I will try him on low-dose metoprolol to suppress PVCs.  Monitor and replete electrolytes including magnesium sulfate 2 g IV x 1.  Ask nephrology to see patient for management of CKD as well as clearance for coronary angiogram.      Rayshawn Navarro MD  06/05/25  11:51 EDT.      Time spent in reviewing chart, discussion and examination:

## 2025-06-05 NOTE — ED NOTES
"Pt to ED via EMS from [facility] for a fall. EMS states pt was going to the restroom when he fell, does not remember how he fell, \"I just fainted.\" - hematoma to back of head. Pt also c/o neck pain when in truck and skin tear on arm. Facility states Eliquis but pt states he thinks he only takes ASA. Pt placed in c-collar on arrival.    EMS also states he had several runs of v-tach - pt denies any cardiac hx or cp  "

## 2025-06-06 LAB
ALBUMIN SERPL-MCNC: 2.6 G/DL (ref 3.5–5.2)
ALBUMIN/GLOB SERPL: 0.9 G/DL
ALP SERPL-CCNC: 97 U/L (ref 39–117)
ALT SERPL W P-5'-P-CCNC: 19 U/L (ref 1–41)
ANION GAP SERPL CALCULATED.3IONS-SCNC: 8 MMOL/L (ref 5–15)
AST SERPL-CCNC: 38 U/L (ref 1–40)
BASOPHILS # BLD AUTO: 0.03 10*3/MM3 (ref 0–0.2)
BASOPHILS # BLD MANUAL: 0 10*3/MM3 (ref 0–0.2)
BASOPHILS NFR BLD AUTO: 0.7 % (ref 0–1.5)
BASOPHILS NFR BLD MANUAL: 0 % (ref 0–1.5)
BILIRUB SERPL-MCNC: 0.5 MG/DL (ref 0–1.2)
BUN SERPL-MCNC: 21 MG/DL (ref 8–23)
BUN/CREAT SERPL: 12 (ref 7–25)
BURR CELLS BLD QL SMEAR: ABNORMAL
CALCIUM SPEC-SCNC: 8.6 MG/DL (ref 8.6–10.5)
CHLORIDE SERPL-SCNC: 103 MMOL/L (ref 98–107)
CO2 SERPL-SCNC: 26 MMOL/L (ref 22–29)
CREAT SERPL-MCNC: 1.75 MG/DL (ref 0.76–1.27)
DEPRECATED RDW RBC AUTO: 51.6 FL (ref 37–54)
EGFRCR SERPLBLD CKD-EPI 2021: 38.6 ML/MIN/1.73
EOSINOPHIL # BLD AUTO: 0.13 10*3/MM3 (ref 0–0.4)
EOSINOPHIL # BLD MANUAL: 0.09 10*3/MM3 (ref 0–0.4)
EOSINOPHIL NFR BLD AUTO: 3.2 % (ref 0.3–6.2)
EOSINOPHIL NFR BLD MANUAL: 2.2 % (ref 0.3–6.2)
ERYTHROCYTE [DISTWIDTH] IN BLOOD BY AUTOMATED COUNT: 13.8 % (ref 12.3–15.4)
GLOBULIN UR ELPH-MCNC: 2.8 GM/DL
GLUCOSE SERPL-MCNC: 145 MG/DL (ref 65–99)
HCT VFR BLD AUTO: 27.2 % (ref 37.5–51)
HGB BLD-MCNC: 9.4 G/DL (ref 13–17.7)
IMM GRANULOCYTES # BLD AUTO: 0.02 10*3/MM3 (ref 0–0.05)
IMM GRANULOCYTES NFR BLD AUTO: 0.5 % (ref 0–0.5)
LYMPHOCYTES # BLD AUTO: 1.22 10*3/MM3 (ref 0.7–3.1)
LYMPHOCYTES # BLD MANUAL: 1.21 10*3/MM3 (ref 0.7–3.1)
LYMPHOCYTES NFR BLD AUTO: 30 % (ref 19.6–45.3)
LYMPHOCYTES NFR BLD MANUAL: 6.6 % (ref 5–12)
MAGNESIUM SERPL-MCNC: 2.3 MG/DL (ref 1.6–2.4)
MCH RBC QN AUTO: 36.3 PG (ref 26.6–33)
MCHC RBC AUTO-ENTMCNC: 34.6 G/DL (ref 31.5–35.7)
MCV RBC AUTO: 105 FL (ref 79–97)
MONOCYTES # BLD AUTO: 0.46 10*3/MM3 (ref 0.1–0.9)
MONOCYTES # BLD: 0.27 10*3/MM3 (ref 0.1–0.9)
MONOCYTES NFR BLD AUTO: 11.3 % (ref 5–12)
NEUTROPHILS # BLD AUTO: 2.5 10*3/MM3 (ref 1.7–7)
NEUTROPHILS NFR BLD AUTO: 2.2 10*3/MM3 (ref 1.7–7)
NEUTROPHILS NFR BLD AUTO: 54.3 % (ref 42.7–76)
NEUTROPHILS NFR BLD MANUAL: 61.5 % (ref 42.7–76)
NRBC SPEC MANUAL: 1.1 /100 WBC (ref 0–0.2)
PLAT MORPH BLD: NORMAL
PLATELET # BLD AUTO: 93 10*3/MM3 (ref 140–450)
PMV BLD AUTO: 10.6 FL (ref 6–12)
POTASSIUM SERPL-SCNC: 4 MMOL/L (ref 3.5–5.2)
PROT SERPL-MCNC: 5.4 G/DL (ref 6–8.5)
QT INTERVAL: 369 MS
QT INTERVAL: 530 MS
QTC INTERVAL: 485 MS
QTC INTERVAL: 492 MS
RBC # BLD AUTO: 2.59 10*6/MM3 (ref 4.14–5.8)
SMUDGE CELLS BLD QL SMEAR: ABNORMAL
SODIUM SERPL-SCNC: 137 MMOL/L (ref 136–145)
VARIANT LYMPHS NFR BLD MANUAL: 29.7 % (ref 19.6–45.3)
WBC NRBC COR # BLD AUTO: 4.06 10*3/MM3 (ref 3.4–10.8)

## 2025-06-06 PROCEDURE — B2111ZZ FLUOROSCOPY OF MULTIPLE CORONARY ARTERIES USING LOW OSMOLAR CONTRAST: ICD-10-PCS | Performed by: STUDENT IN AN ORGANIZED HEALTH CARE EDUCATION/TRAINING PROGRAM

## 2025-06-06 PROCEDURE — 25010000002 AMIODARONE IN DEXTROSE 5% 360-4.14 MG/200ML-% SOLUTION: Performed by: STUDENT IN AN ORGANIZED HEALTH CARE EDUCATION/TRAINING PROGRAM

## 2025-06-06 PROCEDURE — C1894 INTRO/SHEATH, NON-LASER: HCPCS | Performed by: STUDENT IN AN ORGANIZED HEALTH CARE EDUCATION/TRAINING PROGRAM

## 2025-06-06 PROCEDURE — 83735 ASSAY OF MAGNESIUM: CPT | Performed by: INTERNAL MEDICINE

## 2025-06-06 PROCEDURE — 25010000002 FENTANYL CITRATE (PF) 50 MCG/ML SOLUTION: Performed by: STUDENT IN AN ORGANIZED HEALTH CARE EDUCATION/TRAINING PROGRAM

## 2025-06-06 PROCEDURE — 25010000002 THIAMINE PER 100 MG

## 2025-06-06 PROCEDURE — 25010000002 HEPARIN (PORCINE) PER 1000 UNITS: Performed by: STUDENT IN AN ORGANIZED HEALTH CARE EDUCATION/TRAINING PROGRAM

## 2025-06-06 PROCEDURE — 25010000002 THIAMINE PER 100 MG: Performed by: STUDENT IN AN ORGANIZED HEALTH CARE EDUCATION/TRAINING PROGRAM

## 2025-06-06 PROCEDURE — 25010000002 LIDOCAINE 2% SOLUTION: Performed by: STUDENT IN AN ORGANIZED HEALTH CARE EDUCATION/TRAINING PROGRAM

## 2025-06-06 PROCEDURE — 25510000001 IOPAMIDOL PER 1 ML: Performed by: STUDENT IN AN ORGANIZED HEALTH CARE EDUCATION/TRAINING PROGRAM

## 2025-06-06 PROCEDURE — 25810000003 SODIUM CHLORIDE 0.9 % SOLUTION: Performed by: INTERNAL MEDICINE

## 2025-06-06 PROCEDURE — C1769 GUIDE WIRE: HCPCS | Performed by: STUDENT IN AN ORGANIZED HEALTH CARE EDUCATION/TRAINING PROGRAM

## 2025-06-06 PROCEDURE — 99232 SBSQ HOSP IP/OBS MODERATE 35: CPT | Performed by: INTERNAL MEDICINE

## 2025-06-06 PROCEDURE — 93458 L HRT ARTERY/VENTRICLE ANGIO: CPT | Performed by: STUDENT IN AN ORGANIZED HEALTH CARE EDUCATION/TRAINING PROGRAM

## 2025-06-06 PROCEDURE — B34HZZZ ULTRASONOGRAPHY OF RIGHT UPPER EXTREMITY ARTERIES: ICD-10-PCS | Performed by: STUDENT IN AN ORGANIZED HEALTH CARE EDUCATION/TRAINING PROGRAM

## 2025-06-06 PROCEDURE — 97110 THERAPEUTIC EXERCISES: CPT

## 2025-06-06 PROCEDURE — B2151ZZ FLUOROSCOPY OF LEFT HEART USING LOW OSMOLAR CONTRAST: ICD-10-PCS | Performed by: STUDENT IN AN ORGANIZED HEALTH CARE EDUCATION/TRAINING PROGRAM

## 2025-06-06 PROCEDURE — 25010000002 AMIODARONE IN DEXTROSE 5% 360-4.14 MG/200ML-% SOLUTION: Performed by: INTERNAL MEDICINE

## 2025-06-06 PROCEDURE — 80053 COMPREHEN METABOLIC PANEL: CPT | Performed by: INTERNAL MEDICINE

## 2025-06-06 PROCEDURE — 25010000002 AMIODARONE IN DEXTROSE 5% 150-4.21 MG/100ML-% SOLUTION: Performed by: INTERNAL MEDICINE

## 2025-06-06 PROCEDURE — 4A023N7 MEASUREMENT OF CARDIAC SAMPLING AND PRESSURE, LEFT HEART, PERCUTANEOUS APPROACH: ICD-10-PCS | Performed by: STUDENT IN AN ORGANIZED HEALTH CARE EDUCATION/TRAINING PROGRAM

## 2025-06-06 PROCEDURE — 85025 COMPLETE CBC W/AUTO DIFF WBC: CPT | Performed by: INTERNAL MEDICINE

## 2025-06-06 PROCEDURE — 97162 PT EVAL MOD COMPLEX 30 MIN: CPT

## 2025-06-06 PROCEDURE — 25010000002 MIDAZOLAM PER 1 MG: Performed by: STUDENT IN AN ORGANIZED HEALTH CARE EDUCATION/TRAINING PROGRAM

## 2025-06-06 PROCEDURE — 25010000002 PHENYLEPHRINE 10 MG/ML SOLUTION: Performed by: STUDENT IN AN ORGANIZED HEALTH CARE EDUCATION/TRAINING PROGRAM

## 2025-06-06 PROCEDURE — 85007 BL SMEAR W/DIFF WBC COUNT: CPT | Performed by: INTERNAL MEDICINE

## 2025-06-06 RX ORDER — LIDOCAINE HYDROCHLORIDE 20 MG/ML
INJECTION, SOLUTION INFILTRATION; PERINEURAL
Status: DISCONTINUED | OUTPATIENT
Start: 2025-06-06 | End: 2025-06-06 | Stop reason: HOSPADM

## 2025-06-06 RX ORDER — PHENYLEPHRINE HYDROCHLORIDE 10 MG/ML
INJECTION INTRAVENOUS
Status: DISCONTINUED | OUTPATIENT
Start: 2025-06-06 | End: 2025-06-06 | Stop reason: HOSPADM

## 2025-06-06 RX ORDER — METOPROLOL SUCCINATE 25 MG/1
25 TABLET, EXTENDED RELEASE ORAL
Status: DISCONTINUED | OUTPATIENT
Start: 2025-06-07 | End: 2025-06-07

## 2025-06-06 RX ORDER — FENTANYL CITRATE 50 UG/ML
INJECTION, SOLUTION INTRAMUSCULAR; INTRAVENOUS
Status: DISCONTINUED | OUTPATIENT
Start: 2025-06-06 | End: 2025-06-06 | Stop reason: HOSPADM

## 2025-06-06 RX ORDER — VERAPAMIL HYDROCHLORIDE 2.5 MG/ML
INJECTION INTRAVENOUS
Status: DISCONTINUED | OUTPATIENT
Start: 2025-06-06 | End: 2025-06-06 | Stop reason: HOSPADM

## 2025-06-06 RX ORDER — IOPAMIDOL 755 MG/ML
INJECTION, SOLUTION INTRAVASCULAR
Status: DISCONTINUED | OUTPATIENT
Start: 2025-06-06 | End: 2025-06-06 | Stop reason: HOSPADM

## 2025-06-06 RX ORDER — ACETAMINOPHEN 325 MG/1
650 TABLET ORAL EVERY 4 HOURS PRN
Status: DISCONTINUED | OUTPATIENT
Start: 2025-06-06 | End: 2025-06-16 | Stop reason: HOSPADM

## 2025-06-06 RX ORDER — HEPARIN SODIUM 1000 [USP'U]/ML
INJECTION, SOLUTION INTRAVENOUS; SUBCUTANEOUS
Status: DISCONTINUED | OUTPATIENT
Start: 2025-06-06 | End: 2025-06-06 | Stop reason: HOSPADM

## 2025-06-06 RX ORDER — MIDAZOLAM HYDROCHLORIDE 1 MG/ML
INJECTION, SOLUTION INTRAMUSCULAR; INTRAVENOUS
Status: DISCONTINUED | OUTPATIENT
Start: 2025-06-06 | End: 2025-06-06 | Stop reason: HOSPADM

## 2025-06-06 RX ADMIN — THIAMINE HYDROCHLORIDE 200 MG: 100 INJECTION, SOLUTION INTRAMUSCULAR; INTRAVENOUS at 12:39

## 2025-06-06 RX ADMIN — THIAMINE HYDROCHLORIDE 200 MG: 100 INJECTION, SOLUTION INTRAMUSCULAR; INTRAVENOUS at 05:34

## 2025-06-06 RX ADMIN — THIAMINE HYDROCHLORIDE 200 MG: 100 INJECTION, SOLUTION INTRAMUSCULAR; INTRAVENOUS at 21:10

## 2025-06-06 RX ADMIN — LORAZEPAM 2 MG: 1 TABLET ORAL at 02:58

## 2025-06-06 RX ADMIN — METOPROLOL TARTRATE 25 MG: 25 TABLET, FILM COATED ORAL at 07:41

## 2025-06-06 RX ADMIN — FOLIC ACID 1 MG: 1 TABLET ORAL at 07:42

## 2025-06-06 RX ADMIN — MIDODRINE HYDROCHLORIDE 5 MG: 5 TABLET ORAL at 07:42

## 2025-06-06 RX ADMIN — AMIODARONE HYDROCHLORIDE 1 MG/MIN: 1.8 INJECTION, SOLUTION INTRAVENOUS at 21:10

## 2025-06-06 RX ADMIN — AMIODARONE HYDROCHLORIDE 1 MG/MIN: 1.8 INJECTION, SOLUTION INTRAVENOUS at 14:10

## 2025-06-06 RX ADMIN — SODIUM CHLORIDE 500 ML: 9 INJECTION, SOLUTION INTRAVENOUS at 12:51

## 2025-06-06 RX ADMIN — SODIUM CHLORIDE 125 ML/HR: 9 INJECTION, SOLUTION INTRAVENOUS at 00:56

## 2025-06-06 RX ADMIN — Medication 1 TABLET: at 07:42

## 2025-06-06 RX ADMIN — LORAZEPAM 2 MG: 1 TABLET ORAL at 22:15

## 2025-06-06 RX ADMIN — LORAZEPAM 2 MG: 1 TABLET ORAL at 07:42

## 2025-06-06 RX ADMIN — AMIODARONE HYDROCHLORIDE 150 MG: 1.5 INJECTION, SOLUTION INTRAVENOUS at 12:57

## 2025-06-06 NOTE — PLAN OF CARE
Problem: Adult Inpatient Plan of Care  Goal: Plan of Care Review  Outcome: Progressing  Flowsheets (Taken 6/6/2025 1448)  Progress: improving  Outcome Evaluation: Patient has been confused and impulsive at times. Patient attempted to get out of bed at the beginning of the shift2-3 times. Plan for cath lab today at 1740. 500mL bolus given with amio loading dose. Amidarone gtt infusing. AOx1-2, room air, 2nd degree avb/ SR/ SB/, assist x1 with BRP. No complaints of pain, nausea or SOA. CIWA. Will continue to monitor and assist patient as needed.  Plan of Care Reviewed With: patient  Goal: Patient-Specific Goal (Individualized)  Outcome: Progressing  Goal: Absence of Hospital-Acquired Illness or Injury  Outcome: Progressing  Intervention: Identify and Manage Fall Risk  Recent Flowsheet Documentation  Taken 6/6/2025 1315 by Jose Villagran RN  Safety Promotion/Fall Prevention:   assistive device/personal items within reach   fall prevention program maintained   nonskid shoes/slippers when out of bed   safety round/check completed  Taken 6/6/2025 1200 by Jose Villagran RN  Safety Promotion/Fall Prevention:   assistive device/personal items within reach   fall prevention program maintained   nonskid shoes/slippers when out of bed   safety round/check completed  Taken 6/6/2025 1000 by Jose Villagran RN  Safety Promotion/Fall Prevention:   assistive device/personal items within reach   fall prevention program maintained   nonskid shoes/slippers when out of bed   safety round/check completed  Taken 6/6/2025 0737 by Jose Villagran RN  Safety Promotion/Fall Prevention:   assistive device/personal items within reach   fall prevention program maintained   nonskid shoes/slippers when out of bed   safety round/check completed  Intervention: Prevent Skin Injury  Recent Flowsheet Documentation  Taken 6/6/2025 1315 by Jose Villagran RN  Body Position: supine  Skin Protection:   incontinence pads utilized   transparent  dressing maintained  Taken 6/6/2025 1200 by Jose Villagran RN  Body Position: supine  Taken 6/6/2025 1000 by Jose Villagran RN  Body Position: supine  Taken 6/6/2025 0737 by Jose Villagran RN  Body Position: supine  Skin Protection: incontinence pads utilized  Goal: Optimal Comfort and Wellbeing  Outcome: Progressing  Goal: Readiness for Transition of Care  Outcome: Progressing     Problem: Fall Injury Risk  Goal: Absence of Fall and Fall-Related Injury  Outcome: Progressing  Intervention: Promote Injury-Free Environment  Recent Flowsheet Documentation  Taken 6/6/2025 1315 by Jose Villagran RN  Safety Promotion/Fall Prevention:   assistive device/personal items within reach   fall prevention program maintained   nonskid shoes/slippers when out of bed   safety round/check completed  Taken 6/6/2025 1200 by Jose Villagran RN  Safety Promotion/Fall Prevention:   assistive device/personal items within [facility]   fall prevention program maintained   nonskid shoes/slippers when out of bed   safety round/check completed  Taken 6/6/2025 1000 by Jose Villagran RN  Safety Promotion/Fall Prevention:   assistive device/personal items within [facility]   fall prevention program maintained   nonskid shoes/slippers when out of bed   safety round/check completed  Taken 6/6/2025 0737 by Jose Villagran RN  Safety Promotion/Fall Prevention:   assistive device/personal items within reach   fall prevention program maintained   nonskid shoes/slippers when out of bed   safety round/check completed     Problem: Skin Injury Risk Increased  Goal: Skin Health and Integrity  Outcome: Progressing  Intervention: Optimize Skin Protection  Recent Flowsheet Documentation  Taken 6/6/2025 1315 by Jose Villagran RN  Pressure Reduction Techniques:   frequent weight shift encouraged   weight shift assistance provided  Head of Bed (HOB) Positioning: HOB at 30 degrees  Pressure Reduction Devices: pressure-redistributing mattress  utilized  Skin Protection:   incontinence pads utilized   transparent dressing maintained  Taken 6/6/2025 1200 by Jose Villagran RN  Head of Bed ([facility]) Positioning: HOB at 30 degrees  Taken 6/6/2025 1000 by Jose Villagran RN  Head of Bed ([facility]) Positioning: HOB at 30 degrees  Taken 6/6/2025 0737 by Jose Villagran, RN  Pressure Reduction Techniques:   frequent weight shift encouraged   weight shift assistance provided  Head of Bed ([facility]) Positioning: HOB at 30 degrees  Pressure Reduction Devices: pressure-redistributing mattress utilized  Skin Protection: incontinence pads utilized     Problem: Comorbidity Management  Goal: Maintenance of Behavioral Health Symptom Control  Outcome: Progressing  Goal: Blood Glucose Level Within Target Range  Outcome: Progressing  Goal: Maintenance of Heart Failure Symptom Control  Outcome: Progressing  Goal: Blood Pressure in Desired Range  Outcome: Progressing  Goal: Maintenance of Osteoarthritis Symptom Control  Outcome: Progressing   Goal Outcome Evaluation:  Plan of Care Reviewed With: patient        Progress: improving  Outcome Evaluation: Patient has been confused and impulsive at times. Patient attempted to get out of bed at the beginning of the shift2-3 times. Plan for cath lab today at 1740. 500mL bolus given with amio loading dose. Amidarone gtt infusing. AOx1-2, room air, 2nd degree avb/ SR/ SB/, assist x1 with BRP. No complaints of pain, nausea or SOA. CIWA. Will continue to monitor and assist patient as needed.

## 2025-06-06 NOTE — PROGRESS NOTES
Nephrology Note    Consult received and chart reviewed. Full consult note to follow.       AM labs pending.     Nina Burrell MD   [facility].

## 2025-06-06 NOTE — PROGRESS NOTES
"CC: Syncope/V. tach    Interval History: Blood pressure on the lower side overnight      Vital Signs  Temp:  [97.2 °F (36.2 °C)-97.9 °F (36.6 °C)] 97.5 °F (36.4 °C)  Heart Rate:  [52-93] 81  Resp:  [16-18] 18  BP: ()/(31-92) 107/72    Intake/Output Summary (Last 24 hours) at 6/6/2025 0815  Last data filed at 6/6/2025 0716  Gross per 24 hour   Intake 3010 ml   Output 520 ml   Net 2490 ml     Flowsheet Rows      Flowsheet Row First Filed Value   Admission Height 177.8 cm (70\") Documented at 06/04/2025 2159   Admission Weight 86.2 kg (190 lb) Documented at 06/04/2025 2159            PHYSICAL EXAM:  General: No acute distress  Resp:NL Rate, symmetric chest expansion,unlabored, clear  CV:NL rate and rhythm, NL PMI, NL S1 and S2, no Murmur, no gallop, no rub, No JVD.   ABD:Nl sounds, no masses or tenderness, nondistended, no guarding or rebound  Neuro: alert,cooperative and oriented  Extr:Normal pedal pulses, No edema or cyanosis, moves all extremities      Results Review:    Results from last 7 days   Lab Units 06/05/25  0557   SODIUM mmol/L 138   POTASSIUM mmol/L 5.2   CHLORIDE mmol/L 98   CO2 mmol/L 27.0   BUN mg/dL 24.0*   CREATININE mg/dL 2.14*   GLUCOSE mg/dL 123*   CALCIUM mg/dL 8.7     Results from last 7 days   Lab Units 06/04/25  2334 06/04/25  2213   HSTROP T ng/L 51* 50*     Results from last 7 days   Lab Units 06/06/25  0649   WBC 10*3/mm3 4.06   HEMOGLOBIN g/dL 9.4*   HEMATOCRIT % 27.2*   PLATELETS 10*3/mm3 93*     Results from last 7 days   Lab Units 06/04/25  2306   INR  1.24*   APTT seconds 23.7         Results from last 7 days   Lab Units 06/04/25  2213   MAGNESIUM mg/dL 1.6         I reviewed the patient's new clinical results.  I personally viewed and interpreted the patient's EKG/Telemetry data        Medication Review:   Meds reviewed    sodium chloride, 125 mL/hr, Last Rate: 125 mL/hr (06/06/25 0056)    Assessment and Plan:     A syncopal spell-patient does not remember about having prodromal " symptoms.  EMS report nonsustained VT.  EKG and telemetry reviewed showing frequent polymorphic PVCs as singlets, couplets, triplets and some quadruplets , no sustained VT.  Echocardiogram shows mild global left ventricular hypokinesis.  Elevated troponin without significant delta and ischemic changes.  Patient denies having any chest pain.  Essential hypertension  Bilateral DVT and PE in 2020-thought to be provoked.  He has been on Eliquis 2.5 mg p.o. twice daily  Acute on chronic chronic kidney disease-sees nephrology  Essential hypertension-was hypotensive on admission-and hypertensive on hold.  Home medications include Lasix 40 mg p.o. daily, atorvastatin, aspirin, apixaban, lisinopril     An 18 beat monomorphic VT at around send this morning.  Asymptomatic.  Overall PVC burden decreased on metoprolol but borderline low blood pressure overnight.  Started on midodrine-  Decreased metoprolol and discontinued midodrine.  I will start him on amiodarone.  Creatinine improving -Dr. Indra adkins with proceeding with coronary angiogram.  Coronary angiogram today.  Resume anticoagulation after cath    Rayshawn Navarro MD  06/06/25  08:15 EDT

## 2025-06-06 NOTE — PROGRESS NOTES
Name: Bryan Landers ADMIT: 2025   : 1945  PCP: Jose Fonseca MD    MRN: [id number] LOS: 1 days   AGE/SEX: 80 y.o. male  ROOM: [facility]/NONE     Subjective   Subjective   Patient is lying on the bed and does not appear to any major distress.  He is confused.  Denies any nausea, vomiting abdominal pain, chest pain.       Objective   Objective   Vital Signs  Temp:  [97.3 °F (36.3 °C)-97.9 °F (36.6 °C)] 97.5 °F (36.4 °C)  Heart Rate:  [52-93] 81  Resp:  [16-18] 18  BP: ()/(54-76) 107/72  SpO2:  [94 %-100 %] 97 %  on   ;   Device (Oxygen Therapy): room air  Body mass index is 34.73 kg/m².  Physical Exam  HEENT: PERRLA, extraocular movements intact, Scleras no icterus  Neck: Supple, no JVD  Cardiovascular: Regular rate and rhythm with normal S1 and S2  Respiratory: Fairly clear to auscultation anteriorly with no wheezes  GI: Soft, nontender, bowel sounds are present  Extremities: No edema, palpable pedal pulses  Neurologic: Grossly nonfocal, no facial asymmetry    Results Review     I reviewed the patient's new clinical results.  Results from last 7 days   Lab Units 25  0649 25  0557 25  2213   WBC 10*3/mm3 4.06 6.07 5.73   HEMOGLOBIN g/dL 9.4* 10.3* 11.0*   PLATELETS 10*3/mm3 93* 137* 167     Results from last 7 days   Lab Units 25  0910 25  0557 25  2213   SODIUM mmol/L 137 138 138   POTASSIUM mmol/L 4.0 5.2 4.4   CHLORIDE mmol/L 103 98 99   CO2 mmol/L 26.0 27.0 27.2   BUN mg/dL 21.0 24.0* 25.0*   CREATININE mg/dL 1.75* 2.14* 2.27*   GLUCOSE mg/dL 145* 123* 111*   EGFR mL/min/1.73 38.6* 30.3* 28.3*     Results from last 7 days   Lab Units 25  0910 25  2213   ALBUMIN g/dL 2.6* 3.2*   BILIRUBIN mg/dL 0.5 0.4   ALK PHOS U/L 97 105   AST (SGOT) U/L 38 32   ALT (SGPT) U/L 19 20     Results from last 7 days   Lab Units 25  0910 25  0557 25  2213   CALCIUM mg/dL 8.6 8.7 9.3   ALBUMIN g/dL 2.6*  --  3.2*   MAGNESIUM mg/dL 2.3  --  1.6  "      No results found for: \"HGBA1C\", \"POCGLU\"    XR Chest 1 View  Result Date: 6/4/2025   Allowing for submaximal inspiratory result, heart size appears within normal limits.  There is a submaximal inspiratory result. Allowing for that, there is no convincing evidence of infiltrate, effusion or pneumothorax.    This report was finalized on 6/4/2025 11:38 PM by Dr. Caden Ballard M.D on Workstation: [id number]      CT Head Without Contrast  Result Date: 6/4/2025   Negative unenhanced head CT, no acute abnormality.  Negative cervical spine CT, degenerative change without acute abnormality.    This report was finalized on 6/4/2025 11:36 PM by Dr. Caden Ballard M.D on Workstation: [id number]      CT Cervical Spine Without Contrast  Result Date: 6/4/2025   Negative unenhanced head CT, no acute abnormality.  Negative cervical spine CT, degenerative change without acute abnormality.    This report was finalized on 6/4/2025 11:36 PM by Dr. Caden Ballard M.D on Workstation: [id number]        I have personally reviewed all medications:  Scheduled Medications  folic acid, 1 mg, Oral, Daily  [START ON 6/7/2025] metoprolol succinate XL, 25 mg, Oral, Q24H  multivitamin with minerals, 1 tablet, Oral, Daily  thiamine (B-1) IV, 200 mg, Intravenous, Q8H   Followed by  [START ON 6/10/2025] thiamine, 100 mg, Oral, Daily    Infusions  amiodarone, 1 mg/min, Last Rate: 1 mg/min (06/06/25 1410)  sodium chloride, 125 mL/hr, Last Rate: 125 mL/hr (06/06/25 0056)    Diet  NPO Diet NPO Type: Strict NPO    I have personally reviewed:  [x]  Laboratory   [x]  Microbiology   [x]  Radiology   [x]  EKG/Telemetry  [x]  Cardiology/Vascular   []  Pathology    []  Records       Assessment/Plan     Active Hospital Problems    Diagnosis  POA    **Syncope [R55]  Yes    Alcohol use disorder [F10.90]  Yes    Acquired hypothyroidism [E03.9]  Yes    Cognitive decline [R41.89]  Yes    Stage 3b chronic kidney disease [N18.32]  Yes    Essential " hypertension [I10]  Yes      Resolved Hospital Problems   No resolved problems to display.         1. Syncope/fall/orthostasis, CT brain and CT cervical spine with no acute findings.  Carotid Dopplers with no significant stenosis.  2D echo revealed global and mild LV dysfunction.    2.  Alcohol use, unclear if he still drinks on a regular basis however will be on a CIWA protocol and monitor for withdrawals.    3.  Acute/CKD stage 3B, baseline creatinine is around 1.4-1.5 and upon admission creatinine was 2.27 and improved to 1.75.  Continue with fluids and lisinopril has continue fluids and hold lisinopril.  Nephrology is following along.    4.  Hypertension, currently soft low blood pressure therefore lisinopril is on hold.    5.  Hyperlipidemia, statins.    6.  Gout, on allopurinol.    7.  Hypothyroidism, will continue with Synthroid.    8.  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    9.  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  Cardiology did evaluate and initiating amiodarone and is discontinuing midodrine and metoprolol dose is being cut back.  Due for heart catheter today.    10.  CODE STATUS is full code.      Expected Discharge Date: 6/9/2025; Expected Discharge Time:       Feliberto Rowe MD  [facility]  06/06/25  15:42 EDT

## 2025-06-06 NOTE — PLAN OF CARE
Goal Outcome Evaluation:  Plan of Care Reviewed With: patient           Outcome Evaluation: Pt. is an 80 year old Male admitted to the hospital with syncope and a fall.  Pt./family reports that he uses a Rwx for ambulation at home.  Pt. currently presents with decreased ROM, decreased strength, and decreased tolerance to functional activity.  This PM, pt. performs bed ther. ex. program only per nursing request due to HR issues and pt. receiving a Bolus infusion.  Pt. performed BUE/LE ther. ex. program x 10 reps for general strengthening.  Encouraged pt. to continue these ther. ex. on his own throughout the day as well. Pt. will benefit from skilled inpt. P.T. to address his functional deficits and to assist pt. in regaining his maximum level of indepedence with functional mobility.    Anticipated Discharge Disposition (PT): home with assist, home with home health, skilled nursing facility (All pending pt. progress)

## 2025-06-06 NOTE — DISCHARGE INSTRUCTIONS
[facility]  [address] [place] [address]    Coronary Angiogram (Radial/Ulnar Approach) After Care    Refer to this sheet in the next few weeks. These instructions provide you with information on caring for yourself after your procedure. Your caregiver may also give you more specific instructions. Your treatment has been planned according to current medical practices, but problems sometimes occur. Call your caregiver if you have any problems or questions after your procedure.    Home Care Instructions:  You may shower the day after the procedure. Remove the bandage (dressing) and gently wash the site with plain soap and water. Gently pat the site dry. You may apply a band aid daily for 2 days if desired.    Do not apply powder or lotion to the site.  Do not submerge the affected site in water for 3 to 5 days or until the site is completely healed.   Do not lift, push or pull anything over 5 pounds for 5 days after your procedure or as directed by your physician.  As a reference, a gallon of milk weighs 8 pounds.   Inspect the site at least twice daily. You may notice some bruising at the site and it may be tender for 1 to 2 weeks.     Increase your fluid intake for the next 2 days.    Keep arm elevated for 24 hours. For the remainder of the day, keep your arm in “Pledge of Allegiance” position when up and about.     You may drive 24 hours after the procedure unless otherwise instructed by your caregiver.  Do not operate machinery or power tools for 24 hours.  A responsible adult should be with you for the first 24 hours after you arrive home. Do not make any important legal decisions or sign legal papers for 24 hours.  Do not drink alcohol for 24 hours.    Metformin or any medications containing Metformin should not be taken for 48 hours after your procedure.      Call Your Doctor if:   You have unusual pain at the radial/ulnar (wrist) site.  You have redness, warmth, swelling, or pain at the  radial/ulnar (wrist) site.  You have drainage (other than a small amount of blood on the dressing).  `You have chills or a fever > 101.  Your arm becomes pale or dark, cool, tingly, or numb.  You develop chest pain, shortness of breath, feel faint or pass out.    You have heavy bleeding from the site, hold pressure on the site for 20 minutes.  If the bleeding stops, apply a fresh bandage and call your cardiologist.  However, if you        continue to have bleeding, call 911 and continue to apply pressure to the site.   You have any symptoms of a stroke.  Remember BE FAST  B-balance. Sudden trouble walking or loss of balance.  E-eyes.  Sudden changes in how you see or a sudden onset of a very bad headache.   F-face. Sudden weakness or loss of feeling of the face or facial droop on one side.   A-arms Sudden weakness or numbness in one arm.  One arm drifts down if they are both held out in front of you. This happens suddenly and usually on one side of the body.   S-speech.  Sudden trouble speaking, slurred speech or trouble understanding what are saying.   T-time  Time to call emergency services.  Write down the symptoms and the time they started.

## 2025-06-06 NOTE — CONSULTS
Patient Care Team:  Jose Fonseca MD as PCP - General (Family Medicine)  Jose Michelle MD as Consulting Physician (Radiation Oncology)  Roldan Mccarthy MD as Consulting Physician (Urology)  Clarence Nieves MD (Hematology)  Grover Harris DPM as Consulting Physician (Podiatry)  Skip Wall MD PhD as Consulting Physician (Thoracic Surgery)    Chief complaint: JEFFREY/CKD3    Inpatient Nephrology Consult  Consult performed by: Nina Burrell MD  Consult ordered by: Evelin Michael MD          History of Present Illness  Patient is a 81 yo male with CKD3 followed by Dr TIEN Fabian who presented yesterday with with fall and syncope. Per notes, it seems he had been complaining of dizziness.   Most of the history is obtained by chart review. He has history of dementia and is unable to provide accurate information: he is not sure why he is here and cannot remember if he sees a nephrologist.     On evaluation, he was found to have JEFFREY with Cr of 2.2 his baseline is around 1.3-1.5   He was started on IVF and ace-I held.   Cardiology has evaluated the patient, it seems patient has been having non sustain VT and ECHO was abnormal. Therefore, plans for cardiac cath today     Review of Systems   Limited, patient does not remember why he is here.   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/13/2018    History of transfusion     Pneumothorax 02/27/2024    Poor historian     Short-term memory loss     Vitamin D deficiency    ,   Past Surgical History:   Procedure Laterality Date    COLONOSCOPY  09/2016    COLONOSCOPY N/A 9/28/2018    Procedure: COLONOSCOPY;  Surgeon: Olaf Gomez MD;  Location: [facility] OR;  Service:  Gastroenterology    COLONOSCOPY N/A 2019    Procedure: COLONOSCOPY;  Surgeon: Rosa Jack MD;  Location: [facility] OR;  Service: Gastroenterology    CYSTOSCOPY BLADDER BIOPSY N/A 2021    Procedure: CYSTOSCOPY BLADDER BIOPSY;  Surgeon: Roldan Mccarthy MD;  Location: [facility] MAIN OR;  Service: Urology;  Laterality: N/A;    HERNIA REPAIR Bilateral     PROSTATE ULTRASOUND BIOPSY      SIGMOIDOSCOPY N/A 10/2/2018    Procedure: FLEXIBLE SIGMOIDOSCOPY:  APC cautery bleeding using 60 joules;  Surgeon: Olaf Gomez MD;  Location: [facility] ENDOSCOPY;  Service: Gastroenterology    TONSILLECTOMY      TOTAL HIP ARTHROPLASTY Bilateral    ,   Family History   Problem Relation Age of Onset    Stroke Mother     Stroke Father     No Known Problems Brother     Colon cancer Neg Hx     Colon polyps Neg Hx     Malig Hyperthermia Neg Hx    ,   Social History     Socioeconomic History    Marital status:      Spouse name: Chloe    Number of children: 2   Tobacco Use    Smoking status: Former     Current packs/day: 0.00     Average packs/day: 0.3 packs/day for 5.0 years (1.3 ttl pk-yrs)     Types: Cigars, Cigarettes     Start date: 1981     Quit date: 1986     Years since quittin.4     Passive exposure: Never    Smokeless tobacco: Never   Vaping Use    Vaping status: Never Used   Substance and Sexual Activity    Alcohol use: Yes     Alcohol/week: 32.0 - 34.0 standard drinks of alcohol     Types: 4 - 6 Shots of liquor, 28 Standard drinks or equivalent per week     Comment: 2-3 double vodkas a night    Drug use: No    Sexual activity: Yes     Partners: Female     Birth control/protection: Post-menopausal     E-cigarette/Vaping    E-cigarette/Vaping Use Never User     Passive Exposure No     Counseling Given No      E-cigarette/Vaping Substances    Nicotine No     THC No     CBD No     Flavoring No      E-cigarette/Vaping Devices    Disposable No     Pre-filled or Refillable Cartridge No      Refillable Tank No     Pre-filled Pod No          ,   Medications Prior to Admission   Medication Sig Dispense Refill Last Dose/Taking    allopurinol (ZYLOPRIM) 100 MG tablet Take 1 tablet by mouth Daily. Indications: Gout 90 tablet 3 6/4/2025    apixaban (Eliquis) 2.5 MG tablet tablet Take 1 tablet by mouth Every 12 (Twelve) Hours. 60 tablet 11 6/4/2025    aspirin 81 MG chewable tablet Chew 1 tablet Daily. Indications: Blood Clot Within a Vein, DVTs AND PE   6/4/2025    atorvastatin (LIPITOR) 80 MG tablet Take 1 tablet by mouth Daily. Indications: High Amount of Fats in the Blood 90 tablet 3 6/4/2025    cholecalciferol (VITAMIN D3) 1000 UNITS tablet Take 1 tablet by mouth Daily. Indications: Vitamin D Deficiency   6/4/2025    Cyanocobalamin (VITAMIN B 12 PO) Take 1,000 mg by mouth Every Morning. Indications: SUPPLEMENT   6/4/2025    furosemide (LASIX) 40 MG tablet Take 1 tablet by mouth Daily. Indications: Edema 90 tablet 3 6/4/2025    levothyroxine (SYNTHROID, LEVOTHROID) 75 MCG tablet TAKE 1 TABLET BY MOUTH EVERY MORNING INDICATIONS: UNDER ACTIVE THYROID 90 tablet 3 6/4/2025    lisinopril (PRINIVIL,ZESTRIL) 2.5 MG tablet Take 1 tablet by mouth Daily. Indications: High Blood Pressure 90 tablet 4 6/4/2025    memantine (NAMENDA) 10 MG tablet TAKE 2 TABLETS BY MOUTH EVERY MORNING 180 tablet 3 6/4/2025    Multiple Vitamins-Minerals (MULTIVITAL PLATINUM PO) Take 1 tablet by mouth Daily. HOLD FOR SURGERY  Indications: SUPPLEMENT   6/4/2025    Orgovyx 120 MG tablet tablet Take 1 tablet by mouth Daily.   6/4/2025    potassium chloride 10 MEQ CR tablet Take 1 tablet by mouth Daily With Breakfast. Indications: Low Amount of Potassium in the Blood, take with Lasix 90 tablet 3 6/4/2025    Xtandi 40 MG tablet tablet Take 4 tablets by mouth Daily. 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 Indications: Spasm of Lung Air Passages (Patient not taking: Reported on 3/20/2025) 18 g 0    , Scheduled Meds:  folic acid, 1 mg, Oral, Daily  [START ON 6/7/2025] metoprolol succinate XL, 25 mg, Oral, Q24H  multivitamin with minerals, 1 tablet, Oral, Daily  thiamine (B-1) IV, 200 mg, Intravenous, Q8H   Followed by  [START ON 6/10/2025] thiamine, 100 mg, Oral, Daily   , Continuous Infusions:  amiodarone, 1 mg/min, Last Rate: 1 mg/min (06/06/25 1410)  sodium chloride, 125 mL/hr, Last Rate: 125 mL/hr (06/06/25 0056)   , PRN Meds:    acetaminophen    senna-docusate sodium **AND** polyethylene glycol **AND** bisacodyl **AND** bisacodyl    cloNIDine    LORazepam **OR** LORazepam **OR** LORazepam **OR** LORazepam **OR** LORazepam **OR** LORazepam    Magnesium Standard Dose Replacement - Follow Nurse / BPA Driven Protocol    nitroglycerin    ondansetron ODT **OR** ondansetron, and Allergies:  Nsaids and Aricept [donepezil]    Objective     Vital Signs  Temp:  [97.3 °F (36.3 °C)-97.9 °F (36.6 °C)] 97.5 °F (36.4 °C)  Heart Rate:  [52-93] 81  Resp:  [16-18] 18  BP: ()/(54-76) 107/72    I/O this shift:  In: -   Out: 200 [Urine:200]  I/O last 3 completed shifts:  In: 4150 [P.O.:210; I.V.:2940; IV Piggyback:1000]  Out: 570 [Urine:570]    Physical Exam  Physical Examination: General appearance - alert, well appearing, and in no distress  Chest - clear to auscultation, no wheezes, rales or rhonchi, symmetric air entry  Heart - normal rate, regular rhythm, normal S1, S2, no murmurs, rubs, clicks or gallops  Abdomen - soft, nontender, nondistended, no masses or organomegaly  Neurological - alert, oriented, normal speech, no focal findings or movement disorder noted  Extremities - peripheral pulses normal, no pedal edema, no clubbing or cyanosis    Results Review:    I reviewed the patient's new clinical results.    WBC WBC   Date Value Ref Range Status   06/06/2025 4.06 3.40 -  "10.80 10*3/mm3 Final   06/05/2025 6.07 3.40 - 10.80 10*3/mm3 Final   06/04/2025 5.73 3.40 - 10.80 10*3/mm3 Final      HGB Hemoglobin   Date Value Ref Range Status   06/06/2025 9.4 (L) 13.0 - 17.7 g/dL Final   06/05/2025 10.3 (L) 13.0 - 17.7 g/dL Final   06/04/2025 11.0 (L) 13.0 - 17.7 g/dL Final      HCT Hematocrit   Date Value Ref Range Status   06/06/2025 27.2 (L) 37.5 - 51.0 % Final   06/05/2025 29.1 (L) 37.5 - 51.0 % Final   06/04/2025 31.0 (L) 37.5 - 51.0 % Final      Platlets No results found for: \"LABPLAT\"   MCV MCV   Date Value Ref Range Status   06/06/2025 105.0 (H) 79.0 - 97.0 fL Final   06/05/2025 101.4 (H) 79.0 - 97.0 fL Final   06/04/2025 103.3 (H) 79.0 - 97.0 fL Final          Sodium Sodium   Date Value Ref Range Status   06/06/2025 137 136 - 145 mmol/L Final   06/05/2025 138 136 - 145 mmol/L Final   06/04/2025 138 136 - 145 mmol/L Final      Potassium Potassium   Date Value Ref Range Status   06/06/2025 4.0 3.5 - 5.2 mmol/L Final   06/05/2025 5.2 3.5 - 5.2 mmol/L Final     Comment:     Slight hemolysis detected by analyzer. Result may be falsely elevated.   06/04/2025 4.4 3.5 - 5.2 mmol/L Final      Chloride Chloride   Date Value Ref Range Status   06/06/2025 103 98 - 107 mmol/L Final   06/05/2025 98 98 - 107 mmol/L Final   06/04/2025 99 98 - 107 mmol/L Final      CO2 CO2   Date Value Ref Range Status   06/06/2025 26.0 22.0 - 29.0 mmol/L Final   06/05/2025 27.0 22.0 - 29.0 mmol/L Final   06/04/2025 27.2 22.0 - 29.0 mmol/L Final      BUN BUN   Date Value Ref Range Status   06/06/2025 21.0 8.0 - 23.0 mg/dL Final   06/05/2025 24.0 (H) 8.0 - 23.0 mg/dL Final   06/04/2025 25.0 (H) 8.0 - 23.0 mg/dL Final      Creatinine Creatinine   Date Value Ref Range Status   06/06/2025 1.75 (H) 0.76 - 1.27 mg/dL Final   06/05/2025 2.14 (H) 0.76 - 1.27 mg/dL Final   06/04/2025 2.27 (H) 0.76 - 1.27 mg/dL Final      Calcium Calcium   Date Value Ref Range Status   06/06/2025 8.6 8.6 - 10.5 mg/dL Final   06/05/2025 8.7 8.6 - " "10.5 mg/dL Final   06/04/2025 9.3 8.6 - 10.5 mg/dL Final      PO4 No results found for: \"CAPO4\"   Albumin Albumin   Date Value Ref Range Status   06/06/2025 2.6 (L) 3.5 - 5.2 g/dL Final   06/04/2025 3.2 (L) 3.5 - 5.2 g/dL Final      Magnesium Magnesium   Date Value Ref Range Status   06/06/2025 2.3 1.6 - 2.4 mg/dL Final   06/04/2025 1.6 1.6 - 2.4 mg/dL Final      Uric Acid No results found for: \"URICACID\"         Assessment & Plan       Syncope    Essential hypertension    Stage 3b chronic kidney disease    Cognitive decline    Acquired hypothyroidism    Alcohol use disorder      Assessment & Plan  JEFFREY  CKD3  Syncope   Non sustain VT   HTN     Renal function improved with IVF and holding diuretics and ace-I   Ok to proceed with cardiac cath for further evaluation  Can continue IVf for 2 hours after cardiac cath and then stop.   Check labs in am   Monitor volume and respiratory status.   Will follow   Thanks for referral         Nina Burrell MD  06/06/25  16:01 EDT            " Opt out

## 2025-06-06 NOTE — THERAPY EVALUATION
Patient Name: Bryan Landers  : 1945    MRN: [id number]                              Today's Date: 2025       Admit Date: 2025    Visit Dx:     ICD-10-CM ICD-9-CM   1. Acute renal failure superimposed on chronic kidney disease, unspecified acute renal failure type, unspecified CKD stage  N17.9 584.9    N18.9 585.9   2. Syncope, unspecified syncope type  R55 780.2   3. Multifocal PVCs  I49.3 427.69   4. Closed head injury, initial encounter  S09.90XA 959.01   5. Anticoagulated  Z79.01 V58.61   6. Anemia, unspecified type  D64.9 285.9   7. Hyperglycemia  R73.9 790.29   8. Elevated troponin  R79.89 790.6     Patient Active Problem List   Diagnosis    Essential hypertension    Mixed hyperlipidemia    Arthritis    Type 2 diabetes mellitus with chronic kidney disease, without long-term current use of insulin    Severely overweight    Idiopathic chronic gout of left knee    Medication monitoring encounter    Microalbuminuria due to type 2 diabetes mellitus    History of prostate cancer    Stage 3b chronic kidney disease    Medicare annual wellness visit, subsequent    Radiation proctitis    Cognitive decline    Acquired hypothyroidism    B12 deficiency    Sinus tachycardia by electrocardiogram    Chronic pulmonary embolism without acute cor pulmonale    Chronic deep vein thrombosis (DVT) of popliteal vein of both lower extremities    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    Bladder mass    Dependent edema    Anemia of chronic kidney failure, stage 3 (moderate)    Deep venous thrombosis    Class 2 severe obesity with serious comorbidity in adult    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    Syncope    Alcohol use disorder     Past Medical History:   Diagnosis Date    At risk for sleep apnea     Bladder mass     Bruises easily     Diabetes mellitus     Disease of thyroid gland     Elevated cholesterol     GI bleed     Gross hematuria 2021    History of hip replacement, total, bilateral 2018     History of transfusion     Pneumothorax 02/27/2024    Poor historian     Short-term memory loss     Vitamin D deficiency      Past Surgical History:   Procedure Laterality Date    COLONOSCOPY  09/2016    COLONOSCOPY N/A 9/28/2018    Procedure: COLONOSCOPY;  Surgeon: Olaf Gomez MD;  Location: [facility] OR;  Service: Gastroenterology    COLONOSCOPY N/A 1/7/2019    Procedure: COLONOSCOPY;  Surgeon: Rosa Jack MD;  Location: [facility] OR;  Service: Gastroenterology    CYSTOSCOPY BLADDER BIOPSY N/A 9/22/2021    Procedure: CYSTOSCOPY BLADDER BIOPSY;  Surgeon: Roldan Mccarthy MD;  Location: [facility] MAIN OR;  Service: Urology;  Laterality: N/A;    HERNIA REPAIR Bilateral     PROSTATE ULTRASOUND BIOPSY      SIGMOIDOSCOPY N/A 10/2/2018    Procedure: FLEXIBLE SIGMOIDOSCOPY:  APC cautery bleeding using 60 joules;  Surgeon: Olaf Gomez MD;  Location: [facility] ENDOSCOPY;  Service: Gastroenterology    TONSILLECTOMY      TOTAL HIP ARTHROPLASTY Bilateral 2007      General Information       Row Name 06/06/25 1529          Physical Therapy Time and Intention    Document Type evaluation  -MS     Mode of Treatment physical therapy;individual therapy  -MS       Row Name 06/06/25 1529          General Information    Patient Profile Reviewed yes  -MS     Prior Level of Function --  Use of Rwx for ambulation prior to admission per pt. report  -MS     Existing Precautions/Restrictions fall   Exit alarm  -MS     Barriers to Rehab none identified  -MS       Row Name 06/06/25 1529          Cognition    Orientation Status (Cognition) oriented to;person;place  -MS               User Key  (r) = Recorded By, (t) = Taken By, (c) = Cosigned By      Initials Name Provider Type    MS Iglesias Hemanth LUNA, PT Physical Therapist                   Mobility       Row Name 06/06/25 1529          Bed Mobility    Comment, (Bed Mobility) Bed Evaluation and ther. ex. only per nursing staff due to pt.'s H.R. issues;  Receiving  Bolus at this time.   -MS               User Key  (r) = Recorded By, (t) = Taken By, (c) = Cosigned By      Initials Name Provider Type    MS Kelsie Hemanth CARRASCO, PT Physical Therapist                   Obj/Interventions       Row Name 06/06/25 1530          Range of Motion Comprehensive    Comment, General Range of Motion R Shld (Imp. 50%);  L Shld (Imp. 75%);  BLE (WFL's)  -MS       Row Name 06/06/25 1530          Strength Comprehensive (MMT)    Comment, General Manual Muscle Testing (MMT) Assessment R Shld (3-/5);  L Shld (2+/5); BLE (3+/5)  -MS       Row Name 06/06/25 1530          Motor Skills    Therapeutic Exercise --  BUE (AAROM) and BLE (AROM) ther. ex. program x 10 reps completed for general strengthening.  (Ankle pumps, Heel Slides, Hip Abduction, SLR's, Shld Flexion)  -MS               User Key  (r) = Recorded By, (t) = Taken By, (c) = Cosigned By      Initials Name Provider Type    MS Iglesias Hemanth CARRASCO, PT Physical Therapist                   Goals/Plan       Row Name 06/06/25 1531          Bed Mobility Goal 1 (PT)    Activity/Assistive Device (Bed Mobility Goal 1, PT) bed mobility activities, all  -MS     Wayne Level/Cues Needed (Bed Mobility Goal 1, PT) contact guard required  -MS     Time Frame (Bed Mobility Goal 1, PT) long term goal (LTG);1 week  -MS       Row Name 06/06/25 1531          Transfer Goal 1 (PT)    Activity/Assistive Device (Transfer Goal 1, PT) transfers, all;walker, rolling  -MS     Wayne Level/Cues Needed (Transfer Goal 1, PT) contact guard required  -MS     Time Frame (Transfer Goal 1, PT) long term goal (LTG);1 week  -MS       Row Name 06/06/25 1531          Gait Training Goal 1 (PT)    Activity/Assistive Device (Gait Training Goal 1, PT) gait (walking locomotion);walker, rolling  -MS     Wayne Level (Gait Training Goal 1, PT) contact guard required  -MS     Distance (Gait Training Goal 1, PT) 50 feet  -MS     Time Frame (Gait Training Goal 1, PT) long term goal  (LTG);1 week  -MS       Row Name 06/06/25 1531          Therapy Assessment/Plan (PT)    Planned Therapy Interventions (PT) balance training;bed mobility training;gait training;home exercise program;patient/family education;postural re-education;transfer training;strengthening  -MS               User Key  (r) = Recorded By, (t) = Taken By, (c) = Cosigned By      Initials Name Provider Type    Hemanth Sharp, PT Physical Therapist                   Clinical Impression       Row Name 06/06/25 1531          Pain    Pretreatment Pain Rating 0/10 - no pain  -MS     Posttreatment Pain Rating 0/10 - no pain  -MS       Row Name 06/06/25 1531          Plan of Care Review    Plan of Care Reviewed With patient  -MS       Row Name 06/06/25 1531          Therapy Assessment/Plan (PT)    Rehab Potential (PT) good  -MS     Criteria for Skilled Interventions Met (PT) skilled treatment is necessary  -MS     Therapy Frequency (PT) 6 times/wk  -MS       Row Name 06/06/25 1531          Positioning and Restraints    Pre-Treatment Position in bed  -MS     Post Treatment Position bed  -MS     In Bed notified nsg;supine;call light within reach;encouraged to call for assist;exit alarm on  All lines intact.  -MS               User Key  (r) = Recorded By, (t) = Taken By, (c) = Cosigned By      Initials Name Provider Type    Hemanth Sharp, PT Physical Therapist                   Outcome Measures       Row Name 06/06/25 1532 06/06/25 0737       How much help from another person do you currently need...    Turning from your back to your side while in flat bed without using bedrails? 3  -MS 2  -MSA    Moving from lying on back to sitting on the side of a flat bed without bedrails? 3  -MS 2  -MSA    Moving to and from a bed to a chair (including a wheelchair)? 3  -MS 2  -MSA    Standing up from a chair using your arms (e.g., wheelchair, bedside chair)? 3  -MS 2  -MSA    Climbing 3-5 steps with a railing? 2  -MS 2  -MSA    To walk in  hospital room? 3  -MS 2  -MSA    AM-PAC 6 Clicks Score (PT) 17  -MS 12  -MSA    Highest Level of Mobility Goal Stand (1 or More Minutes)-5  -MS Move to Chair/Commode-4  -MSA      Row Name 06/06/25 1532          Functional Assessment    Outcome Measure Options AM-PAC 6 Clicks Basic Mobility (PT)  -MS               User Key  (r) = Recorded By, (t) = Taken By, (c) = Cosigned By      Initials Name Provider Type    Jose Aguilar, RN Registered Nurse    Hemanth Sharp, PT Physical Therapist                                 Physical Therapy Education       Title: PT OT SLP Therapies (In Progress)       Topic: Physical Therapy (In Progress)       Point: Mobility training (Done)       Learning Progress Summary            Patient Acceptance, E,D, VU,NR by MS at 6/6/2025 1532                      Point: Home exercise program (Done)       Learning Progress Summary            Patient Acceptance, E,D, VU,NR by MS at 6/6/2025 1532                      Point: Body mechanics (Not Started)       Learner Progress:  Not documented in this visit.              Point: Precautions (Not Started)       Learner Progress:  Not documented in this visit.                              User Key       Initials Effective Dates Name Provider Type Discipline    MS 06/16/21 -  Hemanth Iglesias PT Physical Therapist PT                  PT Recommendation and Plan  Planned Therapy Interventions (PT): balance training, bed mobility training, gait training, home exercise program, patient/family education, postural re-education, transfer training, strengthening  Outcome Evaluation: Pt. is an 80 year old Male admitted to the hospital with syncope and a fall.  Pt./family reports that he uses a Rwx for ambulation at home.  Pt. currently presents with decreased ROM, decreased strength, and decreased tolerance to functional activity.  This PM, pt. performs bed ther. ex. program only per nursing request due to HR issues and pt. receiving a Bolus  infusion.  Pt. performed BUE/LE ther. ex. program x 10 reps for general strengthening.  Encouraged pt. to continue these ther. ex. on his own throughout the day as well. Pt. will benefit from skilled inpt. P.T. to address his functional deficits and to assist pt. in regaining his maximum level of indepedence with functional mobility.     Time Calculation:         PT Charges       Row Name 06/06/25 1536             Time Calculation    Start Time 1410  -MS      Stop Time 1424  -MS      Time Calculation (min) 14 min  -MS      PT Received On 06/06/25  -MS      PT - Next Appointment 06/07/25  -MS      PT Goal Re-Cert Due Date 06/13/25  -MS         Time Calculation- PT    Total Timed Code Minutes- PT 14 minute(s)  -MS                User Key  (r) = Recorded By, (t) = Taken By, (c) = Cosigned By      Initials Name Provider Type    Hemanth Sharp, PT Physical Therapist                  Therapy Charges for Today       Code Description Service Date Service Provider Modifiers Qty    17561916390 HC PT EVAL MOD COMPLEXITY 2 6/6/2025 Hemanth Iglesias, PT GP 1    [id number]  PT THER PROC EA 15 MIN 6/6/2025 Hemanth Iglesias, PT GP 1            PT G-Codes  Outcome Measure Options: AM-PAC 6 Clicks Basic Mobility (PT)  AM-PAC 6 Clicks Score (PT): 17  AM-PAC 6 Clicks Score (OT): 18  Modified Wetzel Scale: 1 - No significant disability despite symptoms.  Able to carry out all usual duties and activities.  PT Discharge Summary  Anticipated Discharge Disposition (PT): home with assist, home with home health, skilled nursing facility (Pending pt. progress)    Hemanth Iglesias, PT  6/6/2025

## 2025-06-06 NOTE — PLAN OF CARE
Goal Outcome Evaluation:   Patient alert confused follows simple commands forgetful impulsive at times. Ciwa score elevated at times prn ativan given. No c/o pan or nausea noted. No acute distress noted incont care and skin care provided. Will continue to monitor

## 2025-06-06 NOTE — NURSING NOTE
CTU called nurse regarding rhythm change heart rate in the 30's to 50's, nurse ordered EKG stat and EKG was completed. Nurse called cardiology regarding change in rhythm and change in EKG results. Dr riya johnson called back and made him aware of the changes. No new orders at this time will continue to monitor

## 2025-06-07 LAB
ALBUMIN SERPL-MCNC: 2.8 G/DL (ref 3.5–5.2)
ALBUMIN/GLOB SERPL: 1 G/DL
ALP SERPL-CCNC: 95 U/L (ref 39–117)
ALT SERPL W P-5'-P-CCNC: 22 U/L (ref 1–41)
ANION GAP SERPL CALCULATED.3IONS-SCNC: 10.6 MMOL/L (ref 5–15)
AST SERPL-CCNC: 45 U/L (ref 1–40)
BASOPHILS # BLD AUTO: 0.02 10*3/MM3 (ref 0–0.2)
BASOPHILS NFR BLD AUTO: 0.6 % (ref 0–1.5)
BILIRUB SERPL-MCNC: 0.3 MG/DL (ref 0–1.2)
BUN SERPL-MCNC: 19 MG/DL (ref 8–23)
BUN/CREAT SERPL: 11.9 (ref 7–25)
CALCIUM SPEC-SCNC: 8.7 MG/DL (ref 8.6–10.5)
CHLORIDE SERPL-SCNC: 103 MMOL/L (ref 98–107)
CO2 SERPL-SCNC: 23.4 MMOL/L (ref 22–29)
CREAT SERPL-MCNC: 1.59 MG/DL (ref 0.76–1.27)
DEPRECATED RDW RBC AUTO: 50.4 FL (ref 37–54)
EGFRCR SERPLBLD CKD-EPI 2021: 43.3 ML/MIN/1.73
EOSINOPHIL # BLD AUTO: 0.18 10*3/MM3 (ref 0–0.4)
EOSINOPHIL NFR BLD AUTO: 5.2 % (ref 0.3–6.2)
ERYTHROCYTE [DISTWIDTH] IN BLOOD BY AUTOMATED COUNT: 13.9 % (ref 12.3–15.4)
GLOBULIN UR ELPH-MCNC: 2.8 GM/DL
GLUCOSE SERPL-MCNC: 117 MG/DL (ref 65–99)
HCT VFR BLD AUTO: 28.1 % (ref 37.5–51)
HGB BLD-MCNC: 9.8 G/DL (ref 13–17.7)
IMM GRANULOCYTES # BLD AUTO: 0.04 10*3/MM3 (ref 0–0.05)
IMM GRANULOCYTES NFR BLD AUTO: 1.1 % (ref 0–0.5)
LYMPHOCYTES # BLD AUTO: 0.8 10*3/MM3 (ref 0.7–3.1)
LYMPHOCYTES NFR BLD AUTO: 23 % (ref 19.6–45.3)
MCH RBC QN AUTO: 35.8 PG (ref 26.6–33)
MCHC RBC AUTO-ENTMCNC: 34.9 G/DL (ref 31.5–35.7)
MCV RBC AUTO: 102.6 FL (ref 79–97)
MONOCYTES # BLD AUTO: 0.36 10*3/MM3 (ref 0.1–0.9)
MONOCYTES NFR BLD AUTO: 10.3 % (ref 5–12)
NEUTROPHILS NFR BLD AUTO: 2.08 10*3/MM3 (ref 1.7–7)
NEUTROPHILS NFR BLD AUTO: 59.8 % (ref 42.7–76)
NRBC BLD AUTO-RTO: 0 /100 WBC (ref 0–0.2)
PLATELET # BLD AUTO: 139 10*3/MM3 (ref 140–450)
PMV BLD AUTO: 9.5 FL (ref 6–12)
POTASSIUM SERPL-SCNC: 4.1 MMOL/L (ref 3.5–5.2)
PROT SERPL-MCNC: 5.6 G/DL (ref 6–8.5)
QT INTERVAL: 509 MS
QTC INTERVAL: 568 MS
RBC # BLD AUTO: 2.74 10*6/MM3 (ref 4.14–5.8)
SODIUM SERPL-SCNC: 137 MMOL/L (ref 136–145)
WBC NRBC COR # BLD AUTO: 3.48 10*3/MM3 (ref 3.4–10.8)

## 2025-06-07 PROCEDURE — 25010000002 THIAMINE HCL 200 MG/2ML SOLUTION: Performed by: STUDENT IN AN ORGANIZED HEALTH CARE EDUCATION/TRAINING PROGRAM

## 2025-06-07 PROCEDURE — 25010000002 AMIODARONE IN DEXTROSE 5% 360-4.14 MG/200ML-% SOLUTION: Performed by: STUDENT IN AN ORGANIZED HEALTH CARE EDUCATION/TRAINING PROGRAM

## 2025-06-07 PROCEDURE — 80053 COMPREHEN METABOLIC PANEL: CPT | Performed by: STUDENT IN AN ORGANIZED HEALTH CARE EDUCATION/TRAINING PROGRAM

## 2025-06-07 PROCEDURE — 99232 SBSQ HOSP IP/OBS MODERATE 35: CPT | Performed by: INTERNAL MEDICINE

## 2025-06-07 PROCEDURE — 93005 ELECTROCARDIOGRAM TRACING: CPT | Performed by: INTERNAL MEDICINE

## 2025-06-07 PROCEDURE — 93010 ELECTROCARDIOGRAM REPORT: CPT | Performed by: STUDENT IN AN ORGANIZED HEALTH CARE EDUCATION/TRAINING PROGRAM

## 2025-06-07 PROCEDURE — 85025 COMPLETE CBC W/AUTO DIFF WBC: CPT | Performed by: STUDENT IN AN ORGANIZED HEALTH CARE EDUCATION/TRAINING PROGRAM

## 2025-06-07 PROCEDURE — 25010000002 THIAMINE PER 100 MG: Performed by: STUDENT IN AN ORGANIZED HEALTH CARE EDUCATION/TRAINING PROGRAM

## 2025-06-07 RX ORDER — METOPROLOL SUCCINATE 25 MG/1
25 TABLET, EXTENDED RELEASE ORAL 2 TIMES DAILY
Status: DISCONTINUED | OUTPATIENT
Start: 2025-06-07 | End: 2025-06-08

## 2025-06-07 RX ORDER — RANOLAZINE 500 MG/1
1000 TABLET, EXTENDED RELEASE ORAL EVERY 12 HOURS SCHEDULED
Status: DISCONTINUED | OUTPATIENT
Start: 2025-06-07 | End: 2025-06-16 | Stop reason: HOSPADM

## 2025-06-07 RX ADMIN — THIAMINE HYDROCHLORIDE 200 MG: 100 INJECTION, SOLUTION INTRAMUSCULAR; INTRAVENOUS at 05:25

## 2025-06-07 RX ADMIN — APIXABAN 2.5 MG: 2.5 TABLET, FILM COATED ORAL at 20:49

## 2025-06-07 RX ADMIN — Medication 1 TABLET: at 08:08

## 2025-06-07 RX ADMIN — METOPROLOL SUCCINATE 25 MG: 25 TABLET, EXTENDED RELEASE ORAL at 08:08

## 2025-06-07 RX ADMIN — LORAZEPAM 2 MG: 1 TABLET ORAL at 22:47

## 2025-06-07 RX ADMIN — APIXABAN 2.5 MG: 2.5 TABLET, FILM COATED ORAL at 08:08

## 2025-06-07 RX ADMIN — FOLIC ACID 1 MG: 1 TABLET ORAL at 08:08

## 2025-06-07 RX ADMIN — THIAMINE HYDROCHLORIDE 200 MG: 100 INJECTION, SOLUTION INTRAMUSCULAR; INTRAVENOUS at 20:48

## 2025-06-07 RX ADMIN — RANOLAZINE 1000 MG: 500 TABLET, FILM COATED, EXTENDED RELEASE ORAL at 08:08

## 2025-06-07 RX ADMIN — RANOLAZINE 1000 MG: 500 TABLET, FILM COATED, EXTENDED RELEASE ORAL at 20:48

## 2025-06-07 RX ADMIN — METOPROLOL SUCCINATE 25 MG: 25 TABLET, EXTENDED RELEASE ORAL at 20:49

## 2025-06-07 RX ADMIN — AMIODARONE HYDROCHLORIDE 1 MG/MIN: 1.8 INJECTION, SOLUTION INTRAVENOUS at 02:47

## 2025-06-07 NOTE — PROGRESS NOTES
Name: Bryan Landers ADMIT: 2025   : 1945  PCP: Jose Fonseca MD    MRN: [id number] LOS: 2 days   AGE/SEX: 80 y.o. male  ROOM: [facility]     Subjective   Subjective   Patient is lying on the bed and does not appear to any major distress.   Denies any nausea, vomiting abdominal pain, chest pain.  Patient's mental status is close to baseline and answering questions reasonably well today.       Objective   Objective   Vital Signs  Temp:  [97.7 °F (36.5 °C)-98 °F (36.7 °C)] 97.9 °F (36.6 °C)  Heart Rate:  [69-94] 73  Resp:  [12-18] 18  BP: ()/(59-81) 129/73  SpO2:  [91 %-98 %] 98 %  on  Flow (L/min) (Oxygen Therapy):  [3] 3;   Device (Oxygen Therapy): room air  Body mass index is 35.11 kg/m².  Physical Exam  HEENT: PERRLA, extraocular movements intact, Scleras no icterus  Neck: Supple, no JVD  Cardiovascular: Regular rate and rhythm with normal S1 and S2  Respiratory: Fairly clear to auscultation anteriorly with no wheezes  GI: Soft, nontender, bowel sounds are present  Extremities: No edema, palpable pedal pulses  Neurologic: Grossly nonfocal, no facial asymmetry    Results Review     I reviewed the patient's new clinical results.  Results from last 7 days   Lab Units 25  0540 25  0649 25  0557 25  2213   WBC 10*3/mm3 3.48 4.06 6.07 5.73   HEMOGLOBIN g/dL 9.8* 9.4* 10.3* 11.0*   PLATELETS 10*3/mm3 139* 93* 137* 167     Results from last 7 days   Lab Units 25  0540 25  0910 25  0557 25  2213   SODIUM mmol/L 137 137 138 138   POTASSIUM mmol/L 4.1 4.0 5.2 4.4   CHLORIDE mmol/L 103 103 98 99   CO2 mmol/L 23.4 26.0 27.0 27.2   BUN mg/dL 19.0 21.0 24.0* 25.0*   CREATININE mg/dL 1.59* 1.75* 2.14* 2.27*   GLUCOSE mg/dL 117* 145* 123* 111*   EGFR mL/min/1.73 43.3* 38.6* 30.3* 28.3*     Results from last 7 days   Lab Units 25  0540 25  0910 25  2213   ALBUMIN g/dL 2.8* 2.6* 3.2*   BILIRUBIN mg/dL 0.3 0.5 0.4   ALK PHOS U/L 95 97 105   AST  "(SGOT) U/L 45* 38 32   ALT (SGPT) U/L 22 19 20     Results from last 7 days   Lab Units 06/07/25  0540 06/06/25  0910 06/05/25  0557 06/04/25  2213   CALCIUM mg/dL 8.7 8.6 8.7 9.3   ALBUMIN g/dL 2.8* 2.6*  --  3.2*   MAGNESIUM mg/dL  --  2.3  --  1.6       No results found for: \"HGBA1C\", \"POCGLU\"    No radiology results for the last day      I have personally reviewed all medications:  Scheduled Medications  apixaban, 2.5 mg, Oral, Q12H  folic acid, 1 mg, Oral, Daily  metoprolol succinate XL, 25 mg, Oral, BID  multivitamin with minerals, 1 tablet, Oral, Daily  ranolazine, 1,000 mg, Oral, Q12H  thiamine (B-1) IV, 200 mg, Intravenous, Q8H   Followed by  [START ON 6/10/2025] thiamine, 100 mg, Oral, Daily    Infusions  [Held by provider] amiodarone, 1 mg/min, Last Rate: Stopped (06/07/25 0808)    Diet  Diet: Cardiac; Healthy Heart (2-3 Na+); Fluid Consistency: Thin (IDDSI 0)    I have personally reviewed:  [x]  Laboratory   [x]  Microbiology   [x]  Radiology   [x]  EKG/Telemetry  [x]  Cardiology/Vascular   []  Pathology    []  Records       Assessment/Plan     Active Hospital Problems    Diagnosis  POA    **Syncope [R55]  Yes    Alcohol use disorder [F10.90]  Yes    Acquired hypothyroidism [E03.9]  Yes    Cognitive decline [R41.89]  Yes    Stage 3b chronic kidney disease [N18.32]  Yes    Essential hypertension [I10]  Yes      Resolved Hospital Problems   No resolved problems to display.         1. Syncope/fall/orthostasis, CT brain and CT cervical spine with no acute findings.  Carotid Dopplers with no significant stenosis.  2D echo revealed global and mild LV dysfunction.    2.  Alcohol use, unclear if he still drinks on a regular basis however will be on a CIWA protocol and monitor for withdrawals.    3.  Acute/CKD stage 3B, baseline creatinine is around 1.4-1.5 and upon admission creatinine was 2.27 and improved to 1.75.  Continue with fluids and lisinopril has continue fluids and hold lisinopril.  Nephrology is " following along.    4.  Hypertension, currently soft low blood pressure therefore lisinopril is on hold.    5.  Hyperlipidemia, statins.    6.  Gout, on allopurinol.    7.  Hypothyroidism, will continue with Synthroid.    8.  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    9.  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  Cardiology did evaluate and was treated with amiodarone bolus and drip on 6/6/2027.  Today has QT prolongation therefore it was discontinued.  Metoprolol dose is being further advanced and will consider midodrine if blood pressure drops per cardiology.  Ranexa has been initiated as well.    10.  CODE STATUS is full code.      Copy text on this note has been reviewed by me on 6/7/2025      Feliberto Rowe MD  [facility]  06/07/25  16:37 EDT

## 2025-06-07 NOTE — NURSING NOTE
EKG completed this am, showing abn acute ischemia. Called on call cardiology to notify of EKG results. Solo Fairchild called back and notify him of results, no new orders will continue to monitor

## 2025-06-07 NOTE — PROGRESS NOTES
"CC: Syncope    Interval History: No new acute events overnight      Vital Signs  Temp:  [97.7 °F (36.5 °C)-98 °F (36.7 °C)] 98 °F (36.7 °C)  Heart Rate:  [69-94] 72  Resp:  [12-18] 18  BP: ()/(59-81) 105/64    Intake/Output Summary (Last 24 hours) at 6/7/2025 0803  Last data filed at 6/7/2025 0542  Gross per 24 hour   Intake 240 ml   Output 600 ml   Net -360 ml     Flowsheet Rows      Flowsheet Row First Filed Value   Admission Height 177.8 cm (70\") Documented at 06/04/2025 2159   Admission Weight 86.2 kg (190 lb) Documented at 06/04/2025 2159            PHYSICAL EXAM:  General: No acute distress  Resp:NL Rate, symmetric chest expansion,unlabored, clear  CV:NL rate and rhythm, NL PMI, NL S1 and S2, no Murmur, no gallop, no rub, No JVD.   ABD:Nl sounds, no masses or tenderness, nondistended, no guarding or rebound  Neuro: alert,cooperative and oriented  Extr:Normal pedal pulses, No edema or cyanosis, moves all extremities      Results Review:    Results from last 7 days   Lab Units 06/07/25  0540   SODIUM mmol/L 137   POTASSIUM mmol/L 4.1   CHLORIDE mmol/L 103   CO2 mmol/L 23.4   BUN mg/dL 19.0   CREATININE mg/dL 1.59*   GLUCOSE mg/dL 117*   CALCIUM mg/dL 8.7     Results from last 7 days   Lab Units 06/04/25  2334 06/04/25  2213   HSTROP T ng/L 51* 50*     Results from last 7 days   Lab Units 06/07/25  0540   WBC 10*3/mm3 3.48   HEMOGLOBIN g/dL 9.8*   HEMATOCRIT % 28.1*   PLATELETS 10*3/mm3 139*     Results from last 7 days   Lab Units 06/04/25  2306   INR  1.24*   APTT seconds 23.7         Results from last 7 days   Lab Units 06/06/25  0910   MAGNESIUM mg/dL 2.3         I reviewed the patient's new clinical results.  I personally viewed and interpreted the patient's EKG/Telemetry data        Medication Review:   Meds reviewed    [Held by provider] amiodarone, 1 mg/min, Last Rate: 1 mg/min (06/07/25 0247)  sodium chloride, 125 mL/hr, Last Rate: Stopped (06/06/25 1840)      Assessment and Plan:     A syncopal " spell-patient does not remember about having prodromal symptoms.  EMS report nonsustained VT.  EKG and telemetry reviewed showing frequent polymorphic PVCs as singlets, couplets, triplets and some quadruplets , no sustained VT.  Echocardiogram shows mild global left ventricular hypokinesis.  Elevated troponin without significant delta and ischemic changes.  Patient denies having any chest pain.  Unremarkable coronary angiogram.  He had 20 beats VT run on 6/6/2025  Essential hypertension-BP running on the lower side-antihypertensive held.  Bilateral DVT and PE in 2020-thought to be provoked.  He has been on Eliquis 2.5 mg p.o. twice daily  Acute on chronic chronic kidney disease-sees nephrology  Home medications include Lasix 40 mg p.o. daily, atorvastatin, aspirin, apixaban, lisinopril    Still has infrequent monomorphic PVCs as singlets, couplets and triplets.  No VT runs.  Blood pressure still borderline low  He was started on Amio bolus/drip yesterday-  prolonged QTc this morning-DC amiodarone  Increased metoprolol-May add midodrine if blood pressure drops.  Start Ranexa  Creatinine improving  Ambulate patient/orthostatic vitals       Rayshawn Navarro MD  06/07/25  08:03 EDT

## 2025-06-07 NOTE — PROGRESS NOTES
[facility]     Progress Note    Patient Name: Bryan Landers  : 1945  MRN: [id number]  Primary Care Physician:  Jose Fonseca MD  Date of admission: 2025    Subjective   Subjective     Chief Complaint: with jeffrey on ckd III    History of Present Illness  Patient with jeffrey on ckd III admitted after syncopal episode    Review of Systems    Objective   Objective     Vitals:   Temp:  [97.7 °F (36.5 °C)-98 °F (36.7 °C)] 98 °F (36.7 °C)  Heart Rate:  [69-94] 72  Resp:  [12-18] 18  BP: ()/(59-81) 105/64  Flow (L/min) (Oxygen Therapy):  [3] 3    Physical Exam   General Appearance:  In no acute distress.    HEENT: Normal HEENT exam.     Extremities: .  There is no deformity, effusion or dependent edema.    Neurological: Patient is alert     Result Review    Result Review:  I have personally reviewed the results from the time of this admission to 2025 07:59 EDT and agree with these findings:  []  Laboratory list / accordion  []  Microbiology  []  Radiology  []  EKG/Telemetry   []  Cardiology/Vascular   []  Pathology  []  Old records  []  Other:  Most notable findings include:       Assessment & Plan   Assessment / Plan     Brief Patient Summary:  Bryan Landers is a 80 y.o. male who has jeffrey on ckd III after syncope    Active Hospital Problems:  Active Hospital Problems    Diagnosis     **Syncope     Alcohol use disorder     Acquired hypothyroidism     Cognitive decline     Stage 3b chronic kidney disease     Essential hypertension    JEFFREY  CKD3  Syncope   Non sustain VT   HTN    Plan:   Patient seen and examined. Awake, alert. No distress. Labs and chart reviewed. Renal function at baseline.  On ivf. Ok to d/c today.  Cardiology monitoring meds.  Cr at baseline 1.5. bp low.  would continue to hold lisinopril for now. Will follow      Sandee Galvez MD

## 2025-06-07 NOTE — PLAN OF CARE
Goal Outcome Evaluation:   Patient alert follows commands amio gtt infusing. No c/o pain or nausea. CIWA protocol in place, prn ativan given. No acute distress noted. Right radial drsg clean dry intact. Incont care and skin care provided. Will continue to monitor

## 2025-06-08 LAB
ALBUMIN SERPL-MCNC: 2.7 G/DL (ref 3.5–5.2)
ALBUMIN/GLOB SERPL: 1 G/DL
ALP SERPL-CCNC: 93 U/L (ref 39–117)
ALT SERPL W P-5'-P-CCNC: 19 U/L (ref 1–41)
ANION GAP SERPL CALCULATED.3IONS-SCNC: 13.5 MMOL/L (ref 5–15)
ANION GAP SERPL CALCULATED.3IONS-SCNC: 7.7 MMOL/L (ref 5–15)
AST SERPL-CCNC: 39 U/L (ref 1–40)
BASOPHILS # BLD AUTO: 0.02 10*3/MM3 (ref 0–0.2)
BASOPHILS NFR BLD AUTO: 0.6 % (ref 0–1.5)
BILIRUB SERPL-MCNC: 0.3 MG/DL (ref 0–1.2)
BUN SERPL-MCNC: 14 MG/DL (ref 8–23)
BUN SERPL-MCNC: 15 MG/DL (ref 8–23)
BUN/CREAT SERPL: 8.8 (ref 7–25)
BUN/CREAT SERPL: 9.7 (ref 7–25)
CALCIUM SPEC-SCNC: 8.8 MG/DL (ref 8.6–10.5)
CALCIUM SPEC-SCNC: 9.3 MG/DL (ref 8.6–10.5)
CHLORIDE SERPL-SCNC: 103 MMOL/L (ref 98–107)
CHLORIDE SERPL-SCNC: 103 MMOL/L (ref 98–107)
CO2 SERPL-SCNC: 22.5 MMOL/L (ref 22–29)
CO2 SERPL-SCNC: 27.3 MMOL/L (ref 22–29)
CREAT SERPL-MCNC: 1.55 MG/DL (ref 0.76–1.27)
CREAT SERPL-MCNC: 1.6 MG/DL (ref 0.76–1.27)
DEPRECATED RDW RBC AUTO: 52.1 FL (ref 37–54)
EGFRCR SERPLBLD CKD-EPI 2021: 43 ML/MIN/1.73
EGFRCR SERPLBLD CKD-EPI 2021: 44.7 ML/MIN/1.73
EOSINOPHIL # BLD AUTO: 0.15 10*3/MM3 (ref 0–0.4)
EOSINOPHIL NFR BLD AUTO: 4.2 % (ref 0.3–6.2)
ERYTHROCYTE [DISTWIDTH] IN BLOOD BY AUTOMATED COUNT: 14 % (ref 12.3–15.4)
GLOBULIN UR ELPH-MCNC: 2.6 GM/DL
GLUCOSE SERPL-MCNC: 107 MG/DL (ref 65–99)
GLUCOSE SERPL-MCNC: 110 MG/DL (ref 65–99)
HCT VFR BLD AUTO: 26.4 % (ref 37.5–51)
HGB BLD-MCNC: 9.4 G/DL (ref 13–17.7)
IMM GRANULOCYTES # BLD AUTO: 0.03 10*3/MM3 (ref 0–0.05)
IMM GRANULOCYTES NFR BLD AUTO: 0.8 % (ref 0–0.5)
LYMPHOCYTES # BLD AUTO: 0.78 10*3/MM3 (ref 0.7–3.1)
LYMPHOCYTES NFR BLD AUTO: 21.7 % (ref 19.6–45.3)
MAGNESIUM SERPL-MCNC: 1.8 MG/DL (ref 1.6–2.4)
MCH RBC QN AUTO: 36.7 PG (ref 26.6–33)
MCHC RBC AUTO-ENTMCNC: 35.6 G/DL (ref 31.5–35.7)
MCV RBC AUTO: 103.1 FL (ref 79–97)
MONOCYTES # BLD AUTO: 0.42 10*3/MM3 (ref 0.1–0.9)
MONOCYTES NFR BLD AUTO: 11.7 % (ref 5–12)
NEUTROPHILS NFR BLD AUTO: 2.19 10*3/MM3 (ref 1.7–7)
NEUTROPHILS NFR BLD AUTO: 61 % (ref 42.7–76)
NRBC BLD AUTO-RTO: 0 /100 WBC (ref 0–0.2)
PLATELET # BLD AUTO: 124 10*3/MM3 (ref 140–450)
PMV BLD AUTO: 9.1 FL (ref 6–12)
POTASSIUM SERPL-SCNC: 4.3 MMOL/L (ref 3.5–5.2)
POTASSIUM SERPL-SCNC: 4.3 MMOL/L (ref 3.5–5.2)
PROT SERPL-MCNC: 5.3 G/DL (ref 6–8.5)
QT INTERVAL: 440 MS
QTC INTERVAL: 535 MS
RBC # BLD AUTO: 2.56 10*6/MM3 (ref 4.14–5.8)
SODIUM SERPL-SCNC: 138 MMOL/L (ref 136–145)
SODIUM SERPL-SCNC: 139 MMOL/L (ref 136–145)
WBC NRBC COR # BLD AUTO: 3.59 10*3/MM3 (ref 3.4–10.8)

## 2025-06-08 PROCEDURE — 80053 COMPREHEN METABOLIC PANEL: CPT | Performed by: STUDENT IN AN ORGANIZED HEALTH CARE EDUCATION/TRAINING PROGRAM

## 2025-06-08 PROCEDURE — 93005 ELECTROCARDIOGRAM TRACING: CPT | Performed by: INTERNAL MEDICINE

## 2025-06-08 PROCEDURE — 83735 ASSAY OF MAGNESIUM: CPT | Performed by: INTERNAL MEDICINE

## 2025-06-08 PROCEDURE — 97530 THERAPEUTIC ACTIVITIES: CPT

## 2025-06-08 PROCEDURE — 85025 COMPLETE CBC W/AUTO DIFF WBC: CPT | Performed by: STUDENT IN AN ORGANIZED HEALTH CARE EDUCATION/TRAINING PROGRAM

## 2025-06-08 PROCEDURE — 25010000002 THIAMINE PER 100 MG: Performed by: STUDENT IN AN ORGANIZED HEALTH CARE EDUCATION/TRAINING PROGRAM

## 2025-06-08 PROCEDURE — 25010000002 LORAZEPAM PER 2 MG: Performed by: STUDENT IN AN ORGANIZED HEALTH CARE EDUCATION/TRAINING PROGRAM

## 2025-06-08 PROCEDURE — 99232 SBSQ HOSP IP/OBS MODERATE 35: CPT | Performed by: INTERNAL MEDICINE

## 2025-06-08 PROCEDURE — 93010 ELECTROCARDIOGRAM REPORT: CPT | Performed by: STUDENT IN AN ORGANIZED HEALTH CARE EDUCATION/TRAINING PROGRAM

## 2025-06-08 PROCEDURE — 25010000002 THIAMINE HCL 200 MG/2ML SOLUTION: Performed by: STUDENT IN AN ORGANIZED HEALTH CARE EDUCATION/TRAINING PROGRAM

## 2025-06-08 RX ORDER — MIDODRINE HYDROCHLORIDE 5 MG/1
5 TABLET ORAL
Status: DISCONTINUED | OUTPATIENT
Start: 2025-06-08 | End: 2025-06-08

## 2025-06-08 RX ORDER — METOPROLOL SUCCINATE 25 MG/1
50 TABLET, EXTENDED RELEASE ORAL 2 TIMES DAILY
Status: DISCONTINUED | OUTPATIENT
Start: 2025-06-08 | End: 2025-06-14

## 2025-06-08 RX ORDER — MIDODRINE HYDROCHLORIDE 5 MG/1
5 TABLET ORAL
Status: DISCONTINUED | OUTPATIENT
Start: 2025-06-08 | End: 2025-06-11

## 2025-06-08 RX ADMIN — MIDODRINE HYDROCHLORIDE 5 MG: 5 TABLET ORAL at 15:57

## 2025-06-08 RX ADMIN — RANOLAZINE 1000 MG: 500 TABLET, FILM COATED, EXTENDED RELEASE ORAL at 09:51

## 2025-06-08 RX ADMIN — MIDODRINE HYDROCHLORIDE 5 MG: 5 TABLET ORAL at 18:24

## 2025-06-08 RX ADMIN — RANOLAZINE 1000 MG: 500 TABLET, FILM COATED, EXTENDED RELEASE ORAL at 21:12

## 2025-06-08 RX ADMIN — THIAMINE HYDROCHLORIDE 200 MG: 100 INJECTION, SOLUTION INTRAMUSCULAR; INTRAVENOUS at 15:57

## 2025-06-08 RX ADMIN — FOLIC ACID 1 MG: 1 TABLET ORAL at 09:51

## 2025-06-08 RX ADMIN — METOPROLOL SUCCINATE 25 MG: 25 TABLET, EXTENDED RELEASE ORAL at 09:51

## 2025-06-08 RX ADMIN — Medication 1 TABLET: at 09:51

## 2025-06-08 RX ADMIN — LORAZEPAM 1 MG: 2 INJECTION INTRAMUSCULAR; INTRAVENOUS at 03:16

## 2025-06-08 RX ADMIN — METOPROLOL SUCCINATE 50 MG: 25 TABLET, EXTENDED RELEASE ORAL at 21:12

## 2025-06-08 RX ADMIN — LORAZEPAM 1 MG: 1 TABLET ORAL at 09:51

## 2025-06-08 RX ADMIN — APIXABAN 2.5 MG: 2.5 TABLET, FILM COATED ORAL at 09:51

## 2025-06-08 RX ADMIN — APIXABAN 2.5 MG: 2.5 TABLET, FILM COATED ORAL at 21:12

## 2025-06-08 RX ADMIN — THIAMINE HYDROCHLORIDE 200 MG: 100 INJECTION, SOLUTION INTRAMUSCULAR; INTRAVENOUS at 05:52

## 2025-06-08 RX ADMIN — THIAMINE HYDROCHLORIDE 200 MG: 100 INJECTION, SOLUTION INTRAMUSCULAR; INTRAVENOUS at 21:12

## 2025-06-08 NOTE — PROGRESS NOTES
Name: Bryan Landers ADMIT: 2025   : 1945  PCP: Joes Fonseca MD    MRN: [id number] LOS: 3 days   AGE/SEX: 80 y.o. male  ROOM: [facility]     Subjective   Subjective   Patient is lying on the bed and does not appear to any major distress.   Denies any nausea, vomiting abdominal pain, chest pain.  Patient's mental status is close to baseline and answering questions reasonably well.       Objective   Objective   Vital Signs  Temp:  [97.3 °F (36.3 °C)-98.1 °F (36.7 °C)] 97.7 °F (36.5 °C)  Heart Rate:  [79-96] 95  Resp:  [16-18] 18  BP: ()/(62-89) 100/62  SpO2:  [96 %-99 %] 96 %  on   ;   Device (Oxygen Therapy): room air  Body mass index is 34.29 kg/m².  Physical Exam  HEENT: PERRLA, extraocular movements intact, Scleras no icterus  Neck: Supple, no JVD  Cardiovascular: Regular rate and rhythm with normal S1 and S2  Respiratory: Fairly clear to auscultation anteriorly with no wheezes  GI: Soft, nontender, bowel sounds are present  Extremities: No edema, palpable pedal pulses  Neurologic: Grossly nonfocal, no facial asymmetry    Results Review     I reviewed the patient's new clinical results.  Results from last 7 days   Lab Units 25  0640 25  0540 25  0649 25  0557   WBC 10*3/mm3 3.59 3.48 4.06 6.07   HEMOGLOBIN g/dL 9.4* 9.8* 9.4* 10.3*   PLATELETS 10*3/mm3 124* 139* 93* 137*     Results from last 7 days   Lab Units 25  1224 25  0640 25  0540 25  0910   SODIUM mmol/L 139 138 137 137   POTASSIUM mmol/L 4.3 4.3 4.1 4.0   CHLORIDE mmol/L 103 103 103 103   CO2 mmol/L 22.5 27.3 23.4 26.0   BUN mg/dL 14.0 15.0 19.0 21.0   CREATININE mg/dL 1.60* 1.55* 1.59* 1.75*   GLUCOSE mg/dL 110* 107* 117* 145*   EGFR mL/min/1.73 43.0* 44.7* 43.3* 38.6*     Results from last 7 days   Lab Units 25  0640 25  0540 25  0910 25  2213   ALBUMIN g/dL 2.7* 2.8* 2.6* 3.2*   BILIRUBIN mg/dL 0.3 0.3 0.5 0.4   ALK PHOS U/L 93 95 97 105   AST (SGOT) U/L 39  "45* 38 32   ALT (SGPT) U/L 19 22 19 20     Results from last 7 days   Lab Units 06/08/25  1224 06/08/25  0640 06/07/25  0540 06/06/25  0910 06/05/25  0557 06/04/25  2213   CALCIUM mg/dL 9.3 8.8 8.7 8.6   < > 9.3   ALBUMIN g/dL  --  2.7* 2.8* 2.6*  --  3.2*   MAGNESIUM mg/dL  --  1.8  --  2.3  --  1.6    < > = values in this interval not displayed.       No results found for: \"HGBA1C\", \"POCGLU\"    No radiology results for the last day      I have personally reviewed all medications:  Scheduled Medications  apixaban, 2.5 mg, Oral, Q12H  folic acid, 1 mg, Oral, Daily  metoprolol succinate XL, 50 mg, Oral, BID  midodrine, 5 mg, Oral, TID AC  multivitamin with minerals, 1 tablet, Oral, Daily  ranolazine, 1,000 mg, Oral, Q12H  thiamine (B-1) IV, 200 mg, Intravenous, Q8H   Followed by  [START ON 6/10/2025] thiamine, 100 mg, Oral, Daily    Infusions     Diet  Diet: Cardiac; Healthy Heart (2-3 Na+); Fluid Consistency: Thin (IDDSI 0)    I have personally reviewed:  [x]  Laboratory   [x]  Microbiology   [x]  Radiology   [x]  EKG/Telemetry  [x]  Cardiology/Vascular   []  Pathology    []  Records       Assessment/Plan     Active Hospital Problems    Diagnosis  POA    **Syncope [R55]  Yes    Alcohol use disorder [F10.90]  Yes    Acquired hypothyroidism [E03.9]  Yes    Cognitive decline [R41.89]  Yes    Stage 3b chronic kidney disease [N18.32]  Yes    Essential hypertension [I10]  Yes      Resolved Hospital Problems   No resolved problems to display.         1. Syncope/fall/orthostasis, CT brain and CT cervical spine with no acute findings.  Carotid Dopplers with no significant stenosis.  2D echo revealed global and mild LV dysfunction.    2.  Alcohol use, unclear if he still drinks on a regular basis however will be on a [facility] protocol and monitor for withdrawals.    3.  Acute/CKD stage 3B, baseline creatinine is around 1.4-1.5 and upon admission creatinine was 2.27 and improved to 1.60.  Continue with fluids and lisinopril has " continue fluids and hold lisinopril.  Nephrology is following along.    4.  Hypertension, currently soft low blood pressure therefore lisinopril is on hold.    5.  Hyperlipidemia, statins.    6.  Gout, on allopurinol.    7.  Hypothyroidism, will continue with Synthroid.    8.  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    9.  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  Cardiology did evaluate and was treated with amiodarone bolus and drip on 6/6/2027.  On 6/7 QT interval was prolonged therefore amiodarone was discontinued.  Metoprolol dose has been further advanced and midodrine is being added by cardiology.  EP consult will be obtained tomorrow for further evaluation.      10.  CODE STATUS is full code.      Copy text on this note has been reviewed by me on 6/8/2025      Feliberto Rowe MD  [facility]  06/08/25  16:23 EDT

## 2025-06-08 NOTE — PLAN OF CARE
Goal Outcome Evaluation:   Patient alert forgetful confused follows simple commands. Prn ativan based on CIWA score. Assist x1-2 use with urinal incont care and skin care provided. No acute distress noted will continue to monitor

## 2025-06-08 NOTE — NEONATAL TRANSPORT NOTE
Patient refusing to get up and completed orthostatic blood pressures at this time will continue to monitor

## 2025-06-08 NOTE — PLAN OF CARE
Goal Outcome Evaluation:  Plan of Care Reviewed With: patient        Progress: improving  Outcome Evaluation: Pt agreeable to PT this afternoon, eager for activity. Much improved activity tolerance. Comes to EOB with min Ax1, pt utilizing PT arm to self pull himself to side, verbal cueing throughout for sequencing. Sits EOB completing seated ther ex prior to mobility. He stands with CGA, amb to amb total 120', ~40' x3 with standing rest breaks between. He has 1 instance of LE buckling, pt able to correct independently. Amb with CGA, intermittent Min A for walker navigation as he has impulsive and fast gait, copious cueing to stay within walker. He is notable fatigued on way back to his room, impulsive to get to the bed despite cues. Does return to Sup with min A, min A for scooting up in bed. He continues to be a good candidate for skilled PT services to progress functional status.

## 2025-06-08 NOTE — PROGRESS NOTES
[facility]     Progress Note    Patient Name: Bryan Landers  : 1945  MRN: [id number]  Primary Care Physician:  Jose Fonseca MD  Date of admission: 2025    Subjective   Subjective     Chief Complaint: with jeffrey on ckd III    History of Present Illness  Patient with jeffrey on ckd III admitted after syncopal episode    Review of Systems    Objective   Objective     Vitals:   Temp:  [97.3 °F (36.3 °C)-98.1 °F (36.7 °C)] 97.3 °F (36.3 °C)  Heart Rate:  [79-96] 79  Resp:  [16-18] 18  BP: ()/(78-89) 91/78    Physical Exam       Result Review    Result Review:  I have personally reviewed the results from the time of this admission to 2025 12:05 EDT and agree with these findings:  []  Laboratory list / accordion  []  Microbiology  []  Radiology  []  EKG/Telemetry   []  Cardiology/Vascular   []  Pathology  []  Old records  []  Other:  Most notable findings include:       Assessment & Plan   Assessment / Plan     Brief Patient Summary:  Bryan Landers is a 80 y.o. male who has jeffrey on ckd III after syncope    Active Hospital Problems:  Active Hospital Problems    Diagnosis     **Syncope     Alcohol use disorder     Acquired hypothyroidism     Cognitive decline     Stage 3b chronic kidney disease     Essential hypertension    JEFFREY  CKD3  Syncope   Non sustain VT   HTN    Plan:   Patient chart and labs reviewed. Renal function at baseline. Bp still low, cardiology following. Will f/u in am      Sandee Galvez MD

## 2025-06-08 NOTE — THERAPY TREATMENT NOTE
Patient Name: Bryan Landers  : 1945    MRN: [id number]                              Today's Date: 2025       Admit Date: 2025    Visit Dx:     ICD-10-CM ICD-9-CM   1. Acute renal failure superimposed on chronic kidney disease, unspecified acute renal failure type, unspecified CKD stage  N17.9 584.9    N18.9 585.9   2. Syncope, unspecified syncope type  R55 780.2   3. Multifocal PVCs  I49.3 427.69   4. Closed head injury, initial encounter  S09.90XA 959.01   5. Anticoagulated  Z79.01 V58.61   6. Anemia, unspecified type  D64.9 285.9   7. Hyperglycemia  R73.9 790.29   8. Elevated troponin  R79.89 790.6     Patient Active Problem List   Diagnosis    Essential hypertension    Mixed hyperlipidemia    Arthritis    Type 2 diabetes mellitus with chronic kidney disease, without long-term current use of insulin    Severely overweight    Idiopathic chronic gout of left knee    Medication monitoring encounter    Microalbuminuria due to type 2 diabetes mellitus    History of prostate cancer    Stage 3b chronic kidney disease    Medicare annual wellness visit, subsequent    Radiation proctitis    Cognitive decline    Acquired hypothyroidism    B12 deficiency    Sinus tachycardia by electrocardiogram    Chronic pulmonary embolism without acute cor pulmonale    Chronic deep vein thrombosis (DVT) of popliteal vein of both lower extremities    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    Bladder mass    Dependent edema    Anemia of chronic kidney failure, stage 3 (moderate)    Deep venous thrombosis    Class 2 severe obesity with serious comorbidity in adult    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    Syncope    Alcohol use disorder     Past Medical History:   Diagnosis Date    At risk for sleep apnea     Bladder mass     Bruises easily     Diabetes mellitus     Disease of thyroid gland     Elevated cholesterol     GI bleed     Gross hematuria 2021    History of hip replacement, total, bilateral 2018     History of transfusion     Pneumothorax 02/27/2024    Poor historian     Short-term memory loss     Vitamin D deficiency      Past Surgical History:   Procedure Laterality Date    COLONOSCOPY  09/2016    COLONOSCOPY N/A 9/28/2018    Procedure: COLONOSCOPY;  Surgeon: Olaf Gomez MD;  Location: [facility] OR;  Service: Gastroenterology    COLONOSCOPY N/A 1/7/2019    Procedure: COLONOSCOPY;  Surgeon: Rosa Jack MD;  Location: [facility] OR;  Service: Gastroenterology    CYSTOSCOPY BLADDER BIOPSY N/A 9/22/2021    Procedure: CYSTOSCOPY BLADDER BIOPSY;  Surgeon: Roldan Mccarthy MD;  Location: [facility] MAIN OR;  Service: Urology;  Laterality: N/A;    HERNIA REPAIR Bilateral     PROSTATE ULTRASOUND BIOPSY      SIGMOIDOSCOPY N/A 10/2/2018    Procedure: FLEXIBLE SIGMOIDOSCOPY:  APC cautery bleeding using 60 joules;  Surgeon: Olaf Gomez MD;  Location: [facility] ENDOSCOPY;  Service: Gastroenterology    TONSILLECTOMY      TOTAL HIP ARTHROPLASTY Bilateral 2007      General Information       Row Name 06/08/25 1250          Physical Therapy Time and Intention    Document Type therapy note (daily note)  -MO     Mode of Treatment physical therapy;individual therapy  -MO       Row Name 06/08/25 1259          General Information    Patient Profile Reviewed yes  -MO     Existing Precautions/Restrictions fall   -MO       Row Name 06/08/25 1259          Cognition    Orientation Status (Cognition) oriented x 3  -MO       Row Name 06/08/25 1259          Safety Issues/Impairments Affecting Functional Mobility    Safety Issues Affecting Function (Mobility) impulsivity;positioning of assistive device  -MO     Impairments Affecting Function (Mobility) endurance/activity tolerance;strength;balance  -MO     Comment, Safety Issues/Impairments (Mobility) impulsive throughout session with rwx. Does follow all commands appropriately  -MO               User Key  (r) = Recorded By, (t) = Taken By, (c) = Cosigned By       Initials Name Provider Type    Sun Mera, PT Physical Therapist                   Mobility       Row Name 06/08/25 1301          Bed Mobility    Supine-Sit Barnard (Bed Mobility) minimum assist (75% patient effort);moderate assist (50% patient effort);1 person assist;verbal cues  -MO     Sit-Supine Barnard (Bed Mobility) minimum assist (75% patient effort);1 person assist;verbal cues  -MO     Assistive Device (Bed Mobility) bed rails  -MO     Comment, (Bed Mobility) verbal cueing for all set up and sequencing  -MO       Row Name 06/08/25 1301          Sit-Stand Transfer    Sit-Stand Barnard (Transfers) contact guard  -MO     Assistive Device (Sit-Stand Transfers) walker, front-wheeled  -MO     Comment, (Sit-Stand Transfer) dizziness upon standing  -MO       Row Name 06/08/25 1301          Gait/Stairs (Locomotion)    Barnard Level (Gait) contact guard;minimum assist (75% patient effort);1 person assist;verbal cues;nonverbal cues (demo/gesture)  -MO     Assistive Device (Gait) walker, front-wheeled  -MO     Patient was able to Ambulate yes  -MO     Distance in Feet (Gait) 120  40' x 3 with standing rest breaks  -MO     Deviations/Abnormal Patterns (Gait) base of support, narrow;stride length decreased  -MO     Bilateral Gait Deviations forward flexed posture  -MO     Comment, (Gait/Stairs) impulsive gait- assist at rwx with copious cueing to stay within base  -MO               User Key  (r) = Recorded By, (t) = Taken By, (c) = Cosigned By      Initials Name Provider Type    Sun Mera, PT Physical Therapist                   Obj/Interventions       Row Name 06/08/25 1305          Motor Skills    Therapeutic Exercise --  x15 BL seated AP, LAQ  -MO       Row Name 06/08/25 1305          Balance    Balance Assessment sitting static balance;sitting dynamic balance;standing static balance;standing dynamic balance  -MO     Static Sitting Balance supervision  -MO     Dynamic Sitting Balance  standby assist  -MO     Position, Sitting Balance sitting edge of bed  -MO     Static Standing Balance contact guard  -MO     Dynamic Standing Balance contact guard  -MO     Position/Device Used, Standing Balance walker, front-wheeled  -MO               User Key  (r) = Recorded By, (t) = Taken By, (c) = Cosigned By      Initials Name Provider Type    Sun Mera, PT Physical Therapist                   Goals/Plan    No documentation.                  Clinical Impression       Row Name 06/08/25 1305          Pain    Pre/Posttreatment Pain Comment Reports generalized low back pain 2/2 immobility  -MO       Row Name 06/08/25 1305          Plan of Care Review    Plan of Care Reviewed With patient  -MO     Progress improving  -MO     Outcome Evaluation Pt agreeable to PT this afternoon, eager for activity. Much improved activity tolerance. Comes to EOB with min Ax1, pt utilizing PT arm to self pull himself to side, verbal cueing throughout for sequencing. Sits EOB completing seated ther ex prior to mobility. He stands with CGA, amb to amb total 120', ~40' x3 with standing rest breaks between. He has 1 instance of LE buckling, pt able to correct independently. Amb with CGA, intermittent Min A for walker navigation as he has impulsive and fast gait, copious cueing to stay within walker. He is notable fatigued on way back to his room, impulsive to get to the bed despite cues. Does return to Sup with min A, min A for scooting up in bed. He continues to be a good candidate for skilled PT services to progress functional status.  -MO       Row Name 06/08/25 1301          Therapy Assessment/Plan (PT)    Rehab Potential (PT) good  -MO     Criteria for Skilled Interventions Met (PT) skilled treatment is necessary  -MO     Therapy Frequency (PT) 6 times/wk  -MO       Row Name 06/08/25 1307          Vital Signs    O2 Delivery Pre Treatment room air  -MO     O2 Delivery Intra Treatment room air  -MO     O2 Delivery Post Treatment  room air  -MO       Row Name 06/08/25 1305          Positioning and Restraints    Pre-Treatment Position in bed  -MO     Post Treatment Position bed  -MO     In Bed supine;call light within reach;encouraged to call for assist;exit alarm on  -MO               User Key  (r) = Recorded By, (t) = Taken By, (c) = Cosigned By      Initials Name Provider Type    Sun Mera, PT Physical Therapist                   Outcome Measures       Row Name 06/08/25 1309          How much help from another person do you currently need...    Turning from your back to your side while in flat bed without using bedrails? 3  -MO     Moving from lying on back to sitting on the side of a flat bed without bedrails? 2  -MO     Moving to and from a bed to a chair (including a wheelchair)? 3  -MO     Standing up from a chair using your arms (e.g., wheelchair, bedside chair)? 3  -MO     Climbing 3-5 steps with a railing? 2  -MO     To walk in hospital room? 3  -MO     AM-PAC 6 Clicks Score (PT) 16  -MO     Highest Level of Mobility Goal Stand (1 or More Minutes)-5  -MO       Row Name 06/08/25 1309          Functional Assessment    Outcome Measure Options AM-PAC 6 Clicks Basic Mobility (PT)  -MO               User Key  (r) = Recorded By, (t) = Taken By, (c) = Cosigned By      Initials Name Provider Type    Sun Mera PT Physical Therapist                                 Physical Therapy Education       Title: PT OT SLP Therapies (In Progress)       Topic: Physical Therapy (In Progress)       Point: Mobility training (Done)       Learning Progress Summary            Patient Acceptance, E,D, VU,NR by MS at 6/6/2025 1532                      Point: Home exercise program (Done)       Learning Progress Summary            Patient Acceptance, E,D, VU,NR by MS at 6/6/2025 1532                      Point: Body mechanics (Not Started)       Learner Progress:  Not documented in this visit.              Point: Precautions (Not Started)       Learner  Progress:  Not documented in this visit.                              User Key       Initials Effective Dates Name Provider Type Discipline    MS 06/16/21 -  Hemanth Iglesias PT Physical Therapist PT                  PT Recommendation and Plan     Progress: improving  Outcome Evaluation: Pt agreeable to PT this afternoon, eager for activity. Much improved activity tolerance. Comes to EOB with min Ax1, pt utilizing PT arm to self pull himself to side, verbal cueing throughout for sequencing. Sits EOB completing seated ther ex prior to mobility. He stands with CGA, amb to amb total 120', ~40' x3 with standing rest breaks between. He has 1 instance of LE buckling, pt able to correct independently. Amb with CGA, intermittent Min A for walker navigation as he has impulsive and fast gait, copious cueing to stay within walker. He is notable fatigued on way back to his room, impulsive to get to the bed despite cues. Does return to Sup with min A, min A for scooting up in bed. He continues to be a good candidate for skilled PT services to progress functional status.     Time Calculation:         PT Charges       Row Name 06/08/25 1310             Time Calculation    Start Time 1205  -MO      Stop Time 1222  -MO      Time Calculation (min) 17 min  -MO      PT Received On 06/08/25  -MO      PT - Next Appointment 06/09/25  -MO         Time Calculation- PT    Total Timed Code Minutes- PT 17 minute(s)  -MO         Timed Charges    65157 - PT Therapeutic Activity Minutes 17  -MO         Total Minutes    Timed Charges Total Minutes 17  -MO       Total Minutes 17  -MO                User Key  (r) = Recorded By, (t) = Taken By, (c) = Cosigned By      Initials Name Provider Type    Sun Mera PT Physical Therapist                  Therapy Charges for Today       Code Description Service Date Service Provider Modifiers Qty    [id number]  PT THERAPEUTIC ACT EA 15 MIN 6/8/2025 Sun Diaz, PT GP 1            PT  G-Codes  Outcome Measure Options: AM-PAC 6 Clicks Basic Mobility (PT)  AM-PAC 6 Clicks Score (PT): 16  AM-PAC 6 Clicks Score (OT): 18  Modified Irving Scale: 1 - No significant disability despite symptoms.  Able to carry out all usual duties and activities.       Sun Diaz, PT  6/8/2025

## 2025-06-08 NOTE — PROGRESS NOTES
"CC: Syncope    Interval History: No new acute events overnight      Vital Signs  Temp:  [97.3 °F (36.3 °C)-98.1 °F (36.7 °C)] 97.3 °F (36.3 °C)  Heart Rate:  [73-96] 79  Resp:  [16-18] 18  BP: ()/(73-89) 91/78    Intake/Output Summary (Last 24 hours) at 6/8/2025 0836  Last data filed at 6/8/2025 0319  Gross per 24 hour   Intake --   Output 840 ml   Net -840 ml     Flowsheet Rows      Flowsheet Row First Filed Value   Admission Height 177.8 cm (70\") Documented at 06/04/2025 2159   Admission Weight 86.2 kg (190 lb) Documented at 06/04/2025 2159            PHYSICAL EXAM:  General: No acute distress  Resp:NL Rate, symmetric chest expansion,unlabored, clear  CV:NL rate and rhythm, NL PMI, NL S1 and S2, no Murmur, no gallop, no rub, No JVD.   ABD:Nl sounds, no masses or tenderness, nondistended, no guarding or rebound  Neuro: alert,cooperative and oriented  Extr:Normal pedal pulses, No edema or cyanosis, moves all extremities      Results Review:    Results from last 7 days   Lab Units 06/07/25  0540   SODIUM mmol/L 137   POTASSIUM mmol/L 4.1   CHLORIDE mmol/L 103   CO2 mmol/L 23.4   BUN mg/dL 19.0   CREATININE mg/dL 1.59*   GLUCOSE mg/dL 117*   CALCIUM mg/dL 8.7     Results from last 7 days   Lab Units 06/04/25  2334 06/04/25  2213   HSTROP T ng/L 51* 50*     Results from last 7 days   Lab Units 06/08/25  0640   WBC 10*3/mm3 3.59   HEMOGLOBIN g/dL 9.4*   HEMATOCRIT % 26.4*   PLATELETS 10*3/mm3 124*     Results from last 7 days   Lab Units 06/04/25  2306   INR  1.24*   APTT seconds 23.7         Results from last 7 days   Lab Units 06/06/25  0910   MAGNESIUM mg/dL 2.3         I reviewed the patient's new clinical results.  I personally viewed and interpreted the patient's EKG/Telemetry data        Medication Review:   Meds reviewed    [Held by provider] amiodarone, 1 mg/min, Last Rate: Stopped (06/07/25 0808)      Assessment and Plan:     A syncopal spell-patient does not remember about having prodromal symptoms.  " EMS report nonsustained VT.  EKG and telemetry reviewed showing frequent polymorphic PVCs as singlets, couplets, triplets and some quadruplets , no sustained VT.  Echocardiogram shows mild global left ventricular hypokinesis.  Elevated troponin without significant delta and ischemic changes.  Patient denies having any chest pain.  Unremarkable coronary angiogram.  He had 20 beats VT run on 6/6/2025.  He was started on amiodarone but QTc prolonged in 24 hours.  Discontinue amiodarone.  Continue low-dose metoprolol because of low blood pressure.  Essential hypertension-BP running on the lower side-antihypertensive held.  Bilateral DVT and PE in 2020-thought to be provoked.  He has been on Eliquis 2.5 mg p.o. twice daily  Acute on chronic chronic kidney disease-sees nephrology  Home medications include Lasix 40 mg p.o. daily, atorvastatin, aspirin, apixaban, lisinopril    Blood pressure remains on the lower side-he is physically weak and bedridden for the most part.  He had a sedentary lifestyle in the past.  Heart rate in the 90s and continues to have frequent monomorphic PVCs in singles, couplets and triplets.  No ventricular runs in the last 24 hours  I will increase metoprolol dose and add midodrine to treat blood pressure.  Will get EP see him in a.m.  CO home with kath Navarro MD  06/08/25  08:36 EDT

## 2025-06-09 LAB
ALBUMIN SERPL-MCNC: 2.8 G/DL (ref 3.5–5.2)
ALBUMIN/GLOB SERPL: 1 G/DL
ALP SERPL-CCNC: 90 U/L (ref 39–117)
ALT SERPL W P-5'-P-CCNC: 22 U/L (ref 1–41)
ANION GAP SERPL CALCULATED.3IONS-SCNC: 8.9 MMOL/L (ref 5–15)
AST SERPL-CCNC: 39 U/L (ref 1–40)
BASOPHILS # BLD AUTO: 0.03 10*3/MM3 (ref 0–0.2)
BASOPHILS NFR BLD AUTO: 0.8 % (ref 0–1.5)
BILIRUB SERPL-MCNC: 0.4 MG/DL (ref 0–1.2)
BUN SERPL-MCNC: 13 MG/DL (ref 8–23)
BUN/CREAT SERPL: 9 (ref 7–25)
CALCIUM SPEC-SCNC: 8.7 MG/DL (ref 8.6–10.5)
CHLORIDE SERPL-SCNC: 102 MMOL/L (ref 98–107)
CO2 SERPL-SCNC: 25.1 MMOL/L (ref 22–29)
CREAT SERPL-MCNC: 1.44 MG/DL (ref 0.76–1.27)
DEPRECATED RDW RBC AUTO: 56.1 FL (ref 37–54)
EGFRCR SERPLBLD CKD-EPI 2021: 48.8 ML/MIN/1.73
EOSINOPHIL # BLD AUTO: 0.2 10*3/MM3 (ref 0–0.4)
EOSINOPHIL NFR BLD AUTO: 5.1 % (ref 0.3–6.2)
ERYTHROCYTE [DISTWIDTH] IN BLOOD BY AUTOMATED COUNT: 14.6 % (ref 12.3–15.4)
GLOBULIN UR ELPH-MCNC: 2.7 GM/DL
GLUCOSE SERPL-MCNC: 98 MG/DL (ref 65–99)
HCT VFR BLD AUTO: 30.3 % (ref 37.5–51)
HGB BLD-MCNC: 10.3 G/DL (ref 13–17.7)
IMM GRANULOCYTES # BLD AUTO: 0.02 10*3/MM3 (ref 0–0.05)
IMM GRANULOCYTES NFR BLD AUTO: 0.5 % (ref 0–0.5)
LYMPHOCYTES # BLD AUTO: 1.05 10*3/MM3 (ref 0.7–3.1)
LYMPHOCYTES NFR BLD AUTO: 26.9 % (ref 19.6–45.3)
MCH RBC QN AUTO: 36.1 PG (ref 26.6–33)
MCHC RBC AUTO-ENTMCNC: 34 G/DL (ref 31.5–35.7)
MCV RBC AUTO: 106.3 FL (ref 79–97)
MONOCYTES # BLD AUTO: 0.51 10*3/MM3 (ref 0.1–0.9)
MONOCYTES NFR BLD AUTO: 13 % (ref 5–12)
NEUTROPHILS NFR BLD AUTO: 2.1 10*3/MM3 (ref 1.7–7)
NEUTROPHILS NFR BLD AUTO: 53.7 % (ref 42.7–76)
PLATELET # BLD AUTO: 122 10*3/MM3 (ref 140–450)
PMV BLD AUTO: 9.1 FL (ref 6–12)
POTASSIUM SERPL-SCNC: 3.9 MMOL/L (ref 3.5–5.2)
PROT SERPL-MCNC: 5.5 G/DL (ref 6–8.5)
RBC # BLD AUTO: 2.85 10*6/MM3 (ref 4.14–5.8)
SODIUM SERPL-SCNC: 136 MMOL/L (ref 136–145)
WBC NRBC COR # BLD AUTO: 3.91 10*3/MM3 (ref 3.4–10.8)

## 2025-06-09 PROCEDURE — 85025 COMPLETE CBC W/AUTO DIFF WBC: CPT | Performed by: STUDENT IN AN ORGANIZED HEALTH CARE EDUCATION/TRAINING PROGRAM

## 2025-06-09 PROCEDURE — 25010000002 THIAMINE HCL 200 MG/2ML SOLUTION: Performed by: STUDENT IN AN ORGANIZED HEALTH CARE EDUCATION/TRAINING PROGRAM

## 2025-06-09 PROCEDURE — 99232 SBSQ HOSP IP/OBS MODERATE 35: CPT | Performed by: NURSE PRACTITIONER

## 2025-06-09 PROCEDURE — 80053 COMPREHEN METABOLIC PANEL: CPT | Performed by: STUDENT IN AN ORGANIZED HEALTH CARE EDUCATION/TRAINING PROGRAM

## 2025-06-09 PROCEDURE — 97535 SELF CARE MNGMENT TRAINING: CPT

## 2025-06-09 RX ORDER — ATORVASTATIN CALCIUM 80 MG/1
80 TABLET, FILM COATED ORAL DAILY
Status: DISCONTINUED | OUTPATIENT
Start: 2025-06-09 | End: 2025-06-16 | Stop reason: HOSPADM

## 2025-06-09 RX ORDER — LEVOTHYROXINE SODIUM 75 UG/1
75 TABLET ORAL
Status: DISCONTINUED | OUTPATIENT
Start: 2025-06-10 | End: 2025-06-16 | Stop reason: HOSPADM

## 2025-06-09 RX ORDER — ASPIRIN 81 MG/1
81 TABLET, CHEWABLE ORAL DAILY
Status: DISCONTINUED | OUTPATIENT
Start: 2025-06-09 | End: 2025-06-16 | Stop reason: HOSPADM

## 2025-06-09 RX ADMIN — ATORVASTATIN CALCIUM 80 MG: 80 TABLET, FILM COATED ORAL at 13:51

## 2025-06-09 RX ADMIN — ASPIRIN 81 MG CHEWABLE TABLET 81 MG: 81 TABLET CHEWABLE at 13:51

## 2025-06-09 RX ADMIN — THIAMINE HYDROCHLORIDE 200 MG: 100 INJECTION, SOLUTION INTRAMUSCULAR; INTRAVENOUS at 21:35

## 2025-06-09 RX ADMIN — METOPROLOL SUCCINATE 50 MG: 25 TABLET, EXTENDED RELEASE ORAL at 09:10

## 2025-06-09 RX ADMIN — FOLIC ACID 1 MG: 1 TABLET ORAL at 09:10

## 2025-06-09 RX ADMIN — MIDODRINE HYDROCHLORIDE 5 MG: 5 TABLET ORAL at 12:10

## 2025-06-09 RX ADMIN — RANOLAZINE 1000 MG: 500 TABLET, FILM COATED, EXTENDED RELEASE ORAL at 09:10

## 2025-06-09 RX ADMIN — METOPROLOL SUCCINATE 50 MG: 25 TABLET, EXTENDED RELEASE ORAL at 21:35

## 2025-06-09 RX ADMIN — APIXABAN 2.5 MG: 2.5 TABLET, FILM COATED ORAL at 09:10

## 2025-06-09 RX ADMIN — Medication 1 TABLET: at 09:10

## 2025-06-09 RX ADMIN — THIAMINE HYDROCHLORIDE 200 MG: 100 INJECTION, SOLUTION INTRAMUSCULAR; INTRAVENOUS at 13:51

## 2025-06-09 RX ADMIN — MIDODRINE HYDROCHLORIDE 5 MG: 5 TABLET ORAL at 17:36

## 2025-06-09 RX ADMIN — MIDODRINE HYDROCHLORIDE 5 MG: 5 TABLET ORAL at 09:10

## 2025-06-09 RX ADMIN — RANOLAZINE 1000 MG: 500 TABLET, FILM COATED, EXTENDED RELEASE ORAL at 21:35

## 2025-06-09 RX ADMIN — THIAMINE HYDROCHLORIDE 200 MG: 100 INJECTION, SOLUTION INTRAMUSCULAR; INTRAVENOUS at 05:34

## 2025-06-09 RX ADMIN — APIXABAN 2.5 MG: 2.5 TABLET, FILM COATED ORAL at 21:35

## 2025-06-09 NOTE — PLAN OF CARE
Goal Outcome Evaluation:   Patient alert forgetful confused follows commands no c/o pain or nausea noted. CIWA scores 6 at this time. No acute distress noted will continue to monitor

## 2025-06-09 NOTE — CONSULTS
Electrophysiology Hospital Consult            Patient Name: Bryan Landers  Age/Sex: 80 y.o. male  : 1945  MRN: [id number]    Date of Admission: 2025  Date of Encounter Visit: 25  Encounter Provider: NIRAJ Farooq  Referring Provider: Joshua Wheat MD  Place of Service: [facility]  Patient Care Team:  Jose Fonseca MD as PCP - General (Family Medicine)  Jose Michelle MD as Consulting Physician (Radiation Oncology)  Roldan Mccarthy MD as Consulting Physician (Urology)  Clarence Nieves MD (Hematology)  Grover Harris DPM as Consulting Physician (Podiatry)  Skip Wall MD PhD as Consulting Physician (Thoracic Surgery)      Subjective:   EP Consultation for: PVC, NSVT    Chief Complaint: syncope    History of Present Illness:  Bryan Landers is a 80 y.o. male with a past medical history of HTN, HLD, DM who presented to the ED on 2025 following a syncopal episode.      Per EMS patient had some runs of NSVT on the way to ED.  He denied chest pain or shortness of breath at that time.      In the ED he had troponin of 50/51, and a proBNP of 1,950.  General cardiology was asked to evaluate.     It was felt that his syncope likely had an orthostatic/hypotensive component.  His antihypertensive medications have been on hold since admission.  He was started on IV fluids, low-dose metoprolol, and given electrolyte replacement.      He underwent a coronary angiogram that was found to be unremarkable.     Over the weekend on telemetry and EKG he was having more frequent ectopy. He was started on amiodarone with a loading dose and then a continuous infusion.  The amiodarone was thought to have prolonged his Qtc, so it was discontinued. He was continued on PO metoprolol.    He denies chest pain, palpitations, or shortness of breath this morning. He states that he is feeling better now than he did at the beginning of his  admission.       Past Medical History:  Past Medical History:   Diagnosis Date    At risk for sleep apnea     Bladder mass     Bruises easily     Diabetes mellitus     Disease of thyroid gland     Elevated cholesterol     GI bleed     Gross hematuria 09/22/2021    History of hip replacement, total, bilateral 12/13/2018    History of transfusion     Pneumothorax 02/27/2024    Poor historian     Short-term memory loss     Vitamin D deficiency        Past Surgical History:   Procedure Laterality Date    CARDIAC CATHETERIZATION N/A 6/6/2025    Procedure: Left Heart Cath;  Surgeon: Harjinder Aguilera MD;  Location: [facility] CATH INVASIVE LOCATION;  Service: Cardiology;  Laterality: N/A;    COLONOSCOPY  09/2016    COLONOSCOPY N/A 9/28/2018    Procedure: COLONOSCOPY;  Surgeon: Olaf Gomez MD;  Location: [facility] OR;  Service: Gastroenterology    COLONOSCOPY N/A 1/7/2019    Procedure: COLONOSCOPY;  Surgeon: Rosa Jack MD;  Location: [facility] OR;  Service: Gastroenterology    CYSTOSCOPY BLADDER BIOPSY N/A 9/22/2021    Procedure: CYSTOSCOPY BLADDER BIOPSY;  Surgeon: Roldan Mccarthy MD;  Location: [facility] MAIN OR;  Service: Urology;  Laterality: N/A;    HERNIA REPAIR Bilateral     PROSTATE ULTRASOUND BIOPSY      SIGMOIDOSCOPY N/A 10/2/2018    Procedure: FLEXIBLE SIGMOIDOSCOPY:  APC cautery bleeding using 60 joules;  Surgeon: Olaf Gomez MD;  Location: [facility] ENDOSCOPY;  Service: Gastroenterology    TONSILLECTOMY      TOTAL HIP ARTHROPLASTY Bilateral 2007       Home Medications:   Medications Prior to Admission   Medication Sig Dispense Refill Last Dose/Taking    allopurinol (ZYLOPRIM) 100 MG tablet Take 1 tablet by mouth Daily. Indications: Gout 90 tablet 3 6/4/2025    apixaban (Eliquis) 2.5 MG tablet tablet Take 1 tablet by mouth Every 12 (Twelve) Hours. 60 tablet 11 6/4/2025    aspirin 81 MG chewable tablet Chew 1 tablet Daily. Indications: Blood Clot Within a Vein, DVTs AND PE   6/4/2025     atorvastatin (LIPITOR) 80 MG tablet Take 1 tablet by mouth Daily. Indications: High Amount of Fats in the Blood 90 tablet 3 6/4/2025    cholecalciferol (VITAMIN D3) 1000 UNITS tablet Take 1 tablet by mouth Daily. Indications: Vitamin D Deficiency   6/4/2025    Cyanocobalamin (VITAMIN B 12 PO) Take 1,000 mg by mouth Every Morning. Indications: SUPPLEMENT   6/4/2025    furosemide (LASIX) 40 MG tablet Take 1 tablet by mouth Daily. Indications: Edema 90 tablet 3 6/4/2025    levothyroxine (SYNTHROID, LEVOTHROID) 75 MCG tablet TAKE 1 TABLET BY MOUTH EVERY MORNING INDICATIONS: UNDER ACTIVE THYROID 90 tablet 3 6/4/2025    lisinopril (PRINIVIL,ZESTRIL) 2.5 MG tablet Take 1 tablet by mouth Daily. Indications: High Blood Pressure 90 tablet 4 6/4/2025    memantine (NAMENDA) 10 MG tablet TAKE 2 TABLETS BY MOUTH EVERY MORNING 180 tablet 3 6/4/2025    Multiple Vitamins-Minerals (MULTIVITAL PLATINUM PO) Take 1 tablet by mouth Daily. HOLD FOR SURGERY  Indications: SUPPLEMENT   6/4/2025    Orgovyx 120 MG tablet tablet Take 1 tablet by mouth Daily.   6/4/2025    potassium chloride 10 MEQ CR tablet Take 1 tablet by mouth Daily With Breakfast. Indications: Low Amount of Potassium in the Blood, take with Lasix 90 tablet 3 6/4/2025    Xtandi 40 MG tablet tablet Take 4 tablets by mouth Daily. 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 Indications: Spasm of Lung Air Passages (Patient not taking: Reported on 3/20/2025) 18 g 0        Allergies:  Allergies   Allergen Reactions    Nsaids GI Bleeding    Aricept [Donepezil] Diarrhea       Past Social History:  Social History     Socioeconomic History    Marital status:      Spouse name: Chloe    Number of children: 2   Tobacco Use    Smoking status: Former     Current packs/day: 0.00     Average  packs/day: 0.3 packs/day for 5.0 years (1.3 ttl pk-yrs)     Types: Cigars, Cigarettes     Start date: 1981     Quit date: 1986     Years since quittin.4     Passive exposure: Never    Smokeless tobacco: Never   Vaping Use    Vaping status: Never Used   Substance and Sexual Activity    Alcohol use: Yes     Alcohol/week: 32.0 - 34.0 standard drinks of alcohol     Types: 4 - 6 Shots of liquor, 28 Standard drinks or equivalent per week     Comment: 2-3 double vodkas a night    Drug use: No    Sexual activity: Yes     Partners: Female     Birth control/protection: Post-menopausal       Past Family History:  Family History   Problem Relation Age of Onset    Stroke Mother     Stroke Father     No Known Problems Brother     Colon cancer Neg Hx     Colon polyps Neg Hx     Malig Hyperthermia Neg Hx        Review of Systems: All systems reviewed. Pertinent positives identified in HPI. All other systems are negative.     14 point ROS was performed and is negative except as outlined in HPI.     Objective:     Objective:  Vital Signs (last 24 hours)         06 0700  / 0659  0700  / 1318   Most Recent      Temp (°F) 97.3 -  98.2    98.1 -  98.2     98.2 (36.8) 06/ 1206    Heart Rate 68 -  95    72 -  90     90 06/09 1206    Resp   18      18     18 06/09 1206    BP 91/78 -  128/78    115/65 -  118/71     118/71 06/09 1206    SpO2 (%) 93 -  97    95 -  100     100 /09 1206          Temp:  [97.8 °F (36.6 °C)-98.2 °F (36.8 °C)] 98.2 °F (36.8 °C)  Heart Rate:  [68-91] 90  Resp:  [18] 18  BP: (115-128)/(65-86) 118/71  Body mass index is 34.32 kg/m².        Physical Exam:     General Appearance: No acute distress, well developed and well nourished.   Respiratory: No signs of respiratory distress. Respiration rhythm and depth normal.   Cardiovascular:  Heart Rate and Rhythm: Normal, Heart Sounds: Normal S1 and S2. No S3 or S4 noted  Skin: Warm and dry.   Psychiatric: Patient alert and oriented to person,  place, and time. Speech and behavior appropriate. Normal mood and affect.      Lab Review:     Results from last 7 days   Lab Units 06/09/25  0632 06/08/25  1224 06/08/25  0640 06/07/25  0540 06/06/25  0910 06/05/25  0557 06/04/25  2213   SODIUM mmol/L 136 139 138 137 137 138 138   POTASSIUM mmol/L 3.9 4.3 4.3 4.1 4.0 5.2 4.4   CHLORIDE mmol/L 102 103 103 103 103 98 99   CO2 mmol/L 25.1 22.5 27.3 23.4 26.0 27.0 27.2   BUN mg/dL 13.0 14.0 15.0 19.0 21.0 24.0* 25.0*   CREATININE mg/dL 1.44* 1.60* 1.55* 1.59* 1.75* 2.14* 2.27*   GLUCOSE mg/dL 98 110* 107* 117* 145* 123* 111*   CALCIUM mg/dL 8.7 9.3 8.8 8.7 8.6 8.7 9.3   AST (SGOT) U/L 39  --  39 45* 38  --  32   ALT (SGPT) U/L 22  --  19 22 19  --  20     Results from last 7 days   Lab Units 06/04/25  2334 06/04/25  2213   HSTROP T ng/L 51* 50*     Results from last 7 days   Lab Units 06/09/25  0632   WBC 10*3/mm3 3.91   HEMOGLOBIN g/dL 10.3*   HEMATOCRIT % 30.3*   PLATELETS 10*3/mm3 122*     Results from last 7 days   Lab Units 06/04/25  2306   INR  1.24*   APTT seconds 23.7         Results from last 7 days   Lab Units 06/08/25  0640   MAGNESIUM mg/dL 1.8         Results from last 7 days   Lab Units 06/04/25  2213   PROBNP pg/mL 1,950.0*                 EKG:             I personally viewed and interpreted the patient's EKG/Telemetry tracings.    Assessment:       Syncope    Essential hypertension    Stage 3b chronic kidney disease    Cognitive decline    Acquired hypothyroidism    Alcohol use disorder        Plan:   Dr. Motta and I saw this patient.        #PVC/NSVT/syncope  -- PO metoprolol appears to be reducing PVC burden on telemetry, continue as tolerated  -- syncope likely with orthostatic/dehydration component, continue adequate PO intake  -- will set him up with a 2 week ZIO to further evaluate  -- will need a 4-6 week follow up in EP office  -- okay for discharge from EP standpoint    Thank you for allowing me to participate in the care of Bryan Landers. Feel  free to contact me directly with any further questions or concerns.    NIRAJ Farooq  [facility]  06/09/25  14:14 EDT

## 2025-06-09 NOTE — THERAPY TREATMENT NOTE
Patient Name: Bryan Landers  : 1945    MRN: [id number]                              Today's Date: 2025       Admit Date: 2025    Visit Dx:     ICD-10-CM ICD-9-CM   1. Acute renal failure superimposed on chronic kidney disease, unspecified acute renal failure type, unspecified CKD stage  N17.9 584.9    N18.9 585.9   2. Syncope, unspecified syncope type  R55 780.2   3. Multifocal PVCs  I49.3 427.69   4. Closed head injury, initial encounter  S09.90XA 959.01   5. Anticoagulated  Z79.01 V58.61   6. Anemia, unspecified type  D64.9 285.9   7. Hyperglycemia  R73.9 790.29   8. Elevated troponin  R79.89 790.6   9. Palpitations  R00.2 785.1     Patient Active Problem List   Diagnosis    Essential hypertension    Mixed hyperlipidemia    Arthritis    Type 2 diabetes mellitus with chronic kidney disease, without long-term current use of insulin    Severely overweight    Idiopathic chronic gout of left knee    Medication monitoring encounter    Microalbuminuria due to type 2 diabetes mellitus    History of prostate cancer    Stage 3b chronic kidney disease    Medicare annual wellness visit, subsequent    Radiation proctitis    Cognitive decline    Acquired hypothyroidism    B12 deficiency    Sinus tachycardia by electrocardiogram    Chronic pulmonary embolism without acute cor pulmonale    Chronic deep vein thrombosis (DVT) of popliteal vein of both lower extremities    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    Bladder mass    Dependent edema    Anemia of chronic kidney failure, stage 3 (moderate)    Deep venous thrombosis    Class 2 severe obesity with serious comorbidity in adult    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    Syncope    Alcohol use disorder     Past Medical History:   Diagnosis Date    At risk for sleep apnea     Bladder mass     Bruises easily     Diabetes mellitus     Disease of thyroid gland     Elevated cholesterol     GI bleed     Gross hematuria 2021    History of hip  replacement, total, bilateral 12/13/2018    History of transfusion     Pneumothorax 02/27/2024    Poor historian     Short-term memory loss     Vitamin D deficiency      Past Surgical History:   Procedure Laterality Date    CARDIAC CATHETERIZATION N/A 6/6/2025    Procedure: Left Heart Cath;  Surgeon: Harjinder Aguilera MD;  Location: [facility] CATH INVASIVE LOCATION;  Service: Cardiology;  Laterality: N/A;    COLONOSCOPY  09/2016    COLONOSCOPY N/A 9/28/2018    Procedure: COLONOSCOPY;  Surgeon: Olaf Gomez MD;  Location: [facility] OR;  Service: Gastroenterology    COLONOSCOPY N/A 1/7/2019    Procedure: COLONOSCOPY;  Surgeon: Rosa Jack MD;  Location: [facility] OR;  Service: Gastroenterology    CYSTOSCOPY BLADDER BIOPSY N/A 9/22/2021    Procedure: CYSTOSCOPY BLADDER BIOPSY;  Surgeon: Roldan Mccarthy MD;  Location: [facility] MAIN OR;  Service: Urology;  Laterality: N/A;    HERNIA REPAIR Bilateral     PROSTATE ULTRASOUND BIOPSY      SIGMOIDOSCOPY N/A 10/2/2018    Procedure: FLEXIBLE SIGMOIDOSCOPY:  APC cautery bleeding using 60 joules;  Surgeon: Olaf Gomez MD;  Location: [facility] ENDOSCOPY;  Service: Gastroenterology    TONSILLECTOMY      TOTAL HIP ARTHROPLASTY Bilateral 2007      General Information       Row Name 06/09/25 1513          OT Time and Intention    Document Type therapy note (daily note)  -MM     Mode of Treatment occupational therapy;individual therapy  -MM     Patient Effort good  -MM     Symptoms Noted During/After Treatment dizziness  -MM       Row Name 06/09/25 1513          General Information    Patient Profile Reviewed yes  -MM     Existing Precautions/Restrictions fall  -MM       Row Name 06/09/25 1513          Cognition    Orientation Status (Cognition) oriented x 3  intermittent confusion  -MM       Row Name 06/09/25 1513          Safety Issues/Impairments Affecting Functional Mobility    Safety Issues Affecting Function (Mobility) impulsivity;problem-solving  -MM      Impairments Affecting Function (Mobility) endurance/activity tolerance;strength;balance  -MM               User Key  (r) = Recorded By, (t) = Taken By, (c) = Cosigned By      Initials Name Provider Type    Danielle Nunez OT Occupational Therapist                     Mobility/ADL's       Row Name 06/09/25 1514          Bed Mobility    Bed Mobility supine-sit;sit-supine  -MM     Supine-Sit [place] (Bed Mobility) minimum assist (75% patient effort)  -MM     Sit-Supine [place] (Bed Mobility) minimum assist (75% patient effort)  -MM     Assistive Device (Bed Mobility) bed rails  -MM     Comment, (Bed Mobility) assist for BLE into bed  -MM       Row Name 06/09/25 1514          Transfers    Transfers sit-stand transfer;stand-sit transfer  -MM       Row Name 06/09/25 1514          Sit-Stand Transfer    Sit-Stand [place] (Transfers) minimum assist (75% patient effort)  -MM     Assistive Device (Sit-Stand Transfers) walker, front-wheeled  -MM       Row Name 06/09/25 1514          Stand-Sit Transfer    Stand-Sit [place] (Transfers) contact guard;verbal cues  -       Row Name 06/09/25 1514          Functional Mobility    Functional Mobility- Ind. Level contact guard assist  -MM     Functional Mobility- Device walker, front-wheeled  -MM     Functional Mobility- Comment short distance to sinkside  -       Row Name 06/09/25 1514          Activities of Daily Living    BADL Assessment/Intervention grooming  -       Row Name 06/09/25 1514          Grooming Assessment/Training    [place] Level (Grooming) set up;oral care regimen;wash face, hands;contact guard assist  -MM     Position (Grooming) sink side;supported standing  -MM               User Key  (r) = Recorded By, (t) = Taken By, (c) = Cosigned By      Initials Name Provider Type    Danielle Nunez OT Occupational Therapist                   Obj/Interventions       Row Name 06/09/25 1515          Motor Skills    Functional Endurance fair,  limited by dizziness  -MM       Row Name 06/09/25 1515          Balance    Balance Assessment sitting static balance;sitting dynamic balance;sit to stand dynamic balance;standing dynamic balance;standing static balance  -MM     Static Sitting Balance standby assist  -MM     Dynamic Sitting Balance standby assist  -MM     Position, Sitting Balance sitting edge of bed;unsupported  -MM     Sit to Stand Dynamic Balance contact guard;minimal assist  -MM     Static Standing Balance contact guard  -MM     Dynamic Standing Balance contact guard  -MM     Position/Device Used, Standing Balance supported;walker, front-wheeled  -MM     Balance Interventions occupation based/functional task;minimal challenge  -MM     Comment, Balance no overt LOBs  -MM               User Key  (r) = Recorded By, (t) = Taken By, (c) = Cosigned By      Initials Name Provider Type    Danielle Nunez OT Occupational Therapist                   Goals/Plan    No documentation.                  Clinical Impression       Row Name 06/09/25 1515          Pain Assessment    Pretreatment Pain Rating 0/10 - no pain  -MM     Posttreatment Pain Rating 0/10 - no pain  -MM       Row Name 06/09/25 1515          Plan of Care Review    Plan of Care Reviewed With patient  -MM     Progress improving  -     Outcome Evaluation pt seen this date for OT tx session in PM. Pt is agreeable, demo's improved activity tolerance with ADLs this date with ability to demo sinkside ADL tasks with CGA. Limited by some mild dizziness however no overt LOBs. He uses rwx this date, following commands. Some confusion noted throughout and min cues for safety required. continue to progress, rec SNF at d/c.  -MM       Row Name 06/09/25 1515          Therapy Plan Review/Discharge Plan (OT)    Anticipated Discharge Disposition (OT) skilled nursing facility  -       Row Name 06/09/25 1515          Vital Signs    O2 Delivery Pre Treatment room air  -       Row Name 06/09/25 1516           Positioning and Restraints    Pre-Treatment Position in bed  -MM     Post Treatment Position bed  -MM     In Bed notified nsg;fowlers;call light within reach;encouraged to call for assist;exit alarm on;side rails up x3  -MM               User Key  (r) = Recorded By, (t) = Taken By, (c) = Cosigned By      Initials Name Provider Type    Danielle Nunez OT Occupational Therapist                   Outcome Measures       Row Name 06/09/25 1517          How much help from another is currently needed...    Putting on and taking off regular lower body clothing? 2  -MM     Bathing (including washing, rinsing, and drying) 3  -MM     Toileting (which includes using toilet bed pan or urinal) 3  -MM     Putting on and taking off regular upper body clothing 3  -MM     Taking care of personal grooming (such as brushing teeth) 3  -MM     Eating meals 4  -MM     AM-PAC 6 Clicks Score (OT) 18  -MM       Row Name 06/09/25 0911          How much help from another person do you currently need...    Turning from your back to your side while in flat bed without using bedrails? 3  -MS     Moving from lying on back to sitting on the side of a flat bed without bedrails? 2  -MS     Moving to and from a bed to a chair (including a wheelchair)? 3  -MS     Standing up from a chair using your arms (e.g., wheelchair, bedside chair)? 3  -MS     Climbing 3-5 steps with a railing? 2  -MS     To walk in hospital room? 3  -MS     AM-PAC 6 Clicks Score (PT) 16  -MS     Highest Level of Mobility Goal Stand (1 or More Minutes)-5  -MS       Row Name 06/09/25 1517          Functional Assessment    Outcome Measure Options AM-PAC 6 Clicks Daily Activity (OT)  -MM               User Key  (r) = Recorded By, (t) = Taken By, (c) = Cosigned By      Initials Name Provider Type    Jose Carroll, RN Registered Nurse    Danielle Nunez OT Occupational Therapist                      OT Recommendation and Plan  Planned Therapy Interventions (OT): activity  tolerance training, BADL retraining, cognitive/visual perception retraining, neuromuscular control/coordination retraining, patient/caregiver education/training, transfer/mobility retraining, strengthening exercise, occupation/activity based interventions, functional balance retraining, ROM/therapeutic exercise  Therapy Frequency (OT): 5 times/wk  Plan of Care Review  Plan of Care Reviewed With: patient  Progress: improving  Outcome Evaluation: pt seen this date for OT tx session in PM. Pt is agreeable, demo's improved activity tolerance with ADLs this date with ability to demo sinkside ADL tasks with CGA. Limited by some mild dizziness however no overt LOBs. He uses rwx this date, following commands. Some confusion noted throughout and min cues for safety required. continue to progress, rec SNF at d/c.     Time Calculation:   Evaluation Complexity (OT)  Review Occupational Profile/Medical/Therapy History Complexity: expanded/moderate complexity  Assessment, Occupational Performance/Identification of Deficit Complexity: 3-5 performance deficits  Clinical Decision Making Complexity (OT): detailed assessment/moderate complexity  Overall Complexity of Evaluation (OT): moderate complexity     Time Calculation- OT       Row Name 06/09/25 1518             Time Calculation- OT    OT Start Time 1437  -MM      OT Stop Time 1452  -MM      OT Time Calculation (min) 15 min  -MM      Total Timed Code Minutes- OT 15 minute(s)  -MM      OT Received On 06/09/25  -MM      OT - Next Appointment 06/10/25  -MM         Timed Charges    66514 - OT Self Care/Mgmt Minutes 15  -MM         Total Minutes    Timed Charges Total Minutes 15  -MM       Total Minutes 15  -MM                User Key  (r) = Recorded By, (t) = Taken By, (c) = Cosigned By      Initials Name Provider Type    Danielle Nunez OT Occupational Therapist                  Therapy Charges for Today       Code Description Service Date Service Provider Modifiers Qty     [id number] HC OT SELF CARE/MGMT/TRAIN EA 15 MIN 6/9/2025 Danielle Red OT GO 1                 Danielle Red OT  6/9/2025

## 2025-06-09 NOTE — PROGRESS NOTES
Name: Bryan Landers ADMIT: 2025   : 1945  PCP: Jose Fonseca MD    MRN: [id number] LOS: 4 days   AGE/SEX: 80 y.o. male  ROOM: [facility]     Subjective   Subjective   2025  Patient seen and examined at bedside, he is on room air states he is feeling better.  BP stable.  EP to evaluate today.  Case management to start pre-CERT to SNF       Objective   Objective   Vital Signs  Temp:  [97.8 °F (36.6 °C)-98.2 °F (36.8 °C)] 98.2 °F (36.8 °C)  Heart Rate:  [68-91] 90  Resp:  [18] 18  BP: (115-128)/(65-86) 118/71  SpO2:  [93 %-100 %] 100 %  on   ;   Device (Oxygen Therapy): room air  Body mass index is 34.32 kg/m².  Physical Exam  Constitutional:       General: He is not in acute distress.     Appearance: He is obese.   HENT:      Head: Normocephalic and atraumatic.      Nose: Nose normal. No congestion.      Mouth/Throat:      Pharynx: Oropharynx is clear. No oropharyngeal exudate.   Eyes:      General: No scleral icterus.  Cardiovascular:      Rate and Rhythm: Normal rate and regular rhythm.      Heart sounds: No murmur heard.     No friction rub. No gallop.   Pulmonary:      Effort: No respiratory distress.      Breath sounds: No wheezing or rales.   Abdominal:      General: There is no distension.      Tenderness: There is no abdominal tenderness. There is no guarding.   Musculoskeletal:         General: No swelling, deformity or signs of injury.      Cervical back: Normal range of motion. No rigidity.   Skin:     Coloration: Skin is not jaundiced.      Findings: No bruising or lesion.   Neurological:      General: No focal deficit present.      Mental Status: He is alert.      Motor: Weakness present.       Results Review     I reviewed the patient's new clinical results.  Results from last 7 days   Lab Units 25  0632 25  0640 25  0540 25  0649   WBC 10*3/mm3 3.91 3.59 3.48 4.06   HEMOGLOBIN g/dL 10.3* 9.4* 9.8* 9.4*   PLATELETS 10*3/mm3 122* 124* 139* 93*     Results  "from last 7 days   Lab Units 06/09/25  0632 06/08/25  1224 06/08/25  0640 06/07/25  0540   SODIUM mmol/L 136 139 138 137   POTASSIUM mmol/L 3.9 4.3 4.3 4.1   CHLORIDE mmol/L 102 103 103 103   CO2 mmol/L 25.1 22.5 27.3 23.4   BUN mg/dL 13.0 14.0 15.0 19.0   CREATININE mg/dL 1.44* 1.60* 1.55* 1.59*   GLUCOSE mg/dL 98 110* 107* 117*   EGFR mL/min/1.73 48.8* 43.0* 44.7* 43.3*     Results from last 7 days   Lab Units 06/09/25  0632 06/08/25  0640 06/07/25  0540 06/06/25  0910   ALBUMIN g/dL 2.8* 2.7* 2.8* 2.6*   BILIRUBIN mg/dL 0.4 0.3 0.3 0.5   ALK PHOS U/L 90 93 95 97   AST (SGOT) U/L 39 39 45* 38   ALT (SGPT) U/L 22 19 22 19     Results from last 7 days   Lab Units 06/09/25  0632 06/08/25  1224 06/08/25  0640 06/07/25  0540 06/06/25  0910 06/05/25  0557 06/04/25  2213   CALCIUM mg/dL 8.7 9.3 8.8 8.7 8.6   < > 9.3   ALBUMIN g/dL 2.8*  --  2.7* 2.8* 2.6*  --  3.2*   MAGNESIUM mg/dL  --   --  1.8  --  2.3  --  1.6    < > = values in this interval not displayed.       No results found for: \"HGBA1C\", \"POCGLU\"    No radiology results for the last day    I have personally reviewed all medications:  Scheduled Medications  apixaban, 2.5 mg, Oral, Q12H  folic acid, 1 mg, Oral, Daily  metoprolol succinate XL, 50 mg, Oral, BID  midodrine, 5 mg, Oral, TID AC  multivitamin with minerals, 1 tablet, Oral, Daily  ranolazine, 1,000 mg, Oral, Q12H  thiamine (B-1) IV, 200 mg, Intravenous, Q8H   Followed by  [START ON 6/10/2025] thiamine, 100 mg, Oral, Daily    Infusions   Diet  Diet: Cardiac; Healthy Heart (2-3 Na+); Fluid Consistency: Thin (IDDSI 0)    I have personally reviewed:  [x]  Laboratory   [x]  Microbiology   [x]  Radiology   [x]  EKG/Telemetry  [x]  Cardiology/Vascular   []  Pathology    []  Records       Assessment/Plan     Active Hospital Problems    Diagnosis  POA    **Syncope [R55]  Yes    Alcohol use disorder [F10.90]  Yes    Acquired hypothyroidism [E03.9]  Yes    Cognitive decline [R41.89]  Yes    Stage 3b chronic " kidney disease [N18.32]  Yes    Essential hypertension [I10]  Yes      Resolved Hospital Problems   No resolved problems to display.       80 y.o. male admitted with Syncope.    #Syncope  #Fall  #Orthostasis    -CT head without acute mass, shift, hemorrhage    -CT C-spine without acute fracture or subluxation    -CXR without acute cardiopulmonary process    -Midodrine 5 mg p.o. 3 times daily    -Hold home lisinopril    -Case management working on pre-CERT to SNF    - Echo on 6/5/2025 showed EF 46.2% with diastolic dysfunction    #PVCs  #NSVT  #History of PE/DVT  #HFpEF, chronic    - Amiodarone discontinued due to prolonged Qtc    - Continue low-dose metoprolol    - Continue Eliquis 2.5 mg p.o. twice daily    - Patient underwent heart cath on 6/6/2025 which showed normal coronary arteries and normal left-sided filling pressures    - Resume home Lasix when cleared by nephrology    - Resume aspirin and statin    #Alcohol use disorder    -CIWA protocol    #JEFFREY on CKD    -Creatinine currently at baseline    - Nephrology following    #Hypothyroid    - Resume home Synthroid    #Hyperlipidemia    - Resume statin      Eliquis (home med) for DVT prophylaxis.  Full code.  Discussed with patient.  Anticipate discharge home with HH vs SNU facility in 1-2 days.  Expected Discharge Date: 6/9/2025; Expected Discharge Time:       DO [facility]  06/09/25  13:21 EDT

## 2025-06-09 NOTE — CASE MANAGEMENT/SOCIAL WORK
Continued Stay Note  [facility]     Patient Name: Bryan Landers  MRN: [id number]  Today's Date: 6/9/2025    Admit Date: 6/4/2025    Plan: [facility] PENDING precert.   Discharge Plan       Row Name 06/09/25 1325       Plan    Plan [facility] PENDING precert.    Plan Comments CCP spoke w/ pt's spouse Chloe who would like pt to go to SNF at [facility] before returning to his independent living apartment there.  S/W [facility]/ [facility] - the facility will start precert - await determination........Cyndie DELGADO/ ASHWINI                   Discharge Codes    No documentation.                 Expected Discharge Date and Time       Expected Discharge Date Expected Discharge Time    Jun 9, 2025               Cyndie Simental RN

## 2025-06-09 NOTE — PROGRESS NOTES
Nephrology Follow Up Note    Patient Identification:  Name: Bryan Landers MRN: [id number]  Age: 80 y.o. : 1945  Sex: male  Date:2025    Requesting Physician: As per consult order.  Reason for Consultation: ckd   Information from:patient/ family/ chart      Interval Hx   He is feeling ok and tolerating po    Bp near goal       The following medical history and medications personally reviewed by me:    Problem List:   Patient Active Problem List    Diagnosis     *Syncope [R55]     Alcohol use disorder [F10.90]     Class 2 severe obesity with serious comorbidity in adult [E66.812, E66.01]     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 [S22.41XK]     Anemia of chronic kidney failure, stage 3 (moderate) [N18.30, D63.1]     Dependent edema [R60.9]     Bladder mass [N32.89]     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 [D68.51]     Chronic deep vein thrombosis (DVT) of popliteal vein of both lower extremities [I82.533]     Deep venous thrombosis [I82.409]     Chronic pulmonary embolism without acute cor pulmonale [I27.82]     Sinus tachycardia by electrocardiogram [R00.0]     B12 deficiency [E53.8]     Acquired hypothyroidism [E03.9]     Cognitive decline [R41.89]     Radiation proctitis [K62.7]     Stage 3b chronic kidney disease [N18.32]     Medicare annual wellness visit, subsequent [Z00.00]     History of prostate cancer [Z85.46]     Microalbuminuria due to type 2 diabetes mellitus [E11.29, R80.9]     Type 2 diabetes mellitus with chronic kidney disease, without long-term current use of insulin [E11.22]     Severely overweight [E66.3]     Idiopathic chronic gout of left knee [M1A.0620]     Medication monitoring encounter [Z51.81]     Essential hypertension [I10]     Mixed hyperlipidemia [E78.2]     Arthritis [M19.90]        Past Medical History:  Past Medical History:   Diagnosis Date    At risk for sleep apnea     Bladder mass     Bruises easily     Diabetes mellitus     Disease of thyroid gland      Elevated cholesterol     GI bleed     Gross hematuria 09/22/2021    History of hip replacement, total, bilateral 12/13/2018    History of transfusion     Pneumothorax 02/27/2024    Poor historian     Short-term memory loss     Vitamin D deficiency        Past Surgical History:  Past Surgical History:   Procedure Laterality Date    CARDIAC CATHETERIZATION N/A 6/6/2025    Procedure: Left Heart Cath;  Surgeon: Harjinder Aguilera MD;  Location: [facility] CATH INVASIVE LOCATION;  Service: Cardiology;  Laterality: N/A;    COLONOSCOPY  09/2016    COLONOSCOPY N/A 9/28/2018    Procedure: COLONOSCOPY;  Surgeon: Olaf Gomez MD;  Location: [facility] OR;  Service: Gastroenterology    COLONOSCOPY N/A 1/7/2019    Procedure: COLONOSCOPY;  Surgeon: Rosa Jack MD;  Location: [facility] OR;  Service: Gastroenterology    CYSTOSCOPY BLADDER BIOPSY N/A 9/22/2021    Procedure: CYSTOSCOPY BLADDER BIOPSY;  Surgeon: Roldan Mccarthy MD;  Location: [facility] MAIN OR;  Service: Urology;  Laterality: N/A;    HERNIA REPAIR Bilateral     PROSTATE ULTRASOUND BIOPSY      SIGMOIDOSCOPY N/A 10/2/2018    Procedure: FLEXIBLE SIGMOIDOSCOPY:  APC cautery bleeding using 60 joules;  Surgeon: Olaf Gomez MD;  Location: [facility] ENDOSCOPY;  Service: Gastroenterology    TONSILLECTOMY      TOTAL HIP ARTHROPLASTY Bilateral 2007        Home Meds:   Medications Prior to Admission   Medication Sig Dispense Refill Last Dose/Taking    allopurinol (ZYLOPRIM) 100 MG tablet Take 1 tablet by mouth Daily. Indications: Gout 90 tablet 3 6/4/2025    apixaban (Eliquis) 2.5 MG tablet tablet Take 1 tablet by mouth Every 12 (Twelve) Hours. 60 tablet 11 6/4/2025    aspirin 81 MG chewable tablet Chew 1 tablet Daily. Indications: Blood Clot Within a Vein, DVTs AND PE   6/4/2025    atorvastatin (LIPITOR) 80 MG tablet Take 1 tablet by mouth Daily. Indications: High Amount of Fats in the Blood 90 tablet 3 6/4/2025    cholecalciferol (VITAMIN D3) 1000 UNITS tablet  Take 1 tablet by mouth Daily. Indications: Vitamin D Deficiency   6/4/2025    Cyanocobalamin (VITAMIN B 12 PO) Take 1,000 mg by mouth Every Morning. Indications: SUPPLEMENT   6/4/2025    furosemide (LASIX) 40 MG tablet Take 1 tablet by mouth Daily. Indications: Edema 90 tablet 3 6/4/2025    levothyroxine (SYNTHROID, LEVOTHROID) 75 MCG tablet TAKE 1 TABLET BY MOUTH EVERY MORNING INDICATIONS: UNDER ACTIVE THYROID 90 tablet 3 6/4/2025    lisinopril (PRINIVIL,ZESTRIL) 2.5 MG tablet Take 1 tablet by mouth Daily. Indications: High Blood Pressure 90 tablet 4 6/4/2025    memantine (NAMENDA) 10 MG tablet TAKE 2 TABLETS BY MOUTH EVERY MORNING 180 tablet 3 6/4/2025    Multiple Vitamins-Minerals (MULTIVITAL PLATINUM PO) Take 1 tablet by mouth Daily. HOLD FOR SURGERY  Indications: SUPPLEMENT   6/4/2025    Orgovyx 120 MG tablet tablet Take 1 tablet by mouth Daily.   6/4/2025    potassium chloride 10 MEQ CR tablet Take 1 tablet by mouth Daily With Breakfast. Indications: Low Amount of Potassium in the Blood, take with Lasix 90 tablet 3 6/4/2025    Xtandi 40 MG tablet tablet Take 4 tablets by mouth Daily. 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 Indications: Spasm of Lung Air Passages (Patient not taking: Reported on 3/20/2025) 18 g 0        Current Meds:   Current Facility-Administered Medications   Medication Dose Route Frequency Provider Last Rate Last Admin    acetaminophen (TYLENOL) tablet 650 mg  650 mg Oral Q4H PRN Harjinder Aguilera MD        apixaban (ELIQUIS) tablet 2.5 mg  2.5 mg Oral Q12H Rayshawn Navarro MD   2.5 mg at 06/09/25 0910    sennosides-docusate (PERICOLACE) 8.6-50 MG per tablet 2 tablet  2 tablet Oral BID PRN Harjinder Aguilera MD        And    polyethylene glycol (MIRALAX) packet 17 g  17 g Oral Daily PRN Harjinder Aguilera MD         And    bisacodyl (DULCOLAX) EC tablet 5 mg  5 mg Oral Daily PRN Harjinder Aguilera MD        And    bisacodyl (DULCOLAX) suppository 10 mg  10 mg Rectal Daily PRN Harjinder Aguilera MD        folic acid (FOLVITE) tablet 1 mg  1 mg Oral Daily Harjinder Aguilera MD   1 mg at 06/09/25 0910    LORazepam (ATIVAN) tablet 1 mg  1 mg Oral Q1H PRN Harjinder Aguilera MD   1 mg at 06/08/25 0951    Or    LORazepam (ATIVAN) injection 1 mg  1 mg Intravenous Q1H PRN Harjinder Aguilera MD   1 mg at 06/08/25 0316    Or    LORazepam (ATIVAN) tablet 2 mg  2 mg Oral Q1H PRN Harjinder Aguilera MD   2 mg at 06/07/25 2247    Or    LORazepam (ATIVAN) injection 2 mg  2 mg Intravenous Q1H PRN Harjinder Aguilera MD        Or    LORazepam (ATIVAN) injection 2 mg  2 mg Intravenous Q15 Min PRN Harjinder Aguilera MD        Or    LORazepam (ATIVAN) injection 2 mg  2 mg Intramuscular Q15 Min PRN Harjinder Aguilera MD        Magnesium Standard Dose Replacement - Follow Nurse / BPA Driven Protocol   Not Applicable PRN Harjinder Aguilera MD        metoprolol succinate XL (TOPROL-XL) 24 hr tablet 50 mg  50 mg Oral BID Rayshawn Navarro MD   50 mg at 06/09/25 0910    midodrine (PROAMATINE) tablet 5 mg  5 mg Oral TID AC Rayshawn Navarro MD   5 mg at 06/09/25 1210    multivitamin with minerals 1 tablet  1 tablet Oral Daily Harjinder Aguilera MD   1 tablet at 06/09/25 0910    nitroglycerin (NITROSTAT) SL tablet 0.4 mg  0.4 mg Sublingual Q5 Min PRN Harjinder Aguilera MD        ondansetron ODT (ZOFRAN-ODT) disintegrating tablet 4 mg  4 mg Oral Q6H PRN Harjinder Aguilera MD        Or    ondansetron (ZOFRAN) injection 4 mg  4 mg Intravenous Q6H PRN Harjinder Aguilera MD        ranolazine (RANEXA) 12 hr tablet 1,000 mg  1,000 mg Oral Q12H Rayshawn Navarro MD   1,000 mg at 06/09/25 0910    thiamine (B-1) injection 200 mg  200 mg Intravenous Q8H Harjinder Aguilera MD   200 mg at 06/09/25 0534    Followed by    [START ON 6/10/2025] thiamine (VITAMIN B-1) tablet 100 mg  100 mg Oral Daily Harjinder Aguilera MD           Allergies:  Allergies   Allergen Reactions  "   Nsaids GI Bleeding    Aricept [Donepezil] Diarrhea       Social History:   Social History     Socioeconomic History    Marital status:      Spouse name: Chloe    Number of children: 2   Tobacco Use    Smoking status: Former     Current packs/day: 0.00     Average packs/day: 0.3 packs/day for 5.0 years (1.3 ttl pk-yrs)     Types: Cigars, Cigarettes     Start date: 1981     Quit date: 1986     Years since quittin.4     Passive exposure: Never    Smokeless tobacco: Never   Vaping Use    Vaping status: Never Used   Substance and Sexual Activity    Alcohol use: Yes     Alcohol/week: 32.0 - 34.0 standard drinks of alcohol     Types: 4 - 6 Shots of liquor, 28 Standard drinks or equivalent per week     Comment: 2-3 double vodkas a night    Drug use: No    Sexual activity: Yes     Partners: Female     Birth control/protection: Post-menopausal        Family History:  Family History   Problem Relation Age of Onset    Stroke Mother     Stroke Father     No Known Problems Brother     Colon cancer Neg Hx     Colon polyps Neg Hx     Malig Hyperthermia Neg Hx         Review of Systems: as per HPI, in addition:    ROS   No chest pain/palpataions   No cough congestion   No syncope or seizure   No rash   No diarrhea/vomiting/melena               Physical Exam:  Vitals:   Temp (24hrs), Av °F (36.7 °C), Min:97.8 °F (36.6 °C), Max:98.2 °F (36.8 °C)    /65 (BP Location: Left arm, Patient Position: Lying)   Pulse 72   Temp 98.1 °F (36.7 °C) (Oral)   Resp 18   Ht 177.8 cm (70\")   Wt 109 kg (239 lb 3.2 oz)   SpO2 95%   BMI 34.32 kg/m²   Intake/Output:     Intake/Output Summary (Last 24 hours) at 2025 1219  Last data filed at 2025 2112  Gross per 24 hour   Intake --   Output 100 ml   Net -100 ml        Wt Readings from Last 1 Encounters:   25 0300 109 kg (239 lb 3.2 oz)   25 0552 108 kg (238 lb 15.7 oz)   25 0534 111 kg (244 lb 11.4 oz)   25 0502 110 kg (242 lb 1 oz) "   06/05/25 1102 110 kg (243 lb)   06/05/25 0217 110 kg (243 lb)   06/04/25 2159 86.2 kg (190 lb)       Exam:  Alert and oriented NAD   eomi no icterus   Moist mucus membranes   No jvd reg s1s2 no murmur   Clear anteriorly no rales/rhonchi/wheeze   Soft NTND + bs   No edema   Warm dry no rash   Normal mood and judgement           DATA:  Radiology and Labs:  The following labs and radiology results independently reviewed by me              Labs:   Recent Results (from the past 24 hours)   Basic Metabolic Panel    Collection Time: 06/08/25 12:24 PM    Specimen: Blood   Result Value Ref Range    Glucose 110 (H) 65 - 99 mg/dL    BUN 14.0 8.0 - 23.0 mg/dL    Creatinine 1.60 (H) 0.76 - 1.27 mg/dL    Sodium 139 136 - 145 mmol/L    Potassium 4.3 3.5 - 5.2 mmol/L    Chloride 103 98 - 107 mmol/L    CO2 22.5 22.0 - 29.0 mmol/L    Calcium 9.3 8.6 - 10.5 mg/dL    BUN/Creatinine Ratio 8.8 7.0 - 25.0    Anion Gap 13.5 5.0 - 15.0 mmol/L    eGFR 43.0 (L) >60.0 mL/min/1.73   Comprehensive Metabolic Panel    Collection Time: 06/09/25  6:32 AM    Specimen: Blood   Result Value Ref Range    Glucose 98 65 - 99 mg/dL    BUN 13.0 8.0 - 23.0 mg/dL    Creatinine 1.44 (H) 0.76 - 1.27 mg/dL    Sodium 136 136 - 145 mmol/L    Potassium 3.9 3.5 - 5.2 mmol/L    Chloride 102 98 - 107 mmol/L    CO2 25.1 22.0 - 29.0 mmol/L    Calcium 8.7 8.6 - 10.5 mg/dL    Total Protein 5.5 (L) 6.0 - 8.5 g/dL    Albumin 2.8 (L) 3.5 - 5.2 g/dL    ALT (SGPT) 22 1 - 41 U/L    AST (SGOT) 39 1 - 40 U/L    Alkaline Phosphatase 90 39 - 117 U/L    Total Bilirubin 0.4 0.0 - 1.2 mg/dL    Globulin 2.7 gm/dL    A/G Ratio 1.0 g/dL    BUN/Creatinine Ratio 9.0 7.0 - 25.0    Anion Gap 8.9 5.0 - 15.0 mmol/L    eGFR 48.8 (L) >60.0 mL/min/1.73   CBC Auto Differential    Collection Time: 06/09/25  6:32 AM    Specimen: Blood   Result Value Ref Range    WBC 3.91 3.40 - 10.80 10*3/mm3    RBC 2.85 (L) 4.14 - 5.80 10*6/mm3    Hemoglobin 10.3 (L) 13.0 - 17.7 g/dL    Hematocrit 30.3 (L) 37.5 -  51.0 %    .3 (H) 79.0 - 97.0 fL    MCH 36.1 (H) 26.6 - 33.0 pg    MCHC 34.0 31.5 - 35.7 g/dL    RDW 14.6 12.3 - 15.4 %    RDW-SD 56.1 (H) 37.0 - 54.0 fl    MPV 9.1 6.0 - 12.0 fL    Platelets 122 (L) 140 - 450 10*3/mm3    Neutrophil % 53.7 42.7 - 76.0 %    Lymphocyte % 26.9 19.6 - 45.3 %    Monocyte % 13.0 (H) 5.0 - 12.0 %    Eosinophil % 5.1 0.3 - 6.2 %    Basophil % 0.8 0.0 - 1.5 %    Immature Grans % 0.5 0.0 - 0.5 %    Neutrophils, Absolute 2.10 1.70 - 7.00 10*3/mm3    Lymphocytes, Absolute 1.05 0.70 - 3.10 10*3/mm3    Monocytes, Absolute 0.51 0.10 - 0.90 10*3/mm3    Eosinophils, Absolute 0.20 0.00 - 0.40 10*3/mm3    Basophils, Absolute 0.03 0.00 - 0.20 10*3/mm3    Immature Grans, Absolute 0.02 0.00 - 0.05 10*3/mm3       Radiology:  Imaging Results (Last 24 Hours)       ** No results found for the last 24 hours. **                   ASSESSMENT:   Problem List:   JEFFREY  CKD3  Syncope   Non sustain VT   HTN  Alcohol use disorder   Hx of dvt/pe in 2020 on AC            PLAN:   Cr 1.4 which is baseline   Continue to keep even   B-blocker increased and midodrine add per cardiology     Monitor electrolytes and volume closely   Dose all medications for GFR <60  Limit IV dye, NSAIDS and nephrotoxic medications   I appreciate the opportunity to participate in this patient's care.  Please call with any questions or concerns.     Zandra Fbaian M.D  [facility]  Office phone number: [phone]  Answering service phone number: [phone]

## 2025-06-09 NOTE — CONSULTS
Staff have reviewed the chart, and the patient does not have a qualifying diagnosis for Phase II Cardiac Rehab at this time. Staff available if further consultation is needed.

## 2025-06-09 NOTE — PLAN OF CARE
Goal Outcome Evaluation:  Plan of Care Reviewed With: patient        Progress: improving  Outcome Evaluation: pt seen this date for OT tx session in PM. Pt is agreeable, demo's improved activity tolerance with ADLs this date with ability to demo sinkside ADL tasks with CGA. Limited by some mild dizziness however no overt LOBs. He uses rwx this date, following commands. Some confusion noted throughout and min cues for safety required. continue to progress, rec SNF at d/c.    Anticipated Discharge Disposition (OT): skilled nursing facility

## 2025-06-10 LAB
ALBUMIN SERPL-MCNC: 2.8 G/DL (ref 3.5–5.2)
ALBUMIN/GLOB SERPL: 1.1 G/DL
ALP SERPL-CCNC: 84 U/L (ref 39–117)
ALT SERPL W P-5'-P-CCNC: 18 U/L (ref 1–41)
ANION GAP SERPL CALCULATED.3IONS-SCNC: 8.2 MMOL/L (ref 5–15)
AST SERPL-CCNC: 37 U/L (ref 1–40)
BASOPHILS # BLD AUTO: 0.02 10*3/MM3 (ref 0–0.2)
BASOPHILS NFR BLD AUTO: 0.5 % (ref 0–1.5)
BILIRUB SERPL-MCNC: 0.4 MG/DL (ref 0–1.2)
BUN SERPL-MCNC: 13 MG/DL (ref 8–23)
BUN/CREAT SERPL: 9.5 (ref 7–25)
CALCIUM SPEC-SCNC: 8.8 MG/DL (ref 8.6–10.5)
CHLORIDE SERPL-SCNC: 103 MMOL/L (ref 98–107)
CO2 SERPL-SCNC: 23.8 MMOL/L (ref 22–29)
CREAT SERPL-MCNC: 1.37 MG/DL (ref 0.76–1.27)
DEPRECATED RDW RBC AUTO: 52.7 FL (ref 37–54)
EGFRCR SERPLBLD CKD-EPI 2021: 51.8 ML/MIN/1.73
EOSINOPHIL # BLD AUTO: 0.17 10*3/MM3 (ref 0–0.4)
EOSINOPHIL NFR BLD AUTO: 4.6 % (ref 0.3–6.2)
ERYTHROCYTE [DISTWIDTH] IN BLOOD BY AUTOMATED COUNT: 14.2 % (ref 12.3–15.4)
GLOBULIN UR ELPH-MCNC: 2.6 GM/DL
GLUCOSE SERPL-MCNC: 106 MG/DL (ref 65–99)
HCT VFR BLD AUTO: 29.3 % (ref 37.5–51)
HGB BLD-MCNC: 10.3 G/DL (ref 13–17.7)
IMM GRANULOCYTES # BLD AUTO: 0.02 10*3/MM3 (ref 0–0.05)
IMM GRANULOCYTES NFR BLD AUTO: 0.5 % (ref 0–0.5)
LYMPHOCYTES # BLD AUTO: 0.89 10*3/MM3 (ref 0.7–3.1)
LYMPHOCYTES NFR BLD AUTO: 24.1 % (ref 19.6–45.3)
MCH RBC QN AUTO: 36.3 PG (ref 26.6–33)
MCHC RBC AUTO-ENTMCNC: 35.2 G/DL (ref 31.5–35.7)
MCV RBC AUTO: 103.2 FL (ref 79–97)
MONOCYTES # BLD AUTO: 0.51 10*3/MM3 (ref 0.1–0.9)
MONOCYTES NFR BLD AUTO: 13.8 % (ref 5–12)
NEUTROPHILS NFR BLD AUTO: 2.08 10*3/MM3 (ref 1.7–7)
NEUTROPHILS NFR BLD AUTO: 56.5 % (ref 42.7–76)
NRBC BLD AUTO-RTO: 0 /100 WBC (ref 0–0.2)
PLATELET # BLD AUTO: 123 10*3/MM3 (ref 140–450)
PMV BLD AUTO: 8.9 FL (ref 6–12)
POTASSIUM SERPL-SCNC: 4 MMOL/L (ref 3.5–5.2)
PROT SERPL-MCNC: 5.4 G/DL (ref 6–8.5)
RBC # BLD AUTO: 2.84 10*6/MM3 (ref 4.14–5.8)
SODIUM SERPL-SCNC: 135 MMOL/L (ref 136–145)
WBC NRBC COR # BLD AUTO: 3.69 10*3/MM3 (ref 3.4–10.8)

## 2025-06-10 PROCEDURE — 85025 COMPLETE CBC W/AUTO DIFF WBC: CPT | Performed by: STUDENT IN AN ORGANIZED HEALTH CARE EDUCATION/TRAINING PROGRAM

## 2025-06-10 PROCEDURE — 80053 COMPREHEN METABOLIC PANEL: CPT | Performed by: STUDENT IN AN ORGANIZED HEALTH CARE EDUCATION/TRAINING PROGRAM

## 2025-06-10 PROCEDURE — 97110 THERAPEUTIC EXERCISES: CPT

## 2025-06-10 RX ADMIN — RANOLAZINE 1000 MG: 500 TABLET, FILM COATED, EXTENDED RELEASE ORAL at 21:29

## 2025-06-10 RX ADMIN — MIDODRINE HYDROCHLORIDE 5 MG: 5 TABLET ORAL at 17:05

## 2025-06-10 RX ADMIN — Medication 5 MG: at 21:29

## 2025-06-10 RX ADMIN — METOPROLOL SUCCINATE 50 MG: 25 TABLET, EXTENDED RELEASE ORAL at 21:29

## 2025-06-10 RX ADMIN — DOCUSATE SODIUM AND SENNOSIDES 2 TABLET: 8.6; 5 TABLET, FILM COATED ORAL at 18:22

## 2025-06-10 RX ADMIN — FOLIC ACID 1 MG: 1 TABLET ORAL at 08:07

## 2025-06-10 RX ADMIN — MIDODRINE HYDROCHLORIDE 5 MG: 5 TABLET ORAL at 11:30

## 2025-06-10 RX ADMIN — RANOLAZINE 500 MG: 500 TABLET, FILM COATED, EXTENDED RELEASE ORAL at 08:08

## 2025-06-10 RX ADMIN — Medication 100 MG: at 08:07

## 2025-06-10 RX ADMIN — MIDODRINE HYDROCHLORIDE 5 MG: 5 TABLET ORAL at 08:07

## 2025-06-10 RX ADMIN — ASPIRIN 81 MG CHEWABLE TABLET 81 MG: 81 TABLET CHEWABLE at 08:08

## 2025-06-10 RX ADMIN — APIXABAN 2.5 MG: 2.5 TABLET, FILM COATED ORAL at 21:29

## 2025-06-10 RX ADMIN — LEVOTHYROXINE SODIUM 75 MCG: 0.07 TABLET ORAL at 06:16

## 2025-06-10 RX ADMIN — METOPROLOL SUCCINATE 50 MG: 25 TABLET, EXTENDED RELEASE ORAL at 08:07

## 2025-06-10 RX ADMIN — ACETAMINOPHEN 650 MG: 325 TABLET ORAL at 18:22

## 2025-06-10 RX ADMIN — ATORVASTATIN CALCIUM 80 MG: 80 TABLET, FILM COATED ORAL at 08:08

## 2025-06-10 RX ADMIN — APIXABAN 2.5 MG: 2.5 TABLET, FILM COATED ORAL at 08:07

## 2025-06-10 RX ADMIN — Medication 1 TABLET: at 08:07

## 2025-06-10 NOTE — PLAN OF CARE
Goal Outcome Evaluation:           Progress: improving  Outcome Evaluation: Pt AOx4. On RA. NSR- SB w/ pvcs on monitor. On PO midodrine BP WNL. Pt asking if he is ready to go home. Pt precert is pending for higher level of care at his facility. Daughter has been updated. Pt denies pain. Eager to leave. Plan of care ongoing- NEEDS ZIO PATCH AT DISCHARGE  Sat up in the chair this evening. Worked with PT/OT as well   Melatonin ordered for this evening

## 2025-06-10 NOTE — CASE MANAGEMENT/SOCIAL WORK
Continued Stay Note  [facility]     Patient Name: Bryan Landers  MRN: [id number]  Today's Date: 6/10/2025    Admit Date: 6/4/2025    Plan: [facility]i [facility] precert Pending   Discharge Plan       Row Name 06/10/25 1322       Plan    Plan Preble Chireno precert Pending    Patient/Family in Agreement with Plan yes    Plan Comments Spoke with [facility] still pending. Updated pt's daughter.                   Discharge Codes    No documentation.                 Expected Discharge Date and Time       Expected Discharge Date Expected Discharge Time    Jm 10, 2025               ISAI Avina

## 2025-06-10 NOTE — PROGRESS NOTES
[facility]     Progress Note    Patient Name: Bryan Landers  : 1945  MRN: [id number]  Primary Care Physician:  Jose Fonseca MD  Date of admission: 2025    Subjective   Subjective     Chief Complaint: with jeffrey on ckd III    History of Present Illness  Patient with jeffrey on ckd III admitted after syncopal episode    Review of Systems    Objective   Objective     Vitals:   Temp:  [98.1 °F (36.7 °C)-98.4 °F (36.9 °C)] 98.4 °F (36.9 °C)  Heart Rate:  [72-90] 73  Resp:  [18] 18  BP: (114-118)/(65-83) 114/73    Physical Exam   General Appearance:  In no acute distress.    HEENT: Normal HEENT exam.     Extremities: .  There is no deformity, effusion or dependent edema.    Neurological: Patient is alert     Result Review    Result Review:  I have personally reviewed the results from the time of this admission to 6/10/2025 06:59 EDT and agree with these findings:  []  Laboratory list / accordion  []  Microbiology  []  Radiology  []  EKG/Telemetry   []  Cardiology/Vascular   []  Pathology  []  Old records  []  Other:  Most notable findings include:       Assessment & Plan   Assessment / Plan     Brief Patient Summary:  Bryan Landers is a 80 y.o. male who has jeffrey on ckd III after syncope    Active Hospital Problems:  Active Hospital Problems    Diagnosis     **Syncope     Alcohol use disorder     Acquired hypothyroidism     Cognitive decline     Stage 3b chronic kidney disease     Essential hypertension    JEFFREY  CKD3  Syncope   Non sustain VT   HTN    Plan:   Patient seen and examined. Awake, alert. No distress. Labs and chart reviewed. Renal function improved with a cr of 1.3.  likely d/c soon.  Will follow      Sandee Galvez MD

## 2025-06-10 NOTE — PROGRESS NOTES
Name: Bryan Landers ADMIT: 2025   : 1945  PCP: Jose Fonseca MD    MRN: [id number] LOS: 5 days   AGE/SEX: 80 y.o. male  ROOM: [facility]     Subjective   Subjective   2025  Patient seen and examined at bedside, he is on room air states he is feeling better.  BP stable.  EP to evaluate today.  Case management to start pre-CERT to SNF    06/10/2025  No overnight events, patient without distress.  Pre-CERT pending to SNF         Objective   Objective   Vital Signs  Temp:  [97.7 °F (36.5 °C)-98.4 °F (36.9 °C)] 97.9 °F (36.6 °C)  Heart Rate:  [69-75] 75  Resp:  [18] 18  BP: (114-127)/(73-87) 126/83  SpO2:  [98 %-100 %] 98 %  on   ;   Device (Oxygen Therapy): room air  Body mass index is 33.47 kg/m².  Physical Exam  Constitutional:       General: He is not in acute distress.     Appearance: He is obese.   HENT:      Head: Normocephalic and atraumatic.      Nose: Nose normal. No congestion.      Mouth/Throat:      Pharynx: Oropharynx is clear. No oropharyngeal exudate.   Eyes:      General: No scleral icterus.  Cardiovascular:      Rate and Rhythm: Normal rate and regular rhythm.      Heart sounds: No murmur heard.     No friction rub. No gallop.   Pulmonary:      Effort: No respiratory distress.      Breath sounds: No wheezing or rales.   Abdominal:      General: There is no distension.      Tenderness: There is no abdominal tenderness. There is no guarding.   Musculoskeletal:         General: No swelling, deformity or signs of injury.      Cervical back: Normal range of motion. No rigidity.   Skin:     Coloration: Skin is not jaundiced.      Findings: No bruising or lesion.   Neurological:      General: No focal deficit present.      Mental Status: He is alert.      Motor: Weakness present.       Results Review     I reviewed the patient's new clinical results.  Results from last 7 days   Lab Units 06/10/25  0542 25  0632 25  0640 25  0540   WBC 10*3/mm3 3.69 3.91 3.59 3.48  "  HEMOGLOBIN g/dL 10.3* 10.3* 9.4* 9.8*   PLATELETS 10*3/mm3 123* 122* 124* 139*     Results from last 7 days   Lab Units 06/10/25  0542 06/09/25  0632 06/08/25  1224 06/08/25  0640   SODIUM mmol/L 135* 136 139 138   POTASSIUM mmol/L 4.0 3.9 4.3 4.3   CHLORIDE mmol/L 103 102 103 103   CO2 mmol/L 23.8 25.1 22.5 27.3   BUN mg/dL 13.0 13.0 14.0 15.0   CREATININE mg/dL 1.37* 1.44* 1.60* 1.55*   GLUCOSE mg/dL 106* 98 110* 107*   EGFR mL/min/1.73 51.8* 48.8* 43.0* 44.7*     Results from last 7 days   Lab Units 06/10/25  0542 06/09/25  0632 06/08/25  0640 06/07/25  0540   ALBUMIN g/dL 2.8* 2.8* 2.7* 2.8*   BILIRUBIN mg/dL 0.4 0.4 0.3 0.3   ALK PHOS U/L 84 90 93 95   AST (SGOT) U/L 37 39 39 45*   ALT (SGPT) U/L 18 22 19 22     Results from last 7 days   Lab Units 06/10/25  0542 06/09/25  0632 06/08/25  1224 06/08/25  0640 06/07/25  0540 06/06/25  0910 06/05/25  0557 06/04/25  2213   CALCIUM mg/dL 8.8 8.7 9.3 8.8 8.7 8.6   < > 9.3   ALBUMIN g/dL 2.8* 2.8*  --  2.7* 2.8* 2.6*  --  3.2*   MAGNESIUM mg/dL  --   --   --  1.8  --  2.3  --  1.6    < > = values in this interval not displayed.       No results found for: \"HGBA1C\", \"POCGLU\"    No radiology results for the last day    I have personally reviewed all medications:  Scheduled Medications  apixaban, 2.5 mg, Oral, Q12H  aspirin, 81 mg, Oral, Daily  atorvastatin, 80 mg, Oral, Daily  folic acid, 1 mg, Oral, Daily  levothyroxine, 75 mcg, Oral, Q AM  metoprolol succinate XL, 50 mg, Oral, BID  midodrine, 5 mg, Oral, TID AC  multivitamin with minerals, 1 tablet, Oral, Daily  ranolazine, 1,000 mg, Oral, Q12H  thiamine, 100 mg, Oral, Daily    Infusions   Diet  Diet: Cardiac; Healthy Heart (2-3 Na+); Fluid Consistency: Thin (IDDSI 0)    I have personally reviewed:  [x]  Laboratory   [x]  Microbiology   [x]  Radiology   [x]  EKG/Telemetry  [x]  Cardiology/Vascular   []  Pathology    []  Records       Assessment/Plan     Active Hospital Problems    Diagnosis  POA    **Syncope [R55]  " Yes    Alcohol use disorder [F10.90]  Yes    Acquired hypothyroidism [E03.9]  Yes    Cognitive decline [R41.89]  Yes    Stage 3b chronic kidney disease [N18.32]  Yes    Essential hypertension [I10]  Yes      Resolved Hospital Problems   No resolved problems to display.       80 y.o. male admitted with Syncope.    #Syncope  #Fall  #Orthostasis    -CT head without acute mass, shift, hemorrhage    -CT C-spine without acute fracture or subluxation    -CXR without acute cardiopulmonary process    -Midodrine 5 mg p.o. 3 times daily    -Hold home lisinopril    -Case management working on pre-CERT to SNF, pre-CERT pending    - Echo on 6/5/2025 showed EF 46.2% with diastolic dysfunction    #PVCs  #NSVT  #History of PE/DVT  #HFpEF, chronic    - Amiodarone discontinued due to prolonged Qtc    - Continue low-dose metoprolol    - Continue Eliquis 2.5 mg p.o. twice daily    - Patient underwent heart cath on 6/6/2025 which showed normal coronary arteries and normal left-sided filling pressures    -Patient without evidence of volume overload, continue to hold home Lasix    - Resume aspirin and statin    - EP recommends 2 weeks ZIO at discharge, follow-up in EP office and they have cleared for discharge    #Alcohol use disorder    -CIWA protocol    #JEFFREY on CKD    -Creatinine currently at baseline    - Nephrology following    #Hypothyroid    - Resume home Synthroid    #Hyperlipidemia    - Resume statin      Eliquis (home med) for DVT prophylaxis.  Full code.  Discussed with patient.  Anticipate discharge home with HH vs SNU facility in 1-2 days.  Expected Discharge Date: 6/10/2025; Expected Discharge Time:       Joseph Mathis DO  [facility]  06/10/25  12:34 EDT

## 2025-06-10 NOTE — THERAPY TREATMENT NOTE
Patient Name: Bryan Landers  : 1945    MRN: [id number]                              Today's Date: 6/10/2025       Admit Date: 2025    Visit Dx:     ICD-10-CM ICD-9-CM   1. Acute renal failure superimposed on chronic kidney disease, unspecified acute renal failure type, unspecified CKD stage  N17.9 584.9    N18.9 585.9   2. Syncope, unspecified syncope type  R55 780.2   3. Multifocal PVCs  I49.3 427.69   4. Closed head injury, initial encounter  S09.90XA 959.01   5. Anticoagulated  Z79.01 V58.61   6. Anemia, unspecified type  D64.9 285.9   7. Hyperglycemia  R73.9 790.29   8. Elevated troponin  R79.89 790.6   9. Palpitations  R00.2 785.1     Patient Active Problem List   Diagnosis    Essential hypertension    Mixed hyperlipidemia    Arthritis    Type 2 diabetes mellitus with chronic kidney disease, without long-term current use of insulin    Severely overweight    Idiopathic chronic gout of left knee    Medication monitoring encounter    Microalbuminuria due to type 2 diabetes mellitus    History of prostate cancer    Stage 3b chronic kidney disease    Medicare annual wellness visit, subsequent    Radiation proctitis    Cognitive decline    Acquired hypothyroidism    B12 deficiency    Sinus tachycardia by electrocardiogram    Chronic pulmonary embolism without acute cor pulmonale    Chronic deep vein thrombosis (DVT) of popliteal vein of both lower extremities    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    Bladder mass    Dependent edema    Anemia of chronic kidney failure, stage 3 (moderate)    Deep venous thrombosis    Class 2 severe obesity with serious comorbidity in adult    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    Syncope    Alcohol use disorder     Past Medical History:   Diagnosis Date    At risk for sleep apnea     Bladder mass     Bruises easily     Diabetes mellitus     Disease of thyroid gland     Elevated cholesterol     GI bleed     Gross hematuria 2021    History of hip  replacement, total, bilateral 12/13/2018    History of transfusion     Pneumothorax 02/27/2024    Poor historian     Short-term memory loss     Vitamin D deficiency      Past Surgical History:   Procedure Laterality Date    CARDIAC CATHETERIZATION N/A 6/6/2025    Procedure: Left Heart Cath;  Surgeon: Harjinder Aguilera MD;  Location: [facility] CATH INVASIVE LOCATION;  Service: Cardiology;  Laterality: N/A;    COLONOSCOPY  09/2016    COLONOSCOPY N/A 9/28/2018    Procedure: COLONOSCOPY;  Surgeon: Olaf Gomez MD;  Location: [facility] OR;  Service: Gastroenterology    COLONOSCOPY N/A 1/7/2019    Procedure: COLONOSCOPY;  Surgeon: Rosa Jack MD;  Location: [facility] OR;  Service: Gastroenterology    CYSTOSCOPY BLADDER BIOPSY N/A 9/22/2021    Procedure: CYSTOSCOPY BLADDER BIOPSY;  Surgeon: Roldan Mccarthy MD;  Location: [facility] MAIN OR;  Service: Urology;  Laterality: N/A;    HERNIA REPAIR Bilateral     PROSTATE ULTRASOUND BIOPSY      SIGMOIDOSCOPY N/A 10/2/2018    Procedure: FLEXIBLE SIGMOIDOSCOPY:  APC cautery bleeding using 60 joules;  Surgeon: Olaf Gomez MD;  Location: [facility] ENDOSCOPY;  Service: Gastroenterology    TONSILLECTOMY      TOTAL HIP ARTHROPLASTY Bilateral 2007      General Information       Row Name 06/10/25 1445          Physical Therapy Time and Intention    Document Type therapy note (daily note)  -     Mode of Treatment physical therapy;individual therapy  -       Row Name 06/10/25 1445          General Information    Patient Profile Reviewed yes  -     Existing Precautions/Restrictions fall  -       Row Name 06/10/25 1445          Cognition    Orientation Status (Cognition) oriented x 3  -       Row Name 06/10/25 1445          Safety Issues/Impairments Affecting Functional Mobility    Impairments Affecting Function (Mobility) endurance/activity tolerance;strength;balance  -               User Key  (r) = Recorded By, (t) = Taken By, (c) = Cosigned By      Initials  Name Provider Type    Karlee Leyva, PT Physical Therapist                   Mobility       Row Name 06/10/25 1445          Bed Mobility    Bed Mobility supine-sit;sit-supine  -     Supine-Sit Lincoln (Bed Mobility) contact guard  -     Sit-Supine [place] (Bed Mobility) contact guard  -     Assistive Device (Bed Mobility) bed rails  -     Comment, (Bed Mobility) Min vs for proper hand placement and seq  -       Row Name 06/10/25 1440          Sit-Stand Transfer    Sit-Stand Lincoln (Transfers) minimum assist (75% patient effort)  -     Assistive Device (Sit-Stand Transfers) walker, front-wheeled  -     Comment, (Sit-Stand Transfer) 2x, 1 lob posterior due to NBOS.  -       Row Name 06/10/25 1445          Gait/Stairs (Locomotion)    Lincoln Level (Gait) contact guard;minimum assist (75% patient effort);1 person assist;verbal cues;nonverbal cues (demo/gesture)  -     Assistive Device (Gait) walker, front-wheeled  -     Patient was able to Ambulate yes  -     Distance in Feet (Gait) 1  a few steps EOB, refused gait trial  -     Deviations/Abnormal Patterns (Gait) base of support, narrow;stride length decreased  -     Bilateral Gait Deviations forward flexed posture  -     Comment, (Gait/Stairs) Pt remains impulsive, unsteady w/ NBOS, and dec step length. Mod vcs and tcs for safety, FWW use, and posture correction  -               User Key  (r) = Recorded By, (t) = Taken By, (c) = Cosigned By      Initials Name Provider Type    Karlee Leyva, PT Physical Therapist                   Obj/Interventions       Row Name 06/10/25 1449          Strength Comprehensive (MMT)    General Manual Muscle Testing (MMT) Assessment lower extremity strength deficits identified  -       Row Name 06/10/25 1449          Motor Skills    Motor Skills functional endurance  -     Functional Endurance fair, unsteady and dizzy  -               User Key  (r) = Recorded By, (t) = Taken  By, (c) = Cosigned By      Initials Name Provider Type     Mark, Irmaaly, PT Physical Therapist                   Goals/Plan       Row Name 06/10/25 144          Bed Mobility Goal 1 (PT)    Activity/Assistive Device (Bed Mobility Goal 1, PT) bed mobility activities, all  -     Welch Level/Cues Needed (Bed Mobility Goal 1, PT) supervision required  -     Time Frame (Bed Mobility Goal 1, PT) long term goal (LTG);1 week  -     Progress/Outcomes (Bed Mobility Goal 1, PT) goal revised this date  -       Row Name 06/10/25 1445          Transfer Goal 1 (PT)    Activity/Assistive Device (Transfer Goal 1, PT) transfers, all;walker, rolling  -     Welch Level/Cues Needed (Transfer Goal 1, PT) contact guard required  -     Time Frame (Transfer Goal 1, PT) long term goal (LTG);1 week  -     Progress/Outcome (Transfer Goal 1, PT) continuing progress toward goal  -       Row Name 06/10/25 1445          Gait Training Goal 1 (PT)    Activity/Assistive Device (Gait Training Goal 1, PT) gait (walking locomotion);walker, rolling  -     Welch Level (Gait Training Goal 1, PT) contact guard required  -     Distance (Gait Training Goal 1, PT) 50 feet  -     Time Frame (Gait Training Goal 1, PT) long term goal (LTG);1 week  -     Progress/Outcome (Gait Training Goal 1, PT) continuing progress toward goal  -       Row Name 06/10/25 1445          Therapy Assessment/Plan (PT)    Planned Therapy Interventions (PT) balance training;bed mobility training;gait training;home exercise program;motor coordination training;patient/family education;strengthening;transfer training  -               User Key  (r) = Recorded By, (t) = Taken By, (c) = Cosigned By      Initials Name Provider Type     Mark, Irmaaly, PT Physical Therapist                   Clinical Impression       Row Name 06/10/25 3747          Pain    Pretreatment Pain Rating 4/10  -     Posttreatment Pain Rating 4/10  -     Pain  Location knee;back  -     Pain Side/Orientation bilateral  -     Pre/Posttreatment Pain Comment Reports generalized low back pain 2/2 immobility  -       Row Name 06/10/25 1445          Plan of Care Review    Plan of Care Reviewed With patient  -     Outcome Evaluation Pt was AOX4, on RA, agreed to limited PT services. Pt was CGA for bed mobility, min A for transfers, and CGA to min A for a few steps EOB w/ FWW. Completed therex EOB: Marches seated, LAQ, AP's, hip add sqeezes. He tolerated well though has more difficulty performing on LLE > RLE. Continue poc as tolerated  -       Row Name 06/10/25 1445          Therapy Assessment/Plan (PT)    Rehab Potential (PT) good  -     Criteria for Skilled Interventions Met (PT) skilled treatment is necessary  -     Therapy Frequency (PT) 6 times/wk  -       Row Name 06/10/25 1445          Positioning and Restraints    Pre-Treatment Position in bed  -     Post Treatment Position bed  -     In Bed supine;call light within reach;exit alarm on;encouraged to call for assist  -               User Key  (r) = Recorded By, (t) = Taken By, (c) = Cosigned By      Initials Name Provider Type    Karlee Leyva, PT Physical Therapist                   Outcome Measures       Row Name 06/10/25 1445 06/10/25 0811       How much help from another person do you currently need...    Turning from your back to your side while in flat bed without using bedrails? 4  - 4  -PB    Moving from lying on back to sitting on the side of a flat bed without bedrails? 3  - 3  -PB    Moving to and from a bed to a chair (including a wheelchair)? 2  - 2  -PB    Standing up from a chair using your arms (e.g., wheelchair, bedside chair)? 3  - 2  -PB    Climbing 3-5 steps with a railing? 2  - 2  -PB    To walk in hospital room? 2  - 2  -PB    AM-PAC 6 Clicks Score (PT) 16  - 15  -PB    Highest Level of Mobility Goal Stand (1 or More Minutes)-5  - Move to Chair/Commode-4  -PB       Row Name 06/10/25 [facility]          Functional Assessment    Outcome Measure Options AM-PAC 6 Clicks Basic Mobility (PT)  -               User Key  (r) = Recorded By, (t) = Taken By, (c) = Cosigned By      Initials Name Provider Type    Margaret Pedraza, RN Registered Nurse    Karlee Leyva, PT Physical Therapist                                 Physical Therapy Education       Title: PT OT SLP Therapies (In Progress)       Topic: Physical Therapy (In Progress)       Point: Mobility training (In Progress)       Learning Progress Summary            Patient Acceptance, E,D, NR by  at 6/10/2025 1445    Acceptance, TB,E, VU by PB at 6/10/2025 1415    Acceptance, E,D, VU,NR by MS at 6/6/2025 1532                      Point: Home exercise program (In Progress)       Learning Progress Summary            Patient Acceptance, E,D, NR by  at 6/10/2025 1445    Acceptance, TB,E, VU by PB at 6/10/2025 1415    Acceptance, E,D, VU,NR by MS at 6/6/2025 1532                      Point: Body mechanics (In Progress)       Learning Progress Summary            Patient Acceptance, E,D, NR by  at 6/10/2025 1445    Acceptance, TB,E, VU by PB at 6/10/2025 1415                      Point: Precautions (In Progress)       Learning Progress Summary            Patient Acceptance, E,D, NR by  at 6/10/2025 1445    Acceptance, TB,E, VU by PB at 6/10/2025 1415                                      User Key       Initials Effective Dates Name Provider Type Discipline    MS 06/16/21 -  Hemanth Iglesias PT Physical Therapist PT     02/09/24 -  Margaret Quintero RN Registered Nurse Nurse     05/28/25 -  Karlee Flores PT Physical Therapist PT                  PT Recommendation and Plan  Planned Therapy Interventions (PT): balance training, bed mobility training, gait training, home exercise program, motor coordination training, patient/family education, strengthening, transfer training  Outcome Evaluation: Pt was AOX4, on RA, agreed to  limited PT services. Pt was CGA for bed mobility, min A for transfers, and CGA to min A for a few steps EOB w/ FWW. Completed therex EOB: Marches seated, LAQ, AP's, hip add sqeezes. He tolerated well though has more difficulty performing on LLE > RLE. Continue poc as tolerated     Time Calculation:         PT Charges       Row Name 06/10/25 1445             Time Calculation    Start Time 1445  -      Stop Time 1459  -      Time Calculation (min) 14 min  -      PT - Next Appointment 06/11/25  -                User Key  (r) = Recorded By, (t) = Taken By, (c) = Cosigned By      Initials Name Provider Type    Karlee Leyva, PT Physical Therapist                  Therapy Charges for Today       Code Description Service Date Service Provider Modifiers Qty    37402249855 HC PT THER PROC EA 15 MIN 6/10/2025 Karlee Flores, PT GP 1            PT G-Codes  Outcome Measure Options: AM-PAC 6 Clicks Basic Mobility (PT)  AM-PAC 6 Clicks Score (PT): 16  AM-PAC 6 Clicks Score (OT): 18  Modified David Scale: 1 - No significant disability despite symptoms.  Able to carry out all usual duties and activities.  PT Discharge Summary  Anticipated Discharge Disposition (PT): home with assist, home with home health, skilled nursing facility    Karlee Flores PT  6/10/2025

## 2025-06-10 NOTE — NURSING NOTE
Pt had been AOx1 throughout the day-he has dementia. He keeps telling this RN that he fell today. Bed alarm has been activated all day along with chair alarm. RN and NA rounded hourly throughout the day. Pt did fall at the facility- reason for admission. Reoriented pt and reassured him that he did NOT fall. Daughter updated and said he is confused baseline and probably relating his fall from 6/4

## 2025-06-10 NOTE — PLAN OF CARE
Goal Outcome Evaluation:  Plan of Care Reviewed With: patient           Outcome Evaluation: Pt was AOX4, on RA, agreed to limited PT services. Pt was CGA for bed mobility, min A for transfers, and CGA to min A for a few steps EOB w/ FWW. Completed therex EOB: Marches seated, LAQ, AP's, hip add sqeezes. He tolerated well though has more difficulty performing on LLE > RLE. Continue poc as tolerated    Anticipated Discharge Disposition (PT): home with assist, home with home health, skilled nursing facility

## 2025-06-11 ENCOUNTER — RESULTS FOLLOW-UP (OUTPATIENT)
Dept: TELEMETRY | Facility: HOSPITAL | Age: 80
End: 2025-06-11
Payer: MEDICARE

## 2025-06-11 LAB
ALBUMIN SERPL-MCNC: 2.8 G/DL (ref 3.5–5.2)
ALBUMIN/GLOB SERPL: 1 G/DL
ALP SERPL-CCNC: 86 U/L (ref 39–117)
ALT SERPL W P-5'-P-CCNC: 18 U/L (ref 1–41)
ANION GAP SERPL CALCULATED.3IONS-SCNC: 10 MMOL/L (ref 5–15)
AST SERPL-CCNC: 36 U/L (ref 1–40)
BASOPHILS # BLD AUTO: 0.02 10*3/MM3 (ref 0–0.2)
BASOPHILS NFR BLD AUTO: 0.4 % (ref 0–1.5)
BILIRUB SERPL-MCNC: 0.4 MG/DL (ref 0–1.2)
BUN SERPL-MCNC: 14 MG/DL (ref 8–23)
BUN/CREAT SERPL: 9.3 (ref 7–25)
CALCIUM SPEC-SCNC: 8.7 MG/DL (ref 8.6–10.5)
CHLORIDE SERPL-SCNC: 99 MMOL/L (ref 98–107)
CO2 SERPL-SCNC: 25 MMOL/L (ref 22–29)
CREAT SERPL-MCNC: 1.51 MG/DL (ref 0.76–1.27)
DEPRECATED RDW RBC AUTO: 55.1 FL (ref 37–54)
EGFRCR SERPLBLD CKD-EPI 2021: 46.1 ML/MIN/1.73
EOSINOPHIL # BLD AUTO: 0.11 10*3/MM3 (ref 0–0.4)
EOSINOPHIL NFR BLD AUTO: 2.5 % (ref 0.3–6.2)
ERYTHROCYTE [DISTWIDTH] IN BLOOD BY AUTOMATED COUNT: 14.5 % (ref 12.3–15.4)
GLOBULIN UR ELPH-MCNC: 2.9 GM/DL
GLUCOSE SERPL-MCNC: 102 MG/DL (ref 65–99)
HCT VFR BLD AUTO: 31 % (ref 37.5–51)
HGB BLD-MCNC: 10.6 G/DL (ref 13–17.7)
IMM GRANULOCYTES # BLD AUTO: 0.04 10*3/MM3 (ref 0–0.05)
IMM GRANULOCYTES NFR BLD AUTO: 0.9 % (ref 0–0.5)
LYMPHOCYTES # BLD AUTO: 0.99 10*3/MM3 (ref 0.7–3.1)
LYMPHOCYTES NFR BLD AUTO: 22.1 % (ref 19.6–45.3)
MCH RBC QN AUTO: 36.3 PG (ref 26.6–33)
MCHC RBC AUTO-ENTMCNC: 34.2 G/DL (ref 31.5–35.7)
MCV RBC AUTO: 106.2 FL (ref 79–97)
MONOCYTES # BLD AUTO: 0.57 10*3/MM3 (ref 0.1–0.9)
MONOCYTES NFR BLD AUTO: 12.8 % (ref 5–12)
NEUTROPHILS NFR BLD AUTO: 2.74 10*3/MM3 (ref 1.7–7)
NEUTROPHILS NFR BLD AUTO: 61.3 % (ref 42.7–76)
PLATELET # BLD AUTO: 133 10*3/MM3 (ref 140–450)
PMV BLD AUTO: 9.2 FL (ref 6–12)
POTASSIUM SERPL-SCNC: 3.8 MMOL/L (ref 3.5–5.2)
PROT SERPL-MCNC: 5.7 G/DL (ref 6–8.5)
RBC # BLD AUTO: 2.92 10*6/MM3 (ref 4.14–5.8)
SODIUM SERPL-SCNC: 134 MMOL/L (ref 136–145)
WBC NRBC COR # BLD AUTO: 4.47 10*3/MM3 (ref 3.4–10.8)

## 2025-06-11 PROCEDURE — 85025 COMPLETE CBC W/AUTO DIFF WBC: CPT | Performed by: STUDENT IN AN ORGANIZED HEALTH CARE EDUCATION/TRAINING PROGRAM

## 2025-06-11 PROCEDURE — 80053 COMPREHEN METABOLIC PANEL: CPT | Performed by: STUDENT IN AN ORGANIZED HEALTH CARE EDUCATION/TRAINING PROGRAM

## 2025-06-11 RX ORDER — MIDODRINE HYDROCHLORIDE 5 MG/1
5 TABLET ORAL ONCE
Status: COMPLETED | OUTPATIENT
Start: 2025-06-11 | End: 2025-06-11

## 2025-06-11 RX ORDER — MIDODRINE HYDROCHLORIDE 5 MG/1
10 TABLET ORAL
Status: DISCONTINUED | OUTPATIENT
Start: 2025-06-11 | End: 2025-06-16 | Stop reason: HOSPADM

## 2025-06-11 RX ADMIN — LEVOTHYROXINE SODIUM 75 MCG: 0.07 TABLET ORAL at 07:27

## 2025-06-11 RX ADMIN — DOCUSATE SODIUM AND SENNOSIDES 2 TABLET: 8.6; 5 TABLET, FILM COATED ORAL at 09:25

## 2025-06-11 RX ADMIN — Medication 1 TABLET: at 08:06

## 2025-06-11 RX ADMIN — ATORVASTATIN CALCIUM 80 MG: 80 TABLET, FILM COATED ORAL at 08:06

## 2025-06-11 RX ADMIN — Medication 5 MG: at 20:24

## 2025-06-11 RX ADMIN — APIXABAN 2.5 MG: 2.5 TABLET, FILM COATED ORAL at 20:23

## 2025-06-11 RX ADMIN — MIDODRINE HYDROCHLORIDE 5 MG: 5 TABLET ORAL at 08:06

## 2025-06-11 RX ADMIN — BISACODYL 5 MG: 5 TABLET, COATED ORAL at 20:24

## 2025-06-11 RX ADMIN — APIXABAN 2.5 MG: 2.5 TABLET, FILM COATED ORAL at 08:06

## 2025-06-11 RX ADMIN — MIDODRINE HYDROCHLORIDE 5 MG: 5 TABLET ORAL at 14:55

## 2025-06-11 RX ADMIN — ASPIRIN 81 MG CHEWABLE TABLET 81 MG: 81 TABLET CHEWABLE at 08:06

## 2025-06-11 RX ADMIN — Medication 100 MG: at 08:06

## 2025-06-11 RX ADMIN — MIDODRINE HYDROCHLORIDE 10 MG: 5 TABLET ORAL at 16:58

## 2025-06-11 RX ADMIN — METOPROLOL SUCCINATE 50 MG: 25 TABLET, EXTENDED RELEASE ORAL at 20:23

## 2025-06-11 RX ADMIN — METOPROLOL SUCCINATE 50 MG: 25 TABLET, EXTENDED RELEASE ORAL at 08:06

## 2025-06-11 RX ADMIN — RANOLAZINE 1000 MG: 500 TABLET, FILM COATED, EXTENDED RELEASE ORAL at 08:06

## 2025-06-11 RX ADMIN — DOCUSATE SODIUM AND SENNOSIDES 2 TABLET: 8.6; 5 TABLET, FILM COATED ORAL at 20:27

## 2025-06-11 RX ADMIN — MIDODRINE HYDROCHLORIDE 5 MG: 5 TABLET ORAL at 11:32

## 2025-06-11 RX ADMIN — FOLIC ACID 1 MG: 1 TABLET ORAL at 08:06

## 2025-06-11 RX ADMIN — RANOLAZINE 1000 MG: 500 TABLET, FILM COATED, EXTENDED RELEASE ORAL at 20:23

## 2025-06-11 NOTE — PROGRESS NOTES
Name: Bryan Landers ADMIT: 2025   : 1945  PCP: Jose Fonseca MD    MRN: [id number] LOS: 6 days   AGE/SEX: 80 y.o. male  ROOM: [facility]     Subjective   Subjective   2025  Patient seen and examined at bedside, he is on room air states he is feeling better.  BP stable.  EP to evaluate today.  Case management to start pre-CERT to SNF    06/10/2025  No overnight events, patient without distress.  Pre-CERT pending to SNF    2025  Patient resting in chair without complaints, pre-CERT pending to SNF         Objective   Objective   Vital Signs  Temp:  [97.3 °F (36.3 °C)-97.6 °F (36.4 °C)] 97.6 °F (36.4 °C)  Heart Rate:  [68-82] 81  Resp:  [18] 18  BP: (105-119)/(73-94) 105/75  SpO2:  [97 %-98 %] 97 %  on   ;   Device (Oxygen Therapy): room air  Body mass index is 33.4 kg/m².  Physical Exam  Constitutional:       General: He is not in acute distress.     Appearance: He is obese.   HENT:      Head: Normocephalic and atraumatic.      Nose: Nose normal. No congestion.      Mouth/Throat:      Pharynx: Oropharynx is clear. No oropharyngeal exudate.   Eyes:      General: No scleral icterus.  Cardiovascular:      Rate and Rhythm: Normal rate and regular rhythm.      Heart sounds: No murmur heard.     No friction rub. No gallop.   Pulmonary:      Effort: No respiratory distress.      Breath sounds: No wheezing or rales.   Abdominal:      General: There is no distension.      Tenderness: There is no abdominal tenderness. There is no guarding.   Musculoskeletal:         General: No swelling, deformity or signs of injury.      Cervical back: Normal range of motion. No rigidity.   Skin:     Coloration: Skin is not jaundiced.      Findings: No bruising or lesion.   Neurological:      General: No focal deficit present.      Mental Status: He is alert.      Motor: Weakness present.       Results Review     I reviewed the patient's new clinical results.  Results from last 7 days   Lab Units 25  0603  "06/10/25  0542 06/09/25  0632 06/08/25  0640   WBC 10*3/mm3 4.47 3.69 3.91 3.59   HEMOGLOBIN g/dL 10.6* 10.3* 10.3* 9.4*   PLATELETS 10*3/mm3 133* 123* 122* 124*     Results from last 7 days   Lab Units 06/11/25  0603 06/10/25  0542 06/09/25  0632 06/08/25  1224   SODIUM mmol/L 134* 135* 136 139   POTASSIUM mmol/L 3.8 4.0 3.9 4.3   CHLORIDE mmol/L 99 103 102 103   CO2 mmol/L 25.0 23.8 25.1 22.5   BUN mg/dL 14.0 13.0 13.0 14.0   CREATININE mg/dL 1.51* 1.37* 1.44* 1.60*   GLUCOSE mg/dL 102* 106* 98 110*   EGFR mL/min/1.73 46.1* 51.8* 48.8* 43.0*     Results from last 7 days   Lab Units 06/11/25  0603 06/10/25  0542 06/09/25  0632 06/08/25  0640   ALBUMIN g/dL 2.8* 2.8* 2.8* 2.7*   BILIRUBIN mg/dL 0.4 0.4 0.4 0.3   ALK PHOS U/L 86 84 90 93   AST (SGOT) U/L 36 37 39 39   ALT (SGPT) U/L 18 18 22 19     Results from last 7 days   Lab Units 06/11/25  0603 06/10/25  0542 06/09/25  0632 06/08/25  1224 06/08/25  0640 06/07/25  0540 06/06/25  0910 06/05/25  0557 06/04/25  2213   CALCIUM mg/dL 8.7 8.8 8.7 9.3 8.8   < > 8.6   < > 9.3   ALBUMIN g/dL 2.8* 2.8* 2.8*  --  2.7*   < > 2.6*  --  3.2*   MAGNESIUM mg/dL  --   --   --   --  1.8  --  2.3  --  1.6    < > = values in this interval not displayed.       No results found for: \"HGBA1C\", \"POCGLU\"    No radiology results for the last day    I have personally reviewed all medications:  Scheduled Medications  apixaban, 2.5 mg, Oral, Q12H  aspirin, 81 mg, Oral, Daily  atorvastatin, 80 mg, Oral, Daily  folic acid, 1 mg, Oral, Daily  levothyroxine, 75 mcg, Oral, Q AM  metoprolol succinate XL, 50 mg, Oral, BID  midodrine, 5 mg, Oral, TID AC  multivitamin with minerals, 1 tablet, Oral, Daily  ranolazine, 1,000 mg, Oral, Q12H  thiamine, 100 mg, Oral, Daily    Infusions   Diet  Diet: Cardiac; Healthy Heart (2-3 Na+); Fluid Consistency: Thin (IDDSI 0)    I have personally reviewed:  [x]  Laboratory   [x]  Microbiology   [x]  Radiology   [x]  EKG/Telemetry  [x]  Cardiology/Vascular   []  " Pathology    []  Records       Assessment/Plan     Active Hospital Problems    Diagnosis  POA    **Syncope [R55]  Yes    Alcohol use disorder [F10.90]  Yes    Acquired hypothyroidism [E03.9]  Yes    Cognitive decline [R41.89]  Yes    Stage 3b chronic kidney disease [N18.32]  Yes    Essential hypertension [I10]  Yes      Resolved Hospital Problems   No resolved problems to display.       80 y.o. male admitted with Syncope.    #Syncope  #Fall  #Orthostasis    -CT head without acute mass, shift, hemorrhage    -CT C-spine without acute fracture or subluxation    -CXR without acute cardiopulmonary process    -Midodrine 5 mg p.o. 3 times daily    -Hold home lisinopril    -Case management working on pre-CERT to SNF, pre-CERT pending    - Echo on 6/5/2025 showed EF 46.2% with diastolic dysfunction    #PVCs  #NSVT  #History of PE/DVT  #HFpEF, chronic    - Amiodarone discontinued due to prolonged Qtc    - Continue low-dose metoprolol    - Continue Eliquis 2.5 mg p.o. twice daily    - Patient underwent heart cath on 6/6/2025 which showed normal coronary arteries and normal left-sided filling pressures    -Patient without evidence of volume overload, continue to hold home Lasix    - Resume aspirin and statin    - EP recommends 2 weeks ZIO at discharge, follow-up in EP office and they have cleared for discharge    #Alcohol use disorder    -CIWA protocol    #JEFFREY on CKD    -Creatinine currently at baseline    - Nephrology following    #Hypothyroid    - Resume home Synthroid    #Hyperlipidemia    - Resume statin      Eliquis (home med) for DVT prophylaxis.  Full code.  Discussed with patient.  Anticipate discharge to SNU facility in 1-2 days.  Expected Discharge Date: 6/12/2025; Expected Discharge Time:  3:00 PM      DO [facility]  06/11/25  13:11 EDT

## 2025-06-11 NOTE — SIGNIFICANT NOTE
"   06/11/25 1500   Physical Therapy Time and Intention   Document Type therapy note (daily note)   Mode of Treatment physical therapy;individual therapy   Session Not Performed patient unavailable for treatment;unable to treat, medical status change   Comment, Session Not Performed Rn declined PT services. \" He is orthostatic, in the 60s... I just got him back in bed as well. Not today please.\" No charges   Therapy Assessment/Plan (PT)   Criteria for Skilled Interventions Met (PT) skilled treatment is necessary       "

## 2025-06-11 NOTE — NURSING NOTE
Sitting 101/76- in chair  Standing 67/50- after pt stated he was dizzy-  Got pt back to bed x2 asst and gait belt.   Back to bed BP- 98/64

## 2025-06-11 NOTE — PLAN OF CARE
Goal Outcome Evaluation:  Plan of Care Reviewed With: patient        Progress: improving  Outcome Evaluation: Patient  resting  at this  time.   No  complaints  of  pain.  Was  up  in  chair  for  a  while.   remain  on  room  air.,   Possible  discharge  today.    Nursing  will  continue  to monitor.

## 2025-06-11 NOTE — CASE MANAGEMENT/SOCIAL WORK
Continued Stay Note  [facility]     Patient Name: Bryan Landers  MRN: [id number]  Today's Date: 6/11/2025    Admit Date: 6/4/2025    Plan: Pre-cert pending for [facility]   Discharge Plan       Row Name 06/11/25 1512       Plan    Plan Pre-cert pending for [facility]    Plan Comments CCP spoke with Maria Isabel; pre-cert is still pending. Neha PICKENS                   Discharge Codes    No documentation.                 Expected Discharge Date and Time       Expected Discharge Date Expected Discharge Time    Jun 12, 2025               CELIO Sutton

## 2025-06-11 NOTE — PLAN OF CARE
Goal Outcome Evaluation:           Progress: no change  Outcome Evaluation: Pt AOx1. On RA. NSR on monitor. Sat up in chair most of the day- when getting back to bed pt became very orthostatic. MD made aware nad stockings ordere and increase in midodrine ordered for this evening and extra 1x dose. Pt stating he was dizzy. Asst back to bed x2 asst. This RN refusesd PT this evening d/t symptoms. Pt eager to leave. Awaiting pre cert- pending. Pt not in acute distress. Plan of care ongoing     Not eating well at meals- pt states no appetite

## 2025-06-11 NOTE — PROGRESS NOTES
"   LOS: 6 days     Chief Complaint/ Reason for encounter: JEFFREY on CKD    Subjective   06/11/25 : Patient is doing well today with no new complaints  Good appetite with no nausea or vomiting  No shortness of breath chest pain or edema  Voiding well with no dysuria          Medical history reviewed:  History of Present Illness    Subjective    History taken from: Chart and patient/family as able    Vital Signs  Temp:  [97.3 °F (36.3 °C)-97.6 °F (36.4 °C)] 97.6 °F (36.4 °C)  Heart Rate:  [68-82] 81  Resp:  [18] 18  BP: (105-119)/(73-94) 105/75       Wt Readings from Last 1 Encounters:   06/11/25 0526 106 kg (232 lb 12.9 oz)   06/10/25 0452 106 kg (233 lb 4 oz)   06/09/25 0300 109 kg (239 lb 3.2 oz)   06/08/25 0552 108 kg (238 lb 15.7 oz)   06/07/25 0534 111 kg (244 lb 11.4 oz)   06/06/25 0502 110 kg (242 lb 1 oz)   06/05/25 1102 110 kg (243 lb)   06/05/25 0217 110 kg (243 lb)   06/04/25 2159 86.2 kg (190 lb)       Objective:  Vital signs: (most recent): Blood pressure 105/75, pulse 81, temperature 97.6 °F (36.4 °C), temperature source Oral, resp. rate 18, height 177.8 cm (70\"), weight 106 kg (232 lb 12.9 oz), SpO2 97%.                Objective:  General Appearance:  Comfortable, well-appearing, no acute distress   HEENT: Mucous membranes moist, atraumatic  Lungs:  Normal effort and normal respiratory rate.  Breath sounds clear to auscultation: No rhonchi/Rales.  No  respiratory distress.   Heart:  S1, S2 normal.   Abdomen: Abdomen is soft, nontender/nondistended  Extremities: No edema of bilateral lower extremities  Skin:  Warm and dry       Results Review:    Intake/Output:     Intake/Output Summary (Last 24 hours) at 6/11/2025 1533  Last data filed at 6/11/2025 0843  Gross per 24 hour   Intake 450 ml   Output 250 ml   Net 200 ml         DATA:  Radiology and Labs:  The following labs independently reviewed by me. Additional labs ordered for tomorrow a.m.  Interval notes, chart personally reviewed by me.   Old records " independently reviewed showing CKD 3 followed by me in the office, baseline creatinine around 1.5  The following radiologic studies independently viewed by me, findings cardiac catheterization showed normal coronary arteries and filling pressures  New problems include nonsustained V. tach, JEFFREY  Discussed with patient himself at bedside    Risk/ complexity of medical care/ medical decision making moderate complexity, JEFFREY management    Labs:   Recent Results (from the past 24 hours)   Comprehensive Metabolic Panel    Collection Time: 06/11/25  6:03 AM    Specimen: Blood   Result Value Ref Range    Glucose 102 (H) 65 - 99 mg/dL    BUN 14.0 8.0 - 23.0 mg/dL    Creatinine 1.51 (H) 0.76 - 1.27 mg/dL    Sodium 134 (L) 136 - 145 mmol/L    Potassium 3.8 3.5 - 5.2 mmol/L    Chloride 99 98 - 107 mmol/L    CO2 25.0 22.0 - 29.0 mmol/L    Calcium 8.7 8.6 - 10.5 mg/dL    Total Protein 5.7 (L) 6.0 - 8.5 g/dL    Albumin 2.8 (L) 3.5 - 5.2 g/dL    ALT (SGPT) 18 1 - 41 U/L    AST (SGOT) 36 1 - 40 U/L    Alkaline Phosphatase 86 39 - 117 U/L    Total Bilirubin 0.4 0.0 - 1.2 mg/dL    Globulin 2.9 gm/dL    A/G Ratio 1.0 g/dL    BUN/Creatinine Ratio 9.3 7.0 - 25.0    Anion Gap 10.0 5.0 - 15.0 mmol/L    eGFR 46.1 (L) >60.0 mL/min/1.73   CBC Auto Differential    Collection Time: 06/11/25  6:03 AM    Specimen: Blood   Result Value Ref Range    WBC 4.47 3.40 - 10.80 10*3/mm3    RBC 2.92 (L) 4.14 - 5.80 10*6/mm3    Hemoglobin 10.6 (L) 13.0 - 17.7 g/dL    Hematocrit 31.0 (L) 37.5 - 51.0 %    .2 (H) 79.0 - 97.0 fL    MCH 36.3 (H) 26.6 - 33.0 pg    MCHC 34.2 31.5 - 35.7 g/dL    RDW 14.5 12.3 - 15.4 %    RDW-SD 55.1 (H) 37.0 - 54.0 fl    MPV 9.2 6.0 - 12.0 fL    Platelets 133 (L) 140 - 450 10*3/mm3    Neutrophil % 61.3 42.7 - 76.0 %    Lymphocyte % 22.1 19.6 - 45.3 %    Monocyte % 12.8 (H) 5.0 - 12.0 %    Eosinophil % 2.5 0.3 - 6.2 %    Basophil % 0.4 0.0 - 1.5 %    Immature Grans % 0.9 (H) 0.0 - 0.5 %    Neutrophils, Absolute 2.74 1.70 -  7.00 10*3/mm3    Lymphocytes, Absolute 0.99 0.70 - 3.10 10*3/mm3    Monocytes, Absolute 0.57 0.10 - 0.90 10*3/mm3    Eosinophils, Absolute 0.11 0.00 - 0.40 10*3/mm3    Basophils, Absolute 0.02 0.00 - 0.20 10*3/mm3    Immature Grans, Absolute 0.04 0.00 - 0.05 10*3/mm3       Radiology:  Pertinent radiology studies were reviewed as described above      Medications have been reviewed separately in chart review medication tab      ASSESSMENT:  JEFFREY, improved and near baseline  CKD stage IIIb at baseline today  Syncope   Non sustain VT with normal coronaries on cath  Mild hypotension on midodrine/metoprolol for rate control  Alcohol use disorder   Hx of dvt/pe in 2020 on AC               PLAN:   Renal function remains stable, creatinine 1.5 which is near baseline  Euvolemic on exam, keep even encourage oral fluids  Maintain midodrine and metoprolol for BP and rate control respectively     Monitor electrolytes and volume closely   Dose all medications for GFR around 45-50  Limit IV dye, NSAIDS and nephrotoxic medications   He stable for discharge at any time from a renal standpoint with follow-up in our office in 4 to 6 weeks      Alli Fabian MD  [facility]   Office phone number: [phone]  Answering service phone number: [phone]    06/11/25  15:33 EDT    Dictation performed using Dragon dictation software

## 2025-06-12 ENCOUNTER — APPOINTMENT (OUTPATIENT)
Dept: GENERAL RADIOLOGY | Facility: HOSPITAL | Age: 80
End: 2025-06-12
Payer: MEDICARE

## 2025-06-12 LAB
ALBUMIN SERPL-MCNC: 2.9 G/DL (ref 3.5–5.2)
ALBUMIN/GLOB SERPL: 1.1 G/DL
ALP SERPL-CCNC: 81 U/L (ref 39–117)
ALT SERPL W P-5'-P-CCNC: 20 U/L (ref 1–41)
ANION GAP SERPL CALCULATED.3IONS-SCNC: 7.5 MMOL/L (ref 5–15)
AST SERPL-CCNC: 34 U/L (ref 1–40)
BACTERIA UR QL AUTO: NORMAL /HPF
BASOPHILS # BLD AUTO: 0.02 10*3/MM3 (ref 0–0.2)
BASOPHILS NFR BLD AUTO: 0.4 % (ref 0–1.5)
BILIRUB SERPL-MCNC: 0.4 MG/DL (ref 0–1.2)
BILIRUB UR QL STRIP: NEGATIVE
BUN SERPL-MCNC: 15 MG/DL (ref 8–23)
BUN/CREAT SERPL: 11.1 (ref 7–25)
CALCIUM SPEC-SCNC: 8.6 MG/DL (ref 8.6–10.5)
CHLORIDE SERPL-SCNC: 98 MMOL/L (ref 98–107)
CLARITY UR: CLEAR
CO2 SERPL-SCNC: 25.5 MMOL/L (ref 22–29)
COLOR UR: YELLOW
CREAT SERPL-MCNC: 1.35 MG/DL (ref 0.76–1.27)
DEPRECATED RDW RBC AUTO: 49.9 FL (ref 37–54)
EGFRCR SERPLBLD CKD-EPI 2021: 52.7 ML/MIN/1.73
EOSINOPHIL # BLD AUTO: 0.16 10*3/MM3 (ref 0–0.4)
EOSINOPHIL NFR BLD AUTO: 3.3 % (ref 0.3–6.2)
ERYTHROCYTE [DISTWIDTH] IN BLOOD BY AUTOMATED COUNT: 14.3 % (ref 12.3–15.4)
GLOBULIN UR ELPH-MCNC: 2.7 GM/DL
GLUCOSE SERPL-MCNC: 99 MG/DL (ref 65–99)
GLUCOSE UR STRIP-MCNC: NEGATIVE MG/DL
HCT VFR BLD AUTO: 27.4 % (ref 37.5–51)
HGB BLD-MCNC: 9.8 G/DL (ref 13–17.7)
HGB UR QL STRIP.AUTO: NEGATIVE
HYALINE CASTS UR QL AUTO: NORMAL /LPF
IMM GRANULOCYTES # BLD AUTO: 0.03 10*3/MM3 (ref 0–0.05)
IMM GRANULOCYTES NFR BLD AUTO: 0.6 % (ref 0–0.5)
KETONES UR QL STRIP: NEGATIVE
LEUKOCYTE ESTERASE UR QL STRIP.AUTO: ABNORMAL
LYMPHOCYTES # BLD AUTO: 0.82 10*3/MM3 (ref 0.7–3.1)
LYMPHOCYTES NFR BLD AUTO: 17.1 % (ref 19.6–45.3)
MCH RBC QN AUTO: 35.9 PG (ref 26.6–33)
MCHC RBC AUTO-ENTMCNC: 35.8 G/DL (ref 31.5–35.7)
MCV RBC AUTO: 100.4 FL (ref 79–97)
MONOCYTES # BLD AUTO: 0.56 10*3/MM3 (ref 0.1–0.9)
MONOCYTES NFR BLD AUTO: 11.7 % (ref 5–12)
NEUTROPHILS NFR BLD AUTO: 3.2 10*3/MM3 (ref 1.7–7)
NEUTROPHILS NFR BLD AUTO: 66.9 % (ref 42.7–76)
NITRITE UR QL STRIP: NEGATIVE
NRBC BLD AUTO-RTO: 0 /100 WBC (ref 0–0.2)
OSMOLALITY UR: 337 MOSM/KG
PH UR STRIP.AUTO: 6.5 [PH] (ref 5–8)
PLATELET # BLD AUTO: 138 10*3/MM3 (ref 140–450)
PMV BLD AUTO: 9 FL (ref 6–12)
POTASSIUM SERPL-SCNC: 3.9 MMOL/L (ref 3.5–5.2)
PROT SERPL-MCNC: 5.6 G/DL (ref 6–8.5)
PROT UR QL STRIP: NEGATIVE
RBC # BLD AUTO: 2.73 10*6/MM3 (ref 4.14–5.8)
RBC # UR STRIP: NORMAL /HPF
REF LAB TEST METHOD: NORMAL
SODIUM SERPL-SCNC: 131 MMOL/L (ref 136–145)
SODIUM UR-SCNC: 76 MMOL/L
SP GR UR STRIP: 1.01 (ref 1–1.03)
SQUAMOUS #/AREA URNS HPF: NORMAL /HPF
UROBILINOGEN UR QL STRIP: ABNORMAL
WBC # UR STRIP: NORMAL /HPF
WBC NRBC COR # BLD AUTO: 4.79 10*3/MM3 (ref 3.4–10.8)

## 2025-06-12 PROCEDURE — 81001 URINALYSIS AUTO W/SCOPE: CPT | Performed by: STUDENT IN AN ORGANIZED HEALTH CARE EDUCATION/TRAINING PROGRAM

## 2025-06-12 PROCEDURE — 84300 ASSAY OF URINE SODIUM: CPT | Performed by: STUDENT IN AN ORGANIZED HEALTH CARE EDUCATION/TRAINING PROGRAM

## 2025-06-12 PROCEDURE — 80053 COMPREHEN METABOLIC PANEL: CPT | Performed by: STUDENT IN AN ORGANIZED HEALTH CARE EDUCATION/TRAINING PROGRAM

## 2025-06-12 PROCEDURE — 74018 RADEX ABDOMEN 1 VIEW: CPT

## 2025-06-12 PROCEDURE — 25810000003 SODIUM CHLORIDE 0.9 % SOLUTION: Performed by: STUDENT IN AN ORGANIZED HEALTH CARE EDUCATION/TRAINING PROGRAM

## 2025-06-12 PROCEDURE — 83935 ASSAY OF URINE OSMOLALITY: CPT | Performed by: STUDENT IN AN ORGANIZED HEALTH CARE EDUCATION/TRAINING PROGRAM

## 2025-06-12 PROCEDURE — 85025 COMPLETE CBC W/AUTO DIFF WBC: CPT | Performed by: STUDENT IN AN ORGANIZED HEALTH CARE EDUCATION/TRAINING PROGRAM

## 2025-06-12 RX ORDER — SODIUM CHLORIDE 9 MG/ML
50 INJECTION, SOLUTION INTRAVENOUS CONTINUOUS
Status: ACTIVE | OUTPATIENT
Start: 2025-06-12 | End: 2025-06-13

## 2025-06-12 RX ORDER — FLUDROCORTISONE ACETATE 0.1 MG/1
100 TABLET ORAL DAILY
Status: DISCONTINUED | OUTPATIENT
Start: 2025-06-12 | End: 2025-06-14

## 2025-06-12 RX ADMIN — APIXABAN 2.5 MG: 2.5 TABLET, FILM COATED ORAL at 08:19

## 2025-06-12 RX ADMIN — RANOLAZINE 1000 MG: 500 TABLET, FILM COATED, EXTENDED RELEASE ORAL at 21:35

## 2025-06-12 RX ADMIN — Medication 5 MG: at 21:34

## 2025-06-12 RX ADMIN — FOLIC ACID 1 MG: 1 TABLET ORAL at 08:19

## 2025-06-12 RX ADMIN — DOCUSATE SODIUM AND SENNOSIDES 2 TABLET: 8.6; 5 TABLET, FILM COATED ORAL at 11:06

## 2025-06-12 RX ADMIN — ATORVASTATIN CALCIUM 80 MG: 80 TABLET, FILM COATED ORAL at 08:19

## 2025-06-12 RX ADMIN — MIDODRINE HYDROCHLORIDE 10 MG: 5 TABLET ORAL at 17:15

## 2025-06-12 RX ADMIN — RANOLAZINE 1000 MG: 500 TABLET, FILM COATED, EXTENDED RELEASE ORAL at 08:19

## 2025-06-12 RX ADMIN — SODIUM CHLORIDE 500 ML: 9 INJECTION, SOLUTION INTRAVENOUS at 14:16

## 2025-06-12 RX ADMIN — Medication 1 TABLET: at 08:19

## 2025-06-12 RX ADMIN — LEVOTHYROXINE SODIUM 75 MCG: 0.07 TABLET ORAL at 06:34

## 2025-06-12 RX ADMIN — APIXABAN 2.5 MG: 2.5 TABLET, FILM COATED ORAL at 21:34

## 2025-06-12 RX ADMIN — MAGNESIUM HYDROXIDE 15 ML: 1200 LIQUID ORAL at 07:16

## 2025-06-12 RX ADMIN — ASPIRIN 81 MG CHEWABLE TABLET 81 MG: 81 TABLET CHEWABLE at 08:19

## 2025-06-12 RX ADMIN — Medication 100 MG: at 08:19

## 2025-06-12 RX ADMIN — MIDODRINE HYDROCHLORIDE 10 MG: 5 TABLET ORAL at 11:05

## 2025-06-12 RX ADMIN — SODIUM CHLORIDE 50 ML/HR: 9 INJECTION, SOLUTION INTRAVENOUS at 15:00

## 2025-06-12 RX ADMIN — MIDODRINE HYDROCHLORIDE 10 MG: 5 TABLET ORAL at 08:19

## 2025-06-12 RX ADMIN — FLUDROCORTISONE ACETATE 100 MCG: 0.1 TABLET ORAL at 11:05

## 2025-06-12 NOTE — PLAN OF CARE
Goal Outcome Evaluation:  Plan of Care Reviewed With: patient        Progress: no change  Outcome Evaluation: Patient  resting  at this  time.  No  complant  of  pain voiced.  No  Bowel movement  this  shift. Dulcolax  suppository    given.  Adequat  fluid  given  and promoted.  Room  air.  SB/[id number]  on  the  monitor.  Nursing  will  contiue to monitor

## 2025-06-12 NOTE — PLAN OF CARE
Goal Outcome Evaluation:           Progress: no change  Outcome Evaluation: Pt AOx1. On RA. NSR on monitor. Orthostatic and dizzy this am- MD added fludrocortisone. No BM since 6/6- more senna/laxatives given today- Tried to use BSC but too weak this afternoon so used bedpan. Had 2 small hard balls with liquid stool with. Pt has not eaten in 2 days. Nephrology and [facility] think he may be a little dry-IVFs started and urine studies added. PRecert was pending but not ready to leave for SNF yet. Wife updated. Pt denies pain. Plan of care ongoing

## 2025-06-12 NOTE — NURSING NOTE
Positive orthostatics this am. Dropped to SBP 80s when sitting. Pt stood for a few seconds and said he needed to sit back down (unable to get standing BP). Dizziness got better once he laid back down. Pt now nauseous and no appetite-unable to give midodrine or am meds at this time. No BM in 1 week. MD made aware and stat KUB ordered.

## 2025-06-12 NOTE — PROGRESS NOTES
Name: Bryan Landers ADMIT: 2025   : 1945  PCP: Jose Fonseca MD    MRN: [id number] LOS: 7 days   AGE/SEX: 80 y.o. male  ROOM: [facility]     Subjective   Subjective   2025  Patient seen and examined at bedside, he is on room air states he is feeling better.  BP stable.  EP to evaluate today.  Case management to start pre-CERT to SNF    06/10/2025  No overnight events, patient without distress.  Pre-CERT pending to SNF    2025  Patient resting in chair without complaints, pre-CERT pending to SNF    2025  Patient seen and examined at bedside he does complain of weakness.  He was orthostatic yesterday afternoon and midodrine was increased.  He became orthostatic again this morning before receiving midodrine dose.  Fludrocortisone added.  Has had poor p.o. intake, will give some fluids.         Objective   Objective   Vital Signs  Temp:  [97.7 °F (36.5 °C)-98 °F (36.7 °C)] 97.7 °F (36.5 °C)  Heart Rate:  [73-77] 77  Resp:  [18-20] 20  BP: ()/(64-86) 105/68  SpO2:  [95 %-97 %] 95 %  on  Flow (L/min) (Oxygen Therapy):  [3] 3;   Device (Oxygen Therapy): room air  Body mass index is 33.4 kg/m².  Physical Exam  Constitutional:       General: He is not in acute distress.     Appearance: He is obese.   HENT:      Head: Normocephalic and atraumatic.      Nose: Nose normal. No congestion.      Mouth/Throat:      Pharynx: Oropharynx is clear. No oropharyngeal exudate.   Eyes:      General: No scleral icterus.  Cardiovascular:      Rate and Rhythm: Normal rate and regular rhythm.      Heart sounds: No murmur heard.     No friction rub. No gallop.   Pulmonary:      Effort: No respiratory distress.      Breath sounds: No wheezing or rales.   Abdominal:      General: There is no distension.      Tenderness: There is no abdominal tenderness. There is no guarding.   Musculoskeletal:         General: No swelling, deformity or signs of injury.      Cervical back: Normal range of motion. No  "rigidity.   Skin:     Coloration: Skin is not jaundiced.      Findings: No bruising or lesion.   Neurological:      General: No focal deficit present.      Mental Status: He is alert.      Motor: Weakness present.       Results Review     I reviewed the patient's new clinical results.  Results from last 7 days   Lab Units 06/12/25  0644 06/11/25  0603 06/10/25  0542 06/09/25  0632   WBC 10*3/mm3 4.79 4.47 3.69 3.91   HEMOGLOBIN g/dL 9.8* 10.6* 10.3* 10.3*   PLATELETS 10*3/mm3 138* 133* 123* 122*     Results from last 7 days   Lab Units 06/12/25  0644 06/11/25  0603 06/10/25  0542 06/09/25  0632   SODIUM mmol/L 131* 134* 135* 136   POTASSIUM mmol/L 3.9 3.8 4.0 3.9   CHLORIDE mmol/L 98 99 103 102   CO2 mmol/L 25.5 25.0 23.8 25.1   BUN mg/dL 15.0 14.0 13.0 13.0   CREATININE mg/dL 1.35* 1.51* 1.37* 1.44*   GLUCOSE mg/dL 99 102* 106* 98   EGFR mL/min/1.73 52.7* 46.1* 51.8* 48.8*     Results from last 7 days   Lab Units 06/12/25  0644 06/11/25  0603 06/10/25  0542 06/09/25  0632   ALBUMIN g/dL 2.9* 2.8* 2.8* 2.8*   BILIRUBIN mg/dL 0.4 0.4 0.4 0.4   ALK PHOS U/L 81 86 84 90   AST (SGOT) U/L 34 36 37 39   ALT (SGPT) U/L 20 18 18 22     Results from last 7 days   Lab Units 06/12/25  0644 06/11/25  0603 06/10/25  0542 06/09/25  0632 06/08/25  1224 06/08/25  0640 06/07/25  0540 06/06/25  0910   CALCIUM mg/dL 8.6 8.7 8.8 8.7   < > 8.8   < > 8.6   ALBUMIN g/dL 2.9* 2.8* 2.8* 2.8*  --  2.7*   < > 2.6*   MAGNESIUM mg/dL  --   --   --   --   --  1.8  --  2.3    < > = values in this interval not displayed.       No results found for: \"HGBA1C\", \"POCGLU\"    XR Abdomen KUB  Result Date: 6/12/2025  As above    This report was finalized on 6/12/2025 9:00 AM by Dr. Alli Mccarthy M.D on Workstation: [id number]        I have personally reviewed all medications:  Scheduled Medications  apixaban, 2.5 mg, Oral, Q12H  aspirin, 81 mg, Oral, Daily  atorvastatin, 80 mg, Oral, Daily  fludrocortisone, 100 mcg, Oral, Daily  folic acid, 1 mg, Oral, " Daily  levothyroxine, 75 mcg, Oral, Q AM  metoprolol succinate XL, 50 mg, Oral, BID  midodrine, 10 mg, Oral, TID AC  multivitamin with minerals, 1 tablet, Oral, Daily  ranolazine, 1,000 mg, Oral, Q12H  thiamine, 100 mg, Oral, Daily    Infusions   Diet  Diet: Cardiac; Healthy Heart (2-3 Na+); Fluid Consistency: Thin (IDDSI 0)    I have personally reviewed:  [x]  Laboratory   [x]  Microbiology   [x]  Radiology   [x]  EKG/Telemetry  [x]  Cardiology/Vascular   []  Pathology    []  Records       Assessment/Plan     Active Hospital Problems    Diagnosis  POA    **Syncope [R55]  Yes    Alcohol use disorder [F10.90]  Yes    Acquired hypothyroidism [E03.9]  Yes    Cognitive decline [R41.89]  Yes    Stage 3b chronic kidney disease [N18.32]  Yes    Essential hypertension [I10]  Yes      Resolved Hospital Problems   No resolved problems to display.       80 y.o. male admitted with Syncope.    #Syncope  #Fall  #Orthostasis    -CT head without acute mass, shift, hemorrhage    -CT C-spine without acute fracture or subluxation    -CXR without acute cardiopulmonary process    - Patient orthostatic on the afternoon of 6/11/2025, midodrine increased to 10 mg p.o. 3 times daily.  He was also orthostatic on the morning of 6/12/2025 before receiving medications.  Fludrocortisone added to his medications    -Nursing reports poor p.o. intake will check UA and urine studies    -Will also give  mL bolus and NS at 50 mL an hour x 10 hours, monitor closely for fluid overload    -Hold home lisinopril    -Case management working on pre-CERT to SNF, pre-CERT pending    - Echo on 6/5/2025 showed EF 46.2% with diastolic dysfunction    #PVCs  #NSVT  #History of PE/DVT  #HFpEF, chronic    - Amiodarone discontinued due to prolonged Qtc    - Continue low-dose metoprolol    - Continue Eliquis 2.5 mg p.o. twice daily    - Patient underwent heart cath on 6/6/2025 which showed normal coronary arteries and normal left-sided filling pressures     -Patient without evidence of volume overload, continue to hold home Lasix    - Resume aspirin and statin    - EP recommends 2 weeks ZIO at discharge, follow-up in EP office and they have cleared for discharge    #Alcohol use disorder    -CIWA protocol    #JEFFREY on CKD    -Creatinine currently at baseline    - Nephrology following    #Hyponatremia    - Sodium 131 this morning    - Nursing reports poor p.o. intake along with weakness    - 500 mL NS bolus followed by NS 50ml/hr x 10 hours    - Check UA, urine sodium, urine osmolality    #Anemia  #Thrombocytopenia    - Hemoglobin 9.8, no overt bleeding, appears near base    - Platelets stable at 138, patient placed    - Monitor labs    #Hypothyroid    - Resume home Synthroid    #Hyperlipidemia    - Resume statin      Eliquis (home med) for DVT prophylaxis.  Full code.  Discussed with patient.  Anticipate discharge to SNU facility in 1-2 days.  Expected Discharge Date: 6/14/2025; Expected Discharge Time:  3:00 PM      Joseph Mathis DO  [facility]  06/12/25  13:57 EDT

## 2025-06-12 NOTE — PROGRESS NOTES
"   LOS: 7 days     Chief Complaint/ Reason for encounter: JEFFREY on CKD    Subjective   06/11/25 : Patient is doing well today with no new complaints  Good appetite with no nausea or vomiting  No shortness of breath chest pain or edema  Voiding well with no dysuria    6/12: Feels well today denies any new complaints, waiting rehab bed  Worsening orthostatic blood pressures, midodrine increased, fludrocortisone added  Complains of intermittent dizziness with standing    Medical history reviewed:  History of Present Illness    Subjective    History taken from: Chart and patient/family as able    Vital Signs  Temp:  [97.7 °F (36.5 °C)-98 °F (36.7 °C)] 97.7 °F (36.5 °C)  Heart Rate:  [73-77] 77  Resp:  [18-20] 20  BP: ()/(64-86) 105/68       Wt Readings from Last 1 Encounters:   06/11/25 0526 106 kg (232 lb 12.9 oz)   06/10/25 0452 106 kg (233 lb 4 oz)   06/09/25 0300 109 kg (239 lb 3.2 oz)   06/08/25 0552 108 kg (238 lb 15.7 oz)   06/07/25 0534 111 kg (244 lb 11.4 oz)   06/06/25 0502 110 kg (242 lb 1 oz)   06/05/25 1102 110 kg (243 lb)   06/05/25 0217 110 kg (243 lb)   06/04/25 2159 86.2 kg (190 lb)       Objective:  Vital signs: (most recent): Blood pressure 105/68, pulse 77, temperature 97.7 °F (36.5 °C), temperature source Oral, resp. rate 20, height 177.8 cm (70\"), weight 106 kg (232 lb 12.9 oz), SpO2 95%.                Objective:  General Appearance:  Comfortable, well-appearing, no acute distress   HEENT: Mucous membranes moist, atraumatic  Lungs:  Normal effort and normal respiratory rate.  Breath sounds clear to auscultation: No rhonchi/Rales.  No  respiratory distress.   Heart:  S1, S2 normal.   Abdomen: Abdomen is soft, nontender/nondistended  Extremities: No edema of bilateral lower extremities  Skin:  Warm and dry       Results Review:    Intake/Output:     Intake/Output Summary (Last 24 hours) at 6/12/2025 1401  Last data filed at 6/12/2025 0824  Gross per 24 hour   Intake 240 ml   Output --   Net 240 " ml         DATA:  Radiology and Labs:  The following labs independently reviewed by me. Additional labs ordered for tomorrow a.m.  Interval notes, chart personally reviewed by me.   Old records independently reviewed showing CKD 3 followed by me in the office, baseline creatinine around 1.5  Discussed with patient himself at bedside    Risk/ complexity of medical care/ medical decision making moderate complexity, JEFFREY management    Labs:   Recent Results (from the past 24 hours)   Comprehensive Metabolic Panel    Collection Time: 06/12/25  6:44 AM    Specimen: Blood   Result Value Ref Range    Glucose 99 65 - 99 mg/dL    BUN 15.0 8.0 - 23.0 mg/dL    Creatinine 1.35 (H) 0.76 - 1.27 mg/dL    Sodium 131 (L) 136 - 145 mmol/L    Potassium 3.9 3.5 - 5.2 mmol/L    Chloride 98 98 - 107 mmol/L    CO2 25.5 22.0 - 29.0 mmol/L    Calcium 8.6 8.6 - 10.5 mg/dL    Total Protein 5.6 (L) 6.0 - 8.5 g/dL    Albumin 2.9 (L) 3.5 - 5.2 g/dL    ALT (SGPT) 20 1 - 41 U/L    AST (SGOT) 34 1 - 40 U/L    Alkaline Phosphatase 81 39 - 117 U/L    Total Bilirubin 0.4 0.0 - 1.2 mg/dL    Globulin 2.7 gm/dL    A/G Ratio 1.1 g/dL    BUN/Creatinine Ratio 11.1 7.0 - 25.0    Anion Gap 7.5 5.0 - 15.0 mmol/L    eGFR 52.7 (L) >60.0 mL/min/1.73   CBC Auto Differential    Collection Time: 06/12/25  6:44 AM    Specimen: Blood   Result Value Ref Range    WBC 4.79 3.40 - 10.80 10*3/mm3    RBC 2.73 (L) 4.14 - 5.80 10*6/mm3    Hemoglobin 9.8 (L) 13.0 - 17.7 g/dL    Hematocrit 27.4 (L) 37.5 - 51.0 %    .4 (H) 79.0 - 97.0 fL    MCH 35.9 (H) 26.6 - 33.0 pg    MCHC 35.8 (H) 31.5 - 35.7 g/dL    RDW 14.3 12.3 - 15.4 %    RDW-SD 49.9 37.0 - 54.0 fl    MPV 9.0 6.0 - 12.0 fL    Platelets 138 (L) 140 - 450 10*3/mm3    Neutrophil % 66.9 42.7 - 76.0 %    Lymphocyte % 17.1 (L) 19.6 - 45.3 %    Monocyte % 11.7 5.0 - 12.0 %    Eosinophil % 3.3 0.3 - 6.2 %    Basophil % 0.4 0.0 - 1.5 %    Immature Grans % 0.6 (H) 0.0 - 0.5 %    Neutrophils, Absolute 3.20 1.70 - 7.00  10*3/mm3    Lymphocytes, Absolute 0.82 0.70 - 3.10 10*3/mm3    Monocytes, Absolute 0.56 0.10 - 0.90 10*3/mm3    Eosinophils, Absolute 0.16 0.00 - 0.40 10*3/mm3    Basophils, Absolute 0.02 0.00 - 0.20 10*3/mm3    Immature Grans, Absolute 0.03 0.00 - 0.05 10*3/mm3    nRBC 0.0 0.0 - 0.2 /100 WBC       Radiology:  Pertinent radiology studies were reviewed as described above      Medications have been reviewed separately in chart review medication tab      ASSESSMENT:  JEFFREY, improved and near baseline  CKD stage IIIb at baseline today  Syncope   Non sustain VT with normal coronaries on cath  Mild hypotension on midodrine/metoprolol for rate control  Alcohol use disorder   Hx of dvt/pe in 2020 on AC   Worsening hyponatremia, down to 131       PLAN:   His renal function is further improved, creatinine 1.35 but he is increasingly orthostatic and hypotensive at times  His weight is down about 10 pounds over the last 5 days  Urine sodium and osmolality have been ordered.  Recheck sodium after IV hydration  Agree with increase midodrine, addition of fludrocortisone and IV hydration with normal saline  Metoprolol dosing per cardiology  Monitor electrolytes and volume closely   Dose all medications for GFR around 45-50        Alli Fabian MD  [facility]   Office phone number: [phone]  Answering service phone number: [phone]    06/12/25  14:01 EDT    Dictation performed using Dragon dictation software

## 2025-06-13 LAB
ALBUMIN SERPL-MCNC: 2.9 G/DL (ref 3.5–5.2)
ANION GAP SERPL CALCULATED.3IONS-SCNC: 12 MMOL/L (ref 5–15)
BUN SERPL-MCNC: 14 MG/DL (ref 8–23)
BUN/CREAT SERPL: 11.3 (ref 7–25)
CALCIUM SPEC-SCNC: 8.6 MG/DL (ref 8.6–10.5)
CHLORIDE SERPL-SCNC: 99 MMOL/L (ref 98–107)
CO2 SERPL-SCNC: 23 MMOL/L (ref 22–29)
CREAT SERPL-MCNC: 1.24 MG/DL (ref 0.76–1.27)
DEPRECATED RDW RBC AUTO: 55.8 FL (ref 37–54)
EGFRCR SERPLBLD CKD-EPI 2021: 58.4 ML/MIN/1.73
ERYTHROCYTE [DISTWIDTH] IN BLOOD BY AUTOMATED COUNT: 15.3 % (ref 12.3–15.4)
GLUCOSE SERPL-MCNC: 102 MG/DL (ref 65–99)
HCT VFR BLD AUTO: 30 % (ref 37.5–51)
HGB BLD-MCNC: 10.6 G/DL (ref 13–17.7)
MAGNESIUM SERPL-MCNC: 1.8 MG/DL (ref 1.6–2.4)
MCH RBC QN AUTO: 36.1 PG (ref 26.6–33)
MCHC RBC AUTO-ENTMCNC: 35.3 G/DL (ref 31.5–35.7)
MCV RBC AUTO: 102 FL (ref 79–97)
PHOSPHATE SERPL-MCNC: 3.3 MG/DL (ref 2.5–4.5)
PLATELET # BLD AUTO: 150 10*3/MM3 (ref 140–450)
PMV BLD AUTO: 8.7 FL (ref 6–12)
POTASSIUM SERPL-SCNC: 3.9 MMOL/L (ref 3.5–5.2)
RBC # BLD AUTO: 2.94 10*6/MM3 (ref 4.14–5.8)
SODIUM SERPL-SCNC: 134 MMOL/L (ref 136–145)
URATE SERPL-MCNC: 6.5 MG/DL (ref 3.4–7)
WBC NRBC COR # BLD AUTO: 5.46 10*3/MM3 (ref 3.4–10.8)

## 2025-06-13 PROCEDURE — 97530 THERAPEUTIC ACTIVITIES: CPT

## 2025-06-13 PROCEDURE — 93005 ELECTROCARDIOGRAM TRACING: CPT | Performed by: STUDENT IN AN ORGANIZED HEALTH CARE EDUCATION/TRAINING PROGRAM

## 2025-06-13 PROCEDURE — 80069 RENAL FUNCTION PANEL: CPT | Performed by: INTERNAL MEDICINE

## 2025-06-13 PROCEDURE — 93010 ELECTROCARDIOGRAM REPORT: CPT | Performed by: INTERNAL MEDICINE

## 2025-06-13 PROCEDURE — 83735 ASSAY OF MAGNESIUM: CPT | Performed by: STUDENT IN AN ORGANIZED HEALTH CARE EDUCATION/TRAINING PROGRAM

## 2025-06-13 PROCEDURE — 84550 ASSAY OF BLOOD/URIC ACID: CPT | Performed by: INTERNAL MEDICINE

## 2025-06-13 PROCEDURE — 25010000002 ALBUMIN HUMAN 25% PER 50 ML: Performed by: STUDENT IN AN ORGANIZED HEALTH CARE EDUCATION/TRAINING PROGRAM

## 2025-06-13 PROCEDURE — 85027 COMPLETE CBC AUTOMATED: CPT | Performed by: STUDENT IN AN ORGANIZED HEALTH CARE EDUCATION/TRAINING PROGRAM

## 2025-06-13 PROCEDURE — P9047 ALBUMIN (HUMAN), 25%, 50ML: HCPCS | Performed by: STUDENT IN AN ORGANIZED HEALTH CARE EDUCATION/TRAINING PROGRAM

## 2025-06-13 PROCEDURE — 97110 THERAPEUTIC EXERCISES: CPT

## 2025-06-13 RX ORDER — ALBUMIN (HUMAN) 12.5 G/50ML
25 SOLUTION INTRAVENOUS ONCE
Status: COMPLETED | OUTPATIENT
Start: 2025-06-13 | End: 2025-06-13

## 2025-06-13 RX ORDER — HYDROXYZINE HYDROCHLORIDE 25 MG/1
25 TABLET, FILM COATED ORAL 3 TIMES DAILY PRN
Status: DISCONTINUED | OUTPATIENT
Start: 2025-06-13 | End: 2025-06-14

## 2025-06-13 RX ADMIN — Medication 100 MG: at 08:06

## 2025-06-13 RX ADMIN — FLUDROCORTISONE ACETATE 100 MCG: 0.1 TABLET ORAL at 08:06

## 2025-06-13 RX ADMIN — APIXABAN 2.5 MG: 2.5 TABLET, FILM COATED ORAL at 20:17

## 2025-06-13 RX ADMIN — Medication 5 MG: at 20:18

## 2025-06-13 RX ADMIN — APIXABAN 2.5 MG: 2.5 TABLET, FILM COATED ORAL at 08:05

## 2025-06-13 RX ADMIN — ALBUMIN (HUMAN) 25 G: 0.25 INJECTION, SOLUTION INTRAVENOUS at 13:33

## 2025-06-13 RX ADMIN — Medication 1 TABLET: at 08:05

## 2025-06-13 RX ADMIN — MIDODRINE HYDROCHLORIDE 10 MG: 5 TABLET ORAL at 17:54

## 2025-06-13 RX ADMIN — METOPROLOL SUCCINATE 50 MG: 25 TABLET, EXTENDED RELEASE ORAL at 21:54

## 2025-06-13 RX ADMIN — ASPIRIN 81 MG CHEWABLE TABLET 81 MG: 81 TABLET CHEWABLE at 08:05

## 2025-06-13 RX ADMIN — ATORVASTATIN CALCIUM 80 MG: 80 TABLET, FILM COATED ORAL at 08:05

## 2025-06-13 RX ADMIN — LEVOTHYROXINE SODIUM 75 MCG: 0.07 TABLET ORAL at 06:37

## 2025-06-13 RX ADMIN — FOLIC ACID 1 MG: 1 TABLET ORAL at 08:05

## 2025-06-13 RX ADMIN — RANOLAZINE 1000 MG: 500 TABLET, FILM COATED, EXTENDED RELEASE ORAL at 20:18

## 2025-06-13 RX ADMIN — MIDODRINE HYDROCHLORIDE 10 MG: 5 TABLET ORAL at 06:37

## 2025-06-13 RX ADMIN — METOPROLOL SUCCINATE 50 MG: 25 TABLET, EXTENDED RELEASE ORAL at 08:05

## 2025-06-13 RX ADMIN — MIDODRINE HYDROCHLORIDE 10 MG: 5 TABLET ORAL at 11:57

## 2025-06-13 RX ADMIN — MENTHOL, ZINC OXIDE 1 APPLICATION: .44; 20.6 OINTMENT TOPICAL at 20:17

## 2025-06-13 RX ADMIN — HYDROXYZINE HYDROCHLORIDE 25 MG: 25 TABLET, FILM COATED ORAL at 11:57

## 2025-06-13 RX ADMIN — RANOLAZINE 1000 MG: 500 TABLET, FILM COATED, EXTENDED RELEASE ORAL at 08:05

## 2025-06-13 NOTE — PROGRESS NOTES
Name: Bryan Landers ADMIT: 2025   : 1945  PCP: Jose Fonseca MD    MRN: [id number] LOS: 8 days   AGE/SEX: 80 y.o. male  ROOM: [facility]     Subjective   Subjective   2025  Patient seen and examined at bedside, he is on room air states he is feeling better.  BP stable.  EP to evaluate today.  Case management to start pre-CERT to SNF    06/10/2025  No overnight events, patient without distress.  Pre-CERT pending to SNF    2025  Patient resting in chair without complaints, pre-CERT pending to SNF    2025  Patient seen and examined at bedside he does complain of weakness.  He was orthostatic yesterday afternoon and midodrine was increased.  He became orthostatic again this morning before receiving midodrine dose.  Fludrocortisone added.  Has had poor p.o. intake, will give some fluids.    25  Patient still remains orthostatic but appears to be better than yesterday and had good response to fluids.  Nutrition has added supplements.  Pre-CERT remains pending, patient is getting frustrated with the long process.  Personally discussed with case management         Objective   Objective   Vital Signs  Temp:  [97.6 °F (36.4 °C)-98 °F (36.7 °C)] 97.7 °F (36.5 °C)  Heart Rate:  [71-96] 87  Resp:  [16-18] 18  BP: ()/(34-94) 109/85  SpO2:  [95 %-97 %] 95 %  on   ;   Device (Oxygen Therapy): room air  Body mass index is 33.59 kg/m².  Physical Exam  Constitutional:       General: He is not in acute distress.     Appearance: He is obese.   HENT:      Head: Normocephalic and atraumatic.      Nose: Nose normal. No congestion.      Mouth/Throat:      Pharynx: Oropharynx is clear. No oropharyngeal exudate.   Eyes:      General: No scleral icterus.  Cardiovascular:      Rate and Rhythm: Normal rate and regular rhythm.      Heart sounds: No murmur heard.     No friction rub. No gallop.   Pulmonary:      Effort: No respiratory distress.      Breath sounds: No wheezing or rales.   Abdominal:      " General: There is no distension.      Tenderness: There is no abdominal tenderness. There is no guarding.   Musculoskeletal:         General: No swelling, deformity or signs of injury.      Cervical back: Normal range of motion. No rigidity.   Skin:     Coloration: Skin is not jaundiced.      Findings: No bruising or lesion.   Neurological:      General: No focal deficit present.      Mental Status: He is alert.      Motor: Weakness present.       Results Review     I reviewed the patient's new clinical results.  Results from last 7 days   Lab Units 06/13/25  0832 06/12/25  0644 06/11/25  0603 06/10/25  0542   WBC 10*3/mm3 5.46 4.79 4.47 3.69   HEMOGLOBIN g/dL 10.6* 9.8* 10.6* 10.3*   PLATELETS 10*3/mm3 150 138* 133* 123*     Results from last 7 days   Lab Units 06/13/25  0547 06/12/25  0644 06/11/25  0603 06/10/25  0542   SODIUM mmol/L 134* 131* 134* 135*   POTASSIUM mmol/L 3.9 3.9 3.8 4.0   CHLORIDE mmol/L 99 98 99 103   CO2 mmol/L 23.0 25.5 25.0 23.8   BUN mg/dL 14.0 15.0 14.0 13.0   CREATININE mg/dL 1.24 1.35* 1.51* 1.37*   GLUCOSE mg/dL 102* 99 102* 106*   EGFR mL/min/1.73 58.4* 52.7* 46.1* 51.8*     Results from last 7 days   Lab Units 06/13/25  0547 06/12/25  0644 06/11/25  0603 06/10/25  0542 06/09/25  0632   ALBUMIN g/dL 2.9* 2.9* 2.8* 2.8* 2.8*   BILIRUBIN mg/dL  --  0.4 0.4 0.4 0.4   ALK PHOS U/L  --  81 86 84 90   AST (SGOT) U/L  --  34 36 37 39   ALT (SGPT) U/L  --  20 18 18 22     Results from last 7 days   Lab Units 06/13/25  0547 06/12/25  0644 06/11/25  0603 06/10/25  0542 06/08/25  1224 06/08/25  0640   CALCIUM mg/dL 8.6 8.6 8.7 8.8   < > 8.8   ALBUMIN g/dL 2.9* 2.9* 2.8* 2.8*   < > 2.7*   MAGNESIUM mg/dL 1.8  --   --   --   --  1.8   PHOSPHORUS mg/dL 3.3  --   --   --   --   --     < > = values in this interval not displayed.       No results found for: \"HGBA1C\", \"POCGLU\"    XR Abdomen KUB  Result Date: 6/12/2025  As above    This report was finalized on 6/12/2025 9:00 AM by Dr. Alli Mccarthy, " M.D on Workstation: [id number]        I have personally reviewed all medications:  Scheduled Medications  albumin human, 25 g, Intravenous, Once  apixaban, 2.5 mg, Oral, Q12H  aspirin, 81 mg, Oral, Daily  atorvastatin, 80 mg, Oral, Daily  fludrocortisone, 100 mcg, Oral, Daily  folic acid, 1 mg, Oral, Daily  levothyroxine, 75 mcg, Oral, Q AM  metoprolol succinate XL, 50 mg, Oral, BID  midodrine, 10 mg, Oral, TID AC  multivitamin with minerals, 1 tablet, Oral, Daily  ranolazine, 1,000 mg, Oral, Q12H  thiamine, 100 mg, Oral, Daily    Infusions   Diet  Diet: Cardiac; Healthy Heart (2-3 Na+); Fluid Consistency: Thin (IDDSI 0)    I have personally reviewed:  [x]  Laboratory   [x]  Microbiology   [x]  Radiology   [x]  EKG/Telemetry  [x]  Cardiology/Vascular   []  Pathology    []  Records       Assessment/Plan     Active Hospital Problems    Diagnosis  POA   • **Syncope [R55]  Yes   • Alcohol use disorder [F10.90]  Yes   • Acquired hypothyroidism [E03.9]  Yes   • Cognitive decline [R41.89]  Yes   • Stage 3b chronic kidney disease [N18.32]  Yes   • Essential hypertension [I10]  Yes      Resolved Hospital Problems   No resolved problems to display.       80 y.o. male admitted with Syncope.    #Syncope  #Fall  #Orthostasis    -CT head without acute mass, shift, hemorrhage    -CT C-spine without acute fracture or subluxation    -CXR without acute cardiopulmonary process    - Patient orthostatic on the afternoon of 6/11/2025, midodrine increased to 10 mg p.o. 3 times daily.  He was also orthostatic on the morning of 6/12/2025 before receiving medications.  Fludrocortisone added to his medications    -Nursing reports poor p.o. intake will check UA and urine studies    - S/p 500 NS bolus followed by maintenance fluids to complete 1L NS total on 6/12/2025    -Hold home lisinopril    -Case management working on pre-CERT to SNF, pre-CERT pending.  Patient getting frustrated by the long process but he is agreeable to wait, personally  discussed with case management again and they are closely keeping an eye for when pre-CERT becomes available    - Echo on 6/5/2025 showed EF 46.2% with diastolic dysfunction    #PVCs  #NSVT  #History of PE/DVT  #HFpEF, chronic    - Amiodarone discontinued due to prolonged Qtc    - Continue low-dose metoprolol    - Continue Eliquis 2.5 mg p.o. twice daily    - Patient underwent heart cath on 6/6/2025 which showed normal coronary arteries and normal left-sided filling pressures    -Patient without evidence of volume overload, continue to hold home Lasix    - Resume aspirin and statin    - EP recommends 2 weeks ZIO at discharge, follow-up in EP office and they have cleared for discharge    #Alcohol use disorder    -CIWA protocol    #JEFFREY on CKD    -Creatinine currently at baseline    - Nephrology following    #Hyponatremia    - Sodium 131 > 134 this morning    - Nursing reports poor p.o. intake along with weakness    - S/p 1 L NS on 6/12/2025    - Urine studies reviewed    #Anemia  #Thrombocytopenia    - Hemoglobin 10.6, no overt bleeding, appears near base    - Platelets stable at 150, patient placed    - Monitor labs    #Hypothyroid    - Resume home Synthroid    #Hyperlipidemia    - Resume statin      Eliquis (home med) for DVT prophylaxis.  Full code.  Discussed with patient.  Anticipate discharge to SNU facility in 1-2 days.  Expected Discharge Date: 6/14/2025; Expected Discharge Time:  3:00 PM      DO [facility]  06/13/25  11:32 EDT

## 2025-06-13 NOTE — PLAN OF CARE
Goal Outcome Evaluation:  Plan of Care Reviewed With: patient        Progress: no change  Outcome Evaluation: pt still maintains dizziness w/ positional movements and education on importance of up to chair to which he vrb'd understanding. BP monitored with MAP 89 w c/o dizziness upon sitting EOB and unable to get accurate BP reading in standing MAP 79 once back to sitting EOB and up in chair. Pt did not require increased physical A but more son CGA/MIN A for safety due to symptomatic. Completed 2 bouts of standing MIP for BP regulation/hemodynamic stability, still c/o dizziness though. Left up in chair w/ feet down on floor and encouragement ankle pumps, kicks. RN notified.    Anticipated Discharge Disposition (OT): skilled nursing facility

## 2025-06-13 NOTE — PLAN OF CARE
Goal Outcome Evaluation:  Plan of Care Reviewed With: patient           Outcome Evaluation: Pt still very dizzy w/ activity, especially in standing. Bed mobility was CGA, transfers min A, then afew steps EOB w/ FWW min A. Completed LAQ, AP's, sit<>stands 4x, then standing marches ( total of 35 w/ 2 seated rest bks). Pt has difficulty tolerating continuous activity due to significant drops in BP. Pt educated on the benefits of sitting upright in bed and the recliner to work on improving BP symptoms. Continue POC as tolerated.    Anticipated Discharge Disposition (PT): home with assist, home with home health, skilled nursing facility, other (see comments) (SNF vs HHPT depending on progress)

## 2025-06-13 NOTE — PROGRESS NOTES
"Nutrition Services    Patient Name: Bryan Landers  YOB: 1945  MRN: [id number]  Admission date: 6/4/2025    Assessment Date:  06/13/25    NUTRITION EVALUATION      Reason for Encounter Length of Stay   Diagnosis/Problem Admission Diagnosis:  Multifocal PVCs [I49.3]  Syncope [R55]  Hyperglycemia [R73.9]  Elevated troponin [R79.89]  Anticoagulated [Z79.01]  Closed head injury, initial encounter [S09.90XA]  Syncope, unspecified syncope type [R55]  Anemia, unspecified type [D64.9]  Acute renal failure superimposed on chronic kidney disease, unspecified acute renal failure type, unspecified CKD stage [N17.9, N18.9]    Problem List:    Syncope    Essential hypertension    Stage 3b chronic kidney disease    Cognitive decline    Acquired hypothyroidism    Alcohol use disorder     Narrative 6/12: Pt is a 80 y.o. male with a history of dementia, alcohol use, CKD stage IIIb, hypertension admitted following a fall. Pt with poor appetite, noted to have not eaten in 2 days.       PO Diet Diet: Cardiac; Healthy Heart (2-3 Na+); Fluid Consistency: Thin (IDDSI 0)   Allergies NKFA   Supplements n/a   PO Intake % 0%       Chewing/Swallowing Difficulty no issues identified at this time       Medications reviewed   Labs  reviewed       Physical Findings confused, disoriented, room air     Edema 1+ (trace), 2+ (mild)    GI Function fecal incontinence, normoactive, last bowel movement: 6/13   Skin Status pressure injury: right posterior heel - stage 1, left posterior gluteal - deep tissue, location of wound: left posterior heel, right medial ankle, right food, right posterior elbow, bilateral    Lines/Drains none   I/O reviewed        Height  Weight  BMI  Weight Trend     Height: 177.8 cm (70\")  Weight: 106 kg (234 lb 2.1 oz) (06/13/25 0638)  Body mass index is 33.59 kg/m².  Loss    Weight change: weight loss of 15 lbs (6%) over 3 month(s)    Significant?  Yes       NFPE Unable to perform due to: pt confused/disoriented     "   Nutrition Problem (PES) Problem: Inadequate Oral Intake  Etiology: Medical Diagnosis - syncope, hyponatremia, hx of dementia, alcohol use disorder and Factors Affecting Nutrition - weakness   Signs/Symptoms: Report of Minimal PO Intake and Unintended Weight Change       Intervention/Plan Addition of Boost Original BID to support po intake.  Addition of Yuval BID to support wound healing.  Encourage good po intakes.  Will continue to monitor.    RD to follow up per protocol.     Estimated Requirements         Weight used  106 kg    Calories  2120 - 2650 kcals (20 kcal/kg, 25 kcal/kg)    Protein  110 - 146 g (1.5 - 2.0 gm/kg IBW 73 kg)    Fluid  (Defer to physician)     Results from last 7 days   Lab Units 06/13/25  0547 06/12/25  0644 06/11/25  0603 06/10/25  0542   SODIUM mmol/L 134* 131* 134* 135*   POTASSIUM mmol/L 3.9 3.9 3.8 4.0   CHLORIDE mmol/L 99 98 99 103   CO2 mmol/L 23.0 25.5 25.0 23.8   BUN mg/dL 14.0 15.0 14.0 13.0   CREATININE mg/dL 1.24 1.35* 1.51* 1.37*   CALCIUM mg/dL 8.6 8.6 8.7 8.8   BILIRUBIN mg/dL  --  0.4 0.4 0.4   ALK PHOS U/L  --  81 86 84   ALT (SGPT) U/L  --  20 18 18   AST (SGOT) U/L  --  34 36 37   GLUCOSE mg/dL 102* 99 102* 106*     Results from last 7 days   Lab Units 06/13/25  0832 06/13/25  0547 06/09/25  0632 06/08/25  0640 06/07/25  0540 06/06/25  0910   MAGNESIUM mg/dL  --  1.8  --  1.8  --  2.3   PHOSPHORUS mg/dL  --  3.3  --   --   --   --    HEMOGLOBIN g/dL 10.6*  --    < > 9.4*   < >  --    HEMATOCRIT % 30.0*  --    < > 26.4*   < >  --     < > = values in this interval not displayed.     Lab Results   Component Value Date    HGBA1C 5.80 (H) 11/22/2023     Wt Readings from Last 10 Encounters:   06/13/25 106 kg (234 lb 2.1 oz)   03/20/25 113 kg (249 lb)   10/18/24 110 kg (243 lb)   09/20/24 117 kg (259 lb)   09/13/24 118 kg (260 lb)   06/20/24 110 kg (242 lb)   05/23/24 112 kg (247 lb)   04/10/24 116 kg (256 lb 1.6 oz)   03/28/24 104 kg (230 lb)   03/12/24 114 kg (252 lb)        Electronically signed by:  Danielle Longo RD  06/13/25 09:09 EDT

## 2025-06-13 NOTE — CASE MANAGEMENT/SOCIAL WORK
Continued Stay Note  [facility]     Patient Name: Bryan Landers  MRN: [id number]  Today's Date: 2025    Admit Date: 2025    Plan: Plan [facility] - pre cert denied.  YURY Frost RN   Discharge Plan       Row Name 25 1522       Plan    Plan Plan [facility] - pre cert denied.  YURY Frost RN    Patient/Family in Agreement with Plan yes    Plan Comments Per Maria Isabel ( [phone]) pre cert has been denied by [employer].  Peer to Peer time frame has .  Called and spoke with Jimmy with [facility] and she will reach out to MD regarding doing a facility appeal.  Plan [facility] pending pre cert appeal.   YURY Frost RN                   Discharge Codes    No documentation.                 Expected Discharge Date and Time       Expected Discharge Date Expected Discharge Time    2025               Miroslava Frost RN

## 2025-06-13 NOTE — PROGRESS NOTES
"   LOS: 8 days     Chief Complaint/ Reason for encounter: JEFFREY on CKD    Subjective   06/11/25 : Patient is doing well today with no new complaints  Good appetite with no nausea or vomiting  No shortness of breath chest pain or edema  Voiding well with no dysuria    6/12: Feels well today denies any new complaints, waiting rehab bed  Worsening orthostatic blood pressures, midodrine increased, fludrocortisone added  Complains of intermittent dizziness with standing    6/13: He feels little better today denies new complaints  No more dizziness still complains of weakness  Good oral intake no nausea or vomiting  No shortness of breath chest pain or edema    Medical history reviewed:  History of Present Illness    Subjective    History taken from: Chart and patient/family as able    Vital Signs  Temp:  [97.6 °F (36.4 °C)-98 °F (36.7 °C)] 97.7 °F (36.5 °C)  Heart Rate:  [71-96] 87  Resp:  [16-18] 18  BP: ()/(34-94) 109/85       Wt Readings from Last 1 Encounters:   06/13/25 0638 106 kg (234 lb 2.1 oz)   06/11/25 0526 106 kg (232 lb 12.9 oz)   06/10/25 0452 106 kg (233 lb 4 oz)   06/09/25 0300 109 kg (239 lb 3.2 oz)   06/08/25 0552 108 kg (238 lb 15.7 oz)   06/07/25 0534 111 kg (244 lb 11.4 oz)   06/06/25 0502 110 kg (242 lb 1 oz)   06/05/25 1102 110 kg (243 lb)   06/05/25 0217 110 kg (243 lb)   06/04/25 2159 86.2 kg (190 lb)       Objective:  Vital signs: (most recent): Blood pressure 109/85, pulse 87, temperature 97.7 °F (36.5 °C), temperature source Oral, resp. rate 18, height 177.8 cm (70\"), weight 106 kg (234 lb 2.1 oz), SpO2 95%.                Objective:  General Appearance:  Comfortable, well-appearing, no acute distress   HEENT: Mucous membranes moist, atraumatic  Lungs:  Normal effort and normal respiratory rate.  Breath sounds clear to auscultation: No rhonchi/Rales.  No  respiratory distress.   Heart:  S1, S2 normal.   Abdomen: Abdomen is soft, nontender/nondistended  Extremities: No edema of bilateral " lower extremities  Skin:  Warm and dry       Results Review:    Intake/Output:     Intake/Output Summary (Last 24 hours) at 6/13/2025 1402  Last data filed at 6/13/2025 0638  Gross per 24 hour   Intake 1422 ml   Output 125 ml   Net 1297 ml         DATA:  Radiology and Labs:  The following labs independently reviewed by me. Additional labs ordered for tomorrow a.m.  Interval notes, chart personally reviewed by me.   Old records independently reviewed showing CKD 3 followed by me in the office, baseline creatinine around 1.5  Discussed with patient himself at bedside    Risk/ complexity of medical care/ medical decision making moderate complexity, JEFFREY management    Labs:   Recent Results (from the past 24 hours)   Urinalysis With Microscopic If Indicated (No Culture) - Urine, Clean Catch    Collection Time: 06/12/25  4:09 PM    Specimen: Urine, Clean Catch   Result Value Ref Range    Color, UA Yellow Yellow, Straw    Appearance, UA Clear Clear    pH, UA 6.5 5.0 - 8.0    Specific Gravity, UA 1.012 1.005 - 1.030    Glucose, UA Negative Negative    Ketones, UA Negative Negative    Bilirubin, UA Negative Negative    Blood, UA Negative Negative    Protein, UA Negative Negative    Leuk Esterase, UA Trace (A) Negative    Nitrite, UA Negative Negative    Urobilinogen, UA 0.2 E.U./dL 0.2 - 1.0 E.U./dL   Sodium, Urine, Random - Urine, Clean Catch    Collection Time: 06/12/25  4:09 PM    Specimen: Urine, Clean Catch   Result Value Ref Range    Sodium, Urine 76 mmol/L   Osmolality, Urine - Urine, Clean Catch    Collection Time: 06/12/25  4:09 PM    Specimen: Urine, Clean Catch   Result Value Ref Range    Osmolality, Urine 337 mOsm/kg   Urinalysis, Microscopic Only - Urine, Clean Catch    Collection Time: 06/12/25  4:09 PM    Specimen: Urine, Clean Catch   Result Value Ref Range    RBC, UA 0-2 None Seen, 0-2 /HPF    WBC, UA 0-2 None Seen, 0-2 /HPF    Bacteria, UA None Seen None Seen /HPF    Squamous Epithelial Cells, UA 0-2 None  Seen, 0-2 /HPF    Hyaline Casts, UA 0-2 None Seen /LPF    Methodology Automated Microscopy    Renal Function Panel    Collection Time: 06/13/25  5:47 AM    Specimen: Blood   Result Value Ref Range    Glucose 102 (H) 65 - 99 mg/dL    BUN 14.0 8.0 - 23.0 mg/dL    Creatinine 1.24 0.76 - 1.27 mg/dL    Sodium 134 (L) 136 - 145 mmol/L    Potassium 3.9 3.5 - 5.2 mmol/L    Chloride 99 98 - 107 mmol/L    CO2 23.0 22.0 - 29.0 mmol/L    Calcium 8.6 8.6 - 10.5 mg/dL    Albumin 2.9 (L) 3.5 - 5.2 g/dL    Phosphorus 3.3 2.5 - 4.5 mg/dL    Anion Gap 12.0 5.0 - 15.0 mmol/L    BUN/Creatinine Ratio 11.3 7.0 - 25.0    eGFR 58.4 (L) >60.0 mL/min/1.73   Uric Acid    Collection Time: 06/13/25  5:47 AM    Specimen: Blood   Result Value Ref Range    Uric Acid 6.5 3.4 - 7.0 mg/dL   Magnesium    Collection Time: 06/13/25  5:47 AM    Specimen: Blood   Result Value Ref Range    Magnesium 1.8 1.6 - 2.4 mg/dL   CBC (No Diff)    Collection Time: 06/13/25  8:32 AM    Specimen: Blood   Result Value Ref Range    WBC 5.46 3.40 - 10.80 10*3/mm3    RBC 2.94 (L) 4.14 - 5.80 10*6/mm3    Hemoglobin 10.6 (L) 13.0 - 17.7 g/dL    Hematocrit 30.0 (L) 37.5 - 51.0 %    .0 (H) 79.0 - 97.0 fL    MCH 36.1 (H) 26.6 - 33.0 pg    MCHC 35.3 31.5 - 35.7 g/dL    RDW 15.3 12.3 - 15.4 %    RDW-SD 55.8 (H) 37.0 - 54.0 fl    MPV 8.7 6.0 - 12.0 fL    Platelets 150 140 - 450 10*3/mm3       Radiology:  Pertinent radiology studies were reviewed as described above      Medications have been reviewed separately in chart review medication tab      ASSESSMENT:  JEFFREY, improved and near baseline  CKD stage IIIb at baseline today  Syncope   Non sustain VT with normal coronaries on cath  Mild hypotension on midodrine/metoprolol for rate control  Alcohol use disorder   Hx of dvt/pe in 2020 on AC   Hyponatremia, improved with fluids, 134 suggesting prerenal       PLAN:   His renal function remains very stable, below usual baseline of 1.5  Weight stable  Good response to IV fluids  yesterday  Sodium is up to 134 with IV fluids suggesting prerenal  Continue midodrine and fludrocortisone    Metoprolol dosing per cardiology  Monitor electrolytes and volume closely   Dose all medications for GFR around 45-50  Discharge planning      Alli Fabian MD  [facility]   Office phone number: [phone]  Answering service phone number: [phone]    06/13/25  14:02 EDT    Dictation performed using Dragon dictation software

## 2025-06-13 NOTE — THERAPY TREATMENT NOTE
Patient Name: Bryan Landers  : 1945    MRN: [id number]                              Today's Date: 2025       Admit Date: 2025    Visit Dx:     ICD-10-CM ICD-9-CM   1. Acute renal failure superimposed on chronic kidney disease, unspecified acute renal failure type, unspecified CKD stage  N17.9 584.9    N18.9 585.9   2. Syncope, unspecified syncope type  R55 780.2   3. Multifocal PVCs  I49.3 427.69   4. Closed head injury, initial encounter  S09.90XA 959.01   5. Anticoagulated  Z79.01 V58.61   6. Anemia, unspecified type  D64.9 285.9   7. Hyperglycemia  R73.9 790.29   8. Elevated troponin  R79.89 790.6   9. Palpitations  R00.2 785.1     Patient Active Problem List   Diagnosis    Essential hypertension    Mixed hyperlipidemia    Arthritis    Type 2 diabetes mellitus with chronic kidney disease, without long-term current use of insulin    Severely overweight    Idiopathic chronic gout of left knee    Medication monitoring encounter    Microalbuminuria due to type 2 diabetes mellitus    History of prostate cancer    Stage 3b chronic kidney disease    Medicare annual wellness visit, subsequent    Radiation proctitis    Cognitive decline    Acquired hypothyroidism    B12 deficiency    Sinus tachycardia by electrocardiogram    Chronic pulmonary embolism without acute cor pulmonale    Chronic deep vein thrombosis (DVT) of popliteal vein of both lower extremities    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    Bladder mass    Dependent edema    Anemia of chronic kidney failure, stage 3 (moderate)    Deep venous thrombosis    Class 2 severe obesity with serious comorbidity in adult    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    Syncope    Alcohol use disorder     Past Medical History:   Diagnosis Date    At risk for sleep apnea     Bladder mass     Bruises easily     Diabetes mellitus     Disease of thyroid gland     Elevated cholesterol     GI bleed     Gross hematuria 2021    History of hip  replacement, total, bilateral 12/13/2018    History of transfusion     Pneumothorax 02/27/2024    Poor historian     Short-term memory loss     Vitamin D deficiency      Past Surgical History:   Procedure Laterality Date    CARDIAC CATHETERIZATION N/A 6/6/2025    Procedure: Left Heart Cath;  Surgeon: Harjinder Aguilera MD;  Location: [facility] CATH INVASIVE LOCATION;  Service: Cardiology;  Laterality: N/A;    COLONOSCOPY  09/2016    COLONOSCOPY N/A 9/28/2018    Procedure: COLONOSCOPY;  Surgeon: Olaf Gomez MD;  Location: [facility] OR;  Service: Gastroenterology    COLONOSCOPY N/A 1/7/2019    Procedure: COLONOSCOPY;  Surgeon: Rosa Jack MD;  Location: [facility] OR;  Service: Gastroenterology    CYSTOSCOPY BLADDER BIOPSY N/A 9/22/2021    Procedure: CYSTOSCOPY BLADDER BIOPSY;  Surgeon: Roldan Mccarthy MD;  Location: [facility] MAIN OR;  Service: Urology;  Laterality: N/A;    HERNIA REPAIR Bilateral     PROSTATE ULTRASOUND BIOPSY      SIGMOIDOSCOPY N/A 10/2/2018    Procedure: FLEXIBLE SIGMOIDOSCOPY:  APC cautery bleeding using 60 joules;  Surgeon: Olaf Gmoez MD;  Location: [facility] ENDOSCOPY;  Service: Gastroenterology    TONSILLECTOMY      TOTAL HIP ARTHROPLASTY Bilateral 2007      General Information       Row Name 06/13/25 1300          Physical Therapy Time and Intention    Document Type therapy note (daily note)  -     Mode of Treatment co-treatment;physical therapy;occupational therapy  -       Row Name 06/13/25 1300          General Information    Patient Profile Reviewed yes  -     Existing Precautions/Restrictions fall;orthostatic hypotension  -       Row Name 06/13/25 1300          Living Environment    Current Living Arrangements independent living facility  -       Row Name 06/13/25 1300          Cognition    Orientation Status (Cognition) oriented x 3;other (see comments);verbal cues/prompts needed for orientation  -       Row Name 06/13/25 1300          Safety  Issues/Impairments Affecting Functional Mobility    Safety Issues Affecting Function (Mobility) insight into deficits/self-awareness;judgment;positioning of assistive device;problem-solving;safety precaution awareness  -     Impairments Affecting Function (Mobility) endurance/activity tolerance;strength;balance;cognition;other (see comments)  -     Comment, Safety Issues/Impairments (Mobility) PT/OT cotreatment medically appropriate and necessary due to patient acuity level, to maximize therapeutic benefit due to impaired act tolerance, and for safety of patient and staff. Treatment focused on progression of care and goals established in POC.  -               User Key  (r) = Recorded By, (t) = Taken By, (c) = Cosigned By      Initials Name Provider Type    Karlee Leyva, PT Physical Therapist                   Mobility       Row Name 06/13/25 1300          Bed Mobility    Bed Mobility supine-sit  -     Supine-Sit Elrod (Bed Mobility) contact guard  -     Assistive Device (Bed Mobility) bed rails  -     Comment, (Bed Mobility) Min vcs for proper hand placement and seq movent  -       Row Name 06/13/25 1300          Transfers    Comment, (Transfers) Reports dizziness w/ all transfers  -       Row Name 06/13/25 1300          Bed-Chair Transfer    Bed-Chair Elrod (Transfers) minimum assist (75% patient effort)  -     Assistive Device (Bed-Chair Transfers) walker, front-wheeled  -       Row Name 06/13/25 1300          Sit-Stand Transfer    Sit-Stand Elrod (Transfers) minimum assist (75% patient effort)  -     Assistive Device (Sit-Stand Transfers) walker, front-wheeled  -     Comment, (Sit-Stand Transfer) Completed 4x sit<>stand  -       Row Name 06/13/25 1300          Gait/Stairs (Locomotion)    Elrod Level (Gait) contact guard;minimum assist (75% patient effort);1 person assist;verbal cues;nonverbal cues (demo/gesture)  -     Assistive Device (Gait) walker,  front-wheeled  -     Patient was able to Ambulate yes  -     Distance in Feet (Gait) 1  Took a few steps EOB to recliner  -     Deviations/Abnormal Patterns (Gait) base of support, narrow;stride length decreased  -     Bilateral Gait Deviations forward flexed posture;heel strike decreased  -     Comment, (Gait/Stairs) Pt slightly impulsive, w/ NBOS, dec step length, amber, and fwd trunk flexion. Min vcs for safe mobility, fww use, and posture corrections.  -               User Key  (r) = Recorded By, (t) = Taken By, (c) = Cosigned By      Initials Name Provider Type    Karlee Leyva, PT Physical Therapist                   Obj/Interventions       Row Name 06/13/25 1300          Strength Comprehensive (MMT)    General Manual Muscle Testing (MMT) Assessment lower extremity strength deficits identified  -     Comment, General Manual Muscle Testing (MMT) Assessment DESIREE moore 3/5  -       Row Name 06/13/25 1300          Motor Skills    Motor Skills functional endurance  -     Functional Endurance fair  -       Row Name 06/13/25 1300          Balance    Balance Assessment standing static balance;standing dynamic balance  -     Static Sitting Balance supervision  -     Dynamic Sitting Balance standby assist  -     Position, Sitting Balance supported;sitting edge of bed  -     Static Standing Balance contact guard  -     Dynamic Standing Balance minimal assist  -     Position/Device Used, Standing Balance supported;walker, 4-wheeled  -     Comment, Balance Sat EOB working on static and dynamic sitting balance, verticle ornetation, and saftey. ~10 min  -               User Key  (r) = Recorded By, (t) = Taken By, (c) = Cosigned By      Initials Name Provider Type    Karlee Leyva, PT Physical Therapist                   Goals/Plan       Row Name 06/13/25 1300          Bed Mobility Goal 1 (PT)    Activity/Assistive Device (Bed Mobility Goal 1, PT) bed mobility activities, all  -      [place] Level/Cues Needed (Bed Mobility Goal 1, PT) supervision required  -     Time Frame (Bed Mobility Goal 1, PT) long term goal (LTG);1 week  -     Progress/Outcomes (Bed Mobility Goal 1, PT) progress slower than expected  -       Row Name 06/13/25 1300          Transfer Goal 1 (PT)    Activity/Assistive Device (Transfer Goal 1, PT) transfers, all;walker, rolling  -     [place] Level/Cues Needed (Transfer Goal 1, PT) contact guard required  -     Time Frame (Transfer Goal 1, PT) long term goal (LTG);1 week  -     Progress/Outcome (Transfer Goal 1, PT) progress slower than expected  -       Row Name 06/13/25 1300          Gait Training Goal 1 (PT)    Activity/Assistive Device (Gait Training Goal 1, PT) gait (walking locomotion);walker, rolling  -     [place] Level (Gait Training Goal 1, PT) contact guard required  -     Distance (Gait Training Goal 1, PT) 50 feet  -     Time Frame (Gait Training Goal 1, PT) long term goal (LTG);1 week  -     Progress/Outcome (Gait Training Goal 1, PT) progress slower than expected  -       Row Name 06/13/25 1300          Therapy Assessment/Plan (PT)    Planned Therapy Interventions (PT) balance training;bed mobility training;gait training;home exercise program;patient/family education;strengthening;transfer training  -               User Key  (r) = Recorded By, (t) = Taken By, (c) = Cosigned By      Initials Name Provider Type    Karlee Leyva, PT Physical Therapist                   Clinical Impression       Row Name 06/13/25 1609          Pain    Pretreatment Pain Rating 0/10 - no pain  -     Posttreatment Pain Rating 0/10 - no pain  -     Pre/Posttreatment Pain Comment No pain reported  -       Row Name 06/13/25 1609          Plan of Care Review    Plan of Care Reviewed With patient  -     Outcome Evaluation Pt still very dizzy w/ activity, especially in standing. Bed mobility was CGA, transfers min A, then afew steps EOB  w/ FWW min A. Completed LAQ, AP's, sit<>stands 4x, then standing marches ( total of 35 w/ 2 seated rest bks). Pt has difficulty tolerating continuous activity due to significant drops in BP. Pt educated on the benefits of sitting upright in bed and the recliner to work on improving BP symptoms. Continue POC as tolerated.  -       Row Name 06/13/25 1609          Therapy Assessment/Plan (PT)    Rehab Potential (PT) good  -     Criteria for Skilled Interventions Met (PT) skilled treatment is necessary  -     Therapy Frequency (PT) 5 times/wk  -       Row Name 06/13/25 1609          Vital Signs    O2 Delivery Intra Treatment room air  -       Row Name 06/13/25 1609          Positioning and Restraints    Pre-Treatment Position in bed  -     Post Treatment Position chair  -     In Chair notified nsg;sitting;call light within reach;exit alarm on;encouraged to call for assist  -               User Key  (r) = Recorded By, (t) = Taken By, (c) = Cosigned By      Initials Name Provider Type    Karlee Leyva, PT Physical Therapist                   Outcome Measures       Row Name 06/13/25 0747          How much help from another person do you currently need...    Turning from your back to your side while in flat bed without using bedrails? 3  -RW     Moving from lying on back to sitting on the side of a flat bed without bedrails? 3  -RW     Moving to and from a bed to a chair (including a wheelchair)? 2  -RW     Standing up from a chair using your arms (e.g., wheelchair, bedside chair)? 2  -RW     Climbing 3-5 steps with a railing? 2  -RW     To walk in hospital room? 2  -RW     AM-PAC 6 Clicks Score (PT) 14  -RW     Highest Level of Mobility Goal Move to Chair/Commode-4  -RW       Row Name 06/13/25 1413          Functional Assessment    Outcome Measure Options AM-PAC 6 Clicks Daily Activity (OT)  -BC               User Key  (r) = Recorded By, (t) = Taken By, (c) = Cosigned By      Initials Name Provider Type     Kieran Collins, RN Registered Nurse    Ramya Lombardo OT Occupational Therapist                                 Physical Therapy Education       Title: PT OT SLP Therapies (In Progress)       Topic: Physical Therapy (In Progress)       Point: Mobility training (In Progress)       Learning Progress Summary            Patient Acceptance, E, NR by  at 6/13/2025 1300    Acceptance, E,TB, VU by PB at 6/11/2025 1525    Acceptance, E,D, NR by  at 6/10/2025 1445    Acceptance, TB,E, VU by PB at 6/10/2025 1415    Acceptance, E,D, VU,NR by MS at 6/6/2025 1532                      Point: Home exercise program (In Progress)       Learning Progress Summary            Patient Acceptance, E, NR by  at 6/13/2025 1300    Acceptance, E,TB, VU by PB at 6/11/2025 1525    Acceptance, E,D, NR by  at 6/10/2025 1445    Acceptance, TB,E, VU by PB at 6/10/2025 1415    Acceptance, E,D, VU,NR by MS at 6/6/2025 1532                      Point: Body mechanics (In Progress)       Learning Progress Summary            Patient Acceptance, E, NR by  at 6/13/2025 1300    Acceptance, E,TB, VU by PB at 6/11/2025 1525    Acceptance, E,D, NR by  at 6/10/2025 1445    Acceptance, TB,E, VU by PB at 6/10/2025 1415                      Point: Precautions (In Progress)       Learning Progress Summary            Patient Acceptance, E, NR by  at 6/13/2025 1300    Acceptance, E,TB, VU by PB at 6/11/2025 1525    Acceptance, E,D, NR by  at 6/10/2025 1445    Acceptance, TB,E, VU by PB at 6/10/2025 1415                                      User Key       Initials Effective Dates Name Provider Type Discipline    MS 06/16/21 -  Hemanth Iglesias, PT Physical Therapist PT    PB 02/09/24 -  Margaret Quintero, RN Registered Nurse Nurse     05/28/25 -  Karlee Flores, PT Physical Therapist PT                  PT Recommendation and Plan  Planned Therapy Interventions (PT): balance training, bed mobility training, gait training, home exercise program,  patient/family education, strengthening, transfer training  Outcome Evaluation: Pt still very dizzy w/ activity, especially in standing. Bed mobility was CGA, transfers min A, then afew steps EOB w/ FWW min A. Completed LAQ, AP's, sit<>stands 4x, then standing marches ( total of 35 w/ 2 seated rest bks). Pt has difficulty tolerating continuous activity due to significant drops in BP. Pt educated on the benefits of sitting upright in bed and the recliner to work on improving BP symptoms. Continue POC as tolerated.     Time Calculation:         PT Charges       Row Name 06/13/25 1300             Time Calculation    Start Time 1300  -      Stop Time 1328  -      Time Calculation (min) 28 min  -      PT - Next Appointment 06/14/25  -                User Key  (r) = Recorded By, (t) = Taken By, (c) = Cosigned By      Initials Name Provider Type     Karlee Flores, PT Physical Therapist                  Therapy Charges for Today       Code Description Service Date Service Provider Modifiers Qty    [id number]  PT THERAPEUTIC ACT EA 15 MIN 6/13/2025 Karlee Flores, PT GP 1    [id number]  PT THER PROC EA 15 MIN 6/13/2025 Karlee Flores, PT GP 1            PT G-Codes  Outcome Measure Options: AM-PAC 6 Clicks Daily Activity (OT)  AM-PAC 6 Clicks Score (PT): 14  AM-PAC 6 Clicks Score (OT): 18  Modified [place] Scale: 1 - No significant disability despite symptoms.  Able to carry out all usual duties and activities.  PT Discharge Summary  Anticipated Discharge Disposition (PT): home with assist, home with home health, skilled nursing facility, other (see comments) (SNF vs HHPT depending on progress)    Karlee Flores PT  6/13/2025

## 2025-06-13 NOTE — CASE MANAGEMENT/SOCIAL WORK
Continued Stay Note  [facility]     Patient Name: Bryan Landers  MRN: [id number]  Today's Date: 6/13/2025    Admit Date: 6/4/2025    Plan: Plan [facility] pre cert pending.   YURY Frost RN   Discharge Plan       Row Name 06/13/25 0958       Plan    Plan Plan [facility] pre cert pending.   YURY Frost RN    Patient/Family in Agreement with Plan yes    Plan Comments Spoke with pt's daughter ( Isi Echavarria [phone]) on 6/12  regarding awaiting pre cert for [facility].   Maria Isabel ( [phone]) is following.   Plan [facility] pre cert pending. YURY Frost RN                   Discharge Codes    No documentation.                 Expected Discharge Date and Time       Expected Discharge Date Expected Discharge Time    Jun 14, 2025               Miroslava Frost RN

## 2025-06-13 NOTE — NURSING NOTE
Reason for Visit: CWCN: We are seeing a patient at the request of the floor nurse regarding a skin issue on bilateral heels and gluteal sites. The patient is alert and able to turn with assistance. Upon assessment, we were able to see redness on the heels, which were bleached.  In addition, redness,  bleached, and slight excoriation toward the buttocks, possibly related to MASD noted.  Treatment Plan/Recommendations: Calmoseptine ointment to Sacrococcygeal/Buttocks  area ordered.  Protective padding should be used to facilitate cushioning bilateral heels. Keep the heels elevated off the bed for pressure reduction.   Wound care order and pressure ulcer standing measures have been placed into Epic  Rn notified if the patient will be admitted for more than 48 hours and will need specialty mattress, Centrella Pro+ for adequate pressure redistribution and pressure relief  Wound Team Follow up Plan: WCN team will follow up according to his need.

## 2025-06-13 NOTE — PLAN OF CARE
Goal Outcome Evaluation:  Plan of Care Reviewed With: patient           Outcome Evaluation: Atarax given to help with anxiety, worked a little bit but then pt cont to try and get out of bed.  Albumin given to help BP's.  Ortho BP's were not successful this afternoon r/t pt not wanting to cooperate.  VSS, RA, NSR.

## 2025-06-13 NOTE — THERAPY TREATMENT NOTE
Patient Name: Bryan Landers  : 1945    MRN: [id number]                              Today's Date: 2025       Admit Date: 2025    Visit Dx:     ICD-10-CM ICD-9-CM   1. Acute renal failure superimposed on chronic kidney disease, unspecified acute renal failure type, unspecified CKD stage  N17.9 584.9    N18.9 585.9   2. Syncope, unspecified syncope type  R55 780.2   3. Multifocal PVCs  I49.3 427.69   4. Closed head injury, initial encounter  S09.90XA 959.01   5. Anticoagulated  Z79.01 V58.61   6. Anemia, unspecified type  D64.9 285.9   7. Hyperglycemia  R73.9 790.29   8. Elevated troponin  R79.89 790.6   9. Palpitations  R00.2 785.1     Patient Active Problem List   Diagnosis    Essential hypertension    Mixed hyperlipidemia    Arthritis    Type 2 diabetes mellitus with chronic kidney disease, without long-term current use of insulin    Severely overweight    Idiopathic chronic gout of left knee    Medication monitoring encounter    Microalbuminuria due to type 2 diabetes mellitus    History of prostate cancer    Stage 3b chronic kidney disease    Medicare annual wellness visit, subsequent    Radiation proctitis    Cognitive decline    Acquired hypothyroidism    B12 deficiency    Sinus tachycardia by electrocardiogram    Chronic pulmonary embolism without acute cor pulmonale    Chronic deep vein thrombosis (DVT) of popliteal vein of both lower extremities    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    Bladder mass    Dependent edema    Anemia of chronic kidney failure, stage 3 (moderate)    Deep venous thrombosis    Class 2 severe obesity with serious comorbidity in adult    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    Syncope    Alcohol use disorder     Past Medical History:   Diagnosis Date    At risk for sleep apnea     Bladder mass     Bruises easily     Diabetes mellitus     Disease of thyroid gland     Elevated cholesterol     GI bleed     Gross hematuria 2021    History of hip  replacement, total, bilateral 12/13/2018    History of transfusion     Pneumothorax 02/27/2024    Poor historian     Short-term memory loss     Vitamin D deficiency      Past Surgical History:   Procedure Laterality Date    CARDIAC CATHETERIZATION N/A 6/6/2025    Procedure: Left Heart Cath;  Surgeon: Harjinder Aguilera MD;  Location: [facility] CATH INVASIVE LOCATION;  Service: Cardiology;  Laterality: N/A;    COLONOSCOPY  09/2016    COLONOSCOPY N/A 9/28/2018    Procedure: COLONOSCOPY;  Surgeon: Olaf Gomez MD;  Location: [facility] OR;  Service: Gastroenterology    COLONOSCOPY N/A 1/7/2019    Procedure: COLONOSCOPY;  Surgeon: Rosa Jack MD;  Location: [facility] OR;  Service: Gastroenterology    CYSTOSCOPY BLADDER BIOPSY N/A 9/22/2021    Procedure: CYSTOSCOPY BLADDER BIOPSY;  Surgeon: Roldan Mccarthy MD;  Location: [facility] MAIN OR;  Service: Urology;  Laterality: N/A;    HERNIA REPAIR Bilateral     PROSTATE ULTRASOUND BIOPSY      SIGMOIDOSCOPY N/A 10/2/2018    Procedure: FLEXIBLE SIGMOIDOSCOPY:  APC cautery bleeding using 60 joules;  Surgeon: Olaf Gomez MD;  Location: [facility] ENDOSCOPY;  Service: Gastroenterology    TONSILLECTOMY      TOTAL HIP ARTHROPLASTY Bilateral 2007      General Information       Row Name 06/13/25 1405          OT Time and Intention    Document Type therapy note (daily note)  -BC     Mode of Treatment co-treatment;physical therapy;occupational therapy  -BC     Patient Effort good  -BC       Row Name 06/13/25 140          General Information    Existing Precautions/Restrictions fall;orthostatic hypotension  -BC       Row Name 06/13/25 1409          Cognition    Orientation Status (Cognition) oriented x 3;other (see comments);verbal cues/prompts needed for orientation  Pt would verbalize things that were incoherent talking about a barn outside (from 6th floor looking out at roof)  -BC       Row Name 06/13/25 1400          Safety Issues/Impairments Affecting Functional  Mobility    Safety Issues Affecting Function (Mobility) insight into deficits/self-awareness;judgment;positioning of assistive device;problem-solving;safety precaution awareness  -BC     Impairments Affecting Function (Mobility) endurance/activity tolerance;strength;balance;cognition;other (see comments)  OH  -BC     Comment, Safety Issues/Impairments (Mobility) PT/OT cotreatment medically appropriate and necessary due to patient acuity level, to maximize therapeutic benefit due to impaired act tolerance, and for safety of patient and staff. Treatment focused on progression of care and goals established in POC.  -BC               User Key  (r) = Recorded By, (t) = Taken By, (c) = Cosigned By      Initials Name Provider Type    BC Ramya Armijo OT Occupational Therapist                     Mobility/ADL's       Row Name 06/13/25 1406          Bed Mobility    Bed Mobility supine-sit  -BC     Supine-Sit Moffat (Bed Mobility) contact guard  -BC     Bed Mobility, Safety Issues impaired trunk control for bed mobility  -BC     Assistive Device (Bed Mobility) bed rails  -BC     Comment, (Bed Mobility) CGA for manual cues/assist w/ trunk  -BC       Row Name 06/13/25 1406          Transfers    Transfers sit-stand transfer;stand-sit transfer;bed-chair transfer  -BC       Row Name 06/13/25 1406          Bed-Chair Transfer    Bed-Chair Moffat (Transfers) minimum assist (75% patient effort)  -BC     Assistive Device (Bed-Chair Transfers) walker, front-wheeled  -BC       Row Name 06/13/25 1406          Sit-Stand Transfer    Sit-Stand Moffat (Transfers) minimum assist (75% patient effort)  -BC     Assistive Device (Sit-Stand Transfers) walker, front-wheeled  -BC     Comment, (Sit-Stand Transfer) x4 sit<>stands w /min A  -BC       Row Name 06/13/25 1406          Stand-Sit Transfer    Stand-Sit Moffat (Transfers) contact guard;verbal cues  -BC       Row Name 06/13/25 1406          Functional Mobility     Functional Mobility- Ind. Level contact guard assist  -BC     Functional Mobility- Device walker, front-wheeled  -BC     Functional Mobility- Comment short distance from EOB ~5-6 feet before c/o dizziness  -BC     Patient was able to Ambulate yes  -BC               User Key  (r) = Recorded By, (t) = Taken By, (c) = Cosigned By      Initials Name Provider Type    Ramya Lombardo, STANISLAV Occupational Therapist                   Obj/Interventions       Row Name 06/13/25 1409          Balance    Comment, Balance SBA sitting balance, CGA/Min A for standing balance w/ walker support  -BC               User Key  (r) = Recorded By, (t) = Taken By, (c) = Cosigned By      Initials Name Provider Type    BC Ramya Armijo, STANISLAV Occupational Therapist                   Goals/Plan    No documentation.                  Clinical Impression       Row Name 06/13/25 1410          Pain Assessment    Pre/Posttreatment Pain Comment did not report of pain  -BC       Row Name 06/13/25 1410          Plan of Care Review    Plan of Care Reviewed With patient  -BC     Progress no change  -BC     Outcome Evaluation pt still maintains dizziness w/ positional movements and education on importance of up to chair to which he vrb'd understanding. BP monitored with MAP 89 w c/o dizziness upon sitting EOB and unable to get accurate BP reading in standing MAP 79 once back to sitting EOB and up in chair. Pt did not require increased physical A but more son CGA/MIN A for safety due to symptomatic. Completed 2 bouts of standing MIP for BP regulation/hemodynamic stability, still c/o dizziness though. Left up in chair w/ feet down on floor and encouragement ankle pumps, kicks. RN notified.  -BC       Row Name 06/13/25 1410          Therapy Plan Review/Discharge Plan (OT)    Anticipated Discharge Disposition (OT) skilled nursing facility  -BC       Row Name 06/13/25 1410          Vital Signs    O2 Delivery Pre Treatment room air  -BC     O2 Delivery Intra  Treatment room air  -BC     O2 Delivery Post Treatment room air  -BC     Pre Patient Position Supine  -BC     Intra Patient Position Standing  -BC     Post Patient Position Sitting  -BC       Row Name 06/13/25 1410          Positioning and Restraints    Pre-Treatment Position in bed  -BC     Post Treatment Position chair  -BC     In Chair notified nsg;sitting;call light within reach;exit alarm on  -BC               User Key  (r) = Recorded By, (t) = Taken By, (c) = Cosigned By      Initials Name Provider Type    Ramya Lombardo OT Occupational Therapist                   Outcome Measures       Row Name 06/13/25 1413          How much help from another is currently needed...    Putting on and taking off regular lower body clothing? 2  -BC     Bathing (including washing, rinsing, and drying) 3  -BC     Toileting (which includes using toilet bed pan or urinal) 3  -BC     Putting on and taking off regular upper body clothing 3  -BC     Taking care of personal grooming (such as brushing teeth) 3  -BC     Eating meals 4  -BC     AM-PAC 6 Clicks Score (OT) 18  -BC       Row Name 06/13/25 0747          How much help from another person do you currently need...    Turning from your back to your side while in flat bed without using bedrails? 3  -RW     Moving from lying on back to sitting on the side of a flat bed without bedrails? 3  -RW     Moving to and from a bed to a chair (including a wheelchair)? 2  -RW     Standing up from a chair using your arms (e.g., wheelchair, bedside chair)? 2  -RW     Climbing 3-5 steps with a railing? 2  -RW     To walk in hospital room? 2  -RW     AM-PAC 6 Clicks Score (PT) 14  -RW     Highest Level of Mobility Goal Move to Chair/Commode-4  -RW       Row Name 06/13/25 1413          Functional Assessment    Outcome Measure Options AM-PAC 6 Clicks Daily Activity (OT)  -BC               User Key  (r) = Recorded By, (t) = Taken By, (c) = Cosigned By      Initials Name Provider Type    KHURRAM  Kieran Morataya, RN Registered Nurse    Ramya Lombardo OT Occupational Therapist                      OT Recommendation and Plan     Plan of Care Review  Plan of Care Reviewed With: patient  Progress: no change  Outcome Evaluation: pt still maintains dizziness w/ positional movements and education on importance of up to chair to which he vrb'd understanding. BP monitored with MAP 89 w c/o dizziness upon sitting EOB and unable to get accurate BP reading in standing MAP 79 once back to sitting EOB and up in chair. Pt did not require increased physical A but more son CGA/MIN A for safety due to symptomatic. Completed 2 bouts of standing MIP for BP regulation/hemodynamic stability, still c/o dizziness though. Left up in chair w/ feet down on floor and encouragement ankle pumps, kicks. RN notified.     Time Calculation:         Time Calculation- OT       Row Name 06/13/25 1414             Time Calculation- OT    OT Start Time 1303  -BC      OT Stop Time 1326  -BC      OT Time Calculation (min) 23 min  -BC      Total Timed Code Minutes- OT 23 minute(s)  -BC      OT Received On 06/13/25  -BC      OT - Next Appointment 06/16/25  -BC         Timed Charges    60094 - OT Therapeutic Activity Minutes 23  -BC         Total Minutes    Timed Charges Total Minutes 23  -BC       Total Minutes 23  -BC                User Key  (r) = Recorded By, (t) = Taken By, (c) = Cosigned By      Initials Name Provider Type    BC Ramya Armijo OT Occupational Therapist                  Therapy Charges for Today       Code Description Service Date Service Provider Modifiers Qty    41826075891 HC OT THERAPEUTIC ACT EA 15 MIN 6/13/2025 Ramya Armijo OT GO 2                 Ramya Armijo OT  6/13/2025

## 2025-06-13 NOTE — CASE MANAGEMENT/SOCIAL WORK
Continued Stay Note  [facility]     Patient Name: Bryan Landers  MRN: [id number]  Today's Date: 2025    Admit Date: 2025    Plan: Plan [place]- pre cert denied - awaiting appeal decision.   YURY Frost RN   Discharge Plan       Row Name 25 1620       Plan    Plan Plan [facility] [place]- pre cert denied - awaiting appeal decision.   YURY Frost RN    Plan Comments Called and spoke  with pt's daughter ( Isi Echavarria [phone]) and updated her on insurance denial.  Paperwork competed to do expedited appeal.  Plan [facility] [place]- pre cert denied - awaiting appeal decision. YURY Frost RN      Row Name 25 1528       Plan    Plan Plan [facility] - pre cert denied.  YURY Frost RN    Patient/Family in Agreement with Plan yes    Plan Comments Per Maria Isabel ( [phone]) pre cert has been denied by [employer].  Peer to Peer time frame has .  Called and spoke with Jimmy with [facility] and she will reach out to MD regarding doing a facility appeal.  Plan [facility] [place] pending pre cert appeal.   YURY Frost RN                   Discharge Codes    No documentation.                 Expected Discharge Date and Time       Expected Discharge Date Expected Discharge Time    2025               Miroslava Frost RN

## 2025-06-13 NOTE — PLAN OF CARE
Goal Outcome Evaluation:  Plan of Care Reviewed With: patient        Progress: no change  Outcome Evaluation: positive orthostatic blood pressure.  the pt also reported dizziness when standing.  other VS stable.  APRN notified.  pt denied pain.  meds given per orders.  CIWA scores noted.  skin breakdown noted.  wound consult placed.  will continue to monitor.

## 2025-06-14 LAB
ALBUMIN SERPL-MCNC: 3.6 G/DL (ref 3.5–5.2)
ANION GAP SERPL CALCULATED.3IONS-SCNC: 9.2 MMOL/L (ref 5–15)
BUN SERPL-MCNC: 14 MG/DL (ref 8–23)
BUN/CREAT SERPL: 10.8 (ref 7–25)
CALCIUM SPEC-SCNC: 9.2 MG/DL (ref 8.6–10.5)
CHLORIDE SERPL-SCNC: 98 MMOL/L (ref 98–107)
CO2 SERPL-SCNC: 24.8 MMOL/L (ref 22–29)
CREAT SERPL-MCNC: 1.3 MG/DL (ref 0.76–1.27)
DEPRECATED RDW RBC AUTO: 52.8 FL (ref 37–54)
EGFRCR SERPLBLD CKD-EPI 2021: 55.2 ML/MIN/1.73
ERYTHROCYTE [DISTWIDTH] IN BLOOD BY AUTOMATED COUNT: 15 % (ref 12.3–15.4)
GEN 5 1HR TROPONIN T REFLEX: 43 NG/L
GLUCOSE SERPL-MCNC: 134 MG/DL (ref 65–99)
HCT VFR BLD AUTO: 27.4 % (ref 37.5–51)
HGB BLD-MCNC: 9.8 G/DL (ref 13–17.7)
MAGNESIUM SERPL-MCNC: 1.9 MG/DL (ref 1.6–2.4)
MCH RBC QN AUTO: 35.5 PG (ref 26.6–33)
MCHC RBC AUTO-ENTMCNC: 35.8 G/DL (ref 31.5–35.7)
MCV RBC AUTO: 99.3 FL (ref 79–97)
PHOSPHATE SERPL-MCNC: 3.1 MG/DL (ref 2.5–4.5)
PLATELET # BLD AUTO: 137 10*3/MM3 (ref 140–450)
PMV BLD AUTO: 10 FL (ref 6–12)
POTASSIUM SERPL-SCNC: 3.8 MMOL/L (ref 3.5–5.2)
QT INTERVAL: 524 MS
QTC INTERVAL: 503 MS
RBC # BLD AUTO: 2.76 10*6/MM3 (ref 4.14–5.8)
SODIUM SERPL-SCNC: 132 MMOL/L (ref 136–145)
TROPONIN T % DELTA: -4
TROPONIN T NUMERIC DELTA: -2 NG/L
TROPONIN T SERPL HS-MCNC: 45 NG/L
WBC NRBC COR # BLD AUTO: 4.29 10*3/MM3 (ref 3.4–10.8)

## 2025-06-14 PROCEDURE — 99233 SBSQ HOSP IP/OBS HIGH 50: CPT | Performed by: INTERNAL MEDICINE

## 2025-06-14 PROCEDURE — 80069 RENAL FUNCTION PANEL: CPT | Performed by: INTERNAL MEDICINE

## 2025-06-14 PROCEDURE — 85027 COMPLETE CBC AUTOMATED: CPT | Performed by: STUDENT IN AN ORGANIZED HEALTH CARE EDUCATION/TRAINING PROGRAM

## 2025-06-14 PROCEDURE — 83735 ASSAY OF MAGNESIUM: CPT | Performed by: STUDENT IN AN ORGANIZED HEALTH CARE EDUCATION/TRAINING PROGRAM

## 2025-06-14 PROCEDURE — 84484 ASSAY OF TROPONIN QUANT: CPT | Performed by: STUDENT IN AN ORGANIZED HEALTH CARE EDUCATION/TRAINING PROGRAM

## 2025-06-14 RX ORDER — SODIUM CHLORIDE 9 MG/ML
50 INJECTION, SOLUTION INTRAVENOUS CONTINUOUS
Status: DISCONTINUED | OUTPATIENT
Start: 2025-06-14 | End: 2025-06-15

## 2025-06-14 RX ORDER — FLUDROCORTISONE ACETATE 0.1 MG/1
100 TABLET ORAL EVERY 12 HOURS SCHEDULED
Status: DISCONTINUED | OUTPATIENT
Start: 2025-06-14 | End: 2025-06-16 | Stop reason: HOSPADM

## 2025-06-14 RX ORDER — METOPROLOL SUCCINATE 25 MG/1
25 TABLET, EXTENDED RELEASE ORAL 2 TIMES DAILY
Status: DISCONTINUED | OUTPATIENT
Start: 2025-06-14 | End: 2025-06-16 | Stop reason: HOSPADM

## 2025-06-14 RX ADMIN — METOPROLOL SUCCINATE 25 MG: 25 TABLET, EXTENDED RELEASE ORAL at 20:41

## 2025-06-14 RX ADMIN — Medication 100 MG: at 08:09

## 2025-06-14 RX ADMIN — MENTHOL, ZINC OXIDE 1 APPLICATION: .44; 20.6 OINTMENT TOPICAL at 20:44

## 2025-06-14 RX ADMIN — MIDODRINE HYDROCHLORIDE 10 MG: 5 TABLET ORAL at 17:33

## 2025-06-14 RX ADMIN — LEVOTHYROXINE SODIUM 75 MCG: 0.07 TABLET ORAL at 06:34

## 2025-06-14 RX ADMIN — Medication 5 MG: at 20:41

## 2025-06-14 RX ADMIN — FLUDROCORTISONE ACETATE 100 MCG: 0.1 TABLET ORAL at 20:41

## 2025-06-14 RX ADMIN — ATORVASTATIN CALCIUM 80 MG: 80 TABLET, FILM COATED ORAL at 08:09

## 2025-06-14 RX ADMIN — ASPIRIN 81 MG CHEWABLE TABLET 81 MG: 81 TABLET CHEWABLE at 08:09

## 2025-06-14 RX ADMIN — RANOLAZINE 1000 MG: 500 TABLET, FILM COATED, EXTENDED RELEASE ORAL at 20:42

## 2025-06-14 RX ADMIN — RANOLAZINE 1000 MG: 500 TABLET, FILM COATED, EXTENDED RELEASE ORAL at 08:09

## 2025-06-14 RX ADMIN — FOLIC ACID 1 MG: 1 TABLET ORAL at 08:09

## 2025-06-14 RX ADMIN — APIXABAN 2.5 MG: 2.5 TABLET, FILM COATED ORAL at 08:11

## 2025-06-14 RX ADMIN — MENTHOL, ZINC OXIDE 1 APPLICATION: .44; 20.6 OINTMENT TOPICAL at 08:11

## 2025-06-14 RX ADMIN — FLUDROCORTISONE ACETATE 100 MCG: 0.1 TABLET ORAL at 08:09

## 2025-06-14 RX ADMIN — APIXABAN 2.5 MG: 2.5 TABLET, FILM COATED ORAL at 20:41

## 2025-06-14 RX ADMIN — MIDODRINE HYDROCHLORIDE 10 MG: 5 TABLET ORAL at 12:21

## 2025-06-14 RX ADMIN — MIDODRINE HYDROCHLORIDE 10 MG: 5 TABLET ORAL at 06:34

## 2025-06-14 RX ADMIN — Medication 1 TABLET: at 08:09

## 2025-06-14 NOTE — PROGRESS NOTES
LOS: 9 days   Patient Care Team:  Jose Fonseca MD as PCP - General (Family Medicine)  Jose Michelle MD as Consulting Physician (Radiation Oncology)  Roldan Mccarthy MD as Consulting Physician (Urology)  Clarence Nieves MD (Hematology)  Grover Harris DPM as Consulting Physician (Podiatry)  Skip Wall MD PhD as Consulting Physician (Thoracic Surgery)    Chief Complaint: PVCs, NSVT, AV block, syncope, orthostatic hypotension.    Interval History: He had what appears to be Mobitz type I AV block and brief 2:1 AV block overnight with associated bradycardia.  He was asymptomatic during these events.  He still does not feel the PVCs. He was again orthostatic this morning with standing with systolic blood pressures dropping into the 70s.  He was lightheaded when this occurred.    Vital Signs:  Temp:  [97.5 °F (36.4 °C)-98.2 °F (36.8 °C)] 97.5 °F (36.4 °C)  Heart Rate:  [55-93] 75  Resp:  [18] 18  BP: ()/(60-81) 127/65    Intake/Output Summary (Last 24 hours) at 6/14/2025 1521  Last data filed at 6/14/2025 0747  Gross per 24 hour   Intake 360 ml   Output 150 ml   Net 210 ml       Physical Exam:   General Appearance:    No acute distress, awake, chronically ill-appearing.   Lungs:     Clear to auscultation bilaterally     Heart:    Regular rhythm with frequent ectopy and normal rate. II/VI SM RUSB.   Abdomen:     Soft, nontender, nondistended.    Extremities:   No clubbing, cyanosis, or edema.     Results Review:    Results from last 7 days   Lab Units 06/14/25  0930   SODIUM mmol/L 132*   POTASSIUM mmol/L 3.8   CHLORIDE mmol/L 98   CO2 mmol/L 24.8   BUN mg/dL 14.0   CREATININE mg/dL 1.30*   GLUCOSE mg/dL 134*   CALCIUM mg/dL 9.2     Results from last 7 days   Lab Units 06/14/25  1047 06/14/25  0930   HSTROP T ng/L 43* 45*     Results from last 7 days   Lab Units 06/14/25  0641   WBC 10*3/mm3 4.29   HEMOGLOBIN g/dL 9.8*   HEMATOCRIT % 27.4*   PLATELETS 10*3/mm3 137*              Results from last 7 days   Lab Units 06/14/25  0930   MAGNESIUM mg/dL 1.9           I reviewed the patient's new clinical results.        Assessment:  1.  Syncope, most likely secondary to #2  2.  Orthostatic hypotension  3.  Frequent PVCs and NSVT  4.  Mobitz type Isecond-degree AV block and brief 2:1 AV block  5.  Prolonged QT interval with amiodarone  6.  Chronic HFmrEF, secondary to #7  7.  Mild nonischemic cardiomyopathy, EF 46% by echo on 6/5/2025  8.  Angiographically normal coronary arteries by cath on 6/6/2025  9.  Alcohol use disorder  10.  Hyponatremia  11.  Multifactorial anemia  12.  Thrombocytopenia  13.  Acute kidney injury with stage IIIb chronic kidney disease  14.  History of DVT and pulmonary embolism in 2020, on chronic anticoagulation  15.  Hypothyroidism    Plan:  -Again, he developed Mobitz type I secondary AV block and brief 2:1 AV block overnight.  He was asymptomatic at the time.  I still think the most likely cause for his syncope is the orthostatic hypotension.    -His metoprolol was held because of the above, as well as recurrent orthostatic hypotension with systolic readings into the 70s with standing.  He was symptomatic when this occurred.  I am going to reduce the metoprolol succinate from 50 mg twice daily to 25 mg twice daily.  Again, amiodarone caused a prolonged QT interval.  Given the new events, I will also ask EP to see him again or at least make recommendations on Monday.    -I will check a thyroid function panel tomorrow morning.  He is on thyroid replacement therapy.    -For now, continue fludrocortisone 100 mcg every 12 hours and midodrine 10 mg 3 times daily.  Volume resuscitation fluids per nephrology.    -Continue Eliquis given history of pulmonary embolism and DVT in 2020.    -No evidence of coronary artery disease on recent cath.    -Ranexa being utilized as adjunct for arrhythmia.    -Complicated case.  Cardiology will continue to follow.    Jose Clancy,  MD  06/14/25  15:21 EDT

## 2025-06-14 NOTE — PROGRESS NOTES
"   LOS: 9 days     Chief Complaint/ Reason for encounter: JEFFREY on CKD    Subjective   06/11/25 : Patient is doing well today with no new complaints  Good appetite with no nausea or vomiting  No shortness of breath chest pain or edema  Voiding well with no dysuria    6/12: Feels well today denies any new complaints, waiting rehab bed  Worsening orthostatic blood pressures, midodrine increased, fludrocortisone added  Complains of intermittent dizziness with standing    6/13: He feels little better today denies new complaints  No more dizziness still complains of weakness  Good oral intake no nausea or vomiting  No shortness of breath chest pain or edema    6/14  No new complaint today.  No shortness of breath.    Medical history reviewed:  History of Present Illness    Subjective    History taken from: Chart and patient/family as able    Vital Signs  Temp:  [97.5 °F (36.4 °C)-98.4 °F (36.9 °C)] 97.5 °F (36.4 °C)  Heart Rate:  [55-93] 67  Resp:  [18] 18  BP: ()/(60-81) 120/81       Wt Readings from Last 1 Encounters:   06/14/25 0531 112 kg (246 lb 7.6 oz)   06/13/25 0638 106 kg (234 lb 2.1 oz)   06/11/25 0526 106 kg (232 lb 12.9 oz)   06/10/25 0452 106 kg (233 lb 4 oz)   06/09/25 0300 109 kg (239 lb 3.2 oz)   06/08/25 0552 108 kg (238 lb 15.7 oz)   06/07/25 0534 111 kg (244 lb 11.4 oz)   06/06/25 0502 110 kg (242 lb 1 oz)   06/05/25 1102 110 kg (243 lb)   06/05/25 0217 110 kg (243 lb)   06/04/25 2159 86.2 kg (190 lb)       Objective:  Vital signs: (most recent): Blood pressure 120/81, pulse 67, temperature 97.5 °F (36.4 °C), temperature source Oral, resp. rate 18, height 177.8 cm (70\"), weight 112 kg (246 lb 7.6 oz), SpO2 98%.                Objective:  General Appearance:  Comfortable, well-appearing, no acute distress   HEENT: Mucous membranes moist, atraumatic  Lungs:  Normal effort and normal respiratory rate.  Breath sounds clear to auscultation: No rhonchi/Rales.  No  respiratory distress.   Heart:  S1, S2 " normal.   Abdomen: Abdomen is soft, nontender/nondistended  Extremities: No edema of bilateral lower extremities  Skin:  Warm and dry       Results Review:    Intake/Output:     Intake/Output Summary (Last 24 hours) at 6/14/2025 1148  Last data filed at 6/14/2025 0747  Gross per 24 hour   Intake 360 ml   Output 150 ml   Net 210 ml         DATA:  Radiology and Labs:  The following labs independently reviewed by me. Additional labs ordered for tomorrow a.m.  Interval notes, chart personally reviewed by me.   Old records independently reviewed showing CKD 3 followed by me in the office, baseline creatinine around 1.5  Discussed with patient himself at bedside    Risk/ complexity of medical care/ medical decision making moderate complexity, JEFFREY management    Labs:   Recent Results (from the past 24 hours)   ECG 12 Lead Rhythm Change    Collection Time: 06/13/25 10:43 PM   Result Value Ref Range    QT Interval 524 ms    QTC Interval 503 ms   CBC (No Diff)    Collection Time: 06/14/25  6:41 AM    Specimen: Blood   Result Value Ref Range    WBC 4.29 3.40 - 10.80 10*3/mm3    RBC 2.76 (L) 4.14 - 5.80 10*6/mm3    Hemoglobin 9.8 (L) 13.0 - 17.7 g/dL    Hematocrit 27.4 (L) 37.5 - 51.0 %    MCV 99.3 (H) 79.0 - 97.0 fL    MCH 35.5 (H) 26.6 - 33.0 pg    MCHC 35.8 (H) 31.5 - 35.7 g/dL    RDW 15.0 12.3 - 15.4 %    RDW-SD 52.8 37.0 - 54.0 fl    MPV 10.0 6.0 - 12.0 fL    Platelets 137 (L) 140 - 450 10*3/mm3   Renal Function Panel    Collection Time: 06/14/25  9:30 AM    Specimen: Blood   Result Value Ref Range    Glucose 134 (H) 65 - 99 mg/dL    BUN 14.0 8.0 - 23.0 mg/dL    Creatinine 1.30 (H) 0.76 - 1.27 mg/dL    Sodium 132 (L) 136 - 145 mmol/L    Potassium 3.8 3.5 - 5.2 mmol/L    Chloride 98 98 - 107 mmol/L    CO2 24.8 22.0 - 29.0 mmol/L    Calcium 9.2 8.6 - 10.5 mg/dL    Albumin 3.6 3.5 - 5.2 g/dL    Phosphorus 3.1 2.5 - 4.5 mg/dL    Anion Gap 9.2 5.0 - 15.0 mmol/L    BUN/Creatinine Ratio 10.8 7.0 - 25.0    eGFR 55.2 (L) >60.0  mL/min/1.73   Magnesium    Collection Time: 06/14/25  9:30 AM    Specimen: Blood   Result Value Ref Range    Magnesium 1.9 1.6 - 2.4 mg/dL   High Sensitivity Troponin T    Collection Time: 06/14/25  9:30 AM    Specimen: Blood   Result Value Ref Range    HS Troponin T 45 (H) <22 ng/L   High Sensitivity Troponin T 1Hr    Collection Time: 06/14/25 10:47 AM    Specimen: Blood   Result Value Ref Range    HS Troponin T 43 (H) <22 ng/L    Troponin T Numeric Delta -2 ng/L    Troponin T % Delta -4 Abnormal if >/= 20%       Radiology:  Pertinent radiology studies were reviewed as described above      Medications have been reviewed separately in chart review medication tab      ASSESSMENT:  JEFFREY, improved and near baseline  CKD stage IIIb at baseline today  Syncope   Non sustain VT with normal coronaries on cath  Mild hypotension on midodrine/metoprolol for rate control  Alcohol use disorder   Hx of dvt/pe in 2020 on AC   Hyponatremia, improved with fluids, 134 suggesting prerenal       PLAN:   Patient's renal profile stable with BUN and creatinine of 14 and 1.3.  Sodium touch low 132 but stable.  Vitals revealed orthostatic drop in BP from 97-79 systolic.   Continue midodrine and will increase the fludrocortisone to 100 mcg 2 times a day.  Will ask for stocking lower legs.  Patient noted on gentle hydration 50 mL/h normal saline, okay to continue for now.  If good oral intake, will DC the fluids in the morning.  Check cortisol level a.m.  Also check TSH.      Metoprolol dosing per cardiology  Monitor electrolytes and volume closely   Dose all medications for GFR around 45-50  Discharge planning      Praneeth Copeland MD  [facility]   Office phone number: [phone]  Answering service phone number: [phone]    06/14/25  11:48 EDT    Dictation performed using Dragon dictation software

## 2025-06-14 NOTE — PROGRESS NOTES
RN called the service with concerns about orthoatasis. Orthoatatic vitals performed this AM and when standing, BP dropped to 70s/50s with HR reportedly in the 30s. He will receive midodrine this AM. After laying back down, BP and HR returned to baseline. Given AV block overnight and bradycardia this AM, will hold morning metoprolol dose until he is seen by provider in case adjustments need to be made.

## 2025-06-14 NOTE — PROGRESS NOTES
RN called the service with concerns for rhythm change.  RN noticed on monitor patient appeared to be in second-degree AV block.  EKG obtained and consistent with possible AV block.  No significant bradycardia.  Patient is asymptomatic and vitals are stable.  Negative [facility] on 6/6/25.  Will continue to monitor for now.

## 2025-06-14 NOTE — PROGRESS NOTES
Post-Acute Authorization Submission      Post Acute Pre-Cert Documentation  Request Submitted by Facility - Type:: Post Acute  Post-Acute Authorization Type Submitted:: SNF  Date Post Acute Pre-Cert Inititated per Facility: 06/09/25  Date Post Acute Pre-Cert Completed: 06/13/25  Accepting Facility: [facility] Discharge Date Requested: 06/10/25  Response Received from Insurance?: Denial  Action Taken by Requesting Facility:: Facility Appeal  Response Communicated to::   Authorization Number:: EXPEDITED APPEAL REQUESTED/SNF DENIAL# [id number]  Post Acute Pre-Cert Initiated Comment: Voicemail from [employer] representative requested completed AOR form be faxed, message states phone number for patient and MD missing from form received. AOR form completed and faxed to [phone] as requested..........Cyn Vincent, RN

## 2025-06-14 NOTE — PLAN OF CARE
Goal Outcome Evaluation:  Plan of Care Reviewed With: patient        Progress: no change  Outcome Evaluation: positive orthostatic BP.  unable to complete the second standing orthostatic BP.  the pt was weak, unsteady, dizzy, and HR dropped to 38 while standing.  other VS stable.  the pt denied pain.  meds given per orders.  rhythm change noted.  EKG completed.  cardiology consulted.  wound care completed per orders.  CIWA scores noted.  will continue to monitor.

## 2025-06-14 NOTE — PROGRESS NOTES
Name: Bryan Landers ADMIT: 2025   : 1945  PCP: Jose Fonseca MD    MRN: [id number] LOS: 9 days   AGE/SEX: 80 y.o. male  ROOM: [facility]     Subjective   Subjective   2025  Patient seen and examined at bedside, he is on room air states he is feeling better.  BP stable.  EP to evaluate today.  Case management to start pre-CERT to SNF    06/10/2025  No overnight events, patient without distress.  Pre-CERT pending to SNF    2025  Patient resting in chair without complaints, pre-CERT pending to SNF    2025  Patient seen and examined at bedside he does complain of weakness.  He was orthostatic yesterday afternoon and midodrine was increased.  He became orthostatic again this morning before receiving midodrine dose.  Fludrocortisone added.  Has had poor p.o. intake, will give some fluids.    25  Patient still remains orthostatic but appears to be better than yesterday and had good response to fluids.  Nutrition has added supplements.  Pre-CERT remains pending, patient is getting frustrated with the long process.  Personally discussed with case management    2025  Patient remains orthostatic and developed AV block overnight.  Toprol being held and cardiology consulted.  He is currently sitting in bed and admits to weakness but does not appear to be in distress.         Objective   Objective   Vital Signs  Temp:  [97.5 °F (36.4 °C)-98.4 °F (36.9 °C)] 97.5 °F (36.4 °C)  Heart Rate:  [55-93] 67  Resp:  [18] 18  BP: ()/(60-81) 120/81  SpO2:  [94 %-100 %] 98 %  on   ;   Device (Oxygen Therapy): room air  Body mass index is 35.37 kg/m².  Physical Exam  Constitutional:       General: He is not in acute distress.     Appearance: He is obese.   HENT:      Head: Normocephalic and atraumatic.      Nose: Nose normal. No congestion.      Mouth/Throat:      Pharynx: Oropharynx is clear. No oropharyngeal exudate.   Eyes:      General: No scleral icterus.  Cardiovascular:      Rate and  Rhythm: Normal rate and regular rhythm.      Heart sounds: No murmur heard.     No friction rub. No gallop.   Pulmonary:      Effort: No respiratory distress.      Breath sounds: No wheezing or rales.   Abdominal:      General: There is no distension.      Tenderness: There is no abdominal tenderness. There is no guarding.   Musculoskeletal:         General: No swelling, deformity or signs of injury.      Cervical back: Normal range of motion. No rigidity.   Skin:     Coloration: Skin is not jaundiced.      Findings: No bruising or lesion.   Neurological:      General: No focal deficit present.      Mental Status: He is alert.      Motor: Weakness present.       Results Review     I reviewed the patient's new clinical results.  Results from last 7 days   Lab Units 06/14/25  0641 06/13/25  0832 06/12/25  0644 06/11/25  0603   WBC 10*3/mm3 4.29 5.46 4.79 4.47   HEMOGLOBIN g/dL 9.8* 10.6* 9.8* 10.6*   PLATELETS 10*3/mm3 137* 150 138* 133*     Results from last 7 days   Lab Units 06/14/25  0930 06/13/25  0547 06/12/25  0644 06/11/25  0603   SODIUM mmol/L 132* 134* 131* 134*   POTASSIUM mmol/L 3.8 3.9 3.9 3.8   CHLORIDE mmol/L 98 99 98 99   CO2 mmol/L 24.8 23.0 25.5 25.0   BUN mg/dL 14.0 14.0 15.0 14.0   CREATININE mg/dL 1.30* 1.24 1.35* 1.51*   GLUCOSE mg/dL 134* 102* 99 102*   EGFR mL/min/1.73 55.2* 58.4* 52.7* 46.1*     Results from last 7 days   Lab Units 06/14/25  0930 06/13/25  0547 06/12/25  0644 06/11/25  0603 06/10/25  0542 06/09/25  0632   ALBUMIN g/dL 3.6 2.9* 2.9* 2.8* 2.8* 2.8*   BILIRUBIN mg/dL  --   --  0.4 0.4 0.4 0.4   ALK PHOS U/L  --   --  81 86 84 90   AST (SGOT) U/L  --   --  34 36 37 39   ALT (SGPT) U/L  --   --  20 18 18 22     Results from last 7 days   Lab Units 06/14/25  0930 06/13/25  0547 06/12/25  0644 06/11/25  0603 06/08/25  1224 06/08/25  0640   CALCIUM mg/dL 9.2 8.6 8.6 8.7   < > 8.8   ALBUMIN g/dL 3.6 2.9* 2.9* 2.8*   < > 2.7*   MAGNESIUM mg/dL 1.9 1.8  --   --   --  1.8   PHOSPHORUS  "mg/dL 3.1 3.3  --   --   --   --     < > = values in this interval not displayed.       No results found for: \"HGBA1C\", \"POCGLU\"    No radiology results for the last day      I have personally reviewed all medications:  Scheduled Medications  apixaban, 2.5 mg, Oral, Q12H  aspirin, 81 mg, Oral, Daily  atorvastatin, 80 mg, Oral, Daily  fludrocortisone, 100 mcg, Oral, Daily  folic acid, 1 mg, Oral, Daily  levothyroxine, 75 mcg, Oral, Q AM  Menthol-Zinc Oxide, 1 Application, Topical, Q12H  [Held by provider] metoprolol succinate XL, 50 mg, Oral, BID  midodrine, 10 mg, Oral, TID AC  multivitamin with minerals, 1 tablet, Oral, Daily  ranolazine, 1,000 mg, Oral, Q12H  thiamine, 100 mg, Oral, Daily    Infusions   Diet  Diet: Cardiac; Healthy Heart (2-3 Na+); Fluid Consistency: Thin (IDDSI 0)    I have personally reviewed:  [x]  Laboratory   [x]  Microbiology   [x]  Radiology   [x]  EKG/Telemetry  [x]  Cardiology/Vascular   []  Pathology    []  Records       Assessment/Plan     Active Hospital Problems    Diagnosis  POA    **Syncope [R55]  Yes    Alcohol use disorder [F10.90]  Yes    Acquired hypothyroidism [E03.9]  Yes    Cognitive decline [R41.89]  Yes    Stage 3b chronic kidney disease [N18.32]  Yes    Essential hypertension [I10]  Yes      Resolved Hospital Problems   No resolved problems to display.       80 y.o. male admitted with Syncope.    #Syncope  #Fall  #Orthostasis    -CT head without acute mass, shift, hemorrhage    -CT C-spine without acute fracture or subluxation    -CXR without acute cardiopulmonary process    - Patient orthostatic on the afternoon of 6/11/2025, midodrine increased to 10 mg p.o. 3 times daily.  He was also orthostatic on the morning of 6/12/2025 before receiving medications.  Fludrocortisone added to his medications    -Nursing reports poor p.o. intake will check UA and urine studies    - S/p 500 NS bolus followed by maintenance fluids to complete 1L NS total on 6/12/2025    -Hold home " lisinopril    -Case management working on pre-CERT to SNF, pre-CERT pending.  Patient getting frustrated by the long process but he is agreeable to wait, personally discussed with case management again and they are closely keeping an eye for when pre-CERT becomes available    - Echo on 6/5/2025 showed EF 46.2% with diastolic dysfunction    - Due to second-degree AV block and continued symptoms cardiology has been reconsulted Toprol held    - Due to hyponatremia and poor p.o. intake have started NS 50 mL/HR x 1 L    #PVCs  #NSVT  #History of PE/DVT  #HFpEF, chronic  #AV block    - Amiodarone discontinued due to prolonged Qtc    - Continue low-dose metoprolol    - Continue Eliquis 2.5 mg p.o. twice daily    - Patient underwent heart cath on 6/6/2025 which showed normal coronary arteries and normal left-sided filling pressures    -Patient without evidence of volume overload, continue to hold home Lasix    - Resume aspirin and statin    - EP recommends 2 weeks ZIO at discharge, follow-up in EP office and they have cleared for discharge    - Patient developed second-degree AV block overnight, Toprol being held, cardiology reconsulted    #Alcohol use disorder    -CIWA protocol    #JEFFREY on CKD    -Creatinine currently at baseline    - Nephrology following    #Hyponatremia    - Sodium 131 > 134 > 132 this morning    - Nursing reports poor p.o. intake along with weakness    - S/p 1 L NS on 6/12/2025    - will give NS @ 50/hr for 1L, monitor closely for fluid overload    - Urine studies reviewed    #Anemia  #Thrombocytopenia    - Hemoglobin 9.8, no overt bleeding, appears near base    - Platelets stable at 137    - Monitor labs    #Hypothyroid    - Resume home Synthroid    #Hyperlipidemia    - Resume statin      Eliquis (home med) for DVT prophylaxis.  Full code.  Discussed with patient.  Anticipate discharge to SNU facility in 1-2 days.  Expected Discharge Date: 6/14/2025; Expected Discharge Time:  3:00 PM      Joseph  DO [facility]  06/14/25  11:28 EDT

## 2025-06-15 PROBLEM — E66.9 OBESITY (BMI 30-39.9): Status: ACTIVE | Noted: 2025-06-15

## 2025-06-15 LAB
ALBUMIN SERPL-MCNC: 3.2 G/DL (ref 3.5–5.2)
ANION GAP SERPL CALCULATED.3IONS-SCNC: 10 MMOL/L (ref 5–15)
BUN SERPL-MCNC: 14 MG/DL (ref 8–23)
BUN/CREAT SERPL: 11.4 (ref 7–25)
CALCIUM SPEC-SCNC: 8.8 MG/DL (ref 8.6–10.5)
CHLORIDE SERPL-SCNC: 101 MMOL/L (ref 98–107)
CO2 SERPL-SCNC: 24 MMOL/L (ref 22–29)
CORTIS SERPL-MCNC: 14.3 MCG/DL
CREAT SERPL-MCNC: 1.23 MG/DL (ref 0.76–1.27)
DEPRECATED RDW RBC AUTO: 54.3 FL (ref 37–54)
EGFRCR SERPLBLD CKD-EPI 2021: 59 ML/MIN/1.73
ERYTHROCYTE [DISTWIDTH] IN BLOOD BY AUTOMATED COUNT: 14.6 % (ref 12.3–15.4)
GLUCOSE SERPL-MCNC: 93 MG/DL (ref 65–99)
HCT VFR BLD AUTO: 28.1 % (ref 37.5–51)
HGB BLD-MCNC: 9.9 G/DL (ref 13–17.7)
MAGNESIUM SERPL-MCNC: 1.8 MG/DL (ref 1.6–2.4)
MCH RBC QN AUTO: 36.1 PG (ref 26.6–33)
MCHC RBC AUTO-ENTMCNC: 35.2 G/DL (ref 31.5–35.7)
MCV RBC AUTO: 102.6 FL (ref 79–97)
PHOSPHATE SERPL-MCNC: 3.4 MG/DL (ref 2.5–4.5)
PLATELET # BLD AUTO: 136 10*3/MM3 (ref 140–450)
PMV BLD AUTO: 8.4 FL (ref 6–12)
POTASSIUM SERPL-SCNC: 4.1 MMOL/L (ref 3.5–5.2)
QT INTERVAL: 494 MS
QTC INTERVAL: 540 MS
RBC # BLD AUTO: 2.74 10*6/MM3 (ref 4.14–5.8)
SODIUM SERPL-SCNC: 135 MMOL/L (ref 136–145)
T4 FREE SERPL-MCNC: 1.4 NG/DL (ref 0.92–1.68)
TSH SERPL DL<=0.05 MIU/L-ACNC: 7.18 UIU/ML (ref 0.27–4.2)
WBC NRBC COR # BLD AUTO: 4.45 10*3/MM3 (ref 3.4–10.8)

## 2025-06-15 PROCEDURE — 82533 TOTAL CORTISOL: CPT | Performed by: HOSPITALIST

## 2025-06-15 PROCEDURE — 93010 ELECTROCARDIOGRAM REPORT: CPT | Performed by: INTERNAL MEDICINE

## 2025-06-15 PROCEDURE — 99232 SBSQ HOSP IP/OBS MODERATE 35: CPT | Performed by: INTERNAL MEDICINE

## 2025-06-15 PROCEDURE — 80069 RENAL FUNCTION PANEL: CPT | Performed by: INTERNAL MEDICINE

## 2025-06-15 PROCEDURE — 85027 COMPLETE CBC AUTOMATED: CPT | Performed by: STUDENT IN AN ORGANIZED HEALTH CARE EDUCATION/TRAINING PROGRAM

## 2025-06-15 PROCEDURE — 83735 ASSAY OF MAGNESIUM: CPT | Performed by: INTERNAL MEDICINE

## 2025-06-15 PROCEDURE — 84439 ASSAY OF FREE THYROXINE: CPT | Performed by: INTERNAL MEDICINE

## 2025-06-15 PROCEDURE — 93005 ELECTROCARDIOGRAM TRACING: CPT | Performed by: INTERNAL MEDICINE

## 2025-06-15 PROCEDURE — 84443 ASSAY THYROID STIM HORMONE: CPT | Performed by: INTERNAL MEDICINE

## 2025-06-15 RX ORDER — LEVOTHYROXINE SODIUM 50 UG/1
50 TABLET ORAL
Status: DISCONTINUED | OUTPATIENT
Start: 2025-06-15 | End: 2025-06-16 | Stop reason: HOSPADM

## 2025-06-15 RX ADMIN — MENTHOL, ZINC OXIDE 1 APPLICATION: .44; 20.6 OINTMENT TOPICAL at 22:28

## 2025-06-15 RX ADMIN — MIDODRINE HYDROCHLORIDE 10 MG: 5 TABLET ORAL at 10:33

## 2025-06-15 RX ADMIN — ATORVASTATIN CALCIUM 80 MG: 80 TABLET, FILM COATED ORAL at 09:05

## 2025-06-15 RX ADMIN — FOLIC ACID 1 MG: 1 TABLET ORAL at 09:05

## 2025-06-15 RX ADMIN — MIDODRINE HYDROCHLORIDE 10 MG: 5 TABLET ORAL at 17:43

## 2025-06-15 RX ADMIN — RANOLAZINE 1000 MG: 500 TABLET, FILM COATED, EXTENDED RELEASE ORAL at 22:28

## 2025-06-15 RX ADMIN — APIXABAN 2.5 MG: 2.5 TABLET, FILM COATED ORAL at 22:28

## 2025-06-15 RX ADMIN — FLUDROCORTISONE ACETATE 100 MCG: 0.1 TABLET ORAL at 22:28

## 2025-06-15 RX ADMIN — Medication 1 TABLET: at 09:05

## 2025-06-15 RX ADMIN — ASPIRIN 81 MG CHEWABLE TABLET 81 MG: 81 TABLET CHEWABLE at 09:05

## 2025-06-15 RX ADMIN — APIXABAN 2.5 MG: 2.5 TABLET, FILM COATED ORAL at 09:05

## 2025-06-15 RX ADMIN — METOPROLOL SUCCINATE 25 MG: 25 TABLET, EXTENDED RELEASE ORAL at 09:05

## 2025-06-15 RX ADMIN — MENTHOL, ZINC OXIDE 1 APPLICATION: .44; 20.6 OINTMENT TOPICAL at 09:05

## 2025-06-15 RX ADMIN — RANOLAZINE 1000 MG: 500 TABLET, FILM COATED, EXTENDED RELEASE ORAL at 09:05

## 2025-06-15 RX ADMIN — MIDODRINE HYDROCHLORIDE 10 MG: 5 TABLET ORAL at 06:55

## 2025-06-15 RX ADMIN — Medication 5 MG: at 22:28

## 2025-06-15 RX ADMIN — Medication 100 MG: at 09:05

## 2025-06-15 RX ADMIN — LEVOTHYROXINE SODIUM 50 MCG: 50 TABLET ORAL at 12:35

## 2025-06-15 RX ADMIN — LEVOTHYROXINE SODIUM 75 MCG: 0.07 TABLET ORAL at 06:55

## 2025-06-15 RX ADMIN — FLUDROCORTISONE ACETATE 100 MCG: 0.1 TABLET ORAL at 09:05

## 2025-06-15 RX ADMIN — METOPROLOL SUCCINATE 25 MG: 25 TABLET, EXTENDED RELEASE ORAL at 22:28

## 2025-06-15 NOTE — PROGRESS NOTES
LOS: 10 days   Patient Care Team:  Jose Fonseca MD as PCP - General (Family Medicine)  Jose Michelle MD as Consulting Physician (Radiation Oncology)  Roldan Mccarthy MD as Consulting Physician (Urology)  Clarence Nieves MD (Hematology)  Grover Harris DPM as Consulting Physician (Podiatry)  Skip Wall MD PhD as Consulting Physician (Thoracic Surgery)    Chief Complaint: PVCs, NSVT, AV block, syncope, orthostatic hypotension.    Interval History: He is better when standing.  Blood pressure did not drop as significantly as yesterday.  No further Mobitz type I AV block or brief 2:1 AV block (noted the day prior).  He just feels weak this morning.  No chest pain or shortness of breath.      Vital Signs:  Temp:  [97.5 °F (36.4 °C)-98.2 °F (36.8 °C)] 98.2 °F (36.8 °C)  Heart Rate:  [75-80] 80  Resp:  [16-18] 18  BP: (112-129)/(74-88) 129/88    Intake/Output Summary (Last 24 hours) at 6/15/2025 1651  Last data filed at 6/15/2025 1154  Gross per 24 hour   Intake 1327 ml   Output 325 ml   Net 1002 ml       Physical Exam:   General Appearance:    No acute distress, awake, chronically ill-appearing.   Lungs:     Clear to auscultation bilaterally     Heart:    Regular rhythm with frequent ectopy and normal rate. II/VI SM RUSB.   Abdomen:     Soft, nontender, nondistended.    Extremities:   No clubbing, cyanosis, or edema.     Results Review:    Results from last 7 days   Lab Units 06/15/25  0649   SODIUM mmol/L 135*   POTASSIUM mmol/L 4.1   CHLORIDE mmol/L 101   CO2 mmol/L 24.0   BUN mg/dL 14.0   CREATININE mg/dL 1.23   GLUCOSE mg/dL 93   CALCIUM mg/dL 8.8     Results from last 7 days   Lab Units 06/14/25  1047 06/14/25  0930   HSTROP T ng/L 43* 45*     Results from last 7 days   Lab Units 06/15/25  0649   WBC 10*3/mm3 4.45   HEMOGLOBIN g/dL 9.9*   HEMATOCRIT % 28.1*   PLATELETS 10*3/mm3 136*             Results from last 7 days   Lab Units 06/15/25  0649   MAGNESIUM mg/dL 1.8           I  reviewed the patient's new clinical results.        Assessment:  1.  Syncope, most likely secondary to #2  2.  Orthostatic hypotension  3.  Frequent PVCs and NSVT  4.  Mobitz type Isecond-degree AV block and brief 2:1 AV block  5.  Prolonged QT interval with amiodarone  6.  Chronic HFmrEF, secondary to #7  7.  Mild nonischemic cardiomyopathy, EF 46% by echo on 6/5/2025  8.  Angiographically normal coronary arteries by cath on 6/6/2025  9.  Alcohol use disorder  10.  Hyponatremia  11.  Multifactorial anemia  12.  Thrombocytopenia  13.  Acute kidney injury with stage IIIb chronic kidney disease  14.  History of DVT and pulmonary embolism in 2020, on chronic anticoagulation  15.  Hypothyroidism    Plan:  -Again, he developed Mobitz type I secondary AV block and brief 2:1 AV block 2 days ago.  He was asymptomatic at the time.  I still think the most likely cause for his syncope is the orthostatic hypotension.    -I reduced the metoprolol succinate dose to 25 mg twice a day because of the AV block and continuing orthostasis.  His systolic blood pressures dropped into the 70s with standing yesterday morning, although are better today.  No amiodarone secondary to prolonged QT interval in the past with this agent.    -TSH mildly elevated with a normal T4.    -For now, continue fludrocortisone 100 mcg every 12 hours and midodrine 10 mg 3 times daily.  IV fluids were stopped this morning per nephrology.    -Continue Eliquis given history of pulmonary embolism and DVT in 2020.    -No evidence of coronary artery disease on recent cath.    -Ranexa being utilized as adjunct for arrhythmia.    Jose Clancy MD  06/15/25  16:51 EDT

## 2025-06-15 NOTE — PROGRESS NOTES
[facility] Hospital [facility]    [phone]   LOS: 10 days     Name: Bryan Landers  Age/Sex: 80 y.o. male  :  1945        PCP: Jose Fonseca MD  Chief Complaint   Patient presents with    Syncope      Subjective   Confused this morning.  He does not realize he has been in the hospital for the last few days.  He is asking if his wife brought him in last night.  He denies new issues or complaints no dizziness chest pain palpitations or shortness of breath but he has not been out of bed yet today.  He denies eating breakfast despite an empty tray and feels it is currently almost dinnertime.  Remains on IV fluids  General: No Fever or Chills, Cardiac: No Chest Pain or Palpitations, and GI: No Nausea, Vomiting, or Diarrhea    apixaban, 2.5 mg, Oral, Q12H  aspirin, 81 mg, Oral, Daily  atorvastatin, 80 mg, Oral, Daily  fludrocortisone, 100 mcg, Oral, Q12H  folic acid, 1 mg, Oral, Daily  levothyroxine, 75 mcg, Oral, Q AM  Menthol-Zinc Oxide, 1 Application, Topical, Q12H  metoprolol succinate XL, 25 mg, Oral, BID  midodrine, 10 mg, Oral, TID AC  multivitamin with minerals, 1 tablet, Oral, Daily  ranolazine, 1,000 mg, Oral, Q12H  thiamine, 100 mg, Oral, Daily    sodium chloride, 50 mL/hr, Last Rate: 50 mL/hr (25 1418)    Objective   Vital Signs  Temp:  [97.5 °F (36.4 °C)-98.2 °F (36.8 °C)] 98.2 °F (36.8 °C)  Heart Rate:  [75-79] 75  Resp:  [16-18] 18  BP: (112-127)/(65-80) 117/78  Body mass index is 34.83 kg/m².    Intake/Output Summary (Last 24 hours) at 6/15/2025 0917  Last data filed at 6/15/2025 0657  Gross per 24 hour   Intake 1327 ml   Output 225 ml   Net 1102 ml       Physical Exam  He is awake alert sitting up in the bed in no distress.  Oriented only to self he does not realize he is in the hospital or why he is here.  Respirations are even and nonlabored clear to auscultation anteriorly  Abdomen soft nontender nondistended  Trace lower extremity edema      Results Review:       I reviewed the  patient's new clinical results.  Results from last 7 days   Lab Units 06/15/25  0649 06/14/25  0641 06/13/25  0832 06/12/25  0644 06/11/25  0603 06/10/25  0542 06/09/25  0632   WBC 10*3/mm3 4.45 4.29 5.46 4.79 4.47 3.69 3.91   HEMOGLOBIN g/dL 9.9* 9.8* 10.6* 9.8* 10.6* 10.3* 10.3*   PLATELETS 10*3/mm3 136* 137* 150 138* 133* 123* 122*     Results from last 7 days   Lab Units 06/15/25  0649 06/14/25  0930 06/13/25  0547 06/12/25  0644 06/11/25  0603 06/10/25  0542 06/09/25  0632   SODIUM mmol/L 135* 132* 134* 131* 134* 135* 136   POTASSIUM mmol/L 4.1 3.8 3.9 3.9 3.8 4.0 3.9   CHLORIDE mmol/L 101 98 99 98 99 103 102   CO2 mmol/L 24.0 24.8 23.0 25.5 25.0 23.8 25.1   BUN mg/dL 14.0 14.0 14.0 15.0 14.0 13.0 13.0   CREATININE mg/dL 1.23 1.30* 1.24 1.35* 1.51* 1.37* 1.44*   CALCIUM mg/dL 8.8 9.2 8.6 8.6 8.7 8.8 8.7   MAGNESIUM mg/dL 1.8 1.9 1.8  --   --   --   --    PHOSPHORUS mg/dL 3.4 3.1 3.3  --   --   --   --    Estimated Creatinine Clearance: 58.5 mL/min (by C-G formula based on SCr of 1.23 mg/dL).      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       ASSESMENT  This is a an 80-year-old gentleman with history of chronic kidney disease, hypothyroidism, hypertension, hyperlipidemia, type 2 diabetes, chronic alcohol use and factor V Leiden who presents to the hospital with syncopal events secondary to significant  orthostasis.  Also noted to have multifocal PVCs.  He has responded well to Florinef and midodrine here in the hospital.  Planning for Holter monitor at discharge to monitor PVCs in the outpatient setting.  Edited by: Olaf Guerrero MD at 6/15/2025 0744   PLAN  Continue present management.  Would like to try to discontinue the IV fluids especially if her plan is to go to rehab he will not be able to utilize those there.  Continue midodrine and Florinef monitor blood pressures  Awaiting placement  Continue delirium precautions encourage out of bed activity and up in chair is much as possible and tolerated  Renal function electrolytes stable monitor sodium levels closely    VTE Prophylaxis:  Pharmacologic & mechanical VTE prophylaxis orders are present.      Code Status and Medical Interventions: CPR (Attempt to Resuscitate); Full   Ordered at: 06/05/25 0157     Code Status (Patient has no pulse and is not breathing):    CPR (Attempt to Resuscitate)     Medical Interventions (Patient has pulse or is breathing):    Full          Disposition  Expected Discharge Date: 6/14/2025; Expected Discharge Time:  3:00 PM       Olaf Guerrero MD  [facility]  06/15/25  09:17 EDT

## 2025-06-15 NOTE — SIGNIFICANT NOTE
06/15/25 1008   OTHER   Discipline physical therapy assistant   Rehab Time/Intention   Session Not Performed patient/family declined treatment   Recommendation   PT - Next Appointment 06/16/25

## 2025-06-15 NOTE — PROGRESS NOTES
"   LOS: 10 days     Chief Complaint/ Reason for encounter: JEFFREY on CKD    Subjective   06/11/25 : Patient is doing well today with no new complaints  Good appetite with no nausea or vomiting  No shortness of breath chest pain or edema  Voiding well with no dysuria    6/12: Feels well today denies any new complaints, waiting rehab bed  Worsening orthostatic blood pressures, midodrine increased, fludrocortisone added  Complains of intermittent dizziness with standing    6/13: He feels little better today denies new complaints  No more dizziness still complains of weakness  Good oral intake no nausea or vomiting  No shortness of breath chest pain or edema    6/14  No new complaint today.  No shortness of breath.    6/15  Patient says she feels little less dizzy when he tried to stand up, somewhat better as compared to before.    Medical history reviewed:  History of Present Illness    Subjective    History taken from: Chart and patient/family as able    Vital Signs  Temp:  [97.5 °F (36.4 °C)-98.2 °F (36.8 °C)] 98.2 °F (36.8 °C)  Heart Rate:  [75-79] 75  Resp:  [16-18] 18  BP: (112-127)/(65-80) 117/78       Wt Readings from Last 1 Encounters:   06/15/25 0657 110 kg (242 lb 11.6 oz)   06/15/25 0500 109 kg (240 lb 8.4 oz)   06/14/25 0531 112 kg (246 lb 7.6 oz)   06/13/25 0638 106 kg (234 lb 2.1 oz)   06/11/25 0526 106 kg (232 lb 12.9 oz)   06/10/25 0452 106 kg (233 lb 4 oz)   06/09/25 0300 109 kg (239 lb 3.2 oz)   06/08/25 0552 108 kg (238 lb 15.7 oz)   06/07/25 0534 111 kg (244 lb 11.4 oz)   06/06/25 0502 110 kg (242 lb 1 oz)   06/05/25 1102 110 kg (243 lb)   06/05/25 0217 110 kg (243 lb)   06/04/25 2159 86.2 kg (190 lb)       Objective:  Vital signs: (most recent): Blood pressure 117/78, pulse 75, temperature 98.2 °F (36.8 °C), temperature source Oral, resp. rate 18, height 177.8 cm (70\"), weight 110 kg (242 lb 11.6 oz), SpO2 98%.                Objective:  General Appearance:  Comfortable, well-appearing, no acute " distress   HEENT: Mucous membranes moist, atraumatic  Lungs:  Normal effort and normal respiratory rate.  Breath sounds clear to auscultation: No rhonchi/Rales.  No  respiratory distress.   Heart:  S1, S2 normal.   Abdomen: Abdomen is soft, nontender/nondistended  Extremities: No edema of bilateral lower extremities  Skin:  Warm and dry       Results Review:    Intake/Output:     Intake/Output Summary (Last 24 hours) at 6/15/2025 1036  Last data filed at 6/15/2025 0657  Gross per 24 hour   Intake 1327 ml   Output 225 ml   Net 1102 ml         DATA:  Radiology and Labs:  The following labs independently reviewed by me. Additional labs ordered for tomorrow a.m.  Interval notes, chart personally reviewed by me.   Old records independently reviewed showing CKD 3 followed by me in the office, baseline creatinine around 1.5  Discussed with patient himself at bedside    Risk/ complexity of medical care/ medical decision making moderate complexity, JEFFREY management    Labs:   Recent Results (from the past 24 hours)   High Sensitivity Troponin T 1Hr    Collection Time: 06/14/25 10:47 AM    Specimen: Blood   Result Value Ref Range    HS Troponin T 43 (H) <22 ng/L    Troponin T Numeric Delta -2 ng/L    Troponin T % Delta -4 Abnormal if >/= 20%   ECG 12 Lead Rhythm Change    Collection Time: 06/15/25  3:27 AM   Result Value Ref Range    QT Interval 494 ms    QTC Interval 540 ms   Renal Function Panel    Collection Time: 06/15/25  6:49 AM    Specimen: Blood   Result Value Ref Range    Glucose 93 65 - 99 mg/dL    BUN 14.0 8.0 - 23.0 mg/dL    Creatinine 1.23 0.76 - 1.27 mg/dL    Sodium 135 (L) 136 - 145 mmol/L    Potassium 4.1 3.5 - 5.2 mmol/L    Chloride 101 98 - 107 mmol/L    CO2 24.0 22.0 - 29.0 mmol/L    Calcium 8.8 8.6 - 10.5 mg/dL    Albumin 3.2 (L) 3.5 - 5.2 g/dL    Phosphorus 3.4 2.5 - 4.5 mg/dL    Anion Gap 10.0 5.0 - 15.0 mmol/L    BUN/Creatinine Ratio 11.4 7.0 - 25.0    eGFR 59.0 (L) >60.0 mL/min/1.73   CBC (No Diff)     Collection Time: 06/15/25  6:49 AM    Specimen: Blood   Result Value Ref Range    WBC 4.45 3.40 - 10.80 10*3/mm3    RBC 2.74 (L) 4.14 - 5.80 10*6/mm3    Hemoglobin 9.9 (L) 13.0 - 17.7 g/dL    Hematocrit 28.1 (L) 37.5 - 51.0 %    .6 (H) 79.0 - 97.0 fL    MCH 36.1 (H) 26.6 - 33.0 pg    MCHC 35.2 31.5 - 35.7 g/dL    RDW 14.6 12.3 - 15.4 %    RDW-SD 54.3 (H) 37.0 - 54.0 fl    MPV 8.4 6.0 - 12.0 fL    Platelets 136 (L) 140 - 450 10*3/mm3   Cortisol    Collection Time: 06/15/25  6:49 AM    Specimen: Blood   Result Value Ref Range    Cortisol 14.30   mcg/dL   TSH Rfx On Abnormal To Free T4    Collection Time: 06/15/25  6:49 AM    Specimen: Blood   Result Value Ref Range    TSH 7.180 (H) 0.270 - 4.200 uIU/mL   Magnesium    Collection Time: 06/15/25  6:49 AM    Specimen: Blood   Result Value Ref Range    Magnesium 1.8 1.6 - 2.4 mg/dL   T4, Free    Collection Time: 06/15/25  6:49 AM    Specimen: Blood   Result Value Ref Range    Free T4 1.40 0.92 - 1.68 ng/dL       Radiology:  Pertinent radiology studies were reviewed as described above      Medications have been reviewed separately in chart review medication tab      ASSESSMENT:  JEFFREY, improved and near baseline  CKD stage IIIb at baseline today  Syncope   Non sustain VT with normal coronaries on cath  Mild hypotension on midodrine/metoprolol for rate control  Alcohol use disorder   Hx of dvt/pe in 2020 on AC   Hyponatremia, improved with fluids, 134 suggesting prerenal       PLAN:   Patient's renal profile stable with BUN and creatinine 14 and 1.23  Sodium improved to 135.  Vitals revealed orthostatic drop in BP from 97-79 systolic.   Continue midodrine and continue fludrocortisone 100 mcg 2 times a day.  Cortisol level within normal limit, TSH noted elevated, will start the patient on Synthroid 50 mcg daily, patient will need repeat TSH in 4 weeks with PCP.  stocking lower legs.  Will DC IV fluids as patient did not have good oral intake.        Metoprolol dosing per  cardiology  Monitor electrolytes and volume closely   Dose all medications for GFR around 45-50  Discharge planning      Praneeth Copeland MD  [facility]   Office phone number: [phone]  Answering service phone number: [phone]    06/15/25  10:36 EDT    Dictation performed using Dragon dictation software

## 2025-06-15 NOTE — NURSING NOTE
Refused orthostatics multiple times throughout shift, explained the importance of them. He had a different reason each time why he couldn't.

## 2025-06-16 ENCOUNTER — APPOINTMENT (OUTPATIENT)
Dept: CARDIOLOGY | Facility: HOSPITAL | Age: 80
End: 2025-06-16
Payer: MEDICARE

## 2025-06-16 VITALS
DIASTOLIC BLOOD PRESSURE: 89 MMHG | HEIGHT: 70 IN | SYSTOLIC BLOOD PRESSURE: 134 MMHG | OXYGEN SATURATION: 97 % | HEART RATE: 84 BPM | TEMPERATURE: 97.9 F | RESPIRATION RATE: 18 BRPM | WEIGHT: 235.45 LBS | BODY MASS INDEX: 33.71 KG/M2

## 2025-06-16 LAB
ALBUMIN SERPL-MCNC: 2.8 G/DL (ref 3.5–5.2)
ANION GAP SERPL CALCULATED.3IONS-SCNC: 9.9 MMOL/L (ref 5–15)
BUN SERPL-MCNC: 15 MG/DL (ref 8–23)
BUN/CREAT SERPL: 12.2 (ref 7–25)
CALCIUM SPEC-SCNC: 8.8 MG/DL (ref 8.6–10.5)
CHLORIDE SERPL-SCNC: 102 MMOL/L (ref 98–107)
CO2 SERPL-SCNC: 23.1 MMOL/L (ref 22–29)
CREAT SERPL-MCNC: 1.23 MG/DL (ref 0.76–1.27)
DEPRECATED RDW RBC AUTO: 52.5 FL (ref 37–54)
EGFRCR SERPLBLD CKD-EPI 2021: 59 ML/MIN/1.73
ERYTHROCYTE [DISTWIDTH] IN BLOOD BY AUTOMATED COUNT: 14.4 % (ref 12.3–15.4)
GLUCOSE SERPL-MCNC: 99 MG/DL (ref 65–99)
HCT VFR BLD AUTO: 26.8 % (ref 37.5–51)
HGB BLD-MCNC: 9.6 G/DL (ref 13–17.7)
MCH RBC QN AUTO: 36.4 PG (ref 26.6–33)
MCHC RBC AUTO-ENTMCNC: 35.8 G/DL (ref 31.5–35.7)
MCV RBC AUTO: 101.5 FL (ref 79–97)
PHOSPHATE SERPL-MCNC: 3.2 MG/DL (ref 2.5–4.5)
PLATELET # BLD AUTO: 149 10*3/MM3 (ref 140–450)
PMV BLD AUTO: 8.7 FL (ref 6–12)
POTASSIUM SERPL-SCNC: 3.6 MMOL/L (ref 3.5–5.2)
RBC # BLD AUTO: 2.64 10*6/MM3 (ref 4.14–5.8)
SODIUM SERPL-SCNC: 135 MMOL/L (ref 136–145)
WBC NRBC COR # BLD AUTO: 3.95 10*3/MM3 (ref 3.4–10.8)

## 2025-06-16 PROCEDURE — 85027 COMPLETE CBC AUTOMATED: CPT | Performed by: STUDENT IN AN ORGANIZED HEALTH CARE EDUCATION/TRAINING PROGRAM

## 2025-06-16 PROCEDURE — 93246 EXT ECG>7D<15D RECORDING: CPT

## 2025-06-16 PROCEDURE — 80069 RENAL FUNCTION PANEL: CPT | Performed by: INTERNAL MEDICINE

## 2025-06-16 PROCEDURE — 99231 SBSQ HOSP IP/OBS SF/LOW 25: CPT | Performed by: NURSE PRACTITIONER

## 2025-06-16 RX ORDER — METOPROLOL SUCCINATE 25 MG/1
25 TABLET, EXTENDED RELEASE ORAL 2 TIMES DAILY
Start: 2025-06-16

## 2025-06-16 RX ORDER — RANOLAZINE 1000 MG/1
1000 TABLET, EXTENDED RELEASE ORAL EVERY 12 HOURS SCHEDULED
Start: 2025-06-16

## 2025-06-16 RX ORDER — FOLIC ACID 1 MG/1
1 TABLET ORAL DAILY
Start: 2025-06-17

## 2025-06-16 RX ORDER — LEVOTHYROXINE SODIUM 125 UG/1
125 TABLET ORAL
Start: 2025-06-17

## 2025-06-16 RX ORDER — MIDODRINE HYDROCHLORIDE 10 MG/1
10 TABLET ORAL
Start: 2025-06-16

## 2025-06-16 RX ORDER — FLUDROCORTISONE ACETATE 0.1 MG/1
0.1 TABLET ORAL 2 TIMES DAILY
Start: 2025-06-16

## 2025-06-16 RX ORDER — LANOLIN ALCOHOL/MO/W.PET/CERES
100 CREAM (GRAM) TOPICAL DAILY
Start: 2025-06-17

## 2025-06-16 RX ADMIN — LEVOTHYROXINE SODIUM 50 MCG: 50 TABLET ORAL at 06:10

## 2025-06-16 RX ADMIN — ATORVASTATIN CALCIUM 80 MG: 80 TABLET, FILM COATED ORAL at 08:17

## 2025-06-16 RX ADMIN — Medication 1 TABLET: at 08:17

## 2025-06-16 RX ADMIN — RANOLAZINE 1000 MG: 500 TABLET, FILM COATED, EXTENDED RELEASE ORAL at 08:17

## 2025-06-16 RX ADMIN — LEVOTHYROXINE SODIUM 75 MCG: 0.07 TABLET ORAL at 06:10

## 2025-06-16 RX ADMIN — MENTHOL, ZINC OXIDE 1 APPLICATION: .44; 20.6 OINTMENT TOPICAL at 08:17

## 2025-06-16 RX ADMIN — FLUDROCORTISONE ACETATE 100 MCG: 0.1 TABLET ORAL at 08:17

## 2025-06-16 RX ADMIN — APIXABAN 2.5 MG: 2.5 TABLET, FILM COATED ORAL at 08:17

## 2025-06-16 RX ADMIN — MIDODRINE HYDROCHLORIDE 10 MG: 5 TABLET ORAL at 14:08

## 2025-06-16 RX ADMIN — MIDODRINE HYDROCHLORIDE 10 MG: 5 TABLET ORAL at 08:17

## 2025-06-16 RX ADMIN — METOPROLOL SUCCINATE 25 MG: 25 TABLET, EXTENDED RELEASE ORAL at 08:17

## 2025-06-16 RX ADMIN — Medication 100 MG: at 08:16

## 2025-06-16 RX ADMIN — FOLIC ACID 1 MG: 1 TABLET ORAL at 08:17

## 2025-06-16 RX ADMIN — ASPIRIN 81 MG CHEWABLE TABLET 81 MG: 81 TABLET CHEWABLE at 08:17

## 2025-06-16 NOTE — CASE MANAGEMENT/SOCIAL WORK
Case Management/Social Work    Patient Name:  Bryan Landers  YOB: 1945  MRN: [id number]  Admit Date:  6/4/2025        Post Acute Pre-Cert Documentation  SNF  Date Post Acute Pre-Cert Inititated per Facility: 06/09/25  Date Post Acute Pre-Cert Completed: 06/13/25  [place] [facility] Discharge Date Requested: 06/10/25  Authorization Number:: EXPEDITED APPEAL IN PROGRESS OVERTURNED - CASE# [id number] APPROVAL# [id number]    APPROVED 6/10/25 - 6/22/25    SPOKE TO [employer] APPEALS REP CATIE COTTON AND OBTAINED ABOVE INFORMATION.     Electronically signed by:  SHIELA Choe  06/16/25 12:45 EDT

## 2025-06-16 NOTE — CASE MANAGEMENT/SOCIAL WORK
Continued Stay Note  [facility]     Patient Name: Bryan Landers  MRN: [id number]  Today's Date: 6/16/2025    Admit Date: 6/4/2025    Plan: Plan [facility] pre cert obtained with appeal overturned.  YURY Frost RN   Discharge Plan       Row Name 06/16/25 1325       Plan    Plan Plan [facility] pre cert obtained with appeal overturned.  YURY Frost RN    Plan Comments Per [facility] Post Acute denial for pre cert has been overturned.   Per Maria Isabel  ( [phone]) pt has a bed and can be accepted at [facility] today.  MD notified.  Called and spoke with pt's daughter ( Isi Echavarria [phone]) and notified of pt's acceptance at [facility] and pre cert has been obtained.  Discharge Summary in packet.  Discharge Summary to be printed for facility per Maria Isabel.   Prescriptions sent to facility pharmacy if needed.  Packet to RN.   Pt's daughter states she can transport pt.  Plan [facility] pre cert obtained with appeal overturned. YURY Frost RN                   Discharge Codes    No documentation.                 Expected Discharge Date and Time       Expected Discharge Date Expected Discharge Time    Jun 16, 2025               Miroslava Frost RN

## 2025-06-16 NOTE — DISCHARGE SUMMARY
Patient Name: Bryan Landers  : 1945  MRN: [id number]    Date of Admission: 2025  Date of Discharge:  2025  Primary Care Physician: Jose Fonseca MD      Chief Complaint:   Syncope      Discharge Diagnoses     Active Hospital Problems    Diagnosis  POA    **Syncope [R55]  Yes    Obesity (BMI 30-39.9) [E66.9]  Yes    Alcohol use disorder [F10.90]  Yes    Anemia of chronic kidney failure, stage 3 (moderate) [N18.30, D63.1]  Yes    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        Hospital Course     Mr. Landers is an 80-year-old gentleman with history of chronic kidney disease, hypothyroidism, hypertension, hyperlipidemia, type 2 diabetes, chronic alcohol use and factor V Leiden who presents to the hospital with syncopal events secondary to significant orthostasis.  Also noted to have multifocal PVCs.  He has responded well to Florinef and midodrine here in the hospital.  Planning for Holter monitor at discharge to monitor PVCs in the outpatient setting.  At this point he stabilized to discharge to skilled nursing facility today.  He will need to follow-up with cardiology in the outpatient setting and 3 to 4 weeks and follow-up with his primary care physician in 1 to 2 weeks follow-up with nephrology as directed.  His Synthroid was increased during this hospitalization with slow increasing TSH in the outpatient setting.  Blood pressure medications of also been adjusted and decreased and changed due to the orthostasis.      Day of Discharge   See progress note  Consultants     Consult Orders (all) (From admission, onward)        Start     Ordered    06/13/25 2302  Inpatient Cardiology Consult  Once        Specialty:  Cardiology  Provider:  Rayshawn Navarro MD    06/13/25 2301    06/09/25 1103  Cardiac Electrophysiologist Inpatient Consult  Once        Specialty:  Cardiac Electrophysiology  Provider:  Ajay Mathews MD    06/09/25 1103    06/06/25 1903  Cardiac Rehab Evaluation and Enrollment  Once        Provider:  (Not yet assigned)    06/06/25 1902    06/05/25 1455  Inpatient Nephrology Consult  Once        Specialty:  Nephrology  Provider:  Alli Fabian MD    06/05/25 1456    06/05/25 1327  Inpatient Nephrology Consult  Once,   Status:  Canceled        Specialty:  Nephrology  Provider:  Evelin Michael MD    06/05/25 1326    06/05/25 0158  Inpatient consult to [facility]  Once        Provider:  (Not yet assigned)    06/05/25 0157    06/05/25 0156  Inpatient Cardiology Consult  Once        Specialty:  Cardiology  Provider:  Ajay Avila Jr., MD    06/05/25 0157    06/04/25 2356  [facility] (on-call MD unless specified) Details  Once        Specialty:  Hospitalist  Provider:  (Not yet assigned)    06/04/25 2355                  Procedures     Left Heart Cath    Imaging Results (All)       Procedure Component Value Units Date/Time    XR Abdomen KUB [[id number]] Collected: 06/12/25 0859     Updated: 06/12/25 0903    Narrative:      KUB     HISTORY: Constipation and dry heaving     COMPARISON: None available     FINDINGS: The bowel gas pattern is nonobstructive. Stool burden not  increased. Bilateral hip arthroplasties.       Impression:      As above           This report was finalized on 6/12/2025 9:00 AM by Dr. Alli Mccarthy M.D on Workstation: [id number]       XR Chest 1 View [[id number]] Collected: 06/04/25 2337     Updated: 06/04/25 2341    Narrative:      CXR ONE VIEW      HISTORY: syncope     COMPARISON: 3/28/2024     TECHNIQUE: single portable AP       Impression:         Allowing for submaximal inspiratory result,  heart size appears within  normal limits.     There is a submaximal inspiratory result. Allowing for that, there is no  convincing evidence of infiltrate, effusion or pneumothorax.           This report was finalized on 6/4/2025 11:38 PM by Dr. Caden Ballard M.D on Workstation: [id number]       CT Head Without Contrast [[id number]] Collected: 06/04/25 2331     Updated: 06/04/25 2339    Narrative:      NON-CONTRAST CT HEAD & CT CERVICAL SPINE     REASON:  The patient is being seen in the ED for trauma and is  determined to have a high energy mechanism and/or high risk for missed  injuries due to presence of associated injuries or distracting pain. The  patient will need imaging of the head per the trauma protocol given  diagnoses such as intracranial hemorrhage cannot be excluded.    The  patient will need imaging of the cervical spine given diagnoses such as  cervical spine fracture cannot be excluded.  The diagnostic information  gained in this study exceeds the estimated risk of radiation induced  injury at the time of order placement.  Mechanism of injury: Syncope and fall from standing, headache and neck  pain     COMPARISON STUDIES:  None Available.     TECHNIQUE:  Axial images were acquired from the skull base to vertex  without contrast, including multiplanar reformats, per standard  departmental protocol.   Routine cervical spine CT without contrast.  Multiplanar reformats were created. Radiation dose reduction techniques  were utilized, including automated exposure control, and exposure  modulation based on body size.     FINDINGS:     Head CT: There is no CT evidence of acute intracranial hemorrhage, mass,  or infarct. There is volume loss, but there is no evidence of  hydrocephalus or extra-axial fluid collection.  There are nonspecific  white matter changes, but there is no acute intracranial abnormality.   Skull base and calvarium show no acute abnormality, and the extracranial  soft tissues show  no acute abnormality.     C-spine CT: There is no evidence of acute alignment abnormality.  There  are spinal degenerative changes, but there is no fracture or other acute  bony abnormality.  The paraspinous soft tissues show no acute  abnormality.       Impression:         Negative unenhanced head CT, no acute abnormality.      Negative cervical spine CT, degenerative change without acute  abnormality.           This report was finalized on 6/4/2025 11:36 PM by Dr. Caden Ballard M.D on Workstation: [id number]       CT Cervical Spine Without Contrast [[id number]] Collected: 06/04/25 2331     Updated: 06/04/25 2339    Narrative:      NON-CONTRAST CT HEAD & CT CERVICAL SPINE     REASON:  The patient is being seen in the ED for trauma and is  determined to have a high energy mechanism and/or high risk for missed  injuries due to presence of associated injuries or distracting pain. The  patient will need imaging of the head per the trauma protocol given  diagnoses such as intracranial hemorrhage cannot be excluded.    The  patient will need imaging of the cervical spine given diagnoses such as  cervical spine fracture cannot be excluded.  The diagnostic information  gained in this study exceeds the estimated risk of radiation induced  injury at the time of order placement.  Mechanism of injury: Syncope and fall from standing, headache and neck  pain     COMPARISON STUDIES:  None Available.     TECHNIQUE:  Axial images were acquired from the skull base to vertex  without contrast, including multiplanar reformats, per standard  departmental protocol.   Routine cervical spine CT without contrast.  Multiplanar reformats were created. Radiation dose reduction techniques  were utilized, including automated exposure control, and exposure  modulation based on body size.     FINDINGS:     Head CT: There is no CT evidence of acute intracranial hemorrhage, mass,  or infarct. There is volume loss, but there is no evidence  of  hydrocephalus or extra-axial fluid collection.  There are nonspecific  white matter changes, but there is no acute intracranial abnormality.   Skull base and calvarium show no acute abnormality, and the extracranial  soft tissues show no acute abnormality.     C-spine CT: There is no evidence of acute alignment abnormality.  There  are spinal degenerative changes, but there is no fracture or other acute  bony abnormality.  The paraspinous soft tissues show no acute  abnormality.       Impression:         Negative unenhanced head CT, no acute abnormality.      Negative cervical spine CT, degenerative change without acute  abnormality.           This report was finalized on 6/4/2025 11:36 PM by Dr. Caden Ballard M.D on Workstation: [id number]             Results for orders placed during the hospital encounter of 06/04/25    Duplex Carotid Ultrasound CAR    Interpretation Summary    Right internal carotid artery demonstrates a less than 50% stenosis.    Antegrade right vertebral flow.    Left internal carotid artery demonstrates normal flow without evidence of hemodynamically significant stenosis.    Antegrade left vertebral flow.    Results for orders placed during the hospital encounter of 06/04/25    Adult Transthoracic Echo Complete W/ Cont if Necessary Per Protocol    Interpretation Summary    Left ventricular systolic function is low normal. Calculated left ventricular EF = 46.2%.  There is global left ventricular hypokinesis present.    Left ventricular diastolic function is consistent with (grade I) impaired relaxation.    Frequent ventricular ectopies noted during study.    Pertinent Labs     Results from last 7 days   Lab Units 06/16/25  0642 06/15/25  0649 06/14/25  0641 06/13/25  0832   WBC 10*3/mm3 3.95 4.45 4.29 5.46   HEMOGLOBIN g/dL 9.6* 9.9* 9.8* 10.6*   PLATELETS 10*3/mm3 149 136* 137* 150     Results from last 7 days   Lab Units 06/16/25  0642 06/15/25  0649 06/14/25  0930 06/13/25  0547  "  SODIUM mmol/L 135* 135* 132* 134*   POTASSIUM mmol/L 3.6 4.1 3.8 3.9   CHLORIDE mmol/L 102 101 98 99   CO2 mmol/L 23.1 24.0 24.8 23.0   BUN mg/dL 15.0 14.0 14.0 14.0   CREATININE mg/dL 1.23 1.23 1.30* 1.24   GLUCOSE mg/dL 99 93 134* 102*   EGFR mL/min/1.73 59.0* 59.0* 55.2* 58.4*     Results from last 7 days   Lab Units 06/16/25  0642 06/15/25  0649 06/14/25  0930 06/13/25  0547 06/12/25  0644 06/11/25  0603 06/10/25  0542   ALBUMIN g/dL 2.8* 3.2* 3.6 2.9* 2.9* 2.8* 2.8*   BILIRUBIN mg/dL  --   --   --   --  0.4 0.4 0.4   ALK PHOS U/L  --   --   --   --  81 86 84   AST (SGOT) U/L  --   --   --   --  34 36 37   ALT (SGPT) U/L  --   --   --   --  20 18 18     Results from last 7 days   Lab Units 06/16/25  0642 06/15/25  0649 06/14/25  0930 06/13/25  0547   CALCIUM mg/dL 8.8 8.8 9.2 8.6   ALBUMIN g/dL 2.8* 3.2* 3.6 2.9*   MAGNESIUM mg/dL  --  1.8 1.9 1.8   PHOSPHORUS mg/dL 3.2 3.4 3.1 3.3       Results from last 7 days   Lab Units 06/14/25  1047 06/14/25  0930   HSTROP T ng/L 43* 45*     Results from last 7 days   Lab Units 06/13/25  0547 06/12/25  1609   SODIUM UR mmol/L  --  76   OSMOLALITY UR mOsm/kg  --  337   URIC ACID mg/dL 6.5  --          Invalid input(s): \"LDLCALC\"          Test Results Pending at Discharge     Pending Results       Procedure [Order ID] Specimen - Date/Time    Holter Monitor - 72 Hour Up To 15 Days - In process [[id number]] Resulted: 06/16/25 0918     Updated: 06/16/25 0918    This result has not been signed. Information might be incomplete.                Discharge Details        Discharge Medications        New Medications        Instructions Start Date   fludrocortisone 0.1 MG tablet   0.1 mg, Oral, 2 Times Daily      folic acid 1 MG tablet  Commonly known as: FOLVITE   1 mg, Oral, Daily   Start Date: June 17, 2025     metoprolol succinate XL 25 MG 24 hr tablet  Commonly known as: TOPROL-XL   25 mg, Oral, 2 Times Daily      midodrine 10 MG tablet  Commonly known as: PROAMATINE   10 mg, " Oral, 3 Times Daily Before Meals      ranolazine 1000 MG 12 hr tablet  Commonly known as: RANEXA   1,000 mg, Oral, Every 12 Hours Scheduled      thiamine 100 MG tablet  Commonly known as: VITAMIN B1   100 mg, Oral, Daily   Start Date: June 17, 2025            Changes to Medications        Instructions Start Date   levothyroxine 75 MCG tablet  Commonly known as: SYNTHROID, LEVOTHROID  What changed: Another medication with the same name was added. Make sure you understand how and when to take each.   TAKE 1 TABLET BY MOUTH EVERY MORNING INDICATIONS: UNDER ACTIVE THYROID      levothyroxine 125 MCG tablet  Commonly known as: SYNTHROID, LEVOTHROID  What changed: You were already taking a medication with the same name, and this prescription was added. Make sure you understand how and when to take each.   125 mcg, Oral, Every Early Morning   Start Date: June 17, 2025            Continue These Medications        Instructions Start Date   allopurinol 100 MG tablet  Commonly known as: ZYLOPRIM   100 mg, Oral, Daily      apixaban 2.5 MG tablet tablet  Commonly known as: Eliquis   2.5 mg, Oral, Every 12 Hours Scheduled      aspirin 81 MG chewable tablet   81 mg, Daily      atorvastatin 80 MG tablet  Commonly known as: LIPITOR   80 mg, Oral, Daily      cholecalciferol 25 MCG (1000 UT) tablet  Commonly known as: VITAMIN D3   1 tablet, Daily      multivitamin with minerals tablet tablet   1 tablet, Daily      Orgovyx 120 MG tablet tablet  Generic drug: relugolix   120 mg, Daily      VITAMIN B 12 PO   1,000 mg, Every Morning      Xtandi 40 MG tablet tablet  Generic drug: enzalutamide   4 tablets, Daily             Stop These Medications      albuterol sulfate  (90 Base) MCG/ACT inhaler  Commonly known as: PROVENTIL HFA;VENTOLIN HFA;PROAIR HFA     furosemide 40 MG tablet  Commonly known as: LASIX     lisinopril 2.5 MG tablet  Commonly known as: PRINIVIL,ZESTRIL     memantine 10 MG tablet  Commonly known as: NAMENDA      potassium chloride 10 MEQ CR tablet              Allergies   Allergen Reactions    Nsaids GI Bleeding    Aricept [Donepezil] Diarrhea       Discharge Disposition:  Skilled Nursing Facility (DC - External)      Discharge Diet:  Diet Order   Procedures    Diet: Cardiac; Healthy Heart (2-3 Na+); Fluid Consistency: Thin (IDDSI 0)       Discharge Activity:   Activity Instructions       Activity as Tolerated              CODE STATUS:    Code Status and Medical Interventions: CPR (Attempt to Resuscitate); Full   Ordered at: 06/05/25 0157     Code Status (Patient has no pulse and is not breathing):    CPR (Attempt to Resuscitate)     Medical Interventions (Patient has pulse or is breathing):    Full       Future Appointments   Date Time Provider Department Center   7/16/2025  1:00 PM Sekou Archibald PA-C [facility]   9/18/2025  9:45 AM Jose Fonseca MD [facility]     Additional Instructions for the Follow-ups that You Need to Schedule       Discharge Follow-up with PCP   As directed       Currently Documented PCP:    Jose Fonseca MD    PCP Phone Number:    [phone]     Follow Up Details: 2-3 weeks        Discharge Follow-up with Specified Provider: Cardiology 1 month or sooner if directed; 1 Month   As directed      To: Cardiology 1 month or sooner if directed   Follow Up: 1 Month               Follow-up Information       Jose Fonseca MD .    Specialty: Family Medicine  Why: 2-3 weeks  Contact information:  [address] [facility] [address] [phone]                             Additional Instructions for the Follow-ups that You Need to Schedule       Discharge Follow-up with PCP   As directed       Currently Documented PCP:    Jose Fonseca MD    PCP Phone Number:    [phone]     Follow Up Details: 2-3 weeks        Discharge Follow-up with Specified Provider: Cardiology 1 month or sooner if directed; 1 Month   As directed      To: Cardiology 1 month or  sooner if directed   Follow Up: 1 Month            Time Spent on Discharge:  Greater than 30 minutes      Olaf Guerrero MD  [facility]  06/16/25  13:16 EDT

## 2025-06-16 NOTE — PROGRESS NOTES
Dedicated to Hospital Care    [phone]   LOS: 11 days     Name: Bryan Landers  Age/Sex: 80 y.o. male  :  1945        PCP: Jose Fonseca MD  Chief Complaint   Patient presents with    Syncope      Subjective   Less confused this morning.  Feels tired but denies new issues or complaints rested well overnight.  Appetite has been fair.  Currently taking his morning medications  General: No Fever or Chills, Cardiac: No Chest Pain or Palpitations, and GI: No Nausea, Vomiting, or Diarrhea    apixaban, 2.5 mg, Oral, Q12H  aspirin, 81 mg, Oral, Daily  atorvastatin, 80 mg, Oral, Daily  fludrocortisone, 100 mcg, Oral, Q12H  folic acid, 1 mg, Oral, Daily  levothyroxine, 50 mcg, Oral, Q AM  levothyroxine, 75 mcg, Oral, Q AM  Menthol-Zinc Oxide, 1 Application, Topical, Q12H  metoprolol succinate XL, 25 mg, Oral, BID  midodrine, 10 mg, Oral, TID AC  multivitamin with minerals, 1 tablet, Oral, Daily  ranolazine, 1,000 mg, Oral, Q12H  thiamine, 100 mg, Oral, Daily         Objective   Vital Signs  Temp:  [97.3 °F (36.3 °C)-98.2 °F (36.8 °C)] 97.7 °F (36.5 °C)  Heart Rate:  [66-82] 66  Resp:  [18] 18  BP: (118-129)/(73-88) 122/73  Body mass index is 33.78 kg/m².    Intake/Output Summary (Last 24 hours) at 2025 0825  Last data filed at 6/15/2025 1154  Gross per 24 hour   Intake --   Output 100 ml   Net -100 ml       Physical Exam  Awake alert sitting up in the bed oriented to self and place, no acute distress  Respirations are even and nonlabored clear to auscultation anteriorly  Regular rate and rhythm  Abdomen soft nontender nondistended positive bowel sounds  Trace edema in his lower extremities    Results Review:       I reviewed the patient's new clinical results.  Results from last 7 days   Lab Units 25  0642 06/15/25  0649 25  0641 25  0832 25  0644 25  0603 06/10/25  0542   WBC 10*3/mm3 3.95 4.45 4.29 5.46 4.79 4.47 3.69   HEMOGLOBIN g/dL 9.6* 9.9* 9.8* 10.6* 9.8* 10.6*  10.3*   PLATELETS 10*3/mm3 149 136* 137* 150 138* 133* 123*     Results from last 7 days   Lab Units 06/16/25  0642 06/15/25  0649 06/14/25  0930 06/13/25  0547 06/12/25  0644 06/11/25  0603 06/10/25  0542   SODIUM mmol/L 135* 135* 132* 134* 131* 134* 135*   POTASSIUM mmol/L 3.6 4.1 3.8 3.9 3.9 3.8 4.0   CHLORIDE mmol/L 102 101 98 99 98 99 103   CO2 mmol/L 23.1 24.0 24.8 23.0 25.5 25.0 23.8   BUN mg/dL 15.0 14.0 14.0 14.0 15.0 14.0 13.0   CREATININE mg/dL 1.23 1.23 1.30* 1.24 1.35* 1.51* 1.37*   CALCIUM mg/dL 8.8 8.8 9.2 8.6 8.6 8.7 8.8   MAGNESIUM mg/dL  --  1.8 1.9 1.8  --   --   --    PHOSPHORUS mg/dL 3.2 3.4 3.1 3.3  --   --   --    Estimated Creatinine Clearance: 57.7 mL/min (by C-G formula based on SCr of 1.23 mg/dL).      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       ASSESMENT  This is a an 80-year-old gentleman with history of chronic kidney disease, hypothyroidism, hypertension, hyperlipidemia, type 2 diabetes, chronic alcohol use and factor V Leiden who presents to the hospital with syncopal events secondary to significant orthostasis.  Also noted to have multifocal PVCs.  He has responded well to Florinef and midodrine here in the hospital.  Planning for Holter monitor at discharge to monitor PVCs in the outpatient setting.  Edited by: Olaf Guerrero MD at 6/15/2025 0744   PLAN  Continue  present management.  IV fluids discontinued  Continue midodrine and Florinef monitor blood pressures  Awaiting placement  Continue delirium precautions encourage out of bed activity and up in chair is much as possible and tolerated  Renal function electrolytes stable monitor sodium levels closely  2 week ZIO monitor at discharge with follow up in clinic in 4-6 weeks with Dr. Motta     VTE Prophylaxis:  Pharmacologic & mechanical VTE prophylaxis orders are present.      Code Status and Medical Interventions: CPR (Attempt to Resuscitate); Full   Ordered at: 06/05/25 0157     Code Status (Patient has no pulse and is not breathing):    CPR (Attempt to Resuscitate)     Medical Interventions (Patient has pulse or is breathing):    Full          Disposition  Expected Discharge Date: 6/16/2025; Expected Discharge Time:        Olaf Guerrero MD  [facility]  06/16/25  08:25 EDT

## 2025-06-16 NOTE — PROGRESS NOTES
"   LOS: 11 days     Chief Complaint/ Reason for encounter: JEFFREY on CKD    Subjective   06/11/25 : Patient is doing well today with no new complaints  Good appetite with no nausea or vomiting  No shortness of breath chest pain or edema  Voiding well with no dysuria    6/12: Feels well today denies any new complaints, waiting rehab bed  Worsening orthostatic blood pressures, midodrine increased, fludrocortisone added  Complains of intermittent dizziness with standing    6/13: He feels little better today denies new complaints  No more dizziness still complains of weakness  Good oral intake no nausea or vomiting  No shortness of breath chest pain or edema    6/16: Patient very anxious to be discharged today denies new complaints    Medical history reviewed:  History of Present Illness    Subjective    History taken from: Chart and patient/family as able    Vital Signs  Temp:  [97.3 °F (36.3 °C)-97.9 °F (36.6 °C)] 97.9 °F (36.6 °C)  Heart Rate:  [66-84] 84  Resp:  [18] 18  BP: (118-134)/(73-89) 134/89       Wt Readings from Last 1 Encounters:   06/16/25 0521 107 kg (235 lb 7.2 oz)   06/15/25 0657 110 kg (242 lb 11.6 oz)   06/15/25 0500 109 kg (240 lb 8.4 oz)   06/14/25 0531 112 kg (246 lb 7.6 oz)   06/13/25 0638 106 kg (234 lb 2.1 oz)   06/11/25 0526 106 kg (232 lb 12.9 oz)   06/10/25 0452 106 kg (233 lb 4 oz)   06/09/25 0300 109 kg (239 lb 3.2 oz)   06/08/25 0552 108 kg (238 lb 15.7 oz)   06/07/25 0534 111 kg (244 lb 11.4 oz)   06/06/25 0502 110 kg (242 lb 1 oz)   06/05/25 1102 110 kg (243 lb)   06/05/25 0217 110 kg (243 lb)   06/04/25 2159 86.2 kg (190 lb)       Objective:  Vital signs: (most recent): Blood pressure 134/89, pulse 84, temperature 97.9 °F (36.6 °C), temperature source Oral, resp. rate 18, height 177.8 cm (70\"), weight 107 kg (235 lb 7.2 oz), SpO2 97%.                Objective:  General Appearance:  Comfortable, well-appearing, no acute distress   HEENT: Mucous membranes moist, atraumatic  Lungs:  Normal " effort and normal respiratory rate.  Breath sounds clear to auscultation: No rhonchi/Rales.  No  respiratory distress.   Heart:  S1, S2 normal.   Abdomen: Abdomen is soft, nontender/nondistended  Extremities: No edema of bilateral lower extremities  Skin:  Warm and dry       Results Review:    Intake/Output:   No intake or output data in the 24 hours ending 06/16/25 0932        DATA:  Radiology and Labs:  The following labs independently reviewed by me. Additional labs ordered for tomorrow a.m.  Interval notes, chart personally reviewed by me.   Old records independently reviewed showing CKD 3 followed by me in the office, baseline creatinine around 1.5  Discussed with patient himself at bedside    Risk/ complexity of medical care/ medical decision making moderate complexity, JEFFREY management    Labs:   Recent Results (from the past 24 hours)   Renal Function Panel    Collection Time: 06/16/25  6:42 AM    Specimen: Blood   Result Value Ref Range    Glucose 99 65 - 99 mg/dL    BUN 15.0 8.0 - 23.0 mg/dL    Creatinine 1.23 0.76 - 1.27 mg/dL    Sodium 135 (L) 136 - 145 mmol/L    Potassium 3.6 3.5 - 5.2 mmol/L    Chloride 102 98 - 107 mmol/L    CO2 23.1 22.0 - 29.0 mmol/L    Calcium 8.8 8.6 - 10.5 mg/dL    Albumin 2.8 (L) 3.5 - 5.2 g/dL    Phosphorus 3.2 2.5 - 4.5 mg/dL    Anion Gap 9.9 5.0 - 15.0 mmol/L    BUN/Creatinine Ratio 12.2 7.0 - 25.0    eGFR 59.0 (L) >60.0 mL/min/1.73   CBC (No Diff)    Collection Time: 06/16/25  6:42 AM    Specimen: Blood   Result Value Ref Range    WBC 3.95 3.40 - 10.80 10*3/mm3    RBC 2.64 (L) 4.14 - 5.80 10*6/mm3    Hemoglobin 9.6 (L) 13.0 - 17.7 g/dL    Hematocrit 26.8 (L) 37.5 - 51.0 %    .5 (H) 79.0 - 97.0 fL    MCH 36.4 (H) 26.6 - 33.0 pg    MCHC 35.8 (H) 31.5 - 35.7 g/dL    RDW 14.4 12.3 - 15.4 %    RDW-SD 52.5 37.0 - 54.0 fl    MPV 8.7 6.0 - 12.0 fL    Platelets 149 140 - 450 10*3/mm3       Radiology:  Pertinent radiology studies were reviewed as described above      Medications  have been reviewed separately in chart review medication tab      ASSESSMENT:  JEFFREY, improved and near baseline  CKD stage IIIb at baseline today  Syncope   Non sustain VT with normal coronaries on cath  Orthostatic hypotension on midodrine fludrocortisone combo  Alcohol use disorder   Hx of dvt/pe in 2020 on AC   Hyponatremia, improved with fluids, 134 suggesting prerenal       PLAN:   Creatinine remains below his usual baseline of 1.5 today  Weight stable, euvolemic on exam  No need for further IV fluids at this time  Sodium stable at 135  Continue midodrine for BP support but may be able start weaning that down somewhat    Metoprolol dosing per cardiology  Monitor electrolytes and volume closely   Stable for discharge at any time from a renal standpoint    Alli Fabian MD  [facility]   Office phone number: [phone]  Answering service phone number: [phone]    06/16/25  15:12 EDT    Dictation performed using Dragon dictation software

## 2025-06-16 NOTE — PROGRESS NOTES
Nutrition Services    Patient Name: Bryan Landers  YOB: 1945  MRN: [id number]  Admission date: 6/4/2025    PROGRESS NOTE      Encounter Information: Follow up protocol. Checking on po intakes.       PO Diet: Diet: Cardiac; Healthy Heart (2-3 Na+); Fluid Consistency: Thin (IDDSI 0)   PO Supplements: Boost Plus, Yuval   PO Intake:  Limited po intakes recorded - pt reporting fair appetite       Current nutrition support: -   Nutrition support review: -       Weight: Weight: 107 kg (235 lb 7.2 oz) (06/16/25 0521)       Medications: reviewed   Labs: reviewed        GI Function:  fecal incontinence, normoactive, last bowel movement: 6/13       Nutrition Intervention Updates: Boost Original BID to support po intake.  Yuval BID to support wound healing.  Encourage good po intakes.  PRN bowel regimen available.  Will continue to monitor.     RD to follow up per protocol.     Results from last 7 days   Lab Units 06/16/25  0642 06/15/25  0649 06/14/25  0930 06/13/25  0547 06/12/25  0644 06/11/25  0603 06/10/25  0542   SODIUM mmol/L 135* 135* 132*   < > 131* 134* 135*   POTASSIUM mmol/L 3.6 4.1 3.8   < > 3.9 3.8 4.0   CHLORIDE mmol/L 102 101 98   < > 98 99 103   CO2 mmol/L 23.1 24.0 24.8   < > 25.5 25.0 23.8   BUN mg/dL 15.0 14.0 14.0   < > 15.0 14.0 13.0   CREATININE mg/dL 1.23 1.23 1.30*   < > 1.35* 1.51* 1.37*   CALCIUM mg/dL 8.8 8.8 9.2   < > 8.6 8.7 8.8   BILIRUBIN mg/dL  --   --   --   --  0.4 0.4 0.4   ALK PHOS U/L  --   --   --   --  81 86 84   ALT (SGPT) U/L  --   --   --   --  20 18 18   AST (SGOT) U/L  --   --   --   --  34 36 37   GLUCOSE mg/dL 99 93 134*   < > 99 102* 106*    < > = values in this interval not displayed.     Results from last 7 days   Lab Units 06/16/25  0642 06/15/25  0649 06/14/25  0930 06/13/25  0832 06/13/25  0547   MAGNESIUM mg/dL  --  1.8 1.9  --  1.8   PHOSPHORUS mg/dL 3.2 3.4 3.1  --  3.3   HEMOGLOBIN g/dL 9.6* 9.9*  --    < >  --    HEMATOCRIT % 26.8* 28.1*  --    < >  --      < > = values in this interval not displayed.     COVID19   Date Value Ref Range Status   09/20/2021 Not Detected Not Detected - Ref. Range Final     Lab Results   Component Value Date    HGBA1C 5.80 (H) 11/22/2023       RD to follow up per protocol.    Electronically signed by:  Danielle Longo RD  06/16/25 09:21 EDT

## 2025-06-16 NOTE — PLAN OF CARE
Goal Outcome Evaluation:      Pt resting in bed. Cont to be confused. No c/o voiced. VSS No s/s of distress Plan of care ongoing

## 2025-06-16 NOTE — PAYOR COMM NOTE
"JorgeBryan (80 y.o. Male)        PLEASE SEE ATTACHED.     PT REMAINS INPT @ [facility].    REF#[id number]    PLEASE CALL [phone] OR  [phone]    THANK YOU    RASHI FELICIANO LPN CCP   Date of Birth   1945    Social Security Number       Address   [address] [address]    Home Phone   [phone]    MRN   [id number]       Church   None    Marital Status                               Admission Date   6/4/2025    Admission Type   Emergency    Admitting Provider   Feliberto Rowe MD    Attending Provider   Olaf Guerrero MD    Department, Room/Bed   [address], E651/1       Discharge Date       Discharge Disposition       Discharge Destination                                 Attending Provider: Olaf Guerrero MD    Allergies: Nsaids, Aricept [Donepezil]    Isolation: None   Infection: None   Code Status: CPR    Ht: 177.8 cm (70\")   Wt: 107 kg (235 lb 7.2 oz)    Admission Cmt: None   Principal Problem: Syncope [R55]                   Active Insurance as of 6/4/2025       Primary Coverage       Payor Plan Insurance Group Employer/Plan Group    AETNA MEDICARE REPLACEMENT AETNA MEDICARE ADVANTAGE PPO [id number]       Payor Plan Address Payor Plan Phone Number Payor Plan Fax Number Effective Dates    [address] [phone]  1/1/2024 - None Entered    [facility] [id number]         Subscriber Name Subscriber Birth Date Member ID       BRYAN PATEL 1945 [id number]                     Emergency Contacts        (Rel.) Home Phone Work Phone Mobile Phone    Chloe Patel (Spouse) [phone] -- [phone]    Isi Ellison (Daughter) [phone] -- [phone]    JORGESTAN (Son) [phone] -- [phone]              [employer]flower: NPI [id number]  Tax ID [id number]  Operative/Procedure Notes (last 24 hours)  Notes from 06/15/25 0727 through 06/16/25 " 0727   No notes of this type exist for this encounter.          Physician Progress Notes (last 24 hours)        Jose Clancy MD at 06/15/25 1650           LOS: 10 days   Patient Care Team:  Jose Fonseca MD as PCP - General (Family Medicine)  Jose Michelle MD as Consulting Physician (Radiation Oncology)  Roldan Mccarthy MD as Consulting Physician (Urology)  Clarence Nieves MD (Hematology)  Grover Harris DPM as Consulting Physician (Podiatry)  Skip Wall MD PhD as Consulting Physician (Thoracic Surgery)    Chief Complaint: PVCs, NSVT, AV block, syncope, orthostatic hypotension.    Interval History: He is better when standing.  Blood pressure did not drop as significantly as yesterday.  No further Mobitz type I AV block or brief 2:1 AV block (noted the day prior).  He just feels weak this morning.  No chest pain or shortness of breath.      Vital Signs:  Temp:  [97.5 °F (36.4 °C)-98.2 °F (36.8 °C)] 98.2 °F (36.8 °C)  Heart Rate:  [75-80] 80  Resp:  [16-18] 18  BP: (112-129)/(74-88) 129/88    Intake/Output Summary (Last 24 hours) at 6/15/2025 1651  Last data filed at 6/15/2025 1154  Gross per 24 hour   Intake 1327 ml   Output 325 ml   Net 1002 ml       Physical Exam:   General Appearance:    No acute distress, awake, chronically ill-appearing.   Lungs:     Clear to auscultation bilaterally     Heart:    Regular rhythm with frequent ectopy and normal rate. II/VI SM RUSB.   Abdomen:     Soft, nontender, nondistended.    Extremities:   No clubbing, cyanosis, or edema.     Results Review:    Results from last 7 days   Lab Units 06/15/25  0649   SODIUM mmol/L 135*   POTASSIUM mmol/L 4.1   CHLORIDE mmol/L 101   CO2 mmol/L 24.0   BUN mg/dL 14.0   CREATININE mg/dL 1.23   GLUCOSE mg/dL 93   CALCIUM mg/dL 8.8     Results from last 7 days   Lab Units 06/14/25  1047 06/14/25  0930   HSTROP T ng/L 43* 45*     Results from last 7 days   Lab Units 06/15/25  0649   WBC 10*3/mm3 4.45    HEMOGLOBIN g/dL 9.9*   HEMATOCRIT % 28.1*   PLATELETS 10*3/mm3 136*             Results from last 7 days   Lab Units 06/15/25  0649   MAGNESIUM mg/dL 1.8           I reviewed the patient's new clinical results.        Assessment:  1.  Syncope, most likely secondary to #2  2.  Orthostatic hypotension  3.  Frequent PVCs and NSVT  4.  Mobitz type Isecond-degree AV block and brief 2:1 AV block  5.  Prolonged QT interval with amiodarone  6.  Chronic HFmrEF, secondary to #7  7.  Mild nonischemic cardiomyopathy, EF 46% by echo on 6/5/2025  8.  Angiographically normal coronary arteries by cath on 6/6/2025  9.  Alcohol use disorder  10.  Hyponatremia  11.  Multifactorial anemia  12.  Thrombocytopenia  13.  Acute kidney injury with stage IIIb chronic kidney disease  14.  History of DVT and pulmonary embolism in 2020, on chronic anticoagulation  15.  Hypothyroidism    Plan:  -Again, he developed Mobitz type I secondary AV block and brief 2:1 AV block 2 days ago.  He was asymptomatic at the time.  I still think the most likely cause for his syncope is the orthostatic hypotension.    -I reduced the metoprolol succinate dose to 25 mg twice a day because of the AV block and continuing orthostasis.  His systolic blood pressures dropped into the 70s with standing yesterday morning, although are better today.  No amiodarone secondary to prolonged QT interval in the past with this agent.    -TSH mildly elevated with a normal T4.    -For now, continue fludrocortisone 100 mcg every 12 hours and midodrine 10 mg 3 times daily.  IV fluids were stopped this morning per nephrology.    -Continue Eliquis given history of pulmonary embolism and DVT in 2020.    -No evidence of coronary artery disease on recent cath.    -Ranexa being utilized as adjunct for arrhythmia.    Jose Clancy MD  06/15/25  16:51 EDT         Electronically signed by Jose Clancy MD at 06/15/25 4985       Praneeth Copeland MD at 06/15/25 8463         "     LOS: 10 days     Chief Complaint/ Reason for encounter: JEFFREY on CKD    Subjective   06/11/25 : Patient is doing well today with no new complaints  Good appetite with no nausea or vomiting  No shortness of breath chest pain or edema  Voiding well with no dysuria    6/12: Feels well today denies any new complaints, waiting rehab bed  Worsening orthostatic blood pressures, midodrine increased, fludrocortisone added  Complains of intermittent dizziness with standing    6/13: He feels little better today denies new complaints  No more dizziness still complains of weakness  Good oral intake no nausea or vomiting  No shortness of breath chest pain or edema    6/14  No new complaint today.  No shortness of breath.    6/15  Patient says she feels little less dizzy when he tried to stand up, somewhat better as compared to before.    Medical history reviewed:  History of Present Illness    Subjective    History taken from: Chart and patient/family as able    Vital Signs  Temp:  [97.5 °F (36.4 °C)-98.2 °F (36.8 °C)] 98.2 °F (36.8 °C)  Heart Rate:  [75-79] 75  Resp:  [16-18] 18  BP: (112-127)/(65-80) 117/78       Wt Readings from Last 1 Encounters:   06/15/25 0657 110 kg (242 lb 11.6 oz)   06/15/25 0500 109 kg (240 lb 8.4 oz)   06/14/25 0531 112 kg (246 lb 7.6 oz)   06/13/25 0638 106 kg (234 lb 2.1 oz)   06/11/25 0526 106 kg (232 lb 12.9 oz)   06/10/25 0452 106 kg (233 lb 4 oz)   06/09/25 0300 109 kg (239 lb 3.2 oz)   06/08/25 0552 108 kg (238 lb 15.7 oz)   06/07/25 0534 111 kg (244 lb 11.4 oz)   06/06/25 0502 110 kg (242 lb 1 oz)   06/05/25 1102 110 kg (243 lb)   06/05/25 0217 110 kg (243 lb)   06/04/25 2159 86.2 kg (190 lb)       Objective:  Vital signs: (most recent): Blood pressure 117/78, pulse 75, temperature 98.2 °F (36.8 °C), temperature source Oral, resp. rate 18, height 177.8 cm (70\"), weight 110 kg (242 lb 11.6 oz), SpO2 98%.                Objective:  General Appearance:  Comfortable, well-appearing, no acute " distress   HEENT: Mucous membranes moist, atraumatic  Lungs:  Normal effort and normal respiratory rate.  Breath sounds clear to auscultation: No rhonchi/Rales.  No  respiratory distress.   Heart:  S1, S2 normal.   Abdomen: Abdomen is soft, nontender/nondistended  Extremities: No edema of bilateral lower extremities  Skin:  Warm and dry       Results Review:    Intake/Output:     Intake/Output Summary (Last 24 hours) at 6/15/2025 1036  Last data filed at 6/15/2025 0657  Gross per 24 hour   Intake 1327 ml   Output 225 ml   Net 1102 ml         DATA:  Radiology and Labs:  The following labs independently reviewed by me. Additional labs ordered for tomorrow a.m.  Interval notes, chart personally reviewed by me.   Old records independently reviewed showing CKD 3 followed by me in the office, baseline creatinine around 1.5  Discussed with patient himself at bedside    Risk/ complexity of medical care/ medical decision making moderate complexity, JEFFREY management    Labs:   Recent Results (from the past 24 hours)   High Sensitivity Troponin T 1Hr    Collection Time: 06/14/25 10:47 AM    Specimen: Blood   Result Value Ref Range    HS Troponin T 43 (H) <22 ng/L    Troponin T Numeric Delta -2 ng/L    Troponin T % Delta -4 Abnormal if >/= 20%   ECG 12 Lead Rhythm Change    Collection Time: 06/15/25  3:27 AM   Result Value Ref Range    QT Interval 494 ms    QTC Interval 540 ms   Renal Function Panel    Collection Time: 06/15/25  6:49 AM    Specimen: Blood   Result Value Ref Range    Glucose 93 65 - 99 mg/dL    BUN 14.0 8.0 - 23.0 mg/dL    Creatinine 1.23 0.76 - 1.27 mg/dL    Sodium 135 (L) 136 - 145 mmol/L    Potassium 4.1 3.5 - 5.2 mmol/L    Chloride 101 98 - 107 mmol/L    CO2 24.0 22.0 - 29.0 mmol/L    Calcium 8.8 8.6 - 10.5 mg/dL    Albumin 3.2 (L) 3.5 - 5.2 g/dL    Phosphorus 3.4 2.5 - 4.5 mg/dL    Anion Gap 10.0 5.0 - 15.0 mmol/L    BUN/Creatinine Ratio 11.4 7.0 - 25.0    eGFR 59.0 (L) >60.0 mL/min/1.73   CBC (No Diff)     Collection Time: 06/15/25  6:49 AM    Specimen: Blood   Result Value Ref Range    WBC 4.45 3.40 - 10.80 10*3/mm3    RBC 2.74 (L) 4.14 - 5.80 10*6/mm3    Hemoglobin 9.9 (L) 13.0 - 17.7 g/dL    Hematocrit 28.1 (L) 37.5 - 51.0 %    .6 (H) 79.0 - 97.0 fL    MCH 36.1 (H) 26.6 - 33.0 pg    MCHC 35.2 31.5 - 35.7 g/dL    RDW 14.6 12.3 - 15.4 %    RDW-SD 54.3 (H) 37.0 - 54.0 fl    MPV 8.4 6.0 - 12.0 fL    Platelets 136 (L) 140 - 450 10*3/mm3   Cortisol    Collection Time: 06/15/25  6:49 AM    Specimen: Blood   Result Value Ref Range    Cortisol 14.30   mcg/dL   TSH Rfx On Abnormal To Free T4    Collection Time: 06/15/25  6:49 AM    Specimen: Blood   Result Value Ref Range    TSH 7.180 (H) 0.270 - 4.200 uIU/mL   Magnesium    Collection Time: 06/15/25  6:49 AM    Specimen: Blood   Result Value Ref Range    Magnesium 1.8 1.6 - 2.4 mg/dL   T4, Free    Collection Time: 06/15/25  6:49 AM    Specimen: Blood   Result Value Ref Range    Free T4 1.40 0.92 - 1.68 ng/dL       Radiology:  Pertinent radiology studies were reviewed as described above      Medications have been reviewed separately in chart review medication tab      ASSESSMENT:  JEFFREY, improved and near baseline  CKD stage IIIb at baseline today  Syncope   Non sustain VT with normal coronaries on cath  Mild hypotension on midodrine/metoprolol for rate control  Alcohol use disorder   Hx of dvt/pe in 2020 on AC   Hyponatremia, improved with fluids, 134 suggesting prerenal       PLAN:   Patient's renal profile stable with BUN and creatinine 14 and 1.23  Sodium improved to 135.  Vitals revealed orthostatic drop in BP from 97-79 systolic.   Continue midodrine and continue fludrocortisone 100 mcg 2 times a day.  Cortisol level within normal limit, TSH noted elevated, will start the patient on Synthroid 50 mcg daily, patient will need repeat TSH in 4 weeks with PCP.  stocking lower legs.  Will DC IV fluids as patient did not have good oral intake.        Metoprolol dosing per  cardiology  Monitor electrolytes and volume closely   Dose all medications for GFR around 45-50  Discharge planning      Praneeth Copeland MD  [facility]   Office phone number: [phone]  Answering service phone number: [phone]    06/15/25  10:36 EDT    Dictation performed using Dragon dictation software      Electronically signed by Praneeth Copeland MD at 06/15/25 1975       Olaf Guerrero MD at 06/15/25 4021            [facility]    [phone]   LOS: 10 days     Name: Bryan Landers  Age/Sex: 80 y.o. male  :  1945        PCP: Jose Fonseca MD  Chief Complaint   Patient presents with    Syncope      Subjective   Confused this morning.  He does not realize he has been in the hospital for the last few days.  He is asking if his wife brought him in last night.  He denies new issues or complaints no dizziness chest pain palpitations or shortness of breath but he has not been out of bed yet today.  He denies eating breakfast despite an empty tray and feels it is currently almost dinnertime.  Remains on IV fluids  General: No Fever or Chills, Cardiac: No Chest Pain or Palpitations, and GI: No Nausea, Vomiting, or Diarrhea    apixaban, 2.5 mg, Oral, Q12H  aspirin, 81 mg, Oral, Daily  atorvastatin, 80 mg, Oral, Daily  fludrocortisone, 100 mcg, Oral, Q12H  folic acid, 1 mg, Oral, Daily  levothyroxine, 75 mcg, Oral, Q AM  Menthol-Zinc Oxide, 1 Application, Topical, Q12H  metoprolol succinate XL, 25 mg, Oral, BID  midodrine, 10 mg, Oral, TID AC  multivitamin with minerals, 1 tablet, Oral, Daily  ranolazine, 1,000 mg, Oral, Q12H  thiamine, 100 mg, Oral, Daily    sodium chloride, 50 mL/hr, Last Rate: 50 mL/hr (25 1418)    Objective   Vital Signs  Temp:  [97.5 °F (36.4 °C)-98.2 °F (36.8 °C)] 98.2 °F (36.8 °C)  Heart Rate:  [75-79] 75  Resp:  [16-18] 18  BP: (112-127)/(65-80) 117/78  Body mass index is 34.83 kg/m².    Intake/Output Summary (Last 24 hours) at  6/15/2025 0917  Last data filed at 6/15/2025 0657  Gross per 24 hour   Intake 1327 ml   Output 225 ml   Net 1102 ml       Physical Exam  He is awake alert sitting up in the bed in no distress.  Oriented only to self he does not realize he is in the hospital or why he is here.  Respirations are even and nonlabored clear to auscultation anteriorly  Abdomen soft nontender nondistended  Trace lower extremity edema      Results Review:       I reviewed the patient's new clinical results.  Results from last 7 days   Lab Units 06/15/25  0649 06/14/25  0641 06/13/25  0832 06/12/25  0644 06/11/25  0603 06/10/25  0542 06/09/25  0632   WBC 10*3/mm3 4.45 4.29 5.46 4.79 4.47 3.69 3.91   HEMOGLOBIN g/dL 9.9* 9.8* 10.6* 9.8* 10.6* 10.3* 10.3*   PLATELETS 10*3/mm3 136* 137* 150 138* 133* 123* 122*     Results from last 7 days   Lab Units 06/15/25  0649 06/14/25  0930 06/13/25  0547 06/12/25  0644 06/11/25  0603 06/10/25  0542 06/09/25  0632   SODIUM mmol/L 135* 132* 134* 131* 134* 135* 136   POTASSIUM mmol/L 4.1 3.8 3.9 3.9 3.8 4.0 3.9   CHLORIDE mmol/L 101 98 99 98 99 103 102   CO2 mmol/L 24.0 24.8 23.0 25.5 25.0 23.8 25.1   BUN mg/dL 14.0 14.0 14.0 15.0 14.0 13.0 13.0   CREATININE mg/dL 1.23 1.30* 1.24 1.35* 1.51* 1.37* 1.44*   CALCIUM mg/dL 8.8 9.2 8.6 8.6 8.7 8.8 8.7   MAGNESIUM mg/dL 1.8 1.9 1.8  --   --   --   --    PHOSPHORUS mg/dL 3.4 3.1 3.3  --   --   --   --    Estimated Creatinine Clearance: 58.5 mL/min (by C-G formula based on SCr of 1.23 mg/dL).      Assessment & Plan   Active Hospital Problems    Diagnosis  POA    **Syncope [R55]  Yes    Obesity (BMI 30-39.9) [E66.9]  Yes    Alcohol use disorder [F10.90]  Yes    Anemia of chronic kidney failure, stage 3 (moderate) [N18.30, D63.1]  Yes    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 [D68.51]  Yes    Acquired hypothyroidism [E03.9]  Yes    Cognitive decline [R41.89]  Yes    Stage 3b chronic kidney disease [N18.32]  Yes    Type 2 diabetes mellitus with chronic kidney disease, without  long-term current use of insulin [E11.22]  Yes    Essential hypertension [I10]  Yes    Mixed hyperlipidemia [E78.2]  Yes      Resolved Hospital Problems   No resolved problems to display.       ASSESMENT  This is a an 80-year-old gentleman with history of chronic kidney disease, hypothyroidism, hypertension, hyperlipidemia, type 2 diabetes, chronic alcohol use and factor V Leiden who presents to the hospital with syncopal events secondary to significant orthostasis.  Also noted to have multifocal PVCs.  He has responded well to Florinef and midodrine here in the hospital.  Planning for Holter monitor at discharge to monitor PVCs in the outpatient setting.  Edited by: Olaf Guerrero MD at 6/15/2025 0793   PLAN  Continue present management.  Would like to try to discontinue the IV fluids especially if her plan is to go to rehab he will not be able to utilize those there.  Continue midodrine and Florinef monitor blood pressures  Awaiting placement  Continue delirium precautions encourage out of bed activity and up in chair is much as possible and tolerated  Renal function electrolytes stable monitor sodium levels closely    VTE Prophylaxis:  Pharmacologic & mechanical VTE prophylaxis orders are present.      Code Status and Medical Interventions: CPR (Attempt to Resuscitate); Full   Ordered at: 06/05/25 0157     Code Status (Patient has no pulse and is not breathing):    CPR (Attempt to Resuscitate)     Medical Interventions (Patient has pulse or is breathing):    Full          Disposition  Expected Discharge Date: 6/14/2025; Expected Discharge Time:  3:00 PM       Olaf Guerrero MD  [facility]  06/15/25  09:17 EDT            Electronically signed by Olaf Guerrero MD at 06/15/25 3537       Consult Notes (last 24 hours)  Notes from 06/15/25 0728 through 06/16/25 0728   No notes of this type exist for this encounter.

## 2025-06-16 NOTE — PROGRESS NOTES
Electrophysiology Follow-Up Note      Patient Name: Bryan Landers  Age/Sex: 80 y.o. male  : 1945  MRN: [id number]      Day of Service: 25       Follow-up: PVCs, Mobitz type 1, AVB      He had a period of Mobitz type 1 over the weekend with a brief period of 2:1 AVB. He was not aware of this or symptomatic with this episode. His only complaint is dizziness with position changes, he attributes that to his low blood pressures. No chest pain or palpitations.         Temp:  [97.3 °F (36.3 °C)-98.2 °F (36.8 °C)] 97.7 °F (36.5 °C)  Heart Rate:  [66-82] 66  Resp:  [18] 18  BP: (118-129)/(73-88) 122/73     PHYSICAL EXAM:  Constitutional:       Appearance: Healthy appearance. Not in distress.   Pulmonary:      Effort: Pulmonary effort is normal.   Cardiovascular:      Normal rate. Regular rhythm.   Edema:     Peripheral edema absent.   Skin:     General: Skin is warm and dry.   Neurological:      General: No focal deficit present.      Mental Status: Alert and oriented to person, place and time.            ECG/TELE:       Results from last 7 days   Lab Units 25  0642 06/15/25  0649 25  0930   SODIUM mmol/L 135* 135* 132*   POTASSIUM mmol/L 3.6 4.1 3.8   CHLORIDE mmol/L 102 101 98   CO2 mmol/L 23.1 24.0 24.8   BUN mg/dL 15.0 14.0 14.0   CREATININE mg/dL 1.23 1.23 1.30*   GLUCOSE mg/dL 99 93 134*   CALCIUM mg/dL 8.8 8.8 9.2     Results from last 7 days   Lab Units 25  0642 06/15/25  0649 25  0641   WBC 10*3/mm3 3.95 4.45 4.29   HEMOGLOBIN g/dL 9.6* 9.9* 9.8*   HEMATOCRIT % 26.8* 28.1* 27.4*   PLATELETS 10*3/mm3 149 136* 137*         Results from last 7 days   Lab Units 25  1047 25  0930   HSTROP T ng/L 43* 45*     Results from last 7 days   Lab Units 06/15/25  0649   TSH uIU/mL 7.180*           Current Medications:   Scheduled Meds:apixaban, 2.5 mg, Oral, Q12H  aspirin, 81 mg, Oral, Daily  atorvastatin, 80 mg, Oral, Daily  fludrocortisone, 100 mcg, Oral, Q12H  folic  acid, 1 mg, Oral, Daily  levothyroxine, 50 mcg, Oral, Q AM  levothyroxine, 75 mcg, Oral, Q AM  Menthol-Zinc Oxide, 1 Application, Topical, Q12H  metoprolol succinate XL, 25 mg, Oral, BID  midodrine, 10 mg, Oral, TID AC  multivitamin with minerals, 1 tablet, Oral, Daily  ranolazine, 1,000 mg, Oral, Q12H  thiamine, 100 mg, Oral, Daily            Syncope    Essential hypertension    Mixed hyperlipidemia    Type 2 diabetes mellitus with chronic kidney disease, without long-term current use of insulin    Stage 3b chronic kidney disease    Cognitive decline    Acquired hypothyroidism    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    Anemia of chronic kidney failure, stage 3 (moderate)    Alcohol use disorder    Obesity (BMI 30-39.9)       Plan:     We saw him on 6/9/25 for PVCs/NSVT/Syncope. He was admitted following a syncopal episode. It was felt that his syncope was more related to his hypotension at that time. Per EMS he did have runs of NSVT en route to the ED. He was having frequent PVCs on telemetry. He was not symptomatic with his PVCs. He was started on amiodarone but it was felt to have prolonged his Qtc so he started on metoprolol. We recommended continuing metoprolol as tolerated at that time as it had done well reducing his PVC burden on telemetry. Recommended 2 week ZIO monitor at discharge with follow up in clinic in 4-6 weeks with Dr. Motta.         He had a period of Mobitz type 1 over the weekend with a brief period of 2:1 AVB. He was not aware of this or symptomatic with this episode. Metoprolol was decreased from 50mg BID to 25mg BID.        #PVCs/Mobitz type 1/AVB  -- Plan to continue metoprolol as tolerated   -- Plan for ZIO placement at discharge  -- Will follow up with Dr. Motta in clinic in 4-6 weeks to discuss monitor results   -- Okay for discharge from EP standpoint          NIRAJ Farooq  06/16/25  09:10 EDT

## 2025-06-16 NOTE — CASE MANAGEMENT/SOCIAL WORK
Case Management Discharge Note      Final Note: Pt discharged to [facility] for skilled care.  YURY Frost RN         Selected Continued Care - Admitted Since 6/4/2025       Destination Coordination complete.      Service Provider Services Address Phone Fax Patient Preferred    [facility] [address], [place] [address] [phone] --              Durable Medical Equipment    No services have been selected for the patient.                Dialysis/Infusion    No services have been selected for the patient.                Home Medical Care    No services have been selected for the patient.                Therapy    No services have been selected for the patient.                Community Resources    No services have been selected for the patient.                Community & DME    No services have been selected for the patient.                    Transportation Services  Private: Car    Final Discharge Disposition Code: 03 - skilled nursing facility (SNF)

## 2025-06-16 NOTE — PAYOR COMM NOTE
"Bryan Patel (80 y.o. Male)        PLEASE SEE ATTACHED FOR  DC SUMMARY    REF#[id number]    THANK YOU    RASHI FELICIANO ELIANE [facility]   Date of Birth   1945    Social Security Number       Address   [address] [address]    Home Phone   [phone]    MRN   [id number]       Evangelical   None    Marital Status                               Admission Date   2025    Admission Type   Emergency    Admitting Provider   Feliberto Rowe MD    Attending Provider       Department, Room/Bed   [facility], E651/1       Discharge Date   2025    Discharge Disposition   Skilled Nursing Facility (DC - External)    Discharge Destination                                 Attending Provider: (none)   Allergies: Nsaids, Aricept [Donepezil]    Isolation: None   Infection: None   Code Status: CPR    Ht: 177.8 cm (70\")   Wt: 107 kg (235 lb 7.2 oz)    Admission Cmt: None   Principal Problem: Syncope [R55]                   Active Insurance as of 2025       Primary Coverage       Payor Plan Insurance Group Employer/Plan Group    AETNA MEDICARE REPLACEMENT AETNA MEDICARE ADVANTAGE PPO [id number]       Payor Plan Address Payor Plan Phone Number Payor Plan Fax Number Effective Dates    [address] [phone]  2024 - None Entered    [facility] [id number]         Subscriber Name Subscriber Birth Date Member ID       BRYAN PATEL 1945 [id number]                     Emergency Contacts        (Rel.) Home Phone Work Phone Mobile Phone    Chloe Patel (Spouse) [phone] -- [phone]    TerraIsi (Daughter) [phone] -- [phone]    JORGESTAN (Son) [phone] -- [phone]              [place]: [facility] [id number]  Tax ID [id number]     Discharge Summary        Olaf Guerrero MD at 25 1316              Patient Name: Bryan Patel  : 1945  MRN: [id number]    Date of " Admission: 6/4/2025  Date of Discharge:  6/16/2025  Primary Care Physician: Jose Fonseca MD      Chief Complaint:   Syncope      Discharge Diagnoses     Active Hospital Problems    Diagnosis  POA    **Syncope [R55]  Yes    Obesity (BMI 30-39.9) [E66.9]  Yes    Alcohol use disorder [F10.90]  Yes    Anemia of chronic kidney failure, stage 3 (moderate) [N18.30, D63.1]  Yes    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        Hospital Course     Mr. Landers is an 80-year-old gentleman with history of chronic kidney disease, hypothyroidism, hypertension, hyperlipidemia, type 2 diabetes, chronic alcohol use and factor V Leiden who presents to the hospital with syncopal events secondary to significant orthostasis.  Also noted to have multifocal PVCs.  He has responded well to Florinef and midodrine here in the hospital.  Planning for Holter monitor at discharge to monitor PVCs in the outpatient setting.  At this point he stabilized to discharge to skilled nursing facility today.  He will need to follow-up with cardiology in the outpatient setting and 3 to 4 weeks and follow-up with his primary care physician in 1 to 2 weeks follow-up with nephrology as directed.  His Synthroid was increased during this hospitalization with slow increasing TSH in the outpatient setting.  Blood pressure medications of also been adjusted and decreased and changed due to the orthostasis.      Day of Discharge   See progress note  Consultants     Consult Orders (all) (From admission, onward)       Start     Ordered    06/13/25 6851  Inpatient Cardiology Consult  Once         Specialty:  Cardiology  Provider:  Rayshawn Navarro MD    06/13/25 2301    06/09/25 1103  Cardiac Electrophysiologist Inpatient Consult  Once        Specialty:  Cardiac Electrophysiology  Provider:  Ajay Mathews MD    06/09/25 1103    06/06/25 1903  Cardiac Rehab Evaluation and Enrollment  Once        Provider:  (Not yet assigned)    06/06/25 1902    06/05/25 1455  Inpatient Nephrology Consult  Once        Specialty:  Nephrology  Provider:  Alli Fabian MD    06/05/25 1456    06/05/25 1327  Inpatient Nephrology Consult  Once,   Status:  Canceled        Specialty:  Nephrology  Provider:  Evelin Michael MD    06/05/25 1326    06/05/25 0158  Inpatient consult to [facility]  Once        Provider:  (Not yet assigned)    06/05/25 0157    06/05/25 0156  Inpatient Cardiology Consult  Once        Specialty:  Cardiology  Provider:  Ajay Avila Jr., MD    06/05/25 0157    06/04/25 2356  [facility] (on-call MD unless specified) Details  Once        Specialty:  Hospitalist  Provider:  (Not yet assigned)    06/04/25 2355                  Procedures     Left Heart Cath    Imaging Results (All)       Procedure Component Value Units Date/Time    XR Abdomen KUB [[id number]] Collected: 06/12/25 0859     Updated: 06/12/25 0903    Narrative:      KUB     HISTORY: Constipation and dry heaving     COMPARISON: None available     FINDINGS: The bowel gas pattern is nonobstructive. Stool burden not  increased. Bilateral hip arthroplasties.       Impression:      As above           This report was finalized on 6/12/2025 9:00 AM by Dr. Alli Mccarthy M.D on Workstation: [id number]       XR Chest 1 View [[id number]] Collected: 06/04/25 2337     Updated: 06/04/25 2341    Narrative:      CXR ONE VIEW      HISTORY: syncope     COMPARISON: 3/28/2024     TECHNIQUE: single portable AP       Impression:         Allowing for submaximal inspiratory result, heart size appears within  normal limits.     There is a submaximal  inspiratory result. Allowing for that, there is no  convincing evidence of infiltrate, effusion or pneumothorax.           This report was finalized on 6/4/2025 11:38 PM by Dr. Caden Ballard M.D on Workstation: [id number]       CT Head Without Contrast [[id number]] Collected: 06/04/25 2331     Updated: 06/04/25 2339    Narrative:      NON-CONTRAST CT HEAD & CT CERVICAL SPINE     REASON:  The patient is being seen in the ED for trauma and is  determined to have a high energy mechanism and/or high risk for missed  injuries due to presence of associated injuries or distracting pain. The  patient will need imaging of the head per the trauma protocol given  diagnoses such as intracranial hemorrhage cannot be excluded.    The  patient will need imaging of the cervical spine given diagnoses such as  cervical spine fracture cannot be excluded.  The diagnostic information  gained in this study exceeds the estimated risk of radiation induced  injury at the time of order placement.  Mechanism of injury: Syncope and fall from standing, headache and neck  pain     COMPARISON STUDIES:  None Available.     TECHNIQUE:  Axial images were acquired from the skull base to vertex  without contrast, including multiplanar reformats, per standard  departmental protocol.   Routine cervical spine CT without contrast.  Multiplanar reformats were created. Radiation dose reduction techniques  were utilized, including automated exposure control, and exposure  modulation based on body size.     FINDINGS:     Head CT: There is no CT evidence of acute intracranial hemorrhage, mass,  or infarct. There is volume loss, but there is no evidence of  hydrocephalus or extra-axial fluid collection.  There are nonspecific  white matter changes, but there is no acute intracranial abnormality.   Skull base and calvarium show no acute abnormality, and the extracranial  soft tissues show no acute abnormality.     C-spine CT: There is no evidence of acute  alignment abnormality.  There  are spinal degenerative changes, but there is no fracture or other acute  bony abnormality.  The paraspinous soft tissues show no acute  abnormality.       Impression:         Negative unenhanced head CT, no acute abnormality.      Negative cervical spine CT, degenerative change without acute  abnormality.           This report was finalized on 6/4/2025 11:36 PM by Dr. Caden Ballard M.D on Workstation: [id number]       CT Cervical Spine Without Contrast [[id number]] Collected: 06/04/25 2331     Updated: 06/04/25 2339    Narrative:      NON-CONTRAST CT HEAD & CT CERVICAL SPINE     REASON:  The patient is being seen in the ED for trauma and is  determined to have a high energy mechanism and/or high risk for missed  injuries due to presence of associated injuries or distracting pain. The  patient will need imaging of the head per the trauma protocol given  diagnoses such as intracranial hemorrhage cannot be excluded.    The  patient will need imaging of the cervical spine given diagnoses such as  cervical spine fracture cannot be excluded.  The diagnostic information  gained in this study exceeds the estimated risk of radiation induced  injury at the time of order placement.  Mechanism of injury: Syncope and fall from standing, headache and neck  pain     COMPARISON STUDIES:  None Available.     TECHNIQUE:  Axial images were acquired from the skull base to vertex  without contrast, including multiplanar reformats, per standard  departmental protocol.   Routine cervical spine CT without contrast.  Multiplanar reformats were created. Radiation dose reduction techniques  were utilized, including automated exposure control, and exposure  modulation based on body size.     FINDINGS:     Head CT: There is no CT evidence of acute intracranial hemorrhage, mass,  or infarct. There is volume loss, but there is no evidence of  hydrocephalus or extra-axial fluid collection.  There are  nonspecific  white matter changes, but there is no acute intracranial abnormality.   Skull base and calvarium show no acute abnormality, and the extracranial  soft tissues show no acute abnormality.     C-spine CT: There is no evidence of acute alignment abnormality.  There  are spinal degenerative changes, but there is no fracture or other acute  bony abnormality.  The paraspinous soft tissues show no acute  abnormality.       Impression:         Negative unenhanced head CT, no acute abnormality.      Negative cervical spine CT, degenerative change without acute  abnormality.           This report was finalized on 6/4/2025 11:36 PM by Dr. Caden Ballard M.D on Workstation: [id number]             Results for orders placed during the hospital encounter of 06/04/25    Duplex Carotid Ultrasound CAR    Interpretation Summary    Right internal carotid artery demonstrates a less than 50% stenosis.    Antegrade right vertebral flow.    Left internal carotid artery demonstrates normal flow without evidence of hemodynamically significant stenosis.    Antegrade left vertebral flow.    Results for orders placed during the hospital encounter of 06/04/25    Adult Transthoracic Echo Complete W/ Cont if Necessary Per Protocol    Interpretation Summary    Left ventricular systolic function is low normal. Calculated left ventricular EF = 46.2%.  There is global left ventricular hypokinesis present.    Left ventricular diastolic function is consistent with (grade I) impaired relaxation.    Frequent ventricular ectopies noted during study.    Pertinent Labs     Results from last 7 days   Lab Units 06/16/25  0642 06/15/25  0649 06/14/25  0641 06/13/25  0832   WBC 10*3/mm3 3.95 4.45 4.29 5.46   HEMOGLOBIN g/dL 9.6* 9.9* 9.8* 10.6*   PLATELETS 10*3/mm3 149 136* 137* 150     Results from last 7 days   Lab Units 06/16/25  0642 06/15/25  0649 06/14/25  0930 06/13/25  0547   SODIUM mmol/L 135* 135* 132* 134*   POTASSIUM mmol/L 3.6 4.1  "3.8 3.9   CHLORIDE mmol/L 102 101 98 99   CO2 mmol/L 23.1 24.0 24.8 23.0   BUN mg/dL 15.0 14.0 14.0 14.0   CREATININE mg/dL 1.23 1.23 1.30* 1.24   GLUCOSE mg/dL 99 93 134* 102*   EGFR mL/min/1.73 59.0* 59.0* 55.2* 58.4*     Results from last 7 days   Lab Units 06/16/25  0642 06/15/25  0649 06/14/25  0930 06/13/25  0547 06/12/25  0644 06/11/25  0603 06/10/25  0542   ALBUMIN g/dL 2.8* 3.2* 3.6 2.9* 2.9* 2.8* 2.8*   BILIRUBIN mg/dL  --   --   --   --  0.4 0.4 0.4   ALK PHOS U/L  --   --   --   --  81 86 84   AST (SGOT) U/L  --   --   --   --  34 36 37   ALT (SGPT) U/L  --   --   --   --  20 18 18     Results from last 7 days   Lab Units 06/16/25  0642 06/15/25  0649 06/14/25 0930 06/13/25  0547   CALCIUM mg/dL 8.8 8.8 9.2 8.6   ALBUMIN g/dL 2.8* 3.2* 3.6 2.9*   MAGNESIUM mg/dL  --  1.8 1.9 1.8   PHOSPHORUS mg/dL 3.2 3.4 3.1 3.3       Results from last 7 days   Lab Units 06/14/25  1047 06/14/25  0930   HSTROP T ng/L 43* 45*     Results from last 7 days   Lab Units 06/13/25  0547 06/12/25  1609   SODIUM UR mmol/L  --  76   OSMOLALITY UR mOsm/kg  --  337   URIC ACID mg/dL 6.5  --          Invalid input(s): \"LDLCALC\"          Test Results Pending at Discharge     Pending Results       Procedure [Order ID] Specimen - Date/Time    Holter Monitor - 72 Hour Up To 15 Days - In process [[id number]] Resulted: 06/16/25 0918     Updated: 06/16/25 0918    This result has not been signed. Information might be incomplete.                Discharge Details        Discharge Medications        New Medications        Instructions Start Date   fludrocortisone 0.1 MG tablet   0.1 mg, Oral, 2 Times Daily      folic acid 1 MG tablet  Commonly known as: FOLVITE   1 mg, Oral, Daily   Start Date: June 17, 2025     metoprolol succinate XL 25 MG 24 hr tablet  Commonly known as: TOPROL-XL   25 mg, Oral, 2 Times Daily      midodrine 10 MG tablet  Commonly known as: PROAMATINE   10 mg, Oral, 3 Times Daily Before Meals      ranolazine 1000 MG 12 hr " tablet  Commonly known as: RANEXA   1,000 mg, Oral, Every 12 Hours Scheduled      thiamine 100 MG tablet  Commonly known as: VITAMIN B1   100 mg, Oral, Daily   Start Date: June 17, 2025            Changes to Medications        Instructions Start Date   levothyroxine 75 MCG tablet  Commonly known as: SYNTHROID, LEVOTHROID  What changed: Another medication with the same name was added. Make sure you understand how and when to take each.   TAKE 1 TABLET BY MOUTH EVERY MORNING INDICATIONS: UNDER ACTIVE THYROID      levothyroxine 125 MCG tablet  Commonly known as: SYNTHROID, LEVOTHROID  What changed: You were already taking a medication with the same name, and this prescription was added. Make sure you understand how and when to take each.   125 mcg, Oral, Every Early Morning   Start Date: June 17, 2025            Continue These Medications        Instructions Start Date   allopurinol 100 MG tablet  Commonly known as: ZYLOPRIM   100 mg, Oral, Daily      apixaban 2.5 MG tablet tablet  Commonly known as: Eliquis   2.5 mg, Oral, Every 12 Hours Scheduled      aspirin 81 MG chewable tablet   81 mg, Daily      atorvastatin 80 MG tablet  Commonly known as: LIPITOR   80 mg, Oral, Daily      cholecalciferol 25 MCG (1000 UT) tablet  Commonly known as: VITAMIN D3   1 tablet, Daily      multivitamin with minerals tablet tablet   1 tablet, Daily      Orgovyx 120 MG tablet tablet  Generic drug: relugolix   120 mg, Daily      VITAMIN B 12 PO   1,000 mg, Every Morning      Xtandi 40 MG tablet tablet  Generic drug: enzalutamide   4 tablets, Daily             Stop These Medications      albuterol sulfate  (90 Base) MCG/ACT inhaler  Commonly known as: PROVENTIL HFA;VENTOLIN HFA;PROAIR HFA     furosemide 40 MG tablet  Commonly known as: LASIX     lisinopril 2.5 MG tablet  Commonly known as: PRINIVIL,ZESTRIL     memantine 10 MG tablet  Commonly known as: NAMENDA     potassium chloride 10 MEQ CR tablet              Allergies   Allergen  Reactions    Nsaids GI Bleeding    Aricept [Donepezil] Diarrhea       Discharge Disposition:  Skilled Nursing Facility (DC - External)      Discharge Diet:  Diet Order   Procedures    Diet: Cardiac; Healthy Heart (2-3 Na+); Fluid Consistency: Thin (IDDSI 0)       Discharge Activity:   Activity Instructions       Activity as Tolerated              CODE STATUS:    Code Status and Medical Interventions: CPR (Attempt to Resuscitate); Full   Ordered at: 06/05/25 0157     Code Status (Patient has no pulse and is not breathing):    CPR (Attempt to Resuscitate)     Medical Interventions (Patient has pulse or is breathing):    Full       Future Appointments   Date Time Provider Department Center   7/16/2025  1:00 PM Sekou Archibald PA-C [facility]   9/18/2025  9:45 AM Jose Fonseca MD [facility]     Additional Instructions for the Follow-ups that You Need to Schedule       Discharge Follow-up with PCP   As directed       Currently Documented PCP:    Jose Fonseca MD    PCP Phone Number:    [phone]     Follow Up Details: 2-3 weeks        Discharge Follow-up with Specified Provider: Cardiology 1 month or sooner if directed; 1 Month   As directed      To: Cardiology 1 month or sooner if directed   Follow Up: 1 Month               Follow-up Information       Jose Fonseca MD .    Specialty: Family Medicine  Why: 2-3 weeks  Contact information:  [address] [facility] [address] [phone]                             Additional Instructions for the Follow-ups that You Need to Schedule       Discharge Follow-up with PCP   As directed       Currently Documented PCP:    Jose Fonseca MD    PCP Phone Number:    [phone]     Follow Up Details: 2-3 weeks        Discharge Follow-up with Specified Provider: Cardiology 1 month or sooner if directed; 1 Month   As directed      To: Cardiology 1 month or sooner if directed   Follow Up: 1 Month            Time Spent on  Discharge:  Greater than 30 minutes      Olaf Schmidt MD  [facility]  06/16/25  13:16 EDT    Electronically signed by Olaf Schmidt MD at 06/16/25 1318       Discharge Order (From admission, onward)       Start     Ordered    06/16/25 0824  Discharge patient  Once        Expected Discharge Date: 06/16/25   Discharge Disposition: Skilled Nursing Facility (DC - External)   Physician of Record for Attribution - Please select from Treatment Team: OLAF SCHMIDT [[id number]]   Review needed by CMO to determine Physician of Record: No      Question Answer Comment   Physician of Record for Attribution - Please select from Treatment Team OLAF SCHMIDT    Review needed by CMO to determine Physician of Record No        06/16/25 0879

## 2025-07-13 LAB
CV ZIO BASELINE AVG BPM: 79 BPM
CV ZIO BASELINE BPM HIGH: 167 BPM
CV ZIO BASELINE BPM LOW: 30 BPM
CV ZIO DEVICE ANALYSIS TIME: NORMAL
CV ZIO ECT SVE COUNT: 5452 EPISODES
CV ZIO ECT SVE CPLT COUNT: 0 EPISODES
CV ZIO ECT SVE CPLT FREQ: 0
CV ZIO ECT SVE FREQ: NORMAL
CV ZIO ECT SVE TPLT COUNT: 0 EPISODES
CV ZIO ECT SVE TPLT FREQ: 0
CV ZIO ECT VE COUNT: NORMAL EPISODES
CV ZIO ECT VE CPLT COUNT: 2377 EPISODES
CV ZIO ECT VE CPLT FREQ: NORMAL
CV ZIO ECT VE FREQ: NORMAL
CV ZIO ECT VE TPLT COUNT: 84 EPISODES
CV ZIO ECT VE TPLT FREQ: NORMAL
CV ZIO ECTOPIC SVE COUPLET RAW PERCENT: 0 %
CV ZIO ECTOPIC SVE ISOLATED PERCENT: 0.47 %
CV ZIO ECTOPIC SVE TRIPLET RAW PERCENT: 0 %
CV ZIO ECTOPIC VE COUPLET RAW PERCENT: 0.41 %
CV ZIO ECTOPIC VE ISOLATED PERCENT: 2.01 %
CV ZIO ECTOPIC VE TRIPLET RAW PERCENT: 0.02 %
CV ZIO ENROLLMENT END: NORMAL
CV ZIO ENROLLMENT START: NORMAL
CV ZIO L BIGEMINY DUR: 9.6 SEC
CV ZIO L BIGEMINY END: NORMAL
CV ZIO L BIGEMINY START: NORMAL
CV ZIO L TRIGEMINY DUR: 17.9 SEC
CV ZIO L TRIGEMINY END: NORMAL
CV ZIO L TRIGEMINY START: NORMAL
CV ZIO PATIENT EVENTS DIARIES: 0
CV ZIO PATIENT EVENTS TRIGGERS: 3
CV ZIO PAUSE COUNT: 0
CV ZIO PRESCRIPTION STATUS: NORMAL
CV ZIO SVT COUNT: 0
CV ZIO TOTAL  ENROLLMENT PERIOD: NORMAL
CV ZIO VT AVG BPM: 137 BPM
CV ZIO VT BPM HIGH: 167 BPM
CV ZIO VT BPM LOW: 87 BPM
CV ZIO VT COUNT: 6
CV ZIO VT F EPI AVG BPM: 149
CV ZIO VT F EPI BEATS: 4 BEATS
CV ZIO VT F EPI BPM HIGH: 167
CV ZIO VT F EPI BPM LOW: 138
CV ZIO VT F EPI DUR: 1.9 SEC
CV ZIO VT F EPI END: NORMAL
CV ZIO VT F EPI START: NORMAL
CV ZIO VT L EPI AVG BPM: 145
CV ZIO VT L EPI BEATS: 6 BEATS
CV ZIO VT L EPI BPM HIGH: 164 BPM
CV ZIO VT L EPI BPM LOW: 109 BPM
CV ZIO VT L EPI DUR: 2.6
CV ZIO VT L EPI END: NORMAL
CV ZIO VT L EPI START: NORMAL

## 2025-07-21 ENCOUNTER — TELEPHONE (OUTPATIENT)
Dept: CARDIOLOGY | Age: 80
End: 2025-07-21
Payer: MEDICARE

## 2025-07-21 NOTE — TELEPHONE ENCOUNTER
----- Message from Юлия Reid sent at 7/21/2025  1:53 PM EDT -----  Can you get him a follow up with me or Dr. Motta to go over monitor results? He was supposed to get set up with us 4-6 weeks after DC but never did. Thanks!

## 2025-07-21 NOTE — TELEPHONE ENCOUNTER
I called and spoke with patient's wife; patient is under hospice care, she does not wish to make any appointments for him currently.

## (undated) DEVICE — LOU CYSTO: Brand: MEDLINE INDUSTRIES, INC.

## (undated) DEVICE — SUCTION CANISTER, 3000CC,SAFELINER: Brand: DEROYAL

## (undated) DEVICE — MASK,FACE,FLUID RESIST,SHLD,EARLOOP: Brand: MEDLINE

## (undated) DEVICE — Device: Brand: DEFENDO AIR/WATER/SUCTION AND BIOPSY VALVE

## (undated) DEVICE — DGW .035 FC J3MM 260CM TEF: Brand: EMERALD

## (undated) DEVICE — KT MANIFLD CARDIAC

## (undated) DEVICE — GLV SURG BIOGEL LTX PF 7

## (undated) DEVICE — JACKT LAB F/R KNIT CUFF/COLR XLG BLU

## (undated) DEVICE — TIDISHIELD UROLOGY DRAIN BAGS FROSTY VINYL STERILE FITS SIEMENS UROSKOP ACCESS 20 PER CASE: Brand: TIDISHIELD

## (undated) DEVICE — SYR LL 3CC

## (undated) DEVICE — DRSNG TELFA PAD NONADH STR 1S 3X4IN

## (undated) DEVICE — FRCP BX RADJAW4 NDL 2.8 240CM LG OG BX40

## (undated) DEVICE — CATH DIAG IMPULSE FL3.5 5F 100CM

## (undated) DEVICE — JACKT LAB KNIT COLR LG BLU

## (undated) DEVICE — Device

## (undated) DEVICE — TUR/ENDOSCOPIC CABLE, 10' (3.05 M): Brand: CONMED

## (undated) DEVICE — GOWN ISOL W/THUMB UNIV BLU BX/15

## (undated) DEVICE — CANN NASL CO2 TRULINK W/O2 A/

## (undated) DEVICE — THE VASC BAND HEMOSTAT IS A COMPRESSION DEVICE TO ASSIST HEMOSTASIS OF ARTERIAL, VENOUS AND HEMODIALYSIS PERCUTANEOUS ACCESS SITES.: Brand: VASC BAND™ HEMOSTAT

## (undated) DEVICE — ERBE NESSY®PLATE 170 SPLIT; 168CM²; CABLE 3M: Brand: ERBE

## (undated) DEVICE — ENDO. PORT CONNECTOR W/VALVE FOR OLYMPUS® SCOPES: Brand: ERBE

## (undated) DEVICE — SPNG GZ WOVN 4X4IN 12PLY 10/BX STRL

## (undated) DEVICE — THE TORRENT IRRIGATION SCOPE CONNECTOR IS USED WITH THE TORRENT IRRIGATION TUBING TO PROVIDE IRRIGATION FLUIDS SUCH AS STERILE WATER DURING GASTROINTESTINAL ENDOSCOPIC PROCEDURES WHEN USED IN CONJUNCTION WITH AN IRRIGATION PUMP (OR ELECTROSURGICAL UNIT).: Brand: TORRENT

## (undated) DEVICE — TUBING, SUCTION, 1/4" X 10', STRAIGHT: Brand: MEDLINE

## (undated) DEVICE — PK CATH CARD 40

## (undated) DEVICE — BW-412T DISP COMBO CLEANING BRUSH: Brand: SINGLE USE COMBINATION CLEANING BRUSH

## (undated) DEVICE — 10ML ENTERAL/ORAL SYRINGE: Brand: AMERITUS

## (undated) DEVICE — GLIDESHEATH SLENDER STAINLESS STEEL KIT: Brand: GLIDESHEATH SLENDER

## (undated) DEVICE — VIAL FORMALIN CAP 10P 40ML

## (undated) DEVICE — GLV SURG SENSICARE MICRO PF LF 7.5 STRL

## (undated) DEVICE — CATH DIAG DXTERITY ULTRA TRANSRADIAL 4.0 5F 100CM 0/SH